# Patient Record
Sex: FEMALE | Race: WHITE | NOT HISPANIC OR LATINO | Employment: OTHER | URBAN - METROPOLITAN AREA
[De-identification: names, ages, dates, MRNs, and addresses within clinical notes are randomized per-mention and may not be internally consistent; named-entity substitution may affect disease eponyms.]

---

## 2017-01-03 ENCOUNTER — ALLSCRIPTS OFFICE VISIT (OUTPATIENT)
Dept: OTHER | Facility: OTHER | Age: 82
End: 2017-01-03

## 2017-01-05 ENCOUNTER — HOSPITAL ENCOUNTER (OUTPATIENT)
Dept: RADIOLOGY | Age: 82
Discharge: HOME | End: 2017-01-05
Payer: MEDICARE

## 2017-01-05 ENCOUNTER — TRANSCRIBE ORDERS (OUTPATIENT)
Dept: ADMINISTRATIVE | Age: 82
End: 2017-01-05

## 2017-01-05 DIAGNOSIS — M54.2 CERVICALGIA: ICD-10-CM

## 2017-01-05 PROCEDURE — 72050 X-RAY EXAM NECK SPINE 4/5VWS: CPT

## 2017-01-17 ENCOUNTER — ALLSCRIPTS OFFICE VISIT (OUTPATIENT)
Dept: OTHER | Facility: OTHER | Age: 82
End: 2017-01-17

## 2017-01-17 DIAGNOSIS — M23.50: ICD-10-CM

## 2017-01-19 ENCOUNTER — APPOINTMENT (OUTPATIENT)
Dept: PHYSICAL THERAPY | Age: 82
End: 2017-01-19
Payer: MEDICARE

## 2017-01-19 ENCOUNTER — GENERIC CONVERSION - ENCOUNTER (OUTPATIENT)
Dept: OTHER | Facility: OTHER | Age: 82
End: 2017-01-19

## 2017-01-19 DIAGNOSIS — M23.50: ICD-10-CM

## 2017-01-19 PROCEDURE — 97162 PT EVAL MOD COMPLEX 30 MIN: CPT

## 2017-01-19 PROCEDURE — G8979 MOBILITY GOAL STATUS: HCPCS

## 2017-01-19 PROCEDURE — G8978 MOBILITY CURRENT STATUS: HCPCS

## 2017-01-21 ENCOUNTER — OFFICE VISIT (OUTPATIENT)
Dept: URGENT CARE | Age: 82
End: 2017-01-21
Payer: MEDICARE

## 2017-01-21 PROCEDURE — 99204 OFFICE O/P NEW MOD 45 MIN: CPT | Performed by: FAMILY MEDICINE

## 2017-01-21 PROCEDURE — 99215 OFFICE O/P EST HI 40 MIN: CPT | Performed by: FAMILY MEDICINE

## 2017-01-21 PROCEDURE — G0463 HOSPITAL OUTPT CLINIC VISIT: HCPCS | Performed by: FAMILY MEDICINE

## 2017-01-24 ENCOUNTER — APPOINTMENT (OUTPATIENT)
Dept: PHYSICAL THERAPY | Age: 82
End: 2017-01-24
Payer: MEDICARE

## 2017-01-24 PROCEDURE — 97110 THERAPEUTIC EXERCISES: CPT

## 2017-01-26 ENCOUNTER — APPOINTMENT (OUTPATIENT)
Dept: PHYSICAL THERAPY | Age: 82
End: 2017-01-26
Payer: MEDICARE

## 2017-01-26 ENCOUNTER — GENERIC CONVERSION - ENCOUNTER (OUTPATIENT)
Dept: OTHER | Facility: OTHER | Age: 82
End: 2017-01-26

## 2017-01-26 PROCEDURE — 97110 THERAPEUTIC EXERCISES: CPT

## 2017-01-31 ENCOUNTER — APPOINTMENT (OUTPATIENT)
Dept: PHYSICAL THERAPY | Age: 82
End: 2017-01-31
Payer: MEDICARE

## 2017-01-31 PROCEDURE — 97110 THERAPEUTIC EXERCISES: CPT

## 2017-02-02 ENCOUNTER — GENERIC CONVERSION - ENCOUNTER (OUTPATIENT)
Dept: OTHER | Facility: OTHER | Age: 82
End: 2017-02-02

## 2017-02-02 ENCOUNTER — APPOINTMENT (OUTPATIENT)
Dept: PHYSICAL THERAPY | Age: 82
End: 2017-02-02
Payer: MEDICARE

## 2017-02-02 PROCEDURE — 97110 THERAPEUTIC EXERCISES: CPT

## 2017-02-03 ENCOUNTER — GENERIC CONVERSION - ENCOUNTER (OUTPATIENT)
Dept: OTHER | Facility: OTHER | Age: 82
End: 2017-02-03

## 2017-02-06 ENCOUNTER — ALLSCRIPTS OFFICE VISIT (OUTPATIENT)
Dept: OTHER | Facility: OTHER | Age: 82
End: 2017-02-06

## 2017-02-07 ENCOUNTER — APPOINTMENT (OUTPATIENT)
Dept: PHYSICAL THERAPY | Age: 82
End: 2017-02-07
Payer: MEDICARE

## 2017-02-07 PROCEDURE — 97110 THERAPEUTIC EXERCISES: CPT

## 2017-02-08 ENCOUNTER — GENERIC CONVERSION - ENCOUNTER (OUTPATIENT)
Dept: OTHER | Facility: OTHER | Age: 82
End: 2017-02-08

## 2017-02-09 ENCOUNTER — APPOINTMENT (OUTPATIENT)
Dept: PHYSICAL THERAPY | Age: 82
End: 2017-02-09
Payer: MEDICARE

## 2017-02-14 ENCOUNTER — APPOINTMENT (OUTPATIENT)
Dept: PHYSICAL THERAPY | Age: 82
End: 2017-02-14
Payer: MEDICARE

## 2017-02-14 PROCEDURE — 97110 THERAPEUTIC EXERCISES: CPT

## 2017-02-16 ENCOUNTER — APPOINTMENT (OUTPATIENT)
Dept: PHYSICAL THERAPY | Age: 82
End: 2017-02-16
Payer: MEDICARE

## 2017-02-16 PROCEDURE — 97140 MANUAL THERAPY 1/> REGIONS: CPT

## 2017-02-16 PROCEDURE — 97110 THERAPEUTIC EXERCISES: CPT

## 2017-02-21 ENCOUNTER — APPOINTMENT (OUTPATIENT)
Dept: PHYSICAL THERAPY | Age: 82
End: 2017-02-21
Payer: MEDICARE

## 2017-02-21 ENCOUNTER — GENERIC CONVERSION - ENCOUNTER (OUTPATIENT)
Dept: OTHER | Facility: OTHER | Age: 82
End: 2017-02-21

## 2017-02-21 PROCEDURE — 97110 THERAPEUTIC EXERCISES: CPT

## 2017-02-21 PROCEDURE — G8979 MOBILITY GOAL STATUS: HCPCS

## 2017-02-21 PROCEDURE — G8978 MOBILITY CURRENT STATUS: HCPCS

## 2017-02-23 ENCOUNTER — APPOINTMENT (OUTPATIENT)
Dept: PHYSICAL THERAPY | Age: 82
End: 2017-02-23
Payer: MEDICARE

## 2017-02-23 ENCOUNTER — APPOINTMENT (OUTPATIENT)
Dept: LAB | Age: 82
End: 2017-02-23
Payer: MEDICARE

## 2017-02-23 ENCOUNTER — TRANSCRIBE ORDERS (OUTPATIENT)
Dept: ADMINISTRATIVE | Age: 82
End: 2017-02-23

## 2017-02-23 ENCOUNTER — GENERIC CONVERSION - ENCOUNTER (OUTPATIENT)
Dept: OTHER | Facility: OTHER | Age: 82
End: 2017-02-23

## 2017-02-23 DIAGNOSIS — E11.9 TYPE 2 DIABETES MELLITUS WITHOUT COMPLICATIONS (HCC): ICD-10-CM

## 2017-02-23 DIAGNOSIS — D64.9 ANEMIA: ICD-10-CM

## 2017-02-23 LAB
ALBUMIN SERPL BCP-MCNC: 4 G/DL (ref 3.5–5)
ALP SERPL-CCNC: 92 U/L (ref 46–116)
ALT SERPL W P-5'-P-CCNC: 24 U/L (ref 12–78)
ANION GAP SERPL CALCULATED.3IONS-SCNC: 10 MMOL/L (ref 4–13)
AST SERPL W P-5'-P-CCNC: 18 U/L (ref 5–45)
BILIRUB SERPL-MCNC: 0.32 MG/DL (ref 0.2–1)
BUN SERPL-MCNC: 16 MG/DL (ref 5–25)
CALCIUM SERPL-MCNC: 10 MG/DL (ref 8.3–10.1)
CHLORIDE SERPL-SCNC: 100 MMOL/L (ref 100–108)
CO2 SERPL-SCNC: 29 MMOL/L (ref 21–32)
CREAT SERPL-MCNC: 1.21 MG/DL (ref 0.6–1.3)
EST. AVERAGE GLUCOSE BLD GHB EST-MCNC: 134 MG/DL
GFR SERPL CREATININE-BSD FRML MDRD: 42.6 ML/MIN/1.73SQ M
GLUCOSE SERPL-MCNC: 171 MG/DL (ref 65–140)
HBA1C MFR BLD: 6.3 % (ref 4.2–6.3)
POTASSIUM SERPL-SCNC: 3.3 MMOL/L (ref 3.5–5.3)
PROT SERPL-MCNC: 8 G/DL (ref 6.4–8.2)
SODIUM SERPL-SCNC: 139 MMOL/L (ref 136–145)

## 2017-02-23 PROCEDURE — 97010 HOT OR COLD PACKS THERAPY: CPT

## 2017-02-23 PROCEDURE — 80053 COMPREHEN METABOLIC PANEL: CPT

## 2017-02-23 PROCEDURE — 83036 HEMOGLOBIN GLYCOSYLATED A1C: CPT

## 2017-02-23 PROCEDURE — 97110 THERAPEUTIC EXERCISES: CPT

## 2017-02-23 PROCEDURE — 36415 COLL VENOUS BLD VENIPUNCTURE: CPT

## 2017-02-24 ENCOUNTER — GENERIC CONVERSION - ENCOUNTER (OUTPATIENT)
Dept: OTHER | Facility: OTHER | Age: 82
End: 2017-02-24

## 2017-02-28 ENCOUNTER — ALLSCRIPTS OFFICE VISIT (OUTPATIENT)
Dept: OTHER | Facility: OTHER | Age: 82
End: 2017-02-28

## 2017-02-28 ENCOUNTER — APPOINTMENT (OUTPATIENT)
Dept: PHYSICAL THERAPY | Age: 82
End: 2017-02-28
Payer: MEDICARE

## 2017-02-28 LAB
HBA1C MFR BLD HPLC: 6 %
MAX DIASTOLIC BP: 78 MMHG
MAX HEART RATE: 118 BPM
MAX PREDICTED HEART RATE: 138 BPM
MAX. SYSTOLIC BP: 158 MMHG
PROTOCOL NAME: NORMAL
REASON FOR TERMINATION: NORMAL
TARGET HR FORMULA: NORMAL
TEST INDICATION: NORMAL
TIME IN EXERCISE PHASE: 180 S

## 2017-03-02 ENCOUNTER — APPOINTMENT (OUTPATIENT)
Dept: PHYSICAL THERAPY | Age: 82
End: 2017-03-02
Payer: MEDICARE

## 2017-03-02 ENCOUNTER — ALLSCRIPTS OFFICE VISIT (OUTPATIENT)
Dept: OTHER | Facility: OTHER | Age: 82
End: 2017-03-02

## 2017-03-02 PROCEDURE — 97110 THERAPEUTIC EXERCISES: CPT

## 2017-03-10 ENCOUNTER — ALLSCRIPTS OFFICE VISIT (OUTPATIENT)
Dept: RADIOLOGY | Facility: CLINIC | Age: 82
End: 2017-03-10
Payer: MEDICARE

## 2017-03-24 ENCOUNTER — GENERIC CONVERSION - ENCOUNTER (OUTPATIENT)
Dept: OTHER | Facility: OTHER | Age: 82
End: 2017-03-24

## 2017-03-28 ENCOUNTER — TRANSCRIBE ORDERS (OUTPATIENT)
Dept: ADMINISTRATIVE | Age: 82
End: 2017-03-28

## 2017-03-30 ENCOUNTER — APPOINTMENT (OUTPATIENT)
Dept: LAB | Age: 82
End: 2017-03-30
Payer: MEDICARE

## 2017-03-30 ENCOUNTER — TRANSCRIBE ORDERS (OUTPATIENT)
Dept: ADMINISTRATIVE | Age: 82
End: 2017-03-30

## 2017-03-30 ENCOUNTER — LAB CONVERSION - ENCOUNTER (OUTPATIENT)
Dept: OTHER | Facility: OTHER | Age: 82
End: 2017-03-30

## 2017-03-30 DIAGNOSIS — E78.5 HYPERLIPIDEMIA, UNSPECIFIED HYPERLIPIDEMIA TYPE: ICD-10-CM

## 2017-03-30 DIAGNOSIS — Z95.2 HEART VALVE REPLACED BY TRANSPLANT: ICD-10-CM

## 2017-03-30 DIAGNOSIS — Z86.2 PERSONAL HISTORY OF DISEASES OF BLOOD AND BLOOD-FORMING ORGANS: ICD-10-CM

## 2017-03-30 DIAGNOSIS — I25.119 ATHEROSCLEROSIS OF NATIVE CORONARY ARTERY WITH ANGINA PECTORIS, UNSPECIFIED WHETHER NATIVE OR TRANSPLANTED HEART (HCC): ICD-10-CM

## 2017-03-30 DIAGNOSIS — Z86.2 PERSONAL HISTORY OF DISEASES OF BLOOD AND BLOOD-FORMING ORGANS: Primary | ICD-10-CM

## 2017-03-30 DIAGNOSIS — I25.119 ATHEROSCLEROSIS OF NATIVE CORONARY ARTERY WITH ANGINA PECTORIS, UNSPECIFIED WHETHER NATIVE OR TRANSPLANTED HEART (HCC): Primary | ICD-10-CM

## 2017-03-30 LAB
BASOPHILS # BLD AUTO: 0.09 THOUSANDS/ΜL (ref 0–0.1)
BASOPHILS NFR BLD AUTO: 1 % (ref 0–1)
EOSINOPHIL # BLD AUTO: 0.61 THOUSAND/ΜL (ref 0–0.61)
EOSINOPHIL NFR BLD AUTO: 5 % (ref 0–6)
ERYTHROCYTE [DISTWIDTH] IN BLOOD BY AUTOMATED COUNT: 14 % (ref 11.6–15.1)
FERRITIN SERPL-MCNC: 26 NG/ML (ref 8–388)
HCT VFR BLD AUTO: 39.4 % (ref 34.8–46.1)
HGB BLD-MCNC: 12.3 G/DL (ref 11.5–15.4)
LYMPHOCYTES # BLD AUTO: 1.6 THOUSANDS/ΜL (ref 0.6–4.47)
LYMPHOCYTES NFR BLD AUTO: 14 % (ref 14–44)
MCH RBC QN AUTO: 28 PG (ref 26.8–34.3)
MCHC RBC AUTO-ENTMCNC: 31.2 G/DL (ref 31.4–37.4)
MCV RBC AUTO: 90 FL (ref 82–98)
MONOCYTES # BLD AUTO: 1 THOUSAND/ΜL (ref 0.17–1.22)
MONOCYTES NFR BLD AUTO: 9 % (ref 4–12)
NEUTROPHILS # BLD AUTO: 8.43 THOUSANDS/ΜL (ref 1.85–7.62)
NEUTS SEG NFR BLD AUTO: 71 % (ref 43–75)
NRBC BLD AUTO-RTO: 0 /100 WBCS
PLATELET # BLD AUTO: 358 THOUSANDS/UL (ref 149–390)
PMV BLD AUTO: 10.7 FL (ref 8.9–12.7)
RBC # BLD AUTO: 4.4 MILLION/UL (ref 3.81–5.12)
WBC # BLD AUTO: 11.78 THOUSAND/UL (ref 4.31–10.16)

## 2017-03-30 PROCEDURE — 82728 ASSAY OF FERRITIN: CPT

## 2017-03-30 PROCEDURE — 85025 COMPLETE CBC W/AUTO DIFF WBC: CPT

## 2017-03-30 PROCEDURE — 36415 COLL VENOUS BLD VENIPUNCTURE: CPT

## 2017-04-05 ENCOUNTER — ALLSCRIPTS OFFICE VISIT (OUTPATIENT)
Dept: OTHER | Facility: OTHER | Age: 82
End: 2017-04-05

## 2017-04-11 ENCOUNTER — ALLSCRIPTS OFFICE VISIT (OUTPATIENT)
Dept: OTHER | Facility: OTHER | Age: 82
End: 2017-04-11

## 2017-05-02 ENCOUNTER — ALLSCRIPTS OFFICE VISIT (OUTPATIENT)
Dept: OTHER | Facility: OTHER | Age: 82
End: 2017-05-02

## 2017-05-11 ENCOUNTER — GENERIC CONVERSION - ENCOUNTER (OUTPATIENT)
Dept: OTHER | Facility: OTHER | Age: 82
End: 2017-05-11

## 2017-06-01 ENCOUNTER — TRANSCRIBE ORDERS (OUTPATIENT)
Dept: ADMINISTRATIVE | Age: 82
End: 2017-06-01

## 2017-06-01 ENCOUNTER — APPOINTMENT (OUTPATIENT)
Dept: LAB | Age: 82
End: 2017-06-01
Payer: MEDICARE

## 2017-06-01 DIAGNOSIS — D50.9 IRON DEFICIENCY ANEMIA: ICD-10-CM

## 2017-06-01 LAB
BASOPHILS # BLD AUTO: 0.09 THOUSANDS/ΜL (ref 0–0.1)
BASOPHILS NFR BLD AUTO: 1 % (ref 0–1)
EOSINOPHIL # BLD AUTO: 0.55 THOUSAND/ΜL (ref 0–0.61)
EOSINOPHIL NFR BLD AUTO: 4 % (ref 0–6)
ERYTHROCYTE [DISTWIDTH] IN BLOOD BY AUTOMATED COUNT: 14.2 % (ref 11.6–15.1)
FERRITIN SERPL-MCNC: 28 NG/ML (ref 8–388)
HCT VFR BLD AUTO: 40.8 % (ref 34.8–46.1)
HGB BLD-MCNC: 12.7 G/DL (ref 11.5–15.4)
LYMPHOCYTES # BLD AUTO: 1.83 THOUSANDS/ΜL (ref 0.6–4.47)
LYMPHOCYTES NFR BLD AUTO: 14 % (ref 14–44)
MCH RBC QN AUTO: 28.5 PG (ref 26.8–34.3)
MCHC RBC AUTO-ENTMCNC: 31.1 G/DL (ref 31.4–37.4)
MCV RBC AUTO: 92 FL (ref 82–98)
MONOCYTES # BLD AUTO: 0.68 THOUSAND/ΜL (ref 0.17–1.22)
MONOCYTES NFR BLD AUTO: 5 % (ref 4–12)
NEUTROPHILS # BLD AUTO: 9.88 THOUSANDS/ΜL (ref 1.85–7.62)
NEUTS SEG NFR BLD AUTO: 76 % (ref 43–75)
NRBC BLD AUTO-RTO: 0 /100 WBCS
PLATELET # BLD AUTO: 388 THOUSANDS/UL (ref 149–390)
PMV BLD AUTO: 10.7 FL (ref 8.9–12.7)
RBC # BLD AUTO: 4.45 MILLION/UL (ref 3.81–5.12)
WBC # BLD AUTO: 13.06 THOUSAND/UL (ref 4.31–10.16)

## 2017-06-01 PROCEDURE — 36415 COLL VENOUS BLD VENIPUNCTURE: CPT

## 2017-06-01 PROCEDURE — 85025 COMPLETE CBC W/AUTO DIFF WBC: CPT

## 2017-06-01 PROCEDURE — 82728 ASSAY OF FERRITIN: CPT

## 2017-06-07 ENCOUNTER — ALLSCRIPTS OFFICE VISIT (OUTPATIENT)
Dept: OTHER | Facility: OTHER | Age: 82
End: 2017-06-07

## 2017-06-07 DIAGNOSIS — F03.90 DEMENTIA WITHOUT BEHAVIORAL DISTURBANCE (HCC): ICD-10-CM

## 2017-06-07 DIAGNOSIS — R60.9 EDEMA: ICD-10-CM

## 2017-06-07 DIAGNOSIS — E11.9 TYPE 2 DIABETES MELLITUS WITHOUT COMPLICATIONS (HCC): ICD-10-CM

## 2017-06-07 DIAGNOSIS — N39.0 URINARY TRACT INFECTION: ICD-10-CM

## 2017-06-07 DIAGNOSIS — E03.9 HYPOTHYROIDISM: ICD-10-CM

## 2017-06-07 DIAGNOSIS — I10 ESSENTIAL (PRIMARY) HYPERTENSION: ICD-10-CM

## 2017-06-15 ENCOUNTER — HOSPITAL ENCOUNTER (OUTPATIENT)
Dept: RADIOLOGY | Age: 82
Discharge: HOME | End: 2017-06-15
Payer: MEDICARE

## 2017-06-15 ENCOUNTER — APPOINTMENT (OUTPATIENT)
Dept: LAB | Age: 82
End: 2017-06-15
Payer: MEDICARE

## 2017-06-15 ENCOUNTER — TRANSCRIBE ORDERS (OUTPATIENT)
Dept: ADMINISTRATIVE | Age: 82
End: 2017-06-15

## 2017-06-15 DIAGNOSIS — E11.9 TYPE 2 DIABETES MELLITUS WITHOUT COMPLICATIONS (HCC): ICD-10-CM

## 2017-06-15 DIAGNOSIS — I10 ESSENTIAL (PRIMARY) HYPERTENSION: ICD-10-CM

## 2017-06-15 DIAGNOSIS — E03.9 HYPOTHYROIDISM: ICD-10-CM

## 2017-06-15 DIAGNOSIS — F03.90 DEMENTIA WITHOUT BEHAVIORAL DISTURBANCE (HCC): ICD-10-CM

## 2017-06-15 DIAGNOSIS — R60.9 EDEMA: ICD-10-CM

## 2017-06-15 DIAGNOSIS — N39.0 URINARY TRACT INFECTION: ICD-10-CM

## 2017-06-15 LAB
ALBUMIN SERPL BCP-MCNC: 4 G/DL (ref 3.5–5)
ALP SERPL-CCNC: 96 U/L (ref 46–116)
ALT SERPL W P-5'-P-CCNC: 27 U/L (ref 12–78)
ANION GAP SERPL CALCULATED.3IONS-SCNC: 9 MMOL/L (ref 4–13)
AST SERPL W P-5'-P-CCNC: 21 U/L (ref 5–45)
BACTERIA UR QL AUTO: ABNORMAL /HPF
BILIRUB SERPL-MCNC: 0.52 MG/DL (ref 0.2–1)
BILIRUB UR QL STRIP: NEGATIVE
BUN SERPL-MCNC: 19 MG/DL (ref 5–25)
CALCIUM SERPL-MCNC: 9.7 MG/DL (ref 8.3–10.1)
CHLORIDE SERPL-SCNC: 95 MMOL/L (ref 100–108)
CLARITY UR: ABNORMAL
CO2 SERPL-SCNC: 30 MMOL/L (ref 21–32)
COLOR UR: YELLOW
CREAT SERPL-MCNC: 1.07 MG/DL (ref 0.6–1.3)
EST. AVERAGE GLUCOSE BLD GHB EST-MCNC: 134 MG/DL
GFR SERPL CREATININE-BSD FRML MDRD: 49.1 ML/MIN/1.73SQ M
GLUCOSE P FAST SERPL-MCNC: 117 MG/DL (ref 65–99)
GLUCOSE UR STRIP-MCNC: NEGATIVE MG/DL
HBA1C MFR BLD: 6.3 % (ref 4.2–6.3)
HGB UR QL STRIP.AUTO: NEGATIVE
HYALINE CASTS #/AREA URNS LPF: ABNORMAL /LPF
KETONES UR STRIP-MCNC: NEGATIVE MG/DL
LEUKOCYTE ESTERASE UR QL STRIP: ABNORMAL
NITRITE UR QL STRIP: NEGATIVE
NON-SQ EPI CELLS URNS QL MICRO: ABNORMAL /HPF
PH UR STRIP.AUTO: 6.5 [PH] (ref 4.5–8)
POTASSIUM SERPL-SCNC: 3.6 MMOL/L (ref 3.5–5.3)
PROT SERPL-MCNC: 7.9 G/DL (ref 6.4–8.2)
PROT UR STRIP-MCNC: ABNORMAL MG/DL
RBC #/AREA URNS AUTO: ABNORMAL /HPF
SODIUM SERPL-SCNC: 134 MMOL/L (ref 136–145)
SP GR UR STRIP.AUTO: 1.02 (ref 1–1.03)
T4 FREE SERPL-MCNC: 1.31 NG/DL (ref 0.76–1.46)
TSH SERPL DL<=0.05 MIU/L-ACNC: 1.73 UIU/ML (ref 0.36–3.74)
UROBILINOGEN UR QL STRIP.AUTO: 0.2 E.U./DL
VIT B12 SERPL-MCNC: 656 PG/ML (ref 100–900)
WBC #/AREA URNS AUTO: ABNORMAL /HPF

## 2017-06-15 PROCEDURE — 82607 VITAMIN B-12: CPT

## 2017-06-15 PROCEDURE — 70450 CT HEAD/BRAIN W/O DYE: CPT

## 2017-06-15 PROCEDURE — 81001 URINALYSIS AUTO W/SCOPE: CPT

## 2017-06-15 PROCEDURE — 84439 ASSAY OF FREE THYROXINE: CPT

## 2017-06-15 PROCEDURE — 36415 COLL VENOUS BLD VENIPUNCTURE: CPT

## 2017-06-15 PROCEDURE — 84443 ASSAY THYROID STIM HORMONE: CPT

## 2017-06-15 PROCEDURE — 83036 HEMOGLOBIN GLYCOSYLATED A1C: CPT

## 2017-06-15 PROCEDURE — 80053 COMPREHEN METABOLIC PANEL: CPT

## 2017-06-21 ENCOUNTER — ALLSCRIPTS OFFICE VISIT (OUTPATIENT)
Dept: OTHER | Facility: OTHER | Age: 82
End: 2017-06-21

## 2017-06-26 ENCOUNTER — TRANSCRIBE ORDERS (OUTPATIENT)
Dept: ADMINISTRATIVE | Facility: HOSPITAL | Age: 82
End: 2017-06-26

## 2017-06-26 DIAGNOSIS — I35.0 NODULAR CALCIFIC AORTIC VALVE STENOSIS: Primary | ICD-10-CM

## 2017-06-29 ENCOUNTER — ALLSCRIPTS OFFICE VISIT (OUTPATIENT)
Dept: OTHER | Facility: OTHER | Age: 82
End: 2017-06-29

## 2017-07-03 ENCOUNTER — ALLSCRIPTS OFFICE VISIT (OUTPATIENT)
Dept: OTHER | Facility: OTHER | Age: 82
End: 2017-07-03

## 2017-07-03 DIAGNOSIS — D50.9 IRON DEFICIENCY ANEMIA: ICD-10-CM

## 2017-07-07 DIAGNOSIS — I25.10 ATHEROSCLEROTIC HEART DISEASE OF NATIVE CORONARY ARTERY WITHOUT ANGINA PECTORIS: ICD-10-CM

## 2017-07-07 DIAGNOSIS — E78.5 HYPERLIPIDEMIA: ICD-10-CM

## 2017-07-07 DIAGNOSIS — Z95.2 PRESENCE OF PROSTHETIC HEART VALVE: ICD-10-CM

## 2017-07-18 ENCOUNTER — TRANSCRIBE ORDERS (OUTPATIENT)
Dept: LAB | Facility: CLINIC | Age: 82
End: 2017-07-18

## 2017-07-18 ENCOUNTER — HOSPITAL ENCOUNTER (OUTPATIENT)
Dept: NON INVASIVE DIAGNOSTICS | Facility: CLINIC | Age: 82
Discharge: HOME | End: 2017-07-18
Payer: MEDICARE

## 2017-07-18 ENCOUNTER — OFFICE VISIT (OUTPATIENT)
Dept: LAB | Facility: CLINIC | Age: 82
End: 2017-07-18
Payer: MEDICARE

## 2017-07-18 ENCOUNTER — APPOINTMENT (OUTPATIENT)
Dept: LAB | Facility: CLINIC | Age: 82
End: 2017-07-18
Payer: MEDICARE

## 2017-07-18 ENCOUNTER — GENERIC CONVERSION - ENCOUNTER (OUTPATIENT)
Dept: OTHER | Facility: OTHER | Age: 82
End: 2017-07-18

## 2017-07-18 DIAGNOSIS — I35.0 NONRHEUMATIC AORTIC VALVE STENOSIS: ICD-10-CM

## 2017-07-18 DIAGNOSIS — Z95.2 PRESENCE OF PROSTHETIC HEART VALVE: ICD-10-CM

## 2017-07-18 DIAGNOSIS — I35.0 NODULAR CALCIFIC AORTIC VALVE STENOSIS: ICD-10-CM

## 2017-07-18 LAB
ANION GAP SERPL CALCULATED.3IONS-SCNC: 9 MMOL/L (ref 4–13)
ATRIAL RATE: 82 BPM
BUN SERPL-MCNC: 18 MG/DL (ref 5–25)
CALCIUM SERPL-MCNC: 9.8 MG/DL (ref 8.3–10.1)
CHLORIDE SERPL-SCNC: 99 MMOL/L (ref 100–108)
CO2 SERPL-SCNC: 31 MMOL/L (ref 21–32)
CREAT SERPL-MCNC: 0.95 MG/DL (ref 0.6–1.3)
ERYTHROCYTE [DISTWIDTH] IN BLOOD BY AUTOMATED COUNT: 13.6 % (ref 11.6–15.1)
GFR SERPL CREATININE-BSD FRML MDRD: 56.3 ML/MIN/1.73SQ M
GLUCOSE SERPL-MCNC: 99 MG/DL (ref 65–140)
HCT VFR BLD AUTO: 38 % (ref 34.8–46.1)
HGB BLD-MCNC: 11.9 G/DL (ref 11.5–15.4)
MCH RBC QN AUTO: 28.2 PG (ref 26.8–34.3)
MCHC RBC AUTO-ENTMCNC: 31.3 G/DL (ref 31.4–37.4)
MCV RBC AUTO: 90 FL (ref 82–98)
P AXIS: 62 DEGREES
PLATELET # BLD AUTO: 424 THOUSANDS/UL (ref 149–390)
PMV BLD AUTO: 9.8 FL (ref 8.9–12.7)
POTASSIUM SERPL-SCNC: 3.6 MMOL/L (ref 3.5–5.3)
PR INTERVAL: 200 MS
QRS AXIS: -67 DEGREES
QRSD INTERVAL: 180 MS
QT INTERVAL: 464 MS
QTC INTERVAL: 542 MS
RBC # BLD AUTO: 4.22 MILLION/UL (ref 3.81–5.12)
SODIUM SERPL-SCNC: 139 MMOL/L (ref 136–145)
T WAVE AXIS: 87 DEGREES
VENTRICULAR RATE: 82 BPM
WBC # BLD AUTO: 12.83 THOUSAND/UL (ref 4.31–10.16)

## 2017-07-18 PROCEDURE — 36415 COLL VENOUS BLD VENIPUNCTURE: CPT

## 2017-07-18 PROCEDURE — 93306 TTE W/DOPPLER COMPLETE: CPT

## 2017-07-18 PROCEDURE — 93005 ELECTROCARDIOGRAM TRACING: CPT

## 2017-07-18 PROCEDURE — 85027 COMPLETE CBC AUTOMATED: CPT

## 2017-07-18 PROCEDURE — 80048 BASIC METABOLIC PNL TOTAL CA: CPT

## 2017-07-19 ENCOUNTER — ALLSCRIPTS OFFICE VISIT (OUTPATIENT)
Dept: OTHER | Facility: OTHER | Age: 82
End: 2017-07-19

## 2017-08-01 ENCOUNTER — TRANSCRIBE ORDERS (OUTPATIENT)
Dept: ADMINISTRATIVE | Age: 82
End: 2017-08-01

## 2017-08-01 ENCOUNTER — APPOINTMENT (OUTPATIENT)
Dept: LAB | Age: 82
End: 2017-08-01
Payer: MEDICARE

## 2017-08-01 DIAGNOSIS — I25.119 ATHEROSCLEROSIS OF NATIVE CORONARY ARTERY WITH ANGINA PECTORIS, UNSPECIFIED WHETHER NATIVE OR TRANSPLANTED HEART (HCC): ICD-10-CM

## 2017-08-01 DIAGNOSIS — E78.5 OTHER AND UNSPECIFIED HYPERLIPIDEMIA: ICD-10-CM

## 2017-08-01 DIAGNOSIS — Z95.2 HEART VALVE REPLACED BY TRANSPLANT: ICD-10-CM

## 2017-08-01 DIAGNOSIS — I25.119 ATHEROSCLEROSIS OF NATIVE CORONARY ARTERY WITH ANGINA PECTORIS, UNSPECIFIED WHETHER NATIVE OR TRANSPLANTED HEART (HCC): Primary | ICD-10-CM

## 2017-08-01 LAB
ALBUMIN SERPL BCP-MCNC: 3.9 G/DL (ref 3.5–5)
ALP SERPL-CCNC: 88 U/L (ref 46–116)
ALT SERPL W P-5'-P-CCNC: 19 U/L (ref 12–78)
ANION GAP SERPL CALCULATED.3IONS-SCNC: 11 MMOL/L (ref 4–13)
AST SERPL W P-5'-P-CCNC: 20 U/L (ref 5–45)
BILIRUB SERPL-MCNC: 0.56 MG/DL (ref 0.2–1)
BUN SERPL-MCNC: 18 MG/DL (ref 5–25)
CALCIUM SERPL-MCNC: 9.9 MG/DL (ref 8.3–10.1)
CHLORIDE SERPL-SCNC: 98 MMOL/L (ref 100–108)
CHOLEST SERPL-MCNC: 122 MG/DL (ref 50–200)
CO2 SERPL-SCNC: 28 MMOL/L (ref 21–32)
CREAT SERPL-MCNC: 0.97 MG/DL (ref 0.6–1.3)
GFR SERPL CREATININE-BSD FRML MDRD: 55 ML/MIN/1.73SQ M
GLUCOSE P FAST SERPL-MCNC: 120 MG/DL (ref 65–99)
HDLC SERPL-MCNC: 55 MG/DL (ref 40–60)
LDLC SERPL CALC-MCNC: 44 MG/DL (ref 0–100)
POTASSIUM SERPL-SCNC: 3.7 MMOL/L (ref 3.5–5.3)
PROT SERPL-MCNC: 7.8 G/DL (ref 6.4–8.2)
SODIUM SERPL-SCNC: 137 MMOL/L (ref 136–145)
TRIGL SERPL-MCNC: 117 MG/DL

## 2017-08-01 PROCEDURE — 80053 COMPREHEN METABOLIC PANEL: CPT

## 2017-08-01 PROCEDURE — 36415 COLL VENOUS BLD VENIPUNCTURE: CPT

## 2017-08-01 PROCEDURE — 80061 LIPID PANEL: CPT

## 2017-08-02 ENCOUNTER — GENERIC CONVERSION - ENCOUNTER (OUTPATIENT)
Dept: OTHER | Facility: OTHER | Age: 82
End: 2017-08-02

## 2017-08-07 ENCOUNTER — ALLSCRIPTS OFFICE VISIT (OUTPATIENT)
Dept: OTHER | Facility: OTHER | Age: 82
End: 2017-08-07

## 2017-08-10 ENCOUNTER — ALLSCRIPTS OFFICE VISIT (OUTPATIENT)
Dept: OTHER | Facility: OTHER | Age: 82
End: 2017-08-10

## 2017-09-06 ENCOUNTER — ALLSCRIPTS OFFICE VISIT (OUTPATIENT)
Dept: OTHER | Facility: OTHER | Age: 82
End: 2017-09-06

## 2017-09-07 ENCOUNTER — TRANSCRIBE ORDERS (OUTPATIENT)
Dept: ADMINISTRATIVE | Age: 82
End: 2017-09-07

## 2017-09-07 ENCOUNTER — APPOINTMENT (OUTPATIENT)
Dept: LAB | Age: 82
End: 2017-09-07
Payer: MEDICARE

## 2017-09-07 DIAGNOSIS — E03.9 HYPOTHYROIDISM: ICD-10-CM

## 2017-09-07 LAB
T4 FREE SERPL-MCNC: 1.23 NG/DL (ref 0.76–1.46)
TSH SERPL DL<=0.05 MIU/L-ACNC: 1.81 UIU/ML (ref 0.36–3.74)

## 2017-09-07 PROCEDURE — 84443 ASSAY THYROID STIM HORMONE: CPT

## 2017-09-07 PROCEDURE — 84439 ASSAY OF FREE THYROXINE: CPT

## 2017-09-07 PROCEDURE — 36415 COLL VENOUS BLD VENIPUNCTURE: CPT

## 2017-10-04 ENCOUNTER — ALLSCRIPTS OFFICE VISIT (OUTPATIENT)
Dept: OTHER | Facility: OTHER | Age: 82
End: 2017-10-04

## 2017-10-25 NOTE — PROGRESS NOTES
Assessment  1  Hypothyroid (244 9) (E03 9)   2  Benign essential HTN (401 1) (I10)   3  CAD S/P percutaneous coronary angioplasty (414 01,V45 82) (I25 10,Z98 61)   4  Chronic low back pain (724 2,338 29) (M54 5,G89 29)   5  Dementia (294 20) (F03 90)   6  Edema (782 3) (R60 9)   7  Hyperlipidemia (272 4) (E78 5)   8  S/p TAVR (transcatheter aortic valve replacement), bioprosthetic (V42 2) (Z95 3)   9  Sleep apnea in adult (327 23) (G47 30)   10  Type 2 diabetes mellitus (250 00) (E11 9)    Plan  Hypothyroid    · (1) T4, FREE; Status:Active; Requested [facility]:[id number];    · (1) TSH; Status:Active; Requested [facility]:[id number];     #1 fatigue symptoms with history of hypothyroid I've asked the patient to have blood testing for free T4 TSH to ensure that she does not have a on the replacement situation  Her CBC indicates no signs of anemia  Her oxygenation is good with a PO2 of 96%  #2 hypertension blood pressure today is 120/78 with good control  Continue on present blood pressure medications  #3 coronary artery disease patient denies any active symptoms of chest pain palpitations or shortness of breath  Her most recent cardiology visit her Plavix was discontinued  She continues on low-dose aspirin on a daily basis  #4 chronic low back pain she continues with epidural steroid injections with her pain management physician Dr Ra Nugent  Neelima 5 early dementia symptoms likely to be secondary to aging process no evidence of Alzheimer's at this time and to need to monitor  #4 history of edema no signs or symptoms of congestive heart failure continue on furosemide 20 mg daily  #5 is free of hyperlipidemia good control reviewed patient's most recent blood work and encouraged her to continue on a healthy diet and continue on her atorvastatin medication  #6 history of sleep apnea patient continues to utilize oxygen 2 L/m overnight for control of nocturnal desaturations      #7 type 2 diabetes good control diabetes no evidence of hypoglycemia most recent blood test on  was 120 for her fasting glucose should continue diet and oral agents for control  Discussion/Summary  Discussion Summary:   In summary this 70-year-old female patient presents today for routine follow-up checkup  Her only complaint is that of fatigue we've asked her to have a free T4 and TSH to check on her thyroid replacement level  We'll continue on her present medications  We did review her blood work including her lipid profile blood sugar comprehensive metabolic profile and CBC  Ovidio Narinder to stay active and maintain a diet  I'll see her in 2 months for her next regular visit  Counseling Documentation With Imm: total time of encounter was 30 minutes-- and-- 20 minutes was spent counseling  Chief Complaint  Chief Complaint Free Text Note Form:  routine visit for this 70-year-old female patient  History of Present Illness  HPI: This 70-year-old female patient presents today for routine follow-up visit  Since her last visit with me she has been seen by cardiology and evaluated for her coronary artery disease and status post aortic valve replacement  Echocardiogram indicates good position and function of her aortic valve  She denies any chest pain palpitations shortness of breath or peripheral edema  Her cardiac disease presently seems to be well compensated with no evidence of any unstable angina or congestive heart failure  She has a permanent pacemaker which appears to be functioning fine  We reviewed her most recent blood work which indicates a well-controlled lipid profile with cholesterol of 122 and LDL of 44 and a triglyceride reading of 117 and a HDL of 55  Her fasting glucose reading was 120 she has had no apparent hypo-glycemic episodes  does complain of fatigue symptoms  We reviewed her most recent CBC which shows no anemia  I've ordered a free T4 and a TSH to assess her thyroid replacement status        Review of Systems  Complete-Female:   Constitutional: feeling tired-- and-- recent 2 lb weight loss, but-- no fever-- and-- no chills  Eyes: Decreased visual acuity, but-- as noted in HPI    ENT: no complaints of earache, no loss of hearing, no nose bleeds, no nasal discharge, no sore throat, no hoarseness  Cardiovascular: No complaints of slow heart rate, no fast heart rate, no chest pain, no palpitations, no leg claudication, no lower extremity edema  Respiratory: No complaints of shortness of breath, no wheezing, no cough, no SOB on exertion, no orthopnea, no PND  Gastrointestinal: No complaints of abdominal pain, no constipation, no nausea or vomiting, no diarrhea, no bloody stools  Genitourinary: No complaints of dysuria, no incontinence, no pelvic pain, no dysmenorrhea, no vaginal discharge or bleeding  Musculoskeletal: No complaints of arthralgias, no myalgias, no joint swelling or stiffness, no limb pain or swelling  Integumentary: No complaints of skin rash or lesions, no itching, no skin wounds, no breast pain or lump  Neurological: No complaints of headache, no confusion, no convulsions, no numbness, no dizziness or fainting, no tingling, no limb weakness, no difficulty walking  Endocrine: No complaints of proptosis, no hot flashes, no muscle weakness, no deepening of the voice, no feelings of weakness  Hematologic/Lymphatic: No complaints of swollen glands, no swollen glands in the neck, does not bleed easily, does not bruise easily  Active Problems  1  Anemia, iron deficiency (280 9) (D50 9)   2  Back pain (724 5) (M54 9)   3  Benign essential HTN (401 1) (I10)   4  Bilateral knee pain (719 46) (M25 561,M25 562)   5  CAD S/P percutaneous coronary angioplasty (414 01,V45 82) (I25 10,Z98 61)   6  Chronic low back pain (724 2,338 29) (M54 5,G89 29)   7  Dementia (294 20) (F03 90)   8  Dependence on nocturnal oxygen therapy (V46 2) (Z99 81)   9  Edema (782 3) (R60 9)   10   Essential thrombocytosis (238 71) (D47 3)   11  GERD (gastroesophageal reflux disease) (530 81) (K21 9)   12  Hyperlipidemia (272 4) (E78 5)   13  Hypochloremia (276 9) (E87 8)   14  Hyponatremia (276 1) (E87 1)   15  Hypothyroid (244 9) (E03 9)   16  Irritable bowel syndrome (564 1) (K58 9)   17  Lumbago (724 2) (M54 5)   18  Lumbar disc herniation with radiculopathy (722 10) (M51 16)   19  Macular degeneration (362 50) (H35 30)   20  Meniere disease (386 00) (H81 09)   21  Nonrheumatic aortic valve stenosis (424 1) (I35 0)   22  Presence of cardiac pacemaker (V45 01) (Z95 0)   23  S/p TAVR (transcatheter aortic valve replacement), bioprosthetic (V42 2) (Z95 3)   24  Sleep apnea in adult (327 23) (G47 30)   25  Type 2 diabetes mellitus (250 00) (E11 9)   26  Viral respiratory infection (079 99) (J98 8,B97 89)    Past Medical History  1  History of Acute UTI (599 0) (N39 0)   2  History of Aortic Valve Replacement   3  History of Arthritis of knee (716 96) (M17 10)   4  History of Bilateral knee pain (719 46) (M25 561,M25 562)   5  History of Bilateral knee pain (719 46) (M25 561,M25 562)   6  History of Calcaneal spur (726 73) (M77 30)   7  History of Cervicalgia (723 1) (M54 2)   8  History of Chalazion of right eye (373 2) (H00 13)   9  History of Chronic knee instability (718 86) (M23 50)   10  History of Chronic pain syndrome (338 4) (G89 4)   11  History of Diverticulitis (562 11) (K57 92)   12  History of Hip pain (719 45) (M25 559)   13  History of anemia (V12 3) (Z86 2)   14  History of bilateral knee replacement (V43 65) (Z96 653)   15  History of leukocytosis (V12 3) (Z86 2)   16  History of osteopenia (V13 59) (Z87 39)   17  History of thrombocytosis (V12 3) (Z86 2)   18  History of Hordeolum externum of left lower eyelid (373 11) (H00 015)   19  History of Leg wound, right (891 0) (S81 801A)   20  History of Lumbar postlaminectomy syndrome (722 83) (M96 1)   21   History of Meniere's disease (386 00) (H81 09) 22  History of Myofascial pain syndrome (729 1) (M79 1)   23  History of Neck pain (723 1) (M54 2)   24  History of Need for DTP vaccine (V06 1) (Z23)   25  History of Need for immunization against influenza (V04 81) (Z23)   26  History of Other specified symptoms and signs involving the circulatory and respiratory systems    (785 9,786 9) (R09 89)   27  History of S/P TAVR (transcatheter aortic valve replacement) (V43 3) (Z95 2)  Active Problems And Past Medical History Reviewed: The active problems and past medical history were reviewed and updated today  Surgical History  1  History of Aortic Valve Replacement Transcatheter   2  History of Appendectomy   3  History of Arthrodesis Lumbar   4  History of Back Surgery   5  History of Cholecystectomy   6  History of Eye Surgery   7  History of Hysterectomy   8  History of Pacemaker Placement   9  History of Small Bowel Resection   10  History of Tonsillectomy   11  History of Total Knee Arthroplasty   12  History of Venous Ligation With Stripping  Surgical History Reviewed: The surgical history was reviewed and updated today  Family History  Father    1  Family history of CAD in native artery  Family History    2  Family history of CAD in native artery   3  Family history of cerebrovascular accident (CVA) (V17 1) (Z82 3)   4  Family history of osteopenia (V17 89) (Z82 69)   5  Family history of Pituitary disease   6  Family history of Polycythemia  Family History Reviewed: The family history was reviewed and updated today  Social History   · Denied: Alcohol use (V49 89) (Z78 9)   · Denied: Drug use (305 90) (F19 90)   · Lives in independent group home (V60 89) (Z59 8)   · Never a smoker   · Non drinker / no alcohol use  Social History Reviewed: The social history was reviewed and updated today  The social history was reviewed and is unchanged  Current Meds   1  Acidophilus Probiotic Blend Oral Capsule;    Therapy: (Recorded:15Lbm4583) to Recorded   2  AmLODIPine Besylate 5 MG Oral Tablet; Take 1 tablet daily; Therapy: 53QLP0368 to (Franck Gasca)  Requested for: 88ZMN6225; Last Rx:07Tzl0991   Ordered   3  Aspirin 81 MG Oral Tablet Chewable; CHEW AND SWALLOW 1 TABLET DAILY; Therapy: (Recorded:25Ygo7730) to Recorded   4  Atorvastatin Calcium 20 MG Oral Tablet; TAKE 1 TABLET DAILY; Therapy: 74UND9832 to (Evaluate:76Ukw4761)  Requested for: 56JER1520; Last Rx:89Ndm5655   Ordered   5  Ferrous Sulfate 325 MG CAPS; take 1 capsule daily; Therapy: (Recorded:10Aug2017) to Recorded   6  Furosemide 20 MG Oral Tablet; Take 1 tablet daily; Therapy: 63Exc9548 to  Requested for: 10Aug2017 Recorded   7  Levothyroxine Sodium 50 MCG Oral Tablet; TAKE 1 TABLET DAILY; Therapy: 83CEG9116 to (UXVETOJF:81IKN1013)  Requested for: 24SAY0855; Last Rx:07Jun2017   Ordered   8  LORazepam 0 5 MG Oral Tablet; TAKE 0 05 TABLET 3 times daily; Therapy: 46ZFY5106 to (Evaluate:85Ulw9949); Last Rx:18Jan2017 Ordered   9  MetFORMIN HCl - 500 MG Oral Tablet; TAKE 1 TABLET TWICE DAILY  Requested for: 81WDX8184;   Last Rx:02Yvo7207 Ordered   10  Metoprolol Tartrate 50 MG Oral Tablet; Take 1 tablet twice daily; Therapy: 51Edv0959 to (Evaluate:45Pal2646)  Requested for: 55Ttr7262; Last Rx:67Tml5357    Ordered   11  Multiple Vitamin TABS; Therapy: (Recorded:10Aug2017) to Recorded   12  Nitrostat 0 4 MG Sublingual Tablet Sublingual; DISSOLVE 1 TABLET UNDER THE TONGUE AS NEEDED    FOR CHEST PAIN Recorded   13  Ocuvite TABS; TAKE 1 TABLET DAILY; Therapy: (Recorded:13Fno2485) to Recorded   14  Omeprazole 20 MG Oral Capsule Delayed Release; TAKE 1 CAPSULE BY MOUTH TWO TIMES DAILY; Therapy: 33Udr2173 to (Evaluate:82Fvt1000)  Requested for: 97Ryn8182; Last Rx:03Pht2177    Ordered   15  OneTouch Ultra Blue In Vitro Strip; TEST ONCE DAILY; Therapy: 67Qsi6043 to (96 761852)  Requested for: 70MUT9593; Last Rx:94Ege9719    Ordered   16   Potassium Chloride ER 20 MEQ Oral Tablet Extended Release; Take 1 tablet daily; Therapy: 20QOJ8956 to (Evaluate:08Jun2018)  Requested for: 28EAY0156; Last Rx:13Jun2017    Ordered   17  TraMADol HCl - 50 MG Oral Tablet; Take 1 tablet every 6 hours as needed for pain; Therapy: 11Lxy9605 to (Evaluate:15Jun2017); Last Rx:17Mar2017 Ordered   18  Vitamin C TABS; Therapy: (Yonatan Ko) to Recorded   19  Vitamin D TABS; Therapy: (Recorded:73Bvv9925) to Recorded  Medication List Reviewed: The medication list was reviewed and updated today  Allergies  1  Advil   2  Aleve TABS    Physical Exam    Constitutional   General appearance: No acute distress, well appearing and well nourished  Eyes   Conjunctiva and lids: No swelling, erythema or discharge  Ears, Nose, Mouth, and Throat   External inspection of ears and nose: Normal     Pulmonary   Respiratory effort: No increased work of breathing or signs of respiratory distress  Auscultation of lungs: Clear to auscultation  Cardiovascular   Auscultation of heart: Normal rate and rhythm, normal S1 and S2, without murmurs  Examination of extremities for edema and/or varicosities: Normal     Carotid pulses: Normal     Abdomen   Abdomen: Non-tender, no masses      Psychiatric   Orientation to person, place, and time: Normal     Mood and affect: Normal          Future Appointments    Date/Time Provider Specialty Site   07/10/2018 08:30 AM Cardiology, [address]   10/04/2017 08:00 AM Cardiology, Device Remote  [address]   01/05/2018 02:00 PM Cardiology, [address] [place] [address]   04/06/2018 01:00 PM Cardiology, [address] [place] [address]     Signatures   Electronically signed by : COLT Ponce ; Sep  6 2017  8:32AM EST                       (Author)

## 2017-11-01 ENCOUNTER — ALLSCRIPTS OFFICE VISIT (OUTPATIENT)
Dept: OTHER | Facility: OTHER | Age: 82
End: 2017-11-01

## 2017-11-02 NOTE — PROGRESS NOTES
Assessment  1  Hordeolum externum of left lower eyelid (373 11) (H00 015)   2  Benign essential HTN (401 1) (I10)   3  Dementia (294 20) (F03 90)   4  Hyperlipidemia (272 4) (E78 5)   5  Hypothyroid (244 9) (E03 9)   6  Type 2 diabetes mellitus (250 00) (E11 9)   7  Essential thrombocytosis (238 71) (D47 3)    Plan  Back pain    · TraMADol HCl - 50 MG Oral Tablet  Hordeolum externum of left lower eyelid    · Tobramycin-Dexamethasone 0 3-0 1 % Ophthalmic Suspension; INSTILL 1 DROP IN BOTH EYE  FOUR TIMES DAILY FOR 1 WEEK    1   Stye of the left lower eyelid recommend TobraDex ophthalmic solution 1 drop 4 times a day for 7 days is also recommended to use a warm washcloth over the lower eyelid for approximately 15 minutes twice a day  Expect good response to this treatment however should it fail to improve or in fact worsen she is encouraged call for follow-up visit  2   Hypertension blood pressure control is very good she will continue on her present medications  3   Hyper lipidemia of continue on present medication  Lipid profile reviewed with the patient it is excellent  4   Hypothyroid state good replacement based on recent blood work indicating normal free T4 and TSH  5   Type 2 diabetes good control no evidence of hypoglycemic episodes encouraged to eat a healthy balanced diet and continue on her metformin 500 mg twice a day  6   Essential thrombocytosis readings are under half a million she should continue on aspirin 81 mg daily  Discussion/Summary  Discussion Summary:   In summary the patient is seen today for examination and follow-up visit  In addition we reviewed her most recent blood work with her  She is seen to have a stye of the left lower eyelid today and she was prescribed medication to treat this infection  I expect good response with TobraDex 1 drop 4 times a day for 7 days and warm compresses to the eyelid  Her cardiovascular status appears to be stable   She has no chest pain palpitations or shortness of breath  Neurologically she has early dementia with no significant change in symptoms compared to her last visit with us  We will see her in 2 months for her next checkup  She is reminded to have a flu vaccine for this upcoming winter  Counseling Documentation With Imm: total time of encounter was 30 minutes-- and-- 25 minutes was spent counseling  Chief Complaint  Chief Complaint Free Text Note Form: This is a routine follow-up visit for this 80-year-old female patient  History of Present Illness  HPI: This 80-year-old female patient presents today for routine follow-up visit  She has been doing well since her last checkup with us  She she reports no new medical concerns  She has a history of dementia, hypothyroid, hypertension, hyperlipidemia, type 2 diabetes, chronic back pain, thrombocytosis  Review of Systems  Complete-Female:   Constitutional: No fever, no chills, feels well, no tiredness, no recent weight gain or weight loss  Eyes: Irritation to the lower eyelid  ENT: no complaints of earache, no loss of hearing, no nose bleeds, no nasal discharge, no sore throat, no hoarseness  Cardiovascular: No complaints of slow heart rate, no fast heart rate, no chest pain, no palpitations, no leg claudication, no lower extremity edema  Respiratory: No complaints of shortness of breath, no wheezing, no cough, no SOB on exertion, no orthopnea, no PND  Gastrointestinal: No complaints of abdominal pain, no constipation, no nausea or vomiting, no diarrhea, no bloody stools  Genitourinary: No complaints of dysuria, no incontinence, no pelvic pain, no dysmenorrhea, no vaginal discharge or bleeding  Musculoskeletal: No complaints of arthralgias, no myalgias, no joint swelling or stiffness, no limb pain or swelling  Integumentary: No complaints of skin rash or lesions, no itching, no skin wounds, no breast pain or lump     Neurological: confusion-- and-- Mild forgetfulness  Psychiatric: Not suicidal, no sleep disturbance, no anxiety or depression, no change in personality, no emotional problems  Endocrine: No complaints of proptosis, no hot flashes, no muscle weakness, no deepening of the voice, no feelings of weakness  Hematologic/Lymphatic: No complaints of swollen glands, no swollen glands in the neck, does not bleed easily, does not bruise easily  Active Problems  1  Anemia, iron deficiency (280 9) (D50 9)   2  Back pain (724 5) (M54 9)   3  Benign essential HTN (401 1) (I10)   4  Bilateral knee pain (719 46) (M25 561,M25 562)   5  CAD S/P percutaneous coronary angioplasty (414 01,V45 82) (I25 10,Z98 61)   6  Chronic low back pain (724 2,338 29) (M54 5,G89 29)   7  Dementia (294 20) (F03 90)   8  Dependence on nocturnal oxygen therapy (V46 2) (Z99 81)   9  Edema (782 3) (R60 9)   10  Essential thrombocytosis (238 71) (D47 3)   11  GERD (gastroesophageal reflux disease) (530 81) (K21 9)   12  Hordeolum externum of left lower eyelid (373 11) (H00 015)   13  Hyperlipidemia (272 4) (E78 5)   14  Hypochloremia (276 9) (E87 8)   15  Hyponatremia (276 1) (E87 1)   16  Hypothyroid (244 9) (E03 9)   17  Irritable bowel syndrome (564 1) (K58 9)   18  Lumbago (724 2) (M54 5)   19  Lumbar disc herniation with radiculopathy (722 10) (M51 16)   20  Macular degeneration (362 50) (H35 30)   21  Meniere disease (386 00) (H81 09)   22  Nonrheumatic aortic valve stenosis (424 1) (I35 0)   23  Presence of cardiac pacemaker (V45 01) (Z95 0)   24  S/p TAVR (transcatheter aortic valve replacement), bioprosthetic (V42 2) (Z95 3)   25  Sleep apnea in adult (327 23) (G47 30)   26  Type 2 diabetes mellitus (250 00) (E11 9)   27  Viral respiratory infection (079 99) (J98 8,B97 89)    Past Medical History  1  History of Acute UTI (599 0) (N39 0)   2  History of Aortic Valve Replacement   3  History of Arthritis of knee (716 96) (M17 10)   4   History of Bilateral knee pain (719 46) (M25 561,M25 562)   5  History of Bilateral knee pain (719 46) (M25 561,M25 562)   6  History of Calcaneal spur (726 73) (M77 30)   7  History of Cervicalgia (723 1) (M54 2)   8  History of Chalazion of right eye (373 2) (H00 13)   9  History of Chronic knee instability (718 86) (M23 50)   10  History of Chronic pain syndrome (338 4) (G89 4)   11  History of Diverticulitis (562 11) (K57 92)   12  History of Hip pain (719 45) (M25 559)   13  History of anemia (V12 3) (Z86 2)   14  History of bilateral knee replacement (V43 65) (Z96 653)   15  History of leukocytosis (V12 3) (Z86 2)   16  History of osteopenia (V13 59) (Z87 39)   17  History of thrombocytosis (V12 3) (Z86 2)   18  History of Hordeolum externum of left lower eyelid (373 11) (H00 015)   19  History of Leg wound, right (891 0) (S81 801A)   20  History of Lumbar postlaminectomy syndrome (722 83) (M96 1)   21  History of Meniere's disease (386 00) (H81 09)   22  History of Myofascial pain syndrome (729 1) (M79 1)   23  History of Neck pain (723 1) (M54 2)   24  History of Need for DTP vaccine (V06 1) (Z23)   25  History of Need for immunization against influenza (V04 81) (Z23)   26  History of Other specified symptoms and signs involving the circulatory and respiratory systems    (785 9,786 9) (R09 89)   27  History of S/P TAVR (transcatheter aortic valve replacement) (V43 3) (Z95 2)  Active Problems And Past Medical History Reviewed: The active problems and past medical history were reviewed and updated today  Surgical History  1  History of Aortic Valve Replacement Transcatheter   2  History of Appendectomy   3  History of Arthrodesis Lumbar   4  History of Back Surgery   5  History of Cholecystectomy   6  History of Eye Surgery   7  History of Hysterectomy   8  History of Pacemaker Placement   9  History of Small Bowel Resection   10  History of Tonsillectomy   11  History of Total Knee Arthroplasty   12   History of Venous Ligation With Stripping  Surgical History Reviewed: The surgical history was reviewed and updated today  Family History  Father    1  Family history of CAD in native artery  Family History    2  Family history of CAD in native artery   3  Family history of cerebrovascular accident (CVA) (V17 1) (Z82 3)   4  Family history of osteopenia (V17 89) (Z82 69)   5  Family history of Pituitary disease   6  Family history of Polycythemia  Family History Reviewed: The family history was reviewed and updated today  Social History   · Denied: Alcohol use (V49 89) (Z78 9)   · Denied: Drug use (305 90) (F19 90)   · Lives in independent group home (V60 89) (Z59 8)   · Never a smoker   · Non drinker / no alcohol use  Social History Reviewed: The social history was reviewed and updated today  The social history was reviewed and is unchanged  Current Meds   1  Acidophilus Probiotic Blend Oral Capsule; Therapy: (Recorded:80Qdm4548) to Recorded   2  AmLODIPine Besylate 5 MG Oral Tablet; Take 1 tablet daily; Therapy: 52XSC5844 to (Alex Jules)  Requested for: 58NZT1546; Last Rx:14Zpw3016   Ordered   3  Amoxicillin CAPS; Therapy: (Recorded:01Nov2017) to Recorded   4  Aspirin 81 MG Oral Tablet Chewable; CHEW AND SWALLOW 1 TABLET DAILY; Therapy: (Recorded:26Zwu4983) to Recorded   5  Atorvastatin Calcium 20 MG Oral Tablet; TAKE 1 TABLET DAILY; Therapy: 52LOK0423 to (Evaluate:01Yzu2283)  Requested for: 84TFH3406; Last Rx:30Bll1528   Ordered   6  Ferrous Sulfate 325 MG CAPS; take 1 capsule daily; Therapy: (Recorded:10Aug2017) to Recorded   7  Furosemide 20 MG Oral Tablet; Take 1 tablet daily; Therapy: 38Amv0590 to (Evaluate:13Oct2018)  Requested for: 35UTV2878; Last Rx:18Oct2017   Ordered   8  Levothyroxine Sodium 50 MCG Oral Tablet; TAKE 1 TABLET DAILY; Therapy: 36QUM7992 to (JFKTQDGW:35BOG8968)  Requested for: 70LHD4504; Last Rx:07Jun2017   Ordered   9   LORazepam 0 5 MG Oral Tablet; TAKE 0 05 TABLET 3 times daily; Therapy: 68EMJ2653 to (Evaluate:42Euc9879); Last Rx:18Jan2017 Ordered   10  MetFORMIN HCl - 500 MG Oral Tablet; TAKE 1 TABLET TWICE DAILY  Requested for: 05STR9574;    Last Rx:19Oct2016 Ordered   11  Metoprolol Tartrate 50 MG Oral Tablet; Take 1 tablet twice daily; Therapy: 13Nwr5557 to (Evaluate:22Dek7896)  Requested for: 33Bcg0636; Last Rx:00Uix9641    Ordered   12  Multiple Vitamin TABS; Therapy: (Recorded:10Aug2017) to Recorded   13  Nitrostat 0 4 MG Sublingual Tablet Sublingual; DISSOLVE 1 TABLET UNDER THE TONGUE AS NEEDED    FOR CHEST PAIN Recorded   14  Ocuvite TABS; TAKE 1 TABLET DAILY; Therapy: (Recorded:81Mip3861) to Recorded   15  Omeprazole 20 MG Oral Capsule Delayed Release; TAKE 1 CAPSULE BY MOUTH TWO TIMES DAILY; Therapy: 23Con8014 to (Evaluate:30Mar2018)  Requested for: 31Oct2017; Last Rx:31Oct2017    Ordered   16  OneTouch Ultra Blue In Vitro Strip; TEST ONCE DAILY; Therapy: 22Pwh2909 to (Ane McCool)  Requested for: 83VOZ9117; Last Rx:35Dju3193    Ordered   17  Potassium Chloride ER 20 MEQ Oral Tablet Extended Release; Take 1 tablet daily; Therapy: 42OGM5244 to (Evaluate:08Jun2018)  Requested for: 17EPJ3784; Last Rx:13Jun2017    Ordered   18  TraMADol HCl - 50 MG Oral Tablet; Take 1 tablet every 6 hours as needed for pain; Therapy: 09Ths5709 to (Evaluate:15Jun2017); Last Rx:17Mar2017 Ordered   19  Vitamin C TABS; Therapy: (Klarissa Keating) to Recorded   20  Vitamin D TABS; Therapy: (Recorded:32Evm9190) to Recorded  Medication List Reviewed: The medication list was reviewed and updated today  Allergies  1  Advil   2   Aleve TABS    Vitals  Vital Signs    Recorded: 98KOQ5937 01:11PM   Temperature 99 5 F   Heart Rate 75   Respiration 12   Systolic 510   Diastolic 82   Weight 003 lb 5 oz   BMI Calculated 34 81   BSA Calculated 1 87   O2 Saturation 96     Physical Exam    Constitutional   General appearance: No acute distress, well appearing and well nourished  Eyes   Conjunctiva and lids: No swelling, erythema or discharge  Ears, Nose, Mouth, and Throat   External inspection of ears and nose: Normal     Pulmonary   Respiratory effort: No increased work of breathing or signs of respiratory distress  Auscultation of lungs: Clear to auscultation  Cardiovascular   Auscultation of heart: Normal rate and rhythm, normal S1 and S2, without murmurs  Examination of extremities for edema and/or varicosities: Normal     Lymphatic   Palpation of lymph nodes in neck: No lymphadenopathy  Musculoskeletal   Gait and station: Abnormal  -- Unsteady gait patient ambulates with the assistance of a walker  Psychiatric   Orientation to person, place, and time: Normal  -- Mild decrease in short-term memory          Future Appointments    Date/Time Provider Specialty Site   07/10/2018 08:30 AM Cardiology, [facility]   01/05/2018 02:00 PM Cardiology, Device Remote   [address]   04/06/2018 01:00 PM Cardiology, Device Remote   [address]     Signatures   Electronically signed by : COLT Cortez ; Nov 1 2017  4:47PM EST                       (Author)

## 2017-11-28 ENCOUNTER — ALLSCRIPTS OFFICE VISIT (OUTPATIENT)
Dept: OTHER | Facility: OTHER | Age: 82
End: 2017-11-28

## 2018-01-01 ENCOUNTER — HOSPITAL ENCOUNTER (INPATIENT)
Facility: HOSPITAL | Age: 83
LOS: 3 days | Discharge: NURSING, REHAB OR LONG-TERM CARE | DRG: 956 | End: 2018-01-05
Attending: EMERGENCY MEDICINE | Admitting: ORTHOPAEDIC SURGERY
Payer: MEDICARE

## 2018-01-01 DIAGNOSIS — S32.599A INFERIOR PUBIC RAMUS FRACTURE (HCC): ICD-10-CM

## 2018-01-01 DIAGNOSIS — S32.512A CLOSED FRACTURE OF LEFT SUPERIOR PUBIC RAMUS, INITIAL ENCOUNTER: ICD-10-CM

## 2018-01-01 DIAGNOSIS — S72.001A CLOSED DISPLACED FRACTURE OF RIGHT FEMORAL NECK (HCC): Primary | ICD-10-CM

## 2018-01-01 DIAGNOSIS — I44.2 COMPLETE ATRIOVENTRICULAR BLOCK (HCC): ICD-10-CM

## 2018-01-01 DIAGNOSIS — S72.141A CLOSED DISPLACED INTERTROCHANTERIC FRACTURE OF RIGHT FEMUR, INITIAL ENCOUNTER (HCC): ICD-10-CM

## 2018-01-01 DIAGNOSIS — I25.10 CORONARY ARTERY DISEASE INVOLVING NATIVE CORONARY ARTERY: ICD-10-CM

## 2018-01-01 DIAGNOSIS — Z95.2 S/P TAVR (TRANSCATHETER AORTIC VALVE REPLACEMENT): ICD-10-CM

## 2018-01-01 PROCEDURE — 93005 ELECTROCARDIOGRAM TRACING: CPT | Performed by: EMERGENCY MEDICINE

## 2018-01-02 ENCOUNTER — APPOINTMENT (INPATIENT)
Dept: RADIOLOGY | Facility: HOSPITAL | Age: 83
DRG: 956 | End: 2018-01-02
Payer: MEDICARE

## 2018-01-02 ENCOUNTER — APPOINTMENT (EMERGENCY)
Dept: RADIOLOGY | Facility: HOSPITAL | Age: 83
DRG: 956 | End: 2018-01-02
Payer: MEDICARE

## 2018-01-02 ENCOUNTER — ANESTHESIA EVENT (INPATIENT)
Dept: PERIOP | Facility: HOSPITAL | Age: 83
DRG: 956 | End: 2018-01-02
Payer: MEDICARE

## 2018-01-02 ENCOUNTER — ANESTHESIA (INPATIENT)
Dept: PERIOP | Facility: HOSPITAL | Age: 83
DRG: 956 | End: 2018-01-02
Payer: MEDICARE

## 2018-01-02 PROBLEM — S72.141A CLOSED DISPLACED INTERTROCHANTERIC FRACTURE OF RIGHT FEMUR (HCC): Status: ACTIVE | Noted: 2018-01-02

## 2018-01-02 PROBLEM — T07.XXXA MULTIPLE INJURIES: Status: ACTIVE | Noted: 2018-01-02

## 2018-01-02 PROBLEM — Z86.73 HISTORY OF TIA (TRANSIENT ISCHEMIC ATTACK): Status: ACTIVE | Noted: 2018-01-02

## 2018-01-02 PROBLEM — Z95.0 HISTORY OF CARDIAC PACEMAKER: Status: ACTIVE | Noted: 2018-01-02

## 2018-01-02 PROBLEM — E11.9 DIABETES MELLITUS (HCC): Status: ACTIVE | Noted: 2018-01-02

## 2018-01-02 LAB
ABO GROUP BLD: NORMAL
ALBUMIN SERPL BCP-MCNC: 3.6 G/DL (ref 3.5–5)
ALP SERPL-CCNC: 87 U/L (ref 46–116)
ALT SERPL W P-5'-P-CCNC: 26 U/L (ref 12–78)
AMORPH URATE CRY URNS QL MICRO: ABNORMAL /HPF
ANION GAP SERPL CALCULATED.3IONS-SCNC: 6 MMOL/L (ref 4–13)
APTT PPP: 27 SECONDS (ref 23–35)
AST SERPL W P-5'-P-CCNC: 25 U/L (ref 5–45)
ATRIAL RATE: 67 BPM
BACTERIA UR QL AUTO: ABNORMAL /HPF
BASOPHILS # BLD AUTO: 0.09 THOUSANDS/ΜL (ref 0–0.1)
BASOPHILS NFR BLD AUTO: 1 % (ref 0–1)
BILIRUB SERPL-MCNC: 0.48 MG/DL (ref 0.2–1)
BILIRUB UR QL STRIP: NEGATIVE
BLD GP AB SCN SERPL QL: NEGATIVE
BUN SERPL-MCNC: 18 MG/DL (ref 5–25)
CALCIUM SERPL-MCNC: 10.1 MG/DL (ref 8.3–10.1)
CHLORIDE SERPL-SCNC: 99 MMOL/L (ref 100–108)
CLARITY UR: CLEAR
CO2 SERPL-SCNC: 30 MMOL/L (ref 21–32)
COLOR UR: YELLOW
CREAT SERPL-MCNC: 1.1 MG/DL (ref 0.6–1.3)
EOSINOPHIL # BLD AUTO: 0.28 THOUSAND/ΜL (ref 0–0.61)
EOSINOPHIL NFR BLD AUTO: 2 % (ref 0–6)
ERYTHROCYTE [DISTWIDTH] IN BLOOD BY AUTOMATED COUNT: 13.7 % (ref 11.6–15.1)
EST. AVERAGE GLUCOSE BLD GHB EST-MCNC: 134 MG/DL
GFR SERPL CREATININE-BSD FRML MDRD: 47 ML/MIN/1.73SQ M
GLUCOSE SERPL-MCNC: 141 MG/DL (ref 65–140)
GLUCOSE SERPL-MCNC: 151 MG/DL (ref 65–140)
GLUCOSE SERPL-MCNC: 171 MG/DL (ref 65–140)
GLUCOSE UR STRIP-MCNC: NEGATIVE MG/DL
HBA1C MFR BLD: 6.3 % (ref 4.2–6.3)
HCT VFR BLD AUTO: 40 % (ref 34.8–46.1)
HGB BLD-MCNC: 13 G/DL (ref 11.5–15.4)
HGB UR QL STRIP.AUTO: NEGATIVE
INR PPP: 1.04 (ref 0.86–1.16)
KETONES UR STRIP-MCNC: NEGATIVE MG/DL
LEUKOCYTE ESTERASE UR QL STRIP: ABNORMAL
LYMPHOCYTES # BLD AUTO: 1.94 THOUSANDS/ΜL (ref 0.6–4.47)
LYMPHOCYTES NFR BLD AUTO: 12 % (ref 14–44)
MCH RBC QN AUTO: 28.9 PG (ref 26.8–34.3)
MCHC RBC AUTO-ENTMCNC: 32.5 G/DL (ref 31.4–37.4)
MCV RBC AUTO: 89 FL (ref 82–98)
MONOCYTES # BLD AUTO: 0.8 THOUSAND/ΜL (ref 0.17–1.22)
MONOCYTES NFR BLD AUTO: 5 % (ref 4–12)
MUCOUS THREADS UR QL AUTO: ABNORMAL
NEUTROPHILS # BLD AUTO: 12.64 THOUSANDS/ΜL (ref 1.85–7.62)
NEUTS SEG NFR BLD AUTO: 80 % (ref 43–75)
NITRITE UR QL STRIP: NEGATIVE
NON-SQ EPI CELLS URNS QL MICRO: ABNORMAL /HPF
NRBC BLD AUTO-RTO: 0 /100 WBCS
P AXIS: 56 DEGREES
PH UR STRIP.AUTO: 7 [PH] (ref 4.5–8)
PLATELET # BLD AUTO: 362 THOUSANDS/UL (ref 149–390)
PMV BLD AUTO: 9.7 FL (ref 8.9–12.7)
POTASSIUM SERPL-SCNC: 4.3 MMOL/L (ref 3.5–5.3)
PR INTERVAL: 194 MS
PROT SERPL-MCNC: 7.8 G/DL (ref 6.4–8.2)
PROT UR STRIP-MCNC: ABNORMAL MG/DL
PROTHROMBIN TIME: 13.6 SECONDS (ref 12.1–14.4)
QRS AXIS: -64 DEGREES
QRSD INTERVAL: 192 MS
QT INTERVAL: 478 MS
QTC INTERVAL: 505 MS
RBC # BLD AUTO: 4.5 MILLION/UL (ref 3.81–5.12)
RBC #/AREA URNS AUTO: ABNORMAL /HPF
RH BLD: NEGATIVE
SODIUM SERPL-SCNC: 135 MMOL/L (ref 136–145)
SP GR UR STRIP.AUTO: 1.02 (ref 1–1.03)
SPECIMEN EXPIRATION DATE: NORMAL
T WAVE AXIS: 88 DEGREES
TROPONIN I SERPL-MCNC: <0.02 NG/ML
UROBILINOGEN UR QL STRIP.AUTO: 0.2 E.U./DL
VENTRICULAR RATE: 67 BPM
WBC # BLD AUTO: 15.91 THOUSAND/UL (ref 4.31–10.16)
WBC #/AREA URNS AUTO: ABNORMAL /HPF

## 2018-01-02 PROCEDURE — 85025 COMPLETE CBC W/AUTO DIFF WBC: CPT | Performed by: EMERGENCY MEDICINE

## 2018-01-02 PROCEDURE — C1713 ANCHOR/SCREW BN/BN,TIS/BN: HCPCS | Performed by: ORTHOPAEDIC SURGERY

## 2018-01-02 PROCEDURE — 86923 COMPATIBILITY TEST ELECTRIC: CPT

## 2018-01-02 PROCEDURE — 93005 ELECTROCARDIOGRAM TRACING: CPT

## 2018-01-02 PROCEDURE — 85730 THROMBOPLASTIN TIME PARTIAL: CPT | Performed by: STUDENT IN AN ORGANIZED HEALTH CARE EDUCATION/TRAINING PROGRAM

## 2018-01-02 PROCEDURE — 85610 PROTHROMBIN TIME: CPT | Performed by: STUDENT IN AN ORGANIZED HEALTH CARE EDUCATION/TRAINING PROGRAM

## 2018-01-02 PROCEDURE — 82948 REAGENT STRIP/BLOOD GLUCOSE: CPT

## 2018-01-02 PROCEDURE — 86901 BLOOD TYPING SEROLOGIC RH(D): CPT | Performed by: STUDENT IN AN ORGANIZED HEALTH CARE EDUCATION/TRAINING PROGRAM

## 2018-01-02 PROCEDURE — 86900 BLOOD TYPING SEROLOGIC ABO: CPT | Performed by: STUDENT IN AN ORGANIZED HEALTH CARE EDUCATION/TRAINING PROGRAM

## 2018-01-02 PROCEDURE — 73552 X-RAY EXAM OF FEMUR 2/>: CPT

## 2018-01-02 PROCEDURE — 81002 URINALYSIS NONAUTO W/O SCOPE: CPT | Performed by: STUDENT IN AN ORGANIZED HEALTH CARE EDUCATION/TRAINING PROGRAM

## 2018-01-02 PROCEDURE — 71045 X-RAY EXAM CHEST 1 VIEW: CPT

## 2018-01-02 PROCEDURE — 83036 HEMOGLOBIN GLYCOSYLATED A1C: CPT | Performed by: HOSPITALIST

## 2018-01-02 PROCEDURE — 81001 URINALYSIS AUTO W/SCOPE: CPT

## 2018-01-02 PROCEDURE — 86850 RBC ANTIBODY SCREEN: CPT | Performed by: STUDENT IN AN ORGANIZED HEALTH CARE EDUCATION/TRAINING PROGRAM

## 2018-01-02 PROCEDURE — 99285 EMERGENCY DEPT VISIT HI MDM: CPT

## 2018-01-02 PROCEDURE — 96374 THER/PROPH/DIAG INJ IV PUSH: CPT

## 2018-01-02 PROCEDURE — 84484 ASSAY OF TROPONIN QUANT: CPT | Performed by: EMERGENCY MEDICINE

## 2018-01-02 PROCEDURE — 36415 COLL VENOUS BLD VENIPUNCTURE: CPT

## 2018-01-02 PROCEDURE — 0QS606Z REPOSITION RIGHT UPPER FEMUR WITH INTRAMEDULLARY INTERNAL FIXATION DEVICE, OPEN APPROACH: ICD-10-PCS | Performed by: ORTHOPAEDIC SURGERY

## 2018-01-02 PROCEDURE — 73700 CT LOWER EXTREMITY W/O DYE: CPT

## 2018-01-02 PROCEDURE — 70450 CT HEAD/BRAIN W/O DYE: CPT

## 2018-01-02 PROCEDURE — 80053 COMPREHEN METABOLIC PANEL: CPT | Performed by: EMERGENCY MEDICINE

## 2018-01-02 PROCEDURE — C1769 GUIDE WIRE: HCPCS | Performed by: ORTHOPAEDIC SURGERY

## 2018-01-02 PROCEDURE — 72125 CT NECK SPINE W/O DYE: CPT

## 2018-01-02 DEVICE — 11.0MM TI TROCH FIXATION NAIL SCREW/95MM - STERILE: Type: IMPLANTABLE DEVICE | Site: HIP | Status: FUNCTIONAL

## 2018-01-02 DEVICE — 5.0MM TI LOCKING SCREW W/T25 STARDRIVE 52MM F/IM NAIL-STER: Type: IMPLANTABLE DEVICE | Site: HIP | Status: FUNCTIONAL

## 2018-01-02 DEVICE — 5.0MM TI LOCKING SCREW W/T25 STARDRIVE 44MM F/IM NAIL-STER: Type: IMPLANTABLE DEVICE | Site: HIP | Status: FUNCTIONAL

## 2018-01-02 DEVICE — 11MM/130 DEG TI CANN TROCH FIXATION NAIL 380MM/RIGHT-STER: Type: IMPLANTABLE DEVICE | Site: HIP | Status: FUNCTIONAL

## 2018-01-02 RX ORDER — ATORVASTATIN CALCIUM 20 MG/1
20 TABLET, FILM COATED ORAL
Status: DISCONTINUED | OUTPATIENT
Start: 2018-01-02 | End: 2018-01-05 | Stop reason: HOSPADM

## 2018-01-02 RX ORDER — OXYCODONE HYDROCHLORIDE 5 MG/1
5 TABLET ORAL EVERY 4 HOURS PRN
Status: DISCONTINUED | OUTPATIENT
Start: 2018-01-02 | End: 2018-01-05 | Stop reason: HOSPADM

## 2018-01-02 RX ORDER — ACETAMINOPHEN 325 MG/1
650 TABLET ORAL EVERY 6 HOURS SCHEDULED
Status: DISCONTINUED | OUTPATIENT
Start: 2018-01-02 | End: 2018-01-05 | Stop reason: HOSPADM

## 2018-01-02 RX ORDER — ACETAMINOPHEN 325 MG/1
650 TABLET ORAL EVERY 6 HOURS PRN
Status: DISCONTINUED | OUTPATIENT
Start: 2018-01-02 | End: 2018-01-02

## 2018-01-02 RX ORDER — CALCIUM CARBONATE 500(1250)
1 TABLET ORAL 2 TIMES DAILY WITH MEALS
Status: DISCONTINUED | OUTPATIENT
Start: 2018-01-02 | End: 2018-01-05 | Stop reason: HOSPADM

## 2018-01-02 RX ORDER — ONDANSETRON 2 MG/ML
INJECTION INTRAMUSCULAR; INTRAVENOUS AS NEEDED
Status: DISCONTINUED | OUTPATIENT
Start: 2018-01-02 | End: 2018-01-02 | Stop reason: SURG

## 2018-01-02 RX ORDER — FUROSEMIDE 40 MG/1
40 TABLET ORAL DAILY
Status: DISCONTINUED | OUTPATIENT
Start: 2018-01-02 | End: 2018-01-04

## 2018-01-02 RX ORDER — PROMETHAZINE HYDROCHLORIDE 25 MG/1
25 TABLET ORAL DAILY
Status: DISCONTINUED | OUTPATIENT
Start: 2018-01-02 | End: 2018-01-05 | Stop reason: HOSPADM

## 2018-01-02 RX ORDER — ALBUMIN, HUMAN INJ 5% 5 %
SOLUTION INTRAVENOUS CONTINUOUS PRN
Status: DISCONTINUED | OUTPATIENT
Start: 2018-01-02 | End: 2018-01-02 | Stop reason: SURG

## 2018-01-02 RX ORDER — MAGNESIUM HYDROXIDE 1200 MG/15ML
LIQUID ORAL AS NEEDED
Status: DISCONTINUED | OUTPATIENT
Start: 2018-01-02 | End: 2018-01-02 | Stop reason: HOSPADM

## 2018-01-02 RX ORDER — OXYCODONE HYDROCHLORIDE 5 MG/1
5 TABLET ORAL EVERY 4 HOURS PRN
Qty: 30 TABLET | Refills: 0 | Status: SHIPPED | OUTPATIENT
Start: 2018-01-02 | End: 2018-01-12

## 2018-01-02 RX ORDER — SUCCINYLCHOLINE CHLORIDE 20 MG/ML
INJECTION INTRAMUSCULAR; INTRAVENOUS AS NEEDED
Status: DISCONTINUED | OUTPATIENT
Start: 2018-01-02 | End: 2018-01-02 | Stop reason: SURG

## 2018-01-02 RX ORDER — ACETAMINOPHEN 325 MG/1
650 TABLET ORAL EVERY 6 HOURS PRN
Status: DISCONTINUED | OUTPATIENT
Start: 2018-01-02 | End: 2018-01-05 | Stop reason: HOSPADM

## 2018-01-02 RX ORDER — SODIUM CHLORIDE, SODIUM LACTATE, POTASSIUM CHLORIDE, CALCIUM CHLORIDE 600; 310; 30; 20 MG/100ML; MG/100ML; MG/100ML; MG/100ML
INJECTION, SOLUTION INTRAVENOUS CONTINUOUS PRN
Status: DISCONTINUED | OUTPATIENT
Start: 2018-01-02 | End: 2018-01-02 | Stop reason: SURG

## 2018-01-02 RX ORDER — ACETAMINOPHEN 325 MG/1
650 TABLET ORAL ONCE
Status: COMPLETED | OUTPATIENT
Start: 2018-01-02 | End: 2018-01-02

## 2018-01-02 RX ORDER — TRAMADOL HYDROCHLORIDE 50 MG/1
50 TABLET ORAL EVERY 6 HOURS PRN
Status: DISCONTINUED | OUTPATIENT
Start: 2018-01-02 | End: 2018-01-05 | Stop reason: HOSPADM

## 2018-01-02 RX ORDER — ASCORBIC ACID 500 MG
500 TABLET ORAL DAILY
Status: DISCONTINUED | OUTPATIENT
Start: 2018-01-02 | End: 2018-01-05 | Stop reason: HOSPADM

## 2018-01-02 RX ORDER — EPHEDRINE SULFATE 50 MG/ML
INJECTION, SOLUTION INTRAVENOUS AS NEEDED
Status: DISCONTINUED | OUTPATIENT
Start: 2018-01-02 | End: 2018-01-02 | Stop reason: SURG

## 2018-01-02 RX ORDER — POTASSIUM CHLORIDE 20 MEQ/1
20 TABLET, EXTENDED RELEASE ORAL DAILY
Status: DISCONTINUED | OUTPATIENT
Start: 2018-01-02 | End: 2018-01-05 | Stop reason: HOSPADM

## 2018-01-02 RX ORDER — PROPOFOL 10 MG/ML
INJECTION, EMULSION INTRAVENOUS CONTINUOUS PRN
Status: DISCONTINUED | OUTPATIENT
Start: 2018-01-02 | End: 2018-01-02 | Stop reason: SURG

## 2018-01-02 RX ORDER — CALCIUM CHLORIDE 100 MG/ML
INJECTION INTRAVENOUS; INTRAVENTRICULAR AS NEEDED
Status: DISCONTINUED | OUTPATIENT
Start: 2018-01-02 | End: 2018-01-02 | Stop reason: SURG

## 2018-01-02 RX ORDER — SODIUM CHLORIDE, SODIUM LACTATE, POTASSIUM CHLORIDE, CALCIUM CHLORIDE 600; 310; 30; 20 MG/100ML; MG/100ML; MG/100ML; MG/100ML
75 INJECTION, SOLUTION INTRAVENOUS CONTINUOUS
Status: DISCONTINUED | OUTPATIENT
Start: 2018-01-02 | End: 2018-01-03

## 2018-01-02 RX ORDER — PROPOFOL 10 MG/ML
INJECTION, EMULSION INTRAVENOUS AS NEEDED
Status: DISCONTINUED | OUTPATIENT
Start: 2018-01-02 | End: 2018-01-02 | Stop reason: SURG

## 2018-01-02 RX ORDER — LIDOCAINE HYDROCHLORIDE 10 MG/ML
INJECTION, SOLUTION INFILTRATION; PERINEURAL AS NEEDED
Status: DISCONTINUED | OUTPATIENT
Start: 2018-01-02 | End: 2018-01-02 | Stop reason: SURG

## 2018-01-02 RX ORDER — SODIUM CHLORIDE 9 MG/ML
75 INJECTION, SOLUTION INTRAVENOUS CONTINUOUS
Status: DISCONTINUED | OUTPATIENT
Start: 2018-01-02 | End: 2018-01-03

## 2018-01-02 RX ORDER — FENTANYL CITRATE 50 UG/ML
INJECTION, SOLUTION INTRAMUSCULAR; INTRAVENOUS AS NEEDED
Status: DISCONTINUED | OUTPATIENT
Start: 2018-01-02 | End: 2018-01-02 | Stop reason: SURG

## 2018-01-02 RX ORDER — METOPROLOL TARTRATE 50 MG/1
50 TABLET, FILM COATED ORAL 2 TIMES DAILY
Status: DISCONTINUED | OUTPATIENT
Start: 2018-01-02 | End: 2018-01-05 | Stop reason: HOSPADM

## 2018-01-02 RX ORDER — OXYCODONE HYDROCHLORIDE 5 MG/1
2.5 TABLET ORAL EVERY 4 HOURS PRN
Status: DISCONTINUED | OUTPATIENT
Start: 2018-01-02 | End: 2018-01-05 | Stop reason: HOSPADM

## 2018-01-02 RX ORDER — ASPIRIN 81 MG/1
81 TABLET, CHEWABLE ORAL DAILY
Status: DISCONTINUED | OUTPATIENT
Start: 2018-01-02 | End: 2018-01-05 | Stop reason: HOSPADM

## 2018-01-02 RX ORDER — LEVOTHYROXINE SODIUM 0.05 MG/1
50 TABLET ORAL
Status: DISCONTINUED | OUTPATIENT
Start: 2018-01-02 | End: 2018-01-05 | Stop reason: HOSPADM

## 2018-01-02 RX ORDER — OXYCODONE HYDROCHLORIDE 5 MG/1
5 TABLET ORAL EVERY 4 HOURS PRN
Status: DISCONTINUED | OUTPATIENT
Start: 2018-01-02 | End: 2018-01-02

## 2018-01-02 RX ORDER — ONDANSETRON 2 MG/ML
4 INJECTION INTRAMUSCULAR; INTRAVENOUS ONCE AS NEEDED
Status: DISCONTINUED | OUTPATIENT
Start: 2018-01-02 | End: 2018-01-02 | Stop reason: HOSPADM

## 2018-01-02 RX ORDER — FENTANYL CITRATE 50 UG/ML
50 INJECTION, SOLUTION INTRAMUSCULAR; INTRAVENOUS ONCE
Status: COMPLETED | OUTPATIENT
Start: 2018-01-02 | End: 2018-01-02

## 2018-01-02 RX ORDER — PANTOPRAZOLE SODIUM 40 MG/1
40 TABLET, DELAYED RELEASE ORAL
Status: DISCONTINUED | OUTPATIENT
Start: 2018-01-02 | End: 2018-01-05 | Stop reason: HOSPADM

## 2018-01-02 RX ORDER — FENTANYL CITRATE 50 UG/ML
25 INJECTION, SOLUTION INTRAMUSCULAR; INTRAVENOUS
Status: DISCONTINUED | OUTPATIENT
Start: 2018-01-02 | End: 2018-01-05 | Stop reason: HOSPADM

## 2018-01-02 RX ADMIN — ACETAMINOPHEN 650 MG: 325 TABLET, FILM COATED ORAL at 02:23

## 2018-01-02 RX ADMIN — POTASSIUM CHLORIDE 20 MEQ: 1500 TABLET, EXTENDED RELEASE ORAL at 18:29

## 2018-01-02 RX ADMIN — FENTANYL CITRATE 50 MCG: 50 INJECTION INTRAMUSCULAR; INTRAVENOUS at 02:20

## 2018-01-02 RX ADMIN — ACETAMINOPHEN 650 MG: 325 TABLET, FILM COATED ORAL at 17:35

## 2018-01-02 RX ADMIN — EPHEDRINE SULFATE 5 MG: 50 INJECTION, SOLUTION INTRAMUSCULAR; INTRAVENOUS; SUBCUTANEOUS at 11:35

## 2018-01-02 RX ADMIN — EPHEDRINE SULFATE 5 MG: 50 INJECTION, SOLUTION INTRAMUSCULAR; INTRAVENOUS; SUBCUTANEOUS at 11:45

## 2018-01-02 RX ADMIN — OXYCODONE HYDROCHLORIDE 5 MG: 5 TABLET ORAL at 07:48

## 2018-01-02 RX ADMIN — PHENYLEPHRINE HYDROCHLORIDE 25 MCG/MIN: 10 INJECTION INTRAVENOUS at 11:50

## 2018-01-02 RX ADMIN — SODIUM CHLORIDE 75 ML/HR: 0.9 INJECTION, SOLUTION INTRAVENOUS at 13:51

## 2018-01-02 RX ADMIN — EPHEDRINE SULFATE 5 MG: 50 INJECTION, SOLUTION INTRAMUSCULAR; INTRAVENOUS; SUBCUTANEOUS at 11:30

## 2018-01-02 RX ADMIN — EPHEDRINE SULFATE 10 MG: 50 INJECTION, SOLUTION INTRAMUSCULAR; INTRAVENOUS; SUBCUTANEOUS at 11:20

## 2018-01-02 RX ADMIN — SUCCINYLCHOLINE CHLORIDE 100 MG: 20 INJECTION, SOLUTION INTRAMUSCULAR; INTRAVENOUS at 11:05

## 2018-01-02 RX ADMIN — SODIUM CHLORIDE, SODIUM LACTATE, POTASSIUM CHLORIDE, AND CALCIUM CHLORIDE: .6; .31; .03; .02 INJECTION, SOLUTION INTRAVENOUS at 10:50

## 2018-01-02 RX ADMIN — METFORMIN HYDROCHLORIDE 500 MG: 500 TABLET, FILM COATED ORAL at 17:35

## 2018-01-02 RX ADMIN — PROPOFOL 120 MG: 10 INJECTION, EMULSION INTRAVENOUS at 11:05

## 2018-01-02 RX ADMIN — ONDANSETRON 4 MG: 2 INJECTION INTRAMUSCULAR; INTRAVENOUS at 12:33

## 2018-01-02 RX ADMIN — CEFAZOLIN SODIUM 2000 MG: 2 SOLUTION INTRAVENOUS at 11:26

## 2018-01-02 RX ADMIN — CEFAZOLIN SODIUM 2000 MG: 2 SOLUTION INTRAVENOUS at 19:39

## 2018-01-02 RX ADMIN — ATORVASTATIN CALCIUM 20 MG: 20 TABLET, FILM COATED ORAL at 21:27

## 2018-01-02 RX ADMIN — CALCIUM CHLORIDE 0.5 G: 100 INJECTION PARENTERAL at 12:00

## 2018-01-02 RX ADMIN — CALCIUM CHLORIDE 0.5 G: 100 INJECTION PARENTERAL at 12:05

## 2018-01-02 RX ADMIN — HYDROMORPHONE HYDROCHLORIDE 0.2 MG: 1 INJECTION, SOLUTION INTRAMUSCULAR; INTRAVENOUS; SUBCUTANEOUS at 13:37

## 2018-01-02 RX ADMIN — METOPROLOL TARTRATE 50 MG: 50 TABLET ORAL at 17:36

## 2018-01-02 RX ADMIN — ALBUMIN HUMAN: 0.05 INJECTION, SOLUTION INTRAVENOUS at 11:54

## 2018-01-02 RX ADMIN — SODIUM CHLORIDE 1000 ML: 0.9 INJECTION, SOLUTION INTRAVENOUS at 21:27

## 2018-01-02 RX ADMIN — FENTANYL CITRATE 50 MCG: 50 INJECTION, SOLUTION INTRAMUSCULAR; INTRAVENOUS at 11:05

## 2018-01-02 RX ADMIN — ACETAMINOPHEN 650 MG: 325 TABLET, FILM COATED ORAL at 23:12

## 2018-01-02 RX ADMIN — Medication 1 TABLET: at 17:35

## 2018-01-02 RX ADMIN — OXYCODONE HYDROCHLORIDE 5 MG: 5 TABLET ORAL at 15:31

## 2018-01-02 RX ADMIN — LIDOCAINE HYDROCHLORIDE 50 MG: 10 INJECTION, SOLUTION INFILTRATION; PERINEURAL at 11:05

## 2018-01-02 RX ADMIN — PROPOFOL 80 MCG/KG/MIN: 10 INJECTION, EMULSION INTRAVENOUS at 11:10

## 2018-01-02 RX ADMIN — FUROSEMIDE 40 MG: 40 TABLET ORAL at 18:29

## 2018-01-02 RX ADMIN — HYDROMORPHONE HYDROCHLORIDE 0.2 MG: 1 INJECTION, SOLUTION INTRAMUSCULAR; INTRAVENOUS; SUBCUTANEOUS at 13:52

## 2018-01-02 NOTE — PROGRESS NOTES
[address] Internal Medicine Progress Note  Patient: Rj Hutton 80 y o  female   MRN: [id number]  PCP: Gianna Doherty MD  Unit/Bed#: ED 24 Encounter: [id number]  Date Of Visit: 01/02/18    Assessment/Plan:  ·   Principal Problem:    Closed displaced intertrochanteric fracture of right femur ([facility] )  Active Problems:    Coronary artery disease involving native coronary artery    S/P TAVR (transcatheter aortic valve replacement)    Physical deconditioning    History of TIA (transient ischemic attack)    Diabetes mellitus ([facility] )    History of cardiac pacemaker   Closed displaced inter trochanteric fracture of right femur; status post fall  Comminuted intertrochanteric fracture with subtrochanteric extension  Planned for operative fixation of right lower extremity     Vital signs evaluated, patient is afebrile blood pressure controlled at 162/73mmHg;  Labs reviewed significant for hyponatremia 135  BUN creatinine within normal range 18/1 10  Cardiac troponin less than 0 02  Leukocytosis 15 91, H&H 13 0/40 0    Cardiology consult, continue aspirin 81 mg throughout the perioperative period, continue beta blockade, no further cardiac testing required preoperatively, proceed to surgical repair of  Right hip      H/x aortic stenosis; S/p TVR 07/2017- see above  H/x PM high-grade heart block with syncope, ventricular paced  Diabetes mellitus:   Blood glucose controlled , Continue sliding scale coverage, blood glucose monitor, A1c 6 3  History of TIA on aspirin, statin  Physical deconditioning:  Physical therapy      VTE Pharmacologic Prophylaxis:   Pharmacologic: Enoxaparin (Lovenox)  Mechanical VTE Prophylaxis in Place:  No    Patient Centered Rounds: I have performed bedside rounds with nursing staff today      Discussions with Specialists or Other Care Team Provider: yes    Education and Discussions with Family / Patient: yes  Current Length of Stay: 0 day(s)    Current Patient Status: Inpatient   Certification Statement: The patient will continue to require additional inpatient hospital stay due to medical/ surgical mgt    Discharge Plan: rehab    Code Status: Level 1 - Full Code      Subjective:   Pain right groin area  Loss of function to right lower limb  ROS negative for chest pain, shortness of breath, nausea, vomiting, fever  Review of systems negative otherwise  Objective:     Vitals:   Temp (24hrs), Av 9 °F (37 2 °C), Min:98 9 °F (37 2 °C), Max:98 9 °F (37 2 °C)    HR:  [62-78] 78  Resp:  [17-27] 17  BP: (135-174)/(63-93) 145/66  SpO2:  [92 %-95 %] 95 %  Body mass index is 34 02 kg/m²       Input and Output Summary (last 24 hours):     No intake or output data in the 24 hours ending 18 1036    Physical Exam:  General Appearance:    Alert, cooperative, no distress, appropriately responsive    Head:    Normocephalic, without obvious abnormality, atraumatic, mucous membranes moist    Eyes:    Conjunctiva/corneas clear, EOM's intact   Neck:   Supple   Lungs:     Clear to auscultation bilaterally, respirations unlabored, no crackles or wheeze heard    Heart:    Regular rate and rhythm, S1 and S2 no no murmur   Abdomen:     Soft, non-tender, bowel sounds active all four quadrants,     no masses, no organomegaly   Extremities:   Extremities  right hip with pain radiating to the groin with loss of function to right lower extremity   Neurologic:  nonfocal         Additional Data:     Labs:      Results from last 7 days  Lab Units 18  0005   WBC Thousand/uL 15 91*   HEMOGLOBIN g/dL 13 0   HEMATOCRIT % 40 0   PLATELETS Thousands/uL 362   NEUTROS PCT % 80*   LYMPHS PCT % 12*   MONOS PCT % 5   EOS PCT % 2       Results from last 7 days  Lab Units 18  0005   SODIUM mmol/L 135*   POTASSIUM mmol/L 4 3   CHLORIDE mmol/L 99*   CO2 mmol/L 30   BUN mg/dL 18   CREATININE mg/dL 1 10   CALCIUM mg/dL 10 1   TOTAL PROTEIN g/dL 7 8   BILIRUBIN TOTAL mg/dL 0 48   ALK PHOS U/L 87   ALT U/L 26   AST U/L 25   GLUCOSE RANDOM mg/dL 151*       Results from last 7 days  Lab Units 01/02/18  0319   INR  1 04       * I Have Reviewed All Lab Data Listed Above  * Additional Pertinent Lab Tests Reviewed: [facility]    Cultures:   Blood Culture: No results found for: BLOODCX  Urine Culture:   Lab Results   Component Value Date    URINECX 30,000-39,000 cfu/ml Mixed Contaminants X3 07/21/2016     Sputum Culture: No components found for: SPUTUMCX  Wound Culture: No results found for: WOUNDCULT    Last 24 Hours Medication List:     acetaminophen 650 mg Oral Q6H [address]   ascorbic acid 500 mg Oral Daily   aspirin 81 mg Oral Daily   atorvastatin 20 mg Oral HS   bisacodyl 5 mg Oral Once   calcium carbonate 1 tablet Oral BID With Meals   cefazolin 2,000 mg Intravenous Once   enoxaparin 40 mg Subcutaneous Daily   furosemide 40 mg Oral Daily   levothyroxine 50 mcg Oral Early Morning   metFORMIN 500 mg Oral BID With Meals   metoprolol tartrate 50 mg Oral BID   multivitamin-minerals 1 tablet Oral Daily   pantoprazole 40 mg Oral Early Morning   potassium chloride 20 mEq Oral Daily   promethazine 25 mg Oral Daily        Today, Patient Was Seen By: Mary Heaton MD    ** Please Note: Dragon 360 Dictation voice to text software may have been used in the creation of this document   **

## 2018-01-02 NOTE — CONSULTS
Inpatient Medical Consultation - [facility] Internal Medicine    Patient Information: Paige Hutton 80 y o  female MRN: [id number]  Unit/Bed#: ED 24 Encounter: [id number]  PCP: Debbie Lynch MD  Date of Admission:  1/1/2018  Date of Consultation: 01/02/18  Requesting Physician: Biagio Closs, DO    Reason For Consultation:   Medical comgt, pre-op risk assessment    Assessment/Plan:    Hospital Problem List:     Principal Problem:    Closed displaced intertrochanteric fracture of right femur [facility])  Active Problems:    Coronary artery disease involving native coronary artery    S/P TAVR (transcatheter aortic valve replacement)    Physical deconditioning    History of TIA (transient ischemic attack)    Diabetes mellitus ([facility] )    History of cardiac pacemaker      Plan:  · Closed right intertrochanteric femur fx: given cardiac comorbidities (PM, CHF, CAD with stent though > 6 months, hx AS with TAVR, obesity, DM, TIA and sedentary lifestyle) pt with intermediate risk would obtain consultation with cardiology, check hgb a1c, mgt, dvt prophylaxis, activity level and analgesics per primary service  · Hx CAD: with HERNAN x 2, was on DAPT for 1 year, continue asa, statin, beta blocker  Therapy  · Hx AS s/p TAVR  · Hx PM for high grade heart block with syncope, v-paced  · DM: accu checks, sliding scale coverage, check hgb a1c  · Hx TIA, on asa, statin  · Physical deconditioning: noted, eventual pt eval    VTE Prophylaxis: Enoxaparin (Lovenox)  / reason for no mechanical VTE prophylaxis fx       Counseling / Coordination of Care Time: 45 minutes  Greater than 50% of total time spent on patient counseling and coordination of care  Collaboration of Care:  Were Recommendations Directly Discussed with Primary Treatment Team? - No     History of Present Illness:    Paige Hutton is a 80 y o  female who presents to the ER after mechanical fall while ambulating to bathroom, she reports that her knee gave out and she went down and immediately noted severe 10/10 right hip and groin pain and inability to weight bear  She denies head trauma or LOC, no chest pain, shortness of breath or palpitations  She reports LH for several months that's being worked up by ENT  She is not very active ambulates in apartment with walker at baseline  She reports last seeing her cardiologist in July and states last echo was ok  She denies hx of CHF but is on lasix daily  Review of Systems:    Denies fevers, chills, sweats  No chest pain, sob, palpitations  No abd pain, nausea or emesis  +LH (chronic for months)  All systems are reviewed  Positive as per history of presenting illness  Patient answers no to all questions  Review of Systems    Past Medical and Surgical History:     Past Medical History:   Diagnosis Date    CAD (coronary artery disease)     s/p HERNAN 6/2016    CHF (congestive heart failure) (HCC)     Diabetes mellitus (HCC)     non-insulin depdenent    GERD (gastroesophageal reflux disease)     History of TIA (transient ischemic attack)     no residual deficits    Hyperlipidemia     Hypertension     Hypoxia     on home O2 QHS    Meniere disease     Pacemaker     CHB-Biotronik in 2/2015    Severe aortic stenosis        Past Surgical History:   Procedure Laterality Date    APPENDECTOMY      BACK SURGERY      CHOLECYSTECTOMY      HYSTERECTOMY      WA EGD TRANSORAL BIOPSY SINGLE/MULTIPLE N/A 11/9/2016    Procedure: ESOPHAGOGASTRODUODENOSCOPY (EGD); Surgeon: Danay Klein MD;  Location: [facility];   Service: Gastroenterology    WA REPLACE AORTIC VALVE OPENFEMORAL ARTERY APPROACH N/A 7/26/2016    Procedure: TRANSFEMORAL TAVR WITH 23MM LAROSE LILY S3 VALVE; JOSE ALFREDO ;  Surgeon: Austin Nicole DO;  Location: [facility];  Service: Cardiac Surgery    SMALL INTESTINE SURGERY      TOTAL KNEE ARTHROPLASTY      VARICOSE VEIN SURGERY      VENTRAL HERNIA REPAIR         Meds/Allergies:    all medications and allergies reviewed    Allergies: Allergies   Allergen Reactions    Advil [Ibuprofen] GI Intolerance       Social History:     Marital Status:     Substance Use History:   History   Alcohol Use No     History   Smoking Status    Never Smoker   Smokeless Tobacco    Never Used     History   Drug Use No       Family History:    non-contributory    Physical Exam:     Vitals:   Blood Pressure: 135/63 (01/02/18 0442)  Pulse: 69 (01/02/18 0442)  Temperature: 98 9 °F (37 2 °C) (01/01/18 2347)  Temp Source: Oral (01/01/18 2347)  Respirations: 20 (01/02/18 0442)  Height: 5' 2" (157 5 cm) (01/01/18 2347)  Weight - Scale: 84 4 kg (186 lb) (01/01/18 2347)  SpO2: 93 % (01/02/18 0442)      Vital signs are reviewed as above  No distress, comfortable on stretcher  Sclera anicteric  RRR  Clear b/l, though decreased 2/2 effort  Soft, obese, +bs, nontender  RLE externally rotated and shortened  Distal pulses intact  Calm and cooperative  Oriented x 3          Physical Exam      Additional Data:     Lab Results: I Have Reviewed All Lab Data Below:      Results from last 7 days  Lab Units 01/02/18  0005   WBC Thousand/uL 15 91*   HEMOGLOBIN g/dL 13 0   HEMATOCRIT % 40 0   PLATELETS Thousands/uL 362   NEUTROS PCT % 80*   LYMPHS PCT % 12*   MONOS PCT % 5   EOS PCT % 2       Results from last 7 days  Lab Units 01/02/18  0005   SODIUM mmol/L 135*   POTASSIUM mmol/L 4 3   CHLORIDE mmol/L 99*   CO2 mmol/L 30   BUN mg/dL 18   CREATININE mg/dL 1 10   CALCIUM mg/dL 10 1   TOTAL PROTEIN g/dL 7 8   BILIRUBIN TOTAL mg/dL 0 48   ALK PHOS U/L 87   ALT U/L 26   AST U/L 25   GLUCOSE RANDOM mg/dL 151*       Results from last 7 days  Lab Units 01/02/18  0319   INR  1 04       * Additional Pertinent Lab Tests Reviewed:  [facility]    Imaging: right intertrochanteric frature    EKG: v-paced      ** Please Note: Dragon 360 Dictation speech to text software may have been used in the creation of this document **

## 2018-01-02 NOTE — ED NOTES
Pt can take sips with meds per [facility] Stabs with Ortho  Holding the metformin for now       Corey Ivy RN  97/85/28 0147

## 2018-01-02 NOTE — CONSULTS
Consultation - Cardiology   John Hutton 80 y o  female MRN: [id number]  Unit/Bed#: ED 24 Encounter: [id number]    Assessment/Plan     Principal Problem:    Closed displaced intertrochanteric fracture of right femur [facility])  Active Problems:    Coronary artery disease involving native coronary artery    S/P TAVR (transcatheter aortic valve replacement)    Physical deconditioning    History of TIA (transient ischemic attack)    Diabetes mellitus ([address] )    History of cardiac pacemaker      Assessment/Plan    1  S/P mechanical fall    2  Right femur fracture  OR anticipated this AM- IM nailing of intertrochanteric femur fracture    3  S/P TAVR 7/2017  2D echocardiogram 7/2017- normal LVEF, mild conc LVH  Mild to mod MR  AV prosthesis well seated without stenosis or regurgitation  4  Chronic diastolic heart failure  Seems euvolemic  Monitor for volume overload  CXR- no active disease  Takes daily lasix 40mg daily    5  CAD s/p HERNAN X2 7/2016  On asa, BB , statin  At low workloads no anginal symptoms    6  S/P PPM- followed through [place] [id number] office        History of Present Illness   Physician Requesting Consult: Mohit Haro DO  Reason for Consult / Principal Problem: pre op risk stratification    HPI: John Hutton is a 80y o  year old female with chronic diastolic heart failure, PPM , CAD with HERNAN X2 to LAD 6/2016, severe AS S/P TAVR ( 23mm Cox LILY 3 ), HLD , HTN , TIA and diabetes who presents with a mechanical fall while getting OOB  She wears oxygen HS  Cardiology has been consulted regarding pre op risk stratification for   She is no longer on plavix but remains on asa  She has limited mobility d/t orthopedic issues  She ambulates with a cane/walker usually  At low workloads she denies any exertional SOB or CP  No edema  She has been stable from a cardiac standpoint  She hasn't had any c/o LE edema  She is followed by Dr Melara Double       Inpatient consult to Cardiology  Consult performed by: Lisa Kohli Bradford Notice  Consult ordered by: Ross Craven          Review of Systems   Constitutional: Negative  HENT: Negative  Eyes: Negative  Respiratory: Negative for shortness of breath  Cardiovascular: Negative for chest pain, palpitations and leg swelling  Gastrointestinal: Negative  Genitourinary: Negative  Musculoskeletal: Positive for arthralgias and gait problem  Neurological: Negative for syncope  Hematological: Bruises/bleeds easily  Psychiatric/Behavioral: Positive for confusion  Historical Information   Past Medical History:   Diagnosis Date    CAD (coronary artery disease)     s/p HERNAN 6/2016    CHF (congestive heart failure) (HCC)     Diabetes mellitus (HCC)     non-insulin depdenent    GERD (gastroesophageal reflux disease)     History of TIA (transient ischemic attack)     no residual deficits    Hyperlipidemia     Hypertension     Hypoxia     on home O2 QHS    Meniere disease     Pacemaker     CHB-Biotronik in 2/2015    Severe aortic stenosis      Past Surgical History:   Procedure Laterality Date    APPENDECTOMY      BACK SURGERY      CHOLECYSTECTOMY      HYSTERECTOMY      SC EGD TRANSORAL BIOPSY SINGLE/MULTIPLE N/A 11/9/2016    Procedure: ESOPHAGOGASTRODUODENOSCOPY (EGD); Surgeon: Morgan Ross MD;  Location: [facility]; Service: Gastroenterology    SC REPLACE AORTIC VALVE OPENFEMORAL ARTERY APPROACH N/A 7/26/2016    Procedure: TRANSFEMORAL TAVR WITH 23MM LAROSE LILY S3 VALVE; JOSE ALFREDO ;  Surgeon: Dara Hernandez DO;  Location: [facility];  Service: Cardiac Surgery    SMALL INTESTINE SURGERY      TOTAL KNEE ARTHROPLASTY      VARICOSE VEIN SURGERY      VENTRAL HERNIA REPAIR       History   Alcohol Use No     History   Drug Use No     History   Smoking Status    Never Smoker   Smokeless Tobacco    Never Used     Family History: History reviewed  No pertinent family history      Meds/Allergies   current meds:   Current Facility-Administered Medications Medication Dose Route Frequency    acetaminophen (TYLENOL) tablet 650 mg  650 mg Oral Q6H PRN    acetaminophen (TYLENOL) tablet 650 mg  650 mg Oral Q6H [address]    ascorbic acid (VITAMIN C) tablet 500 mg  500 mg Oral Daily    aspirin chewable tablet 81 mg  81 mg Oral Daily    atorvastatin (LIPITOR) tablet 20 mg  20 mg Oral HS    bisacodyl (DULCOLAX) EC tablet 5 mg  5 mg Oral Once    calcium carbonate (OYSTER SHELL,OSCAL) 500 mg tablet 1 tablet  1 tablet Oral BID With Meals    ceFAZolin (ANCEF) IVPB (premix) 2,000 mg  2,000 mg Intravenous Once    enoxaparin (LOVENOX) subcutaneous injection 40 mg  40 mg Subcutaneous Daily    fentanyl citrate (PF) 100 MCG/2ML 25 mcg  25 mcg Intravenous Q1H PRN    furosemide (LASIX) tablet 40 mg  40 mg Oral Daily    levothyroxine tablet 50 mcg  50 mcg Oral Early Morning    metFORMIN (GLUCOPHAGE) tablet 500 mg  500 mg Oral BID With Meals    metoprolol tartrate (LOPRESSOR) tablet 50 mg  50 mg Oral BID    multivitamin-minerals (CENTRUM) tablet 1 tablet  1 tablet Oral Daily    oxyCODONE (ROXICODONE) IR tablet 2 5 mg  2 5 mg Oral Q4H PRN    oxyCODONE (ROXICODONE) IR tablet 5 mg  5 mg Oral Q4H PRN    pantoprazole (PROTONIX) EC tablet 40 mg  40 mg Oral Early Morning    potassium chloride (K-DUR,KLOR-CON) CR tablet 20 mEq  20 mEq Oral Daily    promethazine (PHENERGAN) tablet 25 mg  25 mg Oral Daily    and PTA meds:    (Not in a hospital admission)  Allergies   Allergen Reactions    Advil [Ibuprofen] GI Intolerance       Objective   Vitals: Blood pressure (!) 174/74, pulse 72, temperature 98 9 °F (37 2 °C), temperature source Oral, resp  rate 22, height 5' 2" (1 575 m), weight 84 4 kg (186 lb), SpO2 95 %    Orthostatic Blood Pressures    Flowsheet Row Most Recent Value   Blood Pressure   174/74 filed at 01/02/2018 0730   Patient Position - Orthostatic VS  Lying filed at 01/02/2018 0442          No intake or output data in the 24 hours ending 01/02/18 0902    Invasive Devices Peripheral Intravenous Line            Peripheral IV 01/02/18 Left Antecubital less than 1 day                Physical Exam: BP (!) 174/74   Pulse 72   Temp 98 9 °F (37 2 °C) (Oral)   Resp 22   Ht 5' 2" (1 575 m)   Wt 84 4 kg (186 lb)   SpO2 95%   BMI 34 02 kg/m²   General Appearance:    Alert, cooperative, no distress, appears stated age   Head:    Normocephalic, no scleral icterus   Eyes:    PERRL   Nose:   Nares normal, septum midline, mucosa normal, no drainage    Throat:   Lips, mucosa, and tongue normal   Neck:   Supple, symmetrical, trachea midline     no  JVD       Lungs:     Clear to auscultation bilaterally, respirations unlabored        Heart:    Regular rate and rhythm, S1 and S2 normal, no murmur, rub   or gallop   Abdomen:     Soft, non-tender, bowel sounds active all four quadrants,      Extremities:   Extremities normal, atraumatic, no cyanosis or edema   Pulses:   2+ and symmetric all extremities   Skin:   Skin color, texture, turgor normal, no rashes or lesions   Neurologic:   Alert and oriented to person place and time  No focal deficits   Little forgetful       Lab Results:   Recent Results (from the past 72 hour(s))   Comprehensive metabolic panel    Collection Time: 01/02/18 12:05 AM   Result Value Ref Range    Sodium 135 (L) 136 - 145 mmol/L    Potassium 4 3 3 5 - 5 3 mmol/L    Chloride 99 (L) 100 - 108 mmol/L    CO2 30 21 - 32 mmol/L    Anion Gap 6 4 - 13 mmol/L    BUN 18 5 - 25 mg/dL    Creatinine 1 10 0 60 - 1 30 mg/dL    Glucose 151 (H) 65 - 140 mg/dL    Calcium 10 1 8 3 - 10 1 mg/dL    AST 25 5 - 45 U/L    ALT 26 12 - 78 U/L    Alkaline Phosphatase 87 46 - 116 U/L    Total Protein 7 8 6 4 - 8 2 g/dL    Albumin 3 6 3 5 - 5 0 g/dL    Total Bilirubin 0 48 0 20 - 1 00 mg/dL    eGFR 47 ml/min/1 73sq m   CBC and differential    Collection Time: 01/02/18 12:05 AM   Result Value Ref Range    WBC 15 91 (H) 4 31 - 10 16 Thousand/uL    RBC 4 50 3 81 - 5 12 Million/uL    Hemoglobin 13 0 11 5 - 15 4 g/dL    Hematocrit 40 0 34 8 - 46 1 %    MCV 89 82 - 98 fL    MCH 28 9 26 8 - 34 3 pg    MCHC 32 5 31 4 - 37 4 g/dL    RDW 13 7 11 6 - 15 1 %    MPV 9 7 8 9 - 12 7 fL    Platelets 460 104 - 291 Thousands/uL    nRBC 0 /100 WBCs    Neutrophils Relative 80 (H) 43 - 75 %    Lymphocytes Relative 12 (L) 14 - 44 %    Monocytes Relative 5 4 - 12 %    Eosinophils Relative 2 0 - 6 %    Basophils Relative 1 0 - 1 %    Neutrophils Absolute 12 64 (H) 1 85 - 7 62 Thousands/µL    Lymphocytes Absolute 1 94 0 60 - 4 47 Thousands/µL    Monocytes Absolute 0 80 0 17 - 1 22 Thousand/µL    Eosinophils Absolute 0 28 0 00 - 0 61 Thousand/µL    Basophils Absolute 0 09 0 00 - 0 10 Thousands/µL   Troponin I    Collection Time: 01/02/18 12:05 AM   Result Value Ref Range    Troponin I <0 02 <=0 04 ng/mL   ECG 12 lead    Collection Time: 01/02/18 12:23 AM   Result Value Ref Range    Ventricular Rate 67 BPM    Atrial Rate 67 BPM    KY Interval 194 ms    QRSD Interval 192 ms    QT Interval 478 ms    QTC Interval 505 ms    P Axis 56 degrees    QRS Axis -64 degrees    T Wave Axis 88 degrees   Protime-INR    Collection Time: 01/02/18  3:19 AM   Result Value Ref Range    Protime 13 6 12 1 - 14 4 seconds    INR 1 04 0 86 - 1 16   APTT    Collection Time: 01/02/18  3:19 AM   Result Value Ref Range    PTT 27 23 - 35 seconds   Type and screen    Collection Time: 01/02/18  3:19 AM   Result Value Ref Range    ABO Grouping B     Rh Factor Negative     Antibody Screen Negative     Specimen Expiration Date 20180105    Hemoglobin A1c    Collection Time: 01/02/18  5:27 AM   Result Value Ref Range    Hemoglobin A1C 6 3 4 2 - 6 3 %     mg/dl   ED Urine Macroscopic    Collection Time: 01/02/18  7:36 AM   Result Value Ref Range    Color, UA Yellow     Clarity, UA Clear     pH, UA 7 0 4 5 - 8 0    Leukocytes, UA Trace (A) Negative    Nitrite, UA Negative Negative    Protein,  (2+) (A) Negative mg/dl    Glucose, UA Negative Negative mg/dl    Ketones, UA Negative Negative mg/dl    Urobilinogen, UA 0 2 0 2, 1 0 E U /dl E U /dl    Bilirubin, UA Negative Negative    Blood, UA Negative Negative    Specific Gravity, UA 1 025 1 003 - 1 030   Fingerstick Glucose (POCT)    Collection Time: 18  7:51 AM   Result Value Ref Range    POC Glucose 141 (H) 65 - 140 mg/dl     [facility] [facility]  [phone]     Transthoracic Echocardiogram  2D, M-mode, Doppler, and Color Doppler     Study date:  2017     Patient: Anat Lee  MR number: [id number]  Account number: [de-identified]  : 1935  Age: 80 years  Gender: Female  Status: Outpatient  Location: [facility]  Height: 62 in  Weight: 192 5 lb  BP: 156/ 72 mmHg     Indications: Evaluate prosthetic aortic valve      Diagnoses: I35 9 - Nonrheumatic aortic valve disorder, unspecified     Sonographer:  ALMA Amador  Interpreting Physician:  Selena Loyola MD  Primary Physician:  Florentino Medel MD  Referring Physician:  Shannon Mitchell DO     SUMMARY     LEFT VENTRICLE:  Systolic function was normal  Ejection fraction was estimated to be 58 %  There were no regional wall motion abnormalities  Wall thickness was mildly increased  There was mild concentric hypertrophy      MITRAL VALVE:  There was mild to moderate annular calcification  There was mild to moderate regurgitation      AORTIC VALVE:  A transcatheter (TAVR #23 [employer] 3) bioprosthesis was present  The prosthesis was well seated without stenosis or regurgitation  The peak (mean) transvalvular velocities and gradients were 2 77 (2 00) m/sec and 31 (17) mmHg,  respectively  The aortic valve area measured 1 5 cm2 using continuity equation  Valve mean gradient was 17 mmHg  Estimated aortic valve area (by VTI) was 1 56 cm squared  Estimated aortic valve area (by Vmax) was 1 52 cm squared    Estimated aortic valve area (by Vmean) was 1 39 cm squared      TRICUSPID VALVE:  There was mild regurgitation      HISTORY: PRIOR HISTORY: #23 LAROSE Madeline TAVR 1 year post, CAD s/p PTCA, Hyperlipidemia, Permanent pacemaker     PROCEDURE: The study was performed in the [facility]  This was a routine study  The transthoracic approach was used  The study included complete 2D imaging, M-mode, complete spectral Doppler, and color Doppler  The  heart rate was 93 bpm, at the start of the study  Images were obtained from the parasternal, apical, subcostal, and suprasternal notch acoustic windows  Image quality was adequate      LEFT VENTRICLE: Size was normal  Systolic function was normal  Ejection fraction was estimated to be 58 %  There were no regional wall motion abnormalities  Wall thickness was mildly increased  There was mild concentric hypertrophy  DOPPLER: Normal sinus rhythm was absent  The study was not technically sufficient to allow evaluation of LV diastolic function      RIGHT VENTRICLE: The size was normal  Systolic function was normal  Wall thickness was normal      LEFT ATRIUM: Size was at the upper limits of normal      RIGHT ATRIUM: Size was normal      MITRAL VALVE: There was mild to moderate annular calcification  Valve structure was normal  There was normal leaflet separation  DOPPLER: The transmitral velocity was within the normal range  There was no evidence for stenosis  There was  mild to moderate regurgitation      AORTIC VALVE: A transcatheter (TAVR #23 Rodena Mater 3) bioprosthesis was present  The prosthesis was well seated without stenosis or regurgitation  The peak (mean) transvalvular velocities and gradients were 2 77 (2 00) m/sec and 31 (17)  mmHg, respectively  The aortic valve area measured 1 5 cm2 using continuity equation      TRICUSPID VALVE: The valve structure was normal  There was normal leaflet separation  DOPPLER: The transtricuspid velocity was within the normal range  There was no evidence for stenosis   There was mild regurgitation  Pulmonary artery  systolic pressure was within the normal range      PULMONIC VALVE: Leaflets exhibited normal thickness, no calcification, and normal cuspal separation  DOPPLER: The transpulmonic velocity was within the normal range  There was no regurgitation      PERICARDIUM: There was no pericardial effusion  The pericardium was normal in appearance      AORTA: The root exhibited normal size  SUMMARY     CORONARY CIRCULATION:  Left main: The vessel was medium to large sized  Angiography showed minor  luminal irregularities  LAD: The vessel was medium to large sized  Angiography  showed severe atherosclerosis  Mid LAD: There was a diffuse 90 % stenosis  This  is a likely culprit for the patient's clinical presentation  An intervention  was performed  Circumflex: The vessel was large sized  Angiography showed minor  luminal irregularities  RCA: The vessel was medium sized  Angiography showed  minor luminal irregularities      CARDIAC STRUCTURES:  There was about moderate aortic stenosis by gradients (peak to peak gradient 17  mmHg)      HEMODYNAMICS:  Hemodynamic assessment demonstrated mildly elevated LVEDP      1ST LESION INTERVENTIONS:  A drug-eluting stent procedure was performed on the 90 % lesion in the mid LAD  Following intervention there was a 0 % residual stenosis  A Xience Alpine Rx  2 75 x 33mm drug-eluting stent was placed across the lesion and deployed at a  maximum inflation pressure of 14 akiko  A Xience Alpine Rx 3 0 x 18mm  drug-eluting stent was placed across the lesion and deployed at a maximum  inflation pressure of 14 akiko  Imaging: I have personally reviewed pertinent reports  EKG: V paced      Code Status: Level 1 - Full Code  Advance Directive and Living Will:      Power of :    POLST:      Counseling / Coordination of Care  Total floor / unit time spent today 45 minutes    Greater than 50% of total time was spent with the patient and / or family counseling and / or coordination of care

## 2018-01-02 NOTE — OP NOTE
OPERATIVE REPORT  PATIENT NAME: Morris Hutton    :  1935  MRN: [id number]  Pt Location:  OR ROOM 18    SURGERY DATE: 2018    Surgeon(s) and Role:     * Alex Luna MD - Primary     * Josué Lowe PA-C - Assisting     * Edgardo Torres - Assisting    Preop Diagnosis:  Closed displaced intertrochanteric fracture of right femur, initial encounter [facility]) Ethel Clemons  Right subtrochanteric femur fracture     Post-Op Diagnosis Codes:     * Closed displaced intertrochanteric fracture of right femur, initial encounter ([facility] ) [S72 141A]  Right subtrochanteric femur fracture    Procedure(s) (LRB):  INSERTION NAIL IM FEMUR ANTEGRADE (TROCHANTERIC) RIGHT (Right)    Specimen(s):  * No specimens in log *    Estimated Blood Loss:   Minimal    Drains:       Anesthesia Type:   General    Operative Indications:  Closed displaced intertrochanteric fracture of right femur, initial encounter ([facility] ) [S72 141A]  Right subtrochanteric femur fracture    Operative Findings:  Right subtrochanteric femur fracture    Complications:   None    Procedure and Technique:  Antegrade IM nail fixation right femur, TFN  Patient was correctly identified by both the anesthesia department and myself  The right lower extremity was marked  She was taken to the operating room with general anesthesia was induced in the supine position  Deal catheter was placed  She was positioned supine onto radiolucent fracture table ensuring that all bony prominences and neurovascular structures were adequately padded and protected  The right lower extremity was placed in traction  The left lower extremity was placed in a well leg best and SCD was attached  Provisional reduction was obtained with the fracture table  The right lower extremity was prepped and draped in the usual sterile fashion  2 g of Ancef were administered for preoperative antibiotics  A time-out was performed    A 2 5 cm longitudinal incision was made proximal to the level of the greater trochanter with a 10 blade  Dissection was carried past the level of the fascia  A guide pin was introduced into the proximal femur to the level of fracture site  This was center center  An opening Reamer was passed over the guide pin  A ball-tip guidewire was passed through the proximal femur past the fracture site to the level of knee joint  A measurement was taken and this was 380 mm  Reaming ensued  We reamed in successive increments of 1 from a 8 5-12 5  A Synthes TFN nail measuring 11 x 380 was selected  This was passed over the ball-tip guidewire  The ball-tip guidewire was removed  The proximal locking jig was utilized to introduce a pin into the femoral neck and head center center on AP and lateral imaging  A measurement was taken this was pre drilled  A 95 mm lag screw was passed over the guide pin  This was locked in place proximally with a set screw  The proximal locking jig was removed  Attention was paid to the distal lock holes  Two stab incisions were made over the distal lateral femur  Utilizing a perfect [place] technique drill bits were used to drill holes  Measurements were taken  Is size 44 and size 52 mm distal lock screw was placed respectively  Final AP and lateral imaging confirmed appropriate instrumentation alignment and fixation  All wounds were copiously irrigated  Hemostasis was achieved  Fascial layers were closed using 0 Vicryl suture  The subcutaneous layers were closed using 2 Vicryl suture  The skin layers were reapproximated using staples  Sterile dressings were applied to the wound  The patient was taken off the fracture table extubated and taken out the of the operating room       I was present for the entire procedure    Patient Disposition:  PACU      Implants: Synthes TFN 11*380, 95mm lag screw   44/52 mm distal lock screws    SIGNATURE: Dain Ward MD  DATE: January 2, 2018  TIME: 12:17 PM

## 2018-01-02 NOTE — ANESTHESIA PREPROCEDURE EVALUATION
Review of Systems/Medical History  Patient summary reviewed  Chart reviewed  No history of anesthetic complications (NO FAMILY PROBLEMS  WITH ANESTHESIA)     Cardiovascular  EKG reviewed, Exercise tolerance: good, Exercise comment: ACTIVITY LIMITED BY BACK PAIN Pacemaker/AICD (NO PROBLEMS), Hyperlipidemia, Hypertension well controlled, Valvular heart disease (S/P TAVR JULY, DOING WELL) , aortic valve stenosis, CAD, , Dysrhythmias, , CHF ,   Comment: 7/18/17: LEFT VENTRICLE:  Systolic function was normal  Ejection fraction was estimated to be 58 %  There were no regional wall motion abnormalities  S/p TAVR: aortic valve doing well    Seen by Cardiology today: no further optimization required at this time prior to surgery / anesthesia,  Pulmonary  Sleep apnea (USES OXYGEN 3 L/M AT NIGHT) , ,   Comment: Home 02     GI/Hepatic    GERD well controlled,             Endo/Other  Diabetes type 2 ,      GYN       Hematology   Musculoskeletal       Neurology   Psychology           Physical Exam    Airway    Mallampati score: II  TM Distance: >3 FB  Neck ROM: full     Dental       Cardiovascular      Pulmonary      Other Findings        Anesthesia Plan  ASA Score- 3     Anesthesia Type- general with ASA Monitors  Additional Monitors:   Airway Plan: ETT  Comment: TIVA to minimize chance of post op cognitive dysfunction  Plan Factors-    Induction- intravenous  Postoperative Plan- Plan for postoperative opioid use  Informed Consent- Anesthetic plan and risks discussed with patient  I personally reviewed this patient with the CRNA  Discussed and agreed on the Anesthesia Plan with the CRNA  Karrie Nissen

## 2018-01-02 NOTE — PROGRESS NOTES
Low urine output noted  Given lasix and k-dur  Deal flushed with good return  Patient repositioned  Ortho notified  Told to watch closely and call if no improvement after Lasix

## 2018-01-02 NOTE — CASE MANAGEMENT
Initial Clinical Review    Admission: Date/Time/Statement: 1/2/18 @ 0341     Orders Placed This Encounter   Procedures    Inpatient Admission (expected length of stay for this patient is greater than two midnights)     Standing Status:   Standing     Number of Occurrences:   1     Order Specific Question:   Admitting Physician     Answer:   Mont Cushing [[id number]]     Order Specific Question:   Level of Care     Answer:   Med Surg [[id number]]     Order Specific Question:   Estimated length of stay     Answer:   More than 2 Midnights     Order Specific Question:   Certification     Answer:   I certify that inpatient services are medically necessary for this patient for a duration of greater than two midnights  See H&P and MD Progress Notes for additional information about the patient's course of treatment  ED: Date/Time/Mode of Arrival:   ED Arrival Information     Expected Arrival Acuity Means of Arrival Escorted By Service Admission Type    1/1/2018 23:30 1/1/2018 23:43 Urgent Ambulance [address] [facility] Urgent    Arrival Complaint    Fall          Chief Complaint:   Chief Complaint   Patient presents with   [facility] Fall     pt got up to went to the bathroom and slipped off of her mattress  When she was being helped to the bathroom, pt fell and hit her head  Does not remember if she was dizzy and fell  Pt c/o pain in R hip       History of Illness: 80 y  o female ambulates with walker status post fall from standing complaining of right hip pain and inability to bear weight  Pain is well localized to the hip and is made worse with motion or contact to the area  Denies numbness or tingling  Pain improves with rest  No other complaints at this time       ED Vital Signs:   ED Triage Vitals [01/01/18 2347]   Temperature Pulse Respirations Blood Pressure SpO2   98 9 °F (37 2 °C) 68 18 141/93 92 %      Temp Source Heart Rate Source Patient Position - Orthostatic VS BP Location FiO2 (%)   Oral Monitor Lying Right arm --      Pain Score       8        Wt Readings from Last 1 Encounters:   01/01/18 84 4 kg (186 lb)       Vital Signs (abnormal):   Date/Time  Temp  Pulse  Resp  BP  SpO2  O2 Device  Cardiac (WDL)  Patient Position - Orthostatic VS       01/02/18 1255  99 7 °F (37 6 °C)  76  14  110/54  --  Simple mask  --  --   01/02/18 1252  99 8 °F (37 7 °C)  87   23  110/54  98 %  Simple mask  X  --     Abnormal Labs:   01/02/18 0005     WBC 4 31 - 10 16 Thousand/uL 15 91       Diagnostic Test Results: CT Right Hip - Right hip, right inferior pubic ramus, right superior pubic ramus extending to anterior column fractures     ED Treatment:   Medication Administration from 01/01/2018 2330 to 01/02/2018 1049       Date/Time Order Dose Route Action     01/02/2018 0223 acetaminophen (TYLENOL) tablet 650 mg 650 mg Oral Given     01/02/2018 0220 fentanyl citrate (PF) 100 MCG/2ML 50 mcg 50 mcg Intravenous Given     01/02/2018 0748 oxyCODONE (ROXICODONE) IR tablet 5 mg 5 mg Oral Given          Past Medical/Surgical History:    Active Ambulatory Problems     Diagnosis Date Noted    Nonrheumatic aortic valve stenosis 06/06/2016    Coronary artery disease involving native coronary artery 06/08/2016    Complete atrioventricular block (HCC) 07/26/2016    S/P TAVR (transcatheter aortic valve replacement) 07/26/2016    Age-related cognitive decline 07/26/2016    Frequent falls 07/26/2016    Physical deconditioning 07/26/2016    Ambulatory dysfunction 07/26/2016    Vision impairment 07/26/2016    Constipation 07/26/2016    Incontinence in female 07/26/2016     Resolved Ambulatory Problems     Diagnosis Date Noted    No Resolved Ambulatory Problems     Past Medical History:   Diagnosis Date    CAD (coronary artery disease)     CHF (congestive heart failure) (HCC)     Diabetes mellitus (HCC)     GERD (gastroesophageal reflux disease)     History of TIA (transient ischemic attack)     Hyperlipidemia     Hypertension     Hypoxia     Meniere disease     Pacemaker     Severe aortic stenosis        Admitting Diagnosis: Complete atrioventricular block (HCC) [I44 2]  Inferior pubic ramus fracture ([facility] ) [S32 599A]  Closed displaced intertrochanteric fracture of right femur, initial encounter ([facility] ) [S72 141A]  S/P TAVR (transcatheter aortic valve replacement) [Z95 2]  Closed displaced fracture of right femoral neck (HCC) [S72 001A]  Coronary artery disease involving native coronary artery [I25 10]  Closed fracture of left superior pubic ramus, initial encounter ([facility] ) [S32 512A]  Unspecified multiple injuries, initial encounter [T07  XXXA]    Age/Sex: 80 y o  female      Assessment:  80 y  o female status post fall from standing with rightIntertrochanteric femur fracture     Plan:   · Non weight bearing right lower extremity  · Analgesics for pain  · NPO at midnight  · Deal Catheter insertion  · Medicine consult for all medical management and preoperative risk stratification  · To OR for IM nail femur fracture of Intertrochanteric femur fracture  · Dispo: Ortho will follow       Admission Orders:  NPO  1/2 OR Today - INSERTION NAIL IM FEMUR ANTEGRADE (TROCHANTERIC) RIGHT (Right Leg Upper)    Cardiology cons  Internal Medicine cons    Scheduled Meds:   acetaminophen 650 mg Oral Q6H [facility] & half-way   ascorbic acid 500 mg Oral Daily   aspirin 81 mg Oral Daily   atorvastatin 20 mg Oral HS   bisacodyl 5 mg Oral Once   calcium carbonate 1 tablet Oral BID With Meals   enoxaparin 40 mg Subcutaneous Daily   furosemide 40 mg Oral Daily   levothyroxine 50 mcg Oral Early Morning   metFORMIN 500 mg Oral BID With Meals   metoprolol tartrate 50 mg Oral BID   multivitamin-minerals 1 tablet Oral Daily   pantoprazole 40 mg Oral Early Morning   potassium chloride 20 mEq Oral Daily   promethazine 25 mg Oral Daily     Continuous Infusions:   lactated ringers 75 mL/hr     PRN Meds:     acetaminophen    fentanyl citrate (PF)    HYDROmorphone    ondansetron  Fatemeh Torres  [facility] Hold] oxyCODONE

## 2018-01-02 NOTE — ANESTHESIA POSTPROCEDURE EVALUATION
Post-Op Assessment Note      CV Status:  Stable    Mental Status:  Alert and awake    Hydration Status:  Euvolemic    PONV Controlled:  Controlled    Airway Patency:  Patent  Airway: intubated    Post Op Vitals Reviewed: Yes          Staff: CRNA           BP      Temp      Pulse     Resp      SpO2

## 2018-01-02 NOTE — ED ATTENDING ATTESTATION
I, [address] [facility], DO, saw and evaluated the patient  I have discussed the patient with the resident/non-physician practitioner and agree with the resident's/non-physician practitioner's findings, Plan of Care, and MDM as documented in the resident's/non-physician practitioner's note, except where noted  All available labs and Radiology studies were reviewed  At this point I agree with the current assessment done in the Emergency Department  I have conducted an independent evaluation of this patient a history and physical is as follows:    59-year-old female presents with hip pain status post fall  Patient was walking with assistance and leg gave out  Corbel Carlos Alberto to the ground and states she did hit her head however denies loss of consciousness  Complains right-sided hip pain  No chest pain or abdominal pain  Admits to neck pain as well and some low back pain  On exam-no acute distress, heart regular, lungs clear, abdomen soft nontender, tenderness right hip and right lower extremity is shortened, distally neurovascular intact   Plan-CT head and C-spine, CT hip, check basic labs    Critical Care Time  CritCare Time    Procedures

## 2018-01-03 PROBLEM — I50.32 CHRONIC DIASTOLIC CONGESTIVE HEART FAILURE (HCC): Status: ACTIVE | Noted: 2018-01-03

## 2018-01-03 PROBLEM — D62 ACUTE BLOOD LOSS AS CAUSE OF POSTOPERATIVE ANEMIA: Status: ACTIVE | Noted: 2018-01-03

## 2018-01-03 PROBLEM — E03.9 HYPOTHYROIDISM: Status: ACTIVE | Noted: 2018-01-03

## 2018-01-03 PROBLEM — N17.9 AKI (ACUTE KIDNEY INJURY) (HCC): Status: ACTIVE | Noted: 2018-01-03

## 2018-01-03 PROBLEM — E78.5 HYPERLIPIDEMIA: Status: ACTIVE | Noted: 2018-01-03

## 2018-01-03 LAB
ANION GAP SERPL CALCULATED.3IONS-SCNC: 10 MMOL/L (ref 4–13)
BACTERIA UR QL AUTO: ABNORMAL /HPF
BASOPHILS # BLD AUTO: 0.07 THOUSANDS/ΜL (ref 0–0.1)
BASOPHILS NFR BLD AUTO: 1 % (ref 0–1)
BILIRUB UR QL STRIP: NEGATIVE
BUN SERPL-MCNC: 32 MG/DL (ref 5–25)
CALCIUM SERPL-MCNC: 8.7 MG/DL (ref 8.3–10.1)
CHLORIDE SERPL-SCNC: 102 MMOL/L (ref 100–108)
CLARITY UR: CLEAR
CO2 SERPL-SCNC: 25 MMOL/L (ref 21–32)
COLOR UR: YELLOW
CREAT SERPL-MCNC: 1.69 MG/DL (ref 0.6–1.3)
CREAT UR-MCNC: 14.1 MG/DL
EOSINOPHIL # BLD AUTO: 0.06 THOUSAND/ΜL (ref 0–0.61)
EOSINOPHIL NFR BLD AUTO: 1 % (ref 0–6)
ERYTHROCYTE [DISTWIDTH] IN BLOOD BY AUTOMATED COUNT: 14.3 % (ref 11.6–15.1)
GFR SERPL CREATININE-BSD FRML MDRD: 28 ML/MIN/1.73SQ M
GLUCOSE SERPL-MCNC: 134 MG/DL (ref 65–140)
GLUCOSE SERPL-MCNC: 143 MG/DL (ref 65–140)
GLUCOSE SERPL-MCNC: 152 MG/DL (ref 65–140)
GLUCOSE SERPL-MCNC: 158 MG/DL (ref 65–140)
GLUCOSE SERPL-MCNC: 173 MG/DL (ref 65–140)
GLUCOSE UR STRIP-MCNC: NEGATIVE MG/DL
HCT VFR BLD AUTO: 22.2 % (ref 34.8–46.1)
HCT VFR BLD AUTO: 31 % (ref 34.8–46.1)
HGB BLD-MCNC: 10.5 G/DL (ref 11.5–15.4)
HGB BLD-MCNC: 7.1 G/DL (ref 11.5–15.4)
HGB UR QL STRIP.AUTO: ABNORMAL
HYALINE CASTS #/AREA URNS LPF: ABNORMAL /LPF
KETONES UR STRIP-MCNC: NEGATIVE MG/DL
LEUKOCYTE ESTERASE UR QL STRIP: NEGATIVE
LYMPHOCYTES # BLD AUTO: 1.9 THOUSANDS/ΜL (ref 0.6–4.47)
LYMPHOCYTES NFR BLD AUTO: 15 % (ref 14–44)
MCH RBC QN AUTO: 28.7 PG (ref 26.8–34.3)
MCHC RBC AUTO-ENTMCNC: 32 G/DL (ref 31.4–37.4)
MCV RBC AUTO: 90 FL (ref 82–98)
MONOCYTES # BLD AUTO: 1.3 THOUSAND/ΜL (ref 0.17–1.22)
MONOCYTES NFR BLD AUTO: 10 % (ref 4–12)
NEUTROPHILS # BLD AUTO: 9.67 THOUSANDS/ΜL (ref 1.85–7.62)
NEUTS SEG NFR BLD AUTO: 73 % (ref 43–75)
NITRITE UR QL STRIP: NEGATIVE
NON-SQ EPI CELLS URNS QL MICRO: ABNORMAL /HPF
NRBC BLD AUTO-RTO: 0 /100 WBCS
PH UR STRIP.AUTO: 5.5 [PH] (ref 4.5–8)
PLATELET # BLD AUTO: 231 THOUSANDS/UL (ref 149–390)
PMV BLD AUTO: 10.3 FL (ref 8.9–12.7)
POTASSIUM SERPL-SCNC: 4.6 MMOL/L (ref 3.5–5.3)
PROT UR STRIP-MCNC: NEGATIVE MG/DL
PROT UR-MCNC: <6 MG/DL
PROT/CREAT UR: <0.43 MG/G{CREAT} (ref 0–0.1)
RBC # BLD AUTO: 2.47 MILLION/UL (ref 3.81–5.12)
RBC #/AREA URNS AUTO: ABNORMAL /HPF
SODIUM SERPL-SCNC: 137 MMOL/L (ref 136–145)
SP GR UR STRIP.AUTO: 1.01 (ref 1–1.03)
UROBILINOGEN UR QL STRIP.AUTO: 0.2 E.U./DL
WBC # BLD AUTO: 13.07 THOUSAND/UL (ref 4.31–10.16)
WBC #/AREA URNS AUTO: ABNORMAL /HPF

## 2018-01-03 PROCEDURE — G8988 SELF CARE GOAL STATUS: HCPCS

## 2018-01-03 PROCEDURE — G8978 MOBILITY CURRENT STATUS: HCPCS | Performed by: PHYSICAL THERAPIST

## 2018-01-03 PROCEDURE — P9040 RBC LEUKOREDUCED IRRADIATED: HCPCS

## 2018-01-03 PROCEDURE — G8987 SELF CARE CURRENT STATUS: HCPCS

## 2018-01-03 PROCEDURE — 97167 OT EVAL HIGH COMPLEX 60 MIN: CPT

## 2018-01-03 PROCEDURE — 82948 REAGENT STRIP/BLOOD GLUCOSE: CPT

## 2018-01-03 PROCEDURE — 97163 PT EVAL HIGH COMPLEX 45 MIN: CPT | Performed by: PHYSICAL THERAPIST

## 2018-01-03 PROCEDURE — G8979 MOBILITY GOAL STATUS: HCPCS | Performed by: PHYSICAL THERAPIST

## 2018-01-03 PROCEDURE — 84156 ASSAY OF PROTEIN URINE: CPT | Performed by: INTERNAL MEDICINE

## 2018-01-03 PROCEDURE — 85018 HEMOGLOBIN: CPT | Performed by: STUDENT IN AN ORGANIZED HEALTH CARE EDUCATION/TRAINING PROGRAM

## 2018-01-03 PROCEDURE — 80048 BASIC METABOLIC PNL TOTAL CA: CPT | Performed by: PHYSICIAN ASSISTANT

## 2018-01-03 PROCEDURE — 82570 ASSAY OF URINE CREATININE: CPT | Performed by: INTERNAL MEDICINE

## 2018-01-03 PROCEDURE — 85025 COMPLETE CBC W/AUTO DIFF WBC: CPT | Performed by: PHYSICIAN ASSISTANT

## 2018-01-03 PROCEDURE — 81001 URINALYSIS AUTO W/SCOPE: CPT | Performed by: INTERNAL MEDICINE

## 2018-01-03 PROCEDURE — 85014 HEMATOCRIT: CPT | Performed by: STUDENT IN AN ORGANIZED HEALTH CARE EDUCATION/TRAINING PROGRAM

## 2018-01-03 RX ORDER — FUROSEMIDE 10 MG/ML
40 INJECTION INTRAMUSCULAR; INTRAVENOUS ONCE
Status: COMPLETED | OUTPATIENT
Start: 2018-01-03 | End: 2018-01-03

## 2018-01-03 RX ORDER — SODIUM CHLORIDE, SODIUM LACTATE, POTASSIUM CHLORIDE, CALCIUM CHLORIDE 600; 310; 30; 20 MG/100ML; MG/100ML; MG/100ML; MG/100ML
500 INJECTION, SOLUTION INTRAVENOUS ONCE
Status: COMPLETED | OUTPATIENT
Start: 2018-01-03 | End: 2018-01-03

## 2018-01-03 RX ADMIN — FUROSEMIDE 40 MG: 40 TABLET ORAL at 08:20

## 2018-01-03 RX ADMIN — Medication 1 TABLET: at 08:20

## 2018-01-03 RX ADMIN — PROMETHAZINE HYDROCHLORIDE 25 MG: 25 TABLET ORAL at 08:21

## 2018-01-03 RX ADMIN — ASPIRIN 81 MG 81 MG: 81 TABLET ORAL at 08:20

## 2018-01-03 RX ADMIN — ACETAMINOPHEN 650 MG: 325 TABLET, FILM COATED ORAL at 05:16

## 2018-01-03 RX ADMIN — SODIUM CHLORIDE, POTASSIUM CHLORIDE, SODIUM LACTATE AND CALCIUM CHLORIDE 500 ML: 600; 310; 30; 20 INJECTION, SOLUTION INTRAVENOUS at 11:30

## 2018-01-03 RX ADMIN — POTASSIUM CHLORIDE 20 MEQ: 1500 TABLET, EXTENDED RELEASE ORAL at 08:20

## 2018-01-03 RX ADMIN — METFORMIN HYDROCHLORIDE 500 MG: 500 TABLET, FILM COATED ORAL at 07:27

## 2018-01-03 RX ADMIN — OXYCODONE HYDROCHLORIDE 5 MG: 5 TABLET ORAL at 17:46

## 2018-01-03 RX ADMIN — ACETAMINOPHEN 650 MG: 325 TABLET, FILM COATED ORAL at 23:33

## 2018-01-03 RX ADMIN — CEFAZOLIN SODIUM 2000 MG: 2 SOLUTION INTRAVENOUS at 03:17

## 2018-01-03 RX ADMIN — ATORVASTATIN CALCIUM 20 MG: 20 TABLET, FILM COATED ORAL at 21:39

## 2018-01-03 RX ADMIN — Medication 1 TABLET: at 17:46

## 2018-01-03 RX ADMIN — OXYCODONE HYDROCHLORIDE AND ACETAMINOPHEN 500 MG: 500 TABLET ORAL at 08:20

## 2018-01-03 RX ADMIN — OXYCODONE HYDROCHLORIDE 5 MG: 5 TABLET ORAL at 07:28

## 2018-01-03 RX ADMIN — LEVOTHYROXINE SODIUM 50 MCG: 50 TABLET ORAL at 05:16

## 2018-01-03 RX ADMIN — FUROSEMIDE 40 MG: 10 INJECTION, SOLUTION INTRAMUSCULAR; INTRAVENOUS at 13:29

## 2018-01-03 RX ADMIN — METOPROLOL TARTRATE 50 MG: 50 TABLET ORAL at 17:46

## 2018-01-03 RX ADMIN — ACETAMINOPHEN 650 MG: 325 TABLET, FILM COATED ORAL at 11:25

## 2018-01-03 RX ADMIN — INSULIN LISPRO 1 UNITS: 100 INJECTION, SOLUTION INTRAVENOUS; SUBCUTANEOUS at 21:41

## 2018-01-03 RX ADMIN — TRAMADOL HYDROCHLORIDE 50 MG: 50 TABLET, FILM COATED ORAL at 11:26

## 2018-01-03 RX ADMIN — ACETAMINOPHEN 650 MG: 325 TABLET, FILM COATED ORAL at 17:46

## 2018-01-03 RX ADMIN — ENOXAPARIN SODIUM 40 MG: 40 INJECTION SUBCUTANEOUS at 08:19

## 2018-01-03 RX ADMIN — PANTOPRAZOLE SODIUM 40 MG: 40 TABLET, DELAYED RELEASE ORAL at 05:16

## 2018-01-03 RX ADMIN — METOPROLOL TARTRATE 50 MG: 50 TABLET ORAL at 08:20

## 2018-01-03 NOTE — ASSESSMENT & PLAN NOTE
· Patient with baseline creatinine which appears to be 0 9-1 2 but no documented history of CKD  Creatinine post-op 1 69 today  Patient has a Deal post-operatively  She has been receiving IVF   In light of her diastolic heart failure, now s/p IVF with elevated creatinine, I would recommend nephrology consult - this recommendation was discussed with the orthopaedics team  BMP in AM

## 2018-01-03 NOTE — ASSESSMENT & PLAN NOTE
· Cardiology following  · Monitor for signs and symptoms of volume overload  Patient denies SOB and is satting well on room air   Lasix per cardiology - she is on 40mg daily at home and of now

## 2018-01-03 NOTE — ASSESSMENT & PLAN NOTE
· Patient on metformin at home  This was not held on admission  While she is hospitalized, we will discontinue oral antihyperglycemics and start SSI with glucose monitoring  HbA1c 6 3   Hypoglycemia protocol ordered

## 2018-01-03 NOTE — ASSESSMENT & PLAN NOTE
· Hemoglobin yesterday was 13, now 7 1 today  Repeat H&H  Primary team has ordered transfusion of 2 units  Careful fluid management given her CHF   Cardiology on board and recommended nephrology consultation

## 2018-01-03 NOTE — PROGRESS NOTES
Progress Note - Donold Salvage Day 1935, 80 y o  female MRN: [id number]    Unit/Bed#: -01 Encounter: [id number]    Primary Care Provider: Charles Pozo MD   Date and time admitted to hospital: 1/1/2018 11:43 PM        * Closed displaced intertrochanteric fracture of right femur ([facility] )   Assessment & Plan    · POD#1 right IM nail  Ortho primary  · Pain control per primary  · PT/OT  · DVT prophylaxis per primary        MAYELIN (acute kidney injury) ([facility] )   Assessment & Plan    · Patient with baseline creatinine which appears to be 0 9-1 2 but no documented history of CKD  Creatinine post-op 1 69 today  Patient has a Deal post-operatively  She has been receiving IVF  In light of her diastolic heart failure, now s/p IVF with elevated creatinine, I would recommend nephrology consult - this recommendation was discussed with the orthopaedics team  BMP in AM        Acute blood loss as cause of postoperative anemia   Assessment & Plan    · Hemoglobin yesterday was 13, now 7 1 today  Repeat H&H  Primary team has ordered transfusion of 2 units  Careful fluid management given her CHF  Cardiology on board and recommended nephrology consultation        Diabetes mellitus [facility])   Assessment & Plan    · Patient on metformin at home  This was not held on admission  While she is hospitalized, we will discontinue oral antihyperglycemics and start SSI with glucose monitoring  HbA1c 6 3  Hypoglycemia protocol ordered        Chronic diastolic congestive heart failure [facility])   Assessment & Plan    · Cardiology following  · Monitor for signs and symptoms of volume overload  Patient denies SOB and is satting well on room air  Lasix per cardiology - she is on 40mg daily at home and of now        S/P TAVR (transcatheter aortic valve replacement)   Assessment & Plan    · Bioprosthetic valve well seated and without stenosis or regurg per most recent echocardiogram   Cardiology following          Coronary artery disease involving native coronary artery   Assessment & Plan    · Cardiology following  · S/p HERNAN x2  · Continue aspirin, statin, BB        History of cardiac pacemaker   Assessment & Plan    · Cardiology following  Follow up with them as an outpatient        Hyperlipidemia   Assessment & Plan    · Continue statin        Hypothyroidism   Assessment & Plan    · Continue levothyroxine          VTE Pharmacologic Prophylaxis:   Pharmacologic: Enoxaparin (Lovenox) per ortho primary  Mechanical VTE Prophylaxis in Place: Yes left leg only per primary ortho    Patient Centered Rounds: I have performed bedside rounds with nursing staff today  Discussions with Specialists or Other Care Team Provider: Orthopaedics, nursing,      Education and Discussions with Family / Patient: Patient    Time Spent for Care: 30 minutes  More than 50% of total time spent on counseling and coordination of care as described above  Current Length of Stay: 1 day(s)    Current Patient Status: Inpatient     Discharge Plan: Per primary, ortho  Recommend nephrology consult  Prtho has ordered transfusion 2 units PRBCs  Monitor H&H and volume status closely    Code Status: Level 1 - Full Code      Subjective:   Ms Hutton reports that she worked with therapy this AM and is feeling tired  Pain is controlled  She denies SOB, CP, abdominal pain  Objective:     Vitals:   Temp (24hrs), Av 2 °F (37 3 °C), Min:98 2 °F (36 8 °C), Max:99 8 °F (37 7 °C)    HR:  [] 86  Resp:  [10-23] 20  BP: ()/(45-63) 131/63  SpO2:  [95 %-98 %] 97 %  Body mass index is 33 29 kg/m²  Input and Output Summary (last 24 hours): Intake/Output Summary (Last 24 hours) at 18 1137  Last data filed at 18 0900   Gross per 24 hour   Intake             1100 ml   Output              800 ml   Net              300 ml       Physical Exam:     Physical Exam   Constitutional: She is oriented to person, place, and time  No distress     Patient seen lying on her back comfortably resting with nursing staff present  She is very pleasant and cooperative  Cardiovascular: Normal rate and regular rhythm  Murmur heard  Pulmonary/Chest: Effort normal and breath sounds normal  No respiratory distress  She has no wheezes  She exhibits no tenderness  Abdominal: Soft  Bowel sounds are normal  There is no tenderness  Musculoskeletal: She exhibits no edema  Neurological: She is alert and oriented to person, place, and time  Skin:   Right hip with dressing with dark staining on dressing  Psychiatric:   Non-anxious appearing   Vitals reviewed  Additional Data:     Labs:      Results from last 7 days  Lab Units 01/03/18  0559   WBC Thousand/uL 13 07*   HEMOGLOBIN g/dL 7 1*   HEMATOCRIT % 22 2*   PLATELETS Thousands/uL 231   NEUTROS PCT % 73   LYMPHS PCT % 15   MONOS PCT % 10   EOS PCT % 1       Results from last 7 days  Lab Units 01/03/18  0559 01/02/18  0005   SODIUM mmol/L 137 135*   POTASSIUM mmol/L 4 6 4 3   CHLORIDE mmol/L 102 99*   CO2 mmol/L 25 30   BUN mg/dL 32* 18   CREATININE mg/dL 1 69* 1 10   CALCIUM mg/dL 8 7 10 1   TOTAL PROTEIN g/dL  --  7 8   BILIRUBIN TOTAL mg/dL  --  0 48   ALK PHOS U/L  --  87   ALT U/L  --  26   AST U/L  --  25   GLUCOSE RANDOM mg/dL 143* 151*       Results from last 7 days  Lab Units 01/02/18  0319   INR  1 04       * I Have Reviewed All Lab Data Listed Above  * Additional Pertinent Lab Tests Reviewed:  All Labs Within Last 24 Hours Reviewed    Imaging:    Imaging Reports Reviewed Today Include: CXR, XR right hip, CT head and cervical spine, CT hip, echo  Imaging Personally Reviewed by Myself Includes:  None    Recent Cultures (last 7 days):           Last 24 Hours Medication List:     acetaminophen 650 mg Oral Q6H [address]   ascorbic acid 500 mg Oral Daily   aspirin 81 mg Oral Daily   atorvastatin 20 mg Oral HS   bisacodyl 5 mg Oral Once   calcium carbonate 1 tablet Oral BID With Meals   enoxaparin 40 mg Subcutaneous Daily   furosemide 40 mg Oral Daily   insulin lispro 1-5 Units Subcutaneous TID AC   insulin lispro 1-5 Units Subcutaneous HS   levothyroxine 50 mcg Oral Early Morning   metoprolol tartrate 50 mg Oral BID   multivitamin-minerals 1 tablet Oral Daily   pantoprazole 40 mg Oral Early Morning   potassium chloride 20 mEq Oral Daily   promethazine 25 mg Oral Daily        Today, Patient Was Seen By: Tevin Sullivan PA-C    ** Please Note: Dictation voice to text software may have been used in the creation of this document   **

## 2018-01-03 NOTE — CONSULTS
Consultation - Nephrology   Niya December Day 80 y o  female MRN: [id number]  Unit/Bed#: -01 Encounter: [id number]      Assessment/Plan:  1  Acute kidney injury, likely secondary to ATN, hypotension noted intraoperatively, possible component of acute anemia  2  Transient oliguria, improving  3  Acute anemia, postoperatively, anticipated 2 units PRBC  4  Status post right femoral intramedullary nail  5  Status post fall  6  Diabetes      · Overall her urine output already seems to be improving, this may have represented a component of ATN/prerenal azotemia in any case agree with 2 units of PRBC given her postoperative anemia  Would also give her 40 mg IV Lasix to help maintain urine output expression lieu of her history of diastolic heart failure  · Check urinalysis, protein creatinine ratio  · Monitor renal function closely  History of Present Illness   Physician Requesting Consult: Ivelisse Ray MD  Reason for Consult / Principal Problem:  Acute kidney injury  HPI: Niya December Day is a 80y o  year old female who presents with status post fall with right hip fracture    Patient is an 51-year-old female who unfortunately fell after ambulating to the bathroom  Apparently her knee gave out and she went down immediately  Was complaining of significant right hip and groin pain with the inability to bear weight  X-ray revealed right inferior right superior pubic ramus fracture, right femoral intertrochanteric fracture  Intraoperatively, blood pressure was noted be in the 80s  Was given phenylephrine with improvement in blood pressure  Postoperatively she was noted to be oliguric  Urine output has improved this morning  Addition patient also had significant anemia with a hemoglobin 7 1  Denies any significant chest pain, shortness of breath  Pain seems to be under fairly good control  No abdominal pain    Appetite seems to be normal     History obtained from chart review and the patient    Review of Systems: pertinent findings as noted above otherwise negative          Historical Information   Patient Active Problem List   Diagnosis    Nonrheumatic aortic valve stenosis    Coronary artery disease involving native coronary artery    Complete atrioventricular block (HCC)    S/P TAVR (transcatheter aortic valve replacement)    Age-related cognitive decline    Frequent falls    Physical deconditioning    Ambulatory dysfunction    Vision impairment    Constipation    Incontinence in female    History of TIA (transient ischemic attack)    Closed displaced intertrochanteric fracture of right femur ([facility]    Diabetes mellitus ([facility] )    History of cardiac pacemaker    [facility]IN [facility]    Acute blood loss as cause of postoperative anemia    Chronic diastolic congestive heart failure (HCC)    Hyperlipidemia    Hypothyroidism     Past Medical History:   Diagnosis Date    CAD (coronary artery disease)     s/p HERNAN 6/2016    [facility]est[facility]) [facility]     Diabetes mellitus (HCC)     non-insulin depdenent    Disease of thyroid gland     GERD (gastroesophageal reflux disease)     History of TIA (transient ischemic attack)     no residual deficits    Hyperlipidemia     Hypertension     Hypoxia     on home O2 QHS    Meniere disease     Pacemaker     [facility]-[employer]ron[employer] in 2/2015    Severe aortic stenosis      Past Surgical History:   Procedure Laterality Date    APPENDECTOMY      BACK SURGERY      CHOLECYSTECTOMY      FRACTURE SURGERY Right 01/02/2018    femur    HYSTERECTOMY      AZ EGD TRANSORAL BIOPSY SINGLE/MULTIPLE N/A 11/9/2016    Procedure: ESOPHAGOGASTRODUODENOSCOPY (EGD); Surgeon: Chirag House MD;  [facility]: [facility];   Service: Gastroenterology    AZ OPEN RX FEMUR FX+INTRAMED GARRETT Right 1/2/2018    Procedure: INSERTION NAIL IM FEMUR ANTEGRADE (TROCHANTERIC) RIGHT;  Surgeon: Ilda Wilcox MD;  Location: [facility];  Service: Orthopedics    AZ REPLACE AORTIC VALVE OPENFEMORAL ARTERY APPROACH N/A 7/26/2016    Procedure: TRANSFEMORAL TAVR WITH 23MM LAROSE LILY S3 VALVE; JOSE ALFREDO ;  Surgeon: Chris Langford DO;  Location: [facility];  Service: Cardiac Surgery    SMALL INTESTINE SURGERY      TOTAL KNEE ARTHROPLASTY      VARICOSE VEIN SURGERY      VENTRAL HERNIA REPAIR       Social History   History   Alcohol Use No     History   Drug Use No     History   Smoking Status    Never Smoker   Smokeless Tobacco    Never Used     History reviewed  No pertinent family history      Meds/Allergies   current meds:   Current Facility-Administered Medications   Medication Dose Route Frequency    acetaminophen (TYLENOL) tablet 650 mg  650 mg Oral Q6H PRN    acetaminophen (TYLENOL) tablet 650 mg  650 mg Oral Q6H [address]    ascorbic acid (VITAMIN C) tablet 500 mg  500 mg Oral Daily    aspirin chewable tablet 81 mg  81 mg Oral Daily    atorvastatin (LIPITOR) tablet 20 mg  20 mg Oral HS    bisacodyl (DULCOLAX) EC tablet 5 mg  5 mg Oral Once    calcium carbonate (OYSTER SHELL,OSCAL) 500 mg tablet 1 tablet  1 tablet Oral BID With Meals    enoxaparin (LOVENOX) subcutaneous injection 40 mg  40 mg Subcutaneous Daily    fentanyl citrate (PF) 100 MCG/2ML 25 mcg  25 mcg Intravenous Q1H PRN    furosemide (LASIX) tablet 40 mg  40 mg Oral Daily    insulin lispro (HumaLOG) 100 units/mL subcutaneous injection 1-5 Units  1-5 Units Subcutaneous TID AC    insulin lispro (HumaLOG) 100 units/mL subcutaneous injection 1-5 Units  1-5 Units Subcutaneous HS    lactated ringers infusion  75 mL/hr Intravenous Continuous    levothyroxine tablet 50 mcg  50 mcg Oral Early Morning    metoprolol tartrate (LOPRESSOR) tablet 50 mg  50 mg Oral BID    multivitamin-minerals (CENTRUM) tablet 1 tablet  1 tablet Oral Daily    oxyCODONE (ROXICODONE) IR tablet 2 5 mg  2 5 mg Oral Q4H PRN    oxyCODONE (ROXICODONE) IR tablet 5 mg  5 mg Oral Q4H PRN    pantoprazole (PROTONIX) EC tablet 40 mg  40 mg Oral Early Morning    potassium chloride (K-DUR,KLOR-CON) CR tablet 20 mEq  20 mEq Oral Daily    promethazine (PHENERGAN) tablet 25 mg  25 mg Oral Daily    traMADol (ULTRAM) tablet 50 mg  50 mg Oral Q6H PRN       Allergies   Allergen Reactions    Advil [Ibuprofen] GI Intolerance         Objective   /56   Pulse 82   Temp 98 2 °F (36 8 °C) (Oral)   Resp 20   Ht 5' 2" (1 575 m)   Wt 82 6 kg (182 lb)   SpO2 97%   BMI 33 29 kg/m²     Intake/Output Summary (Last 24 hours) at 01/03/18 1239  Last data filed at 01/03/18 0900   Gross per 24 hour   Intake              350 ml   Output              400 ml   Net              -50 ml       Current Weight: Weight - Scale: 82 6 kg (182 lb)    PHYSICAL EXAM:  General:  No acute distress, appears comfortable  HENT:  Mucosa moist  Eyes:  No scleral icterus  Neck:  Supple  Chest:  Clear to auscultation  CVS:  Regular  Abdomen:  Soft  Extremities:  Right lower extremity edema  Skin:  No rash  Neuro:  Nonfocal  Psych:  Appropriate      Lab Results:      Results from last 7 days  Lab Units 01/03/18  0559   WBC Thousand/uL 13 07*   HEMOGLOBIN g/dL 7 1*   HEMATOCRIT % 22 2*   PLATELETS Thousands/uL 231       Results from last 7 days  Lab Units 01/03/18  0559   SODIUM mmol/L 137   POTASSIUM mmol/L 4 6   CHLORIDE mmol/L 102   CO2 mmol/L 25   BUN mg/dL 32*   CREATININE mg/dL 1 69*   GLUCOSE RANDOM mg/dL 143*   CALCIUM mg/dL 8 7

## 2018-01-03 NOTE — ASSESSMENT & PLAN NOTE
· Bioprosthetic valve well seated and without stenosis or regurg per most recent echocardiogram   Cardiology following

## 2018-01-03 NOTE — ASSESSMENT & PLAN NOTE
· POD#1 right IM nail  Ortho primary     · Pain control per primary  · PT/OT  · DVT prophylaxis per primary

## 2018-01-03 NOTE — OCCUPATIONAL THERAPY NOTE
[address]  Evaluation     Patient Name: Patsy RUSSOMTGRACIELA Date: 1/3/2018  Problem List  Patient Active Problem List   Diagnosis    Nonrheumatic aortic valve stenosis    Coronary artery disease involving native coronary artery    Complete atrioventricular block (HCC)    S/P TAVR (transcatheter aortic valve replacement)    Age-related cognitive decline    Frequent falls    Physical deconditioning    Ambulatory dysfunction    Vision impairment    Constipation    Incontinence in female    History of TIA (transient ischemic attack)    Closed displaced intertrochanteric fracture of right femur ([address] )    Diabetes mellitus ([facility] )    History of cardiac pacemaker     Past Medical History  Past Medical History:   Diagnosis Date    CAD (coronary artery disease)     s/p HERNAN 6/2016    CHF (congestive heart failure) (HCC)     Diabetes mellitus ([facility] [address] )     non-insulin depdenent    Disease of thyroid gland     GERD (gastroesophageal reflux disease)     History of TIA (transient ischemic attack)     no residual deficits    Hyperlipidemia     Hypertension     Hypoxia     on home O2 QHS    Meniere disease     Pacemaker     [facility] in 2/2015    Severe aortic stenosis      Past Surgical History  Past Surgical History:   Procedure Laterality Date    APPENDECTOMY      BACK SURGERY      CHOLECYSTECTOMY      FRACTURE SURGERY Right 01/02/2018    femur    HYSTERECTOMY      PA EGD TRANSORAL BIOPSY SINGLE/MULTIPLE N/A 11/9/2016    Procedure: ESOPHAGOGASTRODUODENOSCOPY (EGD); Surgeon: Alicia Low MD;  [facility]: [facility];   Service: Gastroenterology    PA REPLACE AORTIC VALVE OPENFEMORAL ARTERY APPROACH N/A 7/26/2016    Procedure: TRANSFEMORAL TAVR WITH 23MM LAROSE LILY S3 VALVE; JOSE ALFREDO ;  Surgeon: Francies Homans, DO;  [facility];  Service: Cardiac Surgery    SMALL INTESTINE SURGERY      TOTAL KNEE ARTHROPLASTY      VARICOSE VEIN SURGERY      VENTRAL HERNIA REPAIR 01/03/18 0856   Note Type   Note type Eval only   Restrictions/Precautions   Weight Bearing Precautions Per Order Yes   RLE Weight Bearing Per Order WBAT   Other Precautions Fall Risk;O2;Multiple lines  (3L O2 HS and prn daily)   Pain Assessment   Pain Assessment 0-10   Pain Score 5   Pain Location Knee;Hip   Pain Orientation Right   Hospital Pain Intervention(s) Medication (See MAR)   Home Living   Type of Home Apartment  ([facility] apartment; [employer] [place])   Home Layout One level   Bathroom Shower/Tub Walk-in shower   [facility]   Additional Comments Pt lives in [address] [place] [address] apartment with no ROBY   Prior Function   Level of [place] Independent with ADLs and functional mobility   Lives With Facility staff  (available prn; pt in indep living)   ADL Assistance Independent   IADLs Independent   Falls in the last 6 months 1 to 4   Comments PTA, pt reports she was fully indep with ADL fxn and was able to perfrom lite meal prep and IADL fxn of med managemnt  Pt recieves 2 meals daily from facility   Lifestyle   Autonomy Pt was fully indep PTA   Service to Others retired   Intrinsic Gratification pt enjoys participating in leisure activities provided by facility   Psychosocial   Psychosocial (WDL) WDL   Patient Behaviors/Mood Anxious   Subjective   Subjective "I know that I probably will need some rehab"   ADL   Where Assessed Sitting at sink   Eating Assistance 5  Supervision/Setup   Grooming Assistance 5  Supervision/Setup   [address] 2  Maximal [address]  [address] [facility]  1  [address] 1  Total Assistance   Additional Comments Pt req max A x 2 for bed mobility and xfers for sit<>Stand at edge of bed  Pt unable to don socks or pants at this time  Ax2 to thread and don over hips in stance  Pt's spO2 on RA desat to 86%, cues for DBT, pt able to recover to 93%+   Bed Mobility   Rolling R 2  Maximal assistance   Rolling L 2  Maximal assistance   Supine to Sit 2  Maximal assistance   Additional items Assist x 2   Sit to Supine 2  Maximal assistance   Additional items Assist x 2   Additional Comments TA for bed mob and xfers with assist of multiple persons provided   Transfers   Sit to Stand 2  Maximal assistance   Additional items Assist x 2   Stand to Sit 2  Maximal assistance   Additional items Assist x 2   Stand pivot Unable to assess   Balance   Static Sitting Fair +   Dynamic Sitting Fair   Static Standing Fair -   Dynamic Standing Poor +   Activity Tolerance   Activity Tolerance Patient limited by pain; Patient limited by fatigue   Nurse Made Aware per RN Julio Aleman, pt appropriate to be seen for OT/PT eval   RUE Assessment   RUE Assessment X   LUE Assessment   LUE Assessment X  (generalized weakness)   Cognition   Overall Cognitive Status WFL   Orientation Level Oriented X4   Assessment   Limitation Decreased ADL status; Decreased UE ROM; Decreased UE strength;Decreased endurance;Decreased self-care trans;Decreased high-level ADLs   Prognosis Fair   Assessment Pt is a 80 y o  female seen for OT evaluation s/p admit [facility]/1/2018 w/ Closed displaced intertrochanteric fracture of right femur ([facility] [address] )  Personal factors affecting pt at time of IE include:difficulty performing ADLS, difficulty performing IADLS  and health management   Prior to admission, pt was fully indep with ADL fxn and IADL fxn utilizing RW for fxnl mob  Pt reporting unsafe behaviors however, stating she pulls on headboard and RW to assist with xfnelson Alvarado Upon evaluation: Pt requires TA (max A x 2) for xfers and ADL fxn 2* the following deficits impacting occupational performance: weakness, decreased ROM, decreased strength, decreased balance, decreased tolerance, increased pain and orthopedic restrictions   Pt to benefit from continued skilled OT tx while in the hospital to address deficits as defined above and maximize level of functional independence w ADL's and functional mobility  Occupational Performance areas to address include: grooming, bathing/shower, toilet hygiene, dressing, health maintenance, functional mobility, community mobility, clothing management, meal prep and household maintenance  From OT standpoint, recommendation at time of d/c would be appropriate for rehab stay when medically stable and appropriate  Plan   Treatment Interventions ADL retraining;Functional transfer training;UE strengthening/ROM; Endurance training;Patient/family training;Equipment evaluation/education; Compensatory technique education; Energy conservation; Activityengagement   Goal Expiration Date 01/13/18   OT Frequency 3-5x/wk   Additional Treatment Session   Start Time 0830   End Time 0855   Recommendation   OT Discharge Recommendation Short Term Rehab   Barthel Index   Feeding 10   Bathing 0   Grooming Score 0   Dressing Score 5   Bladder Score 0   Bowels Score 10   Toilet Use Score 5   Transfers (Bed/Chair) Score 5   Mobility (Level Surface) Score 0   Stairs Score 0   Barthel Index Score 35   Modified Bhavesh Scale   Modified Lincoln Scale 4   1  PT WILL COMPLETE UB/LB DRESSING WHILE SEATED/STANDING WITH MOD INDEPENDENCE     2  PT WILL COMPLETE TOILETING WITH MOD INDEPENDENCE, WITH THOROUGH HYGIENE, USING DME PRN     3  PT WILL COMPLETE BED MOBILITY AND TRANSFER TO EOB WITH MOD INDEPENDENCE, TO INCREASE FUNCTIONAL MOBILITY FOR PARTICIPATION IN ADLS/IADLS    4  PT WILL COMPLETE FUNCTIONAL TRANSFERS ON/OFF ALL SURFACES, INCLUDING TOILET, WITH MOD INDEPENDENCE AND DME PRN TO INCREASE PARTICIPATION IN ADLS/IADLS     5  PT WILL COMPLETE LIGHT GROOMING TASK WHILE STANDING AT SINK FOR 3-5 MINUTES, WITH MOD INDEPENDENCE AND GOOD BALANCE, DME PRN     6  PT WILL INCREASE ACTIVITY TOLERANCE TO 15-20 MINUTES DURING OT TX SESSION TO INCREASE PARTICIPATION IN ADLS/IADLS     Kike Joya, OT

## 2018-01-03 NOTE — PROGRESS NOTES
Pt still not producing urine at this time, only about 5ml  Ortho notified and 1000 ml NSS fluid bolus given  Will continue to monitor

## 2018-01-03 NOTE — SOCIAL WORK
CM met with the Pt and daughter at bedside to explain the CM role and discuss any potential D/C needs  Pt lives at [address] in [address] floor apartment  PTA IADL's, Pt uses R/w for ambulation  Facility provides two meals a day, Pt manages own medications  No hx of HHC, Pt reports hx of IP rehab, unsure of facility  Pt uses [employer] for transportation  Pt's home pharmacy is [facility] or [facility] on [address]  Pt's daughter Carroll Mathew is reported POA  Pt recommended for STR, SNF list provided

## 2018-01-03 NOTE — SOCIAL WORK
Pt can D/C 01/05 after 3MN's  [address], 2nd choice [address], 3rd choice [facility]  Referrals sent

## 2018-01-03 NOTE — ED PROVIDER NOTES
History  Chief Complaint   Patient presents with    Fall     pt got up to went to the bathroom and slipped off of her mattress  When she was being helped to the bathroom, pt fell and hit her head  Does not remember if she was dizzy and fell  Pt c/o pain in R hip     HPI  80-year-old female presents to the emergency department with complaint of right hip following mechanical fall  Patient states that she was walking to the bathroom with assistance when she believes her right knee gave out and she fell onto her right side  She did fall backwards and hit her head, but does not believe she lost consciousness  She was able to stand with assistance following the fall, but has not been able to bear weight since that time  On presentation, she denies any headache, neck pain, back pain, chest pain, shortness of breath, abdominal pain, nausea, or vomiting  However, she does complain of persistent right hip pain that radiates into her groin and down her right leg  Pain is worse with any attempt at movement of the leg or hip  Patient is to prosthetic knees, but has not previously had surgery on her hips  She is not on any blood thinners  Prior to Admission Medications   Prescriptions Last Dose Informant Patient Reported? Taking? AMLODIPINE BESYLATE PO 1/1/2018 at morning  Yes Yes   Sig: Take 1 tablet by mouth daily   Bisacodyl (DULCOLAX PO) 1/1/2018 at evening  Yes Yes   Sig: Take 1 capsule by mouth  Calcium Carbonate-Vitamin D (CALTRATE COLON HEALTH PO) 1/1/2018 at mrning  Yes Yes   Sig: Take 1 tablet by mouth daily  Multiple Vitamins-Minerals (OCUVITE ADULT 50+ PO) 1/1/2018 at morning  Yes Yes   Sig: Take 1 tablet by mouth daily  acetaminophen (TYLENOL) 325 mg tablet Unknown at 0230  No No   Sig: Take 2 tablets (650 mg total) by mouth every 6 (six) hours as needed for mild pain  ascorbic acid (VITAMIN C) 500 mg tablet 1/1/2018 at morning  Yes Yes   Sig: Take 500 mg by mouth daily     aspirin 81 mg chewable tablet 1/1/2018 at morning  Yes Yes   Sig: Chew 81 mg daily  atorvastatin (LIPITOR) 20 mg tablet 1/1/2018 at hs  No Yes   Sig: Take 1 tablet (20 mg total) by mouth daily at bedtime  furosemide (LASIX) 40 mg tablet 1/1/2018 at morning  Yes Yes   Sig: Take 40 mg by mouth daily   levothyroxine 50 mcg tablet 1/1/2018 at morning  Yes Yes   Sig: Take 50 mcg by mouth daily   metFORMIN (GLUCOPHAGE) 500 mg tablet 1/1/2018 at hs  Yes Yes   Sig: Take 500 mg by mouth 2 (two) times a day with meals  metoprolol tartrate (LOPRESSOR) 50 mg tablet 1/1/2018 at evening  No Yes   Sig: Take 1 tablet (50 mg total) by mouth 2 (two) times a day  pantoprazole (PROTONIX) 40 mg tablet 1/1/2018 at mrning  Yes Yes   Sig: Take 40 mg by mouth daily  polyethylene glycol (MIRALAX) powder Unknown at Unknown time  Yes No   Sig: Take 17 g by mouth daily  potassium chloride (K-DUR,KLOR-CON) 20 mEq tablet Unknown at Unknown time  Yes No   Sig: Take 20 mEq by mouth daily   promethazine (PHENERGAN) 25 mg tablet 1/1/2018 at morning  Yes Yes   Sig: Take 25 mg by mouth daily  Facility-Administered Medications: None       Past Medical History:   Diagnosis Date    CAD (coronary artery disease)     s/p HERNAN 6/2016    CHF (congestive heart failure) (HCC)     Diabetes mellitus ([facility] [address] )     non-insulin depdenent    Disease of thyroid gland     GERD (gastroesophageal reflux disease)     History of TIA (transient ischemic attack)     no residual deficits    Hyperlipidemia     Hypertension     Hypoxia     on home O2 QHS    Meniere disease     Pacemaker     [facility] in 2/2015    Severe aortic stenosis        Past Surgical History:   Procedure Laterality Date    APPENDECTOMY      BACK SURGERY      CHOLECYSTECTOMY      FRACTURE SURGERY Right 01/02/2018    femur    HYSTERECTOMY      OK EGD TRANSORAL BIOPSY SINGLE/MULTIPLE N/A 11/9/2016    Procedure: ESOPHAGOGASTRODUODENOSCOPY (EGD);   Surgeon: Kelly Cardona MD;  Location: [facility]; Service: Gastroenterology    NV REPLACE AORTIC VALVE OPENFEMORAL ARTERY APPROACH N/A 7/26/2016    Procedure: TRANSFEMORAL TAVR WITH 23MM LAROSE LILY S3 VALVE; JOSE ALFREDO ;  Surgeon: Gordy Diop DO;  [facility];  Service: Cardiac Surgery    SMALL INTESTINE SURGERY      TOTAL KNEE ARTHROPLASTY      VARICOSE VEIN SURGERY      VENTRAL HERNIA REPAIR         History reviewed  No pertinent family history  I have reviewed and agree with the history as documented  Social History   Substance Use Topics    Smoking status: Never Smoker    Smokeless tobacco: Never Used    Alcohol use No        Review of Systems   Constitutional: Negative for chills, fatigue and fever  HENT: Negative for trouble swallowing  Eyes: Negative for visual disturbance  Respiratory: Negative for cough and shortness of breath  Cardiovascular: Negative for chest pain  Gastrointestinal: Negative for abdominal pain, nausea and vomiting  Genitourinary: Negative for dysuria and hematuria  Musculoskeletal: Positive for arthralgias  Negative for back pain and neck pain  Skin: Negative for rash  Allergic/Immunologic: Negative for immunocompromised state  Neurological: Negative for headaches  Hematological: Does not bruise/bleed easily  Psychiatric/Behavioral: The patient is not nervous/anxious  All other systems reviewed and are negative        Physical Exam  ED Triage Vitals [01/01/18 2347]   Temperature Pulse Respirations Blood Pressure SpO2   98 9 °F (37 2 °C) 68 18 141/93 92 %      Temp Source Heart Rate Source Patient Position - Orthostatic VS BP Location FiO2 (%)   Oral Monitor Lying Right arm --      Pain Score       8           Orthostatic Vital Signs  Vitals:    01/02/18 1345 01/02/18 1400 01/02/18 1415 01/02/18 1526   BP: 103/50 (!) 104/48 (!) 104/48 124/58   Pulse: 84 82 86 104   Patient Position - Orthostatic VS:    Lying       Physical Exam   Constitutional: She is oriented to person, place, and time  She appears well-nourished  No distress  HENT:   Head: Normocephalic and atraumatic  Eyes: EOM are normal    Neck: Normal range of motion  Neck supple  Cardiovascular: Normal rate and regular rhythm  Murmur heard  Systolic murmur is present with a grade of 3/6   Pulmonary/Chest: Effort normal and breath sounds normal  No respiratory distress  Abdominal: Soft  She exhibits no distension  There is no tenderness  Musculoskeletal: She exhibits deformity  Right hip: She exhibits decreased range of motion and bony tenderness  Right leg externally rotated and shortened  Decreased range of motion at the right hip due to pain  Neurological: She is alert and oriented to person, place, and time  Skin: Skin is warm and dry  She is not diaphoretic  Psychiatric: She has a normal mood and affect  Her behavior is normal    Nursing note and vitals reviewed        ED Medications  Medications   aspirin chewable tablet 81 mg ( Oral Canceled Entry 1/2/18 0900)   bisacodyl (DULCOLAX) EC tablet 5 mg (5 mg Oral Not Given 1/2/18 0519)   promethazine (PHENERGAN) tablet 25 mg ( Oral Canceled Entry 1/2/18 0900)   ascorbic acid (VITAMIN C) tablet 500 mg ( Oral Canceled Entry 1/2/18 0900)   multivitamin-minerals (CENTRUM) tablet 1 tablet ( Oral Canceled Entry 1/2/18 0900)   calcium carbonate (OYSTER SHELL,OSCAL) 500 mg tablet 1 tablet (1 tablet Oral Given 1/2/18 1735)   pantoprazole (PROTONIX) EC tablet 40 mg (40 mg Oral Not Given 1/2/18 0527)   metFORMIN (GLUCOPHAGE) tablet 500 mg (500 mg Oral Given 1/2/18 1735)   acetaminophen (TYLENOL) tablet 650 mg (not administered)   atorvastatin (LIPITOR) tablet 20 mg (20 mg Oral Given 1/2/18 2127)   metoprolol tartrate (LOPRESSOR) tablet 50 mg (50 mg Oral Given 1/2/18 1736)   furosemide (LASIX) tablet 40 mg (40 mg Oral Given 1/2/18 1829)   potassium chloride (K-DUR,KLOR-CON) CR tablet 20 mEq (20 mEq Oral Given 1/2/18 1829)   levothyroxine tablet 50 mcg (50 mcg Oral Not Given 1/2/18 0528)   acetaminophen (TYLENOL) tablet 650 mg (650 mg Oral Given 1/2/18 2312)   oxyCODONE (ROXICODONE) IR tablet 2 5 mg ( Oral MAR Unhold 1/2/18 1438)   oxyCODONE (ROXICODONE) IR tablet 5 mg ( Oral Canceled Entry 1/2/18 1537)   fentanyl citrate (PF) 100 MCG/2ML 25 mcg ( Intravenous MAR Unhold 1/2/18 1438)   lactated ringers infusion (0 mL/hr Intravenous Stopped 1/2/18 1351)   sodium chloride 0 9 % infusion (75 mL/hr Intravenous Restarted 1/2/18 2347)   traMADol (ULTRAM) tablet 50 mg (not administered)   enoxaparin (LOVENOX) subcutaneous injection 40 mg (not administered)   ceFAZolin (ANCEF) IVPB (premix) 2,000 mg (2,000 mg Intravenous New Bag 1/2/18 1939)   acetaminophen (TYLENOL) tablet 650 mg (650 mg Oral Given 1/2/18 0223)   fentanyl citrate (PF) 100 MCG/2ML 50 mcg (50 mcg Intravenous Given 1/2/18 0220)   ceFAZolin (ANCEF) IVPB (premix) 2,000 mg (2,000 mg Intravenous Given 1/2/18 1126)   sodium chloride 0 9 % bolus 1,000 mL (0 mL Intravenous Stopped 1/2/18 2346)       Diagnostic Studies  Results Reviewed     Procedure Component Value Units Date/Time    Urine Microscopic [[id number]]  (Abnormal) Collected:  01/02/18 0736    Lab Status:  Final result Specimen:  Urine from Urine, Other Updated:  01/02/18 1302     RBC, UA None Seen /hpf      WBC, UA 4-10 (A) /hpf      Epithelial Cells Occasional /hpf      Bacteria, UA Occasional /hpf      AMORPH URATES Occasional /hpf      MUCOUS THREADS Occasional    Fingerstick Glucose (POCT) [[id number]]  (Abnormal) Collected:  01/02/18 0751    Lab Status:  Final result Updated:  01/02/18 0756     POC Glucose 141 (H) mg/dl     POCT urinalysis dipstick [[id number]]  (Abnormal) Resulted:  01/02/18 0742    Lab Status:  Final result Updated:  01/02/18 0742    ED Urine Macroscopic [[id number]]  (Abnormal) Collected:  01/02/18 0736    Lab Status:  Final result Specimen:  Urine Updated:  01/02/18 0734     Color, UA Yellow     Clarity, UA Clear     pH, UA 7 0     Leukocytes, UA Trace (A)     Nitrite, UA Negative     Protein,  (2+) (A) mg/dl      Glucose, UA Negative mg/dl      Ketones, UA Negative mg/dl      Urobilinogen, UA 0 2 E U /dl      Bilirubin, UA Negative     Blood, UA Negative     Specific Gravity, UA 1 025    Narrative:       CLINITEK RESULT    Hemoglobin A1c [[id number]]  (Normal) Collected:  01/02/18 0527    Lab Status:  Final result Specimen:  Blood from Arm, Left Updated:  01/02/18 0617     Hemoglobin A1C 6 3 %       mg/dl     Protime-INR [[id number]]  (Normal) Collected:  01/02/18 0319    Lab Status:  Final result Specimen:  Blood from Arm, Left Updated:  01/02/18 0341     Protime 13 6 seconds      INR 1 04    APTT [[id number]]  (Normal) Collected:  01/02/18 0319    Lab Status:  Final result Specimen:  Blood from Arm, Left Updated:  01/02/18 0341     PTT 27 seconds     Narrative: Therapeutic Heparin Range = 60-90 seconds    Troponin I [[id number]]  (Normal) Collected:  01/02/18 0005    Lab Status:  Final result Specimen:  Blood from Arm, Left Updated:  01/02/18 0030     Troponin I <0 02 ng/mL     Narrative:         [employer] Chemistry analyzer 99% cutoff is > 0 04 ng/mL in network labs    o cTnI 99% cutoff is useful only when applied to patients in the clinical setting of myocardial ischemia  o cTnI 99% cutoff should be interpreted in the context of clinical history, ECG findings and possibly cardiac imaging to establish correct diagnosis  o cTnI 99% cutoff may be suggestive but clearly not indicative of a coronary event without the clinical setting of myocardial ischemia      Comprehensive metabolic panel [[id number]]  (Abnormal) Collected:  01/02/18 0005    Lab Status:  Final result Specimen:  Blood from Arm, Left Updated:  01/02/18 0028     Sodium 135 (L) mmol/L      Potassium 4 3 mmol/L      Chloride 99 (L) mmol/L      CO2 30 mmol/L      Anion Gap 6 mmol/L      BUN 18 mg/dL      Creatinine 1 10 mg/dL      Glucose 151 (H) mg/dL      Calcium 10 1 mg/dL      AST 25 U/L ALT 26 U/L      Alkaline Phosphatase 87 U/L      Total Protein 7 8 g/dL      Albumin 3 6 g/dL      Total Bilirubin 0 48 mg/dL      eGFR 47 ml/min/1 73sq m     Narrative:         National Kidney Disease Education Program recommendations are as follows:  GFR calculation is accurate only with a steady state creatinine  Chronic Kidney disease less than 60 ml/min/1 73 sq  meters  Kidney failure less than 15 ml/min/1 73 sq  meters  CBC and differential [[id number]]  (Abnormal) Collected:  01/02/18 0005    Lab Status:  Final result Specimen:  Blood from Arm, Left Updated:  01/02/18 0013     WBC 15 91 (H) Thousand/uL      RBC 4 50 Million/uL      Hemoglobin 13 0 g/dL      Hematocrit 40 0 %      MCV 89 fL      MCH 28 9 pg      MCHC 32 5 g/dL      RDW 13 7 %      MPV 9 7 fL      Platelets 955 Thousands/uL      nRBC 0 /100 WBCs      Neutrophils Relative 80 (H) %      Lymphocytes Relative 12 (L) %      Monocytes Relative 5 %      Eosinophils Relative 2 %      Basophils Relative 1 %      Neutrophils Absolute 12 64 (H) Thousands/µL      Lymphocytes Absolute 1 94 Thousands/µL      Monocytes Absolute 0 80 Thousand/µL      Eosinophils Absolute 0 28 Thousand/µL      Basophils Absolute 0 09 Thousands/µL                  XR femur 2 vw right   Final Result by Rosalva Hamilton MD (01/02 1444)      Fluoroscopy provided for right hip ORIF  Please refer to the separate procedure notes for additional details  Workstation performed: [id number]         XR femur 2 vw right   Final Result by Ovidio Fox DO (01/02 1617)      Comminuted right femoral intertrochanteric fracture  Right inferior pubic ramus fracture  Workstation performed: [id number]         XR chest 1 view   Final Result by Ovidio Fox DO (01/02 0901)      No active pulmonary disease           Workstation performed: [id number]         CT spine cervical without contrast   ED Interpretation by Jess Galarza MD (01/02 9560)   neg      Final Result by Jessie Farley MD (01/02 4180)      No cervical spine fracture or traumatic malalignment  Findings are consistent with the preliminary report from [facility] which was provided shortly after completion of the exam                                 Workstation performed: [id number]         CT head wo contrast   ED Interpretation by Kinjal Seay MD (01/02 4649)   neg      Final Result by Jessie Farley MD (01/02 2500)      No acute intracranial abnormality  Microangiopathic changes  Findings are consistent with the preliminary report from [facility] which was provided shortly after completion of the exam                       Workstation performed: [id number]         CT hip right without contrast   ED Interpretation by Kinjal Seay MD (01/02 0991)   Abnormal   Comminuted, displaced right femoral neck fracture  Comminuted fractures of right superior and inferior pubic rami  Final Result by Jessie Farley MD (01/02 5215)   Right hip, right inferior pubic ramus, right superior pubic ramus extending to anterior column fractures as described above  Findings are consistent with the preliminary report from [facility] which was provided shortly after completion of the exam                       Workstation performed: [id number]               Procedures  Procedures      Phone Consults  ED Phone Contact    ED Course  ED Course as of Jan 03 0005 Tue Jan 02, 2018   0223 Ortho aware                                MDM  Number of Diagnoses or Management Options  Closed displaced fracture of right femoral neck [facility]):   Closed fracture of left superior pubic ramus, initial encounter [facility]): Inferior pubic ramus fracture [facility]):   Diagnosis management comments: 58-year-old female with right hip pain following fall, thought likely mechanical   Will obtain imaging of head, C-spine, and right hip  Will administer analgesia and reassess    Will check electrolytes, EKG other etiologies of fall   Plan for likely admission given right hip deformity on exam     CritCare Time    Disposition  Final diagnoses:   Closed displaced fracture of right femoral neck (HCC)   Inferior pubic ramus fracture (HCC)   Closed fracture of left superior pubic ramus, initial encounter ([facility] [address] )     Time reflects when diagnosis was documented in both MDM as applicable and the Disposition within this note     Time User Action Codes Description Comment    1/2/2018  3:09 AM Daryl Jackson Add [S72 141A] Closed displaced intertrochanteric fracture of right femur, initial encounter ([facility] [address] )     1/2/2018  3:39 AM Sahara Rochessa Add [S72 001A] Closed displaced fracture of right femoral neck ([facility] [address] )     1/2/2018  3:42 AM Sravanthi Miller Add [S32 599A] Inferior pubic ramus fracture ([facility] [address] )     1/2/2018  3:42 AM JosébelSaharaSravanthi Add [S32 512A] Closed fracture of left superior pubic ramus, initial encounter ([facility] [address] )     1/2/2018  3:42 AM Sravanthi Roche Modify [S72 141A] Closed displaced intertrochanteric fracture of right femur, initial encounter ([facility] [address] )     1/2/2018  3:42 AM Sravanthi Miller Modify [S72 001A] Closed displaced fracture of right femoral neck ([facility] [address] )     1/2/2018  4:54 AM Vernard Tanner Add [I25 10] Coronary artery disease involving native coronary artery     1/2/2018  4:54 AM Vernard Tanner Modify [I25 10] Coronary artery disease involving native coronary artery     1/2/2018  4:54 AM Vernard Tanner Add [Z95 2] S/P TAVR (transcatheter aortic valve replacement)     1/2/2018  4:54 AM Vernard Tanner Modify [Z95 2] S/P TAVR (transcatheter aortic valve replacement)     1/2/2018  4:54 AM Vernard Tanner Add [I44 2] Complete atrioventricular block ([facility] [address] )     1/2/2018  4:54 AM Vernard Tanner Modify [I44 2] Complete atrioventricular block [facility])       ED Disposition     ED Disposition Condition Comment    Admit  Case was discussed with Ortho and the patient's admission status was agreed to be Admission Status: inpatient status to the service of Dr Jas Amezcua   Follow-up Information     Follow up With Specialties Details Why Aleksandr Cherry MD Orthopedic Surgery Follow up in 2 week(s)  [address] [place] [address]          Current Discharge Medication List      START taking these medications    Details   enoxaparin (LOVENOX) 40 mg/0 4 mL Inject 0 4 mL under the skin daily for 30 days  Qty: 12 mL, Refills: 0      oxyCODONE (ROXICODONE) 5 mg immediate release tablet Take 1 tablet by mouth every 4 (four) hours as needed for moderate pain for up to 10 days Max Daily Amount: 30 mg  Qty: 30 tablet, Refills: 0         CONTINUE these medications which have NOT CHANGED    Details   AMLODIPINE BESYLATE PO Take 1 tablet by mouth daily      ascorbic acid (VITAMIN C) 500 mg tablet Take 500 mg by mouth daily  aspirin 81 mg chewable tablet Chew 81 mg daily  atorvastatin (LIPITOR) 20 mg tablet Take 1 tablet (20 mg total) by mouth daily at bedtime  Qty: 30 tablet, Refills: 2      Bisacodyl (DULCOLAX PO) Take 1 capsule by mouth  Calcium Carbonate-Vitamin D (CALTRATE COLON HEALTH PO) Take 1 tablet by mouth daily  furosemide (LASIX) 40 mg tablet Take 40 mg by mouth daily      levothyroxine 50 mcg tablet Take 50 mcg by mouth daily      metFORMIN (GLUCOPHAGE) 500 mg tablet Take 500 mg by mouth 2 (two) times a day with meals  metoprolol tartrate (LOPRESSOR) 50 mg tablet Take 1 tablet (50 mg total) by mouth 2 (two) times a day  Qty: 60 tablet, Refills: 2      Multiple Vitamins-Minerals (OCUVITE ADULT 50+ PO) Take 1 tablet by mouth daily  pantoprazole (PROTONIX) 40 mg tablet Take 40 mg by mouth daily  promethazine (PHENERGAN) 25 mg tablet Take 25 mg by mouth daily  acetaminophen (TYLENOL) 325 mg tablet Take 2 tablets (650 mg total) by mouth every 6 (six) hours as needed for mild pain    Qty: 30 tablet, Refills: 0      polyethylene glycol (MIRALAX) powder Take 17 g by mouth daily  potassium chloride (K-DUR,KLOR-CON) 20 mEq tablet Take 20 mEq by mouth daily           No discharge procedures on file  ED Provider  Attending physically available and evaluated Karie Mortonng Day  I managed the patient along with the ED Attending      Electronically Signed by         Laci Morocho MD  Resident  01/03/18 Mendel Rubin, MD  Resident  01/03/18 4619

## 2018-01-03 NOTE — PLAN OF CARE
Problem: OCCUPATIONAL THERAPY ADULT  Goal: Performs self-care activities at highest level of function for planned discharge setting  See evaluation for individualized goals  Treatment Interventions: ADL retraining, Functional transfer training, UE strengthening/ROM, Endurance training, Patient/family training, Equipment evaluation/education, Compensatory technique education, Energy conservation, Activityengagement          See flowsheet documentation for full assessment, interventions and recommendations  Limitation: Decreased ADL status, Decreased UE ROM, Decreased UE strength, Decreased endurance, Decreased self-care trans, Decreased high-level ADLs  Prognosis: Fair  Assessment: Pt is a 80 y o  female seen for OT evaluation [facility] w/ Closed displaced intertrochanteric fracture of right femur ([address] )  Personal factors affecting pt at time of IE include:difficulty performing ADLS, difficulty performing IADLS  and health management   Prior to admission, pt was fully indep with ADL fxn and IADL fxn utilizing RW for fxnl mob  Pt reporting unsafe behaviors however, stating she pulls on headboard and RW to assist with xfers  [place] Upon evaluation: Pt requires TA (max A x 2) for xfers and ADL fxn 2* the following deficits impacting occupational performance: weakness, decreased ROM, decreased strength, decreased balance, decreased tolerance, increased pain and orthopedic restrictions  Pt to benefit from continued skilled OT tx while in the hospital to address deficits as defined above and maximize level of functional independence w ADL's and functional mobility  Occupational Performance areas to address include: grooming, bathing/shower, toilet hygiene, dressing, health maintenance, functional mobility, community mobility, clothing management, meal prep and household maintenance   From OT standpoint, recommendation at time of d/c would be appropriate for rehab stay when medically stable and appropriate          OT Discharge Recommendation: Short Term Rehab

## 2018-01-03 NOTE — PROGRESS NOTES
Fluids d/c overnight and patient's pinon was emptied at 0520  Output overnight was only 175ml  Ortho paged

## 2018-01-03 NOTE — PHYSICAL THERAPY NOTE
Physical Therapy Evaluation    Patient Name: Kate Hutton    NRGXU'G Date: 1/3/2018     Problem List  Patient Active Problem List   Diagnosis    Nonrheumatic aortic valve stenosis    Coronary artery disease involving native coronary artery    Complete atrioventricular block (HCC)    S/P TAVR (transcatheter aortic valve replacement)    Age-related cognitive decline    Frequent falls    Physical deconditioning    Ambulatory dysfunction    Vision impairment    Constipation    Incontinence in female    History of TIA (transient ischemic attack)    Closed displaced intertrochanteric fracture of right femur ([address] )    Diabetes mellitus ([facility] [address] [facility]    History of cardiac pacemaker        Past Medical History  Past Medical History:   Diagnosis Date    CAD (coronary artery disease)     s/p HERNAN 6/2016    CHF (congestive heart failure) ([facility])     Diabetes mellitus ([facility] [address] )     non-insulin depdenent    Disease of thyroid gland     GERD (gastroesophageal reflux disease)     History of TIA (transient ischemic attack)     no residual deficits    Hyperlipidemia     Hypertension     Hypoxia     on home O2 QHS    Meniere disease     Pacemaker     CHB-Biotronik in 2/2015    Severe aortic stenosis         Past Surgical History  Past Surgical History:   Procedure Laterality Date    APPENDECTOMY      BACK SURGERY      CHOLECYSTECTOMY      FRACTURE SURGERY Right 01/02/2018    femur    HYSTERECTOMY      AR EGD TRANSORAL BIOPSY SINGLE/MULTIPLE N/A 11/9/2016    Procedure: ESOPHAGOGASTRODUODENOSCOPY (EGD); Surgeon: Michael Dietrich MD;  [facility];   Service: Gastroenterology    AR REPLACE AORTIC VALVE OPENFEMORAL ARTERY APPROACH N/A 7/26/2016    Procedure: TRANSFEMORAL TAVR WITH 23MM LAROSE LILY S3 VALVE; JOSE ALFREDO ;  Surgeon: Monica Benavides DO;  [facility];  Service: Cardiac Surgery    SMALL INTESTINE SURGERY      TOTAL KNEE ARTHROPLASTY      VARICOSE VEIN SURGERY      VENTRAL HERNIA REPAIR             01/03/18 0855   Note Type   Note type Eval only   Pain Assessment   Pain Assessment No/denies pain   Pain Score No Pain   Home Living   Type of Home Other (Comment)  (independent living- [facility])   Home Layout One level   Home Equipment Walker   Prior Function   Level of [place] Independent with ADLs and functional mobility   Lives With Facility staff   ADL Assistance Independent   IADLs Independent   Falls in the last 6 months 1 to 4   Restrictions/Precautions   Weight Bearing Precautions Per Order Yes   RLE Weight Bearing Per Order WBAT   Other Precautions Fall Risk;O2;Multiple lines   Cognition   Arousal/Participation Cooperative   Orientation Level Oriented X4   RLE Assessment   RLE Assessment X  (hip 2-, knee 3-, ankle 3)   LLE Assessment   LLE Assessment X  (hip 3-, knee 3, ankle 2+)   Coordination   Movements are Fluid and Coordinated 0   Coordination and Movement Description RLE pain and instability   Sensation WFL   Light Touch   RLE Light Touch Grossly intact   LLE Light Touch Grossly intact   Bed Mobility   Rolling R 2  Maximal assistance   Rolling L 2  Maximal assistance   Supine to Sit 2  Maximal assistance   Additional items Assist x 2   Sit to Supine 2  Maximal assistance   Additional items Assist x 2   Transfers   Sit to Stand 2  Maximal assistance   Additional items Assist x 2   Stand to Sit 2  Maximal assistance   Additional items Assist x 2   Stand pivot Unable to assess   Balance   Static Sitting Fair +   Dynamic Sitting Fair   Static Standing Fair -   Dynamic Standing Poor +   Activity Tolerance   Activity Tolerance Patient limited by pain   Nurse Made Aware HOSSEIN Pat[place] aware   Assessment   Prognosis Good   Problem List Decreased strength;Decreased range of motion;Decreased endurance; Impaired balance;Decreased mobility;Pain   Assessment Pt is 79 y/o female admitted to hospital s/p fall resulting in R femur fx; pt is s/p R IM nailing on 1/2/18  Currently WBAT RLE  Pt denies pain @ rest but pain increases to 8/10 after transfers  Pt PLOF is mod I using RW @ independent living facility  Pt reports she was sitting EOB getting dressed and slid off the bed; facility staff was able to help her onto her feet and she tried to walk with RW but fell again 2* R hip pain  Pt also reports hx of B/L TKR and B/L shoulder arthritis which limits functional mobility  Pt presents with R hip pain, dec RLE ROM, dec BLE strength, dec endurance, poor ability to weight shift, dec balance  Noted dec L DF strength and pt reports she does shuffle her feet when walking; contributing to fall risk  Pt reports using O2 @ home @ night; on RA pt SpO2 is 90-93% and drops to 86-89% after transfers; pt requires vc's for PLB; @ end of session SpO2 93% @ rest on RA  Overall pt requires max A x2 for bed mobility and transfers; unable to assess ambulation @ this time 2* pain and poor standing tolerance  Pt will benefit from short-term skilled PT to address above impairments and maximize I with all functional mobility  Barriers to Discharge Decreased caregiver support   Goals   Patient Goals to walk    STG Expiration Date 01/10/18   Short Term Goal #1 Pt will perform all transfers @ min A level using RW  Short Term Goal #2 Pt will ambulate 10' using RW and mod A  Plan   Treatment/Interventions Functional transfer training;LE strengthening/ROM; Therapeutic exercise; Endurance training;Patient/family training;Equipment eval/education; Bed mobility;Gait training   PT Frequency 5x/wk   Recommendation   Recommendation Short-term skilled PT   Equipment Recommended Walker   PT - OK to Discharge Yes  (to rehab)   Barthel Index   Feeding 10   Bathing 0   Grooming Score 0   Dressing Score 5   Bladder Score 0   Bowels Score 10   Toilet Use Score 5   Transfers (Bed/Chair) Score 5   Mobility (Level Surface) Score 0   Stairs Score 0   Barthel Index Score 35

## 2018-01-04 PROBLEM — G93.40 ENCEPHALOPATHY: Status: ACTIVE | Noted: 2018-01-04

## 2018-01-04 LAB
ABO GROUP BLD BPU: NORMAL
ABO GROUP BLD BPU: NORMAL
ANION GAP SERPL CALCULATED.3IONS-SCNC: 6 MMOL/L (ref 4–13)
ANION GAP SERPL CALCULATED.3IONS-SCNC: 6 MMOL/L (ref 4–13)
BASOPHILS # BLD AUTO: 0.08 THOUSANDS/ΜL (ref 0–0.1)
BASOPHILS NFR BLD AUTO: 1 % (ref 0–1)
BPU ID: NORMAL
BPU ID: NORMAL
BUN SERPL-MCNC: 27 MG/DL (ref 5–25)
BUN SERPL-MCNC: 31 MG/DL (ref 5–25)
CALCIUM SERPL-MCNC: 9.1 MG/DL (ref 8.3–10.1)
CALCIUM SERPL-MCNC: 9.2 MG/DL (ref 8.3–10.1)
CHLORIDE SERPL-SCNC: 90 MMOL/L (ref 100–108)
CHLORIDE SERPL-SCNC: 93 MMOL/L (ref 100–108)
CO2 SERPL-SCNC: 30 MMOL/L (ref 21–32)
CO2 SERPL-SCNC: 31 MMOL/L (ref 21–32)
CREAT SERPL-MCNC: 1.09 MG/DL (ref 0.6–1.3)
CREAT SERPL-MCNC: 1.27 MG/DL (ref 0.6–1.3)
EOSINOPHIL # BLD AUTO: 0.18 THOUSAND/ΜL (ref 0–0.61)
EOSINOPHIL NFR BLD AUTO: 1 % (ref 0–6)
ERYTHROCYTE [DISTWIDTH] IN BLOOD BY AUTOMATED COUNT: 13.8 % (ref 11.6–15.1)
GFR SERPL CREATININE-BSD FRML MDRD: 39 ML/MIN/1.73SQ M
GFR SERPL CREATININE-BSD FRML MDRD: 47 ML/MIN/1.73SQ M
GLUCOSE SERPL-MCNC: 141 MG/DL (ref 65–140)
GLUCOSE SERPL-MCNC: 142 MG/DL (ref 65–140)
GLUCOSE SERPL-MCNC: 155 MG/DL (ref 65–140)
GLUCOSE SERPL-MCNC: 158 MG/DL (ref 65–140)
GLUCOSE SERPL-MCNC: 169 MG/DL (ref 65–140)
GLUCOSE SERPL-MCNC: 175 MG/DL (ref 65–140)
HCT VFR BLD AUTO: 26.4 % (ref 34.8–46.1)
HGB BLD-MCNC: 9 G/DL (ref 11.5–15.4)
LYMPHOCYTES # BLD AUTO: 1.67 THOUSANDS/ΜL (ref 0.6–4.47)
LYMPHOCYTES NFR BLD AUTO: 10 % (ref 14–44)
MCH RBC QN AUTO: 30.1 PG (ref 26.8–34.3)
MCHC RBC AUTO-ENTMCNC: 34.1 G/DL (ref 31.4–37.4)
MCV RBC AUTO: 88 FL (ref 82–98)
MONOCYTES # BLD AUTO: 1.42 THOUSAND/ΜL (ref 0.17–1.22)
MONOCYTES NFR BLD AUTO: 9 % (ref 4–12)
NEUTROPHILS # BLD AUTO: 13.15 THOUSANDS/ΜL (ref 1.85–7.62)
NEUTS SEG NFR BLD AUTO: 79 % (ref 43–75)
NRBC BLD AUTO-RTO: 1 /100 WBCS
PLATELET # BLD AUTO: 181 THOUSANDS/UL (ref 149–390)
PMV BLD AUTO: 10.9 FL (ref 8.9–12.7)
POTASSIUM SERPL-SCNC: 3.7 MMOL/L (ref 3.5–5.3)
POTASSIUM SERPL-SCNC: 4.1 MMOL/L (ref 3.5–5.3)
RBC # BLD AUTO: 2.99 MILLION/UL (ref 3.81–5.12)
SODIUM SERPL-SCNC: 127 MMOL/L (ref 136–145)
SODIUM SERPL-SCNC: 129 MMOL/L (ref 136–145)
UNIT DISPENSE STATUS: NORMAL
UNIT DISPENSE STATUS: NORMAL
UNIT PRODUCT CODE: NORMAL
UNIT PRODUCT CODE: NORMAL
UNIT RH: NORMAL
UNIT RH: NORMAL
WBC # BLD AUTO: 16.73 THOUSAND/UL (ref 4.31–10.16)

## 2018-01-04 PROCEDURE — 82948 REAGENT STRIP/BLOOD GLUCOSE: CPT

## 2018-01-04 PROCEDURE — 97535 SELF CARE MNGMENT TRAINING: CPT

## 2018-01-04 PROCEDURE — 80048 BASIC METABOLIC PNL TOTAL CA: CPT | Performed by: PHYSICIAN ASSISTANT

## 2018-01-04 PROCEDURE — 97530 THERAPEUTIC ACTIVITIES: CPT

## 2018-01-04 PROCEDURE — 85025 COMPLETE CBC W/AUTO DIFF WBC: CPT | Performed by: PHYSICIAN ASSISTANT

## 2018-01-04 PROCEDURE — 80048 BASIC METABOLIC PNL TOTAL CA: CPT | Performed by: STUDENT IN AN ORGANIZED HEALTH CARE EDUCATION/TRAINING PROGRAM

## 2018-01-04 RX ORDER — POLYETHYLENE GLYCOL 3350 17 G/17G
17 POWDER, FOR SOLUTION ORAL DAILY PRN
Status: DISCONTINUED | OUTPATIENT
Start: 2018-01-04 | End: 2018-01-05 | Stop reason: HOSPADM

## 2018-01-04 RX ORDER — SENNOSIDES 8.6 MG
1 TABLET ORAL
Status: DISCONTINUED | OUTPATIENT
Start: 2018-01-04 | End: 2018-01-05 | Stop reason: HOSPADM

## 2018-01-04 RX ADMIN — METOPROLOL TARTRATE 50 MG: 50 TABLET ORAL at 18:30

## 2018-01-04 RX ADMIN — ATORVASTATIN CALCIUM 20 MG: 20 TABLET, FILM COATED ORAL at 21:16

## 2018-01-04 RX ADMIN — ACETAMINOPHEN 650 MG: 325 TABLET, FILM COATED ORAL at 23:46

## 2018-01-04 RX ADMIN — SENNOSIDES 8.6 MG: 8.6 TABLET, FILM COATED ORAL at 21:16

## 2018-01-04 RX ADMIN — INSULIN LISPRO 1 UNITS: 100 INJECTION, SOLUTION INTRAVENOUS; SUBCUTANEOUS at 08:40

## 2018-01-04 RX ADMIN — POLYETHYLENE GLYCOL 3350 17 G: 17 POWDER, FOR SOLUTION ORAL at 12:53

## 2018-01-04 RX ADMIN — ACETAMINOPHEN 650 MG: 325 TABLET, FILM COATED ORAL at 18:30

## 2018-01-04 RX ADMIN — SODIUM CHLORIDE 500 ML: 0.9 INJECTION, SOLUTION INTRAVENOUS at 12:59

## 2018-01-04 RX ADMIN — ACETAMINOPHEN 650 MG: 325 TABLET, FILM COATED ORAL at 12:12

## 2018-01-04 RX ADMIN — Medication 1 TABLET: at 08:59

## 2018-01-04 RX ADMIN — INSULIN LISPRO 1 UNITS: 100 INJECTION, SOLUTION INTRAVENOUS; SUBCUTANEOUS at 12:13

## 2018-01-04 RX ADMIN — LEVOTHYROXINE SODIUM 50 MCG: 50 TABLET ORAL at 05:05

## 2018-01-04 RX ADMIN — ASPIRIN 81 MG 81 MG: 81 TABLET ORAL at 08:59

## 2018-01-04 RX ADMIN — PANTOPRAZOLE SODIUM 40 MG: 40 TABLET, DELAYED RELEASE ORAL at 05:05

## 2018-01-04 RX ADMIN — PROMETHAZINE HYDROCHLORIDE 25 MG: 25 TABLET ORAL at 09:00

## 2018-01-04 RX ADMIN — OXYCODONE HYDROCHLORIDE AND ACETAMINOPHEN 500 MG: 500 TABLET ORAL at 08:59

## 2018-01-04 RX ADMIN — POTASSIUM CHLORIDE 20 MEQ: 1500 TABLET, EXTENDED RELEASE ORAL at 08:59

## 2018-01-04 RX ADMIN — ENOXAPARIN SODIUM 40 MG: 40 INJECTION SUBCUTANEOUS at 08:59

## 2018-01-04 RX ADMIN — Medication 1 TABLET: at 18:30

## 2018-01-04 RX ADMIN — ACETAMINOPHEN 650 MG: 325 TABLET, FILM COATED ORAL at 05:05

## 2018-01-04 RX ADMIN — METOPROLOL TARTRATE 50 MG: 50 TABLET ORAL at 08:59

## 2018-01-04 NOTE — ASSESSMENT & PLAN NOTE
Pt is postop   ?  Secondary to hyponatremia vs post anaesthesia, sleep wake cycle  Pt is reasonable for me but daughter concerned   meds reviewed feel appropriate  nephro treating sodium and likely hypovolemic

## 2018-01-04 NOTE — PROGRESS NOTES
Progress Note - Karie Kingston Day 1935, 80 y o  female MRN: [id number]    Unit/Bed#: -01 Encounter: [id number]    Primary Care Provider: Anahy Biggs MD   Date and time admitted to hospital: 1/1/2018 11:43 PM        Encephalopathy   Assessment & Plan    Pt is postop   ? Secondary to hyponatremia vs post anaesthesia, sleep wake cycle  Pt is reasonable for me but daughter concerned   meds reviewed feel appropriate  nephro treating sodium and likely hypovolemic         * Closed displaced intertrochanteric fracture of right femur ([address] )   Assessment & Plan    · POD# 2 right IM nail  · Ortho primary  · Pain control  Adjusted due to some lethargy  · When walked into the room the pt was talking to her daughter on the phone who states he mother is very confused   · Pt is alert but is confused with some of her stories   · Last evening was pulling out iv sites   · Reviewed pain meds, think appropriate atc tylenol   · PT/OT  · DVT prophylaxis per primary        S/P TAVR (transcatheter aortic valve replacement)   Assessment & Plan    · Bioprosthetic valve well seated and without stenosis or regurg per most recent echocardiogram   Cardiology following  Coronary artery disease involving native coronary artery   Assessment & Plan    · Cardiology appreciated   · S/p HERNAN x2  · Continue aspirin, statin, BB        Hypothyroidism   Assessment & Plan    · Continue levothyroxine        Hyperlipidemia   Assessment & Plan    · Continue statin        Chronic diastolic congestive heart failure [facility])   Assessment & Plan    · Cardiology following  · Monitor for signs and symptoms of volume overload  ·  Patient denies SOB and is satting well on room air     · Lasix  Now discontinued bolus of ivf per nehro today  · Will remove pinon discussed with ortho / urinary retention protocol in place  · Also strict I/Os via, blue pads please weigh        Acute blood loss as cause of postoperative anemia   Assessment & Plan · Hemoglobin 9 0 and 26 4  ·  Repeat H&H    · Primary team has ordered transfusion of 2 units  · Careful fluid management given her CHF  · Cards/nephro on board will monitor   · Lasix dcd         MAYELIN (acute kidney injury) ([facility] [address] )   Assessment & Plan    · Patient with baseline creatinine which appears to be 0 9-1 2 but no documented history of CKD  Creatinine post-op 1 69    ·  Patient has a Deal post-operatively  ·  She has been receiving IVF which was no longer running   · Pt was on lasix which has been discontinued and pt will receive one time ivf bolus per nephro chk bmp         History of cardiac pacemaker   Assessment & Plan    · Cardiology following  · Follow up with them as an outpatient        Diabetes mellitus [facility])   Assessment & Plan    · Patient on metformin at home  · This was not held on admission  ·  While she is hospitalized, we will discontinue oral antihyperglycemics and start SSI with glucose monitoring  · HbA1c 6 3  · Hypoglycemia protocol ordered  · Fasting bs was 155            VTE Pharmacologic Prophylaxis:   Pharmacologic: Enoxaparin (Lovenox)  Mechanical VTE Prophylaxis in Place: Yes    Patient Centered Rounds: I have performed bedside rounds with nursing staff today  Discussions with Specialists or Other Care Team Provider: nursing     Education and Discussions with Family / Patient: patient     Time Spent for Care: 30 minutes  More than 50% of total time spent on counseling and coordination of care as described above  Current Length of Stay: 2 day(s)    Current Patient Status: Inpatient   Certification Statement: The patient will continue to require additional inpatient hospital stay due to Pt  pending placement for rehab and sodium correction per nephro     Discharge Plan: to rehab when stable     Code Status: Level 1 - Full Code      Subjective:   Pt is a little confused  But able to make sense  She realizes that she is partially confused  States she has some pain  Wants me to update her daughter which was done over the phone at the bedside  Daughter concerned as pt is more confused from her baseline     Objective:     Vitals:   Temp (24hrs), Av 9 °F (36 6 °C), Min:97 2 °F (36 2 °C), Max:98 4 °F (36 9 °C)    HR:  [62-94] 84  Resp:  [18-20] 18  BP: (125-162)/(60-72) 144/67  SpO2:  [85 %-97 %] 85 %  Body mass index is 33 29 kg/m²  Input and Output Summary (last 24 hours): Intake/Output Summary (Last 24 hours) at 18 1710  Last data filed at 18 1326   Gross per 24 hour   Intake             1010 ml   Output             4150 ml   Net            -3140 ml       Physical Exam:     Physical Exam   Constitutional: She appears well-developed and well-nourished  No distress  HENT:   Head: Normocephalic and atraumatic  Eyes: Pupils are equal, round, and reactive to light  Neck: No JVD present  No tracheal deviation present  No thyromegaly present  Pulmonary/Chest: No stridor  No respiratory distress  She has no wheezes  She has no rales  She exhibits no tenderness  Poor effort    Abdominal: She exhibits no distension and no mass  There is no tenderness  There is no rebound and no guarding  Genitourinary:   Genitourinary Comments: Deal cath in place yellow urine   Musculoskeletal: She exhibits edema (right leg with dsd lateral knee area and right lateral upper thigh) and tenderness  She exhibits no deformity  Lymphadenopathy:     She has no cervical adenopathy  Neurological: She is alert  2 -3    Skin: No rash noted  She is not diaphoretic  No erythema  No pallor  Psychiatric: She has a normal mood and affect         Additional Data:     Labs:      Results from last 7 days  Lab Units 18  0540   WBC Thousand/uL 16 73*   HEMOGLOBIN g/dL 9 0*   HEMATOCRIT % 26 4*   PLATELETS Thousands/uL 181   NEUTROS PCT % 79*   LYMPHS PCT % 10*   MONOS PCT % 9   EOS PCT % 1       Results from last 7 days  Lab Units 18  0540  18  0005   SODIUM mmol/L 127*  < > 135*   POTASSIUM mmol/L 3 7  < > 4 3   CHLORIDE mmol/L 90*  < > 99*   CO2 mmol/L 31  < > 30   BUN mg/dL 31*  < > 18   CREATININE mg/dL 1 27  < > 1 10   CALCIUM mg/dL 9 1  < > 10 1   TOTAL PROTEIN g/dL  --   --  7 8   BILIRUBIN TOTAL mg/dL  --   --  0 48   ALK PHOS U/L  --   --  87   ALT U/L  --   --  26   AST U/L  --   --  25   GLUCOSE RANDOM mg/dL 155*  < > 151*   < > = values in this interval not displayed  Results from last 7 days  Lab Units 01/02/18  0319   INR  1 04       * I Have Reviewed All Lab Data Listed Above  * Additional Pertinent Lab Tests Reviewed: All Labs Within Last 24 Hours Reviewed    Imaging:    Imaging Reports Reviewed Today Include: reviewed       Recent Cultures (last 7 days):           Last 24 Hours Medication List:     acetaminophen 650 mg Oral Q6H [facility]   ascorbic acid 500 mg Oral Daily   aspirin 81 mg Oral Daily   atorvastatin 20 mg Oral HS   bisacodyl 5 mg Oral Once   calcium carbonate 1 tablet Oral BID With Meals   enoxaparin 40 mg Subcutaneous Daily   insulin lispro 1-5 Units Subcutaneous TID AC   insulin lispro 1-5 Units Subcutaneous HS   levothyroxine 50 mcg Oral Early Morning   metoprolol tartrate 50 mg Oral BID   multivitamin-minerals 1 tablet Oral Daily   pantoprazole 40 mg Oral Early Morning   potassium chloride 20 mEq Oral Daily   promethazine 25 mg Oral Daily   senna 1 tablet Oral HS   sodium chloride 500 mL Intravenous Once        Today, Patient Was Seen By: KASIE Taylor    ** Please Note: Dictation voice to text software may have been used in the creation of this document   **

## 2018-01-04 NOTE — OCCUPATIONAL THERAPY NOTE
[address] Progress Note     Patient Name: Tyrell Hutton  QJNTD'I Date: 1/4/2018     Problem List  Patient Active Problem List   Diagnosis    Nonrheumatic aortic valve stenosis    Coronary artery disease involving native coronary artery    Complete atrioventricular block (HCC)    S/P TAVR (transcatheter aortic valve replacement)    Age-related cognitive decline    Frequent falls    Physical deconditioning    Ambulatory dysfunction    Vision impairment    Constipation    Incontinence in female    History of TIA (transient ischemic attack)    Closed displaced intertrochanteric fracture of right femur ([facility] [address] [facility]    Diabetes mellitus ([facility] [address] [facility]    History of cardiac pacemaker    MAYELIN (acute kidney injury) ([facility] [address] )    Acute blood loss as cause of postoperative anemia    Chronic diastolic congestive heart failure ([facility] [address] )    Hyperlipidemia    Hypothyroidism        Past Medical History  Past Medical History:   Diagnosis Date    CAD (coronary artery disease)     s/p HERNAN 6/2016    CHF (congestive heart failure) (HCC)     Diabetes mellitus (HCC)     non-insulin depdenent    Disease of thyroid gland     GERD (gastroesophageal reflux disease)     History of TIA (transient ischemic attack)     no residual deficits    Hyperlipidemia     Hypertension     Hypoxia     on home O2 QHS    Meniere disease     Pacemaker     CHB-Biotronik in 2/2015    Severe aortic stenosis         Past Surgical History  Past Surgical History:   Procedure Laterality Date    APPENDECTOMY      BACK SURGERY      CHOLECYSTECTOMY      FRACTURE SURGERY Right 01/02/2018    femur    HYSTERECTOMY      ND EGD TRANSORAL BIOPSY SINGLE/MULTIPLE N/A 11/9/2016    Procedure: ESOPHAGOGASTRODUODENOSCOPY (EGD); Surgeon: Monika Diaz MD;  [facility];   Service: Gastroenterology    ND OPEN RX FEMUR FX+INTRAMED GARRETT Right 1/2/2018    Procedure: INSERTION NAIL IM FEMUR ANTEGRADE (TROCHANTERIC) RIGHT;  Surgeon: Sully Garza MD; [facility];  Service: Orthopedics    [place] REPLACE AORTIC VALVE OPENFEMORAL ARTERY APPROACH N/A 7/26/2016    Procedure: TRANSFEMORAL TAVR WITH 23MM LAROSE LILY S3 VALVE; JOSE ALFREDO ;  Surgeon: Richa Owens DO;  [facility];  Service: Cardiac Surgery    SMALL INTESTINE SURGERY      TOTAL KNEE ARTHROPLASTY      VARICOSE VEIN SURGERY      VENTRAL HERNIA REPAIR            01/04/18 1055   Restrictions/Precautions   RLE Weight Bearing Per Order WBAT   Other Precautions Fall Risk;Bed Alarm  (pinon)   Lifestyle   Autonomy "I was very confused last night, I didnt know where I was and apparently ripped out my IV"   Pain Assessment   Pain Assessment 0-10   Pain Score 8   Pain Location Hip   Pain Orientation Right   Hospital Pain Intervention(s) Medication (See MAR); Rest;Repositioned;Cold applied   ADL   Where Assessed Sitting at sink   Grooming Assistance 5  Supervision/Setup   Grooming Comments Pt completed grooming tasks seated at sink; Pt req max A x 2 to stand; inappropriate to stand at sink and peform grooming tasks   UB Bathing Assistance 4  Minimal Assistance   UB Bathing Comments Pt req A to bathe abdomen 2* large body habitus   LB Bathing Assistance 1  Total Assistance   LB Bathing Comments Pt rolling for bottom hygiene in bed   LB Dressing Comments TA to don/doff socks   Transfers   Sit to Stand 2  Maximal assistance  (A x 3)   Additional items Assist x 3  (slide board)   Stand to Sit 2  Maximal assistance   Additional items Assist x 2   Sliding Board transfer 2  Maximal assistance   Additional items Assist x 3   Additional Comments pt completed slide board xfer and sit<>stand xfer with A x 2 to complete sit<>stand and Max A x 3 for slide board xfer  Pt req A x 2 for xfer assist and assist of another to safely manage slide board and equip   Pt extremely deconditioned with impaired UE/LE strength, dec endurance, dec act tolerance, and inc pain   Cognition   Overall Cognitive Status Impaired Comments Formalized cog assessment may be necessary to determine d/c needs  Pt tangentile and forgetful  repeating stories at times   [address] [place] [address] Dr Taylor reports she is legally blind   Activity Tolerance   Activity Tolerance Patient limited by pain; Patient limited by fatigue   Medical Staff Made Aware RN    Plan   Treatment Interventions ADL retraining;Functional transfer training;UE strengthening/ROM; Endurance training;Cognitive reorientation;Patient/family training;Equipment evaluation/education; Compensatory technique education; Energy conservation   Treatment Day 1   OT Frequency 3-5x/wk   Recommendation   OT Discharge Recommendation Short Term Rehab   Barthel Index   Feeding 5   Bathing 0   Grooming Score 5   Dressing Score 0   Bladder Score 0   Bowels Score 10   Toilet Use Score 0   Transfers (Bed/Chair) Score 5   Mobility (Level Surface) Score 0   Stairs Score 0   Barthel Index Score 25   Modified [place] Scale   Modified [place] Scale 4   Lorraine Ceja, OT

## 2018-01-04 NOTE — ASSESSMENT & PLAN NOTE
· POD# 2 right IM nail  · Ortho primary     · Pain control  Adjusted due to some lethargy  · When walked into the room the pt was talking to her daughter on the phone who states he mother is very confused   · Pt is alert but is confused with some of her stories   · Last evening was pulling out iv sites   · Reviewed pain meds, think appropriate atc tylenol   · PT/OT  · DVT prophylaxis per primary

## 2018-01-04 NOTE — ASSESSMENT & PLAN NOTE
· Patient with baseline creatinine which appears to be 0 9-1 2 but no documented history of CKD  Creatinine post-op 1 69    ·  Patient has a Deal post-operatively    ·  She has been receiving IVF which was no longer running   · Pt was on lasix which has been discontinued and pt will receive one time ivf bolus per nephro chk bmp

## 2018-01-04 NOTE — ASSESSMENT & PLAN NOTE
· Patient on metformin at home  · This was not held on admission  ·  While she is hospitalized, we will discontinue oral antihyperglycemics and start SSI with glucose monitoring  · HbA1c 6 3     · Hypoglycemia protocol ordered  · Fasting bs was 155

## 2018-01-04 NOTE — PROGRESS NOTES
NEPHROLOGY PROGRESS NOTE   Teresa Soriano Day 80 y o  female MRN: [id number]  Unit/Bed#: -01 Encounter: [id number]  Reason for Consult: MAYELIN    Assessment/Plan:  1  Acute kidney injury, likely secondary to ATN, hypotension noted intraoperatively, possible component of acute anemia  2  Hyponatremia, possible component of volume   3  Transient oliguria, improving  4  Acute anemia, postoperatively, anticipated 2 units PRBC  5  Status post right femoral intramedullary nail  6  Status post fall  7  Diabetes     · Overall renal function is improved, creatinine 1 3  · Urine output markedly improved  · Given her decrease in serum sodium, empirically give 500 cc normal saline x1  · Repeat BMP later today to ensure that is sodium levels remained stable      SUBJECTIVE:  Seen and examined  Patient awake alert  Apparently had a bad evening, some confusion, had pulled out her IV as well as catheter  Currently denies any chest pain, shortness of Breath appears awake and alert      OBJECTIVE:  Current Weight: Weight - Scale: 82 6 kg (182 lb)  Vitals:    01/04/18 0011 01/04/18 0356 01/04/18 0730 01/04/18 0846   BP: 125/60 162/72 150/67 143/64   Pulse: 62 94 78 86   Resp: 20 20 20    Temp: (!) 97 2 °F (36 2 °C) 98 °F (36 7 °C) 98 °F (36 7 °C)    TempSrc: Oral Oral Oral    SpO2: 96% 96% 97%    Weight:       Height:           Intake/Output Summary (Last 24 hours) at 01/04/18 1228  Last data filed at 01/04/18 1047   Gross per 24 hour   Intake             2510 ml   Output             4300 ml   Net            -1790 ml     GENERAL :  No apparent distress, appears comfortable  NECK:  Supple  CV:  Regular  RESP:  Clear to auscultation  ABD:  Soft, nontender  EXT:  No significant edema  Psych:  Appropriate  SKIN:  No rash    Medications:    Current Facility-Administered Medications:     acetaminophen (TYLENOL) tablet 650 mg, 650 mg, Oral, Q6H PRN, Shen Cohen MD    acetaminophen (TYLENOL) tablet 650 mg, 650 mg, Oral, Q6H [address], Danielle Ketchum Molly Mcdonough MD, 650 mg at 01/04/18 1212    ascorbic acid (VITAMIN C) tablet 500 mg, 500 mg, Oral, Daily, Cherry Early MD, 500 mg at 01/04/18 4031    aspirin chewable tablet 81 mg, 81 mg, Oral, Daily, Cherry Early MD, 81 mg at 01/04/18 0859    atorvastatin (LIPITOR) tablet 20 mg, 20 mg, Oral, HS, Cherry Early MD, 20 mg at 01/03/18 2139    bisacodyl (DULCOLAX) EC tablet 5 mg, 5 mg, Oral, Once, Cherry Early MD    calcium carbonate (OYSTER SHELL,OSCAL) 500 mg tablet 1 tablet, 1 tablet, Oral, BID With Meals, Cherry Early MD, 1 tablet at 01/04/18 0859    enoxaparin (LOVENOX) subcutaneous injection 40 mg, 40 mg, Subcutaneous, Daily, Laz Gresham PA-C, 40 mg at 01/04/18 0859    fentanyl citrate (PF) 100 MCG/2ML 25 mcg, 25 mcg, Intravenous, Q1H PRN, Cherry Early MD    insulin lispro (HumaLOG) 100 units/mL subcutaneous injection 1-5 Units, 1-5 Units, Subcutaneous, TID AC, 1 Units at 01/04/18 1213 **AND** Fingerstick Glucose (POCT), , , TID AC, Pierre Ribera PA-C    insulin lispro (HumaLOG) 100 units/mL subcutaneous injection 1-5 Units, 1-5 Units, Subcutaneous, HS, Pierre Ribera PA-C, 1 Units at 01/03/18 2141    levothyroxine tablet 50 mcg, 50 mcg, Oral, Early Morning, Cherry Early MD, 50 mcg at 01/04/18 0505    metoprolol tartrate (LOPRESSOR) tablet 50 mg, 50 mg, Oral, BID, Cherry Early MD, 50 mg at 01/04/18 0859    multivitamin-minerals (CENTRUM) tablet 1 tablet, 1 tablet, Oral, Daily, Cherry Early MD, 1 tablet at 01/04/18 0859    oxyCODONE (ROXICODONE) IR tablet 2 5 mg, 2 5 mg, Oral, Q4H PRN, Cherry Early MD    oxyCODONE (ROXICODONE) IR tablet 5 mg, 5 mg, Oral, Q4H PRN, Cherry Early MD, 5 mg at 01/03/18 1746    pantoprazole (PROTONIX) EC tablet 40 mg, 40 mg, Oral, Early Morning, Cherry Early MD, 40 mg at 01/04/18 0505    polyethylene glycol (MIRALAX) packet 17 g, 17 g, Oral, Daily PRN, Cherry Early MD    potassium chloride (K-DUR,KLOR-CON) CR tablet 20 mEq, 20 mEq, Oral, Daily, Lyle Hsu MD, 20 mEq at 01/04/18 0859    promethazine (PHENERGAN) tablet 25 mg, 25 mg, Oral, Daily, Lyle Hsu MD, 25 mg at 01/04/18 0900    senna (SENOKOT) tablet 8 6 mg, 1 tablet, Oral, HS, Lyle Hsu MD    sodium chloride 0 9 % bolus 500 mL, 500 mL, Intravenous, Once, Luis Griffin, DO    traMADol Charli Brow) tablet 50 mg, 50 mg, Oral, Q6H PRN, Elizabeth Alvarado PA-C, 50 mg at 01/03/18 1126    Laboratory Results:    Results from last 7 days  Lab Units 01/04/18  0540 01/03/18  2112 01/03/18  0559 01/02/18  0005   WBC Thousand/uL 16 73*  --  13 07* 15 91*   HEMOGLOBIN g/dL 9 0* 10 5* 7 1* 13 0   HEMATOCRIT % 26 4* 31 0* 22 2* 40 0   PLATELETS Thousands/uL 181  --  231 362   SODIUM mmol/L 127*  --  137 135*   POTASSIUM mmol/L 3 7  --  4 6 4 3   CHLORIDE mmol/L 90*  --  102 99*   CO2 mmol/L 31  --  25 30   BUN mg/dL 31*  --  32* 18   CREATININE mg/dL 1 27  --  1 69* 1 10   CALCIUM mg/dL 9 1  --  8 7 10 1   ALBUMIN g/dL  --   --   --  3 6   TOTAL PROTEIN g/dL  --   --   --  7 8   GLUCOSE RANDOM mg/dL 155*  --  143* 151*

## 2018-01-04 NOTE — PROGRESS NOTES
Pt found with blood on self  Pt removed IV site from L wrist  Pt does not remember removing IV and states that she was trying to put her blanket back on  Vital signs are stable  Pt is oriented to person and place  Pt could recall her birthday and what month we are currently in  Pt could not recall the current year  Will continue to monitor

## 2018-01-04 NOTE — PLAN OF CARE
Problem: OCCUPATIONAL THERAPY ADULT  Goal: Performs self-care activities at highest level of function for planned discharge setting  See evaluation for individualized goals  Treatment Interventions: ADL retraining, Functional transfer training, UE strengthening/ROM, Endurance training, Cognitive reorientation, Patient/family training, Equipment evaluation/education, Compensatory technique education, Energy conservation          See flowsheet documentation for full assessment, interventions and recommendations  Outcome: Progressing  Limitation: Decreased ADL status, Decreased UE ROM, Decreased UE strength, Decreased endurance, Decreased self-care trans, Decreased high-level ADLs  Prognosis: Fair  Assessment: Pt is a 80 y o  female seen for OT evaluation s/p admit to [facility] on 1/1/2018 w/ Closed displaced intertrochanteric fracture of right femur ([address] )  Personal factors affecting pt at time of IE include:difficulty performing ADLS, difficulty performing IADLS  and health management   Prior to admission, pt was fully indep with ADL fxn and IADL fxn utilizing RW for fxnl mob  Pt reporting unsafe behaviors however, stating she pulls on headboard and RW to assist with xfers  Maged Tang Upon evaluation: Pt requires TA (max A x 2) for xfers and ADL fxn 2* the following deficits impacting occupational performance: weakness, decreased ROM, decreased strength, decreased balance, decreased tolerance, increased pain and orthopedic restrictions  Pt to benefit from continued skilled OT tx while in the hospital to address deficits as defined above and maximize level of functional independence w ADL's and functional mobility  Occupational Performance areas to address include: grooming, bathing/shower, toilet hygiene, dressing, health maintenance, functional mobility, community mobility, clothing management, meal prep and household maintenance   From OT standpoint, recommendation at time of d/c would be appropriate for rehab stay when medically stable and appropriate          OT Discharge Recommendation: Short Term Rehab

## 2018-01-04 NOTE — ASSESSMENT & PLAN NOTE
· Hemoglobin 9 0 and 26 4  ·  Repeat H&H    · Primary team has ordered transfusion of 2 units  · Careful fluid management given her CHF    · Cards/nephro on board will monitor   · Lasix dcd

## 2018-01-04 NOTE — PLAN OF CARE
Problem: PHYSICAL THERAPY ADULT  Goal: Performs mobility at highest level of function for planned discharge setting  See evaluation for individualized goals  Treatment/Interventions: Functional transfer training, LE strengthening/ROM, Therapeutic exercise, Endurance training, Patient/family training, Equipment eval/education, Bed mobility  Equipment Recommended: Wheelchair, Nelida Jean       See flowsheet documentation for full assessment, interventions and recommendations  Outcome: Progressing  Prognosis: Fair  Problem List: Decreased strength, Decreased range of motion, Decreased endurance, Impaired balance, Decreased mobility, Pain  Assessment: Pt participated in skilled PT session with focus on transfers  Pt was total A for sup>sit, unable to assist with transfer  Once sitting EOB pt required max A from therapist to maintain sitting balance; after a few minutes pt was able to hold herself up using bedrail and bed for support and CG from therapist  Attempted sit>stand 2x using RW and max A x2 but pt unable to fully stand upright, LE's leaning back against bed for support and unable to weight shift to initiate SPT  Performed slideboard transfer to w/c max A X2 with little participation from pt  Also performed 2x partial sit>stands in order to reposition pad and reposition pt in w/c  Pt's functional mobility limited by pain, dec LE strength/ROM, dec endurance, dec balance ,poor ability to weight shift, dec WB-ing through RLE  Pt will cont to benefit from skilled PT to continue to address above impairments and progress functional transfers  Barriers to Discharge: Decreased caregiver support     Recommendation: Short-term skilled PT     PT - OK to Discharge: Yes (to rehab)    See flowsheet documentation for full assessment

## 2018-01-04 NOTE — PROGRESS NOTES
Orthopedics   Rod Gathers Day 80 y o  female MRN: [id number]  Unit/Bed#: -01      Subjective:  80 y  o female post operative day 2 right femoral intramedullary nail  No numbness/tinglin RLE  No cp/sob      Labs:    0  Lab Value Date/Time   HCT 31 0 (L) 01/03/2018 2112   HCT 22 2 (L) 01/03/2018 0559   HCT 40 0 01/02/2018 0005   HCT 36 8 12/01/2015 0915   HCT 38 6 07/09/2015 1516   HCT 35 1 02/25/2015 1841   HGB 10 5 (L) 01/03/2018 2112   HGB 7 1 (L) 01/03/2018 0559   HGB 13 0 01/02/2018 0005   HGB 11 7 12/01/2015 0915   HGB 12 0 07/09/2015 1516   HGB 10 5 (L) 02/25/2015 1841   INR 1 04 01/02/2018 0319   INR 1 10 02/20/2015 0618   WBC 13 07 (H) 01/03/2018 0559   WBC 15 91 (H) 01/02/2018 0005   WBC 12 83 (H) 07/18/2017 1456   WBC 10 63 (H) 12/01/2015 0915   WBC 14 31 (H) 07/09/2015 1516   WBC 9 22 02/25/2015 1841   ESR 33 (H) 12/22/2016 1432   CRP 9 5 (H) 12/22/2016 1432       Meds:    Current Facility-Administered Medications:     acetaminophen (TYLENOL) tablet 650 mg, 650 mg, Oral, Q6H PRN, Ger Cedillo MD    acetaminophen (TYLENOL) tablet 650 mg, 650 mg, Oral, Q6H [facility], Ger Cedillo MD, 650 mg at 01/04/18 0505    ascorbic acid (VITAMIN C) tablet 500 mg, 500 mg, Oral, Daily, Ger Cedillo MD, 500 mg at 01/03/18 0820    aspirin chewable tablet 81 mg, 81 mg, Oral, Daily, Ger Cedillo MD, 81 mg at 01/03/18 0820    atorvastatin (LIPITOR) tablet 20 mg, 20 mg, Oral, HS, Ger Cedillo MD, 20 mg at 01/03/18 2139    bisacodyl (DULCOLAX) EC tablet 5 mg, 5 mg, Oral, Once, Ger Cedillo MD    calcium carbonate (OYSTER SHELL,OSCAL) 500 mg tablet 1 tablet, 1 tablet, Oral, BID With Meals, Ger Cedillo MD, 1 tablet at 01/03/18 1746    enoxaparin (LOVENOX) subcutaneous injection 40 mg, 40 mg, Subcutaneous, Daily, [facility], PA-, 40 mg at 01/03/18 9981    fentanyl citrate (PF) 100 MCG/2ML 25 mcg, 25 mcg, Intravenous, Q1H PRN, Ger Cedillo MD    furosemide (LASIX) tablet 40 mg, 40 mg, Oral, Daily, Raul Montemayor MD, 40 mg at 01/03/18 0820    insulin lispro (HumaLOG) 100 units/mL subcutaneous injection 1-5 Units, 1-5 Units, Subcutaneous, TID AC **AND** Fingerstick Glucose (POCT), , , TID AC, King Robin PA-C    insulin lispro (HumaLOG) 100 units/mL subcutaneous injection 1-5 Units, 1-5 Units, Subcutaneous, HS, King Robin PA-C, 1 Units at 01/03/18 2141    levothyroxine tablet 50 mcg, 50 mcg, Oral, Early Morning, Raul Montemayor MD, 50 mcg at 01/04/18 0505    metoprolol tartrate (LOPRESSOR) tablet 50 mg, 50 mg, Oral, BID, Raul Montemayor MD, 50 mg at 01/03/18 1746    multivitamin-minerals (CENTRUM) tablet 1 tablet, 1 tablet, Oral, Daily, Raul Montemayor MD, 1 tablet at 01/03/18 0820    oxyCODONE (ROXICODONE) IR tablet 2 5 mg, 2 5 mg, Oral, Q4H PRN, Raul Montemayor MD    oxyCODONE (ROXICODONE) IR tablet 5 mg, 5 mg, Oral, Q4H PRN, Raul Montemayor MD, 5 mg at 01/03/18 1746    pantoprazole (PROTONIX) EC tablet 40 mg, 40 mg, Oral, Early Morning, Raul Montemayor MD, 40 mg at 01/04/18 0505    potassium chloride (K-DUR,KLOR-CON) CR tablet 20 mEq, 20 mEq, Oral, Daily, Raul Montemayor MD, 20 mEq at 01/03/18 0820    promethazine (PHENERGAN) tablet 25 mg, 25 mg, Oral, Daily, Raul Montemayor MD, 25 mg at 01/03/18 7196    traMADol (ULTRAM) tablet 50 mg, 50 mg, Oral, Q6H PRN, Aziza Avila PA-C, 50 mg at 01/03/18 1126    Blood Culture:   No results found for: BLOODCX    Wound Culture:   No results found for: WOUNDCULT    Ins and Outs:  I/O last 24 hours: In: 8565 [P O :600;  I V :500; Blood:1350]  Out: 7824 [Urine:4595]          Physical exam:  Vitals:    01/04/18 0356   BP: 162/72   Pulse: 94   Resp: 20   Temp: 98 °F (36 7 °C)   SpO2: 96%     right lower extremity  · Dressings clean and dry  · SILT s/s/sp/dp/t  · Motor intact to ankle df/pf, EHL/FHL  · Toes x5 warm and pink  · Actively firing quad muscle    _*_*_*_*_*_*_*_*_*_*_*_*_*_*_*_*_*_*_*_*_*_*_*_*_*_*_*_*_*_*_*_*_*_*_*_*_*_*_*_*_*    Assessment: 80 y  o female post operative day 2 right femur IMN   Pt doing well    Plan:  · Up and out of bed  · Nephrology recs for MAYELIN mgmt  · Weightbearing as tolerated RLE  · PT/OT  · DVT prophylaxis  · Analgesics  · Patient noted to have acute blood loss anemia due to a drop in Hbg of > 2 0g from preop levels, will monitor vital signs and resuscitate with IV fluids as needed    Radha Aguayo MD

## 2018-01-04 NOTE — PHYSICAL THERAPY NOTE
PHYSICAL THERAPY NOTE          Patient Name: Jose Luis Hutton  CXOAJ'I Date: 1/4/2018 01/04/18 0945   Pain Assessment   Pain Assessment 0-10   Pain Score 7   Pain Type Acute pain;Surgical pain   Pain Location Hip   Pain Orientation Right   Hospital Pain Intervention(s) Medication (See MAR); Cold applied   Response to Interventions pt given ice pack @ end of session   Restrictions/Precautions   RLE Weight Bearing Per Order WBAT   Other Precautions Fall Risk  (pinon)   General   Chart Reviewed Yes   Response to Previous Treatment Patient with no complaints from previous session  Cognition   Arousal/Participation Cooperative   Subjective   Subjective Pt reports she had a rough night last night but is feeling better today    Bed Mobility   Supine to Sit 1  Dependent   Additional items Verbal cues;LE management; Bedrails;HOB elevated;Assist x 2   Transfers   Sit to Stand 2  Maximal assistance   Additional items Assist x 2   Stand to Sit 2  Maximal assistance   Additional items Assist x 2   Sliding Board transfer 2  Maximal assistance   Additional items Assist x 2   Additional Comments pt was total A for sit>stand transfer and was unable to assume fully upright position, LE's leaning back against bed  Pt able to perform partial sit>stand with arms around therapist in order to reposition pad, max A  Balance   Static Sitting Fair   Dynamic Sitting Fair -   Static Standing Poor +   Dynamic Standing Poor   Activity Tolerance   Activity Tolerance Patient limited by fatigue;Patient limited by pain   Nurse Made Aware RN aware    Assessment   Prognosis Fair   Problem List Decreased strength;Decreased range of motion;Decreased endurance; Impaired balance;Decreased mobility;Pain   Assessment Pt participated in skilled PT session with focus on transfers  Pt was total A for sup>sit, unable to assist with transfer   Once sitting EOB pt required max A from therapist to maintain sitting balance; after a few minutes pt was able to hold herself up using bedrail and bed for support and CG from therapist  Attempted sit>stand 2x using RW and max A x2 but pt unable to fully stand upright, LE's leaning back against bed for support and unable to weight shift to initiate SPT  Performed slideboard transfer to w/c max A X2 with little participation from pt  Also performed 2x partial sit>stands in order to reposition pad and reposition pt in w/c  Pt's functional mobility limited by pain, dec LE strength/ROM, dec endurance, dec balance ,poor ability to weight shift, dec WB-ing through RLE  Pt will cont to benefit from skilled PT to continue to address above impairments and progress functional transfers  Barriers to Discharge Decreased caregiver support   Plan   Treatment/Interventions Functional transfer training;LE strengthening/ROM; Therapeutic exercise; Endurance training;Patient/family training;Equipment eval/education; Bed mobility   Progress Slow progress, decreased activity tolerance   PT Frequency 5x/wk   Recommendation   Recommendation Short-term skilled PT   Equipment Recommended Wheelchair;Walker   PT - OK to Discharge Yes  (to rehab)

## 2018-01-05 ENCOUNTER — ALLSCRIPTS OFFICE VISIT (OUTPATIENT)
Dept: OTHER | Facility: OTHER | Age: 83
End: 2018-01-05

## 2018-01-05 ENCOUNTER — GENERIC CONVERSION - ENCOUNTER (OUTPATIENT)
Dept: OTHER | Facility: OTHER | Age: 83
End: 2018-01-05

## 2018-01-05 VITALS
BODY MASS INDEX: 33.49 KG/M2 | HEART RATE: 80 BPM | DIASTOLIC BLOOD PRESSURE: 58 MMHG | WEIGHT: 182 LBS | SYSTOLIC BLOOD PRESSURE: 126 MMHG | TEMPERATURE: 98.1 F | OXYGEN SATURATION: 93 % | HEIGHT: 62 IN | RESPIRATION RATE: 20 BRPM

## 2018-01-05 LAB
ANION GAP SERPL CALCULATED.3IONS-SCNC: 7 MMOL/L (ref 4–13)
BASOPHILS # BLD AUTO: 0.07 THOUSANDS/ΜL (ref 0–0.1)
BASOPHILS NFR BLD AUTO: 0 % (ref 0–1)
BUN SERPL-MCNC: 24 MG/DL (ref 5–25)
CALCIUM SERPL-MCNC: 9.1 MG/DL (ref 8.3–10.1)
CHLORIDE SERPL-SCNC: 96 MMOL/L (ref 100–108)
CO2 SERPL-SCNC: 28 MMOL/L (ref 21–32)
CREAT SERPL-MCNC: 0.91 MG/DL (ref 0.6–1.3)
EOSINOPHIL # BLD AUTO: 0.29 THOUSAND/ΜL (ref 0–0.61)
EOSINOPHIL NFR BLD AUTO: 2 % (ref 0–6)
ERYTHROCYTE [DISTWIDTH] IN BLOOD BY AUTOMATED COUNT: 14.1 % (ref 11.6–15.1)
GFR SERPL CREATININE-BSD FRML MDRD: 59 ML/MIN/1.73SQ M
GLUCOSE SERPL-MCNC: 120 MG/DL (ref 65–140)
GLUCOSE SERPL-MCNC: 125 MG/DL (ref 65–140)
GLUCOSE SERPL-MCNC: 130 MG/DL (ref 65–140)
HCT VFR BLD AUTO: 25.6 % (ref 34.8–46.1)
HGB BLD-MCNC: 8.6 G/DL (ref 11.5–15.4)
LYMPHOCYTES # BLD AUTO: 1.74 THOUSANDS/ΜL (ref 0.6–4.47)
LYMPHOCYTES NFR BLD AUTO: 11 % (ref 14–44)
MCH RBC QN AUTO: 29.9 PG (ref 26.8–34.3)
MCHC RBC AUTO-ENTMCNC: 33.6 G/DL (ref 31.4–37.4)
MCV RBC AUTO: 89 FL (ref 82–98)
MONOCYTES # BLD AUTO: 1.66 THOUSAND/ΜL (ref 0.17–1.22)
MONOCYTES NFR BLD AUTO: 10 % (ref 4–12)
NEUTROPHILS # BLD AUTO: 12.17 THOUSANDS/ΜL (ref 1.85–7.62)
NEUTS SEG NFR BLD AUTO: 77 % (ref 43–75)
NRBC BLD AUTO-RTO: 1 /100 WBCS
PLATELET # BLD AUTO: 219 THOUSANDS/UL (ref 149–390)
PMV BLD AUTO: 10.6 FL (ref 8.9–12.7)
POTASSIUM SERPL-SCNC: 4 MMOL/L (ref 3.5–5.3)
RBC # BLD AUTO: 2.88 MILLION/UL (ref 3.81–5.12)
SODIUM SERPL-SCNC: 131 MMOL/L (ref 136–145)
WBC # BLD AUTO: 16.17 THOUSAND/UL (ref 4.31–10.16)

## 2018-01-05 PROCEDURE — 97116 GAIT TRAINING THERAPY: CPT

## 2018-01-05 PROCEDURE — 85025 COMPLETE CBC W/AUTO DIFF WBC: CPT | Performed by: PHYSICIAN ASSISTANT

## 2018-01-05 PROCEDURE — 82948 REAGENT STRIP/BLOOD GLUCOSE: CPT

## 2018-01-05 PROCEDURE — 97530 THERAPEUTIC ACTIVITIES: CPT

## 2018-01-05 PROCEDURE — 80048 BASIC METABOLIC PNL TOTAL CA: CPT | Performed by: PHYSICIAN ASSISTANT

## 2018-01-05 PROCEDURE — 97535 SELF CARE MNGMENT TRAINING: CPT

## 2018-01-05 PROCEDURE — 97112 NEUROMUSCULAR REEDUCATION: CPT

## 2018-01-05 RX ORDER — FUROSEMIDE 40 MG/1
40 TABLET ORAL DAILY
Refills: 0
Start: 2018-01-08 | End: 2021-03-01

## 2018-01-05 RX ORDER — FUROSEMIDE 40 MG/1
40 TABLET ORAL DAILY
Refills: 0 | Status: CANCELLED
Start: 2018-01-08

## 2018-01-05 RX ORDER — KETOTIFEN FUMARATE 0.35 MG/ML
1 SOLUTION/ DROPS OPHTHALMIC 2 TIMES DAILY PRN
Status: DISCONTINUED | OUTPATIENT
Start: 2018-01-05 | End: 2018-01-05 | Stop reason: HOSPADM

## 2018-01-05 RX ADMIN — Medication 1 TABLET: at 09:05

## 2018-01-05 RX ADMIN — POTASSIUM CHLORIDE 20 MEQ: 1500 TABLET, EXTENDED RELEASE ORAL at 09:05

## 2018-01-05 RX ADMIN — PANTOPRAZOLE SODIUM 40 MG: 40 TABLET, DELAYED RELEASE ORAL at 05:19

## 2018-01-05 RX ADMIN — LEVOTHYROXINE SODIUM 50 MCG: 50 TABLET ORAL at 05:18

## 2018-01-05 RX ADMIN — ASPIRIN 81 MG 81 MG: 81 TABLET ORAL at 09:05

## 2018-01-05 RX ADMIN — TRAMADOL HYDROCHLORIDE 50 MG: 50 TABLET, FILM COATED ORAL at 05:19

## 2018-01-05 RX ADMIN — METOPROLOL TARTRATE 50 MG: 50 TABLET ORAL at 09:05

## 2018-01-05 RX ADMIN — KETOTIFEN FUMARATE 1 DROP: 0.35 SOLUTION/ DROPS OPHTHALMIC at 15:00

## 2018-01-05 RX ADMIN — ACETAMINOPHEN 650 MG: 325 TABLET, FILM COATED ORAL at 11:47

## 2018-01-05 RX ADMIN — ACETAMINOPHEN 650 MG: 325 TABLET, FILM COATED ORAL at 05:18

## 2018-01-05 RX ADMIN — PROMETHAZINE HYDROCHLORIDE 25 MG: 25 TABLET ORAL at 11:47

## 2018-01-05 RX ADMIN — ENOXAPARIN SODIUM 40 MG: 40 INJECTION SUBCUTANEOUS at 09:04

## 2018-01-05 RX ADMIN — OXYCODONE HYDROCHLORIDE AND ACETAMINOPHEN 500 MG: 500 TABLET ORAL at 09:05

## 2018-01-05 NOTE — OCCUPATIONAL THERAPY NOTE
[address] Progress Note     Patient Name: Teresa Soriano Day  SSOXP'S Date: 1/5/2018  Problem List  Patient Active Problem List   Diagnosis    Nonrheumatic aortic valve stenosis    Coronary artery disease involving native coronary artery    Complete atrioventricular block (HCC)    S/P TAVR (transcatheter aortic valve replacement)    Age-related cognitive decline    Frequent falls    Ambulatory dysfunction    Vision impairment    Constipation    Incontinence in female    [facility]osed displaced [facility]ter[facility] [facility]    [facility]    History of cardiac pacemaker    MAYELIN [facility]    Acute blood loss as cause of postoperative anemia    Chronic diastolic congestive heart failure ([facility] )    Hyperlipidemia    Hypothyroidism    Encephalopathy        01/05/18 1050   Restrictions/Precautions   RLE Weight Bearing Per Order WBAT   Other Precautions Fall Risk;Bed Alarm   Lifestyle   Reciprocal Relationships Pt's dtr at bedside for OT tx today   Pain Assessment   Pain Assessment 0-10   Pain Score 5   Pain Type Acute pain   Pain Location Hip   Pain Orientation Right   Hospital Pain Intervention(s) Medication (See MAR); Repositioned; Rest   ADL   Where Assessed Supine, bed   Toileting Assistance  1  Total Assistance   Toileting Deficit Use of bedpan/urinal setup   Toileting Comments Pt utilizing bed pan this am; Pt req Max A to roll and A to place/remove bed pan and TA for hygiene  Functional Standing Tolerance   Comments Pt completed in stance wt shifitng L<>R to inc WB tolerance for R LE in stance to improve xfer and ADL status; pt req A x 2 to maintain position in stance with dec standing tolerance noted  pt displays fearfulness and apprehension with all xfers req A to manage LEs for positioning      Transfers   Sliding Board transfer 3  Moderate assistance   Additional items Assist x 2   Additional Comments Pt completed slide board xfer with A x 2 to position and assist  Pt req step by step cues to sequence xfer, Mod A x 1 and CG A of another to manage equipment and provide additional steadying      Assessment   Assessment Pt positioned in w/c at end of tx session; OT edu pt on wt shifting to dec risk for skin breakdown   Plan   Treatment Day 2   OT Frequency 3-5x/wk   Recommendation   OT Discharge Recommendation Short Term Rehab   Barthel Index   Feeding 10   Bathing 0   Grooming Score 5   Dressing Score 5   Bladder Score 5   Bowels Score 10   Toilet Use Score 0   Transfers (Bed/Chair) Score 5   Mobility (Level Surface) Score 0   Stairs Score 0   Barthel Index Score 40   Modified Bhavesh Scale   Modified Shepherd Scale 4

## 2018-01-05 NOTE — PROGRESS NOTES
NEPHROLOGY PROGRESS NOTE   Deonna Mary Day 80 y o  female MRN: [id number]  Unit/Bed#: -01 Encounter: [id number]  Reason for Consult: MAYELIN    Assessment/Plan:  1  Acute kidney injury, likely secondary to ATN, hypotension noted intraoperatively, possible component of acute anemia  2  Hyponatremia, possible component of volume   3  Transient oliguria, improving  4  Acute anemia, postoperatively, anticipated 2 units PRBC  5  Status post right femoral intramedullary nail  6  Status post fall  7  Diabetes     · Overall her renal function has improved, now back within her baseline  · Hyponatremia has improved with saline, given her history of congestive heart failure, avoid further IV fluids, will also hold her Lasix for the next 24-48 hours, when her sodium level normalizes will resume her home diuretic dosing  · Continue with fluid restriction  · If patient is discharged, repeat BMP on Monday, again of sodium levels have normalized, resume diuretic therapy     SUBJECTIVE:  Seen and examined  Patient more awake and alert  Daughter at bedside  Currently eating lunch      OBJECTIVE:  Current Weight: Weight - Scale: 82 6 kg (182 lb)  Vitals:    01/04/18 1600 01/04/18 1829 01/04/18 2300 01/05/18 0700   BP:  129/58 137/63 126/58   Pulse:  94 84 80   Resp:   18 20   Temp:   97 9 °F (36 6 °C) 98 1 °F (36 7 °C)   TempSrc:   Oral Oral   SpO2: 96%  94% 93%   Weight:       Height:           Intake/Output Summary (Last 24 hours) at 01/05/18 1325  Last data filed at 01/05/18 1300   Gross per 24 hour   Intake             1400 ml   Output             2525 ml   Net            -1125 ml     GENERAL :  No apparent distress, appears comfortable  NECK:  Supple  CV:  Regular  RESP:  Few rales left base  ABD:  Soft  EXT:  Trace edema  Psych:  Appropriate  SKIN:  No rash    Medications:    Current Facility-Administered Medications:     acetaminophen (TYLENOL) tablet 650 mg, 650 mg, Oral, Q6H PRN, Aide Fletcher MD    acetaminophen (TYLENOL) tablet 650 mg, 650 mg, Oral, Q6H [address], Robert Lee MD, 650 mg at 01/05/18 1147    ascorbic acid (VITAMIN C) tablet 500 mg, 500 mg, Oral, Daily, Robert Lee MD, 500 mg at 01/05/18 0905    aspirin chewable tablet 81 mg, 81 mg, Oral, Daily, Robert Lee MD, 81 mg at 01/05/18 2456    atorvastatin (LIPITOR) tablet 20 mg, 20 mg, Oral, HS, Robert Lee MD, 20 mg at 01/04/18 2116    bisacodyl (DULCOLAX) EC tablet 5 mg, 5 mg, Oral, Once, Robert Lee MD    calcium carbonate (OYSTER SHELL,OSCAL) 500 mg tablet 1 tablet, 1 tablet, Oral, BID With Meals, Robert Lee MD, 1 tablet at 01/05/18 0905    enoxaparin (LOVENOX) subcutaneous injection 40 mg, 40 mg, Subcutaneous, Daily, Janet Doss PA-C, 40 mg at 01/05/18 1815    fentanyl citrate (PF) 100 MCG/2ML 25 mcg, 25 mcg, Intravenous, Q1H PRN, Robert Lee MD    insulin lispro (HumaLOG) 100 units/mL subcutaneous injection 1-5 Units, 1-5 Units, Subcutaneous, TID AC, 1 Units at 01/04/18 1213 **AND** Fingerstick Glucose (POCT), , , TID AC, Tevin Sullivan PA-C    insulin lispro (HumaLOG) 100 units/mL subcutaneous injection 1-5 Units, 1-5 Units, Subcutaneous, HS, Tevin Sullivan PA-C, 1 Units at 01/03/18 2141    ketotifen (ZADITOR) 0 025 % ophthalmic solution 1 drop, 1 drop, Both Eyes, BID PRN, Oswaldo Gil PA-C    levothyroxine tablet 50 mcg, 50 mcg, Oral, Early Morning, Robert Lee MD, 50 mcg at 01/05/18 0518    metoprolol tartrate (LOPRESSOR) tablet 50 mg, 50 mg, Oral, BID, Robert Lee MD, 50 mg at 01/05/18 0764    multivitamin-minerals (CENTRUM) tablet 1 tablet, 1 tablet, Oral, Daily, Robert Lee MD, 1 tablet at 01/05/18 0905    oxyCODONE (ROXICODONE) IR tablet 2 5 mg, 2 5 mg, Oral, Q4H PRN, Robert Lee MD    oxyCODONE (ROXICODONE) IR tablet 5 mg, 5 mg, Oral, Q4H PRN, Jones MD Jesus, 5 mg at 01/03/18 1746    pantoprazole (PROTONIX) EC tablet 40 mg, 40 mg, Oral, Early Morning, Cherry Early MD, 40 mg at 01/05/18 3838    polyethylene glycol (MIRALAX) packet 17 g, 17 g, Oral, Daily PRN, Cherry Early MD, 17 g at 01/04/18 1253    potassium chloride (K-DUR,KLOR-CON) CR tablet 20 mEq, 20 mEq, Oral, Daily, Cherry Early MD, 20 mEq at 01/05/18 0297    promethazine (PHENERGAN) tablet 25 mg, 25 mg, Oral, Daily, Cherry Early MD, 25 mg at 01/05/18 1147    senna (SENOKOT) tablet 8 6 mg, 1 tablet, Oral, HS, Cherry Early MD, 8 6 mg at 01/04/18 2116    traMADol (ULTRAM) tablet 50 mg, 50 mg, Oral, Q6H PRN, Laz Gresham PA-C, 50 mg at 01/05/18 3822    Laboratory Results:    Results from last 7 days  Lab Units 01/05/18  0537 01/04/18  1754 01/04/18  0540 01/03/18  2112 01/03/18  0559 01/02/18  0005   WBC Thousand/uL 16 17*  --  16 73*  --  13 07* 15 91*   HEMOGLOBIN g/dL 8 6*  --  9 0* 10 5* 7 1* 13 0   HEMATOCRIT % 25 6*  --  26 4* 31 0* 22 2* 40 0   PLATELETS Thousands/uL 219  --  181  --  231 362   SODIUM mmol/L 131* 129* 127*  --  137 135*   POTASSIUM mmol/L 4 0 4 1 3 7  --  4 6 4 3   CHLORIDE mmol/L 96* 93* 90*  --  102 99*   CO2 mmol/L 28 30 31  --  25 30   BUN mg/dL 24 27* 31*  --  32* 18   CREATININE mg/dL 0 91 1 09 1 27  --  1 69* 1 10   CALCIUM mg/dL 9 1 9 2 9 1  --  8 7 10 1   ALBUMIN g/dL  --   --   --   --   --  3 6   TOTAL PROTEIN g/dL  --   --   --   --   --  7 8   GLUCOSE RANDOM mg/dL 125 158* 155*  --  143* 151*

## 2018-01-05 NOTE — PROGRESS NOTES
Orthopedics   Tyrell Elaly Day 80 y o  female MRN: [id number]  Unit/Bed#: -01      Subjective:  80 y  o female post operative day 3 right femoral intramedullary nail  No acute events overnight  Pain controlled  Denies numbness or tingling   Encephalopathy improved   Labs:    0  Lab Value Date/Time   HCT 25 6 (L) 01/05/2018 0537   HCT 26 4 (L) 01/04/2018 0540   HCT 31 0 (L) 01/03/2018 2112   HCT 36 8 12/01/2015 0915   HCT 38 6 07/09/2015 1516   HCT 35 1 02/25/2015 1841   HGB 8 6 (L) 01/05/2018 0537   HGB 9 0 (L) 01/04/2018 0540   HGB 10 5 (L) 01/03/2018 2112   HGB 11 7 12/01/2015 0915   HGB 12 0 07/09/2015 1516   HGB 10 5 (L) 02/25/2015 1841   INR 1 04 01/02/2018 0319   INR 1 10 02/20/2015 0618   WBC 16 17 (H) 01/05/2018 0537   WBC 16 73 (H) 01/04/2018 0540   WBC 13 07 (H) 01/03/2018 0559   WBC 10 63 (H) 12/01/2015 0915   WBC 14 31 (H) 07/09/2015 1516   WBC 9 22 02/25/2015 1841   ESR 33 (H) 12/22/2016 1432   CRP 9 5 (H) 12/22/2016 1432       Meds:    Current Facility-Administered Medications:     acetaminophen (TYLENOL) tablet 650 mg, 650 mg, Oral, Q6H PRN, Sherine Martin MD    acetaminophen (TYLENOL) tablet 650 mg, 650 mg, Oral, Q6H [address], Sherine Martin MD, 650 mg at 01/05/18 0518    ascorbic acid (VITAMIN C) tablet 500 mg, 500 mg, Oral, Daily, Sherine Martin MD, 500 mg at 01/04/18 7303    aspirin chewable tablet 81 mg, 81 mg, Oral, Daily, Sherine Martin MD, 81 mg at 01/04/18 0859    atorvastatin (LIPITOR) tablet 20 mg, 20 mg, Oral, HS, Sherine Martin MD, 20 mg at 01/04/18 2116    bisacodyl (DULCOLAX) EC tablet 5 mg, 5 mg, Oral, Once, Sherine Martin MD    calcium carbonate (OYSTER SHELL,OSCAL) 500 mg tablet 1 tablet, 1 tablet, Oral, BID With Meals, Sherine Martin MD, 1 tablet at 01/04/18 1830    enoxaparin (LOVENOX) subcutaneous injection 40 mg, 40 mg, Subcutaneous, Daily, [facility], PAFREDI, 40 mg at 01/04/18 0859    fentanyl citrate (PF) 100 MCG/2ML 25 mcg, 25 mcg, Intravenous, Q1H PRN, Deven Boo MD    insulin lispro (HumaLOG) 100 units/mL subcutaneous injection 1-5 Units, 1-5 Units, Subcutaneous, TID AC, 1 Units at 01/04/18 1213 **AND** Fingerstick Glucose (POCT), , , TID AC, Viet Weeks PA-C    insulin lispro (HumaLOG) 100 units/mL subcutaneous injection 1-5 Units, 1-5 Units, Subcutaneous, HS, Viet Weeks PA-C, 1 Units at 01/03/18 2141    levothyroxine tablet 50 mcg, 50 mcg, Oral, Early Morning, Deven Boo MD, 50 mcg at 01/05/18 0518    metoprolol tartrate (LOPRESSOR) tablet 50 mg, 50 mg, Oral, BID, Deven Boo MD, 50 mg at 01/04/18 1830    multivitamin-minerals (CENTRUM) tablet 1 tablet, 1 tablet, Oral, Daily, Deven Boo MD, 1 tablet at 01/04/18 0859    oxyCODONE (ROXICODONE) IR tablet 2 5 mg, 2 5 mg, Oral, Q4H PRN, Deven Boo MD    oxyCODONE (ROXICODONE) IR tablet 5 mg, 5 mg, Oral, Q4H PRN, Deven Boo MD, 5 mg at 01/03/18 1746    pantoprazole (PROTONIX) EC tablet 40 mg, 40 mg, Oral, Early Morning, Deven Boo MD, 40 mg at 01/05/18 0519    polyethylene glycol (MIRALAX) packet 17 g, 17 g, Oral, Daily PRN, Deven Boo MD, 17 g at 01/04/18 1253    potassium chloride (K-DUR,KLOR-CON) CR tablet 20 mEq, 20 mEq, Oral, Daily, Deven Boo MD, 20 mEq at 01/04/18 0859    promethazine (PHENERGAN) tablet 25 mg, 25 mg, Oral, Daily, Deven Boo MD, 25 mg at 01/04/18 0900    senna (SENOKOT) tablet 8 6 mg, 1 tablet, Oral, HS, Deven Boo MD, 8 6 mg at 01/04/18 2116    traMADol (ULTRAM) tablet 50 mg, 50 mg, Oral, Q6H PRN, Josué Lowe PA-C, 50 mg at 01/05/18 1272    Blood Culture:   No results found for: BLOODCX    Wound Culture:   No results found for: WOUNDCULT    Ins and Outs:  I/O last 24 hours:   In: 1565 [P O :540; IV Piggyback:500]  Out: 3295 [Urine:3475]          Physical exam:  Vitals:    01/04/18 2300   BP: 137/63   Pulse: 84   Resp: 18   Temp: 97 9 °F (36 6 °C)   SpO2: 94% right lower extremity  · Dressings with mild bloody drainage distal incision, intact   · SILT in all distributions distally   · Motor intact to ankle df/pf, EHL/FHL  · Toes x5 warm and pink  · Actively firing quad muscle    _*_*_*_*_*_*_*_*_*_*_*_*_*_*_*_*_*_*_*_*_*_*_*_*_*_*_*_*_*_*_*_*_*_*_*_*_*_*_*_*_*    Assessment: 80 y  o female post operative day 3 right femur IMN   Pt doing well    Plan:  · Up and out of bed  · Nephrology recs for MAYELIN mgmt  · F/u medicine for clearance to facility   · Weightbearing as tolerated RLE  · PT/OT  · DVT prophylaxis  · Analgesics  · DC planning   · Patient noted to have acute blood loss anemia due to a drop in Hbg of > 2 0g from preop levels, will monitor vital signs and resuscitate with IV fluids as needed    Latrell Moyer MD

## 2018-01-05 NOTE — PHYSICAL THERAPY NOTE
Physical Therapy Progress Note     01/05/18 1051   Pain Assessment   Pain Assessment 0-10   Pain Score 5   Pain Type Acute pain   Pain Location Hip   Pain Orientation Right   Restrictions/Precautions   RLE Weight Bearing Per Order WBAT   Other Precautions Fall Risk;Bed Alarm   General   Chart Reviewed Yes   Response to Previous Treatment Patient with no complaints from previous session  Family/Caregiver Present No   Cognition   Overall Cognitive Status Impaired   Arousal/Participation Cooperative   Orientation Level Oriented X4   Subjective   Subjective reports 5/10 pain (as above); no other c/o   Bed Mobility   Rolling R 3  Moderate assistance   Additional items Assist x 2   Rolling L 3  Moderate assistance   Additional items Assist x 2   Supine to Sit 3  Moderate assistance   Additional items Assist x 2   Sit to Supine 3  Moderate assistance   Additional items Assist x 2   Transfers   Sit to Stand 3  Moderate assistance   Additional items Assist x 1   Stand to Sit 3  Moderate assistance   Additional items Assist x 1   Stand pivot 3  Moderate assistance   Additional items Assist x 1   Sliding Board transfer 3  Moderate assistance   Additional items Assist x 2   Additional Comments slide board xfer with modA x2   Ambulation/Elevation   Gait pattern Not tested   Stair Management Assistance Not tested   Wheelchair Activities   Wheelchair Cushion Standard   Balance   Static Sitting Fair   Dynamic Sitting Fair -   Static Standing Poor +   Dynamic Standing Poor   Endurance Deficit   Endurance Deficit Yes   Endurance Deficit Description difficulty with xfer supine <> sit and fatigued with SBT to w/c from bed; Activity Tolerance   Activity Tolerance Patient limited by fatigue;Patient limited by pain   Nurse Made Aware yes, NSG   Assessment   Prognosis Fair   Problem List Decreased strength;Decreased range of motion;Decreased endurance; Impaired balance;Decreased mobility;Pain   Assessment Pt supine in bed prior to session; able to supine <> Sit and roll L/R/bridge with min/modA x1-2; pt sit EOB with S x5 minutes using B UE and feet on floor; STS x3 with modA x1-2 and pressure on low back and knees to facilitate uprioght posture; pt not attempting erect posture; feeding into slumped/flexed posture and inability to stand; pt slide board xfer, to L side into w/c; discussed positioning in w/c and how to pressure relief; small manual stretch B HS and gastroc; finished session seated in bedside w/c with all needs in reach and alarms active; recommend cont PT POC:    Barriers to Discharge Decreased caregiver support   Goals   Patient Goals none stated   STG Expiration Date 01/10/18   Plan   Treatment/Interventions ADL retraining;Functional transfer training;LE strengthening/ROM; Elevations; Therapeutic exercise; Endurance training;Cognitive reorientation;Patient/family training;Equipment eval/education; Bed mobility;Gait training   Progress Progressing toward goals   PT Frequency 5x/wk   Recommendation   Recommendation Short-term skilled PT   Equipment Recommended Wheelchair;Walker   PT - OK to Discharge Yes  (to short term rehab)     Geraldo Michael, PTA

## 2018-01-05 NOTE — SOCIAL WORK
Per Cathie Wilkerson with [facility], Pt is ready for D/C today  Per Abelardo Ambrose has bed available  Transport scheduled with [facility] at 1900  RN and Pt's daughter aware

## 2018-01-05 NOTE — PLAN OF CARE
Problem: PHYSICAL THERAPY ADULT  Goal: Performs mobility at highest level of function for planned discharge setting  See evaluation for individualized goals  Treatment/Interventions: ADL retraining, Functional transfer training, LE strengthening/ROM, Elevations, Therapeutic exercise, Endurance training, Cognitive reorientation, Patient/family training, Equipment eval/education, Bed mobility, Gait training  Equipment Recommended: Wheelchair, Lulu Parham       See flowsheet documentation for full assessment, interventions and recommendations  Outcome: Progressing  Prognosis: Fair  Problem List: Decreased strength, Decreased range of motion, Decreased endurance, Impaired balance, Decreased mobility, Pain  Assessment: Pt supine in bed prior to session; able to supine <> Sit and roll L/R/bridge with min/modA x1-2; pt sit EOB with S x5 minutes using B UE and feet on floor; STS x3 with modA x1-2 and pressure on low back and knees to facilitate uprioght posture; pt not attempting erect posture; feeding into slumped/flexed posture and inability to stand; pt slide board xfer, to L side into w/c; discussed positioning in w/c and how to pressure relief; small manual stretch B HS and gastroc; finished session seated in bedside w/c with all needs in reach and alarms active; recommend cont PT POC:   Barriers to Discharge: Decreased caregiver support     Recommendation: Short-term skilled PT     PT - OK to Discharge: Yes (to short term rehab)    See flowsheet documentation for full assessment

## 2018-01-05 NOTE — DISCHARGE INSTRUCTIONS
Discharge Instructions - Orthopedics  Deonna Mary Day 80 y o  female MRN: [id number]  Unit/Bed#: PACU 06     Discussed with nephrology, pt should not resume lasix until bmp done on Monday 1/08/2018, bmp should be chkd and sent to dr Davy Cali and or primary care doctor on 1/8/2018     Weight Bearing Status:                                           Weight-bearing as tolerated right lower extremity    DVT prophylaxis:  Complete course of Lovenox as directed    Pain:  Continue analgesics as directed    Dressing Instructions:   Keep dressing clean, dry and intact until follow up appointment  Do not shower until 3 days after the procedure  PT/OT:  Continue PT/OT on outpatient basis as directed    Appt Instructions: If you do not have your appointment, please call the clinic at [phone] to f/u with Dr Leena Laboy in the office in 2 weeks  Contact the office sooner if you experience any increased numbness/tingling in the extremities

## 2018-01-05 NOTE — DISCHARGE SUMMARY
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]  Inferior pubic ramus fracture ([address] ) [S32 599A]  Closed displaced intertrochanteric fracture of right femur, initial encounter ([address] ) [S72 141A]  S/P TAVR (transcatheter aortic valve replacement) [Z95 2]  Closed displaced fracture of right femoral neck ([address] ) [S72 001A]  Coronary artery disease involving native coronary artery [I25 10]  Closed fracture of left superior pubic ramus, initial encounter ([address] ) [S32 512A]  Unspecified multiple injuries, initial encounter [T07  XXXA]    Discharge diagnosis: Complete atrioventricular block ([address] ) [I44 2]  Inferior pubic ramus fracture ([address] ) [S32 599A]  Closed displaced intertrochanteric fracture of right femur, initial encounter ([address] ) [S72 141A]  S/P TAVR (transcatheter aortic valve replacement) [Z95 2]  Closed displaced fracture of right femoral neck ([address] ) [S72 001A]  Coronary artery disease involving native coronary artery [I25 10]  Closed fracture of left superior pubic ramus, initial encounter ([address] ) [S32 512A]  Unspecified multiple injuries, initial encounter [T07  XXXA]    Date of admission: 1/1/2018    Date of discharge: 01/05/18    Procedure: Right long cephalomedullary nail     HPI: 80 y o  female with a history of Fall from standing    Prior to surgery the risks and benefits of surgery were explained and informed consent was obtained  Hospital course: Pt was taken to the OR on 01/01/18  Surgery went without complications and pt was discharged to the PACU in a stable condition and was transferred to the floor  On discharge date pt was cleared by PT and the medicine team and determined to be safe for discharge  Daily discussion was had with the patient, nursing staff, orthopaedic team, and family members if present    All questions were answered to the patients satisifaction  0  Lab Value Date/Time   HGB 8 6 (L) 01/05/2018 0537   HGB 9 0 (L) 01/04/2018 0540   HGB 10 5 (L) 01/03/2018 2112   HGB 7 1 (L) 01/03/2018 0559   HGB 13 0 01/02/2018 0005   HGB 11 9 07/18/2017 1456   HGB 12 7 06/01/2017 1313   HGB 12 3 03/30/2017 0924   HGB 11 9 12/22/2016 1432   HGB 10 9 (L) 10/06/2016 0923   HGB 10 8 (L) 08/24/2016 1157   HGB 10 0 (L) 07/28/2016 0514   HGB 8 5 (L) 07/27/2016 0427   HGB 9 5 (L) 07/26/2016 0936   HGB 10 4 (L) 07/26/2016 0920   HGB 8 8 (L) 07/26/2016 0852   HGB 8 5 (L) 07/26/2016 0832   HGB 10 5 (L) 07/26/2016 0801   HGB 12 6 07/14/2016 0720   HGB 11 9 06/30/2016 1523   HGB 10 1 (L) 06/09/2016 0527   HGB 11 6 06/08/2016 0938   HGB 12 9 05/03/2016 1106   HGB 11 7 12/01/2015 0915   HGB 12 0 07/09/2015 1516   HGB 10 5 (L) 02/25/2015 1841   HGB 11 7 02/21/2015 0424   HGB 10 9 (L) 02/20/2015 0520   HGB 11 4 (L) 02/19/2015 1220   HGB 11 8 07/01/2014 1117   HGB 13 0 02/20/2014 0855       Greater than 2 gram decrease in Hb qualifies for diagnosis of acute blood loss anemia  Vital signs remained stable and pt was resuscitated with IVF as needed  Discharge Instructions:   · Keep dressings clean and dry at all times   · Complete DVT prophylaxis as prescribed   · Physical therapy  · Follow-up as scheduled, otherwise call for appt  Discharge Medications: For the complete list of discharge medications, please refer to the patient's medication reconciliation

## 2018-01-08 ENCOUNTER — GENERIC CONVERSION - ENCOUNTER (OUTPATIENT)
Dept: OTHER | Facility: OTHER | Age: 83
End: 2018-01-08

## 2018-01-10 NOTE — MISCELLANEOUS
Message   Recorded as Task   Date: 02/03/2017 01:56 PM, Created By: Balbir Roberts   Task Name: Care Coordination   Assigned To: Anti Ayesha PALMA,TEAM   Regarding Patient: Patricia Winn, Status: In Progress   Comment:    Blossom Vallecillo - 03 Feb 2017 1:56 PM     TASK CREATED  Caller: Self; Care Coordination; [phone] (Home)  Pt lmom stating that she is still on abt for her eye infection and is cancelling injection scheduled for 2/10  Pt will call when she if free of infection to reschedule appt  Spoke to pt she continues with an eye infection and is on abt eye drops  Pt will call to reschedule once her eye doctor clears her of infection  Brigitte Palma - 03 Feb 2017 2:47 PM     TASK REPLIED TO: Previously Assigned To [facility],Team  Aware  Thanks  Balbir Roberts - 71 Feb 2017 2:53 PM     TASK Nathanael Chopra - 17 Feb 2017 11:25 AM     TASK REACTIVATED   Nella Laguerre - 17 Feb 2017 11:26 AM     TASK REPLIED TO: Previously Assigned To [facility],Team  T/c from patient who states her eye infection has resolved and she would like to schedule her injection  Please contact @ [phone]   [facility] TERM - 17 Feb 2017 12:18 PM     TASK IN PROGRESS   Adebayo Davey - 17 Feb 2017 12:20 PM     TASK REPLIED TO: Previously Assigned To [facility] sched,Team  LMOM for pt to cb to reschedule  If pt has NOT taken her Plavix today, we can schedule for Fri 2/24 in [place] with Dr Lonnie Long  (needs to hold for 7 days)    If pt HAS taken Plavix today, we will need a new anti coag form signed by Dr Logan Aldrich- current hold form only good until 3/1/17  (will need to wait until we receive the signed form back from her dr to schedule)  Adebayo Davey - 17 Feb 2017 2:33 PM     TASK REASSIGNED: Previously Assigned To [facility],Team  Spoke to pt, she did take her Plavix this AM so we cannot add her on 2/24 since she needs to hold Plavix for 7 days   Now the soonest available date we can schedule her for the R L2 & L3 TFESI is 3/3/17 in [place] with Dr Ashok Ceballos  (current hold form expires 3/1/17)    Refaxed anti coag hold request to Dr Salvatore Quevedo at [phone]  Advised pt she will receive a call from our office when we get the new form back signed  LeahEmili encarnacion - 22 Feb 2017 4:38 PM     TASK EDITED  **Received Plavix hold approval from Dr Salvatore Quevedo dated 2/19/17, he has a note on the bottom of form that pt is to stay on ASA 81mg once daily  **    Home # called message said person unavailable please try your call later  Left vm on pt's cell to c/b on Thurs after 8am and s/w the RN at the [facility] office  [facility] c/b # provided  Shalini Sapp - 23 Feb 2017 12:50 PM     TASK EDITED  Calls received on [place] triage line at [phone], 11:18 and 11:56 from pt  and pt 's daughter trying to get rescheduled for TFESI  RN called pt  and daughter and was able to schedule pt  for 3/3 at 9 AM arrival time  Pt  aware that the last day to take Plavix is today 2/23  Pt  is aware that she is only holding the Plavix and that she will continue to take all of her other medications including her ASA  Pt  aware that she will need a , call to reschedule if she becomes ill has a fever or starts antibiotics, wear loose comfortable clothing, may eat and take all medications, except her Plavix which is being held, for up to one prior to opro  Pt  aware that she may be weak in her rt  leg for 6-8 hours post procedure and that she should bring her walker  Pt  verbalized understanding of all information and was appreciative of call  Active Problems    1  Anemia (285 9) (D64 9)   2  Aortic Valve Replacement   3  Arthritis of knee (716 96) (M19 90)   4  Back pain (724 5) (M54 9)   5  Benign essential HTN (401 1) (I10)   6  Bilateral knee pain (719 46) (M25 561,M25 562)   7  Bilateral knee pain (719 46) (M25 561,M25 562)   8  CAD S/P percutaneous coronary angioplasty (414 01,V45 82) (I25 10,Z98 61)   9  Chalazion of right eye (373 2) (H00 13)   10  Chronic knee instability (718 86) (M23 50)   11  Chronic low back pain (724 2,338 29) (M54 5,G89 29)   12  Chronic pain syndrome (338 4) (G89 4)   13  Dependence on nocturnal oxygen therapy (V46 2) (Z99 81)   14  Edema (782 3) (R60 9)   15  Essential thrombocytosis (238 71) (D47 3)   16  GERD (gastroesophageal reflux disease) (530 81) (K21 9)   17  Hip pain (719 45) (M25 559)   18  History of bilateral knee replacement (V43 65) (Z96 653)   19  Hordeolum externum of left lower eyelid (373 11) (H00 015)   20  Hyperlipidemia (272 4) (E78 5)   21  Hypothyroid (244 9) (E03 9)   22  Irritable bowel syndrome (564 1) (K58 9)   23  Leg wound, right (891 0) (S81 801A)   24  Leukocytosis (288 60) (D72 829)   25  Lumbago (724 2) (M54 5)   26  Lumbar disc herniation with radiculopathy (722 10) (M51 16)   27  Lumbar postlaminectomy syndrome (722 83) (M96 1)   28  Macular degeneration (362 50) (H35 30)   29  Meniere disease (386 00) (H81 09)   30  Myofascial pain syndrome (729 1) (M79 1)   31  Neck pain (723 1) (M54 2)   32  Need for DTP vaccine (V06 1) (Z23)   33  Need for immunization against influenza (V04 81) (Z23)   34  Nonrheumatic aortic valve stenosis (424 1) (I35 0)   35  Other specified symptoms and signs involving the circulatory and respiratory systems    (785 9,786 9) (R09 89)   36  Postop check (V67 00) (Z09)   37  Presence of cardiac pacemaker (V45 01) (Z95 0)   38  S/P TAVR (transcatheter aortic valve replacement) (V43 3) (Z95 2)   39  Sleep apnea in adult (327 23) (G47 30)   40  Thrombocytosis (238 71) (D47 3)   41  Type 2 diabetes mellitus (250 00) (E11 9)    Current Meds   1  Acidophilus Probiotic Blend Oral Capsule; Therapy: (Recorded:00Hqf7590) to Recorded   2  AmLODIPine Besylate 5 MG Oral Tablet; Take 1 tablet daily; Therapy: 64CRP3939 to (Violet Stageroxy)  Requested for: 35HJG3805; Last   Rx:32Vrs4867 Ordered   3   Aspirin 81 MG Oral Tablet Chewable; CHEW AND SWALLOW 1 TABLET DAILY; Therapy: (Recorded:82Bvw4305) to Recorded   4  Atorvastatin Calcium 20 MG Oral Tablet; TAKE 1 TABLET DAILY; Therapy: 90HUG5316 to (Evaluate:50Ofc7199)  Requested for: 58HVN5739; Last   Rx:71Wrs7701 Ordered   5  Clopidogrel Bisulfate 75 MG Oral Tablet; TAKE 1 TABLET DAILY; Therapy: 35BFT2452 to (Sony Khan)  Requested for: 75MGV8129; Last   Rx:38Rdm8621 Ordered   6  Dulcolax Stool Softener CAPS; Therapy: (Recorded:09Evo8904) to Recorded   7  Erythromycin 5 MG/GM Ophthalmic Ointment; Apply 1 cm ribbon to right eye 4 times   daily x 7 days; Therapy: 01DLO0857 to (Last Y80QVN1479)  Requested for: 2017 Ordered   8  Furosemide 40 MG Oral Tablet; Take 1 tablet daily; Therapy: 69Cbh2663 to (Evaluate:2017)  Requested for: 2016; Last   Rx:2016 Ordered   9  Levothyroxine Sodium 50 MCG Oral Tablet; TAKE 1 TABLET DAILY; Therapy: 71LDH9963 to [phone])  Requested for: 66FUS5661; Last   Rx:35Mmh2621 Ordered   10  LORazepam 0 5 MG Oral Tablet; TAKE 0 05 TABLET 3 times daily; Therapy: 17DWG5539 to (Evaluate:03Cvu6751); Last Rx:2017 Ordered   11  MetFORMIN HCl - 500 MG Oral Tablet; TAKE 1 TABLET TWICE DAILY  Requested for:    2016; Last Rx:2016 Ordered   12  Metoprolol Tartrate 50 MG Oral Tablet; Take 1 tablet twice daily; Therapy: 50Ozn2862 to (Evaluate:16Mne8682)  Requested for: 69Zhp3699; Last    Rx:11Tjm7135 Ordered   13  MiraLax POWD (Polyethylene Glycol 3350); Therapy: (Recorded:90Yhv1441) to Recorded   14  Nitrostat 0 4 MG Sublingual Tablet Sublingual (Nitroglycerin); DISSOLVE 1 TABLET    UNDER THE TONGUE AS NEEDED FOR CHEST PAIN Recorded   15  Ocuvite TABS; TAKE 1 TABLET DAILY; Therapy: (Recorded:70Lmy7239) to Recorded   16  OneTouch Ultra Blue In Vitro Strip; TEST ONCE DAILY; Therapy: 69Vqg9181 to (Charli Dos Santos)  Requested for: 64XRN3839; Last    Rx:42Kww3297 Ordered   17   Potassium Chloride ER 20 MEQ Oral Tablet Extended Release; Take 1 tablet daily; Therapy: 91Meh0719 to (Evaluate:14Oct2017)  Requested for: 76MGQ5069; Last    Rx:19Oct2016 Ordered   18  TraMADol HCl - 50 MG Oral Tablet; Take 1 tablet every 6 hours as needed for pain; Therapy: 42Sqy4190 to (Evaluate:07Blo6359); Last Rx:47Bql9545 Ordered   19  Vitamin C TABS; Therapy: (All Valderrama) to Recorded   20  Vitamin D TABS; Therapy: (Recorded:76Smk6964) to Recorded    Allergies    1   No Known Drug Allergies    Signatures   Electronically signed by : Lala Pete RN; Feb 23 2017 12:51PM EST                       (Author)

## 2018-01-10 NOTE — MISCELLANEOUS
Message   Recorded as Task   Date: 12/13/2016 11:29 AM, Created By: Gianfranco Reynolds   Task Name: Miscellaneous   Assigned To: [facility]   Regarding Patient: Shaka Hidalgo, Status: Active   CommentLuz Wright - 13 Dec 2016 11:29 AM     TASK CREATED  Anti-coag form needs to be faxed to Dr TEIXEIRA [facility]- [phone]  Pt is scheduled for 2/3/17 (needs to wait until then bc of recent surgery)  Need to wait to send form until January- form signed by dr cain oropeza for 30 days  Adebayo Davey - 15 Dec 2016 8:54 AM     TASK IN PROGRESS   Adebayo Davey - 06 Jan 2017 10:26 AM     TASK REASSIGNED: Previously Assigned To Anti Coag MINERVA,TEAM  [facility] on 1[facility]17 for procedure on 2/3/17  Blossom Vallecillo - 18 Jan 2017 9:59 AM     TASK EDITED  2nd request faxed to Cheri Chappell - 23 Jan 2017 8:30 AM     TASK EDITED  Call placed/left message on nurse line for return call regarding anticoagulant request form  Blossom Vallecillo - 23 Jan 2017 10:19 AM     TASK EDITED   Bam Marcus - 23 Jan 2017 10:27 AM     TASK EDITED  Denisse Cottrell to Bravo Contreras at [facility] office  Directed to a task from 1/6/17  Approval received from [facility] for pt to hold Plavix 7 days prior to TFESI on 2/3/17 and to continue asa  Task to be scanned into chart  Spoke to pt instructed her to stop Plavix starting on 1/27/17, npo 1 hour prior to injection, bring a , if she becomes ill/fever/abt to call the office  Pt verbalized understanding  Sarina Gotti - 26 Jan 2017 1:27 PM     TASK REPLIED TO: Previously Assigned To [place]  Thanks  Active Problems    1  Anemia (285 9) (D64 9)   2  Aortic Valve Replacement   3  Arthritis of knee (716 96) (M19 90)   4  Back pain (724 5) (M54 9)   5  Benign essential HTN (401 1) (I10)   6  Bilateral knee pain (719 46) (M25 561,M25 562)   7  Bilateral knee pain (719 46) (M25 561,M25 562)   8   CAD S/P percutaneous coronary angioplasty (414 01,V45 82) (I25 10,Z98 61)   9  Chalazion of right eye (373 2) (H00 13)   10  Chronic knee instability (718 86) (M23 50)   11  Chronic low back pain (724 2,338 29) (M54 5,G89 29)   12  Chronic pain syndrome (338 4) (G89 4)   13  Dependence on nocturnal oxygen therapy (V46 2) (Z99 81)   14  Edema (782 3) (R60 9)   15  Essential thrombocytosis (238 71) (D47 3)   16  GERD (gastroesophageal reflux disease) (530 81) (K21 9)   17  Hip pain (719 45) (M25 559)   18  History of bilateral knee replacement (V43 65) (Z96 653)   19  Hyperlipidemia (272 4) (E78 5)   20  Hypothyroid (244 9) (E03 9)   21  Irritable bowel syndrome (564 1) (K58 9)   22  Leg wound, right (891 0) (S81 801A)   23  Leukocytosis (288 60) (D72 829)   24  Lumbago (724 2) (M54 5)   25  Lumbar disc herniation with radiculopathy (722 10) (M51 16)   26  Lumbar postlaminectomy syndrome (722 83) (M96 1)   27  Macular degeneration (362 50) (H35 30)   28  Meniere disease (386 00) (H81 09)   29  Myofascial pain syndrome (729 1) (M79 1)   30  Neck pain (723 1) (M54 2)   31  Need for DTP vaccine (V06 1) (Z23)   32  Need for immunization against influenza (V04 81) (Z23)   33  Nonrheumatic aortic valve stenosis (424 1) (I35 0)   34  Other specified symptoms and signs involving the circulatory and respiratory systems    (785 9,786 9) (R09 89)   35  Postop check (V67 00) (Z09)   36  Presence of cardiac pacemaker (V45 01) (Z95 0)   37  S/P TAVR (transcatheter aortic valve replacement) (V43 3) (Z95 2)   38  Sleep apnea in adult (327 23) (G47 30)   39  Thrombocytosis (238 71) (D47 3)   40  Type 2 diabetes mellitus (250 00) (E11 9)    Current Meds   1  Acidophilus Probiotic Blend Oral Capsule; Therapy: (Recorded:37Yxg2658) to Recorded   2  AmLODIPine Besylate 5 MG Oral Tablet; Take 1 tablet daily; Therapy: 31YRW6540 to (Yifan Lambert)  Requested for: 63QTM4145; Last   Rx:05Vwg5857 Ordered   3   Aspirin 81 MG Oral Tablet Chewable; CHEW AND SWALLOW 1 TABLET DAILY; Therapy: (Recorded:49Gag5259) to Recorded   4  Atorvastatin Calcium 20 MG Oral Tablet; Take 1 tablet daily Recorded   5  Clopidogrel Bisulfate 75 MG Oral Tablet; TAKE 1 TABLET DAILY  Requested for:   92DLS7270; Last Rx:19Oct2016 Ordered   6  Clopidogrel Bisulfate 75 MG Oral Tablet; TAKE 1 TABLET DAILY; Therapy: 82NZT2813 to (Cynthia Huber)  Requested for: 63XKW9113; Last   Rx:55Alm6059 Ordered   7  Dulcolax Stool Softener CAPS; Therapy: (Recorded:88Kng2938) to Recorded   8  Erythromycin 5 MG/GM Ophthalmic Ointment; Apply 1 cm ribbon to right eye 4 times   daily x 7 days; Therapy: 83RCA8798 to (Last EL:60CCL4951)  Requested for: 21Jan2017 Ordered   9  Furosemide 40 MG Oral Tablet; Take 1 tablet daily; Therapy: 85Alw2803 to (Evaluate:14Oct2017)  Requested for: 19Oct2016; Last   Rx:19Oct2016 Ordered   10  Levothyroxine Sodium 50 MCG Oral Tablet; TAKE 1 TABLET DAILY; Therapy: 92AKB7693 to (Evaluate:99Ric4174)  Requested for: 64Soq6384; Last    Rx:39Cmi8788 Ordered   11  LORazepam 0 5 MG Oral Tablet; TAKE 0 05 TABLET 3 times daily; Therapy: 66AJX1347 to (Evaluate:77Arl2267); Last Rx:18Jan2017 Ordered   12  Meclizine HCl - 12 5 MG Oral Tablet; One pill Twice a day; Therapy: 33UAV8587 to (Evaluate:01Stg7586)  Requested for: 29UVQ0743; Last    Rx:25Hti0354 Ordered   13  MetFORMIN HCl - 500 MG Oral Tablet; TAKE 1 TABLET TWICE DAILY  Requested for:    26IDC4748; Last Rx:19Oct2016 Ordered   14  Metoprolol Tartrate 50 MG Oral Tablet; Take 1 tablet twice daily; Therapy: 24Dcq3071 to (Evaluate:07Rzq1859)  Requested for: 89Rhd0030; Last    Rx:65Ymx7387 Ordered   15  MiraLax POWD (Polyethylene Glycol 3350); Therapy: (Recorded:86Dhe1581) to Recorded   16  Nitrostat 0 4 MG Sublingual Tablet Sublingual (Nitroglycerin); DISSOLVE 1 TABLET    UNDER THE TONGUE AS NEEDED FOR CHEST PAIN Recorded   17  Ocuvite TABS; TAKE 1 TABLET DAILY; Therapy: (Recorded:65Dax0940) to Recorded   18   OneTouch Ultra Blue In Vitro Strip; TEST ONCE DAILY; Therapy: 31Yvz0162 to (Andrew Gil)  Requested for: 75TCZ2506; Last    Rx:52Nnj9803 Ordered   19  Potassium Chloride ER 20 MEQ Oral Tablet Extended Release; Take 1 tablet daily; Therapy: 09Evm6539 to (Evaluate:14Oct2017)  Requested for: 86BGX6780; Last    Rx:62Saa9981 Ordered   20  Promethazine HCl - 25 MG Oral Tablet; take 1 tab daily; Therapy: (Recorded:31Kmb5220) to Recorded   21  TraMADol HCl - 50 MG Oral Tablet; Take 1 tablet every 6 hours as needed for pain; Therapy: 86Vbi5654 to (Evaluate:05Xuv2455); Last Rx:11Vfx9510 Ordered   22  Vitamin C TABS; Therapy: (Shawn Marcos) to Recorded   23  Vitamin D TABS; Therapy: (Recorded:31Kuz8443) to Recorded    Allergies    1   No Known Drug Allergies    Signatures   Electronically signed by : Alonso Mejia RN; Jan 26 2017  1:41PM EST                       (Author)

## 2018-01-11 NOTE — MISCELLANEOUS
Message   Recorded as Task   Date: 02/03/2017 01:56 PM, Created By: Gurpreet Camacho   Task Name: Care Coordination   Assigned To: [facility] [facility]   Regarding Patient: Jorge Huitron, Status: Active   Comment:    AvelBlossom - 03 Feb 2017 1:56 PM     TASK CREATED  Caller: Self; Care Coordination; [phone] (Home)  Pt lmom stating that she is still on abt for her eye infection and is cancelling injection scheduled for 2/10  Pt will call when she if free of infection to reschedule appt  Spoke to pt she continues with an eye infection and is on abt eye drops  Pt will call to reschedule once her eye doctor clears her of infection  Esther Osullivan - 03 Feb 2017 2:47 PM     TASK REPLIED TO: Previously Assigned To [facility] [facility]  Aware  Thanks  Active Problems    1  Anemia (285 9) (D64 9)   2  Aortic Valve Replacement   3  Arthritis of knee (716 96) (M19 90)   4  Back pain (724 5) (M54 9)   5  Benign essential HTN (401 1) (I10)   6  Bilateral knee pain (719 46) (M25 561,M25 562)   7  Bilateral knee pain (719 46) (M25 561,M25 562)   8  CAD S/P percutaneous coronary angioplasty (414 01,V45 82) (I25 10,Z98 61)   9  Chalazion of right eye (373 2) (H00 13)   10  Chronic knee instability (718 86) (M23 50)   11  Chronic low back pain (724 2,338 29) (M54 5,G89 29)   12  Chronic pain syndrome (338 4) (G89 4)   13  Dependence on nocturnal oxygen therapy (V46 2) (Z99 81)   14  Edema (782 3) (R60 9)   15  Essential thrombocytosis (238 71) (D47 3)   16  GERD (gastroesophageal reflux disease) (530 81) (K21 9)   17  Hip pain (719 45) (M25 559)   18  History of bilateral knee replacement (V43 65) (Z96 653)   19  Hordeolum externum of left lower eyelid (373 11) (H00 015)   20  Hyperlipidemia (272 4) (E78 5)   21  Hypothyroid (244 9) (E03 9)   22  Irritable bowel syndrome (564 1) (K58 9)   23  Leg wound, right (891 0) (S81 801A)   24  Leukocytosis (288 60) (D72 829)   25   Lumbago (724 2) (M54 5)   26  Lumbar disc herniation with radiculopathy (722 10) (M51 16)   27  Lumbar postlaminectomy syndrome (722 83) (M96 1)   28  Macular degeneration (362 50) (H35 30)   29  Meniere disease (386 00) (H81 09)   30  Myofascial pain syndrome (729 1) (M79 1)   31  Neck pain (723 1) (M54 2)   32  Need for DTP vaccine (V06 1) (Z23)   33  Need for immunization against influenza (V04 81) (Z23)   34  Nonrheumatic aortic valve stenosis (424 1) (I35 0)   35  Other specified symptoms and signs involving the circulatory and respiratory systems    (785 9,786 9) (R09 89)   36  Postop check (V67 00) (Z09)   37  Presence of cardiac pacemaker (V45 01) (Z95 0)   38  S/P TAVR (transcatheter aortic valve replacement) (V43 3) (Z95 2)   39  Sleep apnea in adult (327 23) (G47 30)   40  Thrombocytosis (238 71) (D47 3)   41  Type 2 diabetes mellitus (250 00) (E11 9)    Current Meds   1  Acidophilus Probiotic Blend Oral Capsule; Therapy: (Recorded:51Lzq7349) to Recorded   2  AmLODIPine Besylate 5 MG Oral Tablet; Take 1 tablet daily; Therapy: 87XKR0143 to ( Grooms)  Requested for: 03JFX8822; Last   Rx:16Ezf0059 Ordered   3  Aspirin 81 MG Oral Tablet Chewable; CHEW AND SWALLOW 1 TABLET DAILY; Therapy: (Recorded:46Flw6989) to Recorded   4  Atorvastatin Calcium 20 MG Oral Tablet; Take 1 tablet daily Recorded   5  Clopidogrel Bisulfate 75 MG Oral Tablet; TAKE 1 TABLET DAILY  Requested for:   55BFP0596; Last Rx:85Mop5324 Ordered   6  Clopidogrel Bisulfate 75 MG Oral Tablet; TAKE 1 TABLET DAILY; Therapy: 91WPB8582 to ( Grooms)  Requested for: 36TUU0281; Last   Rx:51Sbh5596 Ordered   7  Dulcolax Stool Softener CAPS; Therapy: (Recorded:49Iar2764) to Recorded   8  Erythromycin 5 MG/GM Ophthalmic Ointment; Apply 1 cm ribbon to right eye 4 times   daily x 7 days; Therapy: 42OJY4784 to (Last KI:17XEG8945)  Requested for: 21Jan2017 Ordered   9  Furosemide 40 MG Oral Tablet; Take 1 tablet daily;    Therapy: 88Fer3943 to (Evaluate:14Oct2017)  Requested for: 94JHV6458; Last   Rx:67Hcp8207 Ordered   10  Levothyroxine Sodium 50 MCG Oral Tablet; TAKE 1 TABLET DAILY; Therapy: 39PYI0952 to ()  Requested for: 72LHS3294; Last    Rx:83Qwz7850 Ordered   11  LORazepam 0 5 MG Oral Tablet; TAKE 0 05 TABLET 3 times daily; Therapy: 00VNW7805 to (Evaluate:10Dzh1602); Last Rx:18Jan2017 Ordered   12  Meclizine HCl - 12 5 MG Oral Tablet; One pill Twice a day; Therapy: 75YZU6241 to (Evaluate:73Lnr1758)  Requested for: 44JIO3997; Last    Rx:37Uyg3260 Ordered   13  MetFORMIN HCl - 500 MG Oral Tablet; TAKE 1 TABLET TWICE DAILY  Requested for:    86WJM2250; Last Rx:19Lfr3416 Ordered   14  Metoprolol Tartrate 50 MG Oral Tablet; Take 1 tablet twice daily; Therapy: 29Bjq4922 to (Evaluate:44Dix2072)  Requested for: 85Dhx7969; Last    Rx:29Ejy9379 Ordered   15  MiraLax POWD (Polyethylene Glycol 3350); Therapy: (Recorded:74Pbu2051) to Recorded   16  Nitrostat 0 4 MG Sublingual Tablet Sublingual (Nitroglycerin); DISSOLVE 1 TABLET    UNDER THE TONGUE AS NEEDED FOR CHEST PAIN Recorded   17  Ocuvite TABS; TAKE 1 TABLET DAILY; Therapy: (Recorded:98Uwp2497) to Recorded   18  OneTouch Ultra Blue In Vitro Strip; TEST ONCE DAILY; Therapy: 28Oxs9605 to (77 873 135)  Requested for: 02IZY1176; Last    Rx:36Wrk2956 Ordered   19  Potassium Chloride ER 20 MEQ Oral Tablet Extended Release; Take 1 tablet daily; Therapy: 63Ome4337 to (Evaluate:47Day5900)  Requested for: 97YXU7523; Last    Rx:26Azq5652 Ordered   20  Promethazine HCl - 25 MG Oral Tablet; take 1 tab daily; Therapy: (Recorded:30Vrr8128) to Recorded   21  Tobramycin-Dexamethasone 0 3-0 1 % Ophthalmic Suspension (TobraDex); INSTILL    1-2 DROPS INTO AFFECTED EYE(S)  4 TIMES    DAILY AS DIRECTED; Therapy: 70OWU8200 to (Evaluate:71Dze0934)  Requested for: 61EOT0649; Last    Rx:89Oqw3660 Ordered   22  TraMADol HCl - 50 MG Oral Tablet;  Take 1 tablet every 6 hours as needed for pain; Therapy: 96Hek4914 to (Evaluate:23Cpg0319); Last Rx:02Jry7994 Ordered   23  Vitamin C TABS; Therapy: (Lizabeth Humza) to Recorded   24  Vitamin D TABS; Therapy: (Recorded:78Xnt2124) to Recorded    Allergies    1   No Known Drug Allergies    Signatures   Electronically signed by : Ashely Gonzalez RN; Feb  3 2017  2:53PM EST                       (Author)

## 2018-01-11 NOTE — MISCELLANEOUS
History of Present Illness  Cardiac Surgery Phone Follow-up:    This phone call was in regards to post-operative care  Procedure: TF TAVR  7/26/16   Hospital Discharge Date: 7/28/16  Surgeon: Lori Tolentino DO  Office Visit Pending: Yes   8/24/16  The patient has an appointment with their PCP on: 8/1/16 & 8/9/16  The patient has an appointment with their cardiologist on: 8/10/16  Date of Call: 08/02/2016, follow-up call after discharge  Symptom review with the patient is as follows: shortness of breath, no chest pain, leg swelling, pain is controlled, incision stable, no bowel problems, stable appetite, no lightheaded/dizziness, blood pressure stable, heart rate stable, activity: tolerating daily activity and walks, patient's reported pre-op weight was 195 lbs, patient's reported weight at discharge was lbs, patient's reported current weight is 190 lbs and medication review  Comments: Spoke to patient today  She states she feels much better  She is not feeling as tired or napping nearly as much as she has in the past  She does experience some mild shortness of breath that she is walking a lot  She does feel a little tired in the p m  and takes a short neck  She was seen by her PCP yesterday and noted to have lower extremity edema  He discontinued her hydrochlorothiazide and started her on Lasix 40 mg daily  She's had a 4 pound weight loss since yesterday  She reports her edema is improved today  She went to [place] [address] today and had a BMP drawn  She states her PCP will evaluate her lab work tomorrow and she has a follow-up appointment with him in his office on 9/9/16  She states she's happy with how she is feeling  Her groin site is healing without any issues  Plan: 1  Continue daily, call for waking up 2 pounds or more in one day  2  Continue activity, increase as tolerated  3  Continue frequent use of incentive spirometer    4  Maintain appointment with PCP for follow-up of duretic therapy  5  Call CT surgery office with questions  Active Problems    1  Anemia (285 9) (D64 9)   2  Back pain (724 5) (M54 9)   3  CAD S/P percutaneous coronary angioplasty (414 01,V45 82) (I25 10,Z98 61)   4  Dependence on nocturnal oxygen therapy (V46 2) (Z99 81)   5  Edema (782 3) (R60 9)   6  Essential thrombocytosis (238 71) (D47 3)   7  GERD (gastroesophageal reflux disease) (530 81) (K21 9)   8  Hip pain (719 45) (M25 559)   9  Hyperlipidemia (272 4) (E78 5)   10  Leg wound, right (891 0) (S81 801A)   11  Lumbago (724 2) (M54 5)   12  Macular degeneration (362 50) (H35 30)   13  Meniere disease (386 00) (H81 09)   14  Need for DTP vaccine (V06 1) (Z23)   15  Nonrheumatic aortic valve stenosis (424 1) (I35 0)   16  Other specified symptoms and signs involving the circulatory and respiratory systems    (785 9,786 9) (R09 89)   17  Presence of cardiac pacemaker (V45 01) (Z95 0)   18  Sleep apnea in adult (327 23) (G47 33)   19  Thrombocytosis (238 71) (D47 3)   20  Type 2 diabetes mellitus (250 00) (E11 9)    Past Medical History    1  History of Calcaneal spur (726 73) (M77 30)   2  History of Cervicalgia (723 1) (M54 2)   3  History of Diverticulitis (562 11) (K57 92)   4  History of osteopenia (V13 59) (Z87 39)   5  History of Meniere's disease (386 00) (H81 09)    Surgical History    1  History of Appendectomy   2  History of Arthrodesis Lumbar   3  History of Back Surgery   4  History of Cholecystectomy   5  History of Eye Surgery   6  History of Hysterectomy   7  History of Pacemaker Placement   8  History of Small Bowel Resection   9  History of Tonsillectomy   10  History of Total Knee Arthroplasty   11  History of Venous Ligation With Stripping    Family History  Problems    1  Family history of CAD in native artery : Father   2  Family history of cerebrovascular accident (CVA) (V17 1) (Z82 3)   3  Family history of osteopenia (V17 89) (Z82 69)   4  Family history of Pituitary disease   5   Family history of Polycythemia    Social History    · Lives in independent group home (V60 89) (Z59 8)   · Never a smoker   · Non drinker / no alcohol use    Current Meds    1  Atorvastatin Calcium 40 MG Oral Tablet; TAKE 1 TABLET DAILY; Therapy: 80IUD7891 to (Evaluate:72Jce9664); Last Rx:05Chz6594 Ordered   2  Metoprolol Tartrate 50 MG Oral Tablet; Take 1 tablet twice daily; Therapy: 01Hzr3082 to (Last Rx:74Ewh2574) Ordered    3  Furosemide 40 MG Oral Tablet; Take 1 tablet daily; Therapy: 80Bhl5916 to (Last Rx:53Qvh0013)  Requested for: 15Zxx3405 Ordered    4  Omeprazole 20 MG Oral Capsule Delayed Release; Take 1 capsule twice daily; Therapy: 64ILF9057 to (Evaluate:50Tuk0986)  Requested for: 14KWO4689; Last   Rx:52Ebw6397 Ordered    5  Meclizine HCl - 12 5 MG Oral Tablet; One pill Twice a day; Therapy: 57VPP1040 to (Evaluate:46Aeo8930)  Requested for: 66IAU8043; Last   Rx:68Xod4464 Ordered    6  Mupirocin 2 % External Ointment; Apply a small amount to nostrils twice per day for 5-7   days; Therapy: 65Uul3316 to (Evaluate:56Yon6617)  Requested for: 86Phh4536; Last   Rx:78Jdz3152 Ordered    7  MetFORMIN HCl - 500 MG Oral Tablet; TAKE 1 TABLET TWICE DAILY  Requested for:   85PYO6386; Last Rx:50Spv3945 Ordered   8  OneTouch Ultra Blue In Vitro Strip; TEST ONCE DAILY; Therapy: 91Swp1971 to (Evaluate:27Mar2017)  Requested for: 56Npv4338; Last   Rx:80Otg0276 Ordered    9  Ascorbic Acid 500 MG Oral Tablet; Take 1 tablet daily Recorded   10  Aspirin 81 MG Oral Tablet Chewable; CHEW AND SWALLOW 1 TABLET DAILY; Therapy: (Recorded:87Qdp8189) to Recorded   11  Clopidogrel Bisulfate 75 MG Oral Tablet; Take 1 tablet daily Recorded   12  Dulcolax Stool Softener CAPS; Therapy: (Recorded:62Zez1139) to Recorded   13  MiraLax POWD (Polyethylene Glycol 3350); Therapy: (Recorded:96Ewu9010) to Recorded   14   Nitrostat 0 4 MG Sublingual Tablet Sublingual; DISSOLVE 1 TABLET UNDER THE    TONGUE AS NEEDED FOR CHEST PAIN Recorded   15  Ocuvite TABS; TAKE 1 TABLET DAILY; Therapy: (Recorded:84Fbt2421) to Recorded   16  Pantoprazole Sodium 40 MG Oral Tablet Delayed Release; TAKE 1 TABLET EVERY DAY    Recorded   17  Promethazine HCl - 25 MG Oral Tablet; take 1 tab daily; Therapy: (Recorded:38Thr2864) to Recorded   18  Vitamin D TABS; Therapy: (Recorded:98Lxh8292) to Recorded    Signatures   Electronically signed by : KASIE Zepeda;  Aug  2 2016  2:47PM EST                       (Author)

## 2018-01-11 NOTE — RESULT NOTES
Verified Results  (1) LIPID PANEL FASTING W DIRECT LDL REFLEX [id number] 09:26AM Sandeep Barth     Test Name Result Flag Reference   CHOLESTEROL 122 mg/dL     LDL CHOLESTEROL CALCULATED 44 mg/dL  0-100   This is a fasting blood test  Water,black tea or black  coffee only after 9:00pm the night before test  Drink 2 glasses of water the morning of test         Triglyceride:         Normal              <150 mg/dl       Borderline High    150-199 mg/dl       High               200-499 mg/dl       Very High          >499 mg/dl  Cholesterol:         Desirable        <200 mg/dl      Borderline High  200-239 mg/dl      High             >239 mg/dl  HDL Cholesterol:        High    >59 mg/dL      Low     <41 mg/dL  LDL Cholesterol:        Optimal          <100 mg/dl        Near Optimal     100-129 mg/dl        Above Optimal          Borderline High   130-159 mg/dl          High              160-189 mg/dl          Very High        >189 mg/dl  LDL CALCULATED:    This screening LDL is a calculated result  It does not have the accuracy of the Direct Measured LDL in the monitoring of patients with hyperlipidemia and/or statin therapy  Direct Measure LDL (EYP884) must be ordered separately in these patients  TRIGLYCERIDES 117 mg/dL  <=150   Specimen collection should occur prior to N-Acetylcysteine or Metamizole administration due to the potential for falsely depressed results  HDL,DIRECT 55 mg/dL  40-60   Specimen collection should occur prior to Metamizole administration due to the potential for falsely depressed results

## 2018-01-12 VITALS
OXYGEN SATURATION: 96 % | SYSTOLIC BLOOD PRESSURE: 120 MMHG | DIASTOLIC BLOOD PRESSURE: 78 MMHG | HEART RATE: 74 BPM | RESPIRATION RATE: 12 BRPM

## 2018-01-12 VITALS
HEIGHT: 62 IN | HEART RATE: 74 BPM | WEIGHT: 193 LBS | SYSTOLIC BLOOD PRESSURE: 156 MMHG | BODY MASS INDEX: 35.51 KG/M2 | DIASTOLIC BLOOD PRESSURE: 72 MMHG

## 2018-01-12 VITALS
TEMPERATURE: 100 F | HEART RATE: 82 BPM | RESPIRATION RATE: 12 BRPM | DIASTOLIC BLOOD PRESSURE: 82 MMHG | OXYGEN SATURATION: 96 % | SYSTOLIC BLOOD PRESSURE: 120 MMHG

## 2018-01-12 NOTE — RESULT NOTES
Message   Recorded as Task   Date: 01/26/2017 04:21 PM, Created By: Burke Trujillo   Task Name: Call Back   Assigned To: [employer]   Regarding Patient: Yara Guo, Status: In Progress   Comment:    Shalini Sapp - 26 Jan 2017 4:21 PM     TASK CREATED  Caller: Self; General Medical Question; [phone] (Home)  VMMLOM on Josh triage line at 2:45  Pt stated that she was transferred to this number  Per pt she has an opro with [facility] on 2/3 and she is currently taking erythromycin 0 05mg TID  Per pt it is for a Stye in her eye and she will be complete this on next Friday  Pt requesting a c/b on ph # [phone]   SekouShalini - 26 Jan 2017 4:22 PM     TASK EDITED  Attempted to call pt on number provided, VMMLOM for pt to c/b  Ph number and office hours provided  Monisha Fischer - 27 Jan 2017 1:06 PM     TASK EDITED    I spoke with pt, states she will continue with eye gtts till next Friday  I did discuss with Dr Simon Martin, he would prefer that the pt reschedule for 1 week later  I advised pt, scheduled for 2/10/17 at 0830  Pt will hold Plavix after 2/2/17  Will be NPO 1 hr prior  Pt will call with any other questions or concerns  Will need to extend auth to hold Plavix, expires 2/7/17  ************   Shanna Torres - 27 Jan 2017 3:46 PM     TASK EDITED  Another Anticoagulant hold consent faxed to Dr Geraldine Trivedi for review  Shanna Torres - 27 Jan 2017 3:46 PM     TASK IN PROGRESS   Blossom Vallecillo - 30 Jan 2017 11:54 AM     TASK EDITED  Refaxed to Blossom Castanon - 30 Jan 2017 1:38 PM     TASK EDITED  Clarification: Pt's anticoagulant hold request faxed to Dr Espitia/cardiologist    Francenia Sandhoff - 30 Jan 2017 1:39 PM     TASK EDITED   Shanna Torres - 02 Feb 2017 8:23 AM     TASK EDITED  Received two consents to hold Plavix, one from Dr Paolo Fang and the other from Dr Geraldine Trivedi  Please see scanned images          Signatures   Electronically signed by : Simon Martin, ; Feb 2 2017  8:23AM EST                       (Author)

## 2018-01-13 VITALS
HEART RATE: 116 BPM | OXYGEN SATURATION: 94 % | WEIGHT: 192.31 LBS | DIASTOLIC BLOOD PRESSURE: 80 MMHG | RESPIRATION RATE: 16 BRPM | BODY MASS INDEX: 35.39 KG/M2 | HEIGHT: 62 IN | SYSTOLIC BLOOD PRESSURE: 164 MMHG | TEMPERATURE: 97.9 F

## 2018-01-13 VITALS
SYSTOLIC BLOOD PRESSURE: 140 MMHG | OXYGEN SATURATION: 97 % | HEART RATE: 105 BPM | TEMPERATURE: 100.2 F | RESPIRATION RATE: 16 BRPM | DIASTOLIC BLOOD PRESSURE: 82 MMHG

## 2018-01-13 VITALS
HEART RATE: 108 BPM | BODY MASS INDEX: 36.07 KG/M2 | WEIGHT: 196 LBS | SYSTOLIC BLOOD PRESSURE: 157 MMHG | HEIGHT: 62 IN | DIASTOLIC BLOOD PRESSURE: 83 MMHG

## 2018-01-13 VITALS
HEART RATE: 91 BPM | RESPIRATION RATE: 16 BRPM | OXYGEN SATURATION: 96 % | DIASTOLIC BLOOD PRESSURE: 78 MMHG | TEMPERATURE: 99.6 F | SYSTOLIC BLOOD PRESSURE: 138 MMHG

## 2018-01-13 NOTE — MISCELLANEOUS
Message   Recorded as Task   Date: 10/19/2016 04:32 PM, Created By: Elke Espinal   Task Name: Call Back   Assigned To: [address]   Regarding Patient: Anu Ahmadi, Status: Active   Comment:    Elke Espinal - 19 Oct 2016 4:32 PM     TASK CREATED  Caller: Self; Results Inquiry; [phone] (Home)  Pt left vm at 3:28 that she had a call from the doctor this am when she was out about her MRI she had done yest     She will be home today until 4:45  C/B # [phone]  [address] - 20 Oct 2016 12:44 PM     TASK REPLIED TO: Previously Assigned To [facility] [address] on 10/20/16 1242 pm to review MRI results with her  Since I am unable to reach her, please have the patient come in to review MRI results and to discuss next steps  Nancy Gale - 20 Oct 2016 3:01 PM     TASK EDITED  s/w pt, advised of above  Pt verbalized understanding - apologized for missing Dr Jamel Gaming calls  Scheduled sovs 11/4/016 at [phone] in the [place] office  Pt verbalized understanding and apprecatio  Active Problems    1  Anemia (285 9) (D64 9)   2  Aortic Valve Replacement   3  Back pain (724 5) (M54 9)   4  CAD S/P percutaneous coronary angioplasty (414 01,V45 82) (I25 10,Z98 61)   5  Chronic low back pain (724 2,338 29) (M54 5,G89 29)   6  Chronic pain syndrome (338 4) (G89 4)   7  Dependence on nocturnal oxygen therapy (V46 2) (Z99 81)   8  Edema (782 3) (R60 9)   9  Essential thrombocytosis (238 71) (D47 3)   10  GERD (gastroesophageal reflux disease) (530 81) (K21 9)   11  Hip pain (719 45) (M25 559)   12  Hyperlipidemia (272 4) (E78 5)   13  Leg wound, right (891 0) (S81 801A)   14  Leukocytosis (288 60) (D72 829)   15  Lumbago (724 2) (M54 5)   16  Lumbar disc herniation with radiculopathy (722 10) (M51 16)   17  Lumbar postlaminectomy syndrome (722 83) (M96 1)   18  Macular degeneration (362 50) (H35 30)   19  Meniere disease (386 00) (H81 09)   20  Need for DTP vaccine (V06 1) (Z23)   21  Nonrheumatic aortic valve stenosis (424 1) (I35 0)   22  Other specified symptoms and signs involving the circulatory and respiratory systems    (785 9,786 9) (R09 89)   23  Postop check (V67 00) (Z09)   24  Presence of cardiac pacemaker (V45 01) (Z95 0)   25  S/P TAVR (transcatheter aortic valve replacement) (V43 3) (Z95 2)   26  Sleep apnea in adult (327 23) (G47 33)   27  Thrombocytosis (238 71) (D47 3)   28  Type 2 diabetes mellitus (250 00) (E11 9)    Current Meds   1  Acidophilus Probiotic Blend Oral Capsule; Therapy: (Recorded:53Gqw4692) to Recorded   2  Aspirin 81 MG Oral Tablet Chewable; CHEW AND SWALLOW 1 TABLET DAILY; Therapy: (Recorded:26May2016) to Recorded   3  Atorvastatin Calcium 40 MG Oral Tablet; TAKE 1 TABLET DAILY; Therapy: 72NBE6970 to (Evaluate:14Oct2017)  Requested for: 92PEW3867; Last   Rx:19Oct2016 Ordered   4  Clopidogrel Bisulfate 75 MG Oral Tablet; TAKE 1 TABLET DAILY  Requested for:   92BDF0898; Last Rx:19Oct2016 Ordered   5  Dulcolax Stool Softener CAPS; Therapy: (Recorded:73Evy8609) to Recorded   6  Furosemide 40 MG Oral Tablet; Take 1 tablet daily; Therapy: 91Vus4720 to (Evaluate:14Oct2017)  Requested for: 19Oct2016; Last   Rx:19Oct2016 Ordered   7  Meclizine HCl - 12 5 MG Oral Tablet; One pill Twice a day; Therapy: 26KBU6135 to (Evaluate:18Uzw2739)  Requested for: 82RPP2952; Last   Rx:95Rzv5502 Ordered   8  MetFORMIN HCl - 500 MG Oral Tablet; TAKE 1 TABLET TWICE DAILY  Requested for:   19Oct2016; Last Rx:19Oct2016 Ordered   9  Metoprolol Tartrate 50 MG Oral Tablet; Take 1 tablet twice daily; Therapy: 90Lpi5778 to (Evaluate:14Oct2017)  Requested for: 19Oct2016; Last   Rx:19Oct2016 Ordered   10  MiraLax POWD (Polyethylene Glycol 3350); Therapy: (Recorded:53Ija6098) to Recorded   11  Nitrostat 0 4 MG Sublingual Tablet Sublingual (Nitroglycerin); DISSOLVE 1 TABLET    UNDER THE TONGUE AS NEEDED FOR CHEST PAIN Recorded   12  Ocuvite TABS; TAKE 1 TABLET DAILY;     Therapy: (Recorded:96Ycf1982) to Recorded   13  OneTouch Ultra Blue In Vitro Strip; TEST ONCE DAILY; Therapy: 11Stp6086 to (Evaluate:27Mar2017)  Requested for: 79Ekj1631; Last    Rx:13Zvz6451 Ordered   14  Pantoprazole Sodium 40 MG Oral Tablet Delayed Release; TAKE 1 TABLET EVERY DAY     Requested for: 54BEF0631; Last Rx:19Oct2016 Ordered   15  Potassium Chloride ER 20 MEQ Oral Tablet Extended Release; Take 1 tablet daily; Therapy: 85Irf3622 to (Evaluate:14Oct2017)  Requested for: 21XLG9681; Last    Rx:19Oct2016 Ordered   16  Promethazine HCl - 25 MG Oral Tablet; take 1 tab daily; Therapy: (Recorded:48Muq1544) to Recorded   17  TraMADol HCl - 50 MG Oral Tablet; Take 1 tablet every 6 hours as needed for pain; Therapy: 06Nrc3096 to (Evaluate:73Frx1627); Last Rx:01Vjl1114 Ordered   18  Vitamin C TABS; Therapy: (Bernhard Sicard) to Recorded   19  Vitamin D TABS; Therapy: (Recorded:95Uzu7049) to Recorded    Allergies    1   No Known Drug Allergies    Signatures   Electronically signed by : Qi Handley, ; Oct 20 2016  3:01PM EST                       (Author)

## 2018-01-14 VITALS
BODY MASS INDEX: 35.7 KG/M2 | SYSTOLIC BLOOD PRESSURE: 130 MMHG | OXYGEN SATURATION: 95 % | DIASTOLIC BLOOD PRESSURE: 80 MMHG | HEART RATE: 117 BPM | WEIGHT: 194 LBS | HEIGHT: 62 IN

## 2018-01-14 VITALS
HEART RATE: 89 BPM | OXYGEN SATURATION: 96 % | BODY MASS INDEX: 35.16 KG/M2 | DIASTOLIC BLOOD PRESSURE: 72 MMHG | WEIGHT: 192.25 LBS | TEMPERATURE: 99.5 F | SYSTOLIC BLOOD PRESSURE: 124 MMHG | RESPIRATION RATE: 16 BRPM

## 2018-01-14 VITALS
OXYGEN SATURATION: 97 % | BODY MASS INDEX: 34.42 KG/M2 | WEIGHT: 187.01 LBS | RESPIRATION RATE: 18 BRPM | HEIGHT: 62 IN | DIASTOLIC BLOOD PRESSURE: 80 MMHG | TEMPERATURE: 99.3 F | SYSTOLIC BLOOD PRESSURE: 142 MMHG | HEART RATE: 94 BPM

## 2018-01-14 VITALS
SYSTOLIC BLOOD PRESSURE: 156 MMHG | DIASTOLIC BLOOD PRESSURE: 81 MMHG | BODY MASS INDEX: 35.88 KG/M2 | HEART RATE: 103 BPM | WEIGHT: 195 LBS | HEIGHT: 62 IN

## 2018-01-14 VITALS
BODY MASS INDEX: 35.33 KG/M2 | DIASTOLIC BLOOD PRESSURE: 68 MMHG | HEIGHT: 62 IN | RESPIRATION RATE: 16 BRPM | SYSTOLIC BLOOD PRESSURE: 130 MMHG | HEART RATE: 76 BPM | TEMPERATURE: 98.7 F | OXYGEN SATURATION: 96 % | WEIGHT: 192 LBS

## 2018-01-14 VITALS
BODY MASS INDEX: 35.7 KG/M2 | HEIGHT: 62 IN | WEIGHT: 194 LBS | SYSTOLIC BLOOD PRESSURE: 151 MMHG | HEART RATE: 75 BPM | DIASTOLIC BLOOD PRESSURE: 74 MMHG

## 2018-01-14 VITALS
HEART RATE: 75 BPM | OXYGEN SATURATION: 96 % | RESPIRATION RATE: 12 BRPM | DIASTOLIC BLOOD PRESSURE: 82 MMHG | BODY MASS INDEX: 34.81 KG/M2 | WEIGHT: 190.31 LBS | TEMPERATURE: 99.5 F | SYSTOLIC BLOOD PRESSURE: 138 MMHG

## 2018-01-14 VITALS
DIASTOLIC BLOOD PRESSURE: 74 MMHG | WEIGHT: 192.31 LBS | SYSTOLIC BLOOD PRESSURE: 146 MMHG | BODY MASS INDEX: 35.39 KG/M2 | HEART RATE: 84 BPM | HEIGHT: 62 IN

## 2018-01-16 DIAGNOSIS — Z47.89 ENCOUNTER FOR OTHER ORTHOPEDIC AFTERCARE (CODE): ICD-10-CM

## 2018-01-16 DIAGNOSIS — E87.1 HYPO-OSMOLALITY AND HYPONATREMIA (CODE): ICD-10-CM

## 2018-01-17 ENCOUNTER — ALLSCRIPTS OFFICE VISIT (OUTPATIENT)
Dept: OTHER | Facility: OTHER | Age: 83
End: 2018-01-17

## 2018-01-17 ENCOUNTER — APPOINTMENT (OUTPATIENT)
Dept: RADIOLOGY | Facility: CLINIC | Age: 83
End: 2018-01-17
Payer: COMMERCIAL

## 2018-01-17 DIAGNOSIS — Z47.89 ENCOUNTER FOR OTHER ORTHOPEDIC AFTERCARE (CODE): ICD-10-CM

## 2018-01-17 PROCEDURE — 73552 X-RAY EXAM OF FEMUR 2/>: CPT

## 2018-01-17 NOTE — MISCELLANEOUS
History of Present Illness  TCM Communication [facility]: The patient is being contacted for follow-up after hospitalization and [facility]  She was hospitalized at and [facility]  The date of admission: 6/8/2016, date of discharge: 6/10/2016  She was discharged to home, to an assisted living facility  She scheduled a follow up appointment  The patient is currently asymptomatic  Counseling was provided to patient's caretaker  Communication performed and completed by Sheila Meyers      Active Problems    1  Aortic stenosis (424 1) (I35 0)   2  Back pain (724 5) (M54 9)   3  Essential thrombocytosis (238 71) (D47 3)   4  GERD (gastroesophageal reflux disease) (530 81) (K21 9)   5  Hip pain (719 45) (M25 559)   6  Hyperlipidemia (272 4) (E78 5)   7  Lumbago (724 2) (M54 5)   8  Meniere disease (386 00) (H81 09)   9  Other specified symptoms and signs involving the circulatory and respiratory systems   (785 9,786 9) (R09 89)   10  Presence of cardiac pacemaker (V45 01) (Z95 0)   11  Type 2 diabetes mellitus (250 00) (E11 9)    Past Medical History    1  History of Calcaneal spur (726 73) (M77 30)   2  History of Cervicalgia (723 1) (M54 2)   3  History of Diverticulitis (562 11) (K57 92)   4  History of osteopenia (V13 59) (Z87 39)   5  History of Meniere's disease (386 00) (H81 09)    Surgical History    1  History of Appendectomy   2  History of Arthrodesis Lumbar   3  History of Back Surgery   4  History of Cholecystectomy   5  History of Eye Surgery   6  History of Hysterectomy   7  History of Pacemaker Placement   8  History of Small Bowel Resection   9  History of Tonsillectomy   10  History of Total Knee Arthroplasty   11  History of Venous Ligation With Stripping    Family History  Family History    1  Family history of cerebrovascular accident (CVA) (V17 1) (Z82 3)   2  Family history of osteopenia (V17 89) (Z82 69)   3   Family history of Polycythemia    Social History    · Never a smoker · Non drinker / no alcohol use    Current Meds   1  Aspirin 81 MG Oral Tablet Chewable; CHEW AND SWALLOW 1 TABLET DAILY; Therapy: (Recorded:26May2016) to Recorded   2  Dulcolax Stool Softener CAPS; Therapy: (Recorded:16May2016) to Recorded   3  Meclizine HCl - 12 5 MG Oral Tablet; One pill Twice a day; Therapy: 20DDP9308 to (Evaluate:74Smw6812)  Requested for: 00BJF6611; Last   Rx:16Feb2016 Ordered   4  MetFORMIN HCl - 500 MG Oral Tablet; TAKE 1 TABLET DAILY; Therapy: (Recorded:26May2016) to Recorded   5  Metoprolol Succinate ER 50 MG Oral Tablet Extended Release 24 Hour; take 3 tabs daily; Therapy: (Recorded:26May2016) to Recorded   6  MiraLax POWD;   Therapy: (Recorded:16May2016) to Recorded   7  Omeprazole 20 MG Oral Capsule Delayed Release; TAKE 1 CAPSULE DAILY; Therapy: (Recorded:26May2016) to Recorded   8  Omeprazole 20 MG Oral Capsule Delayed Release; Take 1 capsule twice daily; Therapy: 85AJD0848 to (Evaluate:55Isc4420)  Requested for: 50ZZD4877; Last   Rx:16Feb2016 Ordered   9  Promethazine HCl - 25 MG Oral Tablet; take 1 tab daily; Therapy: (Recorded:26May2016) to Recorded   10  TraMADol HCl - 50 MG Oral Tablet; TAKE 1 TABLET Every 6 hours Take 1 to 2 tblets every    6 hours PRN pain; Last Rx:10May2016 Ordered   11  Triamterene-HCTZ 37 5-25 MG Oral Tablet; TAKE 2 TABLETS BY MOUTH EVERY DAY; Therapy: 94FAL3063 to (Evaluate:16Wpt3090)  Requested for: 18IMG0225; Last    Rx:30May2016 Ordered   12  Vitamin C TABS; Therapy: (Recorded:37Jfs3324) to Recorded   13  Vitamin D TABS; Therapy: (Recorded:76Oxs4544) to Recorded    Allergies    1  No Known Drug Allergies    Future Appointments    Date/Time Provider Specialty Site   06/30/2016 11:00 AM Cardiology, [address] [facility]   06/23/2016 01:30 PM COLT Cortez   Internal Medicine [facility] INTERNAL MED   06/15/2016 09:45 AM Sara Russell, DO Cardiac Surgery CT SURGERY [address] [facility] 06/16/2016 01:45 PM Kylie Alejo MD Hematology Oncology [facility] MEDICAL ONCOLOGY   07/07/2016 02:20 PM Eugenia Ye DO Cardiology [facility]     Signatures   Electronically signed by : Beth Azul OM; Jun 14 2016 12:11PM EST                       (Author)    Electronically signed by : COLT Faith ; Jun 14 2016  5:24PM EST                       (Author)

## 2018-01-17 NOTE — MISCELLANEOUS
Message   Recorded as Task   Date: 02/03/2017 01:56 PM, Created By: Jean-Pierre Luevano   Task Name: Care Coordination   Assigned To: [facility] sched,Team   Regarding Patient: Shelby Gomez, Status: Active   Comment:    Blossom Vallecillo - 03 Feb 2017 1:56 PM     TASK CREATED  Caller: Self; Care Coordination; [phone] (Home)  Pt lmom stating that she is still on abt for her eye infection and is cancelling injection scheduled for 2/10  Pt will call when she if free of infection to reschedule appt  Spoke to pt she continues with an eye infection and is on abt eye drops  Pt will call to reschedule once her eye doctor clears her of infection  Jai Moody - 03 Feb 2017 2:47 PM     TASK REPLIED TO: Previously Assigned To [facility] [facility],Team  Aware  Thanks  Jean-Pierer Luevano - 86 Feb 2017 2:53 PM     TASK Tammie Herrera - 17 Feb 2017 11:25 AM     TASK REACTIVATED   Nella Laguerre - 17 Feb 2017 11:26 AM     TASK REPLIED TO: Previously Assigned To [facility] sched,Team  T/c from patient who states her eye infection has resolved and she would like to schedule her injection  Please contact @ [phone]   [facility] TERM - 17 Feb 2017 12:18 PM     TASK IN PROGRESS   Adebayo Davey - 17 Feb 2017 12:20 PM     TASK REPLIED TO: Previously Assigned To [facility] sched,Team  LMOM for pt to cb to reschedule  If pt has NOT taken her Plavix today, we can schedule for Fri 2/24 in [place] with Dr Alyssa Rhoades  (needs to hold for 7 days)    If pt HAS taken Plavix today, we will need a new anti coag form signed by Dr Vinh Oliveira- current hold form only good until 3/1/17  (will need to wait until we receive the signed form back from her dr to schedule)  Adebayo Davey - 17 Feb 2017 2:33 PM     TASK REASSIGNED: Previously Assigned To [facility] sched,Team  Spoke to pt, she did take her Plavix this AM so we cannot add her on 2/24 since she needs to hold Plavix for 7 days   Now the soonest available date we can schedule her for the R L2 & L3 TFESI is 3/3/17 in [place] with Dr Ashia Hyatt  (current hold form expires 3/1/17)    Refaxed anti coag hold request to Dr Chrissy Melo at [phone]  Advised pt she will receive a call from our office when we get the new form back signed  LeahEmili - 22 Feb 2017 4:38 PM     TASK EDITED  **Received Plavix hold approval from Dr Chrissy Melo dated 2/19/17, he has a note on the bottom of form that pt is to stay on ASA 81mg once daily  **    Home # called message said person unavailable please try your call later  Left vm on pt's cell to c/b on Thurs after 8am and s/w the RN at the [facility] office  [facility] c/b # provided  Shalini Sapp - 23 Feb 2017 12:50 PM     TASK EDITED  Calls received on [place] triage line at [phone], 11:18 and 11:56 from pt  and pt 's daughter trying to get rescheduled for TFESI  RN called pt  and daughter and was able to schedule pt  for 3/3 at 9 AM arrival time  Pt  aware that the last day to take Plavix is today 2/23  Pt  is aware that she is only holding the Plavix and that she will continue to take all of her other medications including her ASA  Pt  aware that she will need a , call to reschedule if she becomes ill has a fever or starts antibiotics, wear loose comfortable clothing, may eat and take all medications, except her Plavix which is being held, for up to one prior to opro  Pt  aware that she may be weak in her rt  leg for 6-8 hours post procedure and that she should bring her walker  Pt  verbalized understanding of all information and was appreciative of call  Shalini Sapp - 23 Feb 2017 12:51 PM     TASK COMPLETED   Adebayo Davey - 24 Feb 2017 11:31 AM     TASK REACTIVATED   Adebayo Davey - 24 Feb 2017 11:32 AM     TASK REPLIED TO: Previously Assigned To Anti Coag B,TEAM  Per change in Dr Rajeev Pires schedule, needed to reschedule pt from 3/3 to 3/10  Pt will now be taking her last dose of Plavix on 3/2  Pt understood and agreeable  Active Problems    1  Anemia (285 9) (D64 9)   2  Aortic Valve Replacement   3  Arthritis of knee (716 96) (M19 90)   4  Back pain (724 5) (M54 9)   5  Benign essential HTN (401 1) (I10)   6  Bilateral knee pain (719 46) (M25 561,M25 562)   7  Bilateral knee pain (719 46) (M25 561,M25 562)   8  CAD S/P percutaneous coronary angioplasty (414 01,V45 82) (I25 10,Z98 61)   9  Chalazion of right eye (373 2) (H00 13)   10  Chronic knee instability (718 86) (M23 50)   11  Chronic low back pain (724 2,338 29) (M54 5,G89 29)   12  Chronic pain syndrome (338 4) (G89 4)   13  Dependence on nocturnal oxygen therapy (V46 2) (Z99 81)   14  Edema (782 3) (R60 9)   15  Essential thrombocytosis (238 71) (D47 3)   16  GERD (gastroesophageal reflux disease) (530 81) (K21 9)   17  Hip pain (719 45) (M25 559)   18  History of bilateral knee replacement (V43 65) (Z96 653)   19  Hordeolum externum of left lower eyelid (373 11) (H00 015)   20  Hyperlipidemia (272 4) (E78 5)   21  Hypothyroid (244 9) (E03 9)   22  Irritable bowel syndrome (564 1) (K58 9)   23  Leg wound, right (891 0) (S81 801A)   24  Leukocytosis (288 60) (D72 829)   25  Lumbago (724 2) (M54 5)   26  Lumbar disc herniation with radiculopathy (722 10) (M51 16)   27  Lumbar postlaminectomy syndrome (722 83) (M96 1)   28  Macular degeneration (362 50) (H35 30)   29  Meniere disease (386 00) (H81 09)   30  Myofascial pain syndrome (729 1) (M79 1)   31  Neck pain (723 1) (M54 2)   32  Need for DTP vaccine (V06 1) (Z23)   33  Need for immunization against influenza (V04 81) (Z23)   34  Nonrheumatic aortic valve stenosis (424 1) (I35 0)   35  Other specified symptoms and signs involving the circulatory and respiratory systems    (785 9,786 9) (R09 89)   36  Postop check (V67 00) (Z09)   37  Presence of cardiac pacemaker (V45 01) (Z95 0)   38  S/P TAVR (transcatheter aortic valve replacement) (V43 3) (Z95 2)   39  Sleep apnea in adult (327 23) (G47 30)   40   Thrombocytosis (238 71) (D47 3)   41  Type 2 diabetes mellitus (250 00) (E11 9)    Current Meds   1  Acidophilus Probiotic Blend Oral Capsule; Therapy: (Recorded:88Gzt2696) to Recorded   2  AmLODIPine Besylate 5 MG Oral Tablet; Take 1 tablet daily; Therapy: 47FGT1885 to (Aziza Garth)  Requested for: 18PCL6969; Last   Rx:27Ryw7858 Ordered   3  Aspirin 81 MG Oral Tablet Chewable; CHEW AND SWALLOW 1 TABLET DAILY; Therapy: (Recorded:26May2016) to Recorded   4  Atorvastatin Calcium 20 MG Oral Tablet; TAKE 1 TABLET DAILY; Therapy: 18KKM8379 to (Evaluate:59Wfy4231)  Requested for: 57RGE5246; Last   Rx:13Phj5206 Ordered   5  Clopidogrel Bisulfate 75 MG Oral Tablet; TAKE 1 TABLET DAILY; Therapy: 35XKD0066 to (Aziza Garth)  Requested for: 64BJJ8186; Last   Rx:59Wnw5135 Ordered   6  Dulcolax Stool Softener CAPS; Therapy: (Recorded:92Jog1601) to Recorded   7  Erythromycin 5 MG/GM Ophthalmic Ointment; Apply 1 cm ribbon to right eye 4 times   daily x 7 days; Therapy: 39CLO4291 to (Last TB:03FCO5112)  Requested for: 21Jan2017 Ordered   8  Furosemide 40 MG Oral Tablet; Take 1 tablet daily; Therapy: 95Vsp3065 to (Evaluate:14Oct2017)  Requested for: 19Oct2016; Last   Rx:19Oct2016 Ordered   9  Levothyroxine Sodium 50 MCG Oral Tablet; TAKE 1 TABLET DAILY; Therapy: 32BBZ5170 to [phone])  Requested for: 86HUG8615; Last   Rx:01Feb2017 Ordered   10  LORazepam 0 5 MG Oral Tablet; TAKE 0 05 TABLET 3 times daily; Therapy: 12ZPJ5350 to (Evaluate:85Iuy9708); Last Rx:18Jan2017 Ordered   11  MetFORMIN HCl - 500 MG Oral Tablet; TAKE 1 TABLET TWICE DAILY  Requested for:    19Oct2016; Last Rx:19Oct2016 Ordered   12  Metoprolol Tartrate 50 MG Oral Tablet; Take 1 tablet twice daily; Therapy: 31Abj1378 to (Evaluate:87Pcx4227)  Requested for: 23Ymj6177; Last    Rx:76Xrh5411 Ordered   13  MiraLax POWD (Polyethylene Glycol 3350); Therapy: (Recorded:28Cbf8391) to Recorded   14   Nitrostat 0 4 MG Sublingual Tablet Sublingual (Nitroglycerin); DISSOLVE 1 TABLET    UNDER THE TONGUE AS NEEDED FOR CHEST PAIN Recorded   15  Ocuvite TABS; TAKE 1 TABLET DAILY; Therapy: (Recorded:49Hrx0234) to Recorded   16  OneTouch Ultra Blue In Vitro Strip; TEST ONCE DAILY; Therapy: 13Qni9555 to (Anny Danielle)  Requested for: 96YXV2402; Last    Rx:13Dty0860 Ordered   17  Potassium Chloride ER 20 MEQ Oral Tablet Extended Release; Take 1 tablet daily; Therapy: 63Miq0214 to (Evaluate:67Nfq9540)  Requested for: 83GHR2559; Last    Rx:58Uth2654 Ordered   18  TraMADol HCl - 50 MG Oral Tablet; Take 1 tablet every 6 hours as needed for pain; Therapy: 02Fip3045 to (Evaluate:70Rpm9088); Last Rx:36Gwe0141 Ordered   19  Vitamin C TABS; Therapy: (Marybeth Hooper) to Recorded   20  Vitamin D TABS; Therapy: (Recorded:76Gkq5659) to Recorded    Allergies    1   No Known Drug Allergies    Signatures   Electronically signed by : Yoseph Morales, ; Feb 24 2017 11:32AM EST                       (Author)

## 2018-01-18 ENCOUNTER — GENERIC CONVERSION - ENCOUNTER (OUTPATIENT)
Dept: OTHER | Facility: OTHER | Age: 83
End: 2018-01-18

## 2018-01-18 NOTE — RESULT NOTES
Verified Results  (1) BASIC METABOLIC PROFILE 83QWT4544 01:08PM Prakash Velarde   [facility]ly [facility] Kidney Disease Education Program recommendations are as follows:  GFR calculation is accurate only with a steady state creatinine  Chronic Kidney disease less than 60 ml/min/1 73 sq  meters  Kidney failure less than 15 ml/min/1 73 sq  meters  Test Name Result Flag Reference   GLUCOSE,RANDM 117 mg/dL     SODIUM 139 mmol/L  136-145   POTASSIUM 4 3 mmol/L  3 5-5 3   CHLORIDE 101 mmol/L  100-108   CARBON DIOXIDE 26 mmol/L  21-32   ANION GAP (CALC) 12 mmol/L  4-13   BLOOD UREA NITROGEN 29 mg/dL H 5-25   CREATININE 1 93 mg/dL H 0 60-1 30   CALCIUM 9 7 mg/dL  8 3-10 1   eGFR Non-African American 24 9 ml/min/1 73sq m       (1) LIPID PANEL, FASTING 27OCQ7028 01:08PM Prakash Velarde   Triglyceride:         Normal              <150 mg/dl       Borderline High    150-199 mg/dl       High               200-499 mg/dl       Very High          >499 mg/dl  Cholesterol:         Desirable        <200 mg/dl      Borderline High  200-239 mg/dl      High             >239 mg/dl  HDL Cholesterol:        High    >59 mg/dL      Low     <41 mg/dL  LDL CALCULATED:    This screening LDL is a calculated result  It does not have the accuracy of the Direct Measured LDL in the monitoring of patients with hyperlipidemia and/or statin therapy  Direct Measure LDL (PSK190) must be ordered separately in these patients       Test Name Result Flag Reference   CHOLESTEROL 199 mg/dL     HDL,DIRECT 44 mg/dL  40-60   LDL CHOLESTEROL CALCULATED 120 mg/dL H 0-100   TRIGLYCERIDES 177 mg/dL H <=150

## 2018-01-18 NOTE — PROGRESS NOTES
Assessment   1  Aftercare following other surgery of musculoskeletal system (V58 78) (Z47 89)    Plan   Aftercare following other surgery of musculoskeletal system    · Follow-up Visit in 4 Weeks Evaluation and Treatment  Follow-up  Status: Hold For -    Scheduling  Requested for: 96QXA4152    Discussion/Summary      Patient seen and examined by Dr Patricia Nolasco and myself  She is about 2 weeks s/p right IMN fixation for closed intertrochanteric fracture (1/2/2018)  X-rays in office today appear stable  incisions are c/d/i, staples removed and steri-strips applied  RLE, continue physical therapy  in one month for re-evaluation and new x-rays  The patient, patient's family was counseled regarding diagnostic results,-- instructions for management,-- importance of compliance with treatment  The treatment plan was reviewed with the patient/guardian  The patient/guardian understands and agrees with the treatment plan      Chief Complaint   1  Leg Pain  Post-op visit      Post-Op   Post-Op Hip:      [address] is status post hip fracture repair of the right hip on 1/2/2018  Right IMN fixation (antegrade) for closed intertrochanteric fracture  HPI:    PE:    Assessment:    Plan:     HPI: Patient presents for a post-op visit  She is about 2 weeks s/p right IMN fixation for IT fracture  Today she reports her right hip pain is improving and she is starting to progress with therapy  She notes intermittent clear fluid drainage from most proximal incision, no pus drainage, erythema, denies fever/chills  No numbness or tingling  No other complaints today  Review of Systems        Constitutional: no fever-- and-- no chills  Cardiovascular: No complaints of chest pain, no palpitations, no leg claudication or lower extremity edema  Respiratory: no compliants of shortness of breath, no wheezing, no cough        Gastrointestinal: no complaints of abdominal pain, no constipation, no nausea or diarrhea, no vomiting, no bloody stools  Musculoskeletal: arthralgias-- and-- limb pain  Integumentary: no skin wound  Neurological: no numbness-- and-- no tingling  ROS reviewed  Active Problems   1  Anemia, iron deficiency (280 9) (D50 9)   2  Back pain (724 5) (M54 9)   3  Benign essential HTN (401 1) (I10)   4  Bilateral knee pain (719 46) (M25 561,M25 562)   5  CAD S/P percutaneous coronary angioplasty (414 01,V45 82) (I25 10,Z98 61)   6  Chronic low back pain (724 2,338 29) (M54 5,G89 29)   7  Dementia (294 20) (F03 90)   8  Dependence on nocturnal oxygen therapy (V46 2) (Z99 81)   9  Edema (782 3) (R60 9)   10  Essential thrombocytosis (238 71) (D47 3)   11  GERD (gastroesophageal reflux disease) (530 81) (K21 9)   12  Hordeolum externum of left lower eyelid (373 11) (H00 015)   13  Hyperlipidemia (272 4) (E78 5)   14  Hypochloremia (276 9) (E87 8)   15  Hyponatremia (276 1) (E87 1)   16  Hypothyroid (244 9) (E03 9)   17  Irritable bowel syndrome (564 1) (K58 9)   18  Lumbago (724 2) (M54 5)   19  Lumbar disc herniation with radiculopathy (722 10) (M51 16)   20  Macular degeneration (362 50) (H35 30)   21  Meniere disease (386 00) (H81 09)   22  Need for influenza vaccination (V04 81) (Z23)   23  Nonrheumatic aortic valve stenosis (424 1) (I35 0)   24  Presence of cardiac pacemaker (V45 01) (Z95 0)   25  S/p TAVR (transcatheter aortic valve replacement), bioprosthetic (V42 2) (Z95 3)   26  Sleep apnea in adult (327 23) (G47 30)   27  Type 2 diabetes mellitus (250 00) (E11 9)   28  Viral respiratory infection (079 99) (J98 8,B97 89)    Social History    · Denied: Alcohol use (V49 89) (Z78 9)   · Denied: Drug use (305 90) (F19 90)   · Lives in independent group home (V60 89) (Z59 8)   · Never a smoker   · Non drinker / no alcohol use  The social history was reviewed and updated today  Current Meds    1  Acidophilus Probiotic Blend Oral Capsule;      Therapy: (Recorded:29Gfx7769) to Recorded 2  AmLODIPine Besylate 5 MG Oral Tablet; Take 1 tablet daily; Therapy: 85XRH8735 to (Jerry Bidding)  Requested for: 69YDP7514; Last     Rx:56Svb4751 Ordered   3  Amoxicillin CAPS; Therapy: (Recorded:01Nov2017) to Recorded   4  Aspirin 81 MG Oral Tablet Chewable; CHEW AND SWALLOW 1 TABLET DAILY; Therapy: (Recorded:18Rzl3925) to Recorded   5  Atorvastatin Calcium 20 MG Oral Tablet; TAKE 1 TABLET DAILY; Therapy: 84RFZ4510 to (Evaluate:56Mod0855)  Requested for: 87GWG2154; Last     Rx:70Zaw6557 Ordered   6  Ferrous Sulfate 325 MG CAPS; take 1 capsule daily; Therapy: (Recorded:10Aug2017) to Recorded   7  Furosemide 20 MG Oral Tablet; Take 1 tablet daily; Therapy: 01Aug2016 to (Evaluate:13Oct2018)  Requested for: 17WEG9634; Last     Rx:18Oct2017 Ordered   8  Levothyroxine Sodium 50 MCG Oral Tablet; TAKE 1 TABLET DAILY; Therapy: 89KAZ1571 to (KJDIXKKV:74OMP2431)  Requested for: 79BBA5547; Last     Rx:07Jun2017 Ordered   9  LORazepam 0 5 MG Oral Tablet; TAKE 0 05 TABLET 3 times daily; Therapy: 02BQY0168 to 21 ); Last Rx:81Xbk1186 Ordered   10  Meclizine HCl - 12 5 MG Oral Tablet; One pill Twice a day; Therapy: 87IAO9564 to (Ty Pozo)  Requested for: 10OGO2315; Last      Rx:72Kzd5542 Ordered   11  MetFORMIN HCl - 500 MG Oral Tablet; TAKE 1 TABLET TWICE DAILY  Requested for:      19Oct2016; Last Rx:19Oct2016 Ordered   12  Metoprolol Tartrate 50 MG Oral Tablet; Take 1 tablet twice daily; Therapy: 21Png6215 to (Evaluate:47Qvo0096)  Requested for: 88Hep0179; Last      Rx:06Wvv9575 Ordered   13  Multiple Vitamin TABS; Therapy: (Recorded:10Aug2017) to Recorded   14  Nitrostat 0 4 MG Sublingual Tablet Sublingual; DISSOLVE 1 TABLET UNDER THE      TONGUE AS NEEDED FOR CHEST PAIN Recorded   15  Ocuvite TABS; TAKE 1 TABLET DAILY; Therapy: (Recorded:94Jzk8145) to Recorded   16   Omeprazole 20 MG Oral Capsule Delayed Release; TAKE 1 CAPSULE BY MOUTH TWO TIMES DAILY; Therapy: 52Xvo4866 to (Evaluate:30Mar2018)  Requested for: 31Oct2017; Last      Rx:31Oct2017 Ordered   17  OneTouch Ultra Blue In Vitro Strip; TEST ONCE DAILY; Therapy: 92Dar4127 to [phone])  Requested for: 45ULM6225; Last      Rx:82Wpt3750 Ordered   18  Potassium Chloride ER 20 MEQ Oral Tablet Extended Release; Take 1 tablet daily; Therapy: 44UID1504 to (Evaluate:08Jun2018)  Requested for: 36DQO8841; Last      Rx:13Jun2017 Ordered   19  Tobramycin-Dexamethasone 0 3-0 1 % Ophthalmic Suspension; INSTILL 1 DROP IN      BOTH EYE FOUR TIMES DAILY FOR 1 WEEK; Therapy: 45FOO4765 to (Evaluate:08Nov2017)  Requested for: 57TXI2898; Last      Rx:01Nov2017 Ordered   20  Vitamin C TABS; Therapy: (Robert Comer) to Recorded   21  Vitamin D TABS; Therapy: (Recorded:20Vyt1563) to Recorded     The medication list was reviewed and updated today  Allergies   1  Advil   2  Aleve TABS    Vitals    Recorded: 41TGB1808 09:08AM   Heart Rate 943   Systolic 738   Diastolic 78   Height 5 ft 2 in   Weight 190 lb    BMI Calculated 34 75   BSA Calculated 1 87     Physical Exam        Constitutional - General appearance: Normal       Musculoskeletal - Lower extremity compartments: Normal       Cardiovascular - Pulses: Normal       Respiratory - Lungs - Clear to auscultation bilaterally, no rales, no rhonci, no wheezes  Abdomen - Abdomen - Soft, nontender, nondistended  Skin - Skin and subcutaneous tissue: Normal       Neurologic - Lower extremity peripheral neuro exam: Normal       Psychiatric - Orientation to person, place, and time: Normal       Free Text - NAD  Patient examined in stretcher  Right hip reveals incisions and staples are c/d/i, no evidence of pus drainage or erythema, no wound dehiscence  Minimally TTP over the right hip  She is able to flex the hip joint  Normal ankle plantar and dorsiflexion  No pitting edema or calf tenderness   Feet are warm and well perfused  Attending Note   Attending Note [facility]: Level of Participation: I was present in clinic and examined the patient  Patient's History: Patient is status post left femur IM nail fixation for subtrochanteric femur fracture  On today's visit she reports generalized hip pain related surgery  She also reports history of left footdrop  She has a history of left knee replacement with complications as well as history of previous lumbar spine surgery  Diagnosis and Plan: Status post left hip/femur IM nail fixation  Weightbearing as tolerated  Continue physical therapy  Follow-up in 4 weeks for re-evaluation and new x-rays        Future Appointments      Date/Time Provider Specialty Site   07/10/2018 08:30 AM Cardiology, [address] [facility]   04/06/2018 01:00 PM Cardiology, Device Remote   [facility] [address]   01/19/2018 08:00 AM Salud Ryan, [facility]phrology [address] [facility]     Signatures    Electronically signed by : Julien Romano [facility]; Jan 17 2018  9:37AM EST                       (Author)     Electronically signed by : COLT Manley ; Jan 17 2018  9:51AM EST                       (Author)

## 2018-01-18 NOTE — RESULT NOTES
Message   Recorded as Task   Date: 03/16/2017 02:45 PM, Created By: Angela Clemens   Task Name: Follow Up   Assigned To: [address] [facility] [address] [facility] Procedure,Team   Regarding Patient: Jerome Langford, Status: Active   Comment:    Hailey Reyes - 16 Mar 2017 2:45 PM     TASK CREATED  Patient reports she received 25% relief rom her procedure  Patient ware of the 2wk wait  Her pain leve today is 6  S/p R L2 AND L3 TFESI /PLAVIX-CONFIR CHANGE done3/10/17  Antonia Knight - 16 Mar 2017 3:30 PM     TASK REPLIED TO: Previously Assigned To Jesus Kim          Signatures   Electronically signed by : Arlen Smith, ; Mar 24 2017 11:51AM EST                       (Author)

## 2018-01-19 ENCOUNTER — ALLSCRIPTS OFFICE VISIT (OUTPATIENT)
Dept: OTHER | Facility: OTHER | Age: 83
End: 2018-01-19

## 2018-01-20 NOTE — PROGRESS NOTES
Assessment   1  Benign essential HTN (401 1) (I10)   2  Hyponatremia (276 1) (E87 1)   3  CKD (chronic kidney disease), stage II (585 2) (N18 2)    Discussion/Summary      Patient is an 66-year-old female who presents for hospital follow-up  She was admitted to [facility] from 1/1-1/5 after a fall and suffered a femur fracture  On the day of admission she went to the OR for an intramedullary nail  Postoperatively she developed some acute kidney injury and renal was consulted  1  Acute kidney injury: Suspected ATN from intraoperative hypotension and acute anemia  Her creatinine increased from 1 1 to 1 69 the next day  It then slowly started improved and on discharge was down to 0 9  She had repeat labs yesterday and creatinine was 0 88  2  CKD IIIA: Baseline creatinine 0 9-1 1 which corresponds to a GFR in the 50s   3  Hyponatremia: Sodium dropped to 127 postoperatively  This was felt to be some hypovolemia and did improved to 131 on discharge with IV fluids  Repeat labs yesterday showed a sodium of 140  WIll d/c oral fluid restriction and repeat BMP in 2 weeks to make sure sodium is stable  4  Acute blood-loss anemia: Hemoglobin was 13 preoperatively and dropped down to 7 1  She did receive transfusions and discharge hemoglobin was 8 6    5  Femur fracture: Status post intramedullary nail Hypertension: BP slightly above goal and also a HR on the higher side  Restart her prior amlodipine 5mg daily  s/p TAVR: last ECHO showed EF 55% in July and did not mention diastolic dysfunction  Currently euvolemic  Ok to restart lasix from a renal standpoint if edema or breathing worsens    patient is doing well from a renal standpoint  Follow up prn  Continue to follow and we will gladly see her again if sodium drops or creatinine worsens  Reason For Visit   Hospital follow up      History of Present Illness   Patient presents for hospital follow up   She was recently admitted with a femur fracture and underwent fixation  She is now at rehab and is working with therapy  Overall she has been feeling well  She does complain of urinary hesitancy and occasional dysuria  She states a urine sample was sent last night  She has no shortness of breath but does wear O2 at night (which is chronic for the past year)  She has been off her lasix since discharge and has no edema  She also noticed her BP and heart rate has been a little higher than normal lately but she has been off her amlodipine since discharge  She is on a fluid restriction for her hyponatremia  She states she did have hyponatremia in the past but at that time she was on a thiazide  Review of Systems        Constitutional: No complaints of fever, no chills, no anorexia, no tiredness, no recent weight gain or weight loss  Integumentary: No complaints of skin rash  Gastrointestinal: No complains of abdominal pain, no constipation or diarrhea, no nausea or vomiting  Respiratory: No complaints of shortness of breath, no cough, no productive sputum  Cardiovascular: No complaints of orthopnea, no PND, no chest pain, no palpitations, no lower extremity edema  Musculoskeletal: No complaints of joint pain or swelling  Neurological: No complaints of headache, no lightheadedness or dizziness  Genitourinary: dysuria-- and-- urinary frequency, but-- no hematuria,-- no nocturia,-- no incomplete emptying of bladder-- and-- no foamy urine  Past Medical History      The active problems and past medical history were reviewed and updated today  Surgical History      The surgical history was reviewed and updated today  Family History      The family history was reviewed and updated today  Social History   The social history was reviewed and updated today  The social history was reviewed and is unchanged  Current Meds    1  AmLODIPine Besylate 5 MG Oral Tablet; TAKE 1 TABLET DAILY;      Therapy: 48GPW7411 to (Evaluate:08Nsx6326) Recorded   2  AmLODIPine Besylate 5 MG Oral Tablet; TAKE ONE TABLET BY MOUTH ONCE A DAY; Therapy: 73LFW0592 to (Evaluate:96Saf8443) Recorded   3  Aspirin 81 MG Oral Tablet Chewable; CHEW AND SWALLOW 1 TABLET DAILY; Therapy: (Recorded:07Hbr5713) to Recorded   4  Atorvastatin Calcium 20 MG Oral Tablet; TAKE 1 TABLET DAILY; Therapy: 20TUA8236 to (Evaluate:35Ymn2082)  Requested for: 49ISC4084; Last     Rx:86Imn9575 Ordered   5  Levothyroxine Sodium 50 MCG Oral Tablet; TAKE 1 TABLET DAILY; Therapy: 70DQO7924 to (ILVFUTAM:94YJS7787)  Requested for: 02HTG6545; Last     Rx:14Irr5989 Ordered   6  Lovenox 40 MG/0 4ML Subcutaneous Solution; Inject 4ml daily; Therapy: 05JZG1970 to Recorded   7  MetFORMIN HCl - 500 MG Oral Tablet; TAKE 1 TABLET TWICE DAILY  Requested for:     19Oct2016; Last Rx:81Nxu6127 Ordered   8  Metoprolol Tartrate 50 MG Oral Tablet; Take 1 tablet twice daily; Therapy: 38Lmn1534 to (Evaluate:42Uvu0804)  Requested for: 55Dmj5689; Last     Rx:34Voa1605 Ordered   9  Milk of Magnesia 400 MG/5ML Oral Suspension; Use as needed; Therapy: 60TUV8184 to Recorded   10  MiraLax Oral Packet; MIX 1 PACKET IN 8 OUNCES OF LIQUID AND DRINK ONCE DAILY; Therapy: 45YNG5653 to (Evaluate:70Lqn1957) Recorded   11  Ocuvite TABS; TAKE 1 TABLET DAILY; Therapy: (Recorded:43Ogz4845) to Recorded   12  Omeprazole 20 MG Oral Capsule Delayed Release; TAKE 1 CAPSULE BY MOUTH TWO      TIMES DAILY; Therapy: 98Enu3198 to (Evaluate:30Mar2018)  Requested for: 31Oct2017; Last      Rx:31Oct2017 Ordered   13  OneTouch Ultra Blue In Vitro Strip; TEST ONCE DAILY; Therapy: 17Bgg1737 to (21 )  Requested for: 13ROF6207; Last      Rx:07Bqk2122 Ordered   14  Pantoprazole Sodium 40 MG Oral Tablet Delayed Release; TAKE 1 TABLET DAILY; Therapy: 79WNL3496 to Recorded   15  Potassium Chloride ER 20 MEQ Oral Tablet Extended Release; Take 1 tablet daily;       Therapy: 30HIY0332 to (Evaluate:08Jun2018)  Requested for: 22KSR9762; Last      Rx:13Jun2017 Ordered   16  Tobramycin-Dexamethasone 0 3-0 1 % Ophthalmic Suspension; INSTILL 1 DROP IN      BOTH EYE FOUR TIMES DAILY FOR 1 WEEK; Therapy: 37YFV5279 to (Evaluate:08Nov2017)  Requested for: 15FII2734; Last      Rx:01Nov2017 Ordered   17  Tylenol 325 MG Oral Tablet; TAKE 1 TO 2 TABLETS EVERY 4 HOURS AS NEEDED; Therapy: 24FLX8333 to Recorded     The medication list was reviewed and updated today  Allergies   1  Advil   2  Aleve TABS    Vitals   Vital Signs    Recorded: 52ROU3835 08:47AM Recorded: 69UYK1557 08:08AM   Heart Rate 96    Systolic 727    Diastolic 82    Height  5 ft 2 in   Weight  188 lb 4 0 oz   BMI Calculated  34 43   BSA Calculated  1 86     Physical Exam        Constitutional: General appearance: No acute distress, well appearing and well nourished  ENT: External ears and nose appear normal          Eyes: Anicteric sclerae  Pulmonary: Respiratory effort: No increased work of breathing or signs of respiratory distress  -- Auscultation of lungs: Clear to auscultation  Cardiovascular: Auscultation of heart: Normal rate and rhythm, normal S1 and S2, without murmurs  Abdomen: Non-tender, no masses  Extremities: No cyanosis, clubbing or edema  Rash: No rash present        Neurologic: Non Focal          Psychiatric: Orientation to person, place, and time: Normal  -- and-- Mood and affect: Normal        Results/Data   Epifanio Fisher - Eye Exam 87LUH0728 12:00AM       Test Name Result Flag Reference   Retinopathy Screening      No Retinopathy on exam      * XR FEMUR 2 VIEW RIGHT 17Jan2018 09:11AM Alfonso Bodily Order Number: [id number]      Performing Comments: rm 4      Test Name Result Flag Reference   XR FEMUR 2 VW RIGHT (Report)     RIGHT FEMUR           INDICATION: Right femoral fracture, postsurgical assessment           COMPARISON: January 2, 2018           VIEWS: AP and "lateral;           IMAGES: 5           FINDINGS:           Patient is status post ORIF of comminuted right intertrochanteric fracture  Osseous alignment is unchanged from the intraoperative images  Intramedullary kindra and dynamic hip screw positioning appears appropriate  There is a right knee prosthesis  Expected postoperative changes are seen in the adjacent soft tissues  IMPRESSION:           Compared with January 2, 2018, unchanged satisfactory postoperative appearance/alignment of the right hip status post ORIF                 Workstation performed: [id number]           Signed by:      Tru Atkinson MD      1/18/18      Cardiac Device Remote 18LFT3205 01:42PM Tim Chema      Test Name Result Flag Reference   MISCELLANEOUS COMMENT      BIOTRONIK TRANSMISSION: BATTERY STATUS "OK"  AP-0% - 100% (>40%/DEPENDENT)  ALL AVAILABLE LEAD PARAMETERS APPEAR WITHIN NORMAL LIMIT & STABLE  NO SIGNIFICANT HIGH RATE EPISODES  PACEMAKER FUNCTIONING APPROPRIATELY  eb   Cardiac Electrophysiology Report      [id number] 1 5 18 pdf   DEVICE TYPE Pacemaker        Cardiac Electrophysiology Report [id number] 01:42PM Tim Chema      Test Name Result Flag Reference   Cardiac Electrophysiology Report      [id number]  pdf        Future Appointments      Date/Time Provider Specialty Site   07/10/2018 08:30 AM Cardiology, 2021 Radha Tobar [facility]   04/06/2018 01:00 PM Cardiology, Device Remote   [facility]     Signatures    Electronically signed by : Kylah Juárez [facility]; Jan 19 2018  8:48AM EST                       (Author)     Electronically signed by : Suly Patel DO; Jan 19 2018  9:55AM EST                       (Author)     "

## 2018-01-22 VITALS
SYSTOLIC BLOOD PRESSURE: 144 MMHG | HEIGHT: 62 IN | WEIGHT: 188.25 LBS | HEART RATE: 96 BPM | DIASTOLIC BLOOD PRESSURE: 82 MMHG | BODY MASS INDEX: 34.64 KG/M2

## 2018-01-23 VITALS
HEIGHT: 62 IN | DIASTOLIC BLOOD PRESSURE: 78 MMHG | SYSTOLIC BLOOD PRESSURE: 127 MMHG | WEIGHT: 190 LBS | HEART RATE: 106 BPM | BODY MASS INDEX: 34.96 KG/M2

## 2018-01-23 NOTE — MISCELLANEOUS
History of Present Illness  TCM Communication [facility]: The patient is being contacted for follow-up after hospitalization  She was hospitalized at [facility]  The date of admission: 1/1/18, date of discharge: 1/5/18  Diagnosis: Complete atrioventricular block  She was discharged to home  She did not schedule a follow up appointment  Communication performed and completed by Leelee Manuel   HPI: Patient will follow up with specialist      Active Problems    1  Aftercare following other surgery of musculoskeletal system (V58 78) (Z47 89)   2  Anemia, iron deficiency (280 9) (D50 9)   3  Back pain (724 5) (M54 9)   4  Benign essential HTN (401 1) (I10)   5  Bilateral knee pain (719 46) (M25 561,M25 562)   6  CAD S/P percutaneous coronary angioplasty (414 01,V45 82) (I25 10,Z98 61)   7  Chronic low back pain (724 2,338 29) (M54 5,G89 29)   8  Dementia (294 20) (F03 90)   9  Dependence on nocturnal oxygen therapy (V46 2) (Z99 81)   10  Edema (782 3) (R60 9)   11  Essential thrombocytosis (238 71) (D47 3)   12  GERD (gastroesophageal reflux disease) (530 81) (K21 9)   13  Hordeolum externum of left lower eyelid (373 11) (H00 015)   14  Hyperlipidemia (272 4) (E78 5)   15  Hypochloremia (276 9) (E87 8)   16  Hyponatremia (276 1) (E87 1)   17  Hypothyroid (244 9) (E03 9)   18  Irritable bowel syndrome (564 1) (K58 9)   19  Lumbago (724 2) (M54 5)   20  Lumbar disc herniation with radiculopathy (722 10) (M51 16)   21  Macular degeneration (362 50) (H35 30)   22  Meniere disease (386 00) (H81 09)   23  Need for influenza vaccination (V04 81) (Z23)   24  Nonrheumatic aortic valve stenosis (424 1) (I35 0)   25  Presence of cardiac pacemaker (V45 01) (Z95 0)   26  S/p TAVR (transcatheter aortic valve replacement), bioprosthetic (V42 2) (Z95 3)   27  Sleep apnea in adult (327 23) (G47 30)   28  Type 2 diabetes mellitus (250 00) (E11 9)   29   Viral respiratory infection (079 99) (J98 8,B97 89)    Past Medical History    1  History of Acute UTI (599 0) (N39 0)   2  History of Aortic Valve Replacement   3  History of Arthritis of knee (716 96) (M17 10)   4  History of Bilateral knee pain (719 46) (M25 561,M25 562)   5  History of Bilateral knee pain (719 46) (M25 561,M25 562)   6  History of Calcaneal spur (726 73) (M77 30)   7  History of Cervicalgia (723 1) (M54 2)   8  History of Chalazion of right eye (373 2) (H00 13)   9  History of Chronic knee instability (718 86) (M23 50)   10  History of Chronic pain syndrome (338 4) (G89 4)   11  History of Diverticulitis (562 11) (K57 92)   12  History of Hip pain (719 45) (M25 559)   13  History of anemia (V12 3) (Z86 2)   14  History of bilateral knee replacement (V43 65) (Z96 653)   15  History of leukocytosis (V12 3) (Z86 2)   16  History of osteopenia (V13 59) (Z87 39)   17  History of thrombocytosis (V12 3) (Z86 2)   18  History of Hordeolum externum of left lower eyelid (373 11) (H00 015)   19  History of Leg wound, right (891 0) (S81 801A)   20  History of Lumbar postlaminectomy syndrome (722 83) (M96 1)   21  History of Meniere's disease (386 00) (H81 09)   22  History of Myofascial pain syndrome (729 1) (M79 1)   23  History of Neck pain (723 1) (M54 2)   24  History of Need for DTP vaccine (V06 1) (Z23)   25  History of Need for immunization against influenza (V04 81) (Z23)   26  History of Other specified symptoms and signs involving the circulatory and respiratory    systems (785 9,786 9) (R09 89)   27  History of S/P TAVR (transcatheter aortic valve replacement) (V43 3) (Z95 2)    Surgical History    1  History of Aortic Valve Replacement Transcatheter   2  History of Appendectomy   3  History of Arthrodesis Lumbar   4  History of Back Surgery   5  History of Cholecystectomy   6  History of Eye Surgery   7  History of Hysterectomy   8  History of Pacemaker Placement   9  History of Small Bowel Resection   10  History of Tonsillectomy   11   History of Total Knee Arthroplasty   12  History of Venous Ligation With Stripping    Family History  Father    1  Family history of CAD in native artery  Family History    2  Family history of CAD in native artery   3  Family history of cerebrovascular accident (CVA) (V17 1) (Z82 3)   4  Family history of osteopenia (V17 89) (Z82 69)   5  Family history of Pituitary disease   6  Family history of Polycythemia    Social History    · Denied: Alcohol use (V49 89) (Z78 9)   · Denied: Drug use (305 90) (F19 90)   · Lives in independent group home (V60 89) (Z59 8)   · Never a smoker   · Non drinker / no alcohol use    Current Meds   1  Acidophilus Probiotic Blend Oral Capsule; Therapy: (Recorded:23Chr3934) to Recorded   2  AmLODIPine Besylate 5 MG Oral Tablet; Take 1 tablet daily; Therapy: 77HXD3819 to (Kojo Page)  Requested for: 20ROZ5874; Last   Rx:94Rvk1312 Ordered   3  Amoxicillin CAPS; Therapy: (Recorded:01Nov2017) to Recorded   4  Aspirin 81 MG Oral Tablet Chewable; CHEW AND SWALLOW 1 TABLET DAILY; Therapy: (Recorded:48Mjl0181) to Recorded   5  Atorvastatin Calcium 20 MG Oral Tablet; TAKE 1 TABLET DAILY; Therapy: 70KQG1715 to (Evaluate:55Nxg2256)  Requested for: 26IJM8943; Last   Rx:31Bny8669 Ordered   6  Ferrous Sulfate 325 MG CAPS; take 1 capsule daily; Therapy: (Recorded:10Aug2017) to Recorded   7  Furosemide 20 MG Oral Tablet; Take 1 tablet daily; Therapy: 40Suw6728 to (Evaluate:13Oct2018)  Requested for: 80QZY5685; Last   Rx:18Oct2017 Ordered   8  Levothyroxine Sodium 50 MCG Oral Tablet; TAKE 1 TABLET DAILY; Therapy: 39YLX9036 to (TAQGZNWU:30VSJ0430)  Requested for: 08VGM6286; Last   Rx:51Rur1198 Ordered   9  LORazepam 0 5 MG Oral Tablet; TAKE 0 05 TABLET 3 times daily; Therapy: 21CIF9470 to Yahir Cronin); Last Rx:33Sgc0437 Ordered   10  Meclizine HCl - 12 5 MG Oral Tablet; One pill Twice a day;     Therapy: 13PCX8836 to (German Cabrera)  Requested for: 42AYS4135; Last    Rx:86Iac6100 Ordered 11  MetFORMIN HCl - 500 MG Oral Tablet; TAKE 1 TABLET TWICE DAILY  Requested for:    20KLV0777; Last Rx:93Evz3738 Ordered   12  Metoprolol Tartrate 50 MG Oral Tablet; Take 1 tablet twice daily; Therapy: 04Ekf4410 to (Evaluate:49Doe4913)  Requested for: 29Nkt3876; Last    Rx:37Zxy7387 Ordered   13  Multiple Vitamin TABS; Therapy: (Recorded:43Rpa3525) to Recorded   14  Nitrostat 0 4 MG Sublingual Tablet Sublingual; DISSOLVE 1 TABLET UNDER THE    TONGUE AS NEEDED FOR CHEST PAIN Recorded   15  Ocuvite TABS; TAKE 1 TABLET DAILY; Therapy: (Recorded:56Aae8780) to Recorded   16  Omeprazole 20 MG Oral Capsule Delayed Release; TAKE 1 CAPSULE BY MOUTH TWO    TIMES DAILY; Therapy: 43Jvv1317 to (Evaluate:30Mar2018)  Requested for: 31Oct2017; Last    Rx:82Uwm1144 Ordered   17  OneTouch Ultra Blue In Vitro Strip; TEST ONCE DAILY; Therapy: 44Hnh6113 to ([id number] [id number])  Requested for: 18JFU1077; Last    Rx:01Yky9646 Ordered   18  Potassium Chloride ER 20 MEQ Oral Tablet Extended Release; Take 1 tablet daily; Therapy: 16RYQ7281 to (Evaluate:08Jun2018)  Requested for: 57OGH9739; Last    Rx:13Jun2017 Ordered   19  Tobramycin-Dexamethasone 0 3-0 1 % Ophthalmic Suspension; INSTILL 1 DROP IN    BOTH EYE FOUR TIMES DAILY FOR 1 WEEK; Therapy: 67SCK6582 to (Evaluate:08Nov2017)  Requested for: 66DAR7681; Last    Rx:01Nov2017 Ordered   20  Vitamin C TABS; Therapy: (Tawanda Arias) to Recorded   21  Vitamin D TABS; Therapy: (Recorded:73Yym4355) to Recorded    Allergies    1  Advil   2  Aleve TABS    Future Appointments    Date/Time Provider Specialty Site   07/10/2018 08:30 AM Cardiology, 2021 Radha Morris   04/06/2018 01:00 PM Cardiology, Device Remote  [address]     Signatures   Electronically signed by :  David Briggs, ; Jan 18 2018  6:18PM EST                       (Author)    Electronically signed by : COLT Harris ; Jan 19 2018  7:44AM EST                       (Author)

## 2018-01-23 NOTE — MISCELLANEOUS
Assessment   1  Edema (782 3) (R60 9)1   2  Sleep apnea in adult (327 23) (G47 33)1   3  Type 2 diabetes mellitus (250 00) (E11 9)1   4  Nonrheumatic aortic valve stenosis (424 1) (I35 0)1      1 Amended By: Za Douglas; Aug 01 2016 12:26 PM EST    Plan   We will discontinue the patient's hydrochlorothiazide  In its place we will start furosemide 40 mg daily to be taken in the morning  A basic metabolic profile request was provided to the patient  This is to be performed later this week  Discussion/Summary   In summary this 59-year-old female patient presents today for transition of care  She is status post trans-vascular aortic valve replacement for aortic stenosis  She does have some shortness of breath as well as peripheral edema  She denies any excessive fluid consumption she also denies any chest pain or palpitations  We will adjust her diarrhetic therapy by discontinuing her hydrochlorothiazide and replacing with furosemide examine the patient in 1 week's time  She has a history of sleep apnea and uses oxygen at night and we will check a overnight oxygen oximetry study  Her type 2 diabetes appears to be well controlled and her foot exam is normal on today's visit  Chief Complaint  Chief Complaint Free Text Note Form: His is a transition of care visit for this 59-year-old female patient  She is status post percutaneous replacement of her aortic valve for treatment of aortic stenosis  The procedure was performed by Dr Myke Kang  The patient still has occasional shortness of breath on exertion denies any palpitations or chest discomfort  Does have bilateral lower leg edema1        1 Amended By: Za Douglas; Aug 01 2016 12:21 PM EST    History of Present Illness  [facility] Communication [facility]: The patient is being contacted for follow-up after hospitalization and [facility]  She was hospitalized at and [facility]  The date of admission: 7/26/2016, date of discharge: 7/28/2016  Diagnosis: Nonrheumatic aortic valve disorder  She was discharged to an assisted living facility  She scheduled a follow up appointment  Symptoms: weakness and fatigue  The patient is currently experiencing symptoms  Counseling was provided to patient's caretaker  Julianne Monterroso performed and completed by Wendy Vilchis   HPI:      Review of Systems  Complete-Female:   Constitutional:1  No fever, no chills, feels well, no tiredness, no recent weight gain or weight loss1   Eyes:1  No complaints of eye pain, no red eyes, no eyesight problems, no discharge, no dry eyes, no itching of eyes1   ENT:1  no complaints of earache, no loss of hearing, no nose bleeds, no nasal discharge, no sore throat, no hoarseness1   Cardiovascular:1  lower extremity edema1   Respiratory:1  shortness of breath during exertion1   Gastrointestinal:1  No complaints of abdominal pain, no constipation, no nausea or vomiting, no diarrhea, no bloody stools1   Genitourinary:1  No complaints of dysuria, no incontinence, no pelvic pain, no dysmenorrhea, no vaginal discharge or bleeding1   Musculoskeletal:1  No complaints of arthralgias, no myalgias, no joint swelling or stiffness, no limb pain or swelling1   Integumentary:1  No complaints of skin rash or lesions, no itching, no skin wounds, no breast pain or lump1   Neurological:1  No complaints of headache, no confusion, no convulsions, no numbness, no dizziness or fainting, no tingling, no limb weakness, no difficulty walking1   Psychiatric:1  Not suicidal, no sleep disturbance, no anxiety or depression, no change in personality, no emotional problems1   Endocrine:1  No complaints of proptosis, no hot flashes, no muscle weakness, no deepening of the voice, no feelings of weakness1   Hematologic/Lymphatic:1  No complaints of swollen glands, no swollen glands in the neck, does not bleed easily, does not bruise easily1         1 Amended By: Za Douglas;  Aug 01 2016 12:23 PM EST    Active Problems   1  Anemia (285 9) (D64 9)  2  Back pain (724 5) (M54 9)  3  CAD S/P percutaneous coronary angioplasty (414 01,V45 82) (I25 10,Z98 61)  4  Dependence on nocturnal oxygen therapy (V46 2) (Z99 81)  5  Essential thrombocytosis (238 71) (D47 3)  6  GERD (gastroesophageal reflux disease) (530 81) (K21 9)  7  Hip pain (719 45) (M25 559)  8  Hyperlipidemia (272 4) (E78 5)  9  Leg wound, right (891 0) (S81 801A)  10  Lumbago (724 2) (M54 5)  11  Macular degeneration (362 50) (H35 30)  12  Meniere disease (386 00) (H81 09)  13  Need for DTP vaccine (V06 1) (Z23)  14  Nonrheumatic aortic valve stenosis (424 1) (I35 0)1   15  Other specified symptoms and signs involving the circulatory and respiratory systems    (785 9,786 9) (R09 89)  16  Presence of cardiac pacemaker (V45 01) (Z95 0)  17  Thrombocytosis (238 71) (D47 3)  18  Type 2 diabetes mellitus (250 00) (E11 9)     1 Amended By: [facility]; Aug 01 2016 12:24 PM EST    Past Medical History   1  History of Calcaneal spur (726 73) (M77 30)  2  History of Cervicalgia (723 1) (M54 2)  3  History of Diverticulitis (562 11) (K57 92)  4  History of osteopenia (V13 59) (Z87 39)  5  History of Meniere's disease (386 00) (H81 09)    Surgical History   1  History of Appendectomy  2  History of Arthrodesis Lumbar  3  History of Back Surgery  4  History of Cholecystectomy  5  History of Eye Surgery  6  History of Hysterectomy  7  History of Pacemaker Placement  8  History of Small Bowel Resection  9  History of Tonsillectomy  10  History of Total Knee Arthroplasty  11  History of Venous Ligation With Stripping    Family History  Father   1  Family history of CAD in native artery  Family History   2  Family history of CAD in native artery  3  Family history of cerebrovascular accident (CVA) (V17 1) (Z82 3)  4  Family history of osteopenia (V17 89) (Z82 69)  5  Family history of Pituitary disease  6   Family history of Polycythemia    Social History    · Lives in independent group home (V60 89) (Z59 8)   · Never a smoker   · Non drinker / no alcohol use    Current Meds  1  Ascorbic Acid 500 MG Oral Tablet; Take 1 tablet daily Recorded  2  Aspirin 81 MG Oral Tablet Chewable; CHEW AND SWALLOW 1 TABLET DAILY; Therapy: (Recorded:04Pkq9807) to Recorded  3  Atorvastatin Calcium 40 MG Oral Tablet; TAKE 1 TABLET DAILY; Therapy: 03YNH3806 to (Evaluate:26Qzh0803); Last Rx:12Kuf5137 Ordered  4  Clopidogrel Bisulfate 75 MG Oral Tablet; Take 1 tablet daily Recorded  5  Dulcolax Stool Softener CAPS; Therapy: (Recorded:73Rdk0637) to Recorded  6  Meclizine HCl - 12 5 MG Oral Tablet; One pill Twice a day; Therapy: 40KIX8479 to (Evaluate:40Bzi1470)  Requested for: 53SCR0300; Last   Rx:62Gfb0682 Ordered  7  MetFORMIN HCl - 500 MG Oral Tablet; TAKE 1 TABLET TWICE DAILY  Requested for:   74WQP4138; Last Rx:71Joi7172 Ordered  8  Metoprolol Succinate ER 50 MG Oral Tablet Extended Release 24 Hour; take 3 tabs daily; Therapy: (Recorded:24Mxl3043) to Recorded  9  MiraLax POWD;   Therapy: (Recorded:16May2016) to Recorded  10  Mupirocin 2 % External Ointment; Apply a small amount to nostrils twice per day for 5-7    days; Therapy: 95Cgi8475 to (Evaluate:24Bba5036)  Requested for: 71Fhj1378; Last    Rx:58Raz5013 Ordered  11  Nitrostat 0 4 MG Sublingual Tablet Sublingual; DISSOLVE 1 TABLET UNDER THE    TONGUE AS NEEDED FOR CHEST PAIN Recorded  12  Ocuvite TABS; TAKE 1 TABLET DAILY; Therapy: (Recorded:49Ytr9769) to Recorded  13  Omeprazole 20 MG Oral Capsule Delayed Release; Take 1 capsule twice daily; Therapy: 94BXQ3934 to (Evaluate:30Rtd4564)  Requested for: 30LHS2585; Last    Rx:48Onk5865 Ordered  14  OneTouch Ultra Blue In Vitro Strip; TEST ONCE DAILY; Therapy: 65Tul8290 to (Evaluate:27Mar2017)  Requested for: 97Tbm2060; Last    Rx:23Fft6543 Ordered  15  Promethazine HCl - 25 MG Oral Tablet; take 1 tab daily; Therapy: (Recorded:74Vkh9701) to Recorded  16  TraMADol HCl - 50 MG Oral Tablet; TAKE 1 TABLET Every 6 hours Take 1 to 2 tblets every    6 hours PRN pain; Last Rx:84Ebw9687 Ordered  17  Triamterene-HCTZ 37 5-25 MG Oral Tablet; TAKE 2 TABLETS BY MOUTH EVERY DAY; Therapy: 06QLE7311 to (Evaluate:18Swn7874)  Requested for: 01BAU9497; Last    Rx:32Lle4842 Ordered  18  Vitamin D TABS; Therapy: (Recorded:97Tkn3152) to Recorded    Allergies   1  No Known Drug Allergies    Physical Exam    Constitutional1    General appearance: No acute distress, well appearing and well nourished  1    Eyes1    Conjunctiva and lids: No swelling, erythema or discharge  1    Ears, Nose, Mouth, and Throat1    External inspection of ears and nose: Normal 1    Pulmonary1    Respiratory effort: No increased work of breathing or signs of respiratory distress  1    Auscultation of lungs: Clear to auscultation  1    Cardiovascular1    Auscultation of heart: Abnormal  1  A grade 2/6 systolic murmur1   Examination of extremities for edema and/or varicosities: Abnormal  1  +1 edema of both lower legs and feet1   Carotid pulses: Normal 1    Abdomen1    Abdomen: Non-tender, no masses  1    Skin1    Skin and subcutaneous tissue: Normal without rashes or lesions  1    Psychiatric1    Orientation to person, place, and time: Normal 1    Mood and affect: Normal 1    Diabetic Foot Screen: Normal 1        1 Amended By: Nury London; Aug 01 2016 12:26 PM EST    Future Appointments    Date/Time Provider Specialty Site   07/03/2017 11:00 AM Cardiology, [address] Rad[address] Tobar [facility]   10/03/2016 03:00 PM Cardiology, [address] [place] [address]   08/01/2016 09:30 AM COLT Betancur   Internal Medicine [facility] INTERNAL MED   08/24/2016 01:00 PM [address], DO Cardiac Surgery CT SURGERY [place] OFFICE   09/01/2016 11:45 AM Shania Alejo MD Hematology Oncology [facility]   08/10/2016 10:40 AM Nupur Toni Kwon, DO Cardiology [facility]     Signatures   Electronically signed by : Aleksandar Denise OM; Jul 29 2016  8:56AM EST                       (Author)    Electronically signed by : COLT Scott ; Jul 29 2016 11:22AM EST                       (Author)    Electronically signed by : COLT Scott ; Aug  1 2016 12:28PM EST                       (Author)    Electronically signed by : COLT Scott ; Aug  1 2016  6:24PM EST                       (Author)

## 2018-01-23 NOTE — PROGRESS NOTES
Chief Complaint  Patient was given the high dose flu vaccine today  AV      Active Problems    1  Anemia, iron deficiency (280 9) (D50 9)   2  Back pain (724 5) (M54 9)   3  Benign essential HTN (401 1) (I10)   4  Bilateral knee pain (719 46) (M25 561,M25 562)   5  CAD S/P percutaneous coronary angioplasty (414 01,V45 82) (I25 10,Z98 61)   6  Chronic low back pain (724 2,338 29) (M54 5,G89 29)   7  Dementia (294 20) (F03 90)   8  Dependence on nocturnal oxygen therapy (V46 2) (Z99 81)   9  Edema (782 3) (R60 9)   10  Essential thrombocytosis (238 71) (D47 3)   11  GERD (gastroesophageal reflux disease) (530 81) (K21 9)   12  Hordeolum externum of left lower eyelid (373 11) (H00 015)   13  Hyperlipidemia (272 4) (E78 5)   14  Hypochloremia (276 9) (E87 8)   15  Hyponatremia (276 1) (E87 1)   16  Hypothyroid (244 9) (E03 9)   17  Irritable bowel syndrome (564 1) (K58 9)   18  Lumbago (724 2) (M54 5)   19  Lumbar disc herniation with radiculopathy (722 10) (M51 16)   20  Macular degeneration (362 50) (H35 30)   21  Meniere disease (386 00) (H81 09)   22  Nonrheumatic aortic valve stenosis (424 1) (I35 0)   23  Presence of cardiac pacemaker (V45 01) (Z95 0)   24  S/p TAVR (transcatheter aortic valve replacement), bioprosthetic (V42 2) (Z95 3)   25  Sleep apnea in adult (327 23) (G47 30)   26  Type 2 diabetes mellitus (250 00) (E11 9)   27  Viral respiratory infection (079 99) (J98 8,B97 89)    Current Meds   1  Acidophilus Probiotic Blend Oral Capsule; Therapy: (Recorded:61Ygz0720) to Recorded   2  AmLODIPine Besylate 5 MG Oral Tablet; Take 1 tablet daily; Therapy: 20WPB5505 to (Yves Bush)  Requested for: 11TVV2267; Last   Rx:37Gyo5669 Ordered   3  Amoxicillin CAPS; Therapy: (Recorded:77Wwn0328) to Recorded   4  Aspirin 81 MG Oral Tablet Chewable; CHEW AND SWALLOW 1 TABLET DAILY; Therapy: (Recorded:03Zyf8462) to Recorded   5  Atorvastatin Calcium 20 MG Oral Tablet; TAKE 1 TABLET DAILY;    Therapy: 11KPC3642 to (Evaluate:01Feb2018)  Requested for: 44AUC1435; Last   Rx:36Ulq5002 Ordered   6  Ferrous Sulfate 325 MG CAPS; take 1 capsule daily; Therapy: (Recorded:10Aug2017) to Recorded   7  Furosemide 20 MG Oral Tablet; Take 1 tablet daily; Therapy: 60Uis9823 to (Evaluate:13Oct2018)  Requested for: 57BFZ2700; Last   Rx:18Oct2017 Ordered   8  Levothyroxine Sodium 50 MCG Oral Tablet; TAKE 1 TABLET DAILY; Therapy: 28PJL0762 to (LWEXBBEY:04PSW2107)  Requested for: 44TRT5918; Last   Rx:07Jun2017 Ordered   9  LORazepam 0 5 MG Oral Tablet; TAKE 0 05 TABLET 3 times daily; Therapy: 66GPM1072 to (Evaluate:77Uts8100); Last Rx:18Jan2017 Ordered   10  MetFORMIN HCl - 500 MG Oral Tablet; TAKE 1 TABLET TWICE DAILY  Requested for:    49MOO9819; Last Rx:19Oct2016 Ordered   11  Metoprolol Tartrate 50 MG Oral Tablet; Take 1 tablet twice daily; Therapy: 10Atx7265 to (Evaluate:47Uab8277)  Requested for: 05Vaf4110; Last    Rx:92Bwf7454 Ordered   12  Multiple Vitamin TABS; Therapy: (Recorded:10Aug2017) to Recorded   13  Nitrostat 0 4 MG Sublingual Tablet Sublingual; DISSOLVE 1 TABLET UNDER THE    TONGUE AS NEEDED FOR CHEST PAIN Recorded   14  Ocuvite TABS; TAKE 1 TABLET DAILY; Therapy: (Recorded:53Kac5956) to Recorded   15  Omeprazole 20 MG Oral Capsule Delayed Release; TAKE 1 CAPSULE BY MOUTH TWO    TIMES DAILY; Therapy: 13Apr2017 to (Evaluate:30Mar2018)  Requested for: 31Oct2017; Last    Rx:31Oct2017 Ordered   16  OneTouch Ultra Blue In Vitro Strip; TEST ONCE DAILY; Therapy: 72Kpu5796 to (Ling Egan)  Requested for: 33QIE4044; Last    Rx:11Lmb6360 Ordered   17  Potassium Chloride ER 20 MEQ Oral Tablet Extended Release; Take 1 tablet daily; Therapy: 06VCW3736 to (Evaluate:08Jun2018)  Requested for: 38JPB6175; Last    Rx:13Jun2017 Ordered   18  Tobramycin-Dexamethasone 0 3-0 1 % Ophthalmic Suspension; INSTILL 1 DROP IN    BOTH EYE FOUR TIMES DAILY FOR 1 WEEK;     Therapy: 18GQL0649 to (Marie Nunez)  Requested for: 78FVJ9755; Last    Rx:01Nov2017 Ordered   19  Vitamin C TABS; Therapy: (Barney Rifdarrick) to Recorded   20  Vitamin D TABS; Therapy: (Recorded:32Hxz8491) to Recorded    Allergies    1  Advil   2  Aleve TABS    Plan  Need for influenza vaccination    · Fluzone High-Dose 0 5 ML Intramuscular Suspension Prefilled Syringe    Future Appointments    Date/Time Provider Specialty Site   07/10/2018 08:30 AM Cardiology, [address] Tob[facility]   01/05/2018 02:00 PM Cardiology, Device Remote   [address]   04/06/2018 01:00 PM Cardiology, Device Remote   [address]   01/03/2018 08:00 AM COLT Velazquez   Internal Medicine [facility] INTERNAL MED     Signatures   Electronically signed by : COLT Zhang ; Dec 21 2017  5:48PM EST                       (Author)

## 2018-02-12 ENCOUNTER — HOSPITAL ENCOUNTER (OUTPATIENT)
Dept: RADIOLOGY | Facility: HOSPITAL | Age: 83
Discharge: HOME | End: 2018-02-12
Attending: ORTHOPAEDIC SURGERY
Payer: COMMERCIAL

## 2018-02-12 ENCOUNTER — OFFICE VISIT (OUTPATIENT)
Dept: OBGYN CLINIC | Facility: HOSPITAL | Age: 83
End: 2018-02-12

## 2018-02-12 VITALS
HEART RATE: 73 BPM | SYSTOLIC BLOOD PRESSURE: 114 MMHG | WEIGHT: 188 LBS | DIASTOLIC BLOOD PRESSURE: 73 MMHG | BODY MASS INDEX: 35.5 KG/M2 | HEIGHT: 61 IN

## 2018-02-12 DIAGNOSIS — S72.141D CLOSED DISPLACED INTERTROCHANTERIC FRACTURE OF RIGHT FEMUR WITH ROUTINE HEALING, SUBSEQUENT ENCOUNTER: ICD-10-CM

## 2018-02-12 DIAGNOSIS — S72.21XA CLOSED DISPLACED SUBTROCHANTERIC FRACTURE OF RIGHT FEMUR, INITIAL ENCOUNTER (HCC): Primary | ICD-10-CM

## 2018-02-12 PROCEDURE — 73552 X-RAY EXAM OF FEMUR 2/>: CPT

## 2018-02-12 PROCEDURE — 99024 POSTOP FOLLOW-UP VISIT: CPT | Performed by: ORTHOPAEDIC SURGERY

## 2018-02-12 NOTE — ASSESSMENT & PLAN NOTE
Patient is roughly 6 weeks status post right femur IM nail fixation for intertrochanteric fracture  On today's visit she reports pain that waxes and wanes in nature but overall feels improvement since the surgery  She reports activity and weather related pain  Continue with physical therapy, weight-bearing as tolerated  Follow-up in 6 weeks for re-evaluation and new x-rays

## 2018-02-12 NOTE — PROGRESS NOTES
Assessment/Plan:    Closed displaced intertrochanteric fracture of right femur ([facility] )  Patient is roughly 6 weeks status post right femur IM nail fixation for intertrochanteric fracture  On today's visit she reports pain that waxes and wanes in nature but overall feels improvement since the surgery  She reports activity and weather related pain  Continue with physical therapy, weight-bearing as tolerated  Follow-up in 6 weeks for re-evaluation and new x-rays  Problem List Items Addressed This Visit     Closed displaced intertrochanteric fracture of right femur [facility])     Patient is roughly 6 weeks status post right femur IM nail fixation for intertrochanteric fracture  On today's visit she reports pain that waxes and wanes in nature but overall feels improvement since the surgery  She reports activity and weather related pain  Continue with physical therapy, weight-bearing as tolerated  Follow-up in 6 weeks for re-evaluation and new x-rays  Other Visit Diagnoses     Closed displaced subtrochanteric fracture of right femur, initial encounter ([facility] )    -  Primary    Relevant Orders    XR femur 2 vw right            Subjective:      Patient ID: Giselle Marie is a 80 y o  female  HPI  Patient presents to the office 6 weeks status post right hip IM nail fixation for closed intertrochanteric fracture (01/02/2018)  She is still having pain, but it is improving  She is progressing with physical therapy  The following portions of the patient's history were reviewed and updated as appropriate: current medications, past family history, past medical history, past social history, past surgical history and problem list     Review of Systems   Constitutional: Negative  HENT: Negative  Eyes: Negative  Respiratory: Negative  Cardiovascular: Negative  Gastrointestinal: Negative  Endocrine: Negative  Genitourinary: Negative  Skin: Negative  Allergic/Immunologic: Negative  Hematological: Negative  Psychiatric/Behavioral: Negative  Objective:    Vitals:    02/12/18 1159   BP: 114/73   Pulse: 73        Physical Exam   Constitutional: She is oriented to person, place, and time  She appears well-developed and well-nourished  HENT:   Head: Normocephalic and atraumatic  Eyes: Pupils are equal, round, and reactive to light  Neck: Neck supple  Cardiovascular: Intact distal pulses  Pulmonary/Chest: Effort normal and breath sounds normal    Neurological: She is alert and oriented to person, place, and time  Skin: Skin is warm and dry  Psychiatric: She has a normal mood and affect  Her behavior is normal        Patient presents to the office in a wheelchair  She is able to flex hip  Right lower extremity demonstrates no gross deformity  She is grossly motor and sensory stable L2-S1  Negative modified straight leg raise bilaterally    Imaging demonstrates stable fixation right femur with evidence of callus formation  Fracture lines are still visible        Imaging

## 2018-03-07 NOTE — PROGRESS NOTES
"  Discussion/Summary  Normal device function      Results/Data  Cardiac Device Remote [id number] 01:42PM Marceil Hazard     Test Name Result Flag Reference   MISCELLANEOUS COMMENT      BIOTRONIK TRANSMISSION: BATTERY STATUS "OK"  AP-0% - 100% (>40%/DEPENDENT)  ALL AVAILABLE LEAD PARAMETERS APPEAR WITHIN NORMAL LIMIT & STABLE  NO SIGNIFICANT HIGH RATE EPISODES  PACEMAKER FUNCTIONING APPROPRIATELY  eb   Cardiac Electrophysiology Report      [id number] bio 1 5  18 pdf   DEVICE TYPE Pacemaker       Cardiac Electrophysiology Report [id number] 01:42PM Marceil Hazard     Test Name Result Flag Reference   Cardiac Electrophysiology Report      [id number]  pdf     Signatures   Electronically signed by : Nancy Lynn, ; Jan 5 2018  9:03AM EST                       (Author)    Electronically signed by : Vivian Walden DO; Jan 7 2018 11:38AM EST                       (Author)    "

## 2018-03-07 NOTE — PROGRESS NOTES
"  Discussion/Summary  Normal device function      Results/Data  Cardiac Device Remote 03Oct2016 01:58PM Lenka Prattro     Test Name Result Flag Reference   MISCELLANEOUS COMMENT      BIOTRONIK TRANSMISSION: BATTERY STATUS "OK"  AP-1%, -100% (DEPENDENT/ [facility])  NO SIGNIFICANT HIGH RATE EPISODES  ALL AVAILABLE LEAD PARAMETERS WITHIN NORMAL LIMITS  NORMAL DEVICE FUNCTION  GV   Cardiac Electrophysiology Report      [id number]  pdf   DEVICE TYPE Pacemaker       Cardiac Electrophysiology Report [id number] 01:58PM Lenka Ezio     Test Name Result Flag Reference   Cardiac Electrophysiology Report      [id number]  pdf     Signatures   Electronically signed by : Keshia Patel RN; Oct  3 2016 10:07AM EST                       (Author)    Electronically signed by : Meenakshi Boles DO; Oct  4 2016  6:45PM EST                       (Author)    "

## 2018-03-07 NOTE — PROGRESS NOTES
"  Discussion/Summary  Normal device function      Results/Data  Results   Cardiac Device Remote [id number] 02:47PM Fred Pin     Test Name Result Flag Reference   MISCELLANEOUS COMMENT      BIOTRONIK TRANSMISSION: [facility]  NO SIGNIFICANT HIGH RATE EPISODES  ALL AVAILABLE LEAD PARAMETERS WITHIN NORMAL LIMITS  AP 0%  100%  NORMAL DEVICE FUNCTION  ---CHASE   Cardiac Electrophysiology Report      [id number]  pdf   DEVICE TYPE Pacemaker       Cardiac Electrophysiology Report [id number] 02:47PM Fred Pin     Test Name Result Flag Reference   Cardiac Electrophysiology Report      [id number]  pdf     Signatures   Electronically signed by : Eliceo Farley, ; Mar 17 2016 11:30AM EST                       (Author)    Electronically signed by : Deepika Garvin DO; Mar 19 2016  7:34PM EST                       (Author)    "

## 2018-03-07 NOTE — PROGRESS NOTES
"  Discussion/Summary  Normal device function      Results/Data  Cardiac Device Remote 04Oct2017 01:26PM Shanel Ayala     Test Name Result Flag Reference   MISCELLANEOUS COMMENT      BIOTRONIK TRANSMISSION: BATTERY STATUS "OK"  AP 0%  100% (>40%/DEPENDENT)  ALL AVAILABLE LEAD PARAMETERS WITHIN NORMAL LIMITS  NO SIGNIFICANT HIGH RATE EPISODES  NORMAL DEVICE FUNCTION  RG   Cardiac Electrophysiology Report      [id number]  pdf   DEVICE TYPE Pacemaker       Cardiac Electrophysiology Report [id number] 01:26PM Shanel Ayala     Test Name Result Flag Reference   Cardiac Electrophysiology Report      [id number] pdf     Signatures   Electronically signed by : Pooja Marino, ; Oct  4 2017  9:43AM EST                       (Author)    Electronically signed by : Aleksandar Shaw DO; Oct  8 2017 12:28PM EST                       (Author)    "

## 2018-03-07 NOTE — PROGRESS NOTES
"  Discussion/Summary  Normal device function      Results/Data  Results   Cardiac Device In Clinic 30Jun2016 03:51PM Eather Lover     Test Name Result Flag Reference   MISCELLANEOUS COMMENT (Report)     DEVICE INTERROGATED IN THE [place] OFFICE BY COMPANY REP; BATTERY VOLTAGE ADEQUATE (>9 YRS); AP = 05%,  = 100% ([facility]); 1 AHR EPISODE NOTED WITH DURATION @ <= 3 MINS FOR PAF; PT TAKES ASA 81, CLOPIDOGREL, METOPROLOL SUCC; ALL LEAD PARAMETERS TEST WITHIN NORMAL LIMITS/STABLE; NO PROGRAMMING CHANGES MADE TO DEVICE PARAMETERS; PACEMAKER FUNCTIONING APPROPRIATELY  eb   Cardiac Electrophysiology Report      [id number]  PDF   DEVICE TYPE Pacemaker       Cardiac Electrophysiology Report [id number] 03:51PM Eather Lover     Test Name Result Flag Reference   Cardiac Electrophysiology Report      [id number]  pdf     Signatures   Electronically signed by : Mara Mason, ; Jun 30 2016 12:05PM EST                       (Author)    Electronically signed by : Leslie Singleton DO; Jun 30 2016  8:15PM EST                       (Author)    "

## 2018-03-07 NOTE — PROGRESS NOTES
"  Discussion/Summary  Normal device function      Results/Data  Cardiac Device Remote [id number] 05:34PM Enrique Torres     Test Name Result Flag Reference   MISCELLANEOUS COMMENT      BIOTRONIK TRANSMISSION: BATTERY STATUS "OK"  AP 0%  100%  NO SIGNIFICANT HIGH RATE EPISODES  ALL LEAD PARAMETERS WITHIN NORMAL LIMITS  NORMAL DEVICE FUNCTION  NC   Cardiac Electrophysiology Report      [id number]  pdf   DEVICE TYPE Pacemaker       Cardiac Electrophysiology Report [id number] 05:34PM Enrique Torres     Test Name Result Flag Reference   Cardiac Electrophysiology Report      [id number]  pdf     Signatures   Electronically signed by : Tim Almeida, ; Diogo 10 2017  7:33AM EST                       (Author)    Electronically signed by : COLT Lee ; Diogo 10 2017 10:48AM EST                       (Author)    "

## 2018-03-07 NOTE — PROGRESS NOTES
"  Discussion/Summary  Normal device function      Results/Data  Cardiac Device In Clinic [id number] 02:49PM Durward Fuelling     Test Name Result Flag Reference   MISCELLANEOUS COMMENT (Report)     DEVICE INTERROGATED IN THE [facility] OFFICE  BATTERY VOLTAGE ADEQUATE (7 5 YR)  AP 0%  100% (VO > 40%/DEPENDENT)  ALL AVAILABLE LEAD PARAMETERS WITHIN NORMAL LIMITS  NO SIGNIFICANT HIGH RATE EPISODES  NO PROGRAMMING CHANGES MADE TO DEVICE PARAMETERS  PACEMAKER FUNCTIONING APPROPRIATELY  RG   Cardiac Electrophysiology Report      [id number]  PDF   DEVICE TYPE Pacemaker       Cardiac Electrophysiology Report [id number] 02:49PM Durward Fuelling     Test Name Result Flag Reference   Cardiac Electrophysiology Report      [id number]  pdf     Signatures   Electronically signed by : Felicia Truong, ; Jul  3 2017 11:13AM EST                       (Author)    Electronically signed by : COLT Valdes ; Jul  3 2017 11:28AM EST                       (Author)    "

## 2018-03-22 ENCOUNTER — OFFICE VISIT (OUTPATIENT)
Dept: OBGYN CLINIC | Facility: HOSPITAL | Age: 83
End: 2018-03-22

## 2018-03-22 ENCOUNTER — HOSPITAL ENCOUNTER (OUTPATIENT)
Dept: RADIOLOGY | Facility: HOSPITAL | Age: 83
Discharge: HOME | End: 2018-03-22
Attending: ORTHOPAEDIC SURGERY
Payer: MEDICARE

## 2018-03-22 VITALS
WEIGHT: 183 LBS | HEART RATE: 68 BPM | SYSTOLIC BLOOD PRESSURE: 121 MMHG | DIASTOLIC BLOOD PRESSURE: 79 MMHG | BODY MASS INDEX: 35.93 KG/M2 | HEIGHT: 60 IN

## 2018-03-22 DIAGNOSIS — M79.604 LEG PAIN, ANTERIOR, RIGHT: ICD-10-CM

## 2018-03-22 DIAGNOSIS — M79.604 LEG PAIN, ANTERIOR, RIGHT: Primary | ICD-10-CM

## 2018-03-22 DIAGNOSIS — S72.141D CLOSED DISPLACED INTERTROCHANTERIC FRACTURE OF RIGHT FEMUR WITH ROUTINE HEALING, SUBSEQUENT ENCOUNTER: ICD-10-CM

## 2018-03-22 PROCEDURE — 73552 X-RAY EXAM OF FEMUR 2/>: CPT

## 2018-03-22 PROCEDURE — 99024 POSTOP FOLLOW-UP VISIT: CPT | Performed by: ORTHOPAEDIC SURGERY

## 2018-03-22 NOTE — PROGRESS NOTES
Assessment/Plan:    Closed displaced intertrochanteric fracture of right femur ([address] )  Patient is almost 3 months status post right hip/femur IM nail fixation for intertrochanteric fracture  She is doing well  There is evidence of callus formation  Weightbearing as tolerated  Follow-up in 3 months for re-evaluation and new x-rays  Diagnoses and all orders for this visit:    Leg pain, anterior, right  -     XR femur 2 vw right; Future    Closed displaced intertrochanteric fracture of right femur with routine healing, subsequent encounter        Subjective:      Patient ID: Harmony Schwarz is a 80 y o  female  HPI  Patient is roughly 3 months status post right hip/femur IM nail fixation for intertrochanteric fracture  Overall she reports doing well  She has chronic left footdrop  She is working with physical therapy at her nursing facility    Review of Systems    Improving right hip pain  Subjective weakness right lower extremity  Chronic left footdrop    Objective:      /79   Pulse 68   Ht 5' (1 524 m)   Wt 83 kg (183 lb)   BMI 35 74 kg/m²          Physical Exam    Awake alert and oriented x3  Affect is appropriate  Minimally tender to palpation over the right hip   Motor and sensory stable with the exception of right hip flexion weakness 2+/3/5  Chronic left footdrop  X-ray demonstrates exuberant callus formation about the right hip and stable implant

## 2018-03-22 NOTE — ASSESSMENT & PLAN NOTE
Patient is almost 3 months status post right hip/femur IM nail fixation for intertrochanteric fracture  She is doing well  There is evidence of callus formation  Weightbearing as tolerated  Follow-up in 3 months for re-evaluation and new x-rays

## 2018-04-06 ENCOUNTER — CLINICAL SUPPORT (OUTPATIENT)
Dept: CARDIOLOGY CLINIC | Facility: CLINIC | Age: 83
End: 2018-04-06
Payer: MEDICARE

## 2018-04-06 DIAGNOSIS — R55 SYNCOPE AND COLLAPSE: ICD-10-CM

## 2018-04-06 DIAGNOSIS — Z95.0 CARDIAC PACEMAKER IN SITU: ICD-10-CM

## 2018-04-06 DIAGNOSIS — I44.2 AV BLOCK, COMPLETE (HCC): Primary | ICD-10-CM

## 2018-04-06 PROCEDURE — 93296 REM INTERROG EVL PM/IDS: CPT | Performed by: INTERNAL MEDICINE

## 2018-04-06 PROCEDURE — 93294 REM INTERROG EVL PM/LDLS PM: CPT | Performed by: INTERNAL MEDICINE

## 2018-04-06 NOTE — PROGRESS NOTES
DUAL PM BIOTRONIK TRANSMISSION: BATTERY STATUS "OK"  AP-0%, -100% (DEPENDENT/ CHB)  ALL AVAILABLE LEAD PARAMETERS WITHIN NORMAL LIMITS  NO SIGNIFICANT HIGH RATE EPISODES  NORMAL DEVICE FUNCTION   GV

## 2018-05-21 ENCOUNTER — TELEPHONE (OUTPATIENT)
Dept: CARDIOLOGY CLINIC | Facility: CLINIC | Age: 83
End: 2018-05-21

## 2018-05-21 NOTE — TELEPHONE ENCOUNTER
Received called from Pt's dtr Yousif Casanova checking to see if Nnamdi Araya needs to have any testing prior to o/v 8/9  Per note, orders for echo, lipid panel and CMP  Printed scripts and mailed to address  Yousif Casanova made aware

## 2018-06-01 DIAGNOSIS — E78.5 HYPERLIPIDEMIA: ICD-10-CM

## 2018-06-01 DIAGNOSIS — I25.10 ATHEROSCLEROTIC HEART DISEASE OF NATIVE CORONARY ARTERY WITHOUT ANGINA PECTORIS: ICD-10-CM

## 2018-06-25 ENCOUNTER — HOSPITAL ENCOUNTER (OUTPATIENT)
Dept: RADIOLOGY | Facility: HOSPITAL | Age: 83
Discharge: HOME | End: 2018-06-25
Attending: ORTHOPAEDIC SURGERY
Payer: MEDICARE

## 2018-06-25 ENCOUNTER — OFFICE VISIT (OUTPATIENT)
Dept: OBGYN CLINIC | Facility: HOSPITAL | Age: 83
End: 2018-06-25
Payer: MEDICARE

## 2018-06-25 VITALS
WEIGHT: 190 LBS | HEART RATE: 75 BPM | DIASTOLIC BLOOD PRESSURE: 68 MMHG | BODY MASS INDEX: 37.3 KG/M2 | SYSTOLIC BLOOD PRESSURE: 122 MMHG | HEIGHT: 60 IN

## 2018-06-25 DIAGNOSIS — Z98.890 STATUS POST SURGERY: ICD-10-CM

## 2018-06-25 DIAGNOSIS — M25.551 PAIN OF RIGHT HIP JOINT: Primary | ICD-10-CM

## 2018-06-25 DIAGNOSIS — M25.551 PAIN OF RIGHT HIP JOINT: ICD-10-CM

## 2018-06-25 PROCEDURE — 72170 X-RAY EXAM OF PELVIS: CPT

## 2018-06-25 PROCEDURE — 99213 OFFICE O/P EST LOW 20 MIN: CPT | Performed by: ORTHOPAEDIC SURGERY

## 2018-06-25 PROCEDURE — 73552 X-RAY EXAM OF FEMUR 2/>: CPT

## 2018-06-25 NOTE — PROGRESS NOTES
Assessment/Plan:     Diagnoses and all orders for this visit:    Pain of right hip joint  -     Cancel: XR hip/pelv 2-3 vws right if performed; Future    Status post surgery      patient seen by and discussed with Dr Manjinder Alvarado  Patient is cleared from our standpoint at this time  She continue with all ambulatory activities within her recommended restrictions from her other family doctors  Patient should continue to follow up for her spine injections with [facility] that she has been doing regularly  She can follow up with us on an as-needed basis at this time  Subjective:      Patient ID: Makenzie Levin is a 80 y o  female  Patient presents today for 6 month follow-up status post right femoral IM nail  Patient states that she has not had any pain since the surgery  She states that she has had more restrictions in pain from her TIA and spinal stenosis rather than the hip  She has graduated and completed physical therapy at her rehab program and she can now ambulate with a walker and does not require a 1-1 assistance for ambulatory activities  She denies any new or acute complaints  She denies any concerns for today  The following portions of the patient's history were reviewed and updated as appropriate: allergies, current medications, past family history, past medical history, past social history, past surgical history and problem list     Review of Systems   Constitutional: Negative for chills and fever  Respiratory: Negative for shortness of breath  Cardiovascular: Negative for chest pain  Gastrointestinal: Negative for constipation and diarrhea  Skin: Negative for rash  Neurological: Negative for weakness and numbness  Hematological: Negative  Psychiatric/Behavioral: Negative for behavioral problems  The patient is not nervous/anxious            Objective:      /68   Pulse 75   Ht 5' (1 524 m)   Wt 86 2 kg (190 lb)   BMI 37 11 kg/m²          Physical Exam Constitutional: She is oriented to person, place, and time  She appears well-developed and well-nourished  HENT:   Head: Normocephalic and atraumatic  Eyes: Pupils are equal, round, and reactive to light  Neck: Neck supple  Cardiovascular: Normal rate and regular rhythm  Pulmonary/Chest: Effort normal and breath sounds normal    Neurological: She is alert and oriented to person, place, and time  Skin: Skin is warm and dry  No rash noted  Psychiatric: She has a normal mood and affect  Her behavior is normal      right hip:  Patient has no significant tenderness to palpation over fracture site  She has an inability to flex her hip of 90°, however, she does not have any pain with passive range of motion  She has 5/5 strength to the quadriceps muscle  Patient has intact sensation distally

## 2018-07-10 ENCOUNTER — IN-CLINIC DEVICE VISIT (OUTPATIENT)
Dept: CARDIOLOGY CLINIC | Facility: CLINIC | Age: 83
End: 2018-07-10
Payer: MEDICARE

## 2018-07-10 DIAGNOSIS — R55 SYNCOPE AND COLLAPSE: ICD-10-CM

## 2018-07-10 DIAGNOSIS — Z45.018 ADJUSTMENT AND MANAGEMENT OF CARDIAC PACEMAKER: ICD-10-CM

## 2018-07-10 DIAGNOSIS — Z95.0 CARDIAC PACEMAKER IN SITU: ICD-10-CM

## 2018-07-10 DIAGNOSIS — I44.2 AV BLOCK, COMPLETE (HCC): Primary | ICD-10-CM

## 2018-07-10 PROCEDURE — 93280 PM DEVICE PROGR EVAL DUAL: CPT | Performed by: INTERNAL MEDICINE

## 2018-07-10 NOTE — PROGRESS NOTES
Results for orders placed or performed in visit on 07/10/18   Cardiac EP device report    Narrative    BIOT DUAL PM  DEVICE INTERROGATED IN THE [facility] OFFICE  BATTERY VOLTAGE ADEQUATE (6 7 YRS)  AP-0%, -100% (DEPENDENT/ CHB)  ALL LEAD PARAMETERS WITHIN NORMAL LIMITS  ALL OTHER TESTING WITHIN NORMAL LIMITS  NO SIGNIFICANT HIGH RATE EPISODES  NORMAL DEVICE FUNCTION   GV

## 2018-07-12 ENCOUNTER — HOSPITAL ENCOUNTER (OUTPATIENT)
Dept: NON INVASIVE DIAGNOSTICS | Facility: CLINIC | Age: 83
Discharge: HOME | End: 2018-07-12
Payer: MEDICARE

## 2018-07-12 DIAGNOSIS — E78.5 HYPERLIPIDEMIA: ICD-10-CM

## 2018-07-12 DIAGNOSIS — I25.10 ATHEROSCLEROTIC HEART DISEASE OF NATIVE CORONARY ARTERY WITHOUT ANGINA PECTORIS: ICD-10-CM

## 2018-07-12 PROCEDURE — 93306 TTE W/DOPPLER COMPLETE: CPT

## 2018-07-12 PROCEDURE — 93306 TTE W/DOPPLER COMPLETE: CPT | Performed by: INTERNAL MEDICINE

## 2018-08-09 ENCOUNTER — OFFICE VISIT (OUTPATIENT)
Dept: CARDIOLOGY CLINIC | Facility: CLINIC | Age: 83
End: 2018-08-09
Payer: MEDICARE

## 2018-08-09 VITALS
WEIGHT: 196.1 LBS | SYSTOLIC BLOOD PRESSURE: 130 MMHG | BODY MASS INDEX: 38.5 KG/M2 | OXYGEN SATURATION: 96 % | HEART RATE: 63 BPM | DIASTOLIC BLOOD PRESSURE: 60 MMHG | HEIGHT: 60 IN

## 2018-08-09 DIAGNOSIS — I25.10 CORONARY ARTERY DISEASE INVOLVING NATIVE CORONARY ARTERY OF NATIVE HEART WITHOUT ANGINA PECTORIS: ICD-10-CM

## 2018-08-09 DIAGNOSIS — Z95.5 STATUS POST INSERTION OF DRUG-ELUTING STENT INTO LEFT ANTERIOR DESCENDING (LAD) ARTERY: ICD-10-CM

## 2018-08-09 DIAGNOSIS — Z95.0 HISTORY OF CARDIAC PACEMAKER: ICD-10-CM

## 2018-08-09 DIAGNOSIS — Z95.2 S/P TAVR (TRANSCATHETER AORTIC VALVE REPLACEMENT): Primary | ICD-10-CM

## 2018-08-09 PROCEDURE — 99214 OFFICE O/P EST MOD 30 MIN: CPT | Performed by: INTERNAL MEDICINE

## 2018-08-09 RX ORDER — TETRAHYDROZOLINE HCL 0.05 %
1 DROPS OPHTHALMIC (EYE) 4 TIMES DAILY PRN
COMMUNITY

## 2018-08-09 NOTE — PROGRESS NOTES
Cardiology Follow Up Visit    Mariel Mckeon  1935  [id number]  [address]    1  S/P TAVR (transcatheter aortic valve replacement)      23mm Cox S3 2016   2  History of cardiac pacemaker      2/2015 for complete heart block   3  Status post insertion of drug-eluting stent into left anterior descending (LAD) artery      6/2016   4  Coronary artery disease involving native coronary artery of native heart without angina pectoris         Interval History:     Patient here follow up CAD and TAVR 2016  Had hip fracture after fell out of bed 1/2018  She was told she had a TIA but no neurological evaluation  CT scan no acute abnormality  Nonetheless, she has rehab since but has gone to a nursing home after that fall  2D echocardiogram shows good valve function July 2018 with a mean gradient of 14 mmHg of the bioprosthetic aortic valve implant  She has had no symptoms of coronary artery disease  LAD stent 2016 pre TAVR  She has had no angina  She has maintained on statin therapy as well as aspirin therapy    Last LDL 44 as detailed below        Patient Active Problem List   Diagnosis    Nonrheumatic aortic valve stenosis    Coronary artery disease involving native coronary artery    Complete atrioventricular block (HCC)    S/P TAVR (transcatheter aortic valve replacement)    Age-related cognitive decline    Frequent falls    Ambulatory dysfunction    Vision impairment    Constipation    Incontinence in female    Closed displaced intertrochanteric fracture of right femur ([address] )    Diabetes mellitus ([address] )    History of cardiac pacemaker    MAYELIN (acute kidney injury) ([address] )    Acute blood loss as cause of postoperative anemia    Chronic diastolic congestive heart failure (HCC)    Hyperlipidemia    Hypothyroidism    Encephalopathy    Status post insertion of drug-eluting stent into left anterior descending (LAD) artery     Past Medical History:   Diagnosis Date    CAD (coronary artery disease)     s/p HERNAN 6/2016    CHF (congestive heart failure) (HCC)     Diabetes mellitus (HCC)     non-insulin depdenent    Disease of thyroid gland     GERD (gastroesophageal reflux disease)     History of TIA (transient ischemic attack)     no residual deficits    Hyperlipidemia     Hypertension     Hypoxia     on home O2 QHS    Meniere disease     Pacemaker     CHB-Biotronik in 2/2015    Severe aortic stenosis      Social History     Social History    Marital status:      Spouse name: N/A    Number of children: N/A    Years of education: N/A     Occupational History    Not on file  Social History Main Topics    Smoking status: Never Smoker    Smokeless tobacco: Never Used    Alcohol use No    Drug use: No    Sexual activity: Not on file     Other Topics Concern    Not on file     Social History Narrative    No narrative on file      No family history on file  Past Surgical History:   Procedure Laterality Date    APPENDECTOMY      BACK SURGERY      CHOLECYSTECTOMY      FRACTURE SURGERY Right 01/02/2018    femur    HYSTERECTOMY      RI EGD TRANSORAL BIOPSY SINGLE/MULTIPLE N/A 11/9/2016    Procedure: ESOPHAGOGASTRODUODENOSCOPY (EGD); Surgeon: Arlene August MD;  Location: [facility];   Service: Gastroenterology    RI OPEN RX FEMUR FX+INTRAMED GARRETT Right 1/2/2018    Procedure: INSERTION NAIL IM FEMUR ANTEGRADE (TROCHANTERIC) RIGHT;  Surgeon: Toyin Granda MD;  [facility];  Service: Orthopedics    RI REPLACE AORTIC VALVE OPENFEMORAL ARTERY APPROACH N/A 7/26/2016    Procedure: TRANSFEMORAL TAVR WITH 23MM LAROSE LILY S3 VALVE; JOSE ALFREDO ;  Surgeon: Dino Aase, DO;  [facility];  Service: Cardiac Surgery    SMALL INTESTINE SURGERY      TOTAL KNEE ARTHROPLASTY      VARICOSE VEIN SURGERY      VENTRAL HERNIA REPAIR         Current Outpatient Prescriptions:    acetaminophen (TYLENOL) 325 mg tablet, Take 2 tablets (650 mg total) by mouth every 6 (six) hours as needed for mild pain , Disp: 30 tablet, Rfl: 0    AMLODIPINE BESYLATE PO, Take 5 mg by mouth daily  , Disp: , Rfl:     aspirin 81 mg chewable tablet, Chew 81 mg daily  , Disp: , Rfl:     atorvastatin (LIPITOR) 20 mg tablet, Take 1 tablet (20 mg total) by mouth daily at bedtime   (Patient taking differently: Take 10 mg by mouth daily at bedtime  ), Disp: 30 tablet, Rfl: 2    furosemide (LASIX) 40 mg tablet, Take 1 tablet by mouth daily, Disp: , Rfl: 0    levothyroxine 50 mcg tablet, Take 50 mcg by mouth daily, Disp: , Rfl:     metoprolol tartrate (LOPRESSOR) 50 mg tablet, Take 1 tablet (50 mg total) by mouth 2 (two) times a day , Disp: 60 tablet, Rfl: 2    potassium chloride (K-DUR,KLOR-CON) 20 mEq tablet, Take 20 mEq by mouth daily, Disp: , Rfl:     tetrahydrozoline (VISINE) 0 05 % ophthalmic solution, 1 drop 4 (four) times a day as needed for irritation, Disp: , Rfl:     enoxaparin (LOVENOX) 40 mg/0 4 mL, Inject 0 4 mL under the skin daily for 30 days, Disp: 12 mL, Rfl: 0  Allergies   Allergen Reactions    Advil [Ibuprofen] GI Intolerance         Social, Family, Medication history reviewed and updated as necessary      Labs:     Lab Results   Component Value Date     (L) 01/05/2018    K 4 0 01/05/2018    CL 96 (L) 01/05/2018    CO2 28 01/05/2018    BUN 24 01/05/2018    CREATININE 0 91 01/05/2018    CREATININE 1 09 01/04/2018    GLUCOSE 125 01/05/2018    CALCIUM 9 1 01/05/2018       Lab Results   Component Value Date    WBC 16 17 (H) 01/05/2018    HGB 8 6 (L) 01/05/2018    HGB 9 0 (L) 01/04/2018    HCT 25 6 (L) 01/05/2018    HCT 26 4 (L) 01/04/2018     01/05/2018     01/04/2018       Lab Results   Component Value Date    CHOL 122 08/01/2017    CHOL 160 06/09/2016     Lab Results   Component Value Date    HDL 55 08/01/2017    HDL 34 (L) 06/09/2016     Lab Results   Component Value Date LDLCALC 44 08/01/2017    LDLCALC 105 (H) 06/09/2016     Lab Results   Component Value Date    TRIG 117 08/01/2017    TRIG 103 06/09/2016       Lab Results   Component Value Date    ALT 26 01/02/2018    ALT 19 08/01/2017    AST 25 01/02/2018    AST 20 08/01/2017       Lab Results   Component Value Date    INR 1 04 01/02/2018    INR 0 94 07/21/2016       Lab Results   Component Value Date    NTBNP 535 (H) 07/21/2016       Lab Results   Component Value Date    HGBA1C 6 3 01/02/2018    HGBA1C 6 3 06/15/2017    HGBA1C 6 0 02/28/2017           Imaging: Reviewed in epic        Review of Systems:  14 systems reviewed and negative with exception of the above   Review of Systems    PHYSICAL EXAM:    Physical Exam    Vitals:    08/09/18 1331   BP: 130/60   Pulse: 63   SpO2: 96%     Body mass index is 38 3 kg/m²  Weight (last 2 days)     Date/Time   Weight    08/09/18 1331  89 (196 1)              Gen: No acute distress  HEENT: anicteric, mucous membranes moist  Neck: supple, no jugular venous distention, or carotid bruit  Heart: regular, 2/6 randal  Lungs :clear to auscultation bilaterally, no rales/rhonchi or wheeze  Abdomen: soft nontender, normoactive bowel sounds, no organomegaly  Ext: warm and perfused, normal femoral pulses, trace edema, or clubbing  Skin: warm, no rashes  Neuro: AAO x 3, no focal findings  Psychiatric: normal affect  Musculoskeletal: no obvious joint deformities  Discussion/Summary:    1  S/p TAVR 23mm ES3 2016,   Echocardiogram July 2018 good valve function, mean gradient 14 mmHg    2  Coronary artery disease, asymptomatic    A  LAD drug-eluting stent placement 2016 pre tavr    3  Recent fall with hip fracture and nursing home placement / ambulatory dysfunction    4  Essential hypertension  Controlled    5  Hyperlipidemia, on statin, no recent LDL    6  Permanent pacemaker placed 2015    Plan:  Patient doing well from a cardiac standpoint  No med changes required    Echocardiogram shows good valve function  Will have follow-up echocardiogram next year as well as lipids  Can continue low-salt Mediterranean type diet and daily activity as much as possible

## 2018-10-10 ENCOUNTER — REMOTE DEVICE CLINIC VISIT (OUTPATIENT)
Dept: CARDIOLOGY CLINIC | Facility: CLINIC | Age: 83
End: 2018-10-10
Payer: MEDICARE

## 2018-10-10 DIAGNOSIS — Z95.0 PACEMAKER: Primary | ICD-10-CM

## 2018-10-10 DIAGNOSIS — I44.2 CHB (COMPLETE HEART BLOCK) (HCC): ICD-10-CM

## 2018-10-10 PROCEDURE — 93294 REM INTERROG EVL PM/LDLS PM: CPT | Performed by: INTERNAL MEDICINE

## 2018-10-10 PROCEDURE — 93296 REM INTERROG EVL PM/IDS: CPT | Performed by: INTERNAL MEDICINE

## 2018-10-10 NOTE — PROGRESS NOTES
Results for orders placed or performed in visit on 10/10/18   Cardiac EP device report    Narrative    BIOT DUAL CHAMBER  PPM (DDD MODE)  ezCaterRONIXero TRANSMISSION: BATTERY STATUS "OK"  AP 0%;  100% (CHB/DDD 50)  ALL LEAD PARAMETERS WITHIN NORMAL LIMITS  NO EPISODES  NORMAL DEVICE FUNCTION   PL

## 2018-11-13 NOTE — NURSING NOTE
Pt d/c to D/C rachel with verbal and written instructions  Verbalized understanding  No c/o SOB, pain or discomfort at this time  Try increasing to 30 mg nightly

## 2019-01-24 ENCOUNTER — REMOTE DEVICE CLINIC VISIT (OUTPATIENT)
Dept: CARDIOLOGY CLINIC | Facility: CLINIC | Age: 84
End: 2019-01-24
Payer: MEDICARE

## 2019-01-24 DIAGNOSIS — I44.2 CHB (COMPLETE HEART BLOCK) (HCC): Primary | ICD-10-CM

## 2019-01-24 DIAGNOSIS — Z95.0 PACEMAKER: ICD-10-CM

## 2019-01-24 PROCEDURE — 93296 REM INTERROG EVL PM/IDS: CPT | Performed by: INTERNAL MEDICINE

## 2019-01-24 PROCEDURE — 93294 REM INTERROG EVL PM/LDLS PM: CPT | Performed by: INTERNAL MEDICINE

## 2019-01-24 NOTE — PROGRESS NOTES
Results for orders placed or performed in visit on 01/24/19   Cardiac EP device report    Narrative    BIOT DUAL CHAMBER  PPM (DDD MODE)  Machin[employer]RONIDesalitech TRANSMISSION: BATTERY STATUS "OK" (70%)  AP 0%;  70%  ALL LEAD PARAMETERS WITHIN NORMAL LIMITS  NO EPISODES  NORMAL DEVICE FUNCTION   PL

## 2019-04-25 ENCOUNTER — REMOTE DEVICE CLINIC VISIT (OUTPATIENT)
Dept: CARDIOLOGY CLINIC | Facility: CLINIC | Age: 84
End: 2019-04-25
Payer: MEDICARE

## 2019-04-25 DIAGNOSIS — I44.2 CHB (COMPLETE HEART BLOCK) (HCC): ICD-10-CM

## 2019-04-25 DIAGNOSIS — Z95.0 PACEMAKER: Primary | ICD-10-CM

## 2019-04-25 PROCEDURE — 93294 REM INTERROG EVL PM/LDLS PM: CPT | Performed by: INTERNAL MEDICINE

## 2019-04-25 PROCEDURE — 93296 REM INTERROG EVL PM/IDS: CPT | Performed by: INTERNAL MEDICINE

## 2019-05-02 ENCOUNTER — TRANSCRIBE ORDERS (OUTPATIENT)
Dept: ADMINISTRATIVE | Facility: HOSPITAL | Age: 84
End: 2019-05-02

## 2019-05-02 DIAGNOSIS — M54.5 LOW BACK PAIN, UNSPECIFIED BACK PAIN LATERALITY, UNSPECIFIED CHRONICITY, WITH SCIATICA PRESENCE UNSPECIFIED: ICD-10-CM

## 2019-05-02 DIAGNOSIS — M51.36 DEGENERATION OF LUMBAR INTERVERTEBRAL DISC: Primary | ICD-10-CM

## 2019-05-21 ENCOUNTER — HOSPITAL ENCOUNTER (OUTPATIENT)
Dept: RADIOLOGY | Facility: HOSPITAL | Age: 84
Discharge: HOME | End: 2019-05-21

## 2019-06-18 ENCOUNTER — HOSPITAL ENCOUNTER (OUTPATIENT)
Dept: RADIOLOGY | Facility: HOSPITAL | Age: 84
Discharge: HOME | End: 2019-06-18

## 2019-06-18 ENCOUNTER — HOSPITAL ENCOUNTER (OUTPATIENT)
Dept: RADIOLOGY | Facility: HOSPITAL | Age: 84
Discharge: HOME | End: 2019-06-18
Payer: MEDICARE

## 2019-06-18 DIAGNOSIS — M54.5 LOW BACK PAIN, UNSPECIFIED BACK PAIN LATERALITY, UNSPECIFIED CHRONICITY, WITH SCIATICA PRESENCE UNSPECIFIED: ICD-10-CM

## 2019-06-18 DIAGNOSIS — M51.36 DEGENERATION OF LUMBAR INTERVERTEBRAL DISC: ICD-10-CM

## 2019-06-18 PROCEDURE — A9585 GADOBUTROL INJECTION: HCPCS

## 2019-06-18 PROCEDURE — 72158 MRI LUMBAR SPINE W/O & W/DYE: CPT

## 2019-06-18 RX ADMIN — GADOBUTROL 9 ML: 604.72 INJECTION INTRAVENOUS at 12:59

## 2019-07-16 ENCOUNTER — IN-CLINIC DEVICE VISIT (OUTPATIENT)
Dept: CARDIOLOGY CLINIC | Facility: CLINIC | Age: 84
End: 2019-07-16
Payer: MEDICARE

## 2019-07-16 DIAGNOSIS — Z95.0 CARDIAC PACEMAKER IN SITU: Primary | ICD-10-CM

## 2019-07-16 PROCEDURE — 93280 PM DEVICE PROGR EVAL DUAL: CPT | Performed by: INTERNAL MEDICINE

## 2019-07-16 NOTE — PROGRESS NOTES
Results for orders placed or performed in visit on 07/16/19   Cardiac EP device report    Narrative    BIOT DUAL CHAMBER  PPM (DDD MODE)  DEVICE INTERROGATED IN THE [place] OFFICE: BATTERY VOLTAGE ADEQUATE  AP 0%  100%  ALL LEAD PARAMETERS & TRENDS WITHIN NORMAL LIMITS  NO SIGNIFICANT HIGH RATE EPISODES  NO PROGRAMMING CHANGES MADE TO DEVICE PARAMETERS  NORMAL DEVICE FUNCTION   NC
- - -

## 2019-08-15 ENCOUNTER — HOSPITAL ENCOUNTER (OUTPATIENT)
Dept: NON INVASIVE DIAGNOSTICS | Facility: CLINIC | Age: 84
Discharge: HOME | End: 2019-08-15
Payer: MEDICARE

## 2019-08-15 ENCOUNTER — TELEPHONE (OUTPATIENT)
Dept: CARDIOLOGY CLINIC | Facility: CLINIC | Age: 84
End: 2019-08-15

## 2019-08-15 DIAGNOSIS — I25.10 CORONARY ARTERY DISEASE INVOLVING NATIVE CORONARY ARTERY OF NATIVE HEART WITHOUT ANGINA PECTORIS: ICD-10-CM

## 2019-08-15 DIAGNOSIS — Z95.2 S/P TAVR (TRANSCATHETER AORTIC VALVE REPLACEMENT): ICD-10-CM

## 2019-08-15 PROCEDURE — 93306 TTE W/DOPPLER COMPLETE: CPT | Performed by: INTERNAL MEDICINE

## 2019-08-15 PROCEDURE — 93306 TTE W/DOPPLER COMPLETE: CPT

## 2019-08-15 NOTE — TELEPHONE ENCOUNTER
Notes recorded by Jim Gay DO on 8/15/2019 at 2:18 PM EDT  Please let patient know echo looked fine  I Started to leave a message with results but her machine cut me off  If she calls back - please advise echo looks fine

## 2019-08-20 RX ORDER — KETOCONAZOLE 20 MG/G
CREAM TOPICAL DAILY
COMMUNITY
Start: 2019-07-17

## 2019-08-20 RX ORDER — ATORVASTATIN CALCIUM 10 MG/1
TABLET, FILM COATED ORAL
COMMUNITY
Start: 2019-07-31

## 2019-08-20 RX ORDER — AMLODIPINE BESYLATE 5 MG/1
5 TABLET ORAL DAILY
COMMUNITY
Start: 2019-07-31

## 2019-08-21 ENCOUNTER — OFFICE VISIT (OUTPATIENT)
Dept: CARDIOLOGY CLINIC | Facility: CLINIC | Age: 84
End: 2019-08-21
Payer: MEDICARE

## 2019-08-21 VITALS
HEART RATE: 90 BPM | WEIGHT: 197.4 LBS | OXYGEN SATURATION: 95 % | SYSTOLIC BLOOD PRESSURE: 140 MMHG | HEIGHT: 62 IN | DIASTOLIC BLOOD PRESSURE: 78 MMHG | BODY MASS INDEX: 36.33 KG/M2

## 2019-08-21 DIAGNOSIS — I25.10 CORONARY ARTERY DISEASE INVOLVING NATIVE CORONARY ARTERY OF NATIVE HEART WITHOUT ANGINA PECTORIS: ICD-10-CM

## 2019-08-21 DIAGNOSIS — Z95.2 S/P TAVR (TRANSCATHETER AORTIC VALVE REPLACEMENT): ICD-10-CM

## 2019-08-21 DIAGNOSIS — I35.0 NONRHEUMATIC AORTIC VALVE STENOSIS: Primary | ICD-10-CM

## 2019-08-21 DIAGNOSIS — Z95.5 STATUS POST INSERTION OF DRUG-ELUTING STENT INTO LEFT ANTERIOR DESCENDING (LAD) ARTERY: ICD-10-CM

## 2019-08-21 PROCEDURE — 99214 OFFICE O/P EST MOD 30 MIN: CPT | Performed by: INTERNAL MEDICINE

## 2019-08-21 NOTE — PROGRESS NOTES
Cardiology Follow Up Visit    Yenifer Savage Day  1935  [id number]  [address] [facility]    1  Nonrheumatic aortic valve stenosis     2  Coronary artery disease involving native coronary artery of native heart without angina pectoris  Echo complete with contrast if indicated   3  S/P TAVR (transcatheter aortic valve replacement)  Echo complete with contrast if indicated   4  Status post insertion of drug-eluting stent into left anterior descending (LAD) artery           Discussion/Summary:    1  Status post TAVR, 23 mm [facility] S3 valve 2016, echocardiogram August 2019, mild aortic valve regurgitation, no stenosis  2  History of complete heart block permanent pacemaker February 2015  3  LAD PCI 2016, coronary artery disease, stable, asymptomatic    Plan:  Patient overall doing well, blood pressure states at times after exercise slightly elevated but resting acceptable today  Discussed if that should be the case would add valsartan 80 mg daily to her regimen  Echocardiogram with good bioprosthetic aortic valve function, routine follow-up recommended with annual echo    Interval History:    Patient presents follow-up history of coronary artery disease with LAD PCI in transcatheter aortic valve implantation/      Since last visit, patient has done well  Noticed blood pressure a nursing home after she exercises and with recent increased arthritis slightly elevated  One hundred [de-identified] systolic today  Exertional dyspnea with walker  But not much change over the last several years  Not very active beyond   the walker  From a coronary artery disease standpoint  No angina  History of permanent pacemaker placement  She is ventricular paced an atrial sensed  No high rate episodes        Patient Active Problem List   Diagnosis    Nonrheumatic aortic valve stenosis    Coronary artery disease involving native coronary artery  Complete atrioventricular block (HCC)    S/P TAVR (transcatheter aortic valve replacement)    Age-related cognitive decline    Frequent falls    Ambulatory dysfunction    Vision impairment    Constipation    Incontinence in female    Closed displaced intertrochanteric fracture of right femur ([facility] [address] )    Diabetes mellitus ([facility] )    History of cardiac pacemaker    MAYELIN (acute kidney injury) ([facility] )    Acute blood loss as cause of postoperative anemia    Chronic diastolic congestive heart failure (HCC)    Hyperlipidemia    Hypothyroidism    Encephalopathy    Status post insertion of drug-eluting stent into left anterior descending (LAD) artery     Past Medical History:   Diagnosis Date    CAD (coronary artery disease)     s/p HERNAN 6/2016    [facility]est[facility] failure) [facility]     Diabetes mellitus ([facility])     non-insulin depdenent    Disease of thyroid gland     GERD (gastroesophageal reflux disease)     History of TIA (transient ischemic attack)     no residual deficits    Hyperlipidemia     Hypertension     Hypoxia     on home O2 QHS    Meniere disease     Pacemaker     [facility] in 2/2015    Severe aortic stenosis      Social History     Socioeconomic History    Marital status:       Spouse name: Not on file    Number of children: Not on file    Years of education: Not on file    Highest education level: Not on file   Occupational History    Not on file   Social Needs    Financial resource strain: Not on file    Food insecurity:     Worry: Not on file     Inability: Not on file    Transportation needs:     Medical: Not on file     Non-medical: Not on file   Tobacco Use    Smoking status: Never Smoker    Smokeless tobacco: Never Used   Substance and Sexual Activity    Alcohol use: No    Drug use: No    Sexual activity: Not on file   Lifestyle    Physical activity:     Days per week: Not on file     Minutes per session: Not on file    Stress: Not on file   Relationships  Social connections:     Talks on phone: Not on file     Gets together: Not on file     Attends [employer] service: Not on file     Active member of club or organization: Not on file     Attends meetings of clubs or organizations: Not on file     Relationship status: Not on file    Intimate partner violence:     Fear of current or ex partner: Not on file     Emotionally abused: Not on file     Physically abused: Not on file     Forced sexual activity: Not on file   Other Topics Concern    Not on file   Social History Narrative    Not on file      No family history on file  Past Surgical History:   Procedure Laterality Date    APPENDECTOMY      BACK SURGERY      CHOLECYSTECTOMY      FRACTURE SURGERY Right 01/02/2018    femur    HYSTERECTOMY      SC EGD TRANSORAL BIOPSY SINGLE/MULTIPLE N/A 11/9/2016    Procedure: ESOPHAGOGASTRODUODENOSCOPY (EGD); Surgeon: Abelardo Eagle MD;  Location: [facility]; Service: Gastroenterology    SC OPEN RX FEMUR FX+INTRAMED GARRETT Right 1/2/2018    Procedure: INSERTION NAIL IM FEMUR ANTEGRADE (TROCHANTERIC) RIGHT;  Surgeon: Sugey Lobo MD;  Location[facility];  Service: Orthopedics    SC REPLACE AORTIC VALVE OPENFEMORAL ARTERY APPROACH N/A 7/26/2016    Procedure: TRANSFEMORAL TAVR WITH 23MM LAROSE LILY S3 VALVE; JOSE ALFREDO ;  Surgeon: Maricel Jamison DO;  [facility];  Service: Cardiac Surgery    SMALL INTESTINE SURGERY      TOTAL KNEE ARTHROPLASTY      VARICOSE VEIN SURGERY      VENTRAL HERNIA REPAIR         Current Outpatient Medications:     acetaminophen (TYLENOL) 325 mg tablet, Take 2 tablets (650 mg total) by mouth every 6 (six) hours as needed for mild pain , Disp: 30 tablet, Rfl: 0    amLODIPine (NORVASC) 5 mg tablet, , Disp: , Rfl:     aspirin 81 mg chewable tablet, Chew 81 mg daily  , Disp: , Rfl:     atorvastatin (LIPITOR) 20 mg tablet, Take 1 tablet (20 mg total) by mouth daily at bedtime   (Patient taking differently: Take 10 mg by mouth daily at bedtime  ), Disp: 30 tablet, Rfl: 2    furosemide (LASIX) 40 mg tablet, Take 1 tablet by mouth daily, Disp: , Rfl: 0    ketoconazole (NIZORAL) 2 % cream, , Disp: , Rfl:     levothyroxine 50 mcg tablet, Take 50 mcg by mouth daily, Disp: , Rfl:     metoprolol tartrate (LOPRESSOR) 50 mg tablet, Take 1 tablet (50 mg total) by mouth 2 (two) times a day , Disp: 60 tablet, Rfl: 2    potassium chloride (K-DUR,KLOR-CON) 20 mEq tablet, Take 20 mEq by mouth daily, Disp: , Rfl:     tetrahydrozoline (VISINE) 0 05 % ophthalmic solution, 1 drop 4 (four) times a day as needed for irritation, Disp: , Rfl:     atorvastatin (LIPITOR) 10 mg tablet, , Disp: , Rfl:     enoxaparin (LOVENOX) 40 mg/0 4 mL, Inject 0 4 mL under the skin daily for 30 days, Disp: 12 mL, Rfl: 0  Allergies   Allergen Reactions    Advil [Ibuprofen] GI Intolerance         Social, Family, Medication history reviewed and updated as necessary      Labs:     Lab Results   Component Value Date     12/01/2015    K 4 0 01/05/2018    CL 96 (L) 01/05/2018    CO2 28 01/05/2018    BUN 24 01/05/2018    CREATININE 0 91 01/05/2018    CREATININE 1 09 01/04/2018    GLUCOSE 134 07/26/2016    CALCIUM 9 1 01/05/2018       Lab Results   Component Value Date    WBC 16 17 (H) 01/05/2018    HGB 8 6 (L) 01/05/2018    HGB 9 0 (L) 01/04/2018    HCT 25 6 (L) 01/05/2018    HCT 26 4 (L) 01/04/2018     01/05/2018     01/04/2018       Lab Results   Component Value Date    CHOL 195 07/09/2015    CHOL 163 02/20/2015     Lab Results   Component Value Date    HDL 55 08/01/2017    HDL 34 (L) 06/09/2016     Lab Results   Component Value Date    LDLCALC 44 08/01/2017    LDLCALC 105 (H) 06/09/2016     No results found for: LDLDIRECT          Lab Results   Component Value Date    TRIG 117 08/01/2017    TRIG 103 06/09/2016       Lab Results   Component Value Date    ALT 26 01/02/2018    ALT 19 08/01/2017    AST 25 01/02/2018    AST 20 08/01/2017       Lab Results Component Value Date    INR 1 04 01/02/2018    INR 0 94 07/21/2016       Lab Results   Component Value Date    NTBNP 535 (H) 07/21/2016       Lab Results   Component Value Date    HGBA1C 6 3 01/02/2018    HGBA1C 6 3 06/15/2017    HGBA1C 6 0 02/28/2017           Imaging: Reviewed in epic      Review of Systems:  14 systems reviewed and negative with exception of the above      PHYSICAL EXAM:      Vitals:    08/21/19 1319   BP: 140/78   Pulse: 90   SpO2: 95%     Body mass index is 36 1 kg/m²  Weight (last 2 days)     Date/Time   Weight    08/21/19 1319   89 5 (197 4)                Gen: No acute distress  HEENT: anicteric, mucous membranes moist  Neck: supple, no jugular venous distention, or carotid bruit  Heart: regular, normal s1 and s2, 1/6 SANTO  Lungs :clear to auscultation bilaterally, no rales/rhonchi or wheeze  Abdomen: soft nontender, normoactive bowel sounds, no organomegaly  Ext: warm and perfused, normal femoral pulses, no edema, or clubbing  Skin: warm, no rashes  Neuro: AAO x 3, no focal findings  Psychiatric: normal affect  Musculoskeletal: no obvious joint deformities

## 2019-10-31 ENCOUNTER — REMOTE DEVICE CLINIC VISIT (OUTPATIENT)
Dept: CARDIOLOGY CLINIC | Facility: CLINIC | Age: 84
End: 2019-10-31
Payer: MEDICARE

## 2019-10-31 DIAGNOSIS — Z95.0 CARDIAC PACEMAKER IN SITU: Primary | ICD-10-CM

## 2019-10-31 PROCEDURE — 93296 REM INTERROG EVL PM/IDS: CPT | Performed by: INTERNAL MEDICINE

## 2019-10-31 PROCEDURE — 93294 REM INTERROG EVL PM/LDLS PM: CPT | Performed by: INTERNAL MEDICINE

## 2019-10-31 NOTE — PROGRESS NOTES
Results for orders placed or performed in visit on 10/31/19   Cardiac EP device report    Narrative    BIOT DUAL CHAMBER  PPM (DDD MODE)  [employer] TRANSMISSION: BATTERY STATUS "OK"  AP 0%  100%  ALL AVAILABLE LEAD PARAMETERS WITHIN NORMAL LIMITS  NO SIGNIFICANT HIGH RATE EPISODES  NORMAL DEVICE FUNCTION   NC

## 2020-02-03 ENCOUNTER — REMOTE DEVICE CLINIC VISIT (OUTPATIENT)
Dept: CARDIOLOGY CLINIC | Facility: CLINIC | Age: 85
End: 2020-02-03
Payer: MEDICARE

## 2020-02-03 DIAGNOSIS — Z95.0 CARDIAC PACEMAKER IN SITU: Primary | ICD-10-CM

## 2020-02-03 PROCEDURE — 93294 REM INTERROG EVL PM/LDLS PM: CPT | Performed by: INTERNAL MEDICINE

## 2020-02-03 PROCEDURE — 93296 REM INTERROG EVL PM/IDS: CPT | Performed by: INTERNAL MEDICINE

## 2020-02-03 NOTE — PROGRESS NOTES
Results for orders placed or performed in visit on 02/03/20   Cardiac EP device report    Narrative    BIOT DUAL CHAMBER  PPM (DDD MODE)/ ACTIVE SYSTEM IS MRI CONDITIONAL  [employer] TRANSMISSION: BATTERY VOLTAGE ADEQUATE (60% REMAINING BATTERY LIFE)  AP-0%, -100% (DEPENDENT/CHB)  ALL AVAILABLE LEAD PARAMETERS WITHIN NORMAL LIMITS  NO SIGNIFICANT HIGH RATE EPISODES  NORMAL DEVICE FUNCTION   GV

## 2020-02-10 ENCOUNTER — APPOINTMENT (EMERGENCY)
Dept: RADIOLOGY | Facility: HOSPITAL | Age: 85
DRG: 563 | End: 2020-02-10
Payer: MEDICARE

## 2020-02-10 ENCOUNTER — HOSPITAL ENCOUNTER (INPATIENT)
Facility: HOSPITAL | Age: 85
LOS: 3 days | Discharge: NURSING, REHAB OR LONG-TERM CARE | DRG: 563 | End: 2020-02-14
Attending: EMERGENCY MEDICINE | Admitting: SURGERY
Payer: MEDICARE

## 2020-02-10 DIAGNOSIS — S42.017A CLOSED NONDISPLACED FRACTURE OF STERNAL END OF RIGHT CLAVICLE, INITIAL ENCOUNTER: ICD-10-CM

## 2020-02-10 DIAGNOSIS — R26.2 AMBULATORY DYSFUNCTION: ICD-10-CM

## 2020-02-10 DIAGNOSIS — S42.017D CLOSED NONDISPLACED FRACTURE OF STERNAL END OF RIGHT CLAVICLE WITH ROUTINE HEALING, SUBSEQUENT ENCOUNTER: ICD-10-CM
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
## 2020-02-10 DIAGNOSIS — S42.001A RIGHT CLAVICLE FRACTURE: Primary | ICD-10-CM

## 2020-02-10 DIAGNOSIS — E11.22 TYPE 2 DIABETES MELLITUS WITH CHRONIC KIDNEY DISEASE, WITHOUT LONG-TERM CURRENT USE OF INSULIN, UNSPECIFIED CKD STAGE (HCC): ICD-10-CM

## 2020-02-10 PROBLEM — S42.011A CLOSED FRACTURE OF STERNAL END OF RIGHT CLAVICLE: Status: ACTIVE | Noted: 2020-02-10

## 2020-02-10 LAB
ALBUMIN SERPL BCP-MCNC: 4.2 G/DL (ref 3.5–5)
ALP SERPL-CCNC: 99 U/L (ref 46–116)
ALT SERPL W P-5'-P-CCNC: 26 U/L (ref 12–78)
ANION GAP SERPL CALCULATED.3IONS-SCNC: 7 MMOL/L (ref 4–13)
AST SERPL W P-5'-P-CCNC: 38 U/L (ref 5–45)
BASOPHILS # BLD AUTO: 0.13 THOUSANDS/ΜL (ref 0–0.1)
BASOPHILS NFR BLD AUTO: 1 % (ref 0–1)
BILIRUB SERPL-MCNC: 0.66 MG/DL (ref 0.2–1)
BUN SERPL-MCNC: 20 MG/DL (ref 5–25)
CALCIUM SERPL-MCNC: 10.1 MG/DL (ref 8.3–10.1)
CHLORIDE SERPL-SCNC: 102 MMOL/L (ref 100–108)
CO2 SERPL-SCNC: 25 MMOL/L (ref 21–32)
CREAT SERPL-MCNC: 0.84 MG/DL (ref 0.6–1.3)
EOSINOPHIL # BLD AUTO: 0.24 THOUSAND/ΜL (ref 0–0.61)
EOSINOPHIL NFR BLD AUTO: 2 % (ref 0–6)
ERYTHROCYTE [DISTWIDTH] IN BLOOD BY AUTOMATED COUNT: 12.9 % (ref 11.6–15.1)
GFR SERPL CREATININE-BSD FRML MDRD: 64 ML/MIN/1.73SQ M
GLUCOSE SERPL-MCNC: 108 MG/DL (ref 65–140)
HCT VFR BLD AUTO: 41.9 % (ref 34.8–46.1)
HGB BLD-MCNC: 13.7 G/DL (ref 11.5–15.4)
IMM GRANULOCYTES # BLD AUTO: 0.09 THOUSAND/UL (ref 0–0.2)
IMM GRANULOCYTES NFR BLD AUTO: 1 % (ref 0–2)
INR PPP: 0.94 (ref 0.84–1.19)
LYMPHOCYTES # BLD AUTO: 1.69 THOUSANDS/ΜL (ref 0.6–4.47)
LYMPHOCYTES NFR BLD AUTO: 14 % (ref 14–44)
MCH RBC QN AUTO: 30 PG (ref 26.8–34.3)
MCHC RBC AUTO-ENTMCNC: 32.7 G/DL (ref 31.4–37.4)
MCV RBC AUTO: 92 FL (ref 82–98)
MONOCYTES # BLD AUTO: 0.97 THOUSAND/ΜL (ref 0.17–1.22)
MONOCYTES NFR BLD AUTO: 8 % (ref 4–12)
NEUTROPHILS # BLD AUTO: 8.89 THOUSANDS/ΜL (ref 1.85–7.62)
NEUTS SEG NFR BLD AUTO: 74 % (ref 43–75)
NRBC BLD AUTO-RTO: 0 /100 WBCS
PLATELET # BLD AUTO: 376 THOUSANDS/UL (ref 149–390)
PMV BLD AUTO: 10.1 FL (ref 8.9–12.7)
POTASSIUM SERPL-SCNC: 5.5 MMOL/L (ref 3.5–5.3)
PROT SERPL-MCNC: 8.4 G/DL (ref 6.4–8.2)
PROTHROMBIN TIME: 12.2 SECONDS (ref 11.6–14.5)
RBC # BLD AUTO: 4.57 MILLION/UL (ref 3.81–5.12)
SODIUM SERPL-SCNC: 134 MMOL/L (ref 136–145)
WBC # BLD AUTO: 12.01 THOUSAND/UL (ref 4.31–10.16)

## 2020-02-10 PROCEDURE — 99285 EMERGENCY DEPT VISIT HI MDM: CPT

## 2020-02-10 PROCEDURE — 1123F ACP DISCUSS/DSCN MKR DOCD: CPT | Performed by: EMERGENCY MEDICINE

## 2020-02-10 PROCEDURE — 72125 CT NECK SPINE W/O DYE: CPT

## 2020-02-10 PROCEDURE — 85610 PROTHROMBIN TIME: CPT | Performed by: EMERGENCY MEDICINE

## 2020-02-10 PROCEDURE — 73080 X-RAY EXAM OF ELBOW: CPT

## 2020-02-10 PROCEDURE — 99223 1ST HOSP IP/OBS HIGH 75: CPT | Performed by: SURGERY

## 2020-02-10 PROCEDURE — 73000 X-RAY EXAM OF COLLAR BONE: CPT

## 2020-02-10 PROCEDURE — 93005 ELECTROCARDIOGRAM TRACING: CPT

## 2020-02-10 PROCEDURE — 85025 COMPLETE CBC W/AUTO DIFF WBC: CPT | Performed by: EMERGENCY MEDICINE

## 2020-02-10 PROCEDURE — 36415 COLL VENOUS BLD VENIPUNCTURE: CPT | Performed by: EMERGENCY MEDICINE

## 2020-02-10 PROCEDURE — 73110 X-RAY EXAM OF WRIST: CPT

## 2020-02-10 PROCEDURE — 73030 X-RAY EXAM OF SHOULDER: CPT

## 2020-02-10 PROCEDURE — 80053 COMPREHEN METABOLIC PANEL: CPT | Performed by: EMERGENCY MEDICINE

## 2020-02-10 PROCEDURE — 74177 CT ABD & PELVIS W/CONTRAST: CPT

## 2020-02-10 PROCEDURE — 70450 CT HEAD/BRAIN W/O DYE: CPT

## 2020-02-10 RX ORDER — ACETAMINOPHEN 325 MG/1
975 TABLET ORAL ONCE
Status: COMPLETED | OUTPATIENT
Start: 2020-02-10 | End: 2020-02-10

## 2020-02-10 RX ADMIN — IOHEXOL 100 ML: 350 INJECTION, SOLUTION INTRAVENOUS at 18:07

## 2020-02-10 RX ADMIN — ACETAMINOPHEN 975 MG: 325 TABLET ORAL at 19:08

## 2020-02-10 NOTE — ED ATTENDING ATTESTATION
2/10/2020  IMarcie MD, saw and evaluated the patient  I have discussed the patient with the resident and agree with the resident's findings, Plan of Care, and MDM as documented in the resident's note, unless otherwise documented below  All available labs and Radiology studies were reviewed by myself  I was present for key portions of any procedure(s) performed by the resident and I was immediately available to provide assistance  I agree with the current assessment done in the Emergency Department  I have conducted an independent evaluation of this patient  Briefly, this is a 80 y o  female with past medical history of coronary artery disease, TAVR, with pacemaker in place, presenting following a mechanical fall  Patient is being evaluated as a level C trauma  Patient was returning home from her chiropractor  She slipped on wet floor and fell backwards, striking right side of her head and upper back  She did not lose consciousness  She reports pain in the right side of her head and neck in the back, as well as right shoulder, right hand, and right hip  She denies chest pain, palpitations, shortness of breath  There is no abdominal pain  She reports chronic pain in her right shoulder and right hip due to arthritis, however, also reports injuring her right shoulder in the fall today  She is not on blood thinners      Physical Exam  Vitals:    02/11/20 1356 02/11/20 2212 02/12/20 0717 02/12/20 1510   BP: 158/76 144/73 143/72 142/65   TempSrc: Oral      Pulse: 76 101 85 90   Resp:       Patient Position - Orthostatic VS:       Temp: 98 4 °F (36 9 °C) 98 5 °F (36 9 °C) 98 3 °F (36 8 °C) 99 6 °F (37 6 °C)     PHYSICAL EXAM  /64   Pulse 88   Temp 98 4 °F (36 9 °C) (Oral)   Resp 18   Wt 89 4 kg (197 lb)   SpO2 93%   BMI 36 03 kg/m²   Primary Survey  Airway: Intact  Breathing: Breathing comfortably on room air, breath sounds present and equal bilaterally  Circulation: 2+ radial, dorsalis pedis, posterior tibial pulses  Secondary Survey  Head: Atraumatic, no edema, ecchymoses, no appreciable/palpable hematoma, bony abnormality to the point of impact  Eyes: PERRL, EOMI, Negative racoon sign  Nose: No deformity, no septal hematoma  Mouth/Throat: No dental fractures, no malocclusion  Neck:  There is mild tenderness to palpation over right paraspinal cervical muscle group  Chest: No deformity or ecchymosis, no crepitus to palpation, no flail chest   CV: Regular rate and rhythm  There is pacemaker present in left upper chest without overlying erythema over the pacemaker pocket  Respiratory: Breathing comfortably on room air, breath sounds present and equal bilaterally  Abdomen: Non-distended, normal bowel signs, tender over left flank without rebound or guarding  There is no ecchymosis appreciated  Pelvis: Stable  Extremities: No deformities, moves all extremities spontaneously  There is tenderness with palpation over right clavicle and right shoulder diffusely  There is tenderness with palpation of right wrist over thumb, there is tenderness with palpation over right hip diffusely  There is no tenderness with leg log roll  Back: No step-offs, ecchymoses, or deformities  Non-tender over T- and L- spine  Neuro: GCS 15  Moves all extremities    Vital signs and nursing notes reviewed      Labs  Labs Reviewed   CBC AND DIFFERENTIAL - Abnormal       Result Value Ref Range Status    WBC 12 01 (*) 4 31 - 10 16 Thousand/uL Final    RBC 4 57  3 81 - 5 12 Million/uL Final    Hemoglobin 13 7  11 5 - 15 4 g/dL Final    Hematocrit 41 9  34 8 - 46 1 % Final    MCV 92  82 - 98 fL Final    MCH 30 0  26 8 - 34 3 pg Final    MCHC 32 7  31 4 - 37 4 g/dL Final    RDW 12 9  11 6 - 15 1 % Final    MPV 10 1  8 9 - 12 7 fL Final    Platelets 994  691 - 390 Thousands/uL Final    nRBC 0  /100 WBCs Final    Neutrophils Relative 74  43 - 75 % Final    Immat GRANS % 1  0 - 2 % Final    Lymphocytes Relative 14  14 - 44 % Final    Monocytes Relative 8  4 - 12 % Final    Eosinophils Relative 2  0 - 6 % Final    Basophils Relative 1  0 - 1 % Final    Neutrophils Absolute 8 89 (*) 1 85 - 7 62 Thousands/µL Final    Immature Grans Absolute 0 09  0 00 - 0 20 Thousand/uL Final    Lymphocytes Absolute 1 69  0 60 - 4 47 Thousands/µL Final    Monocytes Absolute 0 97  0 17 - 1 22 Thousand/µL Final    Eosinophils Absolute 0 24  0 00 - 0 61 Thousand/µL Final    Basophils Absolute 0 13 (*) 0 00 - 0 10 Thousands/µL Final   COMPREHENSIVE METABOLIC PANEL - Abnormal    Sodium 134 (*) 136 - 145 mmol/L Final    Potassium 5 5 (*) 3 5 - 5 3 mmol/L Final    Comment: Moderately Hemolyzed; Results May be Affected    Chloride 102  100 - 108 mmol/L Final    CO2 25  21 - 32 mmol/L Final    ANION GAP 7  4 - 13 mmol/L Final    BUN 20  5 - 25 mg/dL Final    Creatinine 0 84  0 60 - 1 30 mg/dL Final    Comment: Standardized to IDMS reference method    Glucose 108  65 - 140 mg/dL Final    Comment:   If the patient is fasting, the ADA then defines impaired fasting glucose as > 100 mg/dL and diabetes as > or equal to 123 mg/dL  Specimen collection should occur prior to Sulfasalazine administration due to the potential for falsely depressed results  Specimen collection should occur prior to Sulfapyridine administration due to the potential for falsely elevated results  Calcium 10 1  8 3 - 10 1 mg/dL Final    AST 38  5 - 45 U/L Final    Comment: Moderately Hemolyzed; Results May be Affected  Specimen collection should occur prior to Sulfasalazine administration due to the potential for falsely depressed results  ALT 26  12 - 78 U/L Final    Comment:   Specimen collection should occur prior to Sulfasalazine and/or Sulfapyridine administration due to the potential for falsely depressed results       Alkaline Phosphatase 99  46 - 116 U/L Final    Total Protein 8 4 (*) 6 4 - 8 2 g/dL Final    Albumin 4 2  3 5 - 5 0 g/dL Final    Total Bilirubin 0 66  0 20 - 1 00 mg/dL Final    eGFR 64  ml/min/1 73sq m Final    Narrative:     National Kidney Disease Foundation guidelines for Chronic Kidney Disease (CKD):     Stage 1 with normal or high GFR (GFR > 90 mL/min/1 73 square meters)    Stage 2 Mild CKD (GFR = 60-89 mL/min/1 73 square meters)    Stage 3A Moderate CKD (GFR = 45-59 mL/min/1 73 square meters)    Stage 3B Moderate CKD (GFR = 30-44 mL/min/1 73 square meters)    Stage 4 Severe CKD (GFR = 15-29 mL/min/1 73 square meters)    Stage 5 End Stage CKD (GFR <15 mL/min/1 73 square meters)  Note: GFR calculation is accurate only with a steady state creatinine   HEMOGLOBIN A1C - Abnormal    Hemoglobin A1C 6 6 (*) 4 2 - 6 3 % Final      mg/dl Final   CBC AND PLATELET - Abnormal    WBC 12 10 (*) 4 31 - 10 16 Thousand/uL Final    RBC 3 97  3 81 - 5 12 Million/uL Final    Hemoglobin 12 1  11 5 - 15 4 g/dL Final    Hematocrit 36 7  34 8 - 46 1 % Final    MCV 92  82 - 98 fL Final    MCH 30 5  26 8 - 34 3 pg Final    MCHC 33 0  31 4 - 37 4 g/dL Final    RDW 13 2  11 6 - 15 1 % Final    Platelets 944  987 - 390 Thousands/uL Final    MPV 10 1  8 9 - 12 7 fL Final   PROTIME-INR - Normal    Protime 12 2  11 6 - 14 5 seconds Final    INR 0 94  0 84 - 1 19 Final   PLATELET COUNT - Normal    Platelets 896  160 - 390 Thousands/uL Final    MPV 9 7  8 9 - 12 7 fL Final   POCT GLUCOSE - Normal    POC Glucose 130  65 - 140 mg/dl Final   POCT GLUCOSE - Normal    POC Glucose 112  65 - 140 mg/dl Final   BASIC METABOLIC PANEL    Sodium 554  136 - 145 mmol/L Final    Potassium 3 7  3 5 - 5 3 mmol/L Final    Chloride 102  100 - 108 mmol/L Final    CO2 28  21 - 32 mmol/L Final    ANION GAP 6  4 - 13 mmol/L Final    BUN 18  5 - 25 mg/dL Final    Creatinine 0 84  0 60 - 1 30 mg/dL Final    Comment: Standardized to IDMS reference method    Glucose 123  65 - 140 mg/dL Final    Comment:   If the patient is fasting, the ADA then defines impaired fasting glucose as > 100 mg/dL and diabetes as > or equal to 123 mg/dL  Specimen collection should occur prior to Sulfasalazine administration due to the potential for falsely depressed results  Specimen collection should occur prior to Sulfapyridine administration due to the potential for falsely elevated results  Calcium 9 4  8 3 - 10 1 mg/dL Final    eGFR 64  ml/min/1 73sq m Final    Narrative:     [employer] guidelines for Chronic Kidney Disease (CKD):     Stage 1 with normal or high GFR (GFR > 90 mL/min/1 73 square meters)    Stage 2 Mild CKD (GFR = 60-89 mL/min/1 73 square meters)    Stage 3A Moderate CKD (GFR = 45-59 mL/min/1 73 square meters)    Stage 3B Moderate CKD (GFR = 30-44 mL/min/1 73 square meters)    Stage 4 Severe CKD (GFR = 15-29 mL/min/1 73 square meters)    Stage 5 End Stage CKD (GFR <15 mL/min/1 73 square meters)  Note: GFR calculation is accurate only with a steady state creatinine        Tests  XR shoulder 2+ views RIGHT   Final Result      Acute proximal midclavicular fracture is better appreciated on cervical spine CT  No definitive acute fracture around the right shoulder joint though evaluation is rather limited by positioning  Moderate to severe glenohumeral osteoarthritis  Workstation performed: [id number]         XR elbow 3+ views RIGHT   Final Result      No acute osseous abnormality  Workstation performed: [id number]         XR wrist 3+ views RIGHT   Final Result   Degenerative change without acute osseous abnormality  Workstation performed: [id number]         CT head without contrast   Final Result      No acute intracranial abnormality  Workstation performed: [id number]         CT cervical spine without contrast   Final Result       No cervical spine fracture or traumatic malalignment  Workstation performed: [id number]         CT abdomen pelvis with contrast   Final Result      No acute traumatic findings in the abdomen or pelvis  Status post hysterectomy  Dilation of the vaginal cuff with fluid, noting 2 foci of gas in the nondependent portion  Findings are nonspecific  Recommend correlation with physical exam findings  Additional chronic findings as described  The study was marked in [facility] for immediate notification  Workstation performed: [id number]             Procedures  ECG 12 Lead Documentation Only  Date/Time: 2/10/2020 6:40 PM  Performed by: Berhane Mark MD  Authorized by: Berhane Mark MD     Comments:      Ventricular paced rhythm, ventricular rate 92, SD interval 192, , , no excessive ST segment discordance or concordance to suggest STEMI   100% capture  ED Course    58-year-old female presenting following mechanical fall  Vital signs reviewed, within normal limits  Patient arrives without cervical collar in place, C-spine precautions maintained  CT head, CT C-spine, CT abdomen/pelvis, as well as x-rays of right shoulder, right elbow, and of right wrist obtained  Symptomatic leak, patient has no lower abdominal pain, low index of suspicion for infection causing dilation of vaginal cuff with fluid and foci of gas  Patient may follow up on outpatient basis with gynecology for pelvic exam   Rest of imaging reveals for acute fracture of right medial clavicle without displacement or impingement of mediastinum  Case discussed with Trauma surgery who kindly accepts patient for admission      Clinical Impression  Final diagnoses:   Right clavicle fracture   Fall, initial encounter   Ambulatory dysfunction

## 2020-02-11 ENCOUNTER — APPOINTMENT (INPATIENT)
Dept: RADIOLOGY | Facility: HOSPITAL | Age: 85
DRG: 563 | End: 2020-02-11
Payer: MEDICARE

## 2020-02-11 PROBLEM — I10 HYPERTENSION: Chronic | Status: ACTIVE | Noted: 2020-02-11

## 2020-02-11 LAB
ANION GAP SERPL CALCULATED.3IONS-SCNC: 6 MMOL/L (ref 4–13)
ATRIAL RATE: 92 BPM
BUN SERPL-MCNC: 18 MG/DL (ref 5–25)
CALCIUM SERPL-MCNC: 9.4 MG/DL (ref 8.3–10.1)
CHLORIDE SERPL-SCNC: 102 MMOL/L (ref 100–108)
CO2 SERPL-SCNC: 28 MMOL/L (ref 21–32)
CREAT SERPL-MCNC: 0.84 MG/DL (ref 0.6–1.3)
ERYTHROCYTE [DISTWIDTH] IN BLOOD BY AUTOMATED COUNT: 13.2 % (ref 11.6–15.1)
EST. AVERAGE GLUCOSE BLD GHB EST-MCNC: 143 MG/DL
GFR SERPL CREATININE-BSD FRML MDRD: 64 ML/MIN/1.73SQ M
GLUCOSE SERPL-MCNC: 112 MG/DL (ref 65–140)
GLUCOSE SERPL-MCNC: 123 MG/DL (ref 65–140)
GLUCOSE SERPL-MCNC: 130 MG/DL (ref 65–140)
GLUCOSE SERPL-MCNC: 173 MG/DL (ref 65–140)
GLUCOSE SERPL-MCNC: 91 MG/DL (ref 65–140)
HBA1C MFR BLD: 6.6 % (ref 4.2–6.3)
HCT VFR BLD AUTO: 36.7 % (ref 34.8–46.1)
HGB BLD-MCNC: 12.1 G/DL (ref 11.5–15.4)
MCH RBC QN AUTO: 30.5 PG (ref 26.8–34.3)
MCHC RBC AUTO-ENTMCNC: 33 G/DL (ref 31.4–37.4)
MCV RBC AUTO: 92 FL (ref 82–98)
P AXIS: 69 DEGREES
PLATELET # BLD AUTO: 318 THOUSANDS/UL (ref 149–390)
PLATELET # BLD AUTO: 318 THOUSANDS/UL (ref 149–390)
PMV BLD AUTO: 10.1 FL (ref 8.9–12.7)
PMV BLD AUTO: 9.7 FL (ref 8.9–12.7)
POTASSIUM SERPL-SCNC: 3.7 MMOL/L (ref 3.5–5.3)
PR INTERVAL: 192 MS
QRS AXIS: -70 DEGREES
QRSD INTERVAL: 188 MS
QT INTERVAL: 450 MS
QTC INTERVAL: 556 MS
RBC # BLD AUTO: 3.97 MILLION/UL (ref 3.81–5.12)
SODIUM SERPL-SCNC: 136 MMOL/L (ref 136–145)
T WAVE AXIS: 101 DEGREES
VENTRICULAR RATE: 92 BPM
WBC # BLD AUTO: 12.1 THOUSAND/UL (ref 4.31–10.16)

## 2020-02-11 PROCEDURE — 82948 REAGENT STRIP/BLOOD GLUCOSE: CPT

## 2020-02-11 PROCEDURE — 73502 X-RAY EXAM HIP UNI 2-3 VIEWS: CPT

## 2020-02-11 PROCEDURE — 85049 AUTOMATED PLATELET COUNT: CPT | Performed by: PHYSICIAN ASSISTANT

## 2020-02-11 PROCEDURE — 83036 HEMOGLOBIN GLYCOSYLATED A1C: CPT | Performed by: PHYSICIAN ASSISTANT

## 2020-02-11 PROCEDURE — 97167 OT EVAL HIGH COMPLEX 60 MIN: CPT

## 2020-02-11 PROCEDURE — 99232 SBSQ HOSP IP/OBS MODERATE 35: CPT | Performed by: PHYSICIAN ASSISTANT

## 2020-02-11 PROCEDURE — 36415 COLL VENOUS BLD VENIPUNCTURE: CPT | Performed by: PHYSICIAN ASSISTANT

## 2020-02-11 PROCEDURE — NS001 PR NO SIGNATURE OR ATTESTATION: Performed by: ORTHOPAEDIC SURGERY

## 2020-02-11 PROCEDURE — 97163 PT EVAL HIGH COMPLEX 45 MIN: CPT

## 2020-02-11 PROCEDURE — 80048 BASIC METABOLIC PNL TOTAL CA: CPT | Performed by: PHYSICIAN ASSISTANT

## 2020-02-11 PROCEDURE — 99285 EMERGENCY DEPT VISIT HI MDM: CPT | Performed by: EMERGENCY MEDICINE

## 2020-02-11 PROCEDURE — 85027 COMPLETE CBC AUTOMATED: CPT | Performed by: PHYSICIAN ASSISTANT

## 2020-02-11 PROCEDURE — 93010 ELECTROCARDIOGRAM REPORT: CPT | Performed by: INTERNAL MEDICINE

## 2020-02-11 RX ORDER — OXYCODONE HYDROCHLORIDE 5 MG/1
5 TABLET ORAL EVERY 4 HOURS PRN
Status: DISCONTINUED | OUTPATIENT
Start: 2020-02-11 | End: 2020-02-14 | Stop reason: HOSPADM

## 2020-02-11 RX ORDER — FERROUS SULFATE 325(65) MG
325 TABLET ORAL
Status: DISCONTINUED | OUTPATIENT
Start: 2020-02-11 | End: 2020-02-14 | Stop reason: HOSPADM

## 2020-02-11 RX ORDER — LOPERAMIDE HYDROCHLORIDE 2 MG/1
2 TABLET ORAL 3 TIMES DAILY PRN
COMMUNITY

## 2020-02-11 RX ORDER — NYSTATIN 100000 [USP'U]/G
POWDER TOPICAL 2 TIMES DAILY
COMMUNITY

## 2020-02-11 RX ORDER — TETRAHYDROZOLINE HCL 0.05 %
1 DROPS OPHTHALMIC (EYE) 4 TIMES DAILY PRN
Status: DISCONTINUED | OUTPATIENT
Start: 2020-02-11 | End: 2020-02-11 | Stop reason: CLARIF

## 2020-02-11 RX ORDER — DOCUSATE SODIUM 100 MG/1
100 CAPSULE, LIQUID FILLED ORAL 2 TIMES DAILY
Status: DISCONTINUED | OUTPATIENT
Start: 2020-02-11 | End: 2020-02-14 | Stop reason: HOSPADM

## 2020-02-11 RX ORDER — ATORVASTATIN CALCIUM 10 MG/1
10 TABLET, FILM COATED ORAL
Status: DISCONTINUED | OUTPATIENT
Start: 2020-02-11 | End: 2020-02-14 | Stop reason: HOSPADM

## 2020-02-11 RX ORDER — INSULIN GLARGINE 100 [IU]/ML
12 INJECTION, SOLUTION SUBCUTANEOUS
Status: DISCONTINUED | OUTPATIENT
Start: 2020-02-11 | End: 2020-02-14 | Stop reason: HOSPADM

## 2020-02-11 RX ORDER — POTASSIUM CHLORIDE 20 MEQ/1
20 TABLET, EXTENDED RELEASE ORAL DAILY
Status: DISCONTINUED | OUTPATIENT
Start: 2020-02-11 | End: 2020-02-11

## 2020-02-11 RX ORDER — HYDROMORPHONE HCL/PF 1 MG/ML
0.2 SYRINGE (ML) INJECTION EVERY 4 HOURS PRN
Status: DISCONTINUED | OUTPATIENT
Start: 2020-02-11 | End: 2020-02-11

## 2020-02-11 RX ORDER — ASPIRIN 81 MG/1
81 TABLET, CHEWABLE ORAL DAILY
Status: DISCONTINUED | OUTPATIENT
Start: 2020-02-11 | End: 2020-02-14 | Stop reason: HOSPADM

## 2020-02-11 RX ORDER — ACETAMINOPHEN 325 MG/1
975 TABLET ORAL EVERY 8 HOURS SCHEDULED
Status: DISCONTINUED | OUTPATIENT
Start: 2020-02-11 | End: 2020-02-14 | Stop reason: HOSPADM

## 2020-02-11 RX ORDER — ONDANSETRON 2 MG/ML
4 INJECTION INTRAMUSCULAR; INTRAVENOUS EVERY 6 HOURS PRN
Status: DISCONTINUED | OUTPATIENT
Start: 2020-02-11 | End: 2020-02-14 | Stop reason: HOSPADM

## 2020-02-11 RX ORDER — LIDOCAINE 50 MG/G
1 PATCH TOPICAL DAILY
Status: COMPLETED | OUTPATIENT
Start: 2020-02-11 | End: 2020-02-11

## 2020-02-11 RX ORDER — OXYCODONE HYDROCHLORIDE 5 MG/1
2.5 TABLET ORAL EVERY 4 HOURS PRN
Status: DISCONTINUED | OUTPATIENT
Start: 2020-02-11 | End: 2020-02-14 | Stop reason: HOSPADM

## 2020-02-11 RX ORDER — LEVOTHYROXINE SODIUM 0.05 MG/1
50 TABLET ORAL
Status: DISCONTINUED | OUTPATIENT
Start: 2020-02-11 | End: 2020-02-14 | Stop reason: HOSPADM

## 2020-02-11 RX ORDER — LOPERAMIDE HYDROCHLORIDE 2 MG/1
2 CAPSULE ORAL 4 TIMES DAILY PRN
Status: DISCONTINUED | OUTPATIENT
Start: 2020-02-11 | End: 2020-02-14 | Stop reason: HOSPADM

## 2020-02-11 RX ORDER — KETOTIFEN FUMARATE 0.35 MG/ML
1 SOLUTION/ DROPS OPHTHALMIC 2 TIMES DAILY PRN
Status: DISCONTINUED | OUTPATIENT
Start: 2020-02-11 | End: 2020-02-14 | Stop reason: HOSPADM

## 2020-02-11 RX ORDER — FERROUS SULFATE 325(65) MG
325 TABLET ORAL
COMMUNITY

## 2020-02-11 RX ORDER — SENNOSIDES 8.6 MG
2 TABLET ORAL DAILY
Status: DISCONTINUED | OUTPATIENT
Start: 2020-02-11 | End: 2020-02-14 | Stop reason: HOSPADM

## 2020-02-11 RX ORDER — METOPROLOL TARTRATE 50 MG/1
50 TABLET, FILM COATED ORAL 2 TIMES DAILY
Status: DISCONTINUED | OUTPATIENT
Start: 2020-02-11 | End: 2020-02-14 | Stop reason: HOSPADM

## 2020-02-11 RX ORDER — AMLODIPINE BESYLATE 5 MG/1
5 TABLET ORAL 2 TIMES DAILY
Status: DISCONTINUED | OUTPATIENT
Start: 2020-02-12 | End: 2020-02-14 | Stop reason: HOSPADM

## 2020-02-11 RX ORDER — AMOXICILLIN 500 MG/1
2000 CAPSULE ORAL AS NEEDED
COMMUNITY
End: 2021-09-05 | Stop reason: HOSPADM

## 2020-02-11 RX ORDER — FUROSEMIDE 40 MG/1
40 TABLET ORAL DAILY
Status: DISCONTINUED | OUTPATIENT
Start: 2020-02-11 | End: 2020-02-12

## 2020-02-11 RX ADMIN — METOPROLOL TARTRATE 50 MG: 50 TABLET, FILM COATED ORAL at 09:30

## 2020-02-11 RX ADMIN — ACETAMINOPHEN 975 MG: 325 TABLET ORAL at 00:53

## 2020-02-11 RX ADMIN — ENOXAPARIN SODIUM 30 MG: 30 INJECTION SUBCUTANEOUS at 22:10

## 2020-02-11 RX ADMIN — INSULIN LISPRO 4 UNITS: 100 INJECTION, SOLUTION INTRAVENOUS; SUBCUTANEOUS at 19:02

## 2020-02-11 RX ADMIN — ACETAMINOPHEN 975 MG: 325 TABLET ORAL at 22:09

## 2020-02-11 RX ADMIN — ATORVASTATIN CALCIUM 10 MG: 10 TABLET, FILM COATED ORAL at 18:03

## 2020-02-11 RX ADMIN — INSULIN GLARGINE 12 UNITS: 100 INJECTION, SOLUTION SUBCUTANEOUS at 22:10

## 2020-02-11 RX ADMIN — LIDOCAINE 1 PATCH: 50 PATCH TOPICAL at 09:29

## 2020-02-11 RX ADMIN — METOPROLOL TARTRATE 50 MG: 50 TABLET, FILM COATED ORAL at 22:12

## 2020-02-11 RX ADMIN — ASPIRIN 81 MG 81 MG: 81 TABLET ORAL at 09:30

## 2020-02-11 RX ADMIN — ACETAMINOPHEN 975 MG: 325 TABLET ORAL at 06:14

## 2020-02-11 RX ADMIN — ENOXAPARIN SODIUM 30 MG: 30 INJECTION SUBCUTANEOUS at 09:31

## 2020-02-11 RX ADMIN — LEVOTHYROXINE SODIUM 50 MCG: 50 TABLET ORAL at 09:29

## 2020-02-11 RX ADMIN — OXYCODONE HYDROCHLORIDE 2.5 MG: 5 TABLET ORAL at 22:09

## 2020-02-11 RX ADMIN — FUROSEMIDE 40 MG: 40 TABLET ORAL at 09:30

## 2020-02-11 RX ADMIN — INSULIN LISPRO 1 UNITS: 100 INJECTION, SOLUTION INTRAVENOUS; SUBCUTANEOUS at 19:01

## 2020-02-11 RX ADMIN — FERROUS SULFATE TAB 325 MG (65 MG ELEMENTAL FE) 325 MG: 325 (65 FE) TAB at 09:30

## 2020-02-11 NOTE — PLAN OF CARE
Problem: PHYSICAL THERAPY ADULT  Goal: Performs mobility at highest level of function for planned discharge setting  See evaluation for individualized goals  Description  Treatment/Interventions: Functional transfer training, LE strengthening/ROM, Therapeutic exercise, Endurance training, Patient/family training, Equipment eval/education, Bed mobility, Gait training, Spoke to nursing          See flowsheet documentation for full assessment, interventions and recommendations  Note:   Prognosis: Fair  Problem List: Decreased strength, Decreased range of motion, Decreased endurance, Impaired balance, Decreased mobility, Decreased coordination, Decreased safety awareness, Pain, Orthopedic restrictions  Assessment: Pt is 80 y o  female seen for PT evaluation s/p admit to [facility] on 2/10/2020 w/ Closed fracture of sternal end of right clavicle  Per Ortho, non-operative management w/ NWB RUE in sling  PT consulted to assess pt's functional mobility and d/c needs  Order placed for PT eval and tx, w/ Up in chair and NWB RUE order  Comorbidities affecting pt's physical performance at time of assessment include: ambulatory dysfunction, fall, HTN, aortic valve stenosis, CAD, hx of R femur fx, hx of cardiac pacemaker, MAYELIN, CHF  PTA, pt was resident of [facility] where she was ambulating w/ AD and requiring assistance for LB bathing and dressing  Pt was able to ambulate to dining rodgers for meals  Personal factors affecting pt at time of IE include: ambulating w/ assistive device, inability to ambulate household distances, inability to navigate community distances, positive fall history, inability to perform IADLs and inability to perform ADLs   Please find objective findings from PT assessment regarding body systems outlined above with impairments and limitations including weakness, decreased ROM, impaired balance, decreased endurance, impaired coordination, gait deviations, pain, decreased activity tolerance, decreased functional mobility tolerance, fall risk and orthopedic restrictions  Pt performed bed mobility at [address]  Pt shoes were donned w/ DF assist brace on LLE 2' hx of drop foot  Pt performed STS at Mod A X2 and took 3 sidesteps toward HOD at [address]  Pt presents w/ significant difficulty picking up feet when ambulation and took shuffling sidesteps requiring assistance to facilitate weightshifting  The following objective measures performed on IE also reveal limitations: Modified [place]: 4 (moderate/severe disability)  Pt's clinical presentation is currently unstable/unpredictable seen in pt's presentation of recent admission for R clavicle fx requiring medical attention, recent decline in function as compared to baseline, multiple lines, fall risk, pain, increased reliance on assistance for functional mobility  Pt to benefit from continued PT tx to address deficits as defined above and maximize level of functional independent mobility and consistency  From PT/mobility standpoint, recommendation at time of d/c would be STR pending progress in order to facilitate return to PLOF  Recommendation: Post acute IP rehab     PT - OK to Discharge: Yes    See flowsheet documentation for full assessment

## 2020-02-11 NOTE — PHYSICAL THERAPY NOTE
Physical Therapy Evaluation     Patient's Name: Yue Womack Day    Admitting Diagnosis  Right clavicle fracture [S42 001A]  Closed nondisplaced fracture of sternal end of right clavicle, initial encounter [S42 017A]  Fall, initial encounter [W19  XXXA]  Ambulatory dysfunction [R26 2]  Unspecified multiple injuries, initial encounter [T07  XXXA]    Problem List  Patient Active Problem List   Diagnosis    Nonrheumatic aortic valve stenosis    Coronary artery disease involving native coronary artery    Complete atrioventricular block (HCC)    S/P TAVR (transcatheter aortic valve replacement)    Age-related cognitive decline    Frequent falls    Ambulatory dysfunction    Vision impairment    Constipation    Incontinence in female    Closed displaced intertrochanteric fracture of right femur ([facility]    Diabetes mellitus ([facility]    History of cardiac pacemaker    MAYELIN (acute kidney injury) ([facility]    Acute blood loss as cause of postoperative anemia    Chronic diastolic congestive heart failure (HCC)    Hyperlipidemia    Hypothyroidism    Encephalopathy    Status post insertion of drug-eluting stent into left anterior descending (LAD) artery    Fall    Closed fracture of sternal end of right clavicle    Hypertension       Past Medical History  Past Medical History:   Diagnosis Date    Blind     CAD (coronary artery disease)     s/p HERNAN 6/2016    CHF (congestive heart failure) (HCC)     Diabetes mellitus (HCC)     non-insulin depdenent    Disease of thyroid gland     GERD (gastroesophageal reflux disease)     History of TIA (transient ischemic attack)     no residual deficits    Hyperlipidemia     Hypertension     Hypoxia     on home O2 QHS    Meniere disease     Pacemaker     [facility] in 2/2015    Severe aortic stenosis        Past Surgical History  Past Surgical History:   Procedure Laterality Date    APPENDECTOMY      BACK SURGERY      CHOLECYSTECTOMY      FRACTURE SURGERY Right 01/02/2018    femur    HYSTERECTOMY      DE EGD TRANSORAL BIOPSY SINGLE/MULTIPLE N/A 11/9/2016    Procedure: ESOPHAGOGASTRODUODENOSCOPY (EGD); Surgeon: Gayatri Yu MD;  [facility]: [facility]; Service: Gastroenterology    DE OPEN RX FEMUR FX+INTRAMED GARRETT Right 1/2/2018    Procedure: INSERTION NAIL IM FEMUR ANTEGRADE (TROCHANTERIC) RIGHT;  Surgeon: Elda Alonso MD;  [facility];  Service: Orthopedics    DE REPLACE AORTIC VALVE OPENFEMORAL ARTERY APPROACH N/A 7/26/2016    Procedure: TRANSFEMORAL TAVR WITH 23MM LAROSE LILY S3 VALVE; JOSE ALFREDO ;  Surgeon: Manuel Pugh DO;  [facility];  Service: Cardiac Surgery    SMALL INTESTINE SURGERY      TOTAL KNEE ARTHROPLASTY      VARICOSE VEIN SURGERY      VENTRAL HERNIA REPAIR          02/11/20 1104   Note Type   Note type Eval only   Pain Assessment   Pain Assessment 0-10   Pain Score 3   Pain Type Acute pain   Pain Location Neck; Shoulder   Pain Orientation Right   Hospital Pain Intervention(s) Repositioned; Ambulation/increased activity; Distraction; Emotional support   Response to Interventions tolerated   Home Living   Type of Home Assisted living   Home Layout One level;Performs ADLs on one level   Bathroom Shower/Tub Walk-in shower   Bathroom Equipment Shower chair;Grab bars in [facility] [address]   Additional Comments Pt resides at [address] [place] [address] Road  Pt was ambulating w/ RW PTA   Prior Function   Level of [place] Needs assistance with ADLs and functional mobility; Needs assistance with IADLs   Lives With Facility staff   Receives Help From Personal care attendant   ADL Assistance Needs assistance   IADLs Needs assistance   Falls in the last 6 months 1 to 4   Comments Pt reports needing assistance w/ LB bathing and dressing   Pt ambulated to dining rodgers w/ RW to receive meals   Restrictions/Precautions   Weight Bearing Precautions Per Order Yes   RUE Weight Bearing Per Order NWB   Braces or Orthoses Sling  (LLE DF assist brace)   Other Precautions WBS; Multiple lines;Telemetry; Fall Risk;Pain   General   Family/Caregiver Present No   Cognition   Arousal/Participation Alert   Orientation Level Oriented X4   Memory Decreased recall of precautions   Following Commands Follows one step commands without difficulty   Comments Pt pleasant and agreeable to work w/ therapy  Pt required VCs for all initiation, sequenincg, and safety   RLE Assessment   RLE Assessment   (grossly assessed w/ functional mobility; at least 3+/5)   LLE Assessment   LLE Assessment   (grossly assessed w/ functional mobility; at least 3+/5)   Light Touch   RLE Light Touch Grossly intact   LLE Light Touch Grossly intact   Bed Mobility   Supine to Sit 2  Maximal assistance   Additional items Assist x 2; Increased time required;Verbal cues;LE management   Sit to Supine 2  Maximal assistance   Additional items Assist x 2; Increased time required;Verbal cues;LE management   Additional Comments Pt lying supine upon PT arrival  Pt shoes donned lying supine  Pt returned lying supine at end of session w/ all needs within reach   Transfers   Sit to Stand 3  Moderate assistance   Additional items Assist x 2; Increased time required;Verbal cues   Stand to Sit 3  Moderate assistance   Additional items Assist x 2; Increased time required;Verbal cues   Additional Comments Transfers w/ HHA on LUE  Pt requires VCs for maintaining upright posture throughout session   Ambulation/Elevation   Gait pattern Excessively slow; Short stride;Decreased foot clearance;Shuffling   Gait Assistance 3  Moderate assist   Additional items Assist x 2;Verbal cues   Assistive Device   (HHA on LUE)   Distance 3 sidesteps   Stair Management Assistance Not tested   Balance   Static Sitting Fair -   Dynamic Sitting Poor +   Static Standing Poor   Dynamic Standing Poor -   Ambulatory Poor -   Endurance Deficit   Endurance Deficit Yes   Endurance Deficit Description pain, weakness, fatigue   Activity Tolerance Activity Tolerance Patient limited by fatigue;Patient limited by pain   Medical Staff Made Aware LUZMARIA Mcnally   Nurse Made Aware RN cleared pt for therapy   Assessment   Prognosis Fair   Problem List Decreased strength;Decreased range of motion;Decreased endurance; Impaired balance;Decreased mobility; Decreased coordination;Decreased safety awareness;Pain;Orthopedic restrictions   Assessment Pt is 80 y o  female seen for PT evaluation s/p admit to [facility] on 2/10/2020 w/ Closed fracture of sternal end of right clavicle  Per Ortho, non-operative management w/ NWB RUE in sling  PT consulted to assess pt's functional mobility and d/c needs  Order placed for PT eval and tx, w/ Up in chair and NWB RUE order  Comorbidities affecting pt's physical performance at time of assessment include: ambulatory dysfunction, fall, HTN, aortic valve stenosis, CAD, hx of R femur fx, hx of cardiac pacemaker, MAYELIN, CHF  PTA, pt was resident of [facility] where she was ambulating w/ AD and requiring assistance for LB bathing and dressing  Pt was able to ambulate to dining rodgers for meals  Personal factors affecting pt at time of IE include: ambulating w/ assistive device, inability to ambulate household distances, inability to navigate community distances, positive fall history, inability to perform IADLs and inability to perform ADLs  Please find objective findings from PT assessment regarding body systems outlined above with impairments and limitations including weakness, decreased ROM, impaired balance, decreased endurance, impaired coordination, gait deviations, pain, decreased activity tolerance, decreased functional mobility tolerance, fall risk and orthopedic restrictions  Pt performed bed mobility at [address] [facility]  Pt shoes were donned w/ DF assist brace on LLE 2' hx of drop foot  Pt performed STS at Mod A X2 and took 3 sidesteps toward HOD at [address]   Pt presents w/ significant difficulty picking up feet when ambulation and took shuffling sidesteps requiring assistance to facilitate weightshifting  The following objective measures performed on IE also reveal limitations: Modified [place]: 4 (moderate/severe disability)  Pt's clinical presentation is currently unstable/unpredictable seen in pt's presentation of recent admission for R clavicle fx requiring medical attention, recent decline in function as compared to baseline, multiple lines, fall risk, pain, increased reliance on assistance for functional mobility  Pt to benefit from continued PT tx to address deficits as defined above and maximize level of functional independent mobility and consistency  From PT/mobility standpoint, recommendation at time of d/c would be STR pending progress in order to facilitate return to PLOF  Goals   Patient Goals to be able to walk   STG Expiration Date 02/25/20   Short Term Goal #1 1  Pt will demonstrate the ability to perform all aspects of bed mobility at Min A in onder to maximize functional independence and decrease burden on caregivers  2 Pt will demonstrate the ability to perform all functional transfers at [address] in order to maximize functional independence and decrease burden on caregivers  3 Pt will demonstrate the ability to ambulate at least 50ft with least restrictive device at S in order to maximize functional independence  4 Pt will demonstrate improved balance by one grade in order to decrease risk of falls  5 Pt will increase b/l LE strength by 1 grade in order to increase ease of functional mobility and transfers   PT Treatment Day 0   Plan   Treatment/Interventions Functional transfer training;LE strengthening/ROM; Therapeutic exercise; Endurance training;Patient/family training;Equipment eval/education; Bed mobility;Gait training;Spoke to nursing   PT Frequency   (3-5x/week)   Recommendation   Recommendation Post acute IP rehab   PT - OK to Discharge Yes   Additional Comments when medically cleared to rehab Modified [place] Scale   Modified [place] Scale 4     Frankey Pleas, PT, DPT
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
## 2020-02-11 NOTE — PLAN OF CARE
Problem: OCCUPATIONAL THERAPY ADULT  Goal: Performs self-care activities at highest level of function for planned discharge setting  See evaluation for individualized goals  Description  Treatment Interventions: ADL retraining, Functional transfer training, UE strengthening/ROM, Endurance training, Cognitive reorientation, Patient/family training, Equipment evaluation/education, Compensatory technique education, Fine motor coordination activities, Activityengagement, Energy conservation          See flowsheet documentation for full assessment, interventions and recommendations  Note:   Limitation: Decreased ADL status, Decreased UE ROM, Decreased UE strength, Decreased Safe judgement during ADL, Decreased endurance, Decreased high-level ADLs  Prognosis: Fair  Assessment: Pt is a 79 yo female seen for OT eval [facility] w/ closed fx of sternal end of R clavicle  Per ortho, "NWB right upper extremity in sling  Will pursue non-operative treatment " Comorbidities include a h/o ambulatory dysfunctoin, DM, HTN, CAD, s/p TAVR, frequent falls, vision impairment, female incontinence, cardiac pacemaker, MAYELIN, HLD , encephalopathy  Pt with active OT orders and up in chair orders  Pt is retired and resides at [address] [place] [address]  Pt was required A w/  ADLS and IADLS, does not drive, & required use of DME PTA, including RW for mobility  Pt is currently demonstrating the following occupational deficits: Max A UB bathing/dressing, Total A LB bathing/dressing and toieleting, Max Ax2 bed mobility, Mod Ax2 STS and ambulating lateral side steps w/ HHAx2   These deficits that are impacting pt's baseline areas of occupation are a result of the following impairments: pain, endurance, activity tolerance, functional mobility, forward functional reach, balance, functional standing tolerance, unsupportive home environment, decreased I w/ ADLS/IADLS, strength, ROM, cognitive impairments, decreased safety awareness, decreased insight into deficits, impaired fine motor skills and coordination deficits  The following Occupational Performance Areas to address include: grooming, bathing/shower, toilet hygiene, dressing, health maintenance, functional mobility and clothing management  Based on the aforementioned OT evaluation, functional performance deficits, and assessments, pt has been identified as a high complexity evaluation  Recommend STR upon D/C   Pt to continue to benefit from acute immediate OT services to address the following goals 3-5x/week to  w/in 7-10 days:      OT Discharge Recommendation: Short Term Rehab  OT - OK to Discharge: Yes(when medically cleared)

## 2020-02-11 NOTE — ASSESSMENT & PLAN NOTE
Lab Results   Component Value Date    HGBA1C 6 6 (H) 02/11/2020       Recent Labs     02/11/20  0130 02/11/20  0609   POCGLU 130 112       Blood Sugar Average: Last 72 hrs:  (P) 121   - Continue subcutaneous insulin regimen, though patient is refusing   - Monitor blood sugars 4 times daily   - Outpatient follow-up with primary care provider

## 2020-02-11 NOTE — ASSESSMENT & PLAN NOTE
- Status post fall with the below noted injuries  - Fall precautions  - Geriatric Medicine consult secondary to ISAR greater than 2   - PT and OT evaluation and treatment as indicated  - Case Management consultation for disposition planning

## 2020-02-11 NOTE — PROGRESS NOTES
Progress Note - Divya Ludin Day 1935, 80 y o  female MRN: [id number]    Unit/Bed#: ED 23 Encounter: [id number]    Primary Care Provider: Richard Merino MD   Date and time admitted to hospital: 2/10/2020  5:25 PM        Fall  Assessment & Plan  - Status post fall with the below noted injuries  - Fall precautions  - Geriatric Medicine consult secondary to ISAR greater than 2   - PT and OT evaluation and treatment as indicated  - Case Management consultation for disposition planning  Hypertension  Assessment & Plan  - Continue home medication regimen   - Outpatient follow-up with primary care provider  Hypothyroidism  Assessment & Plan  - Continue home medication regimen   - Outpatient follow-up with primary care provider  Diabetes mellitus [facility])  Assessment & Plan  Lab Results   Component Value Date    HGBA1C 6 6 (H) 02/11/2020       Recent Labs     02/11/20  0130 02/11/20  0609   POCGLU 130 112       Blood Sugar Average: Last 72 hrs:  (P) 121   - Continue subcutaneous insulin regimen, though patient is refusing   - Monitor blood sugars 4 times daily   - Outpatient follow-up with primary care provider  Ambulatory dysfunction  Assessment & Plan  - Ambulatory dysfunction with baseline use of walker and presentation of fall   - Fall precautions  - Geriatric Medicine consult secondary to ISAR greater than 2   - PT and OT evaluation and treatment as indicated  - Case Management consultation for disposition planning  Anticipate need for post acute care facility placement secondary to acute traumatic injury likely limiting her ability to use a walker  * Closed fracture of sternal end of right clavicle  Assessment & Plan  - Closed proximal right clavicle fracture  - Appreciate orthopedics evaluation  Non operative management in a sling  NWB RUE    - Initiate multimodal analgesic regimen  - Monitor neurovascular exam   - PT and OT evaluation and treatment as indicated when appropriate    - Continue DVT prophylaxis  Prophylaxis: Sequential compression device (Venodyne)  and Enoxaparin (Lovenox)    Disposition: Continue med-surg status    Code status:  Level 1 - Full Code    Consultants: Geriatrics, orthopedics      Identification of Seniors at Risk      Most Recent Value   (ISAR) Identification of Seniors at Risk   Before the illness or injury that brought you to the Emergency, did you need someone to help you on a regular basis? 0 Filed at: 02/10/2020 1732   In the last 24 hours, have you needed more help than usual?  0 Filed at: 02/10/2020 1732   Have you been hospitalized for one or more nights during the past 6 months? 0 Filed at: 02/10/2020 1732   In general, do you see well?  0 Filed at: 02/10/2020 1732   In general, do you have serious problems with your memory? 1 Filed at: 02/10/2020 1732   Do you take more than three different medications every day? 1 Filed at: 02/10/2020 1732   ISAR Score  2 Filed at: 02/10/2020 1732        Geriatrics consultation placed  SUBJECTIVE:     Transfer from: N/A  Outside Films Received: not applicable  Tertiary Exam Due on: 2/11/20    Mechanism of Injury:Fall    Details related to Injury: +LOC:  no    Chief Complaint: "Right shoulder pain"    HPI/Last 24 hour events: Patient notes pain in the area of her broken clavicle  She is feeling well otherwise  Denies pre-syncopal symptoms prior to fall  Confirms that she was wearing wet shoes and slipped       Active medications:           Current Facility-Administered Medications:     acetaminophen (TYLENOL) tablet 975 mg, 975 mg, Oral, Q8H CORDELIA, 975 mg at 02/11/20 0614    [START ON 2/12/2020] amLODIPine (NORVASC) tablet 5 mg, 5 mg, Oral, BID    aspirin chewable tablet 81 mg, 81 mg, Oral, Daily, 81 mg at 02/11/20 0930    atorvastatin (LIPITOR) tablet 10 mg, 10 mg, Oral, Daily With Dinner    docusate sodium (COLACE) capsule 100 mg, 100 mg, Oral, BID    enoxaparin (LOVENOX) subcutaneous injection 30 mg, 30 mg, Subcutaneous, BID, 30 mg at 02/11/20 0931    ferrous sulfate tablet 325 mg, 325 mg, Oral, Daily With Breakfast, 325 mg at 02/11/20 0930    furosemide (LASIX) tablet 40 mg, 40 mg, Oral, Daily, 40 mg at 02/11/20 0930    insulin glargine (LANTUS) subcutaneous injection 12 Units 0 12 mL, 12 Units, Subcutaneous, HS, Stopped at 02/11/20 0144    insulin lispro (HumaLOG) 100 units/mL subcutaneous injection 1-5 Units, 1-5 Units, Subcutaneous, HS    insulin lispro (HumaLOG) 100 units/mL subcutaneous injection 1-6 Units, 1-6 Units, Subcutaneous, TID AC **AND** Fingerstick Glucose (POCT), , , TID AC    insulin lispro (HumaLOG) 100 units/mL subcutaneous injection 4 Units, 4 Units, Subcutaneous, Daily With Breakfast    insulin lispro (HumaLOG) 100 units/mL subcutaneous injection 4 Units, 4 Units, Subcutaneous, Daily With Lunch    insulin lispro (HumaLOG) 100 units/mL subcutaneous injection 4 Units, 4 Units, Subcutaneous, Daily With Dinner    ketotifen (ZADITOR) 0 025 % ophthalmic solution 1 drop, 1 drop, Both Eyes, BID PRN    levothyroxine tablet 50 mcg, 50 mcg, Oral, Early Morning, 50 mcg at 02/11/20 0929    lidocaine (LIDODERM) 5 % patch 1 patch, 1 patch, Topical, Daily, 1 patch at 02/11/20 0929    loperamide (IMODIUM) capsule 2 mg, 2 mg, Oral, 4x Daily PRN    metoprolol tartrate (LOPRESSOR) tablet 50 mg, 50 mg, Oral, BID, 50 mg at 02/11/20 0930    naloxone (NARCAN) 0 04 mg/mL syringe 0 04 mg, 0 04 mg, Intravenous, Q1MIN PRN    ondansetron (ZOFRAN) injection 4 mg, 4 mg, Intravenous, Q6H PRN    oxyCODONE (ROXICODONE) IR tablet 2 5 mg, 2 5 mg, Oral, Q4H PRN    oxyCODONE (ROXICODONE) IR tablet 5 mg, 5 mg, Oral, Q4H PRN    senna (SENOKOT) tablet 17 2 mg, 2 tablet, Oral, Daily    Current Outpatient Medications:     acetaminophen (TYLENOL) 325 mg tablet    amLODIPine (NORVASC) 5 mg tablet    aspirin 81 mg chewable tablet    diclofenac sodium (VOLTAREN) 1 %    ferrous sulfate 325 (65 Fe) mg tablet   ketoconazole (NIZORAL) 2 % cream    levothyroxine 50 mcg tablet    loperamide (IMODIUM A-D) 2 MG tablet    metoprolol tartrate (LOPRESSOR) 50 mg tablet    Multiple Vitamins-Minerals (OCUVITE ADULT 50+ PO)    nystatin (MYCOSTATIN) powder    tetrahydrozoline (VISINE) 0 05 % ophthalmic solution    atorvastatin (LIPITOR) 10 mg tablet    enoxaparin (LOVENOX) 40 mg/0 4 mL    furosemide (LASIX) 40 mg tablet    potassium chloride (K-DUR,KLOR-CON) 20 mEq tablet      OBJECTIVE:     Vitals:   Vitals:    02/11/20 1000   BP: 147/67   Pulse: 74   Resp: 18   Temp:    SpO2: 94%       Physical Exam:   GENERAL APPEARANCE: NAD  NEURO: GCS 15, non-focal  HEENT: NCAT  CV: RRR, no MGR  LUNGS: CTA bilaterally  GI: soft,non-tender,non-distended  : voiding  MSK: + RUE in sling, NVI distally  NO other edema or deformities of any extermities  SKIN: Pink, warm, dry    I/O:   I/O     None          Invasive Devices: Invasive Devices     Peripheral Intravenous Line            Peripheral IV 02/10/20 Right Antecubital less than 1 day                  Imaging:   Xr Clavicle Right    Result Date: 2/11/2020  Impression: Acute fracture of the medial aspect of the right clavicle  No pneumothorax identified  Workstation performed: [id number]     Xr Shoulder 2+ Views Right    Result Date: 2/10/2020  Impression: Acute proximal midclavicular fracture is better appreciated on cervical spine CT  No definitive acute fracture around the right shoulder joint though evaluation is rather limited by positioning  Moderate to severe glenohumeral osteoarthritis  Workstation performed: [id number]     Xr Elbow 3+ Views Right    Result Date: 2/11/2020  Impression: No acute osseous abnormality  Workstation performed: [id number]     Xr Wrist 3+ Views Right    Result Date: 2/11/2020  Impression: Degenerative change without acute osseous abnormality   Workstation performed: [id number]     Xr Hip/pelv 2-3 Vws Right If Performed    Result Date: 2/11/2020  Impression: No acute fracture seen  Old Posttraumatic and postsurgical changes as mentioned  Workstation performed: [id number]     Ct Head Without Contrast    Result Date: 2/10/2020  Impression: No acute intracranial abnormality  Workstation performed: [id number]     Ct Cervical Spine Without Contrast    Result Date: 2/10/2020  Impression:  No cervical spine fracture or traumatic malalignment  Workstation performed: [id number]     Ct Abdomen Pelvis With Contrast    Result Date: 2/10/2020  Impression: No acute traumatic findings in the abdomen or pelvis  Status post hysterectomy  Dilation of the vaginal cuff with fluid, noting 2 foci of gas in the nondependent portion  Findings are nonspecific  Recommend correlation with physical exam findings  Additional chronic findings as described  The study was marked in [facility] for immediate notification   Workstation performed: [id number]       Labs:   CBC:   Lab Results   Component Value Date    WBC 12 10 (H) 02/11/2020    HGB 12 1 02/11/2020    HCT 36 7 02/11/2020    MCV 92 02/11/2020     02/11/2020    MCH 30 5 02/11/2020    MCHC 33 0 02/11/2020    RDW 13 2 02/11/2020    MPV 10 1 02/11/2020    NRBC 0 02/10/2020     CMP:   Lab Results   Component Value Date     02/11/2020    CO2 28 02/11/2020    BUN 18 02/11/2020    CREATININE 0 84 02/11/2020    CALCIUM 9 4 02/11/2020    AST 38 02/10/2020    ALT 26 02/10/2020    ALKPHOS 99 02/10/2020    EGFR 64 02/11/2020

## 2020-02-11 NOTE — ASSESSMENT & PLAN NOTE
Lab Results   Component Value Date    HGBA1C 6 3 01/02/2018       No results for input(s): POCGLU in the last 72 hours  Blood Sugar Average: Last 72 hrs:     - Initiate subcutaneous insulin regimen  - Monitor blood sugars 4 times daily   - Outpatient follow-up with primary care provider

## 2020-02-11 NOTE — ED NOTES
Trauma at bedside     [facility], [address] [facility]  02/11/20 5969

## 2020-02-11 NOTE — ED PROVIDER NOTES
History  Chief Complaint   Patient presents with    Fall     Pt slipped and fell outside, hitting R shoulder -LOC, +headstrike     59-year-old female with history of CAD, CHF, diabetes, ambulatory dysfunction, walks with a walker at baseline presenting from assisted living facility for evaluation after she fell  Patient reports a mechanical fall  She was walking with her walker from outside after attending a doctor's appointment  Her shoes were wet and she slipped  Reports falling onto her backside and onto her right side  Did not have any prodrome before her fall  Has significant right shoulder pain at this time  Does report that she has some neck pain that radiates into her right shoulder  Has chronic pain, reports that her back pain is about stable  No other injuries noted  She denies any loss of consciousness  Does take ASA 81 daily  No amnesia to the events  History provided by:  Patient   used: No    Fall   Associated symptoms: back pain    Associated symptoms: no abdominal pain, no chest pain, no headaches, no nausea and no vomiting        Prior to Admission Medications   Prescriptions Last Dose Informant Patient Reported? Taking? Multiple Vitamins-Minerals (OCUVITE ADULT 50+ PO)   Yes Yes   Sig: Take by mouth daily   acetaminophen (TYLENOL) 325 mg tablet  Self No Yes   Sig: Take 2 tablets (650 mg total) by mouth every 6 (six) hours as needed for mild pain  amLODIPine (NORVASC) 5 mg tablet   Yes Yes   Si mg 2 (two) times a day    aspirin 81 mg chewable tablet  Self Yes Yes   Sig: Chew 81 mg daily     atorvastatin (LIPITOR) 10 mg tablet   Yes No   diclofenac sodium (VOLTAREN) 1 %   Yes Yes   Sig: Apply 2 g topically 4 (four) times a day as needed   enoxaparin (LOVENOX) 40 mg/0 4 mL   No No   Sig: Inject 0 4 mL under the skin daily for 30 days   ferrous sulfate 325 (65 Fe) mg tablet   Yes Yes   Sig: Take 325 mg by mouth daily with breakfast   furosemide (LASIX) 40 mg tablet  Self No No   Sig: Take 1 tablet by mouth daily   ketoconazole (NIZORAL) 2 % cream   Yes Yes   Sig: daily    levothyroxine 50 mcg tablet  Self Yes Yes   Sig: Take 50 mcg by mouth daily   loperamide (IMODIUM A-D) 2 MG tablet   Yes Yes   Sig: Take 2 mg by mouth 3 (three) times a day as needed for diarrhea   metoprolol tartrate (LOPRESSOR) 50 mg tablet  Self No Yes   Sig: Take 1 tablet (50 mg total) by mouth 2 (two) times a day  nystatin (MYCOSTATIN) powder   Yes Yes   Sig: Apply topically 2 (two) times a day   potassium chloride (K-DUR,KLOR-CON) 20 mEq tablet  Self Yes No   Sig: Take 20 mEq by mouth daily   tetrahydrozoline (VISINE) 0 05 % ophthalmic solution   Yes Yes   Si drop 4 (four) times a day as needed for irritation      Facility-Administered Medications: None       Past Medical History:   Diagnosis Date    Blind     CAD (coronary artery disease)     s/p HERNAN 2016    [facility]est[facility] [address] [facility]     [facility]     non-insulin depdenent    Disease of thyroid gland     GERD (gastroesophageal reflux disease)     History of TIA (transient ischemic attack)     no residual deficits    Hyperlipidemia     Hypertension     Hypoxia     on home O2 QHS    Meniere disease     Pacemaker     [facility] in 2015    Severe aortic stenosis        Past Surgical History:   Procedure Laterality Date    APPENDECTOMY      BACK SURGERY      CHOLECYSTECTOMY      FRACTURE SURGERY Right 2018    femur    HYSTERECTOMY      RI EGD TRANSORAL BIOPSY SINGLE/MULTIPLE N/A 2016    Procedure: ESOPHAGOGASTRODUODENOSCOPY (EGD); Surgeon: Orion Bolton MD;  [facility];   Service: Gastroenterology    RI OPEN RX FEMUR FX+INTRAMED GARRETT Right 2018    Procedure: INSERTION NAIL IM FEMUR ANTEGRADE (TROCHANTERIC) RIGHT;  Surgeon: Jamie Martinez MD;  [facility];  Service: Orthopedics    RI REPLACE AORTIC VALVE OPENFEMORAL ARTERY APPROACH N/A 2016    Procedure: TRANSFEMORAL TAVR WITH 23MM LAROSE LILY S3 VALVE; JOSE ALFREDO ;  Surgeon: Doris Ivey DO;  [facility];  Service: Cardiac Surgery    SMALL INTESTINE SURGERY      TOTAL KNEE ARTHROPLASTY      VARICOSE VEIN SURGERY      VENTRAL HERNIA REPAIR         History reviewed  No pertinent family history  I have reviewed and agree with the history as documented  Social History     Tobacco Use    Smoking status: Never Smoker    Smokeless tobacco: Never Used   Substance Use Topics    Alcohol use: No    Drug use: No        Review of Systems   Constitutional: Negative for chills, fatigue and fever  HENT: Negative for congestion and sore throat  Eyes: Negative for redness and visual disturbance  Respiratory: Negative for shortness of breath and wheezing  Cardiovascular: Negative for chest pain and palpitations  Gastrointestinal: Negative for abdominal pain, diarrhea, nausea and vomiting  Genitourinary: Negative for difficulty urinating, dysuria, hematuria and urgency  Musculoskeletal: Positive for back pain and gait problem  Negative for joint swelling  Skin: Negative for pallor and rash  Neurological: Negative for dizziness, syncope, weakness, light-headedness and headaches  Psychiatric/Behavioral: Negative for confusion  The patient is not nervous/anxious          Physical Exam  ED Triage Vitals   Temperature Pulse Respirations Blood Pressure SpO2   02/10/20 1800 02/10/20 1730 02/10/20 1730 02/10/20 1730 02/10/20 1730   98 4 °F (36 9 °C) 99 20 (!) 199/83 96 %      Temp Source Heart Rate Source Patient Position - Orthostatic VS BP Location FiO2 (%)   02/10/20 1800 02/10/20 1730 02/10/20 1730 02/10/20 1957 --   Oral Monitor Lying Left arm       Pain Score       02/10/20 1731       4             Orthostatic Vital Signs  Vitals:    02/10/20 2200 02/10/20 2300 02/11/20 0000 02/11/20 0200   BP: (!) 193/82  146/64 142/65   Pulse: 100 94 88 90   Patient Position - Orthostatic VS:   Lying Lying Physical Exam   Constitutional: She is oriented to person, place, and time  She appears well-developed and well-nourished  No distress  HENT:   Head: Normocephalic  No hematoma, laceration  No hemotympanum b/l  Eyes: Pupils are equal, round, and reactive to light  Conjunctivae are normal    Neck: Normal range of motion  Cardiovascular: Normal rate, regular rhythm and normal heart sounds  No murmur heard  Pulmonary/Chest: Effort normal and breath sounds normal  No respiratory distress  She has no wheezes  She has no rales  She exhibits no tenderness  Abdominal: Soft  Bowel sounds are normal  She exhibits no distension  There is tenderness (Left flank)  Musculoskeletal: Normal range of motion  Tenderness of right heme role head, right clavicle approximately  No C, T, L-spine tenderness  No pelvic tenderness  Able to range both hips fully  Right shoulder has normal range of motion  Symmetric strength throughout  Neurological: She is alert and oriented to person, place, and time  No cranial nerve deficit or sensory deficit  She exhibits normal muscle tone  Coordination normal    Patellar and Achilles reflexes symmetric bilaterally  4/5 symmetric strength b/l    Skin: Skin is warm and dry  No rash noted  She is not diaphoretic  Psychiatric: She has a normal mood and affect  Nursing note and vitals reviewed        ED Medications  Medications   amLODIPine (NORVASC) tablet 5 mg (has no administration in time range)   atorvastatin (LIPITOR) tablet 10 mg (has no administration in time range)   ferrous sulfate tablet 325 mg (has no administration in time range)   furosemide (LASIX) tablet 40 mg (has no administration in time range)   levothyroxine tablet 50 mcg (has no administration in time range)   loperamide (IMODIUM) capsule 2 mg (has no administration in time range)   metoprolol tartrate (LOPRESSOR) tablet 50 mg (has no administration in time range)   docusate sodium (COLACE) capsule 100 mg (has no administration in time range)   senna (SENOKOT) tablet 17 2 mg (has no administration in time range)   ondansetron (ZOFRAN) injection 4 mg (has no administration in time range)   enoxaparin (LOVENOX) subcutaneous injection 30 mg (has no administration in time range)   acetaminophen (TYLENOL) tablet 975 mg (975 mg Oral Given 2/11/20 0053)   lidocaine (LIDODERM) 5 % patch 1 patch (has no administration in time range)   oxyCODONE (ROXICODONE) IR tablet 2 5 mg (has no administration in time range)   oxyCODONE (ROXICODONE) IR tablet 5 mg (has no administration in time range)   HYDROmorphone (DILAUDID) injection 0 2 mg (has no administration in time range)   naloxone (NARCAN) 0 04 mg/mL syringe 0 04 mg (has no administration in time range)   ketotifen (ZADITOR) 0 025 % ophthalmic solution 1 drop (has no administration in time range)   insulin lispro (HumaLOG) 100 units/mL subcutaneous injection 4 Units (has no administration in time range)   insulin lispro (HumaLOG) 100 units/mL subcutaneous injection 4 Units (has no administration in time range)   insulin lispro (HumaLOG) 100 units/mL subcutaneous injection 4 Units (has no administration in time range)   insulin glargine (LANTUS) subcutaneous injection 12 Units 0 12 mL (0 Units Subcutaneous Hold 2/11/20 0144)   insulin lispro (HumaLOG) 100 units/mL subcutaneous injection 1-6 Units (has no administration in time range)   insulin lispro (HumaLOG) 100 units/mL subcutaneous injection 1-5 Units (1 Units Subcutaneous Not Given 2/11/20 0135)   acetaminophen (TYLENOL) tablet 975 mg (975 mg Oral Given 2/10/20 1908)   iohexol (OMNIPAQUE) 350 MG/ML injection (MULTI-DOSE) 100 mL (100 mL Intravenous Given 2/10/20 1807)       Diagnostic Studies  Results Reviewed     Procedure Component Value Units Date/Time    Platelet count [[id number]]  (Normal) Collected:  02/11/20 0156    Lab Status:  Final result Specimen:  Blood from Arm, Right Updated:  02/11/20 0205     Platelets 318 Thousands/uL      MPV 9 7 fL     Hemoglobin A1c w/EAG Estimation (Orders if not completed within the last 90 days) [[id number]] Collected:  02/11/20 0156    Lab Status:   In process Specimen:  Blood from Arm, Right Updated:  02/11/20 0159    Fingerstick Glucose (POCT) [[id number]]  (Normal) Collected:  02/11/20 0130    Lab Status:  Final result Updated:  02/11/20 0130     POC Glucose 130 mg/dl     Protime-INR [[id number]]  (Normal) Collected:  02/10/20 1753    Lab Status:  Final result Specimen:  Blood from Arm, Right Updated:  02/10/20 1826     Protime 12 2 seconds      INR 0 94    Comprehensive metabolic panel [[id number]]  (Abnormal) Collected:  02/10/20 1753    Lab Status:  Final result Specimen:  Blood from Arm, Right Updated:  02/10/20 1817     Sodium 134 mmol/L      Potassium 5 5 mmol/L      Chloride 102 mmol/L      CO2 25 mmol/L      ANION GAP 7 mmol/L      BUN 20 mg/dL      Creatinine 0 84 mg/dL      Glucose 108 mg/dL      Calcium 10 1 mg/dL      AST 38 U/L      ALT 26 U/L      Alkaline Phosphatase 99 U/L      Total Protein 8 4 g/dL      Albumin 4 2 g/dL      Total Bilirubin 0 66 mg/dL      eGFR 64 ml/min/1 73sq m     Narrative:       [place] guidelines for Chronic Kidney Disease (CKD):     Stage 1 with normal or high GFR (GFR > 90 mL/min/1 73 square meters)    Stage 2 Mild CKD (GFR = 60-89 mL/min/1 73 square meters)    Stage 3A Moderate CKD (GFR = 45-59 mL/min/1 73 square meters)    Stage 3B Moderate CKD (GFR = 30-44 mL/min/1 73 square meters)    Stage 4 Severe CKD (GFR = 15-29 mL/min/1 73 square meters)    Stage 5 End Stage CKD (GFR <15 mL/min/1 73 square meters)  Note: GFR calculation is accurate only with a steady state creatinine    CBC and differential [[id number]]  (Abnormal) Collected:  02/10/20 1753    Lab Status:  Final result Specimen:  Blood from Arm, Right Updated:  02/10/20 1800     WBC 12 01 Thousand/uL      RBC 4 57 Million/uL      Hemoglobin 13 7 g/dL Hematocrit 41 9 %      MCV 92 fL      MCH 30 0 pg      MCHC 32 7 g/dL      RDW 12 9 %      MPV 10 1 fL      Platelets 559 Thousands/uL      nRBC 0 /100 WBCs      Neutrophils Relative 74 %      Immat GRANS % 1 %      Lymphocytes Relative 14 %      Monocytes Relative 8 %      Eosinophils Relative 2 %      Basophils Relative 1 %      Neutrophils Absolute 8 89 Thousands/µL      Immature Grans Absolute 0 09 Thousand/uL      Lymphocytes Absolute 1 69 Thousands/µL      Monocytes Absolute 0 97 Thousand/µL      Eosinophils Absolute 0 24 Thousand/µL      Basophils Absolute 0 13 Thousands/µL                  XR shoulder 2+ views RIGHT   Final Result by Adán Reed MD (02/10 2124)      Acute proximal midclavicular fracture is better appreciated on cervical spine CT  No definitive acute fracture around the right shoulder joint though evaluation is rather limited by positioning  Moderate to severe glenohumeral osteoarthritis  Workstation performed: [id number]         CT head without contrast   Final Result by Huber Carvajal MD (02/10 1831)      No acute intracranial abnormality  Workstation performed: [id number]         CT cervical spine without contrast   Final Result by Huber Carvajal MD (02/10 1828)       No cervical spine fracture or traumatic malalignment  Workstation performed: [id number]         CT abdomen pelvis with contrast   Final Result by Adán Reed MD (02/10 1831)      No acute traumatic findings in the abdomen or pelvis  Status post hysterectomy  Dilation of the vaginal cuff with fluid, noting 2 foci of gas in the nondependent portion  Findings are nonspecific  Recommend correlation with physical exam findings  Additional chronic findings as described  The study was marked in [facility] for immediate notification              Workstation performed: [id number]         XR elbow 3+ views RIGHT    (Results Pending)   XR wrist 3+ views RIGHT    (Results Pending)   XR clavicle right    (Results Pending)   XR hip/pelv 2-3 vws right if performed    (Results Pending)         Procedures  Procedures      ED Course  ED Course as of Feb 11 0255   Mon Feb 10, 2020   2130 Acute proximal midclavicular fracture is better appreciated on cervical spine CT      No definitive acute fracture around the right shoulder joint though evaluation is rather limited by positioning      Moderate to severe glenohumeral osteoarthritis  2130 Admit to trauma likely  Identification of Seniors at Risk      Most Recent Value   (ISAR) Identification of Seniors at Risk   Before the illness or injury that brought you to the Emergency, did you need someone to help you on a regular basis? 0 Filed at: 02/10/2020 1732   In the last 24 hours, have you needed more help than usual?  0 Filed at: 02/10/2020 1732   Have you been hospitalized for one or more nights during the past 6 months? 0 Filed at: 02/10/2020 1732   In general, do you see well?  0 Filed at: 02/10/2020 1732   In general, do you have serious problems with your memory? 1 Filed at: 02/10/2020 1732   Do you take more than three different medications every day? 1 Filed at: 02/10/2020 1732   ISAR Score  2 Filed at: 02/10/2020 1732                          MDM  Number of Diagnoses or Management Options  Ambulatory dysfunction:   Fall, initial encounter:   Right clavicle fracture:   Diagnosis management comments: 27-year-old female presenting as a level see trauma after a fall backwards onto her right side  Most prominent area of discomfort is right shoulder and clavicle area  Is able to range appropriately however  CT head is unremarkable  CT C-spine does not show any acute fractures of the cervical spine, however there is a right proximal clavicular fracture visualized  This is also visualized on right shoulder film  No fractures of right humeral head    Given concern that patient fell on her right side, imaging of elbow and wrist obtain on right side  CT abd/pelvis obtained for mild left sided flank tenderness, unremarkable other than incidental findings of small amount of air in vaginal cuff  Both were unremarkable per my and attending's review  She uses a walker at baseline  Will be unable to ambulate with significant pain at right shoulder/clavicle  Admitted to trauma surgery  Disposition  Final diagnoses:   Right clavicle fracture   Fall, initial encounter   Ambulatory dysfunction     Time reflects when diagnosis was documented in both MDM as applicable and the Disposition within this note     Time User Action Codes Description Comment    2/11/2020 12:08 AM Sanjay Lundborg Add [S42 017A] Closed nondisplaced fracture of sternal end of right clavicle, initial encounter     2/11/2020 12:08 AM Sanjay Lundborg Modify [S42 017A] Closed nondisplaced fracture of sternal end of right clavicle, initial encounter     2/11/2020  2:19 AM Jeannette, Glorietta Screws Add [S42 001A] Right clavicle fracture     2/11/2020  2:19 AM Jeannette, Glorietta Screws Modify [S42 017A] Closed nondisplaced fracture of sternal end of right clavicle, initial encounter     2/11/2020  2:19 AM Jeannette, Glorietta Screws Modify [S42 001A] Right clavicle fracture     2/11/2020  2:19 AM Jeannette, Glorietta Screws Add [W19  XXXA] Fall, initial encounter     2/11/2020  2:19 AM Jeannette, Glorietta Screws Add [R26 2] Ambulatory dysfunction       ED Disposition     ED Disposition Condition Date/Time Comment    Admit Stable Tue Feb 11, 2020  2:18 AM Case was discussed with Dr Kyleigh Lopez and the patient's admission status was agreed to be Admission Status: inpatient status to the service of Dr Kyleigh Lopez   Follow-up Information    None         Patient's Medications   Discharge Prescriptions    No medications on file     No discharge procedures on file  ED Provider  Attending physically available and evaluated Eboni Bentley Hutton  I managed the patient along with the ED Attending      Electronically Signed by         Nigel Trujillo MD  02/11/20 1098

## 2020-02-11 NOTE — ASSESSMENT & PLAN NOTE
- Closed proximal right clavicle fracture  - Appreciate orthopedics evaluation  Non operative management in a sling  NWB RUE    - Initiate multimodal analgesic regimen  - Monitor neurovascular exam   - PT and OT evaluation and treatment as indicated when appropriate   - Continue DVT prophylaxis

## 2020-02-11 NOTE — PLAN OF CARE
Problem: Prexisting or High Potential for Compromised Skin Integrity  Goal: Skin integrity is maintained or improved  Description  INTERVENTIONS:  - Identify patients at risk for skin breakdown  - Assess and monitor skin integrity  - Assess and monitor nutrition and hydration status  - Monitor labs   - Assess for incontinence   - Turn and reposition patient  - Assist with mobility/ambulation  - Relieve pressure over bony prominences  - Avoid friction and shearing  - Provide appropriate hygiene as needed including keeping skin clean and dry  - Evaluate need for skin moisturizer/barrier cream  - Collaborate with interdisciplinary team   - Patient/family teaching  - Consider wound care consult   Outcome: Progressing     Problem: Potential for Falls  Goal: Patient will remain free of falls  Description  INTERVENTIONS:  - Assess patient frequently for physical needs  -  Identify cognitive and physical deficits and behaviors that affect risk of falls    -  [place] fall precautions as indicated by assessment   - Educate patient/family on patient safety including physical limitations  - Instruct patient to call for assistance with activity based on assessment  - Modify environment to reduce risk of injury  - Consider OT/PT consult to assist with strengthening/mobility  Outcome: Progressing     Problem: PAIN - ADULT  Goal: Verbalizes/displays adequate comfort level or baseline comfort level  Description  Interventions:  - Encourage patient to monitor pain and request assistance  - Assess pain using appropriate pain scale  - Administer analgesics based on type and severity of pain and evaluate response  - Implement non-pharmacological measures as appropriate and evaluate response  - Consider cultural and social influences on pain and pain management  - Notify physician/advanced practitioner if interventions unsuccessful or patient reports new pain  Outcome: Progressing     Problem: SAFETY ADULT  Goal: Maintain or return to baseline ADL function  Description  INTERVENTIONS:  -  Assess patient's ability to carry out ADLs; assess patient's baseline for ADL function and identify physical deficits which impact ability to perform ADLs (bathing, care of mouth/teeth, toileting, grooming, dressing, etc )  - Assess/evaluate cause of self-care deficits   - Assess range of motion  - Assess patient's mobility; develop plan if impaired  - Assess patient's need for assistive devices and provide as appropriate  - Encourage maximum independence but intervene and supervise when necessary  - Involve family in performance of ADLs  - Assess for home care needs following discharge   - Consider OT consult to assist with ADL evaluation and planning for discharge  - Provide patient education as appropriate  Outcome: Progressing  Goal: Maintain or return mobility status to optimal level  Description  INTERVENTIONS:  - Assess patient's baseline mobility status (ambulation, transfers, stairs, etc )    - Identify cognitive and physical deficits and behaviors that affect mobility  - Identify mobility aids required to assist with transfers and/or ambulation (gait belt, sit-to-stand, lift, walker, cane, etc )  - [place] fall precautions as indicated by assessment  - Record patient progress and toleration of activity level on Mobility SBAR; progress patient to next Phase/Stage  - Instruct patient to call for assistance with activity based on assessment  - Consider rehabilitation consult to assist with strengthening/weightbearing, etc   Outcome: Progressing     Problem: DISCHARGE PLANNING  Goal: Discharge to home or other facility with appropriate resources  Description  INTERVENTIONS:  - Identify barriers to discharge w/patient and caregiver  - Arrange for needed discharge resources and transportation as appropriate  - Identify discharge learning needs (meds, wound care, etc )  - Arrange for interpretive services to assist at discharge as needed  - Refer to Case Management Department for coordinating discharge planning if the patient needs post-hospital services based on physician/advanced practitioner order or complex needs related to functional status, cognitive ability, or social support system  Outcome: Progressing     Problem: Knowledge Deficit  Goal: Patient/family/caregiver demonstrates understanding of disease process, treatment plan, medications, and discharge instructions  Description  Complete learning assessment and assess knowledge base    Interventions:  - Provide teaching at level of understanding  - Provide teaching via preferred learning methods  Outcome: Progressing     Problem: GENITOURINARY - ADULT  Goal: Maintains or returns to baseline urinary function  Description  INTERVENTIONS:  - Assess urinary function  - Encourage oral fluids to ensure adequate hydration if ordered  - Administer IV fluids as ordered to ensure adequate hydration  - Administer ordered medications as needed  - Offer frequent toileting  - Follow urinary retention protocol if ordered  Outcome: Progressing     Problem: SKIN/TISSUE INTEGRITY - ADULT  Goal: Skin integrity remains intact  Description  INTERVENTIONS  - Identify patients at risk for skin breakdown  - Assess and monitor skin integrity  - Assess and monitor nutrition and hydration status  - Monitor labs (i e  albumin)  - Assess for incontinence   - Turn and reposition patient  - Assist with mobility/ambulation  - Relieve pressure over bony prominences  - Avoid friction and shearing  - Provide appropriate hygiene as needed including keeping skin clean and dry  - Evaluate need for skin moisturizer/barrier cream  - Collaborate with interdisciplinary team (i e  Nutrition, Rehabilitation, etc )   - Patient/family teaching  Outcome: Progressing  Goal: Incision(s), wounds(s) or drain site(s) healing without S/S of infection  Description  INTERVENTIONS  - Assess and document risk factors for skin impairment   - Assess and document dressing, incision, wound bed, drain sites and surrounding tissue  - Consider nutrition services referral as needed  - Oral mucous membranes remain intact  - Provide patient/ family education  Outcome: Progressing  Goal: Oral mucous membranes remain intact  Description  INTERVENTIONS  - Assess oral mucosa and hygiene practices  - Implement preventative oral hygiene regimen  - Implement oral medicated treatments as ordered  - Initiate Nutrition services referral as needed  Outcome: Progressing     Problem: MUSCULOSKELETAL - ADULT  Goal: Maintain or return mobility to safest level of function  Description  INTERVENTIONS:  - Assess patient's ability to carry out ADLs; assess patient's baseline for ADL function and identify physical deficits which impact ability to perform ADLs (bathing, care of mouth/teeth, toileting, grooming, dressing, etc )  - Assess/evaluate cause of self-care deficits   - Assess range of motion  - Assess patient's mobility  - Assess patient's need for assistive devices and provide as appropriate  - Encourage maximum independence but intervene and supervise when necessary  - Involve family in performance of ADLs  - Assess for home care needs following discharge   - Consider OT consult to assist with ADL evaluation and planning for discharge  - Provide patient education as appropriate  Outcome: Progressing  Goal: Maintain proper alignment of affected body part  Description  INTERVENTIONS:  - Support, maintain and protect limb and body alignment  - Provide patient/ family with appropriate education  Outcome: Progressing

## 2020-02-11 NOTE — H&P
H&P- Zakia Landsman Day 1935, 80 y o  female MRN: [id number]    Unit/Bed#: ED 23 Encounter: [id number]    Primary Care Provider: Safia Ivy MD   Date and time admitted to hospital: 2/10/2020  5:25 PM        Fall  Assessment & Plan  - Status post fall with the below noted injuries  - Fall precautions  - Geriatric Medicine consult secondary to ISAR greater than 2   - PT and OT evaluation and treatment as indicated  - Case Management consultation for disposition planning  Ambulatory dysfunction  Assessment & Plan  - Ambulatory dysfunction with baseline use of walker and presentation of fall   - Fall precautions  - Geriatric Medicine consult secondary to ISAR greater than 2   - PT and OT evaluation and treatment as indicated  - Case Management consultation for disposition planning  Anticipate need for post acute care facility placement secondary to acute traumatic injury likely limiting her ability to use a walker  * Closed fracture of sternal end of right clavicle  Assessment & Plan  - Closed proximal right clavicle fracture  - Await Orthopedic surgery evaluation and recommendations  - Maintain nonweightbearing status on the right upper extremity and sling as ordered for immobilization/support/comfort  - Initiate multimodal analgesic regimen  - Monitor neurovascular exam   - PT and OT evaluation and treatment as indicated when appropriate  - Initiate DVT prophylaxis  Diabetes mellitus [facility])  Assessment & Plan  Lab Results   Component Value Date    HGBA1C 6 3 01/02/2018       No results for input(s): POCGLU in the last 72 hours  Blood Sugar Average: Last 72 hrs:     - Initiate subcutaneous insulin regimen  - Monitor blood sugars 4 times daily   - Outpatient follow-up with primary care provider  Hypertension  Assessment & Plan  - Continue home medication regimen   - Outpatient follow-up with primary care provider      Hypothyroidism  Assessment & Plan  - Continue home medication regimen   - Outpatient follow-up with primary care provider  H&P Exam - Trauma   Jigar Hutton 80 y o  female MRN: [id number]  Unit/Bed#: ED 23 Encounter: [id number]    Assessment/Plan   Trauma Alert: Evaluation  Model of Arrival: Ambulance  Trauma Team: Attending Dr Zeina Sierra and IVONNE Ramos PA-C  Consultants: Orthopaedics: Dr Linda Ureña 10:32 pm, Returned call: Yes I recieved feedback from Ortho resident at 12:05 am    Trauma Active Problems and Trauma Plan: See above  Chief Complaint:  My right side hurts      History of Present Illness   HPI:  Jigar Hutton is a 80 y o  female who presents from [address] following a mechanical fall  The patient states that she recently return to her assisted living facility following a doctor's appointment  She tempted to get up from her chair and walked to the bathroom using her walker  She notes that she still had issues on which are wet from outside and she slipped falling onto her right side  She did strike her head, but denied loss of consciousness  She struck mostly her right shoulder and the right side of her body  She did have sudden acute onset right shoulder pain as well as some soreness to the right posterior head and neck  She denied any new or ongoing headache, visual changes, lightheadedness or dizziness, chest pain, shortness of breath or difficulty breathing, abdominal pain, nausea or vomiting, and/or any numbness/weakness/tingling in her extremities  Her main complaint is right shoulder pain  Mechanism:Fall    Review of Systems   Constitutional: Negative  Negative for activity change, appetite change, chills, diaphoresis, fatigue and fever  HENT: Negative for congestion, ear pain, facial swelling and sore throat  Right posterior scalp pain   Eyes: Negative  Negative for photophobia, pain and visual disturbance  Respiratory: Negative  Negative for cough, chest tightness, shortness of breath and wheezing  Cardiovascular: Negative  Negative for chest pain and leg swelling  Gastrointestinal: Negative  Negative for abdominal distention, abdominal pain, constipation, diarrhea, nausea and vomiting  Endocrine: Negative  Genitourinary: Negative  Negative for difficulty urinating, dysuria, flank pain, frequency and hematuria  Musculoskeletal: Positive for arthralgias (Right shoulder and right hip pain), back pain and neck pain  Negative for myalgias and neck stiffness  Skin: Negative  Negative for color change, pallor, rash and wound  Allergic/Immunologic: Negative  Neurological: Negative  Negative for dizziness, syncope, weakness, light-headedness and headaches  Hematological: Negative  Psychiatric/Behavioral: Negative  Negative for agitation and confusion  12-point, complete review of systems was reviewed and negative except as stated above  Historical Information   Efforts to obtain history included the following sources: other medical personnel    Past Medical History:   Diagnosis Date    Blind     CAD (coronary artery disease)     s/p HERNAN 6/2016    CHF (congestive heart failure) ([facility])     Diabetes mellitus ([facility] [address] )     non-insulin depdenent    Disease of thyroid gland     GERD (gastroesophageal reflux disease)     History of TIA (transient ischemic attack)     no residual deficits    Hyperlipidemia     Hypertension     Hypoxia     on home O2 QHS    Meniere disease     Pacemaker     [facility] in 2/2015    Severe aortic stenosis      Past Surgical History:   Procedure Laterality Date    APPENDECTOMY      BACK SURGERY      CHOLECYSTECTOMY      FRACTURE SURGERY Right 01/02/2018    femur    HYSTERECTOMY      SC EGD TRANSORAL BIOPSY SINGLE/MULTIPLE N/A 11/9/2016    Procedure: ESOPHAGOGASTRODUODENOSCOPY (EGD); Surgeon: Deanne Ma MD;  [facility]: [facility];   Service: Gastroenterology    SC OPEN RX FEMUR FX+INTRAMED GARRETT Right 1/2/2018    Procedure: INSERTION NAIL IM FEMUR ANTEGRADE (TROCHANTERIC) RIGHT;  Surgeon: Catherine Wilde MD;  [facility];  Service: Orthopedics    NE REPLACE AORTIC VALVE OPENFEMORAL ARTERY APPROACH N/A 7/26/2016    Procedure: TRANSFEMORAL TAVR WITH 23MM LAROSE LILY S3 VALVE; JOSE ALFREDO ;  Surgeon: Florian Mejia DO;  [facility];  Service: Cardiac Surgery    SMALL INTESTINE SURGERY      TOTAL KNEE ARTHROPLASTY      VARICOSE VEIN SURGERY      VENTRAL HERNIA REPAIR       Social History   Social History     Substance and Sexual Activity   Alcohol Use No     Social History     Substance and Sexual Activity   Drug Use No     Social History     Tobacco Use   Smoking Status Never Smoker   Smokeless Tobacco Never Used     Immunization History   Administered Date(s) Administered    H1N1, All Formulations 01/08/2010    Influenza Split High Dose Preservative Free IM 10/27/2016, 11/28/2017    Tdap 07/12/2016     Last Tetanus: Unknown  Family History: Non-contributory  Unable to obtain/limited by N/A      Meds/Allergies   all current active meds have been reviewed and current meds:   Current Facility-Administered Medications   Medication Dose Route Frequency    acetaminophen (TYLENOL) tablet 975 mg  975 mg Oral Q8H [address]    [START ON 2/12/2020] amLODIPine (NORVASC) tablet 5 mg  5 mg Oral BID    atorvastatin (LIPITOR) tablet 10 mg  10 mg Oral Daily With Dinner    docusate sodium (COLACE) capsule 100 mg  100 mg Oral BID    enoxaparin (LOVENOX) subcutaneous injection 30 mg  30 mg Subcutaneous BID    ferrous sulfate tablet 325 mg  325 mg Oral Daily With Breakfast    furosemide (LASIX) tablet 40 mg  40 mg Oral Daily    HYDROmorphone (DILAUDID) injection 0 2 mg  0 2 mg Intravenous Q4H PRN    ketotifen (ZADITOR) 0 025 % ophthalmic solution 1 drop  1 drop Both Eyes BID PRN    levothyroxine tablet 50 mcg  50 mcg Oral Early Morning    lidocaine (LIDODERM) 5 % patch 1 patch  1 patch Topical Daily    loperamide (IMODIUM) capsule 2 mg  2 mg Oral 4x Daily PRN    metoprolol tartrate (LOPRESSOR) tablet 50 mg  50 mg Oral BID    naloxone (NARCAN) 0 04 mg/mL syringe 0 04 mg  0 04 mg Intravenous Q1MIN PRN    ondansetron (ZOFRAN) injection 4 mg  4 mg Intravenous Q6H PRN    oxyCODONE (ROXICODONE) IR tablet 2 5 mg  2 5 mg Oral Q4H PRN    oxyCODONE (ROXICODONE) IR tablet 5 mg  5 mg Oral Q4H PRN    senna (SENOKOT) tablet 17 2 mg  2 tablet Oral Daily       Allergies   Allergen Reactions    Advil [Ibuprofen] GI Intolerance         PHYSICAL EXAM    PE limited by: N/A    Objective   Vitals:   First set: Temperature: 98 4 °F (36 9 °C) (02/10/20 1800)  Pulse: 99 (02/10/20 1730)  Respirations: 20 (02/10/20 1730)  Blood Pressure: (!) 199/83 (02/10/20 1730)    Primary Survey:   (A) Airway: Patent and intact  (B) Breathing: Clear and equal bilaterally  (C) Circulation: Pulses:   normal, carotid  2/4, pedal  2/4, radial  2/4 and femoral  2/4  (D) Disabliity:  GCS Total:  15, Eye Opening:   Spontaneous = 4, Motor Response: Obeys commands = 6 and Verbal Response:  Oriented = 5  (E) Expose:  Completed    Secondary Survey: (Click on Physical Exam tab above)  Physical Exam   Constitutional: She is oriented to person, place, and time  She appears well-developed and well-nourished  She is active  She has a sickly appearance  No distress  She is not intubated  Cervical collar cleared prior to trauma consult and my evaluation  HENT:   Head: Normocephalic  Head is with contusion (Evidence of small contusion with overlying swelling of the right lower posterior scalp with associated tenderness)  Head is without abrasion and without laceration  Right Ear: Hearing and external ear normal    Left Ear: Hearing and external ear normal    Nose: Nose normal  No nose lacerations, sinus tenderness or nasal deformity  Mouth/Throat: Oropharynx is clear and moist and mucous membranes are normal    Eyes: Pupils are equal, round, and reactive to light   EOM are normal    Neck: Normal range of motion  Neck supple  Spinous process tenderness and muscular tenderness present  No tracheal deviation and normal range of motion present  Cardiovascular: Normal rate, regular rhythm, normal heart sounds and intact distal pulses  Exam reveals no gallop and no friction rub  No murmur heard  Pulses:       Carotid pulses are 2+ on the right side, and 2+ on the left side  Radial pulses are 2+ on the right side, and 2+ on the left side  Femoral pulses are 2+ on the right side, and 2+ on the left side  Dorsalis pedis pulses are 2+ on the right side, and 2+ on the left side  Pulmonary/Chest: Effort normal and breath sounds normal  No accessory muscle usage or stridor  No tachypnea and no bradypnea  She is not intubated  No respiratory distress  She has no decreased breath sounds  She has no wheezes  She has no rhonchi  She has no rales  She exhibits no tenderness, no crepitus and no deformity  Abdominal: Soft  Normal appearance and bowel sounds are normal  She exhibits no distension  There is no tenderness  There is no rebound and no guarding  Musculoskeletal: She exhibits tenderness (Tenderness noted over right clavicle, diffusely throughout the neck and back, and over the right hip laterally  )  She exhibits no edema or deformity  Right shoulder: She exhibits decreased range of motion (Decreased range of motion secondary to pain), tenderness (Moderate tenderness over the right anterior shoulder and clavicle), swelling and pain  She exhibits no deformity  Right hip: She exhibits decreased range of motion (Decreased range of motion secondary to pain in the right hip) and tenderness (Tenderness over the right lateral hip)  She exhibits no swelling, no crepitus and no deformity  Cervical back: She exhibits tenderness and pain  She exhibits normal range of motion, no swelling and no deformity  Thoracic back: She exhibits tenderness and pain   She exhibits normal range of motion, no swelling and no deformity  Lumbar back: She exhibits tenderness and pain  She exhibits normal range of motion, no swelling and no deformity  Neurological: She is alert and oriented to person, place, and time  GCS eye subscore is 4  GCS verbal subscore is 5  GCS motor subscore is 6  Skin: Skin is warm, dry and intact  Capillary refill takes less than 2 seconds  No rash noted  She is not diaphoretic  No erythema  No pallor  Nursing note and vitals reviewed  Invasive Devices     Peripheral Intravenous Line            Peripheral IV 02/10/20 Right Antecubital less than 1 day                Lab Results:   Results: I have personally reviewed pertinent reports   , BMP/CMP:   Lab Results   Component Value Date    SODIUM 134 (L) 02/10/2020    K 5 5 (H) 02/10/2020     02/10/2020    CO2 25 02/10/2020    BUN 20 02/10/2020    CREATININE 0 84 02/10/2020    CALCIUM 10 1 02/10/2020    AST 38 02/10/2020    ALT 26 02/10/2020    ALKPHOS 99 02/10/2020    EGFR 64 02/10/2020   , CBC:   Lab Results   Component Value Date    WBC 12 01 (H) 02/10/2020    HGB 13 7 02/10/2020    HCT 41 9 02/10/2020    MCV 92 02/10/2020     02/10/2020    MCH 30 0 02/10/2020    MCHC 32 7 02/10/2020    RDW 12 9 02/10/2020    MPV 10 1 02/10/2020    NRBC 0 02/10/2020    and Coagulation:   Lab Results   Component Value Date    INR 0 94 02/10/2020     Imaging/EKG Studies: Results: I have personally reviewed pertinent reports     and I have personally reviewed pertinent films in PACS  Other Studies: N/A    Code Status: Level 1 - Full Code  Advance Directive and Living Will:      Power of :    POLST:

## 2020-02-11 NOTE — ASSESSMENT & PLAN NOTE
- Ambulatory dysfunction with baseline use of walker and presentation of fall   - Fall precautions  - Geriatric Medicine consult secondary to ISAR greater than 2   - PT and OT evaluation and treatment as indicated  - Case Management consultation for disposition planning  Anticipate need for post acute care facility placement secondary to acute traumatic injury likely limiting her ability to use a walker

## 2020-02-11 NOTE — ED NOTES
Per Nayeli Rowley PA-C, hold lantus for now  Trauma will discuss insulin orders with patient in the morning        Valentin Berman RN  02/11/20 1540

## 2020-02-11 NOTE — ED NOTES
Pt refused 12 U of lantus at this time  Pt states "I have never taken insulin and I don't take metformin anymore either " Page sent to Adi Beckman PA-C questioning if pt needs to take insulin        Jens Giraldo RN  02/11/20 3614

## 2020-02-11 NOTE — ED NOTES
Pt's oxygen saturation 86-89% while sleeping; 2 L NC oxygen applied at this time        Kenneth Roth RN  02/11/20 2625

## 2020-02-11 NOTE — SOCIAL WORK
CM met with pt to discuss the role of CM  Pt resides at [address] [place] [address]  While there, pt requires assistance with bathing and dressing  Pt uses a walker to ambulate and uses a shower chair while bathing  Pt has a living will  Pt obtains her medication on site  Pt has no hx of mental health, substance abuse, IP rehab, or VNA  If pt were to need rehab, pt is amenable to a referral being placed with [facility]  Cm will preemptively place with [facility]  CM reviewed d/c planning process including the following: identifying help at home, patient preference for d/c planning needs, Discharge Lounge, [facility] Meds to Bed program, availability of treatment team to discuss questions or concerns patient and/or family may have regarding understanding medications and recognizing signs and symptoms once discharged  CM also encouraged patient to follow up with all recommended appointments after discharge  Patient advised of importance for patient and family to participate in managing patients medical well being

## 2020-02-12 LAB
GLUCOSE SERPL-MCNC: 118 MG/DL (ref 65–140)
GLUCOSE SERPL-MCNC: 131 MG/DL (ref 65–140)
GLUCOSE SERPL-MCNC: 158 MG/DL (ref 65–140)
GLUCOSE SERPL-MCNC: 161 MG/DL (ref 65–140)

## 2020-02-12 PROCEDURE — 99222 1ST HOSP IP/OBS MODERATE 55: CPT | Performed by: INTERNAL MEDICINE

## 2020-02-12 PROCEDURE — 99232 SBSQ HOSP IP/OBS MODERATE 35: CPT | Performed by: SURGERY

## 2020-02-12 PROCEDURE — 82948 REAGENT STRIP/BLOOD GLUCOSE: CPT

## 2020-02-12 RX ORDER — FUROSEMIDE 40 MG/1
TABLET ORAL
Status: COMPLETED
Start: 2020-02-12 | End: 2020-02-12

## 2020-02-12 RX ORDER — ASPIRIN 81 MG/1
TABLET, CHEWABLE ORAL
Status: COMPLETED
Start: 2020-02-12 | End: 2020-02-12

## 2020-02-12 RX ORDER — ACETAMINOPHEN 325 MG/1
TABLET ORAL
Status: COMPLETED
Start: 2020-02-12 | End: 2020-02-12

## 2020-02-12 RX ORDER — LEVOTHYROXINE SODIUM 0.05 MG/1
TABLET ORAL
Status: COMPLETED
Start: 2020-02-12 | End: 2020-02-12

## 2020-02-12 RX ORDER — SENNOSIDES 8.6 MG
TABLET ORAL
Status: COMPLETED
Start: 2020-02-12 | End: 2020-02-12

## 2020-02-12 RX ORDER — FUROSEMIDE 40 MG/1
40 TABLET ORAL EVERY OTHER DAY
Status: DISCONTINUED | OUTPATIENT
Start: 2020-02-14 | End: 2020-02-14 | Stop reason: HOSPADM

## 2020-02-12 RX ORDER — DOCUSATE SODIUM 100 MG/1
CAPSULE, LIQUID FILLED ORAL
Status: COMPLETED
Start: 2020-02-12 | End: 2020-02-12

## 2020-02-12 RX ORDER — METOPROLOL TARTRATE 50 MG/1
TABLET, FILM COATED ORAL
Status: COMPLETED
Start: 2020-02-12 | End: 2020-02-12

## 2020-02-12 RX ORDER — FUROSEMIDE 40 MG/1
40 TABLET ORAL EVERY OTHER DAY
Status: DISCONTINUED | OUTPATIENT
Start: 2020-02-13 | End: 2020-02-12 | Stop reason: SDUPTHER

## 2020-02-12 RX ORDER — FERROUS SULFATE 325(65) MG
TABLET ORAL
Status: COMPLETED
Start: 2020-02-12 | End: 2020-02-12

## 2020-02-12 RX ORDER — AMLODIPINE BESYLATE 5 MG/1
TABLET ORAL
Status: COMPLETED
Start: 2020-02-12 | End: 2020-02-12

## 2020-02-12 RX ADMIN — AMLODIPINE BESYLATE 5 MG: 5 TABLET ORAL at 21:43

## 2020-02-12 RX ADMIN — ENOXAPARIN SODIUM 30 MG: 30 INJECTION SUBCUTANEOUS at 08:19

## 2020-02-12 RX ADMIN — FUROSEMIDE 40 MG: 40 TABLET ORAL at 08:17

## 2020-02-12 RX ADMIN — ENOXAPARIN SODIUM 30 MG: 30 INJECTION SUBCUTANEOUS at 21:43

## 2020-02-12 RX ADMIN — LEVOTHYROXINE SODIUM: 50 TABLET ORAL at 07:00

## 2020-02-12 RX ADMIN — FERROUS SULFATE TAB 325 MG (65 MG ELEMENTAL FE) 325 MG: 325 (65 FE) TAB at 08:16

## 2020-02-12 RX ADMIN — ASPIRIN 81 MG 81 MG: 81 TABLET ORAL at 08:17

## 2020-02-12 RX ADMIN — METOPROLOL TARTRATE 50 MG: 50 TABLET, FILM COATED ORAL at 21:41

## 2020-02-12 RX ADMIN — ACETAMINOPHEN 975 MG: 325 TABLET ORAL at 13:27

## 2020-02-12 RX ADMIN — DOCUSATE SODIUM 100 MG: 100 CAPSULE, LIQUID FILLED ORAL at 08:17

## 2020-02-12 RX ADMIN — INSULIN GLARGINE 12 UNITS: 100 INJECTION, SOLUTION SUBCUTANEOUS at 21:50

## 2020-02-12 RX ADMIN — ACETAMINOPHEN 975 MG: 325 TABLET ORAL at 21:41

## 2020-02-12 RX ADMIN — DOCUSATE SODIUM 100 MG: 100 CAPSULE, LIQUID FILLED ORAL at 17:26

## 2020-02-12 RX ADMIN — INSULIN LISPRO 4 UNITS: 100 INJECTION, SOLUTION INTRAVENOUS; SUBCUTANEOUS at 08:19

## 2020-02-12 RX ADMIN — ATORVASTATIN CALCIUM 10 MG: 10 TABLET, FILM COATED ORAL at 17:31

## 2020-02-12 RX ADMIN — INSULIN LISPRO 1 UNITS: 100 INJECTION, SOLUTION INTRAVENOUS; SUBCUTANEOUS at 17:33

## 2020-02-12 RX ADMIN — SENNOSIDES 17.2 MG: 8.6 TABLET, FILM COATED ORAL at 08:18

## 2020-02-12 RX ADMIN — INSULIN LISPRO 1 UNITS: 100 INJECTION, SOLUTION INTRAVENOUS; SUBCUTANEOUS at 11:59

## 2020-02-12 RX ADMIN — METOPROLOL TARTRATE 50 MG: 50 TABLET, FILM COATED ORAL at 08:10

## 2020-02-12 RX ADMIN — AMLODIPINE BESYLATE 5 MG: 5 TABLET ORAL at 08:21

## 2020-02-12 RX ADMIN — INSULIN LISPRO 4 UNITS: 100 INJECTION, SOLUTION INTRAVENOUS; SUBCUTANEOUS at 17:32

## 2020-02-12 RX ADMIN — INSULIN LISPRO 4 UNITS: 100 INJECTION, SOLUTION INTRAVENOUS; SUBCUTANEOUS at 12:00

## 2020-02-12 RX ADMIN — ACETAMINOPHEN 325 MG: 325 TABLET ORAL at 07:00

## 2020-02-12 NOTE — PROGRESS NOTES
Progress Note - Tony Kelly Day 1935, 80 y o  female MRN: [id number]    Unit/Bed#: [facility] 029-96 Encounter: [id number]    Primary Care Provider: Martina Roberts MD   Date and time admitted to hospital: 2/10/2020  5:25 PM        Hypertension  Assessment & Plan  - Continue home norvasc and metoprolol  - Outpatient follow-up with primary care provider  Fall  Assessment & Plan  - Status post fall with the below noted injuries  - Fall precautions  - Geriatric Medicine consult secondary to ISAR greater than 2   - PT and OT evaluation and treatment as indicated  - Case Management consultation for disposition planning  Hypothyroidism  Assessment & Plan  - Continue home synthroid dosing  - Outpatient follow-up with primary care provider  Diabetes mellitus [facility])  Assessment & Plan  Lab Results   Component Value Date    HGBA1C 6 6 (H) 02/11/2020       Recent Labs     02/11/20  0130 02/11/20  0609 02/11/20  1723 02/11/20  2058   POCGLU 130 112 173* 91       Blood Sugar Average: Last 72 hrs:  (P) 126 5   - Continue subcutaneous insulin regimen, though patient is refusing, continue to provide re-education  - Monitor blood sugars 4 times daily   - Outpatient follow-up with primary care provider  Ambulatory dysfunction  Assessment & Plan  - Ambulatory dysfunction with baseline use of walker and presentation of fall   - Fall precautions  - Geriatric Medicine consult secondary to ISAR greater than 2   - PT and OT evaluation and treatment as indicated  - Case Management consultation for disposition planning  Anticipate need for post acute care facility placement secondary to acute traumatic injury likely limiting her ability to use a walker  * Closed fracture of sternal end of right clavicle  Assessment & Plan  - Closed proximal right clavicle fracture  - Appreciate orthopedics evaluation  Non operative management in a sling  NWB RUE    - Initiate multimodal analgesic regimen    - Monitor neurovascular exam   - PT and OT evaluation and treatment as indicated when appropriate   - Continue DVT prophylaxis  Disposition:  Awaiting placement in acute rehab      SUBJECTIVE:  Chief Complaint:  None given    Subjective:   Patient with uneventful overnight period  This morning, no complaints  States she slept well  Pain is well controlled        OBJECTIVE:     Meds/Allergies     Current Facility-Administered Medications:     acetaminophen (TYLENOL) tablet 975 mg, 975 mg, Oral, Q8H [address], Chuy Benites PA-C, 975 mg at 02/11/20 2209    amLODIPine (NORVASC) tablet 5 mg, 5 mg, Oral, BID, Chuy Benites PA-C    aspirin chewable tablet 81 mg, 81 mg, Oral, Daily, Orysia BarnacleELLYN, 81 mg at 02/11/20 0930    atorvastatin (LIPITOR) tablet 10 mg, 10 mg, Oral, Daily With Dinner, Chuy Benites PA-C, 10 mg at 02/11/20 1803    docusate sodium (COLACE) capsule 100 mg, 100 mg, Oral, BID, Chuy Benites PA-C    enoxaparin (LOVENOX) subcutaneous injection 30 mg, 30 mg, Subcutaneous, BID, Chuy Benites PA-C, 30 mg at 02/11/20 2210    ferrous sulfate tablet 325 mg, 325 mg, Oral, Daily With Breakfast, Chuy Benites PA-C, 325 mg at 02/11/20 0930    furosemide (LASIX) tablet 40 mg, 40 mg, Oral, Daily, Chuy Benites PA-C, 40 mg at 02/11/20 0930    insulin glargine (LANTUS) subcutaneous injection 12 Units 0 12 mL, 12 Units, Subcutaneous, HS, Chuy Benites PA-C, 12 Units at 02/11/20 2210    insulin lispro (HumaLOG) 100 units/mL subcutaneous injection 1-5 Units, 1-5 Units, Subcutaneous, HS, Chuy Benites PA-C    insulin lispro (HumaLOG) 100 units/mL subcutaneous injection 1-6 Units, 1-6 Units, Subcutaneous, TID AC, 1 Units at 02/11/20 1901 **AND** Fingerstick Glucose (POCT), , , TID AC, Chuy Benites PA-C    insulin lispro (HumaLOG) 100 units/mL subcutaneous injection 4 Units, 4 Units, Subcutaneous, Daily With Breakfast, Chuy Benites PA-C    insulin lispro (HumaLOG) 100 units/mL subcutaneous injection 4 Units, 4 Units, Subcutaneous, Daily With Lunch, Chuy Benites PA-C    insulin lispro (HumaLOG) 100 units/mL subcutaneous injection 4 Units, 4 Units, Subcutaneous, Daily With Excell ELLYN Rasmussen, 4 Units at 02/11/20 1902    ketotifen (ZADITOR) 0 025 % ophthalmic solution 1 drop, 1 drop, Both Eyes, BID PRN, VALERIE Paulson-C    levothyroxine tablet 50 mcg, 50 mcg, Oral, Early Morning, Chuy Benites PA-C, 50 mcg at 02/11/20 9704    loperamide (IMODIUM) capsule 2 mg, 2 mg, Oral, 4x Daily PRN, Nayeli Rowley PA-C    metoprolol tartrate (LOPRESSOR) tablet 50 mg, 50 mg, Oral, BID, Chuy Benites PA-C, 50 mg at 02/11/20 2212    naloxone (NARCAN) 0 04 mg/mL syringe 0 04 mg, 0 04 mg, Intravenous, Q1MIN PRN, VALERIE Paulson-C    ondansetron (ZOFRAN) injection 4 mg, 4 mg, Intravenous, Q6H PRN, VALERIE Paulson-C    oxyCODONE (ROXICODONE) IR tablet 2 5 mg, 2 5 mg, Oral, Q4H PRN, Nayeli Ramirezast, PA-C, 2 5 mg at 02/11/20 2209    oxyCODONE (ROXICODONE) IR tablet 5 mg, 5 mg, Oral, Q4H PRN, Nayeli Ramirezast PA-C    senna (SENOKOT) tablet 17 2 mg, 2 tablet, Oral, Daily, Chuy Benites PA-C     Vitals:   Vitals:    02/11/20 2212   BP: 144/73   Pulse: 101   Resp:    Temp: 98 5 °F (36 9 °C)   SpO2: 93%       Intake/Output:  I/O       02/10 0701 - 02/11 0700 02/11 0701 - 02/12 0700    P  O   240    Total Intake(mL/kg)  240 (2 7)    Urine (mL/kg/hr)  700 (0 3)    Total Output  700    Net  -460                 Nutrition/GI Proph/Bowel Reg:  Carb controlled diet, no GI prophylaxis indicated, Colace and senna ordered; last bowel movement:  Prior to admission     Physical Exam:   GENERAL APPEARANCE:  Elderly female who appears approximately stated age, lying comfortably in bed  No apparent distress  Awake and alert  NEURO:  Awake, alert and orient x3  Nonfocal  HEENT:  Normocephalic atraumatic  Pupils equal round reactive to light  EOMs intact  Mucous membranes dry    Neck is supple, no cervical midline tenderness  CV:  Regular rate and rhythm without murmurs, rubs, gallops  LUNGS:  Clear to auscultation bilaterally, nonlabored, no wheezes, rales, rhonchi  GI:  Soft, nontender, no distension  :  Deferred  MSK:  Right upper extremity in sling, mild pain with palpation  2+ radial pulse with intact sensation and capillary refill in right hand  Otherwise no deformities, full range of motion in other 3 extremities  SKIN:  Warm, dry, well perfused    Invasive Devices     Peripheral Intravenous Line            Peripheral IV 02/10/20 Right Antecubital 1 day          Drain            External Urinary Catheter less than 1 day                 Lab Results: Results: I have personally reviewed pertinent films in PACS a m   Labs pending  Imaging/EKG Studies: Results: I have personally reviewed pertinent films in PACS  Other Studies: N/A  VTE Prophylaxis: Enoxaparin (Lovenox)

## 2020-02-12 NOTE — ASSESSMENT & PLAN NOTE
Lab Results   Component Value Date    HGBA1C 6 6 (H) 02/11/2020       Recent Labs     02/11/20  0130 02/11/20  0609 02/11/20  1723 02/11/20 2058   POCGLU 130 112 173* 91       Blood Sugar Average: Last 72 hrs:  (P) 126 5   - Continue subcutaneous insulin regimen, though patient is refusing, continue to provide re-education  - Monitor blood sugars 4 times daily   - Outpatient follow-up with primary care provider

## 2020-02-12 NOTE — CONSULTS
Consultation - [address] [facility] Tierra Day 80 y o  female MRN: [id number]  Unit/Bed#: [facility] 298-54 Encounter: [id number]      Assessment/Plan     Ambulatory dysfunction with fall-status post mechanical fall  The patient uses a walker at her baseline  Prior to this the patient had not had a fall for several years  Her most recent fall resulted in femoral fracture few years ago   -patient may have difficulty with ambulation she will not be able to use walker properly with her fracture of the right clavicle   -P T  Recommends inpatient rehab  -fall precautions    R clavicle fracture-status post mechanical fall  Orthopedic surgery has evaluated the patient and recommend nonsurgical management with nonweightbearing status of her right upper extremity and to maintain in a sling   -follow-up with orthopedic surgery    Debility-secondary to multiple comorbidities as well as obesity, foot drop and ambulatory dysfunction   -P T  Recommending inpatient rehab    Acute pain related to fracture  -agree with geriatric pain protocol  -continue Tylenol 975 milligrams t i d  Scheduled  -continue low-dose oxycodone 2 5 milligrams Q 4 p r n  For moderate pain  -continue low-dose oxycodone 5 milligrams Q 4 p r n  For severe pain    Vision Impairment-chronic, unchanged  The patient follows at [facility] for management  -continue with home ophthalmic drops    DM type 2-A1c 6 6%  Patient used to be on metformin and insulin  However, her blood sugar has stabilized since she arrived at [facility] and she has been diet controlled  -would discontinue scheduled insulin  -can continue with sliding scale insulin for corrective coverage  -continue with carb controlled diet    HTN-currently adequately controlled  -continue home regimen with amlodipine, metoprolol    CAD-status post PCI with 2 stent placements    Currently asymptomatic without any anginal chest pain   -continue beta-blocker  -continue aspirin and statin    CHF-TTE in August 2019 showed EF of 60 percent and grade 1 diastolic dysfunction   -continue with Lasix every other day as ordered at home  -continue with potassium supplementation    Hypothyroidism-chronic, stable  -continue with home levothyroxine 50 micrograms daily    Cognitive screening -patient is alert and oriented x4 on exam   She and her son note no difficulty with memory or cognition  Mainly her need for assistance with ADLs is secondary to her physical debility as well as poor eye sight   -no further cognitive screening needed at this time  Delirium Precautions:  -Maintain normal sleep/wake cycle, lights and tv on during day with blinds open, lights and tv off at bedtime with blinds closed, minimize unnecessary interruptions  -if insomnia develops consider Melatonin 3mg   -continue supportive care and frequent reorientation   -consider bowel regimen and monitor for adequate urine output as both urine and stool retention may contribute to and worsen delirium    Home medications as verified by [facility]:  Acetaminophen 325 milligrams 2 tablets b i d  Amlodipine 5 milligrams b i d  Amoxicillin 500 milligrams take 4 capsules prior to dental appointments  Artificial tears 1 drop each eye twice daily  Aspirin 81 milligrams daily  Atorvastatin 10 milligrams daily  Diclofenac gel 1 percent apply daily Q 12 p r n  For pain  Ferrous sulfate 325 milligrams 1 tablet daily  Furosemide 40 milligrams every other day  Get a can is all 2 percent topically to face daily  Levothyroxine 50 micrograms daily  Loperamide 2 milligrams 3 times a day as needed for diarrhea  Metoprolol tartrate 50 milligrams b i d    Nystatin powder topically twice daily as needed for irritation  Ocuvite 50+/lutein 5 milligrams daily  Potassium chloride 20 mEq extended release 1 tablet daily  Prosight1 tablet daily    History of Present Illness   Physician Requesting Consult: Graciela Thomas MD  Reason for Consult / Principal Problem: Fall with clavicle fracture  Hx and PE limited by: NA  Additional history obtained from: The patient's son who is at the bedside    HPI: Giuliana Galan is a 80y o  year old female with past medical history significant for hypertension, CAD, CHF, diet controlled type 2 diabetes mellitus, ambulatory dysfunction, hypothyroidism, complete heart block status post ppm and previous femoral fracture status post right femoral IM nail who presented on 02/10/2020 after a mechanical fall  She was returning to her assisted living facility from a doctor's appointment when she slipped on a wet surface and fell backwards  She immediately had right shoulder pain  She was brought to [facility] for evaluation where she was found to have right clavicle fracture  Orthopedic surgery was consulted and recommended nonsurgical management and advise she remain nonweightbearing with her left upper extremity in a sling  Patient was evaluated by physical therapy and occupational therapy he recommended inpatient short-term rehab  Today, the patient reports that her pain is well controlled especially when she holds it still  She does have increased pain when she is moving her right arm  The patient has lived in assisted living at [facility] for several years  She receives assistance with her ADLs including putting shoes on in the morning, bathing and administering her medications  Three meals a day are provided for her  Patient has baseline ambulatory dysfunction and uses a walker her baseline  She does have a brace that she uses for footdrop  She denies any hearing impairment  She does have chronic vision impairment with 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 for which she follows at [facility]  She has 3 living children and her daughter is her power of   The patient denies any cognitive changes and her son who was at the bedside and provides additional history agrees that she has not had any change in her memory  Inpatient consult to Gerontology     Performed by  Carey Allen DO     Authorized by Lulu Barajas PA-C            Review of Systems   Constitutional: Negative for appetite change, chills and fever  HENT: Negative for congestion  Eyes: Positive for visual disturbance (chronic)  Respiratory: Negative for shortness of breath  Cardiovascular: Negative for chest pain  Gastrointestinal: Negative for abdominal pain and constipation  Genitourinary: Negative for frequency  Musculoskeletal: Positive for arthralgias  Skin: Negative for rash  Neurological: Negative for headaches  Hematological: Does not bruise/bleed easily  Psychiatric/Behavioral: Negative for agitation and confusion  Historical Information   Past Medical History:   Diagnosis Date    Blind     CAD (coronary artery disease)     s/p HERNAN 6/2016    [facility]     Diabetes mellitus ([facility] [address] )     non-insulin depdenent    Disease of thyroid gland     GERD (gastroesophageal reflux disease)     History of TIA (transient ischemic attack)     no residual deficits    Hyperlipidemia     Hypertension     Hypoxia     on home O2 QHS    Meniere disease     Pacemaker     CHB-Biotronik in 2/2015    Severe aortic stenosis      Past Surgical History:   Procedure Laterality Date    APPENDECTOMY      BACK SURGERY      CHOLECYSTECTOMY      FRACTURE SURGERY Right 01/02/2018    femur    HYSTERECTOMY      IL EGD TRANSORAL BIOPSY SINGLE/MULTIPLE N/A 11/9/2016    Procedure: ESOPHAGOGASTRODUODENOSCOPY (EGD); Surgeon: Talon Rousseau MD;  [facility]: [facility];   Service: Gastroenterology    IL OPEN RX FEMUR FX+INTRAMED GARRETT Right 1/2/2018    Procedure: INSERTION NAIL IM FEMUR ANTEGRADE (TROCHANTERIC) RIGHT;  Surgeon: Hugo Hernandez MD;  Location: [facility];  Service: Orthopedics    IL REPLACE AORTIC VALVE OPENFEMORAL ARTERY APPROACH N/A 7/26/2016    Procedure: TRANSFEMORAL TAVR WITH 23MM LAROSE LILY S3 VALVE; JOSE ALFREDO ;  Surgeon: Gene Sinha DO;  Location: [facility];  Service: Cardiac Surgery    SMALL INTESTINE SURGERY      TOTAL KNEE ARTHROPLASTY      VARICOSE VEIN SURGERY      VENTRAL HERNIA REPAIR       Social History   Social History     Substance and Sexual Activity   Alcohol Use Not Currently    Frequency: Never    Drinks per session: Patient refused    Binge frequency: Never     Social History     Substance and Sexual Activity   Drug Use No     Social History     Tobacco Use   Smoking Status Never Smoker   Smokeless Tobacco Never Used     Family History:   Family History   Problem Relation Age of Onset    Cancer Child      Meds/Allergies   current meds:   Current Facility-Administered Medications   Medication Dose Route Frequency    acetaminophen (TYLENOL) tablet 975 mg  975 mg Oral Q8H [address]    amLODIPine (NORVASC) tablet 5 mg  5 mg Oral BID    aspirin chewable tablet 81 mg  81 mg Oral Daily    atorvastatin (LIPITOR) tablet 10 mg  10 mg Oral Daily With Dinner    docusate sodium (COLACE) capsule 100 mg  100 mg Oral BID    enoxaparin (LOVENOX) subcutaneous injection 30 mg  30 mg Subcutaneous BID    ferrous sulfate tablet 325 mg  325 mg Oral Daily With Breakfast    furosemide (LASIX) tablet 40 mg  40 mg Oral Daily    insulin glargine (LANTUS) subcutaneous injection 12 Units 0 12 mL  12 Units Subcutaneous HS    insulin lispro (HumaLOG) 100 units/mL subcutaneous injection 1-5 Units  1-5 Units Subcutaneous HS    insulin lispro (HumaLOG) 100 units/mL subcutaneous injection 1-6 Units  1-6 Units Subcutaneous TID AC    insulin lispro (HumaLOG) 100 units/mL subcutaneous injection 4 Units  4 Units Subcutaneous Daily With Breakfast    insulin lispro (HumaLOG) 100 units/mL subcutaneous injection 4 Units  4 Units Subcutaneous Daily With Lunch    insulin lispro (HumaLOG) 100 units/mL subcutaneous injection 4 Units  4 Units Subcutaneous Daily With Dinner    ketotifen (ZADITOR) 0 025 % ophthalmic solution 1 drop  1 drop Both Eyes BID PRN    levothyroxine tablet 50 mcg  50 mcg Oral Early Morning    loperamide (IMODIUM) capsule 2 mg  2 mg Oral 4x Daily PRN    metoprolol tartrate (LOPRESSOR) tablet 50 mg  50 mg Oral BID    naloxone (NARCAN) 0 04 mg/mL syringe 0 04 mg  0 04 mg Intravenous Q1MIN PRN    ondansetron (ZOFRAN) injection 4 mg  4 mg Intravenous Q6H PRN    oxyCODONE (ROXICODONE) IR tablet 2 5 mg  2 5 mg Oral Q4H PRN    oxyCODONE (ROXICODONE) IR tablet 5 mg  5 mg Oral Q4H PRN    senna (SENOKOT) tablet 17 2 mg  2 tablet Oral Daily     Allergies   Allergen Reactions    Advil [Ibuprofen] GI Intolerance       Objective     Intake/Output Summary (Last 24 hours) at 2/12/2020 1414  Last data filed at 2/12/2020 1351  Gross per 24 hour   Intake 600 ml   Output 1130 ml   Net -530 ml     Invasive Devices     Drain            External Urinary Catheter less than 1 day              Physical Exam   Constitutional: She is oriented to person, place, and time  She appears well-developed and well-nourished  No distress  Obese elderly female lying comfortably in bed   HENT:   Head: Normocephalic and atraumatic  Right Ear: External ear normal    Left Ear: External ear normal    Eyes: Right eye exhibits discharge  Left eye exhibits no discharge  Chronic changes of the R eye   Neck: No tracheal deviation present  Cardiovascular: Normal rate and regular rhythm  Murmur heard  Pulmonary/Chest: Effort normal and breath sounds normal  No stridor  No respiratory distress  She has no wheezes  She has no rales  Abdominal: Soft  Bowel sounds are normal  She exhibits no distension  There is no tenderness  Musculoskeletal: She exhibits deformity (RUE in sling)  Neurological: She is alert and oriented to person, place, and time  Skin: Skin is warm and dry  No rash noted  She is not diaphoretic  No erythema  No pallor  Psychiatric: She has a normal mood and affect   Her behavior is normal  Judgment and thought content normal    Nursing note and vitals reviewed  Lab Results:   I have personally reviewed pertinent lab results including the following:   Ref Range & Units 2/11/20  4:26 AM   WBC 4 31 - 10 16 Thousand/uL 12  10High     RBC 3 81 - 5 12 Million/uL 3 97    Hemoglobin 11 5 - 15 4 g/dL 12 1    Hematocrit 34 8 - 46 1 % 36 7    MCV 82 - 98 fL 92    MCH 26 8 - 34 3 pg 30 5    MCHC 31 4 - 37 4 g/dL 33 0    RDW 11 6 - 15 1 % 13 2    Platelets 427 - 195 Thousands/uL 318    MPV 8 9 - 12 7 fL 10 1       Ref Range & Units 2/11/20  4:26 AM   Sodium 136 - 145 mmol/L 136    Potassium 3 5 - 5 3 mmol/L 3 7    Chloride 100 - 108 mmol/L 102    CO2 21 - 32 mmol/L 28    ANION GAP 4 - 13 mmol/L 6    BUN 5 - 25 mg/dL 18    Creatinine 0 60 - 1 30 mg/dL 0 84    Comment: Standardized to IDMS reference method   Glucose 65 - 140 mg/dL 123      I have personally reviewed the following imaging study reports:  XR R clavicle 2/10/2020  IMPRESSION:  Acute fracture of the medial aspect of the right clavicle  No pneumothorax identified  CT head 2/10/2020  IMPRESSION:  No acute intracranial abnormality      Therapies:   PT: recommend inpatient rehab  OT: recommend STR    VTE Prophylaxis: Enoxaparin (Lovenox)    Code Status: Level 1 - Full Code  Advance Directive and Living Will:      Power of :   Daughter  POLST:      Family and Social Support:   Living Arrangements: Other (Comment) (per pt from [facility] assisted living )  Support Systems: None  Assistance Needed: walker   Type of Current Residence: Assisted living  [address]: [address]: No  [place] of Choice: Yes      Goals of Care: return to [facility]

## 2020-02-13 ENCOUNTER — APPOINTMENT (INPATIENT)
Dept: RADIOLOGY | Facility: HOSPITAL | Age: 85
DRG: 563 | End: 2020-02-13
Payer: MEDICARE

## 2020-02-13 LAB
GLUCOSE SERPL-MCNC: 100 MG/DL (ref 65–140)
GLUCOSE SERPL-MCNC: 110 MG/DL (ref 65–140)
GLUCOSE SERPL-MCNC: 111 MG/DL (ref 65–140)
GLUCOSE SERPL-MCNC: 116 MG/DL (ref 65–140)

## 2020-02-13 PROCEDURE — 82948 REAGENT STRIP/BLOOD GLUCOSE: CPT

## 2020-02-13 PROCEDURE — 73130 X-RAY EXAM OF HAND: CPT

## 2020-02-13 PROCEDURE — 99232 SBSQ HOSP IP/OBS MODERATE 35: CPT | Performed by: SURGERY

## 2020-02-13 RX ADMIN — INSULIN LISPRO 4 UNITS: 100 INJECTION, SOLUTION INTRAVENOUS; SUBCUTANEOUS at 12:30

## 2020-02-13 RX ADMIN — AMLODIPINE BESYLATE 5 MG: 5 TABLET ORAL at 10:43

## 2020-02-13 RX ADMIN — AMLODIPINE BESYLATE 5 MG: 5 TABLET ORAL at 21:58

## 2020-02-13 RX ADMIN — KETOTIFEN FUMARATE 1 DROP: 0.35 SOLUTION/ DROPS OPHTHALMIC at 10:54

## 2020-02-13 RX ADMIN — ENOXAPARIN SODIUM 30 MG: 30 INJECTION SUBCUTANEOUS at 10:43

## 2020-02-13 RX ADMIN — ASPIRIN 81 MG 81 MG: 81 TABLET ORAL at 10:43

## 2020-02-13 RX ADMIN — KETOTIFEN FUMARATE 1 DROP: 0.35 SOLUTION/ DROPS OPHTHALMIC at 21:58

## 2020-02-13 RX ADMIN — INSULIN GLARGINE 12 UNITS: 100 INJECTION, SOLUTION SUBCUTANEOUS at 22:08

## 2020-02-13 RX ADMIN — ACETAMINOPHEN 975 MG: 325 TABLET ORAL at 21:57

## 2020-02-13 RX ADMIN — METOPROLOL TARTRATE 50 MG: 50 TABLET, FILM COATED ORAL at 21:59

## 2020-02-13 RX ADMIN — FERROUS SULFATE TAB 325 MG (65 MG ELEMENTAL FE) 325 MG: 325 (65 FE) TAB at 10:43

## 2020-02-13 RX ADMIN — SENNOSIDES 17.2 MG: 8.6 TABLET, FILM COATED ORAL at 10:43

## 2020-02-13 RX ADMIN — ENOXAPARIN SODIUM 30 MG: 30 INJECTION SUBCUTANEOUS at 21:58

## 2020-02-13 RX ADMIN — INSULIN LISPRO 4 UNITS: 100 INJECTION, SOLUTION INTRAVENOUS; SUBCUTANEOUS at 18:14

## 2020-02-13 RX ADMIN — INSULIN LISPRO 4 UNITS: 100 INJECTION, SOLUTION INTRAVENOUS; SUBCUTANEOUS at 10:44

## 2020-02-13 RX ADMIN — METOPROLOL TARTRATE 50 MG: 50 TABLET, FILM COATED ORAL at 10:43

## 2020-02-13 RX ADMIN — ACETAMINOPHEN 975 MG: 325 TABLET ORAL at 15:17

## 2020-02-13 RX ADMIN — ACETAMINOPHEN 975 MG: 325 TABLET ORAL at 05:46

## 2020-02-13 RX ADMIN — LEVOTHYROXINE SODIUM 50 MCG: 50 TABLET ORAL at 05:46

## 2020-02-13 RX ADMIN — DOCUSATE SODIUM 100 MG: 100 CAPSULE, LIQUID FILLED ORAL at 10:44

## 2020-02-13 RX ADMIN — ATORVASTATIN CALCIUM 10 MG: 10 TABLET, FILM COATED ORAL at 18:13

## 2020-02-13 NOTE — ASSESSMENT & PLAN NOTE
Lab Results   Component Value Date    HGBA1C 6 6 (H) 02/11/2020       Recent Labs     02/12/20  0811 02/12/20  1133 02/12/20  1714 02/12/20  2104   POCGLU 118 161* 158* 131       Blood Sugar Average: Last 72 hrs:  (P) 134 25     Glycemic control acceptable here  Continue to monitor blood glucose

## 2020-02-13 NOTE — ASSESSMENT & PLAN NOTE
Closed proximal right clavicle fracture secondary to fall  Appreciate Orthopedic Surgery recommendations  Non-operative management, nonweightbearing in sling  Will need outpatient follow-up

## 2020-02-13 NOTE — ASSESSMENT & PLAN NOTE
Ambulatory dysfunction with baseline use of walker and presentation of fall  PT and OT recommending rehabilitation  Disposition planning

## 2020-02-13 NOTE — SOCIAL WORK
Bed available tomorrow at [address] [facility]  Pt will d/c there tomorrow @1400 via [facility] wheelchair [place] [facility]

## 2020-02-13 NOTE — PLAN OF CARE
Problem: Prexisting or High Potential for Compromised Skin Integrity  Goal: Skin integrity is maintained or improved  Description  INTERVENTIONS:  - Identify patients at risk for skin breakdown  - Assess and monitor skin integrity  - Assess and monitor nutrition and hydration status  - Monitor labs   - Assess for incontinence   - Turn and reposition patient  - Assist with mobility/ambulation  - Relieve pressure over bony prominences  - Avoid friction and shearing  - Provide appropriate hygiene as needed including keeping skin clean and dry  - Evaluate need for skin moisturizer/barrier cream  - Collaborate with interdisciplinary team   - Patient/family teaching  - Consider wound care consult   Outcome: Progressing     Problem: Potential for Falls  Goal: Patient will remain free of falls  Description  INTERVENTIONS:  - Assess patient frequently for physical needs  -  Identify cognitive and physical deficits and behaviors that affect risk of falls    -  [place] fall precautions as indicated by assessment   - Educate patient/family on patient safety including physical limitations  - Instruct patient to call for assistance with activity based on assessment  - Modify environment to reduce risk of injury  - Consider OT/PT consult to assist with strengthening/mobility  Outcome: Progressing     Problem: PAIN - ADULT  Goal: Verbalizes/displays adequate comfort level or baseline comfort level  Description  Interventions:  - Encourage patient to monitor pain and request assistance  - Assess pain using appropriate pain scale  - Administer analgesics based on type and severity of pain and evaluate response  - Implement non-pharmacological measures as appropriate and evaluate response  - Consider cultural and social influences on pain and pain management  - Notify physician/advanced practitioner if interventions unsuccessful or patient reports new pain  Outcome: Progressing     Problem: SAFETY ADULT  Goal: Maintain or return to baseline ADL function  Description  INTERVENTIONS:  -  Assess patient's ability to carry out ADLs; assess patient's baseline for ADL function and identify physical deficits which impact ability to perform ADLs (bathing, care of mouth/teeth, toileting, grooming, dressing, etc )  - Assess/evaluate cause of self-care deficits   - Assess range of motion  - Assess patient's mobility; develop plan if impaired  - Assess patient's need for assistive devices and provide as appropriate  - Encourage maximum independence but intervene and supervise when necessary  - Involve family in performance of ADLs  - Assess for home care needs following discharge   - Consider OT consult to assist with ADL evaluation and planning for discharge  - Provide patient education as appropriate  Outcome: Progressing  Goal: Maintain or return mobility status to optimal level  Description  INTERVENTIONS:  - Assess patient's baseline mobility status (ambulation, transfers, stairs, etc )    - Identify cognitive and physical deficits and behaviors that affect mobility  - Identify mobility aids required to assist with transfers and/or ambulation (gait belt, sit-to-stand, lift, walker, cane, etc )  - [place] fall precautions as indicated by assessment  - Record patient progress and toleration of activity level on Mobility SBAR; progress patient to next Phase/Stage  - Instruct patient to call for assistance with activity based on assessment  - Consider rehabilitation consult to assist with strengthening/weightbearing, etc   Outcome: Progressing     Problem: DISCHARGE PLANNING  Goal: Discharge to home or other facility with appropriate resources  Description  INTERVENTIONS:  - Identify barriers to discharge w/patient and caregiver  - Arrange for needed discharge resources and transportation as appropriate  - Identify discharge learning needs (meds, wound care, etc )  - Arrange for interpretive services to assist at discharge as needed  - Refer to Case Management Department for coordinating discharge planning if the patient needs post-hospital services based on physician/advanced practitioner order or complex needs related to functional status, cognitive ability, or social support system  Outcome: Progressing     Problem: Knowledge Deficit  Goal: Patient/family/caregiver demonstrates understanding of disease process, treatment plan, medications, and discharge instructions  Description  Complete learning assessment and assess knowledge base    Interventions:  - Provide teaching at level of understanding  - Provide teaching via preferred learning methods  Outcome: Progressing     Problem: GENITOURINARY - ADULT  Goal: Maintains or returns to baseline urinary function  Description  INTERVENTIONS:  - Assess urinary function  - Encourage oral fluids to ensure adequate hydration if ordered  - Administer IV fluids as ordered to ensure adequate hydration  - Administer ordered medications as needed  - Offer frequent toileting  - Follow urinary retention protocol if ordered  Outcome: Progressing     Problem: SKIN/TISSUE INTEGRITY - ADULT  Goal: Skin integrity remains intact  Description  INTERVENTIONS  - Identify patients at risk for skin breakdown  - Assess and monitor skin integrity  - Assess and monitor nutrition and hydration status  - Monitor labs (i e  albumin)  - Assess for incontinence   - Turn and reposition patient  - Assist with mobility/ambulation  - Relieve pressure over bony prominences  - Avoid friction and shearing  - Provide appropriate hygiene as needed including keeping skin clean and dry  - Evaluate need for skin moisturizer/barrier cream  - Collaborate with interdisciplinary team (i e  Nutrition, Rehabilitation, etc )   - Patient/family teaching  Outcome: Progressing  Goal: Incision(s), wounds(s) or drain site(s) healing without S/S of infection  Description  INTERVENTIONS  - Assess and document risk factors for skin impairment   - Assess and document dressing, incision, wound bed, drain sites and surrounding tissue  - Consider nutrition services referral as needed  - Oral mucous membranes remain intact  - Provide patient/ family education  Outcome: Progressing  Goal: Oral mucous membranes remain intact  Description  INTERVENTIONS  - Assess oral mucosa and hygiene practices  - Implement preventative oral hygiene regimen  - Implement oral medicated treatments as ordered  - Initiate Nutrition services referral as needed  Outcome: Progressing     Problem: MUSCULOSKELETAL - ADULT  Goal: Maintain or return mobility to safest level of function  Description  INTERVENTIONS:  - Assess patient's ability to carry out ADLs; assess patient's baseline for ADL function and identify physical deficits which impact ability to perform ADLs (bathing, care of mouth/teeth, toileting, grooming, dressing, etc )  - Assess/evaluate cause of self-care deficits   - Assess range of motion  - Assess patient's mobility  - Assess patient's need for assistive devices and provide as appropriate  - Encourage maximum independence but intervene and supervise when necessary  - Involve family in performance of ADLs  - Assess for home care needs following discharge   - Consider OT consult to assist with ADL evaluation and planning for discharge  - Provide patient education as appropriate  Outcome: Progressing  Goal: Maintain proper alignment of affected body part  Description  INTERVENTIONS:  - Support, maintain and protect limb and body alignment  - Provide patient/ family with appropriate education  Outcome: Progressing     Problem: Nutrition/Hydration-ADULT  Goal: Nutrient/Hydration intake appropriate for improving, restoring or maintaining nutritional needs  Description  Monitor and assess patient's nutrition/hydration status for malnutrition  Collaborate with interdisciplinary team and initiate plan and interventions as ordered    Monitor patient's weight and dietary intake as ordered or per policy  Utilize nutrition screening tool and intervene as necessary  Determine patient's food preferences and provide high-protein, high-caloric foods as appropriate       INTERVENTIONS:  - Monitor oral intake, urinary output, labs, and treatment plans  - Assess nutrition and hydration status and recommend course of action  - Evaluate amount of meals eaten  - Assist patient with eating if necessary   - Allow adequate time for meals  - Recommend/ encourage appropriate diets, oral nutritional supplements, and vitamin/mineral supplements  - Order, calculate, and assess calorie counts as needed  - Recommend, monitor, and adjust tube feedings and TPN/PPN based on assessed needs  - Assess need for intravenous fluids  - Provide specific nutrition/hydration education as appropriate  - Include patient/family/caregiver in decisions related to nutrition  Outcome: Progressing

## 2020-02-13 NOTE — ASSESSMENT & PLAN NOTE
Status post fall with the below noted injuries  [address] input  PT and OT recommending rehabilitation  Case Management consultation for disposition planning

## 2020-02-13 NOTE — PROGRESS NOTES
Progress Note - Rosie Mac Day 1935, 80 y o  female MRN: [id number]    Unit/Bed#: [facility] 188-38 Encounter: [id number]    Primary Care Provider: Aleja Bob MD   Date and time admitted to hospital: 2/10/2020  5:25 PM      Fall  Assessment & Plan  Status post fall with the below noted injuries  [address] input  PT and OT recommending rehabilitation  Case Management consultation for disposition planning  Hypertension  Assessment & Plan  Continue home antihypertensives  Hypothyroidism  Assessment & Plan  Continue home levothyroxine  Diabetes mellitus [facility])  Assessment & Plan  Lab Results   Component Value Date    HGBA1C 6 6 (H) 02/11/2020       Recent Labs     02/12/20  0811 02/12/20  1133 02/12/20  1714 02/12/20  2104   POCGLU 118 161* 158* 131       Blood Sugar Average: Last 72 hrs:  (P) 134 25     Glycemic control acceptable here  Continue to monitor blood glucose  Ambulatory dysfunction  Assessment & Plan  Ambulatory dysfunction with baseline use of walker and presentation of fall  PT and OT recommending rehabilitation  Disposition planning  * Closed fracture of sternal end of right clavicle  Assessment & Plan  Closed proximal right clavicle fracture secondary to fall  Appreciate Orthopedic Surgery recommendations  Non-operative management, nonweightbearing in sling  Will need outpatient follow-up  Disposition:  Medically stable for discharge  Placement pending  SUBJECTIVE:    Chief Complaint:  None, patient feels well    Subjective:  Patient seen and examined this morning  She states that she slept through the night without any pain  Patient denies any headache, chest pain or pressure, shortness of breath, nausea, vomiting, or abdominal pain  The pain in her clavicle is well controlled  She denies any numbness or tingling in her right hand        OBJECTIVE:     Meds/Allergies     Current Facility-Administered Medications:    acetaminophen (TYLENOL) tablet 975 mg, 975 mg, Oral, Q8H [address], Chuy Benites PA-C, 975 mg at 02/13/20 0546    amLODIPine (NORVASC) tablet 5 mg, 5 mg, Oral, BID, Erinn Nino PA-C, 5 mg at 02/12/20 2143    aspirin chewable tablet 81 mg, 81 mg, Oral, Daily, Emmanuel Das PA-C, 81 mg at 02/12/20 8763    atorvastatin (LIPITOR) tablet 10 mg, 10 mg, Oral, Daily With Dinner, Chuy Benites PA-C, 10 mg at 02/12/20 1731    docusate sodium (COLACE) capsule 100 mg, 100 mg, Oral, BID, Chuy Benites PA-C, 100 mg at 02/12/20 1726    enoxaparin (LOVENOX) subcutaneous injection 30 mg, 30 mg, Subcutaneous, BID, hCuy Benites PA-C, 30 mg at 02/12/20 2143    ferrous sulfate tablet 325 mg, 325 mg, Oral, Daily With Breakfast, Erinn Nino PA-C, 325 mg at 02/12/20 0816    [START ON 2/14/2020] furosemide (LASIX) tablet 40 mg, 40 mg, Oral, Every Other Day, Janice Perry DO    insulin glargine (LANTUS) subcutaneous injection 12 Units 0 12 mL, 12 Units, Subcutaneous, HS, Chuy Benites PA-C, 12 Units at 02/12/20 2150    insulin lispro (HumaLOG) 100 units/mL subcutaneous injection 1-5 Units, 1-5 Units, Subcutaneous, HS, Chuy Benites PA-C    insulin lispro (HumaLOG) 100 units/mL subcutaneous injection 1-6 Units, 1-6 Units, Subcutaneous, TID AC, 1 Units at 02/12/20 1733 **AND** Fingerstick Glucose (POCT), , , TID AC, Chuy Benites PA-C    insulin lispro (HumaLOG) 100 units/mL subcutaneous injection 4 Units, 4 Units, Subcutaneous, Daily With Breakfast, Erinn Nino PA-C, 4 Units at 02/12/20 0819    insulin lispro (HumaLOG) 100 units/mL subcutaneous injection 4 Units, 4 Units, Subcutaneous, Daily With Lunch, Chuy Benites PA-C, 4 Units at 02/12/20 1200    insulin lispro (HumaLOG) 100 units/mL subcutaneous injection 4 Units, 4 Units, Subcutaneous, Daily With Sarai Alcaraz PA-C, 4 Units at 02/12/20 1732    ketotifen (ZADITOR) 0 025 % ophthalmic solution 1 drop, 1 drop, Both Eyes, BID PRN, Erinn Nino PA-C    levothyroxine tablet 50 mcg, 50 mcg, Oral, Early Morning, Chuy Benites PA-C, 50 mcg at 02/13/20 0546    loperamide (IMODIUM) capsule 2 mg, 2 mg, Oral, 4x Daily PRN, Gt Viear PA-C    metoprolol tartrate (LOPRESSOR) tablet 50 mg, 50 mg, Oral, BID, Chuy Benites PA-C, 50 mg at 02/12/20 2141    naloxone (NARCAN) 0 04 mg/mL syringe 0 04 mg, 0 04 mg, Intravenous, Q1MIN PRN, Gt Viera PA-C    ondansetron (ZOFRAN) injection 4 mg, 4 mg, Intravenous, Q6H PRN, Gt Viera PA-C    oxyCODONE (ROXICODONE) IR tablet 2 5 mg, 2 5 mg, Oral, Q4H PRN, Gt Viera PA-C, 2 5 mg at 02/11/20 2209    oxyCODONE (ROXICODONE) IR tablet 5 mg, 5 mg, Oral, Q4H PRN, Gt Viera PA-C    senna (SENOKOT) tablet 17 2 mg, 2 tablet, Oral, Daily, Chuy Benites PA-C, 17 2 mg at 02/12/20 0818     Vitals:   Vitals:    02/13/20 0722   BP: 155/75   Pulse: 78   Resp:    Temp: 97 7 °F (36 5 °C)   SpO2: 94%       Intake/Output:  I/O       02/11 0701 - 02/12 0700 02/12 0701 - 02/13 0700 02/13 0701 - 02/14 0700    P  O  480 580     Total Intake(mL/kg) 480 (5 5) 580 (6 6)     Urine (mL/kg/hr) 900 (0 4) 1130 (0 5)     Total Output 900 1130     Net -420 -550            Unmeasured Urine Occurrence  1 x            Nutrition/GI Proph/Bowel Reg:  Diabetic diet; bowel regimen with Senokot and Colace    Physical Exam:   GENERAL APPEARANCE:  Awake, alert, no distress  NEURO:  GCS 15, moves left upper extremity and bilateral lower extremities with 5/5 strength, range of motion limited in right upper extremity secondary to clavicular fracture  HEENT:  No external signs of head trauma, normocephalic  CV:  Regular rate and rhythm, no murmurs  LUNGS:  Lungs clear bilaterally, no tachypnea  GI:  Abdomen soft, nontender  :  Deferred  MSK:  Right upper extremity in sling, right hand neurovascularly intact  SKIN:  Warm, dry, no rash    Invasive Devices     Drain            External Urinary Catheter less than 1 day                 Lab Results:  No labs today     Imaging/EKG Studies: Results: I have personally reviewed pertinent reports        VTE Prophylaxis: Enoxaparin (Lovenox)

## 2020-02-14 VITALS
SYSTOLIC BLOOD PRESSURE: 125 MMHG | WEIGHT: 193.12 LBS | BODY MASS INDEX: 35.54 KG/M2 | RESPIRATION RATE: 19 BRPM | HEART RATE: 71 BPM | HEIGHT: 62 IN | OXYGEN SATURATION: 95 % | DIASTOLIC BLOOD PRESSURE: 54 MMHG | TEMPERATURE: 98.4 F

## 2020-02-14 LAB
GLUCOSE SERPL-MCNC: 127 MG/DL (ref 65–140)
GLUCOSE SERPL-MCNC: 98 MG/DL (ref 65–140)

## 2020-02-14 PROCEDURE — 99238 HOSP IP/OBS DSCHRG MGMT 30/<: CPT | Performed by: NURSE PRACTITIONER

## 2020-02-14 PROCEDURE — 82948 REAGENT STRIP/BLOOD GLUCOSE: CPT

## 2020-02-14 RX ORDER — OXYCODONE HYDROCHLORIDE 5 MG/1
2.5 TABLET ORAL EVERY 4 HOURS PRN
Qty: 3 TABLET | Refills: 0 | Status: ON HOLD | OUTPATIENT
Start: 2020-02-14 | End: 2021-08-22 | Stop reason: SDUPTHER

## 2020-02-14 RX ORDER — INSULIN GLARGINE 100 [IU]/ML
12 INJECTION, SOLUTION SUBCUTANEOUS
Qty: 10 ML | Refills: 0
Start: 2020-02-14 | End: 2021-09-05 | Stop reason: HOSPADM

## 2020-02-14 RX ADMIN — KETOTIFEN FUMARATE 1 DROP: 0.35 SOLUTION/ DROPS OPHTHALMIC at 08:55

## 2020-02-14 RX ADMIN — FUROSEMIDE 40 MG: 40 TABLET ORAL at 08:52

## 2020-02-14 RX ADMIN — ASPIRIN 81 MG 81 MG: 81 TABLET ORAL at 08:52

## 2020-02-14 RX ADMIN — SENNOSIDES 17.2 MG: 8.6 TABLET, FILM COATED ORAL at 08:52

## 2020-02-14 RX ADMIN — LEVOTHYROXINE SODIUM 50 MCG: 50 TABLET ORAL at 05:18

## 2020-02-14 RX ADMIN — AMLODIPINE BESYLATE 5 MG: 5 TABLET ORAL at 08:52

## 2020-02-14 RX ADMIN — INSULIN LISPRO 4 UNITS: 100 INJECTION, SOLUTION INTRAVENOUS; SUBCUTANEOUS at 12:53

## 2020-02-14 RX ADMIN — DOCUSATE SODIUM 100 MG: 100 CAPSULE, LIQUID FILLED ORAL at 08:52

## 2020-02-14 RX ADMIN — METOPROLOL TARTRATE 50 MG: 50 TABLET, FILM COATED ORAL at 08:52

## 2020-02-14 RX ADMIN — INSULIN LISPRO 4 UNITS: 100 INJECTION, SOLUTION INTRAVENOUS; SUBCUTANEOUS at 08:51

## 2020-02-14 RX ADMIN — ACETAMINOPHEN 975 MG: 325 TABLET ORAL at 05:18

## 2020-02-14 RX ADMIN — FERROUS SULFATE TAB 325 MG (65 MG ELEMENTAL FE) 325 MG: 325 (65 FE) TAB at 08:52

## 2020-02-14 RX ADMIN — ACETAMINOPHEN 975 MG: 325 TABLET ORAL at 14:20

## 2020-02-14 RX ADMIN — ENOXAPARIN SODIUM 30 MG: 30 INJECTION SUBCUTANEOUS at 08:50

## 2020-02-14 NOTE — ASSESSMENT & PLAN NOTE
Lab Results   Component Value Date    HGBA1C 6 6 (H) 02/11/2020       Recent Labs     02/13/20  1210 02/13/20  1721 02/13/20 2032 02/14/20  0805   POCGLU 100 111 110 98       Blood Sugar Average: Last 72 hrs:  (P) 123 [id number]     Glycemic control acceptable here  Continue to monitor blood glucose  Was not on any antihyperglycemic agents at home  I discussed that hopefully her blood sugars will return to baseline prior to discharge from rehab but that if they do not she may require medications to control her glucose   She is agreeable and will follow closely with her PCP after rehab

## 2020-02-14 NOTE — ASSESSMENT & PLAN NOTE
Closed proximal right clavicle fracture secondary to fall  Appreciate Orthopedic Surgery recommendations  Non-operative management, nonweightbearing in sling  Will need outpatient follow-up   Stable for DC today to rehab

## 2020-02-14 NOTE — DISCHARGE INSTRUCTIONS
Discharge Instructions - Orthopedics  Juvenal Said Day 80 y o  female MRN: [id number]  Unit/Bed#: X ray    Weight Bearing Status:                                           Nonweightbearing in the right upper extremity  OK for elbow and wrist range of motion exercises and shoulder pendulum exercises as tolerated    Appt Instructions: If you do not have your appointment, please call the clinic at [phone] to f/u with Dr Rosendo Choudhary in 4 weeks  Otherwise followup as scheduled     Diabetes in the Older Adult   You may require medication to control your diabetes after rehab  Please continue to monitor your blood sugar and obtain close follow-up with your PCP, within 1 week from discharge  Your blood sugar goal is 70 - 180  If your blood sugar is lower than 70 this is an emergency and you will need treatment immediately  If your blood sugar is > 500 this is an emergency and will require immediate treatment  WHAT YOU NEED TO KNOW:   Older adults with diabetes are at risk for heart disease, stroke, kidney disease, blindness, and nerve damage   You may also be at risk for any of the following:  · Poor nutrition or low blood sugar levels    · Confusion or problems with memory, attention, or learning new things    · Trouble controlling urination or frequent urinary tract infections    · Trouble with coordination or balance    · Falls and injuries    · Pain    · Depression    · Open sores on your legs or feet  DISCHARGE INSTRUCTIONS:   Call 911 for any of the following:   · You have any of the following signs of a stroke:      ¨ Numbness or drooping on one side of your face     ¨ Weakness in an arm or leg    ¨ Confusion or difficulty speaking    ¨ Dizziness, a severe headache, or vision loss    · You have any of the following signs of a heart attack:      ¨ Squeezing, pressure, or pain in your chest that lasts longer than 5 minutes or returns    ¨ Discomfort or pain in your back, neck, jaw, stomach, or arm     ¨ Trouble breathing    ¨ Nausea or vomiting    ¨ Lightheadedness or a sudden cold sweat, especially with chest pain or trouble breathing  Return to the emergency department if:   · You have severe abdominal pain, or the pain spreads to your back  You may also be vomiting  · You have trouble staying awake or focusing  · You are shaking or sweating  · You have blurred or double vision  · Your breath has a fruity, sweet smell  · Your breathing is deep and labored, or rapid and shallow  · Your heartbeat is fast and weak  · You fall and get hurt  Contact your healthcare provider if:   · You are vomiting or have diarrhea  · You have an upset stomach and cannot eat the foods on your meal plan  · You feel weak or more tired than usual      · You feel dizzy, have headaches, or are easily irritated  · Your skin is red, warm, dry, or swollen  · You have a wound that does not heal      · You have numbness in your arms or legs  · You have trouble coping with your illness, or you feel anxious or depressed  · You have problems with your memory  · You have changes in your vision  · You have questions or concerns about your condition or care  Medicines  may be given to decrease the amount of sugar in your blood  You may also need medicine to lower your blood pressure or cholesterol, or medicine to prevent blood clots  Manage the ABCs and prevent problems caused by diabetes:   · Check your blood sugar levels as directed  Your healthcare provider will tell you when and how often to check during the day  Your healthcare provider will also tell you what your blood sugar levels should be before and after a meal  You may need to check for ketones in your urine or blood if your level is higher than directed  Write down your results and show them to your healthcare provider  Your provider may use the results to make changes to your medicine, food, or exercise schedules   Ask your healthcare provider for more information about how to treat a high or low blood sugar level  · Follow your meal plan as directed  A dietitian will help you make a meal plan to keep your blood sugar level steady and make sure you get enough nutrition  Do not skip meals  Your blood sugar level may drop too low if you have taken diabetes medicine and do not eat  Ask your healthcare provider about programs in your community that can deliver the meals to your home  · Try to be active for 30 to 60 minutes most days of the week  Exercise can help keep your blood sugar level steady, decrease your risk of heart disease, and help you lose weight  It can also help improve your balance and decrease your risk for falls  Work with your healthcare provider to create an exercise plan  Ask a family member or friend to exercise with you  Start slow and exercise for 5 to 10 minutes at a time  Examples of activities include walking or swimming  Include muscle strengthening activities 2 to 3 days each week  Include balance training 2 to 3 times each week  Activities that help increase balance include yoga and pierre chi      · Maintain a healthy weight  Ask your healthcare provider how much you should weigh  A healthy weight can help you control your diabetes and prevent heart disease  Ask your provider to help you create a weight loss plan if you are overweight  Together you can set manageable weight loss goals  · Do not smoke  Ask your healthcare provider for information if you currently smoke and need help to quit  Do not use e-cigarettes or smokeless tobacco in place of cigarettes or to help you quit  They still contain nicotine  · Manage stress  Stress may increase your blood sugar level  Deep breathing, muscle relaxation, and music may help you relax  Ask your healthcare provider for more information about these practices  Other ways to manage your diabetes:   · Check your feet every day for sores    Look at your whole foot, including the bottom, and between and under your toes  Check for wounds, corns, and calluses  Use a mirror to see the bottom of your feet  The skin on your feet may be shiny, tight, dry, or darker than normal  Your feet may also be cold and pale  Feel your feet by running your hands along the tops, bottoms, sides, and between your toes  Redness, swelling, and warmth are signs of blood flow problems that can lead to a foot ulcer  Do not try to remove corns or calluses yourself  · Wear medical alert identification  Wear medical alert jewelry or carry a card that says you have diabetes  Ask your healthcare provider where to get these items  · Ask about vaccines  You have a higher risk for serious illness if you get the flu, pneumonia, or hepatitis  Ask your healthcare provider if you should get a flu, pneumonia, shingles, or hepatitis B vaccine, and when to get the vaccine  · Keep all appointments  You may need to return to have your A1c checked every 3 months  You will need to return at least once each year to have your feet checked  You will need an eye exam once a year to check for retinopathy  You will also need urine tests every year to check for kidney problems  You may need tests to monitor for heart disease  Write down your questions so you remember to ask them during your visits  · Get help from family and friends  You may need help checking your blood sugar level, giving insulin injections, or preparing your meals  Ask your family and friends to help you with these tasks  Talk to your healthcare provider if you do not have someone at home to help you  A healthcare provider can come to your home to help you with these tasks  Follow up with your healthcare provider as directed: You may need to return to have your A1c checked every 3 months  You will need to return at least once each year to have your feet checked   You will need an eye exam once a year to check for retinopathy  You will also need urine tests every year to check for kidney problems  You may need tests to monitor for heart disease  Write down your questions so you remember to ask them during your visits  © 2017 [employer] Information is for End User's use only and may not be sold, redistributed or otherwise used for commercial purposes  All illustrations and images included in CareNotes® are the copyrighted property of [employer]  or [employer]  The above information is an  only  It is not intended as medical advice for individual conditions or treatments  Talk to your doctor, nurse or pharmacist before following any medical regimen to see if it is safe and effective for you

## 2020-02-14 NOTE — PLAN OF CARE
Problem: Prexisting or High Potential for Compromised Skin Integrity  Goal: Skin integrity is maintained or improved  Description  INTERVENTIONS:  - Identify patients at risk for skin breakdown  - Assess and monitor skin integrity  - Assess and monitor nutrition and hydration status  - Monitor labs   - Assess for incontinence   - Turn and reposition patient  - Assist with mobility/ambulation  - Relieve pressure over bony prominences  - Avoid friction and shearing  - Provide appropriate hygiene as needed including keeping skin clean and dry  - Evaluate need for skin moisturizer/barrier cream  - Collaborate with interdisciplinary team   - Patient/family teaching  - Consider wound care consult   Outcome: Adequate for Discharge     Problem: Potential for Falls  Goal: Patient will remain free of falls  Description  INTERVENTIONS:  - Assess patient frequently for physical needs  -  Identify cognitive and physical deficits and behaviors that affect risk of falls    -  [place] fall precautions as indicated by assessment   - Educate patient/family on patient safety including physical limitations  - Instruct patient to call for assistance with activity based on assessment  - Modify environment to reduce risk of injury  - Consider OT/PT consult to assist with strengthening/mobility  Outcome: Adequate for Discharge     Problem: PAIN - ADULT  Goal: Verbalizes/displays adequate comfort level or baseline comfort level  Description  Interventions:  - Encourage patient to monitor pain and request assistance  - Assess pain using appropriate pain scale  - Administer analgesics based on type and severity of pain and evaluate response  - Implement non-pharmacological measures as appropriate and evaluate response  - Consider cultural and social influences on pain and pain management  - Notify physician/advanced practitioner if interventions unsuccessful or patient reports new pain  Outcome: Adequate for Discharge     Problem: SAFETY ADULT  Goal: Maintain or return to baseline ADL function  Description  INTERVENTIONS:  -  Assess patient's ability to carry out ADLs; assess patient's baseline for ADL function and identify physical deficits which impact ability to perform ADLs (bathing, care of mouth/teeth, toileting, grooming, dressing, etc )  - Assess/evaluate cause of self-care deficits   - Assess range of motion  - Assess patient's mobility; develop plan if impaired  - Assess patient's need for assistive devices and provide as appropriate  - Encourage maximum independence but intervene and supervise when necessary  - Involve family in performance of ADLs  - Assess for home care needs following discharge   - Consider OT consult to assist with ADL evaluation and planning for discharge  - Provide patient education as appropriate  Outcome: Adequate for Discharge  Goal: Maintain or return mobility status to optimal level  Description  INTERVENTIONS:  - Assess patient's baseline mobility status (ambulation, transfers, stairs, etc )    - Identify cognitive and physical deficits and behaviors that affect mobility  - Identify mobility aids required to assist with transfers and/or ambulation (gait belt, sit-to-stand, lift, walker, cane, etc )  - [place] fall precautions as indicated by assessment  - Record patient progress and toleration of activity level on Mobility SBAR; progress patient to next Phase/Stage  - Instruct patient to call for assistance with activity based on assessment  - Consider rehabilitation consult to assist with strengthening/weightbearing, etc   Outcome: Adequate for Discharge     Problem: DISCHARGE PLANNING  Goal: Discharge to home or other facility with appropriate resources  Description  INTERVENTIONS:  - Identify barriers to discharge w/patient and caregiver  - Arrange for needed discharge resources and transportation as appropriate  - Identify discharge learning needs (meds, wound care, etc )  - Arrange for interpretive services to assist at discharge as needed  - Refer to Case Management Department for coordinating discharge planning if the patient needs post-hospital services based on physician/advanced practitioner order or complex needs related to functional status, cognitive ability, or social support system  Outcome: Adequate for Discharge     Problem: Knowledge Deficit  Goal: Patient/family/caregiver demonstrates understanding of disease process, treatment plan, medications, and discharge instructions  Description  Complete learning assessment and assess knowledge base    Interventions:  - Provide teaching at level of understanding  - Provide teaching via preferred learning methods  Outcome: Adequate for Discharge     Problem: GENITOURINARY - ADULT  Goal: Maintains or returns to baseline urinary function  Description  INTERVENTIONS:  - Assess urinary function  - Encourage oral fluids to ensure adequate hydration if ordered  - Administer IV fluids as ordered to ensure adequate hydration  - Administer ordered medications as needed  - Offer frequent toileting  - Follow urinary retention protocol if ordered  Outcome: Adequate for Discharge     Problem: SKIN/TISSUE INTEGRITY - ADULT  Goal: Skin integrity remains intact  Description  INTERVENTIONS  - Identify patients at risk for skin breakdown  - Assess and monitor skin integrity  - Assess and monitor nutrition and hydration status  - Monitor labs (i e  albumin)  - Assess for incontinence   - Turn and reposition patient  - Assist with mobility/ambulation  - Relieve pressure over bony prominences  - Avoid friction and shearing  - Provide appropriate hygiene as needed including keeping skin clean and dry  - Evaluate need for skin moisturizer/barrier cream  - Collaborate with interdisciplinary team (i e  Nutrition, Rehabilitation, etc )   - Patient/family teaching  Outcome: Adequate for Discharge  Goal: Incision(s), wounds(s) or drain site(s) healing without S/S of infection  Description  INTERVENTIONS  - Assess and document risk factors for skin impairment   - Assess and document dressing, incision, wound bed, drain sites and surrounding tissue  - Consider nutrition services referral as needed  - Oral mucous membranes remain intact  - Provide patient/ family education  Outcome: Adequate for Discharge  Goal: Oral mucous membranes remain intact  Description  INTERVENTIONS  - Assess oral mucosa and hygiene practices  - Implement preventative oral hygiene regimen  - Implement oral medicated treatments as ordered  - Initiate Nutrition services referral as needed  Outcome: Adequate for Discharge     Problem: MUSCULOSKELETAL - ADULT  Goal: Maintain or return mobility to safest level of function  Description  INTERVENTIONS:  - Assess patient's ability to carry out ADLs; assess patient's baseline for ADL function and identify physical deficits which impact ability to perform ADLs (bathing, care of mouth/teeth, toileting, grooming, dressing, etc )  - Assess/evaluate cause of self-care deficits   - Assess range of motion  - Assess patient's mobility  - Assess patient's need for assistive devices and provide as appropriate  - Encourage maximum independence but intervene and supervise when necessary  - Involve family in performance of ADLs  - Assess for home care needs following discharge   - Consider OT consult to assist with ADL evaluation and planning for discharge  - Provide patient education as appropriate  Outcome: Adequate for Discharge  Goal: Maintain proper alignment of affected body part  Description  INTERVENTIONS:  - Support, maintain and protect limb and body alignment  - Provide patient/ family with appropriate education  Outcome: Adequate for Discharge     Problem: Nutrition/Hydration-ADULT  Goal: Nutrient/Hydration intake appropriate for improving, restoring or maintaining nutritional needs  Description  Monitor and assess patient's nutrition/hydration status for malnutrition  Collaborate with interdisciplinary team and initiate plan and interventions as ordered  Monitor patient's weight and dietary intake as ordered or per policy  Utilize nutrition screening tool and intervene as necessary  Determine patient's food preferences and provide high-protein, high-caloric foods as appropriate       INTERVENTIONS:  - Monitor oral intake, urinary output, labs, and treatment plans  - Assess nutrition and hydration status and recommend course of action  - Evaluate amount of meals eaten  - Assist patient with eating if necessary   - Allow adequate time for meals  - Recommend/ encourage appropriate diets, oral nutritional supplements, and vitamin/mineral supplements  - Order, calculate, and assess calorie counts as needed  - Recommend, monitor, and adjust tube feedings and TPN/PPN based on assessed needs  - Assess need for intravenous fluids  - Provide specific nutrition/hydration education as appropriate  - Include patient/family/caregiver in decisions related to nutrition  Outcome: Adequate for Discharge

## 2020-02-14 NOTE — DISCHARGE SUMMARY
Discharge- Fredi Alexandra Day 1935, 80 y o  female MRN: [id number]    Unit/Bed#: [facility] 451-51 Encounter: [id number]    Primary Care Provider: Dalia Mcintyre MD   Date and time admitted to hospital: 2/10/2020  5:25 PM        Hypertension  Assessment & Plan  Continue home antihypertensives  Fall  Assessment & Plan  Status post fall with the below noted injuries  [address] input  PT and OT recommending rehabilitation  Case Management consultation for disposition planning  Hypothyroidism  Assessment & Plan  Continue home levothyroxine  Diabetes mellitus [facility])  Assessment & Plan  Lab Results   Component Value Date    HGBA1C 6 6 (H) 02/11/2020       Recent Labs     02/13/20  1210 02/13/20  1721 02/13/20  2032 02/14/20  0805   POCGLU 100 111 110 98       Blood Sugar Average: Last 72 hrs:  (P) 123 [id number]     Glycemic control acceptable here  Continue to monitor blood glucose  Was not on any antihyperglycemic agents at home  I discussed that hopefully her blood sugars will return to baseline prior to discharge from rehab but that if they do not she may require medications to control her glucose  She is agreeable and will follow closely with her PCP after rehab     Ambulatory dysfunction  Assessment & Plan  Ambulatory dysfunction with baseline use of walker and presentation of fall  PT and OT recommending rehabilitation  Disposition planning  * Closed fracture of sternal end of right clavicle  Assessment & Plan  Closed proximal right clavicle fracture secondary to fall  Appreciate Orthopedic Surgery recommendations  Non-operative management, nonweightbearing in sling  Will need outpatient follow-up   Stable for DC today to rehab                 Resolved Problems  Date Reviewed: 2/14/2020    None          Admission Date:   Admission Orders (From admission, onward)     Ordered        02/11/20 0011  Inpatient Admission  Once                   Physical Exam Constitutional: She is oriented to person, place, and time  No distress  HENT:   Head: Normocephalic and atraumatic  Eyes: Pupils are equal, round, and reactive to light  Conjunctivae are normal  Right eye exhibits no discharge  Left eye exhibits no discharge  Right eye droop/closes - this is chronic   Neck: Normal range of motion  No tracheal deviation present  Cardiovascular: Normal rate, regular rhythm, normal heart sounds and intact distal pulses  Pulmonary/Chest: Effort normal and breath sounds normal  No stridor  No respiratory distress  She has no wheezes  She has no rales  She exhibits no tenderness  right clavicle point tenderness, no external deformity or hematoma    Abdominal: Soft  Bowel sounds are normal  She exhibits no distension and no mass  There is no tenderness  There is no rebound and no guarding  Musculoskeletal: She exhibits no edema or deformity  No paresthesias or motor deficits    Neurological: She is alert and oriented to person, place, and time  Moves all extremities with equal strength bilaterally, right arm in sling   Clear speech   GCS 15    Skin: Skin is warm and dry  Capillary refill takes less than 2 seconds  She is not diaphoretic  Psychiatric: She has a normal mood and affect  Admitting Diagnosis: Right clavicle fracture [S42 001A]  Closed nondisplaced fracture of sternal end of right clavicle, initial encounter [S42 017A]  Injury, unspecified, initial encounter [T14 90XA]  Ambulatory dysfunction [R26 2]  Unspecified multiple injuries, initial encounter [T07  XXXA]    HPI: Per Darline PADILLA on admission "Deven Chawla is a 80 y o  female who presents from [facility] following a mechanical fall  The patient states that she recently return to her assisted living facility following a doctor's appointment  She tempted to get up from her chair and walked to the bathroom using her walker    She notes that she still had issues on which are wet from outside and she slipped falling onto her right side  She did strike her head, but denied loss of consciousness  She struck mostly her right shoulder and the right side of her body  She did have sudden acute onset right shoulder pain as well as some soreness to the right posterior head and neck  She denied any new or ongoing headache, visual changes, lightheadedness or dizziness, chest pain, shortness of breath or difficulty breathing, abdominal pain, nausea or vomiting, and/or any numbness/weakness/tingling in her extremities  Her main complaint is right shoulder pain "    Procedures Performed:   Orders Placed This Encounter   Procedures    ED ECG Documentation Only       Summary of Hospital Course: Mrs Hutton was treated for right clavicle fracture  She was managed non-operatively as recommended by orthopedic surgery with right arm in a sling and non-weight bearing  She will follow-up with them as an outpatient  She was recommended for rehab after working with Pt and Ot therapy  She is stable for DC today  Of note, she does not take any medication for her known diabetes at home  She reports she was on metformin but was able to DC this with lifestyle and diet modifications  Her glucose is uncontrolled at this time, possibly in the setting if acute stress related to her fracture  She will DC to rehab with [facility] but was told she may require medication for her diabetes when she is discharged from rehab and she will at a very minimum need close follow-up with her PCP  She verbalizes understanding and is agreeable to this plan  Significant Findings, Care, Treatment and Services Provided: Xr Clavicle Right    Result Date: 2/11/2020  Impression: Acute fracture of the medial aspect of the right clavicle  No pneumothorax identified   Workstation performed: [id number]     Xr Shoulder 2+ Views Right    Result Date: 2/10/2020  Impression: Acute proximal midclavicular fracture is better appreciated on cervical spine CT  No definitive acute fracture around the right shoulder joint though evaluation is rather limited by positioning  Moderate to severe glenohumeral osteoarthritis  Workstation performed: [id number]     Xr Elbow 3+ Views Right    Result Date: 2/11/2020  Impression: No acute osseous abnormality  Workstation performed: [id number]     Xr Wrist 3+ Views Right    Result Date: 2/11/2020  Impression: Degenerative change without acute osseous abnormality  Workstation performed: [id number]     Xr Hand 3+ Vw Right    Result Date: 2/13/2020  Impression: Osteoarthritis particularly at the third and fourth PIP joints and first Aia 16 joint  Workstation performed: [id number]     Xr Hip/pelv 2-3 Vws Right If Performed    Result Date: 2/11/2020  Impression: No acute fracture seen  Old Posttraumatic and postsurgical changes as mentioned  Workstation performed: [id number]     Ct Head Without Contrast    Result Date: 2/10/2020  Impression: No acute intracranial abnormality  Workstation performed: [id number]     Ct Cervical Spine Without Contrast    Result Date: 2/10/2020  Impression:  No cervical spine fracture or traumatic malalignment  Workstation performed: [id number]     Ct Abdomen Pelvis With Contrast    Result Date: 2/10/2020  Impression: No acute traumatic findings in the abdomen or pelvis  Status post hysterectomy  Dilation of the vaginal cuff with fluid, noting 2 foci of gas in the nondependent portion  Findings are nonspecific  Recommend correlation with physical exam findings  Additional chronic findings as described  The study was marked in [facility] for immediate notification  Workstation performed: [id number]       Complications: none    Condition at Discharge: poor         Discharge instructions/Information to patient and family:   See after visit summary for information provided to patient and family        Provisions for Follow-Up Care:  See after visit summary for information related to follow-up care and any pertinent home health orders  PCP: Nicola Givens MD    Disposition: Short-term rehab at [address] Readmission: No    Discharge Statement   I spent 18 minutes discharging the patient  This time was spent on the day of discharge  I had direct contact with the patient on the day of discharge  Additional documentation is required if more than 30 minutes were spent on discharge  Discharge Medications:  See after visit summary for reconciled discharge medications provided to patient and family

## 2020-02-14 NOTE — INCIDENTAL FINDINGS
The following findings require follow up:  Radiographic finding   Finding: small right renal cyst, multiple areas of arthritis and degenerative changes including in the right arm/shoulder, wrist and fingers as well as spine and hips  Fluid and dilation of the vaginal cuff - this is non-specific  Follow up required: You do not require any specific follow-up for these findings   Please make your PCP aware of these incidental findings for health surveillance     Please notify the following clinician to assist with the follow up:   Dr Kimi Roberts

## 2020-02-19 ENCOUNTER — OFFICE VISIT (OUTPATIENT)
Dept: OBGYN CLINIC | Facility: CLINIC | Age: 85
End: 2020-02-19
Payer: MEDICARE

## 2020-02-19 ENCOUNTER — APPOINTMENT (OUTPATIENT)
Dept: RADIOLOGY | Facility: AMBULARY SURGERY CENTER | Age: 85
End: 2020-02-19
Payer: COMMERCIAL

## 2020-02-19 VITALS
DIASTOLIC BLOOD PRESSURE: 70 MMHG | BODY MASS INDEX: 35.51 KG/M2 | WEIGHT: 193 LBS | HEIGHT: 62 IN | SYSTOLIC BLOOD PRESSURE: 120 MMHG | HEART RATE: 77 BPM

## 2020-02-19 DIAGNOSIS — S42.001A CLOSED DISPLACED FRACTURE OF RIGHT CLAVICLE, UNSPECIFIED PART OF CLAVICLE, INITIAL ENCOUNTER: Primary | ICD-10-CM

## 2020-02-19 DIAGNOSIS — S42.001A CLOSED DISPLACED FRACTURE OF RIGHT CLAVICLE, UNSPECIFIED PART OF CLAVICLE, INITIAL ENCOUNTER: ICD-10-CM

## 2020-02-19 PROCEDURE — 73000 X-RAY EXAM OF COLLAR BONE: CPT

## 2020-02-19 PROCEDURE — 99213 OFFICE O/P EST LOW 20 MIN: CPT | Performed by: ORTHOPAEDIC SURGERY

## 2020-02-19 NOTE — PROGRESS NOTES
80 y o female   Right hand dominant female had a recent   Mechanical fall and was found have a right medial clavicle fracture  She has no numbness or tingling  Pain was in the clavicle region that is made worse with activity and is relieved with rest   Her pain does not radiate    Review of Systems  Review of systems negative unless otherwise specified in HPI    Past Medical History  Past Medical History:   Diagnosis Date    Blind     CAD (coronary artery disease)     s/p HERNAN 6/2016    CH[facility]congestive [facility] failure) [facility] [address] [facility]     Diabetes mellitus ([facility] )     non-insulin depdenent    Disease of thyroid gland     GERD (gastroesophageal reflux disease)     History of TIA (transient ischemic attack)     no residual deficits    Hyperlipidemia     Hypertension     Hypoxia     on home O2 QHS    Meniere disease     Pacemaker     [facility]-[employer]ron[employer] in 2/2015    Severe aortic stenosis        Past Surgical History  Past Surgical History:   Procedure Laterality Date    APPENDECTOMY      BACK SURGERY      CHOLECYSTECTOMY      FRACTURE SURGERY Right 01/02/2018    femur    HYSTERECTOMY      MD EGD TRANSORAL BIOPSY SINGLE/MULTIPLE N/A 11/9/2016    Procedure: ESOPHAGOGASTRODUODENOSCOPY (EGD); Surgeon: Pritesh Calderón MD;  [facility]: [facility];   Service: Gastroenterology    MD OPEN RX FEMUR FX+INTRAMED GARRETT Right 1/2/2018    Procedure: INSERTION NAIL IM FEMUR ANTEGRADE (TROCHANTERIC) RIGHT;  Surgeon: José Luis Leblanc MD;  [facility];  Service: Orthopedics    MD REPLACE AORTIC VALVE OPENFEMORAL ARTERY APPROACH N/A 7/26/2016    Procedure: TRANSFEMORAL TAVR WITH 23MM LAROSE LILY S3 VALVE; JOSE ALFREDO ;  Surgeon: Steffanie Daniel DO;  [facility];  Service: Cardiac Surgery    SMALL INTESTINE SURGERY      TOTAL KNEE ARTHROPLASTY      VARICOSE VEIN SURGERY      VENTRAL HERNIA REPAIR         Current Medications  Current Outpatient Medications on File Prior to Visit   Medication Sig Dispense Refill    acetaminophen (TYLENOL) 325 mg tablet Take 2 tablets (650 mg total) by mouth every 6 (six) hours as needed for mild pain  30 tablet 0    amLODIPine (NORVASC) 5 mg tablet 5 mg 2 (two) times a day       amoxicillin (AMOXIL) 500 mg capsule Take 2,000 mg by mouth as needed (PRIOR TO DENTAL APPOINTMENTS)      aspirin 81 mg chewable tablet Chew 81 mg daily   atorvastatin (LIPITOR) 10 mg tablet       diclofenac sodium (VOLTAREN) 1 % Apply 2 g topically 4 (four) times a day as needed      ferrous sulfate 325 (65 Fe) mg tablet Take 325 mg by mouth daily with breakfast      furosemide (LASIX) 40 mg tablet Take 1 tablet by mouth daily (Patient taking differently: Take 40 mg by mouth every other day )  0    insulin glargine (LANTUS) 100 units/mL subcutaneous injection Inject 12 Units under the skin daily at bedtime 10 mL 0    insulin lispro (HumaLOG) 100 units/mL injection Inject 1-5 Units under the skin daily at bedtime  0    insulin lispro (HumaLOG) 100 units/mL injection Inject 1-6 Units under the skin 3 (three) times a day before meals  0    ketoconazole (NIZORAL) 2 % cream daily       levothyroxine 50 mcg tablet Take 50 mcg by mouth daily      loperamide (IMODIUM A-D) 2 MG tablet Take 2 mg by mouth 3 (three) times a day as needed for diarrhea      metoprolol tartrate (LOPRESSOR) 50 mg tablet Take 1 tablet (50 mg total) by mouth 2 (two) times a day   60 tablet 2    Multiple Vitamins-Minerals (OCUVITE ADULT 50+ PO) Take by mouth daily      nystatin (MYCOSTATIN) powder Apply topically 2 (two) times a day      oxyCODONE (ROXICODONE) 5 mg immediate release tablet Take 0 5 tablets (2 5 mg total) by mouth every 4 (four) hours as needed for moderate pain or severe painMax Daily Amount: 15 mg 3 tablet 0    potassium chloride (K-DUR,KLOR-CON) 20 mEq tablet Take 20 mEq by mouth daily      tetrahydrozoline (VISINE) 0 05 % ophthalmic solution 1 drop 4 (four) times a day as needed for irritation       No current facility-administered medications on file prior to visit  Recent Labs [facility] R[facility])  0   Lab Value Date/Time    HCT 36 7 02/11/2020 0426    HCT 36 8 12/01/2015 0915    HGB 12 1 02/11/2020 0426    HGB 11 7 12/01/2015 0915    WBC 12 10 (H) 02/11/2020 0426    WBC 10 63 (H) 12/01/2015 0915    INR 0 94 02/10/2020 1753    INR 1 10 02/20/2015 0618    ESR 33 (H) 12/22/2016 1432    CRP 9 5 (H) 12/22/2016 1432    GLUCOSE 134 07/26/2016 0936    GLUCOSE 123 12/01/2015 0915    HGBA1C 6 6 (H) 02/11/2020 0156    HGBA1C 6 0 02/28/2017 1351         Physical exam  · General: Awake, Alert, Oriented  · Eyes: Pupils equal, round and reactive to light  · Heart: regular rate and rhythm  · Lungs: No audible wheezing  · Abdomen: soft   right upper extremity:  Skin is intact  There is no ecchymosis   mild tenderness to palpation over the medial aspect of the clavicle  Sensation is intact throughout right upper extremity  Motor is intact and, PN, radial, ulnar nerves distribution  Fingers warm well perfused  Imaging    X-rays right clavicle reviewed personally today showing previously identified medial clavicle fracture  There is no significant displacement  Procedure    None    Assessment/Plan:   80 y  o female  Right clavicle fracture   Being managed non operatively in a sling  She may be weight-bearing as tolerated right upper extremity  Her sling is to be worn for comfort only  Continue occupational and physical therapy at the rehab center as tolerated    Follow up in 6 weeks

## 2020-04-13 DIAGNOSIS — S42.017D CLOSED NONDISPLACED FRACTURE OF STERNAL END OF RIGHT CLAVICLE WITH ROUTINE HEALING, SUBSEQUENT ENCOUNTER: Primary | ICD-10-CM

## 2020-07-17 ENCOUNTER — IN-CLINIC DEVICE VISIT (OUTPATIENT)
Dept: CARDIOLOGY CLINIC | Facility: CLINIC | Age: 85
End: 2020-07-17
Payer: MEDICARE

## 2020-07-17 DIAGNOSIS — Z95.0 PACEMAKER: Primary | ICD-10-CM

## 2020-07-17 PROCEDURE — 93280 PM DEVICE PROGR EVAL DUAL: CPT | Performed by: INTERNAL MEDICINE

## 2020-07-17 NOTE — PROGRESS NOTES
Results for orders placed or performed in visit on 07/17/20   Cardiac EP device report    Narrative    BIOT DUAL CHAMBER  PPM (DDD MODE)/ ACTIVE SYSTEM IS MRI CONDITIONAL  DEVICE INTERROGATED IN THE [place] OFFICE  BATTERY ADEQUATE (55%)  AP 0%;  100% (CHB/DEPENDENT/DDD 50)  ALL LEAD PARAMETERS WITHIN NORMAL LIMITS  NO EPISODES  NO PROGRAMMING CHANGES MADE TO DEVICE PARAMETERS  NORMAL DEVICE FUNCTION   PL

## 2020-08-03 ENCOUNTER — HOSPITAL ENCOUNTER (OUTPATIENT)
Dept: NON INVASIVE DIAGNOSTICS | Facility: CLINIC | Age: 85
Discharge: HOME | End: 2020-08-03
Payer: MEDICARE

## 2020-08-03 ENCOUNTER — TRANSCRIBE ORDERS (OUTPATIENT)
Dept: LAB | Facility: CLINIC | Age: 85
End: 2020-08-03

## 2020-08-03 DIAGNOSIS — Z95.2 S/P TAVR (TRANSCATHETER AORTIC VALVE REPLACEMENT): ICD-10-CM

## 2020-08-03 DIAGNOSIS — I25.10 CORONARY ARTERY DISEASE INVOLVING NATIVE CORONARY ARTERY OF NATIVE HEART WITHOUT ANGINA PECTORIS: ICD-10-CM

## 2020-08-03 PROCEDURE — 93306 TTE W/DOPPLER COMPLETE: CPT

## 2020-08-03 PROCEDURE — 93306 TTE W/DOPPLER COMPLETE: CPT | Performed by: INTERNAL MEDICINE

## 2020-08-14 ENCOUNTER — OFFICE VISIT (OUTPATIENT)
Dept: CARDIOLOGY CLINIC | Facility: CLINIC | Age: 85
End: 2020-08-14
Payer: MEDICARE

## 2020-08-14 VITALS — TEMPERATURE: 97.1 F | DIASTOLIC BLOOD PRESSURE: 52 MMHG | HEART RATE: 68 BPM | SYSTOLIC BLOOD PRESSURE: 140 MMHG

## 2020-08-14 DIAGNOSIS — Z95.5 STATUS POST INSERTION OF DRUG-ELUTING STENT INTO LEFT ANTERIOR DESCENDING (LAD) ARTERY: ICD-10-CM

## 2020-08-14 DIAGNOSIS — Z95.2 S/P TAVR (TRANSCATHETER AORTIC VALVE REPLACEMENT): ICD-10-CM

## 2020-08-14 DIAGNOSIS — I25.10 CORONARY ARTERY DISEASE INVOLVING NATIVE CORONARY ARTERY OF NATIVE HEART WITHOUT ANGINA PECTORIS: Primary | ICD-10-CM

## 2020-08-14 PROCEDURE — 99214 OFFICE O/P EST MOD 30 MIN: CPT | Performed by: INTERNAL MEDICINE

## 2020-08-14 NOTE — PROGRESS NOTES
Cardiology Follow Up Visit    Ashlie January Day  1935  [id number]  [facility] [address] [id number]  [phone]  [phone]    1  Coronary artery disease involving native coronary artery of native heart without angina pectoris, lad pci pre tavr 2016 with myron  Echo complete with contrast if indicated    Lipid Panel with Direct LDL reflex   2  Status post insertion of drug-eluting stent into left anterior descending (LAD) artery, pre tavr 2016  Echo complete with contrast if indicated   3  S/P TAVR (transcatheter aortic valve replacement), #23 Cox S3  Echo complete with contrast if indicated         Discussion/Summary:    1  Status post TAVR, 23 mm Cox Madeline S3 valve 2016, echocardiogram August 2019, mild aortic valve regurgitation, no stenosis  2  History of complete heart block permanent pacemaker February 2015  3  CAD, LAD PCI 2016, coronary artery disease, stable, asymptomatic  4  Essential hypertension, overall controlled  5  Hyperlipidemia, on secondary prevention statin    Plan: Pt doing well clinically  No complaints  Venous insuff edema on lasix, instructed can take extra dose if edema worse, legs feel heavier  Otherwise echo with good device function and normal LVEF  Lipid panel ordered  Encouraged low salt/sensible diet, daily activity as much as possible  Stable CV status  Interval History:    30-year-old female here follow-up status post TAVR 2016, lad PCI with lesion LAD found pre TAVR catheterization underwent drug-eluting stent placement    Frequent mechanical falls and ambulatory dysfunction  Most recently February 2020  Echocardiogram August 2020 with mean TAVR gradient 13 mmHg, well-seated valve  Permanent pacemaker with complete heart block 2015, dual chamber permanent pacemaker placement  Venous insuff edema worse h/o broken right lower leg with remote femur fx         Patient Active Problem List   Diagnosis  Nonrheumatic aortic valve stenosis    Coronary artery disease involving native coronary artery    Complete atrioventricular block (HCC)    S/P TAVR (transcatheter aortic valve replacement)    Age-related cognitive decline    Frequent falls    Ambulatory dysfunction    Vision impairment    Constipation    Incontinence in female    Closed displaced intertrochanteric fracture of right femur ([facility]    Diabetes mellitus ([facility] )    History of cardiac pacemaker    [facility]    Acute blood loss as cause of postoperative anemia    Chronic diastolic congestive heart failure ([facility])    Hyperlipidemia    Hypothyroidism    Encephalopathy    Status post insertion of drug-eluting stent into left anterior descending (LAD) artery    Fall    Closed fracture of sternal end of right clavicle    Hypertension     Past Medical History:   Diagnosis Date    Anemia     Resolved 3/1/2017     Arthritis     Knee - Resolved 3/1/2017     Blind     CAD (coronary artery disease)     s/p HERNAN 6/2016    Calcaneal spur     CHF (congestive heart failure) ([facility])     Chronic knee instability     Resolved 3/1/2017     Chronic pain syndrome     Resolved 3/1/2017     Dementia ([facility] )     Last assessed 11/1/2017     Diabetes mellitus ([facility] )     non-insulin depdenent    Disease of thyroid gland     Diverticulitis     Essential thrombocytopenia ([facility])     Last assessed 11/1/2017     GERD (gastroesophageal reflux disease)     History of aortic valve replacement     Resolved 3/1/2017     History of bilateral knee replacement     Resolved 3/1/2017     History of TIA (transient ischemic attack)     no residual deficits    Hyperlipidemia     Hypertension     Hypoxia     on home O2 QHS    Leukocytosis     Resolved 3/1/2017     Lumbar post-laminectomy syndrome     Resolved 3/1/2017     Meniere disease     Osteopenia     Pacemaker     [facility]ron[facility] in 2/2015    S/P TAVR (transcatheter aortic valve replacement)     Resolved 3/1/2017     Severe aortic stenosis     Thrombocytosis ([facility] )     Resolved 4/5/2017     Type 2 diabetes mellitus ([facility] )     Last assessed 11/1/2017      Social History     Socioeconomic History    Marital status:       Spouse name: Not on file    Number of children: Not on file    Years of education: Not on file    Highest education level: Not on file   Occupational History    Not on file   Social Needs    Financial resource strain: Not on file    Food insecurity     Worry: Not on file     Inability: Not on file    Transportation needs     Medical: Not on file     Non-medical: Not on file   Tobacco Use    Smoking status: Never Smoker    Smokeless tobacco: Never Used   Substance and Sexual Activity    Alcohol use: Not Currently     Frequency: Never     Drinks per session: Patient refused     Binge frequency: Never    Drug use: No    Sexual activity: Not on file   Lifestyle    Physical activity     Days per week: Not on file     Minutes per session: Not on file    Stress: Not on file   Relationships    Social connections     Talks on phone: Not on file     Gets together: Not on file     Attends [employer] service: Not on file     Active member of club or organization: Not on file     Attends meetings of clubs or organizations: Not on file     Relationship status: Not on file    Intimate partner violence     Fear of current or ex partner: Not on file     Emotionally abused: Not on file     Physically abused: Not on file     Forced sexual activity: Not on file   Other Topics Concern    Not on file   Social History Narrative    Lives in an independent group home       Family History   Problem Relation Age of Onset    Cancer Child     Coronary artery disease Father         Native artery     Stroke Family     Osteopenia Family     Other Family         Pituitary disease    Polycythemia Family      Past Surgical History:   Procedure Laterality Date    APPENDECTOMY  BACK SURGERY      Arthrodesis     CARDIAC PACEMAKER PLACEMENT      CHOLECYSTECTOMY      EYE SURGERY      FRACTURE SURGERY Right 01/02/2018    femur    HYSTERECTOMY      CT EGD TRANSORAL BIOPSY SINGLE/MULTIPLE N/A 11/9/2016    Procedure: ESOPHAGOGASTRODUODENOSCOPY (EGD); Surgeon: Heather Tony MD;  Location: [facility]; Service: Gastroenterology    CT OPEN RX FEMUR FX+INTRAMED GARRETT Right 1/2/2018    Procedure: INSERTION NAIL IM FEMUR ANTEGRADE (TROCHANTERIC) RIGHT;  Surgeon: Holly Rodríguez MD;  Location: [facility];  Service: Orthopedics    CT REPLACE AORTIC VALVE OPENFEMORAL ARTERY APPROACH N/A 7/26/2016    Procedure: TRANSFEMORAL TAVR WITH 23MM LAROSE LILY S3 VALVE; JOSE ALFREDO ;  Surgeon: Issa Mcgowan DO;  [facility];  Service: Cardiac Surgery    SMALL INTESTINE SURGERY      TONSILLECTOMY      TOTAL KNEE ARTHROPLASTY      VARICOSE VEIN SURGERY      VENTRAL HERNIA REPAIR         Current Outpatient Medications:     acetaminophen (TYLENOL) 325 mg tablet, Take 2 tablets (650 mg total) by mouth every 6 (six) hours as needed for mild pain , Disp: 30 tablet, Rfl: 0    amLODIPine (NORVASC) 5 mg tablet, 5 mg 2 (two) times a day , Disp: , Rfl:     amoxicillin (AMOXIL) 500 mg capsule, Take 2,000 mg by mouth as needed (PRIOR TO DENTAL APPOINTMENTS), Disp: , Rfl:     aspirin 81 mg chewable tablet, Chew 81 mg daily  , Disp: , Rfl:     atorvastatin (LIPITOR) 10 mg tablet, , Disp: , Rfl:     diclofenac sodium (VOLTAREN) 1 %, Apply 2 g topically 4 (four) times a day as needed, Disp: , Rfl:     ferrous sulfate 325 (65 Fe) mg tablet, Take 325 mg by mouth daily with breakfast, Disp: , Rfl:     furosemide (LASIX) 40 mg tablet, Take 1 tablet by mouth daily, Disp: , Rfl: 0    insulin glargine (LANTUS) 100 units/mL subcutaneous injection, Inject 12 Units under the skin daily at bedtime, Disp: 10 mL, Rfl: 0    ketoconazole (NIZORAL) 2 % cream, daily , Disp: , Rfl:     levothyroxine 50 mcg tablet, Take 50 mcg by mouth daily, Disp: , Rfl:     loperamide (IMODIUM A-D) 2 MG tablet, Take 2 mg by mouth 3 (three) times a day as needed for diarrhea, Disp: , Rfl:     metoprolol tartrate (LOPRESSOR) 50 mg tablet, Take 1 tablet (50 mg total) by mouth 2 (two) times a day , Disp: 60 tablet, Rfl: 2    Multiple Vitamins-Minerals (OCUVITE ADULT 50+ PO), Take by mouth daily, Disp: , Rfl:     nystatin (MYCOSTATIN) powder, Apply topically 2 (two) times a day, Disp: , Rfl:     potassium chloride (K-DUR,KLOR-CON) 20 mEq tablet, Take 20 mEq by mouth daily, Disp: , Rfl:     tetrahydrozoline (VISINE) 0 05 % ophthalmic solution, 1 drop 4 (four) times a day as needed for irritation, Disp: , Rfl:     insulin lispro (HumaLOG) 100 units/mL injection, Inject 1-5 Units under the skin daily at bedtime, Disp: , Rfl: 0    insulin lispro (HumaLOG) 100 units/mL injection, Inject 1-6 Units under the skin 3 (three) times a day before meals, Disp: , Rfl: 0    oxyCODONE (ROXICODONE) 5 mg immediate release tablet, Take 0 5 tablets (2 5 mg total) by mouth every 4 (four) hours as needed for moderate pain or severe painMax Daily Amount: 15 mg, Disp: 3 tablet, Rfl: 0  Allergies   Allergen Reactions    Advil [Ibuprofen] GI Intolerance         Social, Family, Medication history reviewed and updated as necessary      Labs:     Lab Results   Component Value Date     12/01/2015    K 3 7 02/11/2020     02/11/2020    CO2 28 02/11/2020    BUN 18 02/11/2020    CREATININE 0 84 02/11/2020    CREATININE 0 84 02/10/2020    GLUCOSE 134 07/26/2016    CALCIUM 9 4 02/11/2020       Lab Results   Component Value Date    WBC 12 10 (H) 02/11/2020    HGB 12 1 02/11/2020    HGB 13 7 02/10/2020    HCT 36 7 02/11/2020    HCT 41 9 02/10/2020     02/11/2020     02/11/2020       Lab Results   Component Value Date    CHOL 195 07/09/2015    CHOL 163 02/20/2015     Lab Results   Component Value Date    HDL 55 08/01/2017    HDL 34 (L) 06/09/2016 Lab Results   Component Value Date    LDLCALC 44 08/01/2017    LDLCALC 105 (H) 06/09/2016     No results found for: LDLDIRECT          Lab Results   Component Value Date    TRIG 117 08/01/2017    TRIG 103 06/09/2016       Lab Results   Component Value Date    ALT 26 02/10/2020    ALT 26 01/02/2018    AST 38 02/10/2020    AST 25 01/02/2018       Lab Results   Component Value Date    INR 0 94 02/10/2020    INR 1 04 01/02/2018       Lab Results   Component Value Date    NTBNP 535 (H) 07/21/2016       Lab Results   Component Value Date    HGBA1C 6 6 (H) 02/11/2020    HGBA1C 6 3 01/02/2018    HGBA1C 6 3 06/15/2017           Imaging: Reviewed in epic        Review of Systems:  14 systems reviewed and negative with exception of the above        PHYSICAL EXAM:      Vitals:    08/14/20 1027   BP: 140/52   Pulse: 68   Temp: (!) 97 1 °F (36 2 °C)     There is no height or weight on file to calculate BMI  Weight (last 2 days)     Date/Time   Weight    08/14/20 1027   --    Weight: unavailable - in wheelchair at 08/14/20 1027                Gen: No acute distress  HEENT: anicteric, mucous membranes moist  Neck: supple, no jugular venous distention, or carotid bruit  Heart: regular, normal s1 and s2, 2/6 randal  Lungs :clear to auscultation bilaterally, no rales/rhonchi or wheeze  Abdomen: soft nontender, normoactive bowel sounds, no organomegaly  Ext: warm and perfused, normal femoral pulses, 1-2+ edema worse right leg, or clubbing  Skin: warm, no rashes  Neuro: AAO x 3, no focal findings  Psychiatric: normal affect  Musculoskeletal: no obvious joint deformities

## 2020-11-11 ENCOUNTER — IN-CLINIC DEVICE VISIT (OUTPATIENT)
Dept: CARDIOLOGY CLINIC | Facility: CLINIC | Age: 85
End: 2020-11-11
Payer: MEDICARE

## 2020-11-11 DIAGNOSIS — Z95.0 CARDIAC PACEMAKER IN SITU: Primary | ICD-10-CM

## 2020-11-11 PROCEDURE — 93288 INTERROG EVL PM/LDLS PM IP: CPT | Performed by: INTERNAL MEDICINE

## 2020-12-01 ENCOUNTER — IN-CLINIC DEVICE VISIT (OUTPATIENT)
Dept: CARDIOLOGY CLINIC | Facility: CLINIC | Age: 85
End: 2020-12-01

## 2020-12-01 DIAGNOSIS — Z95.0 CARDIAC PACEMAKER IN SITU: Primary | ICD-10-CM

## 2020-12-01 PROCEDURE — RECHECK: Performed by: INTERNAL MEDICINE

## 2021-02-12 DIAGNOSIS — Z23 ENCOUNTER FOR IMMUNIZATION: ICD-10-CM

## 2021-03-01 ENCOUNTER — APPOINTMENT (EMERGENCY)
Dept: RADIOLOGY | Facility: HOSPITAL | Age: 86
End: 2021-03-01
Payer: MEDICARE

## 2021-03-01 ENCOUNTER — HOSPITAL ENCOUNTER (EMERGENCY)
Facility: HOSPITAL | Age: 86
Discharge: HOME | End: 2021-03-01
Attending: EMERGENCY MEDICINE | Admitting: EMERGENCY MEDICINE
Payer: MEDICARE

## 2021-03-01 VITALS
TEMPERATURE: 98.4 F | SYSTOLIC BLOOD PRESSURE: 168 MMHG | HEART RATE: 76 BPM | DIASTOLIC BLOOD PRESSURE: 69 MMHG | OXYGEN SATURATION: 96 % | RESPIRATION RATE: 16 BRPM

## 2021-03-01 DIAGNOSIS — I87.2 VENOUS INSUFFICIENCY: ICD-10-CM

## 2021-03-01 DIAGNOSIS — R60.0 BILATERAL LOWER EXTREMITY EDEMA: Primary | ICD-10-CM

## 2021-03-01 LAB
ALBUMIN SERPL BCP-MCNC: 3.8 G/DL (ref 3.5–5)
ALP SERPL-CCNC: 114 U/L (ref 46–116)
ALT SERPL W P-5'-P-CCNC: 33 U/L (ref 12–78)
ANION GAP SERPL CALCULATED.3IONS-SCNC: 8 MMOL/L (ref 4–13)
AST SERPL W P-5'-P-CCNC: 18 U/L (ref 5–45)
BASOPHILS # BLD AUTO: 0.09 THOUSANDS/ΜL (ref 0–0.1)
BASOPHILS NFR BLD AUTO: 1 % (ref 0–1)
BILIRUB SERPL-MCNC: 0.29 MG/DL (ref 0.2–1)
BUN SERPL-MCNC: 20 MG/DL (ref 5–25)
CALCIUM SERPL-MCNC: 9.8 MG/DL (ref 8.3–10.1)
CHLORIDE SERPL-SCNC: 101 MMOL/L (ref 100–108)
CO2 SERPL-SCNC: 30 MMOL/L (ref 21–32)
CREAT SERPL-MCNC: 1.01 MG/DL (ref 0.6–1.3)
EOSINOPHIL # BLD AUTO: 0.45 THOUSAND/ΜL (ref 0–0.61)
EOSINOPHIL NFR BLD AUTO: 4 % (ref 0–6)
ERYTHROCYTE [DISTWIDTH] IN BLOOD BY AUTOMATED COUNT: 13.2 % (ref 11.6–15.1)
GFR SERPL CREATININE-BSD FRML MDRD: 51 ML/MIN/1.73SQ M
GLUCOSE SERPL-MCNC: 125 MG/DL (ref 65–140)
HCT VFR BLD AUTO: 41 % (ref 34.8–46.1)
HGB BLD-MCNC: 12.9 G/DL (ref 11.5–15.4)
IMM GRANULOCYTES # BLD AUTO: 0.05 THOUSAND/UL (ref 0–0.2)
IMM GRANULOCYTES NFR BLD AUTO: 0 % (ref 0–2)
LYMPHOCYTES # BLD AUTO: 1.96 THOUSANDS/ΜL (ref 0.6–4.47)
LYMPHOCYTES NFR BLD AUTO: 17 % (ref 14–44)
MCH RBC QN AUTO: 29.9 PG (ref 26.8–34.3)
MCHC RBC AUTO-ENTMCNC: 31.5 G/DL (ref 31.4–37.4)
MCV RBC AUTO: 95 FL (ref 82–98)
MONOCYTES # BLD AUTO: 1.09 THOUSAND/ΜL (ref 0.17–1.22)
MONOCYTES NFR BLD AUTO: 9 % (ref 4–12)
NEUTROPHILS # BLD AUTO: 8.18 THOUSANDS/ΜL (ref 1.85–7.62)
NEUTS SEG NFR BLD AUTO: 69 % (ref 43–75)
NRBC BLD AUTO-RTO: 0 /100 WBCS
NT-PROBNP SERPL-MCNC: 668 PG/ML
PLATELET # BLD AUTO: 369 THOUSANDS/UL (ref 149–390)
PMV BLD AUTO: 9.7 FL (ref 8.9–12.7)
POTASSIUM SERPL-SCNC: 4 MMOL/L (ref 3.5–5.3)
PROT SERPL-MCNC: 7.9 G/DL (ref 6.4–8.2)
RBC # BLD AUTO: 4.32 MILLION/UL (ref 3.81–5.12)
SODIUM SERPL-SCNC: 139 MMOL/L (ref 136–145)
TROPONIN I SERPL-MCNC: <0.02 NG/ML
WBC # BLD AUTO: 11.82 THOUSAND/UL (ref 4.31–10.16)

## 2021-03-01 PROCEDURE — 36415 COLL VENOUS BLD VENIPUNCTURE: CPT

## 2021-03-01 PROCEDURE — 99285 EMERGENCY DEPT VISIT HI MDM: CPT | Performed by: EMERGENCY MEDICINE

## 2021-03-01 PROCEDURE — 84484 ASSAY OF TROPONIN QUANT: CPT | Performed by: EMERGENCY MEDICINE

## 2021-03-01 PROCEDURE — 85025 COMPLETE CBC W/AUTO DIFF WBC: CPT | Performed by: EMERGENCY MEDICINE

## 2021-03-01 PROCEDURE — 80053 COMPREHEN METABOLIC PANEL: CPT | Performed by: EMERGENCY MEDICINE

## 2021-03-01 PROCEDURE — 71045 X-RAY EXAM CHEST 1 VIEW: CPT

## 2021-03-01 PROCEDURE — 99284 EMERGENCY DEPT VISIT MOD MDM: CPT

## 2021-03-01 PROCEDURE — 93005 ELECTROCARDIOGRAM TRACING: CPT

## 2021-03-01 PROCEDURE — 83880 ASSAY OF NATRIURETIC PEPTIDE: CPT | Performed by: EMERGENCY MEDICINE

## 2021-03-01 PROCEDURE — 96374 THER/PROPH/DIAG INJ IV PUSH: CPT

## 2021-03-01 RX ORDER — FUROSEMIDE 10 MG/ML
40 INJECTION INTRAMUSCULAR; INTRAVENOUS ONCE
Status: COMPLETED | OUTPATIENT
Start: 2021-03-01 | End: 2021-03-01

## 2021-03-01 RX ORDER — BUMETANIDE 1 MG/1
2 TABLET ORAL 2 TIMES DAILY
Qty: 56 TABLET | Refills: 0 | Status: SHIPPED | OUTPATIENT
Start: 2021-03-01 | End: 2021-08-22 | Stop reason: HOSPADM

## 2021-03-01 RX ADMIN — FUROSEMIDE 40 MG: 10 INJECTION, SOLUTION INTRAVENOUS at 20:34

## 2021-03-02 LAB
ATRIAL RATE: 78 BPM
P AXIS: 54 DEGREES
PR INTERVAL: 186 MS
QRS AXIS: -57 DEGREES
QRSD INTERVAL: 192 MS
QT INTERVAL: 468 MS
QTC INTERVAL: 520 MS
T WAVE AXIS: 79 DEGREES
VENTRICULAR RATE: 74 BPM

## 2021-03-02 PROCEDURE — 93010 ELECTROCARDIOGRAM REPORT: CPT | Performed by: INTERNAL MEDICINE

## 2021-03-02 NOTE — ED PROVIDER NOTES
History  Chief Complaint   Patient presents with    Foot Swelling     pt reprots swollen legs for 3 weeks  reports she often has swelling but it never lasts this long     This is a 80year old female patient with PMH of CAD s/p HERNAN, s/p TAVR, complete heart block s/p pacemake, hypothyroidism, HTN, DM, presented with worsening legt swelling for the past 3 weeks  She also stated she had gained weight about 5 pounds recently  She denied SOB, MARTIN, CP, or trauma to the leg  She has documented venous insufficiency in the past according to chart review  Her PCP has increased the dose of lasix from 40 mg once a day, to twice a day now at 80mg twice a day still of no help  Leg elevation also did not help with the swelling  She stated her legs are swollen when she wakes up in the morning  History provided by:  Patient and significant other      Prior to Admission Medications   Prescriptions Last Dose Informant Patient Reported? Taking? Multiple Vitamins-Minerals (OCUVITE ADULT 50+ PO)  Self Yes No   Sig: Take by mouth daily   acetaminophen (TYLENOL) 325 mg tablet  Self No No   Sig: Take 2 tablets (650 mg total) by mouth every 6 (six) hours as needed for mild pain  amLODIPine (NORVASC) 5 mg tablet  Self Yes No   Si mg 2 (two) times a day    amoxicillin (AMOXIL) 500 mg capsule  Self Yes No   Sig: Take 2,000 mg by mouth as needed (PRIOR TO DENTAL APPOINTMENTS)   aspirin 81 mg chewable tablet  Self Yes No   Sig: Chew 81 mg daily     atorvastatin (LIPITOR) 10 mg tablet  Self Yes No   diclofenac sodium (VOLTAREN) 1 %  Self Yes No   Sig: Apply 2 g topically 4 (four) times a day as needed   ferrous sulfate 325 (65 Fe) mg tablet  Self Yes No   Sig: Take 325 mg by mouth daily with breakfast   insulin glargine (LANTUS) 100 units/mL subcutaneous injection  Self No No   Sig: Inject 12 Units under the skin daily at bedtime   insulin lispro (HumaLOG) 100 units/mL injection  Self No No   Sig: Inject 1-5 Units under the skin daily at bedtime   insulin lispro (HumaLOG) 100 units/mL injection  Self No No   Sig: Inject 1-6 Units under the skin 3 (three) times a day before meals   ketoconazole (NIZORAL) 2 % cream  Self Yes No   Sig: daily    levothyroxine 50 mcg tablet  Self Yes No   Sig: Take 50 mcg by mouth daily   loperamide (IMODIUM A-D) 2 MG tablet  Self Yes No   Sig: Take 2 mg by mouth 3 (three) times a day as needed for diarrhea   metoprolol tartrate (LOPRESSOR) 50 mg tablet  Self No No   Sig: Take 1 tablet (50 mg total) by mouth 2 (two) times a day     nystatin (MYCOSTATIN) powder  Self Yes No   Sig: Apply topically 2 (two) times a day   oxyCODONE (ROXICODONE) 5 mg immediate release tablet  Self No No   Sig: Take 0 5 tablets (2 5 mg total) by mouth every 4 (four) hours as needed for moderate pain or severe painMax Daily Amount: 15 mg   potassium chloride (K-DUR,KLOR-CON) 20 mEq tablet  Self Yes No   Sig: Take 20 mEq by mouth daily   tetrahydrozoline (VISINE) 0 05 % ophthalmic solution  Self Yes No   Si drop 4 (four) times a day as needed for irritation      Facility-Administered Medications: None       Past Medical History:   Diagnosis Date    Anemia     Resolved 3/1/2017     Arthritis     Knee - Resolved 3/1/2017     Blind     CAD (coronary artery disease)     s/p HERNAN 2016    Calcaneal spur     [facility]est[facility] failure) [facility]     Chronic knee instability     Resolved 3/1/2017     Chronic pain syndrome     Resolved 3/1/2017     Dementia ([facility] [address] )     Last assessed 2017     Diabetes mellitus ([facility] [address] )     non-insulin depdenent    Disease of thyroid gland     Diverticulitis     Essential thrombocytopenia ([facility] [address] )     Last assessed 2017     GERD (gastroesophageal reflux disease)     History of aortic valve replacement     Resolved 3/1/2017     History of bilateral knee replacement     Resolved 3/1/2017     History of TIA (transient ischemic attack)     no residual deficits    Hyperlipidemia     Hypertension  Hypoxia     on home O2 QHS    Leukocytosis     Resolved 3/1/2017     Lumbar post-laminectomy syndrome     Resolved 3/1/2017     Meniere disease     Osteopenia     Pacemaker     [facility] in 2/2015    S/P TAVR (transcatheter aortic valve replacement)     Resolved 3/1/2017     Severe aortic stenosis     Thrombocytosis [facility] [address] [facility]     Resolved 4/5/2017     Type 2 diabetes mellitus ([facility] [address] )     Last assessed 11/1/2017        Past Surgical History:   Procedure Laterality Date    APPENDECTOMY      BACK SURGERY      Arthrodesis     CARDIAC PACEMAKER PLACEMENT      CHOLECYSTECTOMY      EYE SURGERY      FRACTURE SURGERY Right 01/02/2018    femur    HYSTERECTOMY      CA EGD TRANSORAL BIOPSY SINGLE/MULTIPLE N/A 11/9/2016    Procedure: ESOPHAGOGASTRODUODENOSCOPY (EGD); Surgeon: Leticia Malik MD;  Location: [facility]; Service: Gastroenterology    CA OPEN RX FEMUR FX+INTRAMED GARRETT Right 1/2/2018    Procedure: INSERTION NAIL IM FEMUR ANTEGRADE (TROCHANTERIC) RIGHT;  Surgeon: Elen Campos MD;  [facility];  Service: Orthopedics    CA REPLACE AORTIC VALVE OPENFEMORAL ARTERY APPROACH N/A 7/26/2016    Procedure: TRANSFEMORAL TAVR WITH 23MM LAROSE LILY S3 VALVE; JOSE ALFREDO ;  Surgeon: Stana Kussmaul, DO;  [facility];  Service: Cardiac Surgery    SMALL INTESTINE SURGERY      TONSILLECTOMY      TOTAL KNEE ARTHROPLASTY      VARICOSE VEIN SURGERY      VENTRAL HERNIA REPAIR         Family History   Problem Relation Age of Onset    Cancer Child     Coronary artery disease Father         Native artery     Stroke Family     Osteopenia Family     Other Family         Pituitary disease    Polycythemia Family      I have reviewed and agree with the history as documented      E-Cigarette/Vaping    E-Cigarette Use Never User      E-Cigarette/Vaping Substances     Social History     Tobacco Use    Smoking status: Never Smoker    Smokeless tobacco: Never Used   Substance Use Topics    Alcohol use: Not Currently     Frequency: Never     Drinks per session: Patient refused     Binge frequency: Never    Drug use: No        Review of Systems   Constitutional: Negative for chills and fever  HENT: Negative for ear pain and sore throat  Eyes: Negative for pain and visual disturbance  Respiratory: Negative for cough and shortness of breath  Cardiovascular: Positive for leg swelling  Negative for chest pain and palpitations  Gastrointestinal: Negative for abdominal pain and vomiting  Genitourinary: Negative for dysuria and hematuria  Musculoskeletal: Negative for arthralgias and back pain  Skin: Negative for color change and rash  Neurological: Negative for seizures and syncope  All other systems reviewed and are negative  Physical Exam  ED Triage Vitals   Temperature Pulse Respirations Blood Pressure SpO2   03/01/21 1637 03/01/21 1636 03/01/21 1636 03/01/21 1636 03/01/21 1636   98 4 °F (36 9 °C) 76 16 168/69 96 %      Temp Source Heart Rate Source Patient Position - Orthostatic VS BP Location FiO2 (%)   03/01/21 1637 03/01/21 1636 03/01/21 1636 03/01/21 1636 --   Oral Monitor Sitting Left arm       Pain Score       03/01/21 1636       2             Orthostatic Vital Signs  Vitals:    03/01/21 1636   BP: 168/69   Pulse: 76   Patient Position - Orthostatic VS: Sitting       Physical Exam  Vitals signs and nursing note reviewed  Constitutional:       General: She is not in acute distress  Appearance: She is well-developed  She is obese  HENT:      Head: Normocephalic and atraumatic  Eyes:      Conjunctiva/sclera: Conjunctivae normal    Neck:      Musculoskeletal: Neck supple  Cardiovascular:      Rate and Rhythm: Normal rate and regular rhythm  Heart sounds: No murmur  Comments: JVD +  Pulmonary:      Effort: Pulmonary effort is normal  No respiratory distress  Breath sounds: Normal breath sounds  Abdominal:      Palpations: Abdomen is soft        Tenderness: There is no abdominal tenderness  There is no guarding or rebound  Musculoskeletal:      Right lower leg: Edema (upto thigh bilaterally) present  Left lower leg: Edema present  Skin:     General: Skin is warm and dry  Coloration: Skin is not jaundiced  Findings: No lesion or rash  Neurological:      Mental Status: She is alert           ED Medications  Medications   furosemide (LASIX) injection 40 mg (has no administration in time range)       Diagnostic Studies  Results Reviewed     Procedure Component Value Units Date/Time    Comprehensive metabolic panel [[id number]] Collected: 03/01/21 1628    Lab Status: Final result Specimen: Blood from Arm, Left Updated: 03/01/21 1719     Sodium 139 mmol/L      Potassium 4 0 mmol/L      Chloride 101 mmol/L      CO2 30 mmol/L      ANION GAP 8 mmol/L      BUN 20 mg/dL      Creatinine 1 01 mg/dL      Glucose 125 mg/dL      Calcium 9 8 mg/dL      AST 18 U/L      ALT 33 U/L      Alkaline Phosphatase 114 U/L      Total Protein 7 9 g/dL      Albumin 3 8 g/dL      Total Bilirubin 0 29 mg/dL      eGFR 51 ml/min/1 73sq m     Narrative:      Mikhail guidelines for Chronic Kidney Disease (CKD):     Stage 1 with normal or high GFR (GFR > 90 mL/min/1 73 square meters)    Stage 2 Mild CKD (GFR = 60-89 mL/min/1 73 square meters)    Stage 3A Moderate CKD (GFR = 45-59 mL/min/1 73 square meters)    Stage 3B Moderate CKD (GFR = 30-44 mL/min/1 73 square meters)    Stage 4 Severe CKD (GFR = 15-29 mL/min/1 73 square meters)    Stage 5 End Stage CKD (GFR <15 mL/min/1 73 square meters)  Note: GFR calculation is accurate only with a steady state creatinine    NT-BNP PRO [[id number]]  (Abnormal) Collected: 03/01/21 1628    Lab Status: Final result Specimen: Blood from Arm, Left Updated: 03/01/21 1719     NT-proBNP 668 pg/mL     Troponin I [[id number]]  (Normal) Collected: 03/01/21 1628    Lab Status: Final result Specimen: Blood from Arm, Left Updated: 03/01/21 1704     Troponin I <0 02 ng/mL     CBC and differential [[id number]]  (Abnormal) Collected: 03/01/21 1628    Lab Status: Final result Specimen: Blood from Arm, Left Updated: 03/01/21 1643     WBC 11 82 Thousand/uL      RBC 4 32 Million/uL      Hemoglobin 12 9 g/dL      Hematocrit 41 0 %      MCV 95 fL      MCH 29 9 pg      MCHC 31 5 g/dL      RDW 13 2 %      MPV 9 7 fL      Platelets 981 Thousands/uL      nRBC 0 /100 WBCs      Neutrophils Relative 69 %      Immat GRANS % 0 %      Lymphocytes Relative 17 %      Monocytes Relative 9 %      Eosinophils Relative 4 %      Basophils Relative 1 %      Neutrophils Absolute 8 18 Thousands/µL      Immature Grans Absolute 0 05 Thousand/uL      Lymphocytes Absolute 1 96 Thousands/µL      Monocytes Absolute 1 09 Thousand/µL      Eosinophils Absolute 0 45 Thousand/µL      Basophils Absolute 0 09 Thousands/µL                  XR chest 1 view portable    (Results Pending)         Procedures  Procedures      ED Course                             SBIRT 22yo+      Most Recent Value   SBIRT (24 yo +)   In order to provide better care to our patients, we are screening all of our patients for alcohol and drug use  Would it be okay to ask you these screening questions? Unable to answer at this time Filed at: 03/01/2021 2002                [facility]  Number of Diagnoses or Management Options  Bilateral lower extremity edema:   Venous insufficiency:   Diagnosis management comments: Bilateral leg swelling: CBC, CMP, are unremarkable  Troponin is negative  BNP is elevated  CXR does not show apparent pulmonary edema or pleural effusion  IV lasix 40 mg  Will start Bumex 2mg BID upon discharge  Refer the patient to vascular surgery  Patient is medically stable to be discharged         Amount and/or Complexity of Data Reviewed  Clinical lab tests: ordered and reviewed  Discussion of test results with the performing providers: yes  Decide to obtain previous medical records or to obtain history from someone other than the patient: yes  Obtain history from someone other than the patient: yes  Review and summarize past medical records: yes  Discuss the patient with other providers: yes  Independent visualization of images, tracings, or specimens: yes    Risk of Complications, Morbidity, and/or Mortality  Presenting problems: low  Diagnostic procedures: low  Management options: low    Patient Progress  Patient progress: stable      Disposition  Final diagnoses:   Bilateral lower extremity edema   Venous insufficiency     Time reflects when diagnosis was documented in both MDM as applicable and the Disposition within this note     Time User Action Codes Description Comment    3/1/2021  8:19 PM Sami Valenzuela Add [R60 0] Bilateral lower extremity edema     3/1/2021  8:19 PM Sami Valenzuela Add [I87 2] Venous insufficiency       ED Disposition     ED Disposition Condition Date/Time Comment    Discharge Stable Mon Mar 1, 2021  8:31 PM Rosie Mac Day discharge to home/self care  Follow-up Information     Follow up With Specialties Details Why Contact Info Additional Information    Vin Anderson MD Internal Medicine In 1 week  [address] [facility] [address]  [phone]       [facility] Vascular Surgery In 1 week  [address] [id number] [phone] [address], [id number] [phone]          Patient's Medications   Discharge Prescriptions    BUMETANIDE (BUMEX) 1 MG TABLET    Take 2 tablets (2 mg total) by mouth 2 (two) times a day for 14 days       Start Date: 3/1/2021  End Date: 3/15/2021       Order Dose: 2 mg       Quantity: 56 tablet    Refills: 0         PDMP Review       Value Time User    PDMP Reviewed  Yes 2/14/2020 11:42 AM Lizbeth Dubois           ED Provider  Attending physically available and evaluated Rosie Mac Day   I managed the patient along with the ED Attending      Electronically Signed by         Sarah Garcia MD  03/01/21 2030

## 2021-03-08 NOTE — ED ATTENDING ATTESTATION
3/1/2021  IGloria MD, saw and evaluated the patient  I have discussed the patient with the resident/non-physician practitioner and agree with the resident's/non-physician practitioner's findings, Plan of Care, and MDM as documented in the resident's/non-physician practitioner's note, except where noted  All available labs and Radiology studies were reviewed  I was present for key portions of any procedure(s) performed by the resident/non-physician practitioner and I was immediately available to provide assistance  At this point I agree with the current assessment done in the Emergency Department  I have conducted an independent evaluation of this patient a history and physical is as follows:    ED Course     81 yo female with h/o DM, CAD s/p stent, CHF, dementia, pacemaker, s/p TAVR resident of nursing facility p/w worsening bilateral symmetric LE swelling not improved by escalating doses of PO lasix  Pt without h/o cp or SOB, f/c/n/v/d  On exam pt with intact distal pulses, soft compartments, pitting edema to mid thigh with intact skin  No signs of infection  Pt without vol overload on CXR or exam   Creatinine intact  Discussed in real time with PCP who agrees to trial of Bumex and continuation of compression stockings  Plan discussed with patient and family who feel comfortable with discharge and close follow up with PCP          Past Medical History:   Diagnosis Date    Anemia     Resolved 3/1/2017     Arthritis     Knee - Resolved 3/1/2017     Blind     CAD (coronary artery disease)     s/p HERNAN 6/2016    Calcaneal spur     CHF (congestive heart failure) (HCC)     Chronic knee instability     Resolved 3/1/2017     Chronic pain syndrome     Resolved 3/1/2017     Dementia ([address] )     Last assessed 11/1/2017     Diabetes mellitus ([address] )     non-insulin depdenent    Disease of thyroid gland     Diverticulitis     Essential thrombocytopenia ([address] )     Last assessed 11/1/2017     GERD (gastroesophageal reflux disease)     History of aortic valve replacement     Resolved 3/1/2017     History of bilateral knee replacement     Resolved 3/1/2017     History of TIA (transient ischemic attack)     no residual deficits    Hyperlipidemia     Hypertension     Hypoxia     on home O2 QHS    Leukocytosis     Resolved 3/1/2017     Lumbar post-laminectomy syndrome     Resolved 3/1/2017     Meniere disease     Osteopenia     [facility]    S/P TAVR (transcatheter aortic valve replacement)     Resolved 3/1/2017     Severe aortic stenosis     Thrombocytosis ([address] )     Resolved 4/5/2017     Type 2 diabetes mellitus ([facility] [address] )     Last assessed 11/1/2017          Critical Care Time  Procedures

## 2021-03-11 ENCOUNTER — TRANSCRIBE ORDERS (OUTPATIENT)
Dept: ADMINISTRATIVE | Facility: HOSPITAL | Age: 86
End: 2021-03-11

## 2021-03-11 DIAGNOSIS — Z12.31 SCREENING MAMMOGRAM FOR HIGH-RISK PATIENT: Primary | ICD-10-CM

## 2021-03-24 ENCOUNTER — OFFICE VISIT (OUTPATIENT)
Dept: VASCULAR SURGERY | Facility: CLINIC | Age: 86
End: 2021-03-24
Payer: MEDICARE

## 2021-03-24 VITALS — DIASTOLIC BLOOD PRESSURE: 72 MMHG | SYSTOLIC BLOOD PRESSURE: 168 MMHG | HEART RATE: 77 BPM

## 2021-03-24 DIAGNOSIS — R60.0 BILATERAL LOWER EXTREMITY EDEMA: ICD-10-CM

## 2021-03-24 DIAGNOSIS — I87.2 VENOUS INSUFFICIENCY: ICD-10-CM

## 2021-03-24 PROCEDURE — 99204 OFFICE O/P NEW MOD 45 MIN: CPT | Performed by: PHYSICIAN ASSISTANT

## 2021-03-24 RX ORDER — FUROSEMIDE 40 MG/1
TABLET ORAL
Status: ON HOLD | COMMUNITY
Start: 2021-03-13 | End: 2021-08-22 | Stop reason: SDUPTHER

## 2021-03-24 NOTE — PROGRESS NOTES
Assessment/Plan:    Marked bilateral lower extremity edema  History of venous insufficiency  Underlying cardiac disease    - sudden, 8 weeks of marked increased LE edema  - refractory to compression and increased diuretics  - ?no increased SOB  - sedentary  - hx vv stripping  - seen in ED on 3/1/21 for edema         R  L  Thigh 54cm 54cm   Calf  47 5cm 45cm  Ankle 29cm 31cm      Discussion:  We had a detailed discussion regarding her history, symptoms and plan of care  Etiology of marked edema is uncertain  She has a cardiac history but denies increased shortness breath  She also has known venous disease  Review of her labs show renal function is stable  Her echo from last year showed normal systolic function  Given her age, recommend conservative measures and education to help her a better manage lower extremity edema  Agree with Ace wrap compression to be applied daily  She would benefit from lymphedema clinic therapy if it can be coordinated  At the lymphedema clinic, they can help her get her legs to the point where she can take care of them and provide education and further recommendations  May consider lymphedema pumps  Recommend heart healthy diet and low-sodium  She can continue with diuretic therapy as directed  Would recommend Cardiology were primary care for diuretic management  I recommend regular follow up with cardiology to rule out cardiac etiology especially if edema does not respond or if she develops chest pain or shortness of breath        -Continue with compression, elevation, activity as tolerated and skin moisturizer    -Avoid soups and foods high in sodium  -Refer for physiotherapy with manual lymphatic drainage for therapeutic purposes   -Follow-up with Cardiology to rule out cardiac etiology  -Monitor legs for skin changes   -Legs were measured in the office today   -Check venous duplex for completeness to rule out DVT  -Follow up office visit in 6-8 weeks or sooner if needed  Diagnoses and all orders for this visit:    Bilateral lower extremity edema  -     Ambulatory referral to Vascular Surgery  -     Ambulatory referral to PT/OT lymphedema therapy; Future  -     VAS lower limb venous duplex study, complete bilateral; Future    Venous insufficiency  -     Ambulatory referral to Vascular Surgery  -     Ambulatory referral to PT/OT lymphedema therapy; Future  -     VAS lower limb venous duplex study, complete bilateral; Future    Other orders  -     furosemide (LASIX) 40 mg tablet        Subjective:      Patient ID: Kathy Prophet Day is a 80 y o  female  Patient is new to our office today  She is here for evaluation of BLE edema  Patient states she has burning pain in her legs from the knee down  She states her legs hurt when touched  The patient's legs are wrapped with ace wraps  She states her legs got to large for stockings  Patient takes ASA 81mg and Atorvastatin  HPI    Kathy Prophet Day 59-year-old female hypertension, hyperlipidemia, diabetes mellitus requiring insulin, permanent pacemaker, CAD s/p cor stents, s/p TAVR ('16), CHF who is referred for evaluation of bilateral lower extremity edema  Patient presents today accompanied by her daughter  She resides at a local nursing home  She is generally in the wheelchair but can use a walker  She reported with reveals a helpful couple of months ago when she suddenly developed marked lower extremity edema for which she presented to the [employer] for evaluation  Her workup was significant for mildly elevated proBNP but otherwise negative  She was placed on increased diuretic therapy on Bumex 40 BID (x 14 days) and referred to vascular surgery for evaluation  The patient gives a history of intermittent, chronic lower extremity edema but it normally resolves on own  Currently, she has marked edema which is not resolved  She has severe pedal and lower extremity edema with some edema into the thighs    At the center, they are using Ace wrap compression but the compression seems to have lost its elasticity  She alos has new, severe foot pain describing her feet are "on fire" which is likely due to increased edema since she does not normally have foot pain or neuropathy  She has no leg pain or calf pain  She denies increased shortness of breath, chest pain or trauma  She has a history of varicose veins and venous insufficiency and underwent vein stripping of the right leg at age 23, as well as some injection procedures over the years  Currently, she has no severe bulging varicose veins  I had a long discussion with patient and her daughter, assuming there is nothing serious underlying cause of her edema or cardiac concerned, we can proceed with conservative measures in ordered get her edema under control to the point where it can be adequately managed  The patient and her daughter agree with this approach and would like to avoid invasive procedures if possible  Patient education was provided  The following portions of the patient's history were reviewed and updated as appropriate: allergies, current medications, past family history, past medical history, past social history, past surgical history and problem list     Review of Systems   Constitutional: Negative  HENT: Negative  Eyes: Negative  Respiratory: Negative  Cardiovascular: Positive for leg swelling  Gastrointestinal: Negative  Endocrine: Negative  Genitourinary: Negative  Musculoskeletal: Negative  Leg pain   Skin: Negative  Allergic/Immunologic: Negative  Neurological: Negative  Hematological: Negative  Psychiatric/Behavioral: Positive for agitation  Objective:      /72 (BP Location: Right arm, Patient Position: Sitting)   Pulse 77             Marked Bilateral pedal and LE edema  Good AT/DP/PT signals bilaterally  Feet are warm and well-perfused          Physical Exam  Vitals signs and nursing note reviewed  Constitutional:       Appearance: She is well-developed  She is obese  Comments: Elderly female  Comes into the office in a wheelchair  Appears chronically, but not acutely ill  HENT:      Head: Normocephalic and atraumatic  Eyes:      General: Scleral icterus present  Pupils: Pupils are equal, round, and reactive to light  Neck:      Musculoskeletal: Neck supple  Thyroid: No thyromegaly  Vascular: Carotid bruit present  No JVD  Trachea: Trachea normal    Cardiovascular:      Rate and Rhythm: Normal rate and regular rhythm  Pulses:           Carotid pulses are 2+ on the right side and 2+ on the left side  Radial pulses are 2+ on the right side and 2+ on the left side  Heart sounds: Normal heart sounds, S1 normal and S2 normal  No murmur  No friction rub  No gallop  Comments:   Marked, bilateral lower extremity edema  Chronic stasis changes  No wounds      Pulmonary:      Effort: Pulmonary effort is normal  No accessory muscle usage or respiratory distress  Breath sounds: Normal breath sounds  No wheezing or rales  Abdominal:      General: Bowel sounds are normal  There is no distension  Palpations: Abdomen is soft  Tenderness: There is no abdominal tenderness  Comments: Obese; no flud   Musculoskeletal:         General: No deformity  Right lower leg: 3+ Edema present  Left lower le+ Edema present  Comments: Moves all extremities, but decreased lower extremity strength   Skin:     General: Skin is warm and dry  Findings: No lesion or rash  Nails: There is no clubbing  Neurological:      Mental Status: She is alert and oriented to person, place, and time  Comments: Grossly normal    Psychiatric:         Behavior: Behavior is cooperative  I have reviewed and made appropriate changes to the review of systems input by the medical assistant      Vitals:    21 1336 BP: 168/72   BP Location: Right arm   Patient Position: Sitting   Pulse: 77       Patient Active Problem List   Diagnosis    Nonrheumatic aortic valve stenosis    Coronary artery disease involving native coronary artery    Complete atrioventricular block (HCC)    S/P TAVR (transcatheter aortic valve replacement)    Age-related cognitive decline    Frequent falls    Ambulatory dysfunction    Vision impairment    Constipation    Incontinence in female    Closed displaced intertrochanteric fracture of right femur [facility]    Diabetes mellitus ([facility]    History of cardiac pacemaker    MAYELIN (acute kidney injury) ([facility]    Acute blood loss as cause of postoperative anemia    Chronic diastolic congestive heart failure (HCC)    Hyperlipidemia    Hypothyroidism    Encephalopathy    Status post insertion of drug-eluting stent into left anterior descending (LAD) artery    Fall    Closed fracture of sternal end of right clavicle    Hypertension       Past Surgical History:   Procedure Laterality Date    APPENDECTOMY      BACK SURGERY      Arthrodesis     CARDIAC PACEMAKER PLACEMENT      CHOLECYSTECTOMY      EYE SURGERY      FRACTURE SURGERY Right 01/02/2018    femur    HYSTERECTOMY      WI EGD TRANSORAL BIOPSY SINGLE/MULTIPLE N/A 11/9/2016    Procedure: ESOPHAGOGASTRODUODENOSCOPY (EGD); Surgeon: Selma Kelly MD;  [facility];   Service: Gastroenterology    WI OPEN RX FEMUR FX+INTRAMED GARRETT Right 1/2/2018    Procedure: INSERTION NAIL IM FEMUR ANTEGRADE (TROCHANTERIC) RIGHT;  Surgeon: Yesi Martinez MD;  [facility];  Service: Orthopedics    WI REPLACE AORTIC VALVE OPENFEMORAL ARTERY APPROACH N/A 7/26/2016    Procedure: TRANSFEMORAL TAVR WITH 23MM LAROSE LILY S3 VALVE; JOSE ALFREDO ;  Surgeon: Jesus Alvarez DO;  [facility];  Service: Cardiac Surgery    SMALL INTESTINE SURGERY      TONSILLECTOMY      TOTAL KNEE ARTHROPLASTY      VARICOSE VEIN SURGERY      VENTRAL HERNIA REPAIR         Family History   Problem Relation Age of Onset    Cancer Child     Coronary artery disease Father         Native artery     Stroke Family     Osteopenia Family     Other Family         Pituitary disease    Polycythemia Family        Social History     Socioeconomic History    Marital status:       Spouse name: Not on file    Number of children: Not on file    Years of education: Not on file    Highest education level: Not on file   Occupational History    Not on file   Social Needs    Financial resource strain: Not on file    Food insecurity     Worry: Not on file     Inability: Not on file    Transportation needs     Medical: Not on file     Non-medical: Not on file   Tobacco Use    Smoking status: Never Smoker    Smokeless tobacco: Never Used   Substance and Sexual Activity    Alcohol use: Not Currently     Frequency: Never     Drinks per session: Patient refused     Binge frequency: Never    Drug use: No    Sexual activity: Not on file   Lifestyle    Physical activity     Days per week: Not on file     Minutes per session: Not on file    Stress: Not on file   Relationships    Social connections     Talks on phone: Not on file     Gets together: Not on file     Attends Ca[employer]ism service: Not on file     Active member of club or organization: Not on file     Attends meetings of clubs or organizations: Not on file     Relationship status: Not on file    Intimate partner violence     Fear of current or ex partner: Not on file     Emotionally abused: Not on file     Physically abused: Not on file     Forced sexual activity: Not on file   Other Topics Concern    Not on file   Social History Narrative    Lives in an independent group home        Allergies   Allergen Reactions    Advil [Ibuprofen] GI Intolerance    Propoxyphene Other (See Comments)     DARVOCET=ACID REFLUX    Rofecoxib Other (See Comments)         Current Outpatient Medications:     acetaminophen (TYLENOL) 325 mg tablet, Take 2 tablets (650 mg total) by mouth every 6 (six) hours as needed for mild pain , Disp: 30 tablet, Rfl: 0    amLODIPine (NORVASC) 5 mg tablet, 5 mg 2 (two) times a day , Disp: , Rfl:     amoxicillin (AMOXIL) 500 mg capsule, Take 2,000 mg by mouth as needed (PRIOR TO DENTAL APPOINTMENTS), Disp: , Rfl:     aspirin 81 mg chewable tablet, Chew 81 mg daily  , Disp: , Rfl:     atorvastatin (LIPITOR) 10 mg tablet, , Disp: , Rfl:     diclofenac sodium (VOLTAREN) 1 %, Apply 2 g topically 4 (four) times a day as needed, Disp: , Rfl:     ferrous sulfate 325 (65 Fe) mg tablet, Take 325 mg by mouth daily with breakfast, Disp: , Rfl:     furosemide (LASIX) 40 mg tablet, , Disp: , Rfl:     insulin glargine (LANTUS) 100 units/mL subcutaneous injection, Inject 12 Units under the skin daily at bedtime, Disp: 10 mL, Rfl: 0    ketoconazole (NIZORAL) 2 % cream, daily , Disp: , Rfl:     levothyroxine 50 mcg tablet, Take 50 mcg by mouth daily, Disp: , Rfl:     loperamide (IMODIUM A-D) 2 MG tablet, Take 2 mg by mouth 3 (three) times a day as needed for diarrhea, Disp: , Rfl:     metoprolol tartrate (LOPRESSOR) 50 mg tablet, Take 1 tablet (50 mg total) by mouth 2 (two) times a day , Disp: 60 tablet, Rfl: 2    Multiple Vitamins-Minerals (OCUVITE ADULT 50+ PO), Take by mouth daily, Disp: , Rfl:     nystatin (MYCOSTATIN) powder, Apply topically 2 (two) times a day, Disp: , Rfl:     oxyCODONE (ROXICODONE) 5 mg immediate release tablet, Take 0 5 tablets (2 5 mg total) by mouth every 4 (four) hours as needed for moderate pain or severe painMax Daily Amount: 15 mg, Disp: 3 tablet, Rfl: 0    potassium chloride (K-DUR,KLOR-CON) 20 mEq tablet, Take 20 mEq by mouth daily, Disp: , Rfl:     tetrahydrozoline (VISINE) 0 05 % ophthalmic solution, 1 drop 4 (four) times a day as needed for irritation, Disp: , Rfl:     bumetanide (BUMEX) 1 mg tablet, Take 2 tablets (2 mg total) by mouth 2 (two) times a day for 14 days, Disp: 56 tablet, Rfl: 0

## 2021-03-24 NOTE — PATIENT INSTRUCTIONS
Marked bilateral lower extremity edema  History of venous insufficiency  Underlying cardiac disease      - sudden, 8 weeks of marked increased LE edema  - refractory to compression and increased diuretics  - ?no increased SOB  - sedentary   - hx vv stripping      -Continue with compression, elevation, activity as tolerated and skin moisturizer      -avoid soups and foods high in sodium    -refer for physiotherapy with manual lymphatic drainage for therapeutic purposes    -follow-up with Cardiology to rule out cardiac etiology    -monitor legs for skin changes    -check venous duplex for completeness to rule out DVT    -Follow up office visit in 6-8 weeks or sooner if needed  We had a detailed discussion regarding her history, symptoms and plan of care  Etiology of marked edema is uncertain  She has a cardiac history but denies increased shortness breath  She also has known venous disease  Review of her labs show renal function is stable  Her echo from last year showed normal systolic function  Given her age, recommend conservative measures and education to help her a better manage lower extremity edema  Agree with Ace wrap compression to be applied daily  She would benefit from lymphedema clinic therapy if it can be coordinated  At the lymphedema clinic, they can help her get her legs to the point where she can take care of them and provide education and further recommendations  May consider lymphedema pumps  Recommend heart healthy diet and low-sodium  She can continue with diuretic therapy as directed  Would recommend Cardiology were primary care for diuretic management  I recommend regular follow up with cardiology to rule out cardiac etiology especially if edema does not respond or if she develops chest pain or shortness of breath  Edema   WHAT YOU NEED TO KNOW:   Edema is swelling throughout your body  Edema is usually a sign that you are retaining fluid   The swelling may be caused by heart failure or kidney, thyroid, or liver disease  It may also be caused by medicines such as antidepressants, blood pressure medicines, or hormones  Sudden swelling around the lips or face may be a sign of a severe allergic reaction  Swelling of an arm or leg may be caused by blockage of your veins  DISCHARGE INSTRUCTIONS:   Return to the emergency department if:   · You have shortness of breath at rest, especially when you lie down  · You cough up pink, foamy sputum  · You have chest pain  · Your heartbeat is fast or uneven  Contact your healthcare provider if:   · The swollen area feels cold and is pale or blue in color  · The swollen area feels warm, painful, and is red in color  · You have increased swelling or swelling in other parts of your body  · You have questions or concerns about your condition or care  Medicines:   · Medicines  help to get rid of extra body fluid  · Take your medicine as directed  Contact your healthcare provider if you think your medicine is not helping or if you have side effects  Tell him or her if you are allergic to any medicine  Keep a list of the medicines, vitamins, and herbs you take  Include the amounts, and when and why you take them  Bring the list or the pill bottles to follow-up visits  Carry your medicine list with you in case of an emergency  Follow up with your healthcare provider as directed:  Write down your questions so you remember to ask them during your visits  Manage edema:   · Elevate  your arms or legs as directed  Raise them above the level of your heart as often as you can  This will help decrease swelling and pain  Prop them on pillows or blankets to keep them elevated comfortably  · Wear pressure stockings as directed  The stockings are tight and put pressure on your legs  This helps to keep fluid from collecting in your legs or ankles  · Limit your salt intake  Salt causes your body to hold water   Ask about any other changes to your diet  · Stay active  Do not stand or sit for long periods of time  Ask your healthcare provider about the best exercise plan for you  · Keep your skin moist  using lotion, cream, or ointment  Ask your healthcare provider what to use and how often to use it  © Copyright [address] Information is for End User's use only and may not be sold, redistributed or otherwise used for commercial purposes  All illustrations and images included in CareNotes® are the copyrighted property of A D A M , Inc  or [facility]   The above information is an  only  It is not intended as medical advice for individual conditions or treatments  Talk to your doctor, nurse or pharmacist before following any medical regimen to see if it is safe and effective for you

## 2021-04-01 ENCOUNTER — REMOTE DEVICE CLINIC VISIT (OUTPATIENT)
Dept: CARDIOLOGY CLINIC | Facility: CLINIC | Age: 86
End: 2021-04-01
Payer: MEDICARE

## 2021-04-01 DIAGNOSIS — Z95.0 PACEMAKER: Primary | ICD-10-CM

## 2021-04-01 PROCEDURE — 93294 REM INTERROG EVL PM/LDLS PM: CPT | Performed by: INTERNAL MEDICINE

## 2021-04-01 PROCEDURE — 93296 REM INTERROG EVL PM/IDS: CPT | Performed by: INTERNAL MEDICINE

## 2021-04-01 NOTE — PROGRESS NOTES
Results for orders placed or performed in visit on 04/01/21   Cardiac EP device report    Narrative    BIOT DUAL CHAMBER  PPM (DDD MODE)/ ACTIVE SYSTEM IS MRI CONDITIONAL  BIOTRONIK TRANSMISSION: BATTERY VOLTAGE ADEQUATE (~50% REMAINING LONGEVITY)  AP 0%  100% (DEPENDENT - DDD 50)  ALL AVAILABLE LEAD PARAMETERS WITHIN NORMAL LIMITS  NO SIGNIFICANT HIGH RATE EPISODES  PACEMAKER FUNCTIONING APPROPRIATELY     EBS

## 2021-04-08 ENCOUNTER — EVALUATION (OUTPATIENT)
Dept: PHYSICAL THERAPY | Age: 86
End: 2021-04-08
Payer: MEDICARE

## 2021-04-08 DIAGNOSIS — R60.0 BILATERAL LOWER EXTREMITY EDEMA: ICD-10-CM

## 2021-04-08 DIAGNOSIS — I89.0 LYMPHEDEMA: Primary | ICD-10-CM

## 2021-04-08 DIAGNOSIS — I87.2 VENOUS INSUFFICIENCY: ICD-10-CM

## 2021-04-08 PROCEDURE — 97162 PT EVAL MOD COMPLEX 30 MIN: CPT

## 2021-04-08 PROCEDURE — 97110 THERAPEUTIC EXERCISES: CPT

## 2021-04-09 NOTE — PROGRESS NOTES
PT Evaluation     Today's date: 2021  Patient name: Blaise Hutton  : 1935  MRN: [id number]  Referring provider: Nereida Pollack PA-C  Dx:   Encounter Diagnosis     ICD-10-CM    1  Lymphedema  I89 0    2  Bilateral lower extremity edema  R60 0 Ambulatory referral to PT/OT lymphedema therapy   3   Venous insufficiency  I87 2 Ambulatory referral to PT/OT lymphedema therapy                  Assessment/Plan    Subjective    Objective           Precautions: allergies: advil, rofecoxib, propoxyphene      Manuals 21             I eval            Mld massage and soft tissue mobilization                                       There exer              Le lymphedema exer packet, ap, ankle circles, hip ext/int rot, bent knee fall outs, , gs, adductor sets, laq, knee bounces 10 reps each                                                                                          Ther Ex                                                                                                                     Ther Activity                                       Gait Training                                       Modalities

## 2021-04-15 ENCOUNTER — HOSPITAL ENCOUNTER (OUTPATIENT)
Dept: NON INVASIVE DIAGNOSTICS | Facility: CLINIC | Age: 86
Discharge: HOME | End: 2021-04-15
Payer: MEDICARE

## 2021-04-15 DIAGNOSIS — I87.2 VENOUS INSUFFICIENCY: ICD-10-CM

## 2021-04-15 DIAGNOSIS — R60.0 BILATERAL LOWER EXTREMITY EDEMA: ICD-10-CM

## 2021-04-15 PROCEDURE — 93970 EXTREMITY STUDY: CPT | Performed by: SURGERY

## 2021-04-15 PROCEDURE — 93970 EXTREMITY STUDY: CPT

## 2021-04-19 ENCOUNTER — OFFICE VISIT (OUTPATIENT)
Dept: PHYSICAL THERAPY | Age: 86
End: 2021-04-19
Payer: MEDICARE

## 2021-04-19 DIAGNOSIS — I87.2 VENOUS INSUFFICIENCY: Primary | ICD-10-CM

## 2021-04-19 DIAGNOSIS — I89.0 LYMPHEDEMA: ICD-10-CM

## 2021-04-19 PROCEDURE — 97140 MANUAL THERAPY 1/> REGIONS: CPT

## 2021-04-19 PROCEDURE — 97110 THERAPEUTIC EXERCISES: CPT

## 2021-04-20 NOTE — PROGRESS NOTES
Daily Note     Today's date: 2021  Patient name: Kate Hutton  : 1935  MRN: [id number]  Referring provider: Nishant Cuevas PA-C  Dx:   Encounter Diagnosis     ICD-10-CM    1  Venous insufficiency  I87 2    2  Lymphedema  I89 0                   Subjective: noted decrease in bilateral le girth today  Pt reports she performs her exer daily  Objective: See treatment diary below      Assessment: Tolerated treatment well  Patient would benefit from continued PT      Plan: Continue per plan of care        Precautions: allergies: advil, rofecoxib, propoxyphene      Manuals 21            I eval            Mld massage and soft tissue mobilization  45 min man                                     There exer              Le lymphedema exer packet, ap, ankle circles, hip ext/int rot, bent knee fall outs, , gs, adductor sets, laq, knee bounces 10 reps each 10 reps while in PT                         Nu step  15 min r 2                                                               Ther Ex                                                                                                                     Ther Activity                                       Gait Training                                       Modalities

## 2021-04-23 ENCOUNTER — OFFICE VISIT (OUTPATIENT)
Dept: PHYSICAL THERAPY | Age: 86
End: 2021-04-23
Payer: MEDICARE

## 2021-04-23 DIAGNOSIS — I87.2 VENOUS INSUFFICIENCY: ICD-10-CM

## 2021-04-23 DIAGNOSIS — I89.0 LYMPHEDEMA: Primary | ICD-10-CM

## 2021-04-23 PROCEDURE — 97116 GAIT TRAINING THERAPY: CPT

## 2021-04-23 PROCEDURE — 97110 THERAPEUTIC EXERCISES: CPT

## 2021-04-23 PROCEDURE — 97140 MANUAL THERAPY 1/> REGIONS: CPT

## 2021-04-28 ENCOUNTER — TELEPHONE (OUTPATIENT)
Dept: VASCULAR SURGERY | Facility: CLINIC | Age: 86
End: 2021-04-28

## 2021-04-28 ENCOUNTER — OFFICE VISIT (OUTPATIENT)
Dept: PHYSICAL THERAPY | Age: 86
End: 2021-04-28
Payer: MEDICARE

## 2021-04-28 DIAGNOSIS — I89.0 LYMPHEDEMA: Primary | ICD-10-CM

## 2021-04-28 DIAGNOSIS — I89.0 LYMPHEDEMA OF BOTH LOWER EXTREMITIES: Primary | ICD-10-CM

## 2021-04-28 PROCEDURE — 97110 THERAPEUTIC EXERCISES: CPT

## 2021-04-28 PROCEDURE — 97140 MANUAL THERAPY 1/> REGIONS: CPT

## 2021-04-28 NOTE — TELEPHONE ENCOUNTER
Received call from Holly Melvin, lymphedema therapist, at [facility]  She said pt is ready for compression wraps and she wants to know if we can fax her an order so she can send it to [facility]  Per Holly Melvin, dx code needs to be I89 0 bilateral lower extremity lymphedema and the order needs to be for "Juxta Fit compression wraps below knee and for both feet"  She asked us to fax the rx to [phone]  Informed her I would send a message to Mirella to see if that is ok to order and then we can fax it over

## 2021-04-29 NOTE — PROGRESS NOTES
Daily Note     Today's date: 2021  Patient name: Roman Hodgkins Day  : 1935  MRN: [id number]  Referring provider: Eun Torres PA-C  Dx:   Encounter Diagnosis     ICD-10-CM    1  Lymphedema  I89 0                   Subjective: "my legs look much smaller "  PT calling Marcus PADILLA requesting juxta fit compression garments below the knee and both feet to be fitted by [facility]  Awaiting dr rosario  Objective: See treatment diary below      Assessment: Tolerated treatment well  Patient would benefit from continued PT      Plan: Continue per plan of care        Precautions: allergies: advil, rofecoxib, propoxyphene      Manuals 21          I eval            Mld massage and soft tissue mobilization  45 min man 45 min man 45 min man                                   There exer              Le lymphedema exer packet, ap, ankle circles, hip ext/int rot, bent knee fall outs, , gs, adductor sets, laq, knee bounces 10 reps each 10 reps while in PT                         Nu step  15 min r 2 15 min r 2          Laq, hip flexion, ankle pumps , w/c push ups  6 sets of 5    3 x 10         S/p transfer  W/c to mat    perf                                    Ther Ex                                                                                                                     Ther Activity                                       Gait Training             With rolling walker cg of 1   20 ft x 4                       Modalities

## 2021-04-30 ENCOUNTER — OFFICE VISIT (OUTPATIENT)
Dept: PHYSICAL THERAPY | Age: 86
End: 2021-04-30
Payer: MEDICARE

## 2021-04-30 DIAGNOSIS — I87.2 VENOUS INSUFFICIENCY: ICD-10-CM

## 2021-04-30 DIAGNOSIS — I89.0 LYMPHEDEMA: Primary | ICD-10-CM

## 2021-04-30 PROCEDURE — 97140 MANUAL THERAPY 1/> REGIONS: CPT

## 2021-04-30 PROCEDURE — 97110 THERAPEUTIC EXERCISES: CPT

## 2021-04-30 NOTE — PROGRESS NOTES
Daily Note     Today's date: 2021  Patient name: Paige Grant Day  : 1935  MRN: [id number]  Referring provider: Keshia Bragg PA-C  Dx:   Encounter Diagnosis     ICD-10-CM    1  Lymphedema  I89 0    2  Venous insufficiency  I87 2                   Subjective: no new c/o's      Objective: See treatment diary below      Assessment: Tolerated treatment well  Patient would benefit from continued PT      Plan: Continue per plan of care        Precautions: allergies: advil, rofecoxib, propoxyphene      Manuals 21         I eval            Mld massage and soft tissue mobilization  45 min man 45 min man 45 min man 45 min                                   There exer              Le lymphedema exer packet, ap, ankle circles, hip ext/int rot, bent knee fall outs, , gs, adductor sets, laq, knee bounces 10 reps each 10 reps while in PT                         Nu step  15 min r 2 15 min r 2  15 min r 3        Laq, hip flexion, ankle pumps , w/c push ups  6 sets of 5    3 x 10 3  X 10        S/p transfer  W/c to mat    perf                                    Ther Ex                                                                                                                     Ther Activity                                       Gait Training             With rolling walker cg of 1   20 ft x 4  20 ft x 4                     Modalities

## 2021-05-03 ENCOUNTER — APPOINTMENT (OUTPATIENT)
Dept: PHYSICAL THERAPY | Age: 86
End: 2021-05-03
Payer: MEDICARE

## 2021-05-06 ENCOUNTER — OFFICE VISIT (OUTPATIENT)
Dept: PHYSICAL THERAPY | Age: 86
End: 2021-05-06
Payer: MEDICARE

## 2021-05-06 DIAGNOSIS — I89.0 LYMPHEDEMA: Primary | ICD-10-CM

## 2021-05-06 PROCEDURE — 97110 THERAPEUTIC EXERCISES: CPT

## 2021-05-06 NOTE — PROGRESS NOTES
Daily Note     Today's date: 2021  Patient name: Victor Manuel Araujo Day  : 1935  MRN: [id number]  Referring provider: Radha Farmer PA-C  Dx:   Encounter Diagnosis     ICD-10-CM    1  Lymphedema  I89 0                   Subjective: pT bl/ LE's with decreased girth  Objective: See treatment diary below      Assessment: Tolerated treatment well  Patient would benefit from continued PT      Plan: Continue per plan of care        Precautions: allergies: advil, rofecoxib, propoxyphene      Manuals 21        I eval            Mld massage and soft tissue mobilization  45 min man 45 min man 45 min man 45 min                                   There exer              Le lymphedema exer packet, ap, ankle circles, hip ext/int rot, bent knee fall outs, , gs, adductor sets, laq, knee bounces 10 reps each 10 reps while in PT                         Nu step  15 min r 2 15 min r 2  15 min r 3        Laq, hip flexion, ankle pumps , w/c push ups  6 sets of 5    3 x 10 3  X 10        S/p transfer  W/c to mat    perf                                    Ther Ex                                                                                                                     Ther Activity                                       Gait Training             With rolling walker cg of 1   20 ft x 4  20 ft x 4                     Modalities

## 2021-05-10 ENCOUNTER — OFFICE VISIT (OUTPATIENT)
Dept: PHYSICAL THERAPY | Age: 86
End: 2021-05-10
Payer: MEDICARE

## 2021-05-10 DIAGNOSIS — I89.0 LYMPHEDEMA: Primary | ICD-10-CM

## 2021-05-10 DIAGNOSIS — I87.2 VENOUS INSUFFICIENCY: ICD-10-CM

## 2021-05-10 PROCEDURE — 97110 THERAPEUTIC EXERCISES: CPT

## 2021-05-10 PROCEDURE — 97140 MANUAL THERAPY 1/> REGIONS: CPT

## 2021-05-10 NOTE — PROGRESS NOTES
Daily Note     Today's date: 5/10/2021  Patient name: Dejon Hutton  : 1935  MRN: [id number]  Referring provider: Danika Ortiz PA-C  Dx:   Encounter Diagnosis     ICD-10-CM    1  Lymphedema  I89 0    2  Venous insufficiency  I87 2                   Subjective: Pt without ace wrap on legs  Pt will have nursing put on upon her return to [address] [place] [address]  Objective: See treatment diary below      Assessment: Tolerated treatment well  Patient would benefit from continued PT      Plan: Continue per plan of care        Precautions: allergies: advil, rofecoxib, propoxyphene      Manuals 4/8/21 4/19 4/23 4/28 4/30 5/6 5/10       I eval            Mld massage and soft tissue mobilization  45 min man 45 min man 45 min man 45 min   45 min man                                There exer              Le lymphedema exer packet, ap, ankle circles, hip ext/int rot, bent knee fall outs, , gs, adductor sets, laq, knee bounces 10 reps each 10 reps while in PT                         Nu step  15 min r 2 15 min r 2  15 min r 3  15 min r 3      Laq, hip flexion, ankle pumps , w/c push ups  6 sets of 5    3 x 10 3  X 10        S/p transfer  W/c to mat    perf                                    Ther Ex                                                                                                                     Ther Activity                                       Gait Training             With rolling walker cg of 1   20 ft x 4  20 ft x 4  30 ft x 2                   Modalities

## 2021-05-13 ENCOUNTER — OFFICE VISIT (OUTPATIENT)
Dept: PHYSICAL THERAPY | Age: 86
End: 2021-05-13
Payer: MEDICARE

## 2021-05-13 DIAGNOSIS — I87.2 VENOUS INSUFFICIENCY: ICD-10-CM

## 2021-05-13 DIAGNOSIS — I89.0 LYMPHEDEMA: Primary | ICD-10-CM

## 2021-05-13 PROCEDURE — 97116 GAIT TRAINING THERAPY: CPT

## 2021-05-13 PROCEDURE — 97110 THERAPEUTIC EXERCISES: CPT

## 2021-05-13 PROCEDURE — 97016 VASOPNEUMATIC DEVICE THERAPY: CPT

## 2021-05-13 NOTE — PROGRESS NOTES
Daily Note     Today's date: 2021  Patient name: Loretta Vann Day  : 1935  MRN: [id number]  Referring provider: Richard Martin PA-C  Dx:   Encounter Diagnosis     ICD-10-CM    1  Lymphedema  I89 0    2  Venous insufficiency  I87 2                   Subjective: "my legs are much smaller " Sowmya supervising PT   Objective: See treatment diary below      Assessment: Tolerated treatment well  Patient would benefit from continued PT      Plan: Continue per plan of care        Precautions: allergies: advil, rofecoxib, propoxyphene      Manuals 4/8/21 4/19 4/23 4/28 4/30 5/6 5/10 5/13      I eval            Mld massage and soft tissue mobilization  45 min man 45 min man 45 min man 45 min   45 min man                                There exer              Le lymphedema exer packet, ap, ankle circles, hip ext/int rot, bent knee fall outs, , gs, adductor sets, laq, knee bounces 10 reps each 10 reps while in PT                         Nu step  15 min r 2 15 min r 2  15 min r 3  15 min r 3 15 min r 3     Laq, hip flexion, ankle pumps , w/c push ups  6 sets of 5    3 x 10 3  X 10        S/p transfer  W/c to mat    perf                                    Ther Ex                                                                                                                     Ther Activity                                       Gait Training             With rolling walker cg of 1   20 ft x 4  20 ft x 4  30 ft x 2 30 ft x 2                  Modalities             30 mmHG with legs elevated trial compression pump         30 min

## 2021-05-17 ENCOUNTER — OFFICE VISIT (OUTPATIENT)
Dept: VASCULAR SURGERY | Facility: CLINIC | Age: 86
End: 2021-05-17
Payer: MEDICARE

## 2021-05-17 VITALS — DIASTOLIC BLOOD PRESSURE: 88 MMHG | SYSTOLIC BLOOD PRESSURE: 158 MMHG | TEMPERATURE: 99 F | HEART RATE: 78 BPM

## 2021-05-17 DIAGNOSIS — I89.0 SECONDARY LYMPHEDEMA: Primary | ICD-10-CM

## 2021-05-17 PROCEDURE — 99214 OFFICE O/P EST MOD 30 MIN: CPT | Performed by: PHYSICIAN ASSISTANT

## 2021-05-17 RX ORDER — IBUPROFEN 600 MG/1
TABLET ORAL
COMMUNITY
Start: 2021-04-19

## 2021-05-17 NOTE — PATIENT INSTRUCTIONS
Secondary lymphedema      -Compression garments daily  -Tubigrip and Ace wraps  -Continue with lymphedema therapy  -Consider lymphedema pumps  -Diuretics per PCP  -F/u 3 months or sooner if needed      Lymphedema   WHAT YOU NEED TO KNOW:   There is no cure for lymphedema  Treatment can relieve symptoms and prevent lymphedema from getting worse  DISCHARGE INSTRUCTIONS:   Contact your healthcare provider or lymphedema specialist if:   · You have a fever or chills  · You have an open area of skin that looks red or swollen, or drains pus  · Your symptoms, such as swelling or pain, get worse  · Your arms or legs feel heavy, or you cannot move them  · Your skin becomes hard, thick, or rough  · You have a skin wound that will not heal      · Your shoes, clothes, or jewelry feel tighter  · You have questions or concerns about your condition or care  Self-care:   · Elevate  your arm or leg above the level of your heart as often as you can  This will help decrease swelling and pain  Prop your arm or leg on pillows or blankets to keep it elevated comfortably  · Wear compression socks, sleeves, or bandages  as directed  Compression devices must be fitted by a healthcare provider  Compression devices may need to be replaced every 3 to 6 months  · Exercise  can help you maintain or regain function of your arm or leg  Ask your healthcare provider what type of exercise to do and how often to do it  Start slow, take breaks, and gradually do more each day  Do not do vigorous, repeated exercises  Watch for changes in your arm or leg during exercise  Stop and rest if you have swelling, increased pain, or heaviness  Elevate your arm or leg above the level of your heart  · Change your position often  to help move lymphatic fluid through your body  Do not sit or  one position for more than 30 minutes  Do not cross your legs when you sit  These actions can cause lymphatic fluid to buildup  · Maintain a healthy weight  Ask your healthcare provider what you should weigh  Weight loss may improve your symptoms  If you need to lose weight, your healthcare provider can help you create a weight loss program     Prevent infection with proper skin care:  A skin infection can make lymphedema worse  Do the following to decrease your risk for a skin infection in your arm or leg with lymphedema:  · Wash your skin gently and dry it well  Use a mild soap to wash your skin  Gently pat your skin dry after you bathe  Apply a mild cream or lotion to moisturize your skin and prevent dryness or cracking  Keep your feet clean and dry  · Protect your skin from injury  Wear gloves when you garden and wash dishes  Cut your nails straight across to prevent injury to your fingers and toes  Use sunscreen and insect repellant to avoid burns and punctures  Wear slippers in the house  Wear shoes when you go outside  · Check your skin every day for signs of infection  Signs of infection include redness, swelling, increased heat, or pus  You may also have a fever or chills  · Care for cuts, scratches or burns  Apply antibiotic ointment to cuts and other small breaks in the skin  Apply a cold pack or cold water to a burn for 15 minutes  Then wash it with soap and water  Cover scratches, cuts, or burns with a clean, dry gauze or bandage as directed  Keep the area clean and dry  · Tell healthcare providers that you have lymphedema  Tell them not to do, IVs, blood draws, and blood pressure readings in the arm or leg with lymphedema  If there is lymphedema in both arms, ask them to take your blood pressure on your leg  Do not allow flu shots or vaccinations in your arm with lymphedema  Follow up with your healthcare provider or lymphedema specialist as directed: You will need regular visits so healthcare providers can examine the affected areas   You may also be referred to a clinic that specializes in lymphedema treatment  Write down your questions so you remember to ask them during your visits  © Copyright [address] Information is for End User's use only and may not be sold, redistributed or otherwise used for commercial purposes  All illustrations and images included in CareNotes® are the copyrighted property of [employer] D [employer]  or [place]  The above information is an  only  It is not intended as medical advice for individual conditions or treatments  Talk to your doctor, nurse or pharmacist before following any medical regimen to see if it is safe and effective for you

## 2021-05-17 NOTE — PROGRESS NOTES
Assessment/Plan:    Secondary lymphedema  Marked bilateral lower extremity edema  History of venous insufficiency  Underlying cardiac disease    -overall doing better with double ACE wrap compression and lymphedema therapy  -currently on Lasix alternative 80/40  -trying to decrease Na  2+ ankle/ 1-2 + LE pitting (improved)    Second measurements  Thigh  57 cm 56 cm  Calf  40 cm 37 cm  Ankle  24 cm 24 cmk    Plan:  Overall improving with conservative measures for lower extremity edema  She has less pain and swelling now on compressive therapy, lymphedma therapy and diuretics  However there is still residual edema  Continue with compressive therapy  She has a CircAid type garment on order from lymphedema therapy  If she continues to have LE edema or refractory, we will add lymphedema pump therapy      -Compression garments daily  -Tubigrip and Ace wraps  -Continue with lymphedema therapy  -Consider lymphedema pumps  -Diuretics per PCP  -F/u 3 months or sooner if needed        Subjective:      Patient ID: Bertha Gusman is a 80 y o  female  Patient is here as a lymphedema f/u and has been doing lymphedema therapy  Patient states the therapy is really great and the swelling went down  Patient plans on getting wraps for the legs  HPI   Harleen Hutton 80-year-old female hypertension, hyperlipidemia, diabetes mellitus requiring insulin, permanent pacemaker, CAD s/p cor stents, s/p TAVR ('16), CHF who is referred for evaluation of bilateral lower extremity edema  She resides at a local nursing home  She is generally in the wheelchair but can use a walker  She reported with reveals a helpful couple of months ago when she suddenly developed marked lower extremity edema for which she presented to [facility] for evaluation  Her workup was significant for mildly elevated proBNP but otherwise negative    She was placed on increased diuretic therapy on Bumex 40 BID (x 14 days) and referred to vascular surgery for evaluation      The patient gives a history of intermittent, chronic lower extremity edema but it normally resolves on own  Currently, she has marked edema which is not resolved  She has severe pedal and lower extremity edema with some edema into the thighs  At the center, they are using Ace wrap compression but the compression seems to have lost its elasticity  She also has new, severe foot pain describing her feet are "on fire" which is likely due to increased edema since she does not normally have foot pain or neuropathy  She has no leg pain or calf pain  She denies increased shortness of breath, chest pain or trauma  She has a history of varicose veins and venous insufficiency and underwent vein stripping of the right leg at age 23, as well as some injection procedures over the years  Currently, she has no severe bulging varicose veins  I had a long discussion with patient and her daughter, assuming there is nothing serious underlying cause of her edema or cardiac concerned, we can proceed with conservative measures in ordered get her edema under control to the point where it can be adequately managed  The patient and her daughter agree with this approach and would like to avoid invasive procedures if possible  Patient education was provided  5/17/21:  Patient returns to the office accompanied by her daughter  She has been going to lymphedema clinic twice weekly, using double Ace wrap compression and on furosemide (per PCP) with good effect  She has much less edema in the legs and they feel better  She still occasionally has some abnormal sensations/ "fire" feeling but it is much less so  She tells me that she is fitted for a compression garment which is on order by the lymphedema clinic  She should be receiving the garment in the next week or 2  She still asks if she can have salt and eat soup which she knows she should not   We reviewed basic patient education for venous insufficiency/secondary lymphedema  We will continue with conservative measures  I discussed patient and her daughter that she has increased lower extremity edema the we should add lymphedema pump therapy  They are in agree with plan  The following portions of the patient's history were reviewed and updated as appropriate: allergies, current medications, past family history, past medical history, past social history, past surgical history and problem list     Review of Systems   Constitutional: Negative  HENT: Negative  Eyes: Negative  Respiratory: Negative  Cardiovascular: Positive for leg swelling (B/l)  Gastrointestinal: Negative  Endocrine: Negative  Genitourinary: Negative  Musculoskeletal: Negative  Skin: Negative  Allergic/Immunologic: Negative  Neurological: Negative  Hematological: Negative  Psychiatric/Behavioral: Negative  Objective:      /88 (BP Location: Right arm, Patient Position: Sitting, Cuff Size: Standard)   Pulse 78   Temp 99 °F (37 2 °C) (Tympanic)       Seated in wheelchair  Obese  2+ ankle edema  1-2+ LE edema; improved  Chronic venous changes  No open wounds  Physical Exam  Vitals signs and nursing note reviewed  Constitutional:       Appearance: She is well-developed  She is obese  Comments: Elderly female in wheelchair   HENT:      Head: Normocephalic and atraumatic  Eyes:      Pupils: Pupils are equal, round, and reactive to light  Neck:      Musculoskeletal: Neck supple  Thyroid: No thyromegaly  Vascular: No JVD  Trachea: Trachea normal    Cardiovascular:      Rate and Rhythm: Normal rate and regular rhythm  Pulses:           Carotid pulses are 2+ on the right side and 2+ on the left side  Radial pulses are 2+ on the right side and 2+ on the left side  Dorsalis pedis pulses are 2+ on the right side and 2+ on the left side        Heart sounds: Normal heart sounds, S1 normal and S2 normal  No murmur  No friction rub  No gallop  Pulmonary:      Effort: Pulmonary effort is normal  No accessory muscle usage or respiratory distress  Breath sounds: No wheezing or rales  Comments: Decreased BS    Resp: non-labored  Abdominal:      General: Bowel sounds are normal  There is no distension  Palpations: Abdomen is soft  Tenderness: There is no abdominal tenderness  Musculoskeletal: Normal range of motion  General: No deformity  Right lower leg: Edema present  Left lower leg: Edema present  Skin:     General: Skin is warm and dry  Findings: No lesion or rash  Nails: There is no clubbing  Neurological:      Mental Status: She is alert and oriented to person, place, and time  Comments: Grossly normal    Psychiatric:         Behavior: Behavior is cooperative  I have reviewed and made appropriate changes to the review of systems input by the medical assistant      Vitals:    05/17/21 1112   BP: 158/88   BP Location: Right arm   Patient Position: Sitting   Cuff Size: Standard   Pulse: 78   Temp: 99 °F (37 2 °C)   TempSrc: Tympanic       Patient Active Problem List   Diagnosis    Nonrheumatic aortic valve stenosis    Coronary artery disease involving native coronary artery    Complete atrioventricular block (HCC)    S/P TAVR (transcatheter aortic valve replacement)    Age-related cognitive decline    Frequent falls    Ambulatory dysfunction    Vision impairment    Constipation    Incontinence in female    Closed displaced intertrochanteric fracture of right femur (HCC)    Diabetes mellitus ([facility] )    History of cardiac pacemaker    MAYELIN (acute kidney injury) ([facility] )    Acute blood loss as cause of postoperative anemia    Chronic diastolic congestive heart failure (HCC)    Hyperlipidemia    Hypothyroidism    Encephalopathy    Status post insertion of drug-eluting stent into left anterior descending (LAD) artery    Fall  Closed fracture of sternal end of right clavicle    Hypertension       Past Surgical History:   Procedure Laterality Date    APPENDECTOMY      BACK SURGERY      Arthrodesis     CARDIAC PACEMAKER PLACEMENT      CHOLECYSTECTOMY      EYE SURGERY      FRACTURE SURGERY Right 01/02/2018    femur    HYSTERECTOMY      [facility] EGD TRANSORAL BIOPSY SINGLE/MULTIPLE N/A 11/9/2016    Procedure: ESOPHAGOGASTRODUODENOSCOPY (EGD); Surgeon: Lina Chiang MD;  [facility]; Service: Gastroenterology    VA OPEN RX FEMUR FX+INTRAMED GARRETT Right 1/2/2018    Procedure: INSERTION NAIL IM FEMUR ANTEGRADE (TROCHANTERIC) RIGHT;  Surgeon: Maria E Fox MD;  [facility];  Service: Orthopedics    VA REPLACE AORTIC VALVE OPENFEMORAL ARTERY APPROACH N/A 7/26/2016    Procedure: TRANSFEMORAL TAVR WITH 23MM LAROSE LILY S3 VALVE; JOSE ALFREDO ;  Surgeon: Ignacio Nielson DO;  [facility];  Service: Cardiac Surgery    SMALL INTESTINE SURGERY      TONSILLECTOMY      TOTAL KNEE ARTHROPLASTY      VARICOSE VEIN SURGERY      VENTRAL HERNIA REPAIR         Family History   Problem Relation Age of Onset    Cancer Child     Coronary artery disease Father         Native artery     Stroke Family     Osteopenia Family     Other Family         Pituitary disease    Polycythemia Family        Social History     Socioeconomic History    Marital status:       Spouse name: Not on file    Number of children: Not on file    Years of education: Not on file    Highest education level: Not on file   Occupational History    Not on file   Social Needs    Financial resource strain: Not on file    Food insecurity     Worry: Not on file     Inability: Not on file    Transportation needs     Medical: Not on file     Non-medical: Not on file   Tobacco Use    Smoking status: Never Smoker    Smokeless tobacco: Never Used   Substance and Sexual Activity    Alcohol use: Not Currently     Frequency: Never     Drinks per session: Patient refused     Binge frequency: Never    Drug use: No    Sexual activity: Not on file   Lifestyle    Physical activity     Days per week: Not on file     Minutes per session: Not on file    Stress: Not on file   Relationships    Social connections     Talks on phone: Not on file     Gets together: Not on file     Attends Christianity service: Not on file     Active member of club or organization: Not on file     Attends meetings of clubs or organizations: Not on file     Relationship status: Not on file    Intimate partner violence     Fear of current or ex partner: Not on file     Emotionally abused: Not on file     Physically abused: Not on file     Forced sexual activity: Not on file   Other Topics Concern    Not on file   Social History Narrative    Lives in an independent group home        Allergies   Allergen Reactions    Advil [Ibuprofen] GI Intolerance    Propoxyphene Other (See Comments)     DARVOCET=ACID REFLUX    Rofecoxib Other (See Comments)         Current Outpatient Medications:     acetaminophen (TYLENOL) 325 mg tablet, Take 2 tablets (650 mg total) by mouth every 6 (six) hours as needed for mild pain , Disp: 30 tablet, Rfl: 0    amLODIPine (NORVASC) 5 mg tablet, 5 mg 2 (two) times a day , Disp: , Rfl:     amoxicillin (AMOXIL) 500 mg capsule, Take 2,000 mg by mouth as needed (PRIOR TO DENTAL APPOINTMENTS), Disp: , Rfl:     atorvastatin (LIPITOR) 10 mg tablet, , Disp: , Rfl:     bumetanide (BUMEX) 1 mg tablet, Take 2 tablets (2 mg total) by mouth 2 (two) times a day for 14 days, Disp: 56 tablet, Rfl: 0    diclofenac sodium (VOLTAREN) 1 %, Apply 2 g topically 4 (four) times a day as needed, Disp: , Rfl:     ferrous sulfate 325 (65 Fe) mg tablet, Take 325 mg by mouth daily with breakfast, Disp: , Rfl:     furosemide (LASIX) 40 mg tablet, , Disp: , Rfl:     insulin glargine (LANTUS) 100 units/mL subcutaneous injection, Inject 12 Units under the skin daily at bedtime, Disp: 10 mL, Rfl: 0    ketoconazole (NIZORAL) 2 % cream, daily , Disp: , Rfl:     levothyroxine 50 mcg tablet, Take 50 mcg by mouth daily, Disp: , Rfl:     loperamide (IMODIUM A-D) 2 MG tablet, Take 2 mg by mouth 3 (three) times a day as needed for diarrhea, Disp: , Rfl:     metoprolol tartrate (LOPRESSOR) 50 mg tablet, Take 1 tablet (50 mg total) by mouth 2 (two) times a day , Disp: 60 tablet, Rfl: 2    potassium chloride (K-DUR,KLOR-CON) 20 mEq tablet, Take 20 mEq by mouth daily, Disp: , Rfl:     tetrahydrozoline (VISINE) 0 05 % ophthalmic solution, 1 drop 4 (four) times a day as needed for irritation, Disp: , Rfl:     aspirin 81 mg chewable tablet, Chew 81 mg daily  , Disp: , Rfl:     Glucagon, rDNA, (Glucagon Emergency) 1 MG KIT, , Disp: , Rfl:     Multiple Vitamins-Minerals (OCUVITE ADULT 50+ PO), Take by mouth daily, Disp: , Rfl:     nystatin (MYCOSTATIN) powder, Apply topically 2 (two) times a day, Disp: , Rfl:     oxyCODONE (ROXICODONE) 5 mg immediate release tablet, Take 0 5 tablets (2 5 mg total) by mouth every 4 (four) hours as needed for moderate pain or severe painMax Daily Amount: 15 mg (Patient not taking: Reported on 5/17/2021), Disp: 3 tablet, Rfl: 0

## 2021-05-21 ENCOUNTER — OFFICE VISIT (OUTPATIENT)
Dept: PHYSICAL THERAPY | Age: 86
End: 2021-05-21
Payer: MEDICARE

## 2021-05-21 DIAGNOSIS — I87.2 VENOUS INSUFFICIENCY: ICD-10-CM

## 2021-05-21 DIAGNOSIS — I89.0 LYMPHEDEMA: Primary | ICD-10-CM

## 2021-05-21 PROCEDURE — 97110 THERAPEUTIC EXERCISES: CPT

## 2021-05-21 PROCEDURE — 97140 MANUAL THERAPY 1/> REGIONS: CPT

## 2021-05-21 NOTE — PROGRESS NOTES
Daily Note     Today's date: 2021  Patient name: Chirag Sharp Day  : 1935  MRN: [id number]  Referring provider: Adal Feldman PA-C  Dx:   Encounter Diagnosis     ICD-10-CM    1  Lymphedema  I89 0    2  Venous insufficiency  I87 2                   Subjective: "I did get fitted for the compression wraps this week and now I am waiting for them "      Objective: See treatment diary below      Assessment: Tolerated treatment well  Patient would benefit from continued PT  Emphasis on le rom and strengthening and promotion of lymphatic activation       Plan: Continue per plan of care        Precautions: allergies: advil, rofecoxib, propoxyphene      Manuals 4/8/21 4/19 4/23 4/28 4/30 5/6 5/10 5/13 5/21     I eval            Mld massage and soft tissue mobilization  45 min man 45 min man 45 min man 45 min   45 min man      Girth measurements taken         Man 15 min                  There exer              Le lymphedema exer packet, ap, ankle circles, hip ext/int rot, bent knee fall outs, , gs, adductor sets, laq, knee bounces 10 reps each 10 reps while in PT        Ankle pumps, ankle circles, knee to chest aarom     Hip abd, add in supine          3 x 10    Nu step  15 min r 2 15 min r 2  15 min r 3  15 min r 3 15 min r 3     Laq, hip flexion, ankle pumps , w/c push ups  6 sets of 5    3 x 10 3  X 10    3 x 10 each    S/p transfer  W/c to mat    perf      perf     Sit to stand          2 x 10                 Ther Ex                                                                                                                     Ther Activity                                       Gait Training             With rolling walker cg of 1   20 ft x 4  20 ft x 4  30 ft x 2 30 ft x 2                  Modalities             30 mmHG with legs elevated trial compression pump         30 min

## 2021-05-24 ENCOUNTER — OFFICE VISIT (OUTPATIENT)
Dept: PHYSICAL THERAPY | Age: 86
End: 2021-05-24
Payer: MEDICARE

## 2021-05-24 DIAGNOSIS — I89.0 LYMPHEDEMA: Primary | ICD-10-CM

## 2021-05-24 PROCEDURE — 97110 THERAPEUTIC EXERCISES: CPT

## 2021-05-24 NOTE — PROGRESS NOTES
Daily Note     Today's date: 2021  Patient name: Kate Hutton  : 1935  MRN: [id number]  Referring provider: Nishant Cuevas PA-C  Dx:   Encounter Diagnosis     ICD-10-CM    1  Lymphedema  I89 0                   Subjective: Pts  Legs are decreasing in girth  Objective: See treatment diary below      Assessment: Tolerated treatment well  Patient would benefit from continued PT      Plan: Continue per plan of care        Precautions: allergies: advil, rofecoxib, propoxyphene      Manuals 4/8/21 4/19 4/23 4/28 4/30 5/6 5/10 5/13 5/21 5/24    I eval            Mld massage and soft tissue mobilization  45 min man 45 min man 45 min man 45 min   45 min man      Girth measurements taken         Man 15 min                  There exer              Le lymphedema exer packet, ap, ankle circles, hip ext/int rot, bent knee fall outs, , gs, adductor sets, laq, knee bounces 10 reps each 10 reps while in PT        Ankle pumps, ankle circles, knee to chest aarom     Hip abd, add in supine          3 x 10 30x   Nu step  15 min r 2 15 min r 2  15 min r 3  15 min r 3 15 min r 3  15 min   Laq, hip flexion, ankle pumps , w/c push ups  6 sets of 5    3 x 10 3  X 10    3 x 10 each # 30x   S/p transfer  W/c to mat    perf      perf  perf   Sit to stand          2 x 10 2 x 10                Ther Ex                                                                                                                     Ther Activity                                       Gait Training             With rolling walker cg of 1   20 ft x 4  20 ft x 4  30 ft x 2 30 ft x 2                  Modalities             30 mmHG with legs elevated trial compression pump         30 min

## 2021-05-27 ENCOUNTER — OFFICE VISIT (OUTPATIENT)
Dept: PHYSICAL THERAPY | Age: 86
End: 2021-05-27
Payer: MEDICARE

## 2021-05-27 DIAGNOSIS — I89.0 LYMPHEDEMA: Primary | ICD-10-CM

## 2021-05-27 PROCEDURE — 97110 THERAPEUTIC EXERCISES: CPT

## 2021-05-27 PROCEDURE — 97016 VASOPNEUMATIC DEVICE THERAPY: CPT

## 2021-05-27 NOTE — PROGRESS NOTES
Daily Note     Today's date: 2021  Patient name: Trevor Hutton  : 1935  MRN: [id number]  Referring provider: Jia Tarango PA-C  Dx:   Encounter Diagnosis     ICD-10-CM    1  Lymphedema  I89 0                   Subjective: Pt  Reports she is on diuretics and is up every two hours to use the bathroom and is tired  Needed to be given a bagel with cream cheese while at therapy as she did not get breakfast before coming  Objective: See treatment diary below      Assessment: Tolerated treatment well  Patient would benefit from continued PT      Plan: Continue per plan of care        Precautions: allergies: advil, rofecoxib, propoxyphene      Manuals 5/27 4/19 4/23 4/28 4/30 5/6 5/10 5/13 5/21 5/24                Mld massage and soft tissue mobilization  45 min man 39 min man 39 min man 39 min   39 min man      Girth measurements taken         Man 15 min                  There exer              Le lymphedema exer packet, ap, ankle circles, hip ext/int rot, bent knee fall outs, , gs, adductor sets, laq, knee bounces 10 reps each 10 reps while in PT        Ankle pumps, ankle circles, knee to chest aarom     Hip abd, add in supine          3 x 10 30x   Nu step 15 min 15 min r 2 15 min r 2  15 min r 3  15 min r 3 15 min r 3  15 min   Laq, hip flexion, ankle pumps , w/c push ups  6 sets of 5 30x5x   3 x 10 3  X 10    3 x 10 each # 30x   S/p transfer  W/c to mat    perf      perf  perf   Sit to stand          2 x 10 2 x 10                Ther Ex                                                                                                                     Ther Activity                                       Gait Training             With rolling walker cg of 1   20 ft x 4  20 ft x 4  30 ft x 2 30 ft x 2                  Modalities             30 mmHG with legs elevated trial compression pump         30 min

## 2021-06-03 ENCOUNTER — OFFICE VISIT (OUTPATIENT)
Dept: PHYSICAL THERAPY | Age: 86
End: 2021-06-03
Payer: MEDICARE

## 2021-06-03 DIAGNOSIS — I89.0 LYMPHEDEMA: Primary | ICD-10-CM

## 2021-06-03 DIAGNOSIS — I87.2 VENOUS INSUFFICIENCY: ICD-10-CM

## 2021-06-03 PROCEDURE — 97110 THERAPEUTIC EXERCISES: CPT

## 2021-06-04 ENCOUNTER — OFFICE VISIT (OUTPATIENT)
Dept: PHYSICAL THERAPY | Age: 86
End: 2021-06-04
Payer: MEDICARE

## 2021-06-04 DIAGNOSIS — I89.0 LYMPHEDEMA: Primary | ICD-10-CM

## 2021-06-04 DIAGNOSIS — I87.2 VENOUS INSUFFICIENCY: ICD-10-CM

## 2021-06-04 PROCEDURE — 97110 THERAPEUTIC EXERCISES: CPT

## 2021-06-04 PROCEDURE — 97140 MANUAL THERAPY 1/> REGIONS: CPT

## 2021-06-04 NOTE — PROGRESS NOTES
Daily Note     Today's date: 6/3/2021  Patient name: Rj Hutton  : 1935  MRN: [id number]  Referring provider: Brant Roberts PA-C  Dx:   Encounter Diagnosis     ICD-10-CM    1  Lymphedema  I89 0    2  Venous insufficiency  I87 2                   Subjective: "I get my leg wraps tomorrow "      Objective: See treatment diary below      Assessment: Tolerated treatment well  Patient would benefit from continued PT      Plan: Continue per plan of care        Precautions: allergies: advil, rofecoxib, propoxyphene      Manuals 5/27 6/3 4/23 4/28 4/30 5/6 5/10 5/13 5/21 5/24                Mld massage and soft tissue mobilization   45 min man 45 min man 45 min   39 min man      Girth measurements taken         Man 15 min                  There exer              Le lymphedema exer packet, ap, ankle circles, hip ext/int rot, bent knee fall outs, , gs, adductor sets, laq, knee bounces 10 reps each 10 reps while in PT        Ankle pumps, ankle circles, knee to chest aarom     Hip abd, add in supine          3 x 10 30x   Nu step 15 min 15 min r 3 15 min r 2  15 min r 3  15 min r 3 15 min r 3  15 min   Laq, hip flexion, ankle pumps , w/c push ups  6 sets of 5 30x5x 3 x  10  3 x 10 3  X 10    3 x 10 each # 30x   S/p transfer  W/c to mat    perf      perf  perf   Sit to stand          2 x 10 2 x 10                Ther Ex                                                                                                                     Ther Activity                                       Gait Training             With rolling walker cg of 1   20 ft x 4  20 ft x 4  30 ft x 2 30 ft x 2                  Modalities             30 mmHG with legs elevated trial compression pump         30 min

## 2021-06-05 NOTE — PROGRESS NOTES
Daily Note     Today's date: 2021  Patient name: Arcelia Adam Day  : 1935  MRN: [id number]  Referring provider: Robert Lopez PA-C  Dx:   Encounter Diagnosis     ICD-10-CM    1  Lymphedema  I89 0    2  Venous insufficiency  I87 2                   Subjective: Pt with good fit of juxta fit compression wraps to both le's   Objective: See treatment diary below      Assessment: Tolerated treatment well  Patient would benefit from continued PT      Plan: d/c of PT at this time set goals achieved       Precautions: allergies: advil, rofecoxib, propoxyphene      Manuals 5/27 6/3 6/4 4/28 4/30 5/6 5/10 5/13 5/21 5/24                Mld massage and soft tissue mobilization    45 min man 45 min   39 min man      Girth measurements taken   Man taken       Man 15 min                  There exer              Le lymphedema exer packet, ap, ankle circles, hip ext/int rot, bent knee fall outs, , gs, adductor sets, laq, knee bounces 10 reps each 10 reps while in PT  10 reps each      Ankle pumps, ankle circles, knee to chest aarom     Hip abd, add in supine          3 x 10 30x   Nu step 15 min 15 min r 3 15 min r 3  15 min r 3  15 min r 3 15 min r 3  15 min   Laq, hip flexion, ankle pumps , w/c push ups  6 sets of 5 30x5x 3 x  10 3 x 10 3 x 10 3  X 10    3 x 10 each # 30x   S/p transfer  W/c to mat    perf      perf  perf   Sit to stand          2 x 10 2 x 10                Ther Ex                                                                                                                     Ther Activity                                       Gait Training             With rolling walker cg of 1   20 ft x 2  20 ft x 4  30 ft x 2 30 ft x 2                  Modalities             30 mmHG with legs elevated trial compression pump         30 min

## 2021-06-08 ENCOUNTER — REMOTE DEVICE CLINIC VISIT (OUTPATIENT)
Dept: CARDIOLOGY CLINIC | Facility: CLINIC | Age: 86
End: 2021-06-08
Payer: MEDICARE

## 2021-06-08 DIAGNOSIS — Z95.0 CARDIAC PACEMAKER IN SITU: Primary | ICD-10-CM

## 2021-06-08 PROCEDURE — 93296 REM INTERROG EVL PM/IDS: CPT | Performed by: INTERNAL MEDICINE

## 2021-06-08 PROCEDURE — 93294 REM INTERROG EVL PM/LDLS PM: CPT | Performed by: INTERNAL MEDICINE

## 2021-06-08 NOTE — PROGRESS NOTES
Results for orders placed or performed in visit on 06/08/21   Cardiac EP device report    Narrative    BIOT DUAL CHAMBER  PPM (DDD MODE)/ ACTIVE SYSTEM IS MRI CONDITIONAL  BIOTRONIK TRANSMISSION: BATTERY STATUS "50% " AP 0%  100%  ALL AVAILABLE LEAD PARAMETERS WITHIN NORMAL LIMITS  NO SIGNIFICANT HIGH RATE EPISODES  NORMAL DEVICE FUNCTION  NC       Current Outpatient Medications:     acetaminophen (TYLENOL) 325 mg tablet, Take 2 tablets (650 mg total) by mouth every 6 (six) hours as needed for mild pain , Disp: 30 tablet, Rfl: 0    amLODIPine (NORVASC) 5 mg tablet, 5 mg 2 (two) times a day , Disp: , Rfl:     amoxicillin (AMOXIL) 500 mg capsule, Take 2,000 mg by mouth as needed (PRIOR TO DENTAL APPOINTMENTS), Disp: , Rfl:     aspirin 81 mg chewable tablet, Chew 81 mg daily  , Disp: , Rfl:     atorvastatin (LIPITOR) 10 mg tablet, , Disp: , Rfl:     bumetanide (BUMEX) 1 mg tablet, Take 2 tablets (2 mg total) by mouth 2 (two) times a day for 14 days, Disp: 56 tablet, Rfl: 0    diclofenac sodium (VOLTAREN) 1 %, Apply 2 g topically 4 (four) times a day as needed, Disp: , Rfl:     ferrous sulfate 325 (65 Fe) mg tablet, Take 325 mg by mouth daily with breakfast, Disp: , Rfl:     furosemide (LASIX) 40 mg tablet, , Disp: , Rfl:     Glucagon, rDNA, (Glucagon Emergency) 1 MG KIT, , Disp: , Rfl:     insulin glargine (LANTUS) 100 units/mL subcutaneous injection, Inject 12 Units under the skin daily at bedtime, Disp: 10 mL, Rfl: 0    ketoconazole (NIZORAL) 2 % cream, daily , Disp: , Rfl:     levothyroxine 50 mcg tablet, Take 50 mcg by mouth daily, Disp: , Rfl:     loperamide (IMODIUM A-D) 2 MG tablet, Take 2 mg by mouth 3 (three) times a day as needed for diarrhea, Disp: , Rfl:     metoprolol tartrate (LOPRESSOR) 50 mg tablet, Take 1 tablet (50 mg total) by mouth 2 (two) times a day , Disp: 60 tablet, Rfl: 2    Multiple Vitamins-Minerals (OCUVITE ADULT 50+ PO), Take by mouth daily, Disp: , Rfl:     nystatin (MYCOSTATIN) powder, Apply topically 2 (two) times a day, Disp: , Rfl:     oxyCODONE (ROXICODONE) 5 mg immediate release tablet, Take 0 5 tablets (2 5 mg total) by mouth every 4 (four) hours as needed for moderate pain or severe painMax Daily Amount: 15 mg (Patient not taking: Reported on 5/17/2021), Disp: 3 tablet, Rfl: 0    potassium chloride (K-DUR,KLOR-CON) 20 mEq tablet, Take 20 mEq by mouth daily, Disp: , Rfl:     tetrahydrozoline (VISINE) 0 05 % ophthalmic solution, 1 drop 4 (four) times a day as needed for irritation, Disp: , Rfl:

## 2021-07-16 ENCOUNTER — IN-CLINIC DEVICE VISIT (OUTPATIENT)
Dept: CARDIOLOGY CLINIC | Facility: CLINIC | Age: 86
End: 2021-07-16
Payer: MEDICARE

## 2021-07-16 DIAGNOSIS — Z95.0 PACEMAKER: Primary | ICD-10-CM

## 2021-07-16 PROCEDURE — 93280 PM DEVICE PROGR EVAL DUAL: CPT | Performed by: INTERNAL MEDICINE

## 2021-07-16 NOTE — PROGRESS NOTES
Results for orders placed or performed in visit on 07/16/21   Cardiac EP device report    Narrative    BIOT DUAL CHAMBER  PPM (DDD MODE)/ ACTIVE SYSTEM IS MRI CONDITIONAL  DEVICE INTERROGATED IN THE [facility] OFFICE/YEARLY  BATTERY ADEQUATE (50%/5 6 YRS)  AP 0%;  100% (DEPENDENT/CHB/DDD 50)  ALL LEAD PARAMETERS WITHIN NORMAL LIMITS  NO EPISODES  NO PROGRAMMING CHANGES MADE TO DEVICE PARAMETERS  NORMAL DEVICE FUNCTION   PL

## 2021-07-26 ENCOUNTER — HOSPITAL ENCOUNTER (OUTPATIENT)
Dept: NON INVASIVE DIAGNOSTICS | Facility: CLINIC | Age: 86
Discharge: HOME | End: 2021-07-26
Payer: MEDICARE

## 2021-07-26 DIAGNOSIS — I25.10 CORONARY ARTERY DISEASE INVOLVING NATIVE CORONARY ARTERY OF NATIVE HEART WITHOUT ANGINA PECTORIS: ICD-10-CM

## 2021-07-26 DIAGNOSIS — Z95.2 S/P TAVR (TRANSCATHETER AORTIC VALVE REPLACEMENT): ICD-10-CM

## 2021-07-26 DIAGNOSIS — Z95.5 STATUS POST INSERTION OF DRUG-ELUTING STENT INTO LEFT ANTERIOR DESCENDING (LAD) ARTERY: ICD-10-CM

## 2021-07-26 PROCEDURE — 93306 TTE W/DOPPLER COMPLETE: CPT | Performed by: INTERNAL MEDICINE

## 2021-07-26 PROCEDURE — 93306 TTE W/DOPPLER COMPLETE: CPT

## 2021-08-17 ENCOUNTER — HOSPITAL ENCOUNTER (INPATIENT)
Facility: HOSPITAL | Age: 86
LOS: 5 days | Discharge: NURSING, REHAB OR LONG-TERM CARE | DRG: 682 | End: 2021-08-22
Attending: EMERGENCY MEDICINE | Admitting: INTERNAL MEDICINE
Payer: MEDICARE

## 2021-08-17 ENCOUNTER — APPOINTMENT (EMERGENCY)
Dept: RADIOLOGY | Facility: HOSPITAL | Age: 86
DRG: 682 | End: 2021-08-17
Payer: MEDICARE

## 2021-08-17 DIAGNOSIS — R77.8 ELEVATED TROPONIN: ICD-10-CM

## 2021-08-17 DIAGNOSIS — S42.017D CLOSED NONDISPLACED FRACTURE OF STERNAL END OF RIGHT CLAVICLE WITH ROUTINE HEALING, SUBSEQUENT ENCOUNTER: ICD-10-CM

## 2021-08-17 DIAGNOSIS — N18.9 ACUTE KIDNEY INJURY SUPERIMPOSED ON CKD (HCC): ICD-10-CM

## 2021-08-17 DIAGNOSIS — R07.9 CHEST PAIN, UNSPECIFIED TYPE: Primary | ICD-10-CM

## 2021-08-17 DIAGNOSIS — N17.9 AKI (ACUTE KIDNEY INJURY) (HCC): ICD-10-CM

## 2021-08-17 DIAGNOSIS — N18.4 STAGE 4 CHRONIC KIDNEY DISEASE (HCC): ICD-10-CM

## 2021-08-17 DIAGNOSIS — I50.33 ACUTE ON CHRONIC DIASTOLIC HEART FAILURE (HCC): ICD-10-CM

## 2021-08-17 DIAGNOSIS — N17.9 ACUTE KIDNEY INJURY SUPERIMPOSED ON CKD (HCC): ICD-10-CM

## 2021-08-17 PROBLEM — R65.10 SIRS (SYSTEMIC INFLAMMATORY RESPONSE SYNDROME) (HCC): Status: ACTIVE | Noted: 2021-08-17

## 2021-08-17 PROBLEM — I50.9 ACUTE ON CHRONIC HEART FAILURE (HCC): Status: ACTIVE | Noted: 2021-08-17

## 2021-08-17 PROBLEM — E87.6 HYPOKALEMIA: Status: ACTIVE | Noted: 2021-08-17

## 2021-08-17 LAB
ALBUMIN SERPL BCP-MCNC: 3.5 G/DL (ref 3.5–5)
ALP SERPL-CCNC: 126 U/L (ref 46–116)
ALT SERPL W P-5'-P-CCNC: 36 U/L (ref 12–78)
ANION GAP SERPL CALCULATED.3IONS-SCNC: 10 MMOL/L (ref 4–13)
ANION GAP SERPL CALCULATED.3IONS-SCNC: 9 MMOL/L (ref 4–13)
APTT PPP: 26 SECONDS (ref 23–37)
AST SERPL W P-5'-P-CCNC: 28 U/L (ref 5–45)
ATRIAL RATE: 90 BPM
BACTERIA UR QL AUTO: ABNORMAL /HPF
BASOPHILS # BLD AUTO: 0.1 THOUSANDS/ΜL (ref 0–0.1)
BASOPHILS NFR BLD AUTO: 1 % (ref 0–1)
BILIRUB SERPL-MCNC: 0.71 MG/DL (ref 0.2–1)
BILIRUB UR QL STRIP: NEGATIVE
BUN SERPL-MCNC: 49 MG/DL (ref 5–25)
BUN SERPL-MCNC: 53 MG/DL (ref 5–25)
CALCIUM SERPL-MCNC: 10 MG/DL (ref 8.3–10.1)
CALCIUM SERPL-MCNC: 10.1 MG/DL (ref 8.3–10.1)
CHLORIDE SERPL-SCNC: 94 MMOL/L (ref 100–108)
CHLORIDE SERPL-SCNC: 97 MMOL/L (ref 100–108)
CLARITY UR: CLEAR
CO2 SERPL-SCNC: 32 MMOL/L (ref 21–32)
CO2 SERPL-SCNC: 36 MMOL/L (ref 21–32)
COLOR UR: YELLOW
CREAT SERPL-MCNC: 1.52 MG/DL (ref 0.6–1.3)
CREAT SERPL-MCNC: 1.75 MG/DL (ref 0.6–1.3)
EOSINOPHIL # BLD AUTO: 0.03 THOUSAND/ΜL (ref 0–0.61)
EOSINOPHIL NFR BLD AUTO: 0 % (ref 0–6)
ERYTHROCYTE [DISTWIDTH] IN BLOOD BY AUTOMATED COUNT: 12.9 % (ref 11.6–15.1)
GFR SERPL CREATININE-BSD FRML MDRD: 26 ML/MIN/1.73SQ M
GFR SERPL CREATININE-BSD FRML MDRD: 31 ML/MIN/1.73SQ M
GLUCOSE SERPL-MCNC: 270 MG/DL (ref 65–140)
GLUCOSE SERPL-MCNC: 292 MG/DL (ref 65–140)
GLUCOSE UR STRIP-MCNC: NEGATIVE MG/DL
HCT VFR BLD AUTO: 44.4 % (ref 34.8–46.1)
HGB BLD-MCNC: 13.9 G/DL (ref 11.5–15.4)
HGB UR QL STRIP.AUTO: ABNORMAL
IMM GRANULOCYTES # BLD AUTO: 0.1 THOUSAND/UL (ref 0–0.2)
IMM GRANULOCYTES NFR BLD AUTO: 1 % (ref 0–2)
INR PPP: 1.03 (ref 0.84–1.19)
KETONES UR STRIP-MCNC: NEGATIVE MG/DL
LEUKOCYTE ESTERASE UR QL STRIP: ABNORMAL
LYMPHOCYTES # BLD AUTO: 1.04 THOUSANDS/ΜL (ref 0.6–4.47)
LYMPHOCYTES NFR BLD AUTO: 5 % (ref 14–44)
MCH RBC QN AUTO: 29.7 PG (ref 26.8–34.3)
MCHC RBC AUTO-ENTMCNC: 31.3 G/DL (ref 31.4–37.4)
MCV RBC AUTO: 95 FL (ref 82–98)
MONOCYTES # BLD AUTO: 1.52 THOUSAND/ΜL (ref 0.17–1.22)
MONOCYTES NFR BLD AUTO: 8 % (ref 4–12)
NEUTROPHILS # BLD AUTO: 17.09 THOUSANDS/ΜL (ref 1.85–7.62)
NEUTS SEG NFR BLD AUTO: 85 % (ref 43–75)
NITRITE UR QL STRIP: NEGATIVE
NON-SQ EPI CELLS URNS QL MICRO: ABNORMAL /HPF
NRBC BLD AUTO-RTO: 0 /100 WBCS
NT-PROBNP SERPL-MCNC: 1806 PG/ML
P AXIS: 65 DEGREES
PH UR STRIP.AUTO: 7 [PH] (ref 4.5–8)
PLATELET # BLD AUTO: 379 THOUSANDS/UL (ref 149–390)
PMV BLD AUTO: 10.8 FL (ref 8.9–12.7)
POTASSIUM SERPL-SCNC: 2.7 MMOL/L (ref 3.5–5.3)
POTASSIUM SERPL-SCNC: 3.1 MMOL/L (ref 3.5–5.3)
PR INTERVAL: 176 MS
PROT SERPL-MCNC: 8.5 G/DL (ref 6.4–8.2)
PROT UR STRIP-MCNC: NEGATIVE MG/DL
PROTHROMBIN TIME: 13.6 SECONDS (ref 11.6–14.5)
QRS AXIS: -70 DEGREES
QRSD INTERVAL: 198 MS
QT INTERVAL: 470 MS
QTC INTERVAL: 574 MS
RBC # BLD AUTO: 4.68 MILLION/UL (ref 3.81–5.12)
RBC #/AREA URNS AUTO: ABNORMAL /HPF
SODIUM SERPL-SCNC: 139 MMOL/L (ref 136–145)
SODIUM SERPL-SCNC: 139 MMOL/L (ref 136–145)
SP GR UR STRIP.AUTO: 1.02 (ref 1–1.03)
T WAVE AXIS: 94 DEGREES
TROPONIN I SERPL-MCNC: 0.11 NG/ML
TROPONIN I SERPL-MCNC: 0.12 NG/ML
TROPONIN I SERPL-MCNC: 0.14 NG/ML
TROPONIN I SERPL-MCNC: 0.15 NG/ML
TSH SERPL DL<=0.05 MIU/L-ACNC: 1.79 UIU/ML (ref 0.36–3.74)
UROBILINOGEN UR QL STRIP.AUTO: 0.2 E.U./DL
VENTRICULAR RATE: 90 BPM
WBC # BLD AUTO: 19.88 THOUSAND/UL (ref 4.31–10.16)
WBC #/AREA URNS AUTO: ABNORMAL /HPF

## 2021-08-17 PROCEDURE — 99285 EMERGENCY DEPT VISIT HI MDM: CPT | Performed by: PHYSICIAN ASSISTANT

## 2021-08-17 PROCEDURE — NC001 PR NO CHARGE: Performed by: INTERNAL MEDICINE

## 2021-08-17 PROCEDURE — 99223 1ST HOSP IP/OBS HIGH 75: CPT | Performed by: INTERNAL MEDICINE

## 2021-08-17 PROCEDURE — 80048 BASIC METABOLIC PNL TOTAL CA: CPT | Performed by: INTERNAL MEDICINE

## 2021-08-17 PROCEDURE — 85730 THROMBOPLASTIN TIME PARTIAL: CPT | Performed by: PHYSICIAN ASSISTANT

## 2021-08-17 PROCEDURE — 84443 ASSAY THYROID STIM HORMONE: CPT | Performed by: INTERNAL MEDICINE

## 2021-08-17 PROCEDURE — 85610 PROTHROMBIN TIME: CPT | Performed by: PHYSICIAN ASSISTANT

## 2021-08-17 PROCEDURE — 83880 ASSAY OF NATRIURETIC PEPTIDE: CPT | Performed by: PHYSICIAN ASSISTANT

## 2021-08-17 PROCEDURE — 93005 ELECTROCARDIOGRAM TRACING: CPT

## 2021-08-17 PROCEDURE — 99285 EMERGENCY DEPT VISIT HI MDM: CPT

## 2021-08-17 PROCEDURE — 84484 ASSAY OF TROPONIN QUANT: CPT | Performed by: PHYSICIAN ASSISTANT

## 2021-08-17 PROCEDURE — 71045 X-RAY EXAM CHEST 1 VIEW: CPT

## 2021-08-17 PROCEDURE — 85025 COMPLETE CBC W/AUTO DIFF WBC: CPT | Performed by: PHYSICIAN ASSISTANT

## 2021-08-17 PROCEDURE — 93010 ELECTROCARDIOGRAM REPORT: CPT | Performed by: INTERNAL MEDICINE

## 2021-08-17 PROCEDURE — 81001 URINALYSIS AUTO W/SCOPE: CPT

## 2021-08-17 PROCEDURE — 80053 COMPREHEN METABOLIC PANEL: CPT | Performed by: PHYSICIAN ASSISTANT

## 2021-08-17 PROCEDURE — 84484 ASSAY OF TROPONIN QUANT: CPT | Performed by: INTERNAL MEDICINE

## 2021-08-17 PROCEDURE — 36415 COLL VENOUS BLD VENIPUNCTURE: CPT | Performed by: PHYSICIAN ASSISTANT

## 2021-08-17 RX ORDER — AMLODIPINE BESYLATE 5 MG/1
5 TABLET ORAL 2 TIMES DAILY
Status: DISCONTINUED | OUTPATIENT
Start: 2021-08-17 | End: 2021-08-18

## 2021-08-17 RX ORDER — NYSTATIN 100000 [USP'U]/G
POWDER TOPICAL 2 TIMES DAILY
Status: DISCONTINUED | OUTPATIENT
Start: 2021-08-17 | End: 2021-08-22 | Stop reason: HOSPADM

## 2021-08-17 RX ORDER — HEPARIN SODIUM 5000 [USP'U]/ML
5000 INJECTION, SOLUTION INTRAVENOUS; SUBCUTANEOUS EVERY 8 HOURS SCHEDULED
Status: DISCONTINUED | OUTPATIENT
Start: 2021-08-17 | End: 2021-08-22 | Stop reason: HOSPADM

## 2021-08-17 RX ORDER — POTASSIUM CHLORIDE 20 MEQ/1
40 TABLET, EXTENDED RELEASE ORAL 2 TIMES DAILY
Status: DISCONTINUED | OUTPATIENT
Start: 2021-08-17 | End: 2021-08-18

## 2021-08-17 RX ORDER — LEVOTHYROXINE SODIUM 0.05 MG/1
50 TABLET ORAL
Status: DISCONTINUED | OUTPATIENT
Start: 2021-08-18 | End: 2021-08-22 | Stop reason: HOSPADM

## 2021-08-17 RX ORDER — ACETAMINOPHEN 325 MG/1
650 TABLET ORAL EVERY 6 HOURS PRN
Status: DISCONTINUED | OUTPATIENT
Start: 2021-08-17 | End: 2021-08-22 | Stop reason: HOSPADM

## 2021-08-17 RX ORDER — ATORVASTATIN CALCIUM 40 MG/1
40 TABLET, FILM COATED ORAL
Status: DISCONTINUED | OUTPATIENT
Start: 2021-08-17 | End: 2021-08-22 | Stop reason: HOSPADM

## 2021-08-17 RX ORDER — ASPIRIN 81 MG/1
81 TABLET, CHEWABLE ORAL ONCE
Status: COMPLETED | OUTPATIENT
Start: 2021-08-17 | End: 2021-08-17

## 2021-08-17 RX ORDER — ASPIRIN 81 MG/1
81 TABLET, CHEWABLE ORAL DAILY
Status: DISCONTINUED | OUTPATIENT
Start: 2021-08-18 | End: 2021-08-22 | Stop reason: HOSPADM

## 2021-08-17 RX ORDER — ASPIRIN 81 MG/1
162 TABLET, CHEWABLE ORAL ONCE
Status: COMPLETED | OUTPATIENT
Start: 2021-08-17 | End: 2021-08-17

## 2021-08-17 RX ORDER — POTASSIUM CHLORIDE 20 MEQ/1
20 TABLET, EXTENDED RELEASE ORAL DAILY
Status: DISCONTINUED | OUTPATIENT
Start: 2021-08-18 | End: 2021-08-17

## 2021-08-17 RX ORDER — INSULIN GLARGINE 100 [IU]/ML
12 INJECTION, SOLUTION SUBCUTANEOUS
Status: DISCONTINUED | OUTPATIENT
Start: 2021-08-17 | End: 2021-08-22 | Stop reason: HOSPADM

## 2021-08-17 RX ORDER — METOPROLOL TARTRATE 50 MG/1
50 TABLET, FILM COATED ORAL 2 TIMES DAILY
Status: DISCONTINUED | OUTPATIENT
Start: 2021-08-17 | End: 2021-08-22 | Stop reason: HOSPADM

## 2021-08-17 RX ORDER — POTASSIUM CHLORIDE 20 MEQ/1
20 TABLET, EXTENDED RELEASE ORAL ONCE
Status: COMPLETED | OUTPATIENT
Start: 2021-08-17 | End: 2021-08-17

## 2021-08-17 RX ORDER — TETRAHYDROZOLINE HCL 0.05 %
1 DROPS OPHTHALMIC (EYE) 4 TIMES DAILY PRN
Status: DISCONTINUED | OUTPATIENT
Start: 2021-08-17 | End: 2021-08-22 | Stop reason: HOSPADM

## 2021-08-17 RX ORDER — FUROSEMIDE 10 MG/ML
80 INJECTION INTRAMUSCULAR; INTRAVENOUS
Status: DISCONTINUED | OUTPATIENT
Start: 2021-08-17 | End: 2021-08-18

## 2021-08-17 RX ORDER — NITROGLYCERIN 0.4 MG/1
0.4 TABLET SUBLINGUAL ONCE
Status: COMPLETED | OUTPATIENT
Start: 2021-08-17 | End: 2021-08-17

## 2021-08-17 RX ORDER — FERROUS SULFATE 325(65) MG
325 TABLET ORAL
Status: DISCONTINUED | OUTPATIENT
Start: 2021-08-18 | End: 2021-08-22 | Stop reason: HOSPADM

## 2021-08-17 RX ORDER — POTASSIUM CHLORIDE 20 MEQ/1
40 TABLET, EXTENDED RELEASE ORAL ONCE
Status: COMPLETED | OUTPATIENT
Start: 2021-08-17 | End: 2021-08-17

## 2021-08-17 RX ADMIN — ATORVASTATIN CALCIUM 40 MG: 40 TABLET, FILM COATED ORAL at 17:12

## 2021-08-17 RX ADMIN — NITROGLYCERIN 0.4 MG: 0.4 TABLET SUBLINGUAL at 11:43

## 2021-08-17 RX ADMIN — FUROSEMIDE 80 MG: 10 INJECTION, SOLUTION INTRAVENOUS at 17:19

## 2021-08-17 RX ADMIN — ASPIRIN 81 MG CHEWABLE TABLET 81 MG: 81 TABLET CHEWABLE at 11:42

## 2021-08-17 RX ADMIN — INSULIN GLARGINE 12 UNITS: 100 INJECTION, SOLUTION SUBCUTANEOUS at 22:25

## 2021-08-17 RX ADMIN — POTASSIUM CHLORIDE 40 MEQ: 1500 TABLET, EXTENDED RELEASE ORAL at 17:12

## 2021-08-17 RX ADMIN — CEFTRIAXONE SODIUM 1000 MG: 10 INJECTION, POWDER, FOR SOLUTION INTRAVENOUS at 19:28

## 2021-08-17 RX ADMIN — ASPIRIN 81 MG CHEWABLE TABLET 162 MG: 81 TABLET CHEWABLE at 11:42

## 2021-08-17 RX ADMIN — POTASSIUM CHLORIDE 20 MEQ: 1500 TABLET, EXTENDED RELEASE ORAL at 15:48

## 2021-08-17 RX ADMIN — AMLODIPINE BESYLATE 5 MG: 5 TABLET ORAL at 17:12

## 2021-08-17 RX ADMIN — HEPARIN SODIUM 5000 UNITS: 5000 INJECTION INTRAVENOUS; SUBCUTANEOUS at 17:11

## 2021-08-17 RX ADMIN — POTASSIUM CHLORIDE 40 MEQ: 1500 TABLET, EXTENDED RELEASE ORAL at 13:38

## 2021-08-17 RX ADMIN — METOPROLOL TARTRATE 50 MG: 50 TABLET, FILM COATED ORAL at 17:11

## 2021-08-17 NOTE — ED PROVIDER NOTES
History  Chief Complaint   Patient presents with    Chest Pain     Pt c/o chest pain since last night  Denies pain now, denies SOB  80year old female presents from the emergency department with complaints of chest pain  States that she has had some intermittent chest pain over the course of the night which he describes as a center of her chest and being tight or clive  Denies associated shortness of breath  She has not had associated nausea, vomiting, or sweating  The pain radiates around her back not to her arm, jaw, or head  History of coronary artery disease with previous stenting  He has a history of hypertension, hyperlipidemia, and diabetes  States she did not tell nursing staff at facility she resides about the pain she was experiencing over night  History provided by:  Patient   used: No        Prior to Admission Medications   Prescriptions Last Dose Informant Patient Reported? Taking? Glucagon, rDNA, (Glucagon Emergency) 1 MG KIT  Outside Facility (Specify) Yes No   Multiple Vitamins-Minerals (OCUVITE ADULT 50+ PO)  Outside Facility (Specify) Yes No   Sig: Take by mouth daily   acetaminophen (TYLENOL) 325 mg tablet  Outside Facility (Specify) No No   Sig: Take 2 tablets (650 mg total) by mouth every 6 (six) hours as needed for mild pain  amLODIPine (NORVASC) 5 mg tablet  Outside Facility (Specify) Yes No   Si mg 2 (two) times a day    amoxicillin (AMOXIL) 500 mg capsule  Outside Facility (Specify) Yes No   Sig: Take 2,000 mg by mouth as needed (PRIOR TO DENTAL APPOINTMENTS)   aspirin 81 mg chewable tablet  Outside Facility (Specify) Yes No   Sig: Chew 81 mg daily     atorvastatin (LIPITOR) 10 mg tablet  Outside Facility (Specify) Yes No   bumetanide (BUMEX) 1 mg tablet  Outside Facility (Specify) No No   Sig: Take 2 tablets (2 mg total) by mouth 2 (two) times a day for 14 days   diclofenac sodium (VOLTAREN) 1 %  Outside Facility (Specify) Yes No   Sig: Apply 2 g topically 4 (four) times a day as needed   ferrous sulfate 325 (65 Fe) mg tablet  Outside Facility (Specify) Yes No   Sig: Take 325 mg by mouth daily with breakfast   furosemide (LASIX) 40 mg tablet  Outside Facility (Specify) Yes No   insulin glargine (LANTUS) 100 units/mL subcutaneous injection  Outside Facility (Specify) No No   Sig: Inject 12 Units under the skin daily at bedtime   ketoconazole (NIZORAL) 2 % cream  Outside Facility (Specify) Yes No   Sig: daily    levothyroxine 50 mcg tablet  Outside Facility (Specify) Yes No   Sig: Take 50 mcg by mouth daily   loperamide (IMODIUM A-D) 2 MG tablet  Outside Facility (Specify) Yes No   Sig: Take 2 mg by mouth 3 (three) times a day as needed for diarrhea   metoprolol tartrate (LOPRESSOR) 50 mg tablet  Outside Facility (Specify) No No   Sig: Take 1 tablet (50 mg total) by mouth 2 (two) times a day     nystatin (MYCOSTATIN) powder  Outside Facility (Specify) Yes No   Sig: Apply topically 2 (two) times a day   oxyCODONE (ROXICODONE) 5 mg immediate release tablet  Outside Facility (Specify) No No   Sig: Take 0 5 tablets (2 5 mg total) by mouth every 4 (four) hours as needed for moderate pain or severe painMax Daily Amount: 15 mg   Patient not taking: Reported on 2021   potassium chloride (K-DUR,KLOR-CON) 20 mEq tablet  Outside Facility (Specify) Yes No   Sig: Take 20 mEq by mouth daily   tetrahydrozoline (VISINE) 0 05 % ophthalmic solution  Outside Facility (Specify) Yes No   Si drop 4 (four) times a day as needed for irritation      Facility-Administered Medications: None       Past Medical History:   Diagnosis Date    Anemia     Resolved 3/1/2017     Arthritis     Knee - Resolved 3/1/2017     Blind     CAD (coronary artery disease)     s/p HERNAN 2016    Calcaneal spur     CHF (congestive heart failure) (HCC)     Chronic knee instability     Resolved 3/1/2017     Chronic pain syndrome     Resolved 3/1/2017     Dementia ([facility] [address] )     Last assessed 11/1/2017     Diabetes mellitus ([facility] [address] )     non-insulin depdenent    Disease of thyroid gland     Diverticulitis     Essential thrombocytopenia (HCC)     Last assessed 11/1/2017     GERD (gastroesophageal reflux disease)     History of aortic valve replacement     Resolved 3/1/2017     History of bilateral knee replacement     Resolved 3/1/2017     History of TIA (transient ischemic attack)     no residual deficits    Hyperlipidemia     Hypertension     Hypoxia     on home O2 QHS    Leukocytosis     Resolved 3/1/2017     Lumbar post-laminectomy syndrome     Resolved 3/1/2017     Meniere disease     Osteopenia     Pacemaker     [facility]B-Biotronik in 2/2015    S/P TAVR (transcatheter aortic valve replacement)     Resolved 3/1/2017     Severe aortic stenosis     Thrombocytosis ([facility] [address] )     Resolved 4/5/2017     Type 2 diabetes mellitus ([facility] [address] )     Last assessed 11/1/2017        Past Surgical History:   Procedure Laterality Date    APPENDECTOMY      BACK SURGERY      Arthrodesis     CARDIAC PACEMAKER PLACEMENT      CHOLECYSTECTOMY      EYE SURGERY      FRACTURE SURGERY Right 01/02/2018    femur    HYSTERECTOMY      MD EGD TRANSORAL BIOPSY SINGLE/MULTIPLE N/A 11/9/2016    Procedure: ESOPHAGOGASTRODUODENOSCOPY (EGD); Surgeon: Lauren Ashton MD;  Location: [facility];   Service: Gastroenterology    MD OPEN RX FEMUR FX+INTRAMED GARRETT Right 1/2/2018    Procedure: INSERTION NAIL IM FEMUR ANTEGRADE (TROCHANTERIC) RIGHT;  Surgeon: Matty Meneses MD;  [facility]: [facility];  Service: Orthopedics    MD REPLACE AORTIC VALVE OPENFEMORAL ARTERY APPROACH N/A 7/26/2016    Procedure: TRANSFEMORAL TAVR WITH 23MM LAROSE LILY S3 VALVE; JOSE ALFREDO ;  Surgeon: Theodora Ohara DO;  [facility];  Service: Cardiac Surgery    SMALL INTESTINE SURGERY      TONSILLECTOMY      TOTAL KNEE ARTHROPLASTY      VARICOSE VEIN SURGERY      VENTRAL HERNIA REPAIR         Family History   Problem Relation Age of Onset    Cancer Child     Coronary artery disease Father         Native artery     Stroke Family     Osteopenia Family     Other Family         Pituitary disease    Polycythemia Family      I have reviewed and agree with the history as documented  E-Cigarette/Vaping    E-Cigarette Use Never User      E-Cigarette/Vaping Substances     Social History     Tobacco Use    Smoking status: Never Smoker    Smokeless tobacco: Never Used   Vaping Use    Vaping Use: Never used   Substance Use Topics    Alcohol use: Not Currently    Drug use: No       Review of Systems   Constitutional: Negative for activity change, appetite change, chills and fever  HENT: Negative for congestion, dental problem, drooling, ear discharge, ear pain, mouth sores, nosebleeds, rhinorrhea, sore throat and trouble swallowing  Eyes: Negative for pain, discharge and itching  Respiratory: Negative for cough, chest tightness, shortness of breath and wheezing  Cardiovascular: Positive for chest pain  Negative for palpitations  Gastrointestinal: Positive for nausea  Negative for abdominal pain, blood in stool, constipation, diarrhea and vomiting  Endocrine: Negative for cold intolerance and heat intolerance  Genitourinary: Negative for difficulty urinating, dysuria, flank pain, frequency and urgency  Musculoskeletal: Positive for back pain  Skin: Negative for rash and wound  Allergic/Immunologic: Negative for food allergies and immunocompromised state  Neurological: Negative for dizziness, seizures, syncope, weakness, numbness and headaches  Psychiatric/Behavioral: Negative for agitation, behavioral problems and confusion  Physical Exam  Physical Exam  Vitals and nursing note reviewed  Constitutional:       General: She is not in acute distress  Appearance: She is not diaphoretic  HENT:      Head: Normocephalic and atraumatic        Right Ear: External ear normal       Left Ear: External ear normal       Mouth/Throat: Pharynx: No oropharyngeal exudate  Eyes:      Conjunctiva/sclera: Conjunctivae normal    Neck:      Vascular: No JVD  Trachea: No tracheal deviation  Cardiovascular:      Rate and Rhythm: Normal rate and regular rhythm  Heart sounds: Normal heart sounds  No murmur heard  No friction rub  No gallop  Pulmonary:      Effort: No respiratory distress  Breath sounds: No wheezing or rales  Chest:      Chest wall: No tenderness  Abdominal:      General: Bowel sounds are normal  There is no distension  Palpations: Abdomen is soft  Tenderness: There is no abdominal tenderness  There is no guarding  Musculoskeletal:         General: No tenderness or deformity  Normal range of motion  Lymphadenopathy:      Cervical: No cervical adenopathy  Skin:     General: Skin is warm and dry  Findings: No erythema or rash  Neurological:      General: No focal deficit present  Mental Status: She is alert and oriented to person, place, and time     Psychiatric:         Mood and Affect: Mood normal          Behavior: Behavior normal          Vital Signs  ED Triage Vitals   Temperature Pulse Respirations Blood Pressure SpO2   08/17/21 1117 08/17/21 1117 08/17/21 1117 08/17/21 1117 08/17/21 1117   98 8 °F (37 1 °C) 91 18 140/65 95 %      Temp Source Heart Rate Source Patient Position - Orthostatic VS BP Location FiO2 (%)   08/17/21 1117 08/17/21 1117 08/17/21 1117 08/17/21 1117 --   Oral Monitor Sitting Left arm       Pain Score       08/17/21 1243       No Pain           Vitals:    08/17/21 1117 08/17/21 1245 08/17/21 1400 08/17/21 1530   BP: 140/65 133/65 121/58 123/62   Pulse: 91 78 78 80   Patient Position - Orthostatic VS: Sitting Sitting Lying Lying         Visual Acuity      ED Medications  Medications   potassium chloride (K-DUR,KLOR-CON) CR tablet 40 mEq (has no administration in time range)   potassium chloride (K-DUR,KLOR-CON) CR tablet 20 mEq (has no administration in time range)   nitroglycerin (NITROSTAT) SL tablet 0 4 mg (0 4 mg Sublingual Given 8/17/21 1143)   aspirin chewable tablet 81 mg (81 mg Oral Given 8/17/21 1142)   aspirin chewable tablet 162 mg (162 mg Oral Given 8/17/21 1142)   potassium chloride (K-DUR,KLOR-CON) CR tablet 40 mEq (40 mEq Oral Given 8/17/21 1338)       Diagnostic Studies  Results Reviewed     Procedure Component Value Units Date/Time    Urine Microscopic [[id number]] Collected: 08/17/21 1525    Lab Status: No result Specimen: Urine, Clean Catch     Comprehensive metabolic panel [[id number]]  (Abnormal) Collected: 08/17/21 1142    Lab Status: Final result Specimen: Blood from Arm, Right Updated: 08/17/21 1304     Sodium 139 mmol/L      Potassium 2 7 mmol/L      Chloride 94 mmol/L      CO2 36 mmol/L      ANION GAP 9 mmol/L      BUN 49 mg/dL      Creatinine 1 52 mg/dL      Glucose 270 mg/dL      Calcium 10 1 mg/dL      AST 28 U/L      ALT 36 U/L      Alkaline Phosphatase 126 U/L      Total Protein 8 5 g/dL      Albumin 3 5 g/dL      Total Bilirubin 0 71 mg/dL      eGFR 31 ml/min/1 73sq m     Narrative:      [place] guidelines for Chronic Kidney Disease (CKD):     Stage 1 with normal or high GFR (GFR > 90 mL/min/1 73 square meters)    Stage 2 Mild CKD (GFR = 60-89 mL/min/1 73 square meters)    Stage 3A Moderate CKD (GFR = 45-59 mL/min/1 73 square meters)    Stage 3B Moderate CKD (GFR = 30-44 mL/min/1 73 square meters)    Stage 4 Severe CKD (GFR = 15-29 mL/min/1 73 square meters)    Stage 5 End Stage CKD (GFR <15 mL/min/1 73 square meters)  Note: GFR calculation is accurate only with a steady state creatinine    Urine Macroscopic, POC [[id number]]  (Abnormal) Collected: 08/17/21 1226    Lab Status: Final result Specimen: Urine Updated: 08/17/21 1228     Color, UA Yellow     Clarity, UA Clear     pH, UA 7 0     Leukocytes, UA Small     Nitrite, UA Negative     Protein, UA Negative mg/dl      Glucose, UA Negative mg/dl      Ketones, UA Negative mg/dl      Urobilinogen, UA 0 2 E U /dl      Bilirubin, UA Negative     Blood, UA Trace     Specific [place], UA 1 020    Narrative:      CLINITEK RESULT    NT-BNP PRO [[id number]]  (Abnormal) Collected: 08/17/21 1142    Lab Status: Final result Specimen: Blood from Arm, Right Updated: 08/17/21 1225     NT-proBNP 1,806 pg/mL     Troponin I [[id number]]  (Abnormal) Collected: 08/17/21 1142    Lab Status: Final result Specimen: Blood from Arm, Right Updated: 08/17/21 1219     Troponin I 0 15 ng/mL     Protime-INR [[id number]]  (Normal) Collected: 08/17/21 1142    Lab Status: Final result Specimen: Blood from Arm, Right Updated: 08/17/21 1211     Protime 13 6 seconds      INR 1 03    APTT [[id number]]  (Normal) Collected: 08/17/21 1142    Lab Status: Final result Specimen: Blood from Arm, Right Updated: 08/17/21 1211     PTT 26 seconds     CBC and differential [[id number]]  (Abnormal) Collected: 08/17/21 1142    Lab Status: Final result Specimen: Blood from Arm, Right Updated: 08/17/21 1203     WBC 19 88 Thousand/uL      RBC 4 68 Million/uL      Hemoglobin 13 9 g/dL      Hematocrit 44 4 %      MCV 95 fL      MCH 29 7 pg      MCHC 31 3 g/dL      RDW 12 9 %      MPV 10 8 fL      Platelets 011 Thousands/uL      nRBC 0 /100 WBCs      Neutrophils Relative 85 %      Immat GRANS % 1 %      Lymphocytes Relative 5 %      Monocytes Relative 8 %      Eosinophils Relative 0 %      Basophils Relative 1 %      Neutrophils Absolute 17 09 Thousands/µL      Immature Grans Absolute 0 10 Thousand/uL      Lymphocytes Absolute 1 04 Thousands/µL      Monocytes Absolute 1 52 Thousand/µL      Eosinophils Absolute 0 03 Thousand/µL      Basophils Absolute 0 10 Thousands/µL                  XR chest portable   ED Interpretation by Rusty Valencia PA-C (08/17 2262)   Poor inspiratory effort  No focal consolidation                   Procedures  ECG 12 Lead Documentation Only    Date/Time: 8/17/2021 11:45 AM  Performed by: Rusty Valencia ELLYN  Authorized by: Jia King PA-C     Indications / Diagnosis:  Chest pain  ECG reviewed by me, the ED Provider: yes    Patient location:  ED  Previous ECG:     Previous ECG:  Compared to current    Comparison ECG info:  3/1/21    Similarity:  No change  Interpretation:     Interpretation: abnormal    Rate:     ECG rate:  90    ECG rate assessment: normal    Rhythm:     Rhythm: paced    Pacing:     Capture:  Complete    Type of pacing:  Ventricular  Ectopy:     Ectopy: none    QRS:     QRS intervals: Wide  Conduction:     Conduction: abnormal      Abnormal conduction: complete LBBB    ST segments:     ST segments:  Normal  T waves:     T waves: normal               ED Course  ED Course as of Aug 17 1539   Tue Aug 17, 2021   1428 Cardiology made aware as per SLIM request                                               MDM  Number of Diagnoses or Management Options  Chest pain, unspecified type  Elevated troponin  Diagnosis management comments: Differential diagnosis includes but not limited to:  Acute coronary syndrome,  Gastritis, esophagitis         Amount and/or Complexity of Data Reviewed  Clinical lab tests: ordered and reviewed  Tests in the radiology section of CPT®: ordered and reviewed  Independent visualization of images, tracings, or specimens: yes        Disposition  Final diagnoses:   Chest pain, unspecified type   Elevated troponin     Time reflects when diagnosis was documented in both MDM as applicable and the Disposition within this note     Time User Action Codes Description Comment    8/17/2021  2:27 PM Nikolas Verdugo 26 [I21 4] NSTEMI (non-ST elevated myocardial infarction) ([facility] )     8/17/2021  2:28 PM Bita Verdugo Remove [I21 4] NSTEMI (non-ST elevated myocardial infarction) ([facility] )     8/17/2021  2:28 PM Bita Verdugo Add [R07 9] Chest pain, unspecified type     8/17/2021  2:28 PM Nikolas Verdugo 26 [R77 8] Elevated troponin       ED Disposition     ED Disposition Condition Date/Time Comment    Admit Stable Savbrianne Aug 17, 2021  2:27 PM Case was discussed with MEGAN and the patient's admission status was agreed to be Admission Status: inpatient status to the service of Dr Kieran Carrillo   Follow-up Information    None         Patient's Medications   Discharge Prescriptions    No medications on file     No discharge procedures on file      PDMP Review       Value Time User    PDMP Reviewed  Yes 2/14/2020 11:42 AM Bri Eduardo [place]          ED Provider  Electronically Signed by           Nicolette Harada, PA-C  08/17/21 1534

## 2021-08-17 NOTE — ASSESSMENT & PLAN NOTE
And no clear source of infection  Blood cultures drawn  Will continue to trend vitals and leukocytes

## 2021-08-17 NOTE — CONSULTS
Please see full consultation , previously documented in the chart  80-year-old female with CAD status post PCI of the LAD, aortic stenosis status post TAVR, admitted to the emergency room with prolonged episode of chest pain at rest, that woke her up at about 4:00 a m  and lasted several hours  It was eventually relieved after administration of sublingual nitroglycerin and aspirin in the ER  Since then she has had no recurrent chest pain  This is the 1st significant episode of chest pain since her prior revascularization  Her EKG showed atrial sensed and ventricular paced rhythm  Her cardiac troponin initially was mildly elevated at 0 15  ProBNP on admission was 1800  chest x-ray showed pulmonary vascular congestion  She has a history of chronic lower extremity edema which was labeled as lymphedema as per vascular surgery  She also has a component of diastolic heart failure and has been on chronic diuretic therapy with recent addition of metolazone that has lead to significant hypokalemia  She is fairly sedentary and generally gets around in a wheelchair  On examination, she is in no acute distress  Lungs are fairly clear but with decreased air entry  Heart sounds are regular with a grade 3 systolic murmur  Chronic mild lower extremity pitting edema noted  Labs, EKG and chest x-ray personally reviewed  Creatinine is 1 52 with a GFR of 31  Potassium is 2 7  ProBNP has gone up  Echo from last month personally reviewed  TAVR valve is functioning well  Grade 1 diastolic dysfunction with preserved EF noted      A/P:  CAD status post prior PCI of the LAD with atypical angina but marginally elevated initial cardiac enzymes:  Continue trend cardiac enzymes  If enzymes markedly elevated, add anticoagulation and consider cardiac catheterization otherwise continue medical therapy with the addition of antianginals  Acute on chronic diastolic heart failure:  Stop metolazone  Switched to IV Lasix  Monitor I's and O's and renal function  Supplement potassium  Elevated white count: Workup as per primary team   Urinalysis negative  Sick sinus syndrome status post dual-chamber pacemaker  Recent pacemaker check showed 100% ventricular pacing with atrial sense      Harry Schaffer MD      Consultation - Cardiology Team One  Adolfo Hutton 80 y o  female MRN: [id number]  Unit/Bed#: ED 19 Encounter: [id number]     Consults     Physician Requesting Consult: Dee Dee Doe MD  Reason for Consult / Principal Problem:  Chest pain        Assessment/ Plan     1  Chest pain with elevated troponin  Troponin 0 15, continue to trend  ECG reviewed showing V paced rhythm   Chest pain free, monitor symptoms   If troponin trends up recommend heparin gtt       2  Acute kidney injury  Current 1 52 on admission  Baseline creatinine 0 8-1 0  Monitor with diuretics      3  Acute on chronic diastolic heart failure  ProBNP 1806  Chest x-ray reviewed showing vascular congestion   Will start IV diuretics   Patient takes furosemide 80 mg PO BID and metolazone 2 5 mg every other day at [facility]   Monitor I/Os  Daily weights   Start 2 gram Na diet and 1800 ml fluid restriction      4  Hypokalemia  K 2 7  Replete     5  Leukocytosis   Followed by primary team   Consider CT of chest      6  Coronary artery disease with history of PCI/stenting to LAD in 2016  Continue home medication:  Aspirin, statin beta-blocker     7  Aortic stenosis status post TAVR in 2016     8  Hypertension average blood pressure 131/63  On amlodipine 5 mg p o  daily and metoprolol 50 mg p o  Twice a day, continue this admission     9  Hyperlipidemia  Continue statin      10   Sick sinus syndrome status post Bi[employer] dual-chamber permanent pacemaker        History of Present Illness         HPI: Adolfo Hutton is a 80y o  year old female who has coronary artery disease status post HERNAN x2 to LAD 2016, aortic stenosis status post TAVR in 2016, hypertension, hyperlipidemia, sick sinus syndrome status post Bi[employer]ik dual-chamber permanent pacemaker, chronic diastolic heart failure, diabetes, hypothyroidism and ambulatory dysfunction  She follows with cardiologist Dr Ben Devlin               She presents to [facility] ER 8/17/2021 from [facility] with complaint of chest pain  Patient reports around 3/4 am she woke up to use bathroom  Prior to getting OOB she reports she had 8/10 chest pain described as pressure that radiated to her back  She denies associated symptoms of SOB, diaphoresis or nausea  Chest pain last for hours until she came to the ER and was given SL nitroglycerin x 1 and aspirin  No further complaint of chest pain  She denies recent illness  No fever or chills  She does report swelling in her legs that has been going on for months  She came to the ER in March 2021 and was discharge back to [facility]  She now follows with vascular surgery for lymphedema  She is now taking furosemide 80 mg PO BID and metolazone 2 5 mg PO every other day recently added about 1 5 weeks ago  She reports her legs have gotten better  She denies orthopnea or abdominal bloating  Pro BNP on admission was elevated: 1800 and Chest xray showed vascular congestion       Echocardiogram 07/26/2021 showed EF 60%, no regional wall motion abnormalities, grade 1 diastolic dysfunction, mild-to-moderate mitral regurgitation, bioprosthetic aortic valve exhibiting normal function and mild-to-moderate tricuspid regurgitation       She lives at [facility]  She is very sedentary and mostly in wheel chair  She pivots to the wheelchair for transfers       EKG reviewed personally:  V paced  Ventricular rate 90 beats per minute     Telemetry reviewed personally:   V paced HR 70s     Review of Systems   Constitutional: Negative for chills, fever and malaise/fatigue  HENT: Negative for congestion  Cardiovascular: Positive for leg swelling   Negative for chest pain, dyspnea on exertion, orthopnea and palpitations  Respiratory: Negative for cough and shortness of breath  Musculoskeletal: Negative for falls  Gastrointestinal: Negative for bloating, nausea and vomiting  Neurological: Negative for dizziness and light-headedness  Psychiatric/Behavioral: Negative for altered mental status     All other systems reviewed and are negative      Historical Information         Medical History        Past Medical History:   Diagnosis Date    Anemia       Resolved 3/1/2017     Arthritis       Knee - Resolved 3/1/2017     Blind      CAD (coronary artery disease)       s/p HERNAN 6/2016    Calcaneal spur      CH[facility]congestive heart failure) ([facility])      Chronic knee instability       Resolved 3/1/2017     Chronic pain syndrome       Resolved 3/1/2017     Dementia ([facility] )       Last assessed 11/1/2017     Diabetes mellitus ([facility] )       non-insulin depdenent    Disease of thyroid gland      Diverticulitis      Essential thrombocytopenia ([facility])       Last assessed 11/1/2017     GERD (gastroesophageal reflux disease)      History of aortic valve replacement       Resolved 3/1/2017     History of bilateral knee replacement       Resolved 3/1/2017     History of TIA (transient ischemic attack)       no residual deficits    Hyperlipidemia      Hypertension      Hypoxia       on home O2 QHS    Leukocytosis       Resolved 3/1/2017     Lumbar post-laminectomy syndrome       Resolved 3/1/2017     Meniere disease      Osteopenia      Pacemaker       [employer] in 2/2015    S/P TAVR (transcatheter aortic valve replacement)       Resolved 3/1/2017     Severe aortic stenosis      Thrombocytosis ([facility])       Resolved 4/5/2017     Type 2 diabetes mellitus ([facility])       Last assessed 11/1/2017          Surgical History         Past Surgical History:   Procedure Laterality Date    APPENDECTOMY        BACK SURGERY         Arthrodesis     CARDIAC PACEMAKER PLACEMENT        CHOLECYSTECTOMY      EYE SURGERY        FRACTURE SURGERY Right 01/02/2018     femur    HYSTERECTOMY        SD EGD TRANSORAL BIOPSY SINGLE/MULTIPLE N/A 11/9/2016     Procedure: ESOPHAGOGASTRODUODENOSCOPY (EGD); Surgeon: Davin Esposito MD;  [facility]: [facility];   Service: Gastroenterology    SD OPEN RX FEMUR FX+INTRAMED GARRETT Right 1/2/2018     Procedure: INSERTION NAIL IM FEMUR ANTEGRADE (TROCHANTERIC) RIGHT;  Surgeon: Cody Kyle MD;  Location[facility];  Service: Orthopedics    SD REPLACE AORTIC VALVE OPENFEMORAL ARTERY APPROACH N/A 7/26/2016     Procedure: TRANSFEMORAL TAVR WITH 23MM LAROSE LILY S3 VALVE; JOSE ALFREDO ;  Surgeon: Efrain Dietz DO;  [facility];  Service: Cardiac Surgery    SMALL INTESTINE SURGERY        TONSILLECTOMY        TOTAL KNEE ARTHROPLASTY        VARICOSE VEIN SURGERY        VENTRAL HERNIA REPAIR             Social History          Substance and Sexual Activity   Alcohol Use Not Currently      Social History          Substance and Sexual Activity   Drug Use No      Social History          Tobacco Use   Smoking Status Never Smoker   Smokeless Tobacco Never Used      Family History:         Family History   Problem Relation Age of Onset    Cancer Child      Coronary artery disease Father           Native artery     Stroke Family      Osteopenia Family      Other Family           Pituitary disease    Polycythemia Family           Meds/Allergies   all current active meds have been reviewed and current meds:   Current Medications          Current Facility-Administered Medications   Medication Dose Route Frequency    potassium chloride (K-DUR,KLOR-CON) CR tablet 20 mEq  20 mEq Oral Once    potassium chloride (K-DUR,KLOR-CON) CR tablet 40 mEq  40 mEq Oral BID         No current facility-administered medications for this encounter               Allergies   Allergen Reactions    Advil [Ibuprofen] GI Intolerance    Propoxyphene Other (See Comments)       DARVOCET=ACID REFLUX    Rofecoxib Other (See Comments)         Objective   Vitals: Blood pressure 121/58, pulse 78, temperature 98 8 °F (37 1 °C), temperature source Oral, resp  rate 18, height 5' (1 524 m), weight 93 kg (205 lb), SpO2 92 %  ,      Body mass index is 40 04 kg/m²  ,      Systolic (93JAA), REN:069 , Min:121 , ZNX:169      Diastolic (05JZQ), NSJ:95, Min:58, Max:65              No intake or output data in the 24 hours ending 08/17/21 1435         Weight (last 2 days)      Date/Time   Weight     08/17/21 1117    93 (205)                    Invasive Devices            Peripheral Intravenous Line                     Peripheral IV 08/17/21 Right Antecubital <1 day                     Physical Exam  Constitutional:       General: She is not in acute distress  Appearance: She is obese  HENT:      Head: Normocephalic  Mouth/Throat:      Mouth: Mucous membranes are moist    Cardiovascular:      Rate and Rhythm: Normal rate and regular rhythm  Pulses: Normal pulses  Pulmonary:      Effort: Pulmonary effort is normal  No respiratory distress  Comments: Decreased in bases  RA   Abdominal:      General: Bowel sounds are normal       Palpations: Abdomen is soft  Musculoskeletal:         General: Swelling present  Normal range of motion  Cervical back: Neck supple  Comments: Trace bilateral LE edema    Skin:     General: Skin is warm and dry  Capillary Refill: Capillary refill takes less than 2 seconds  Neurological:      General: No focal deficit present  Mental Status: She is alert and oriented to person, place, and time     Psychiatric:         Mood and Affect: Mood normal                LABORATORY RESULTS:       Results from last 7 days   Lab Units 08/17/21  1142   TROPONIN I ng/mL 0 15*      CBC with diff:        Results from last 7 days   Lab Units 08/17/21  1142   WBC Thousand/uL 19 88*   HEMOGLOBIN g/dL 13 9   HEMATOCRIT % 44 4   MCV fL 95   PLATELETS Thousands/uL 379   MCH pg 29 7   MCHC g/dL 31 3*   RDW % 12 9   MPV fL 10 8   NRBC AUTO /100 WBCs 0         CMP:       Results from last 7 days   Lab Units 21  1142   POTASSIUM mmol/L 2 7*   CHLORIDE mmol/L 94*   CO2 mmol/L 36*   BUN mg/dL 49*   CREATININE mg/dL 1 52*   CALCIUM mg/dL 10 1   AST U/L 28   ALT U/L 36   ALK PHOS U/L 126*   EGFR ml/min/1 73sq m 31         BMP:       Results from last 7 days   Lab Units 21  1142   POTASSIUM mmol/L 2 7*   CHLORIDE mmol/L 94*   CO2 mmol/L 36*   BUN mg/dL 49*   CREATININE mg/dL 1 52*   CALCIUM mg/dL 10 1                  Lab Results   Component Value Date     NTBNP 1,806 (H) 2021     NTBNP 668 (H) 2021     NTBNP 535 (H) 2016              Results from last 7 days   Lab Units 21  1142   INR   1 03      Lipid Profile:         Lab Results   Component Value Date     CHOL 195 2015     CHOL 163 2015     CHOL 186 2014            Lab Results   Component Value Date     HDL 55 2017     HDL 34 (L) 2016     HDL 44 2016            Lab Results   Component Value Date     LDLCALC 44 2017     LDLCALC 105 (H) 2016     LDLCALC 120 (H) 2016            Lab Results   Component Value Date     TRIG 117 2017     TRIG 103 2016     TRIG 177 (H) 2016      Cardiac testing:   Results for orders placed during the hospital encounter of 21     Echo complete with contrast if indicated     Narrative  [facility] [facility]  [phone]     Transthoracic Echocardiogram  2D, M-mode, Doppler, and Color Doppler     Study date:  2021     Patient: Josh Pandey  MR number: [id number]  Account number: [de-identified]  : 1935  Age: 80 years  Gender: Female  Status: Outpatient  Location: [address] [place] [address] and Vascular Center  Height: 62 in  Weight: 192 5 lb  BP: 158/ 88 mmHg     Indications: Assess left ventricular function   Evaluate prosthetic aortic valve      Diagnoses: I25 10 - Atherosclerotic heart disease of native coronary artery without angina pectoris     Sonographer:  ALMA Chris  Primary Physician:  Jameson Brewster MD  Referring Physician:  Iain Vasquez DO  Group:  [facility]  Interpreting Physician:  Elizabeth Williamson MD     SUMMARY     LEFT VENTRICLE:  Systolic function was normal  Ejection fraction was estimated to be 60 %  There were no regional wall motion abnormalities  Doppler parameters were consistent with abnormal left ventricular relaxation (grade 1 diastolic dysfunction)      MITRAL VALVE:  There was moderate annular calcification  There was mild to moderate regurgitation      AORTIC VALVE:  A 23 mm ([employer] [facility] TAVR) bioprosthesis was present  It exhibited normal function  There was mild to moderate central regurgitation  There was no significant perivalvular regurgitation  Valve mean gradient was 14 mmHg  Estimated aortic valve area (by VTI) was 1 73 cmï¾²     TRICUSPID VALVE:  There was mild to moderate regurgitation  Pulmonary artery systolic pressure was within the normal range      HISTORY: PRIOR HISTORY: CAD s/p PCI, Aortic stenosis s/p TAVR in 2016     PROCEDURE: The study was performed in the [facility] and [facility] Center  This was a routine study  The transthoracic approach was used  The study included complete 2D imaging, M-mode, complete spectral Doppler, and color Doppler  The  heart rate was 71 bpm, at the start of the study  Images were obtained from the parasternal, apical, subcostal, and suprasternal notch acoustic windows  Image quality was adequate      LEFT VENTRICLE: Size was normal  Systolic function was normal  Ejection fraction was estimated to be 60 %  There were no regional wall motion abnormalities   Wall thickness was normal  DOPPLER: Doppler parameters were consistent with  abnormal left ventricular relaxation (grade 1 diastolic dysfunction)      RIGHT VENTRICLE: The size was normal  Systolic function was normal  Wall thickness was normal      LEFT ATRIUM: Size was at the upper limits of normal      RIGHT ATRIUM: Size was normal      MITRAL VALVE: There was moderate annular calcification  Valve structure was normal  There was normal leaflet separation  DOPPLER: The transmitral velocity was within the normal range  There was no evidence for stenosis  There was mild to  moderate regurgitation      AORTIC VALVE: A 23 mm ([employer] TAVR) bioprosthesis was present  It exhibited normal function  DOPPLER: There was no evidence for stenosis  There was mild to moderate central regurgitation  There was no significant perivalvular  regurgitation      TRICUSPID VALVE: The valve structure was normal  There was normal leaflet separation  DOPPLER: The transtricuspid velocity was within the normal range  There was no evidence for stenosis  There was mild to moderate regurgitation  Pulmonary  artery systolic pressure was within the normal range  Estimated peak PA pressure was 30 mmHg      PULMONIC VALVE: Not well visualized  DOPPLER: The transpulmonic velocity was within the normal range  There was no significant regurgitation      PERICARDIUM: There was no pericardial effusion   The pericardium was normal in appearance      AORTA: The root exhibited normal size      SYSTEMIC VEINS: IVC: The inferior vena cava was normal in size      MEASUREMENT TABLES     2D MEASUREMENTS  LVOT   (Reference normals)  Diam   20 mm   (--)     DOPPLER MEASUREMENTS  LVOT   (Reference normals)  Peak soren   124 cm/s   (--)  Mean soren   98 cm/s   (--)  VTI   25 5 cm   (--)  Peak gradient   5 mmHg   (--)  Mean gradient   3 mmHg   (--)  Stroke vol   80 11 ml   (--)  Aortic valve   (Reference normals)  Peak soren   217 cm/s   (--)  Mean soren   169 cm/s   (--)  VTI   46 cm   (--)  Peak gradient   19 mmHg   (--)  Mean gradient   14 mmHg   (--)  Obstr index, VTI   0 55    (--)  Valve area, VTI   1 73 cmï¾²   (--)  Area index, VTI   0 92 cmï¾²/mï¾²   (--)  Obstr index, Vmax   0 57    (--)  Valve area, Vmax   1 79 cmï¾²   (--)  Area index, Vmax   0 95 cmï¾²/mï¾²   (--)  Obstr index, Vmean   0 58    (--)  Valve area, Vmean   1 82 cmï¾²   (--)  Area index, Vmean   0 97 cmï¾²/mï¾²   (--)     SYSTEM MEASUREMENT TABLES     2D  %FS: 39 79 %  Ao Diam: 2 43 cm  EDV(Teich): 101 53 ml  EF(Teich): 70 38 %  ESV(Teich): 30 07 ml  IVSd: 1 cm  LA Area: 16 81 cm2  LA Diam: 3 7 cm  LVEDV MOD A4C: 88 07 ml  LVEF MOD A4C: 66 49 %  LVESV MOD A4C: 29 52 ml  LVIDd: 4 68 cm  LVIDs: 2 82 cm  LVLd A4C: 6 72 cm  LVLs A4C: 5 23 cm  LVOT Diam: 2 03 cm  LVPWd: 1 02 cm  RA Area: 13 74 cm2  RVIDd: 3 32 cm  SV MOD A4C: 58 56 ml  SV(Teich): 71 46 ml     CF  MR Trace: 4 3 cm2  TR Trace: 5 06 cm2     CW  AR Dec [place]: 2 2 m/s2  AR Dec Time: 1516 05 ms  AR PHT: 439 65 ms  AR Vmax: 3 34 m/s  AR maxP 54 mmHg  AV Env  Ti: 266 41 ms  AV MaxP 67 mmHg  AV VTI: 42 45 cm  AV Vmax: 2 1 m/s  AV Vmean: 1 62 m/s  AV meanP 44 mmHg  MV PHT: 42 01 ms  MV VTI: 33 05 cm  MV Vmax: 1 68 m/s  MV Vmean: 1 m/s  MV maxP 3 mmHg  MV meanP 52 mmHg  MVA By PHT: 5 24 cm2  TR MaxP 38 mmHg  TR Vmax: 2 42 m/s     MM  TAPSE: 1 85 cm     PW  LARA (VTI): 2 24 cm2  LARA Vmax: 1 96 cm2  AVAI (VTI): 0 cm2/m2  AVAI Vmax: 0 cm2/m2  E' Sept: 0 06 m/s  E/E' Sept: 19 54  LVOT Env  Ti: 300 67 ms  LVOT VTI: 29 48 cm  LVOT Vmax: 1 27 m/s  LVOT Vmean: 0 98 m/s  LVOT maxP 47 mmHg  LVOT meanP 22 mmHg  LVSI Dopp: 50 63 ml/m2  LVSV Dopp: 95 18 ml  MV A Donnell: 1 34 m/s  MV Dec [place]: 7 65 m/s2  MV DecT: 153 78 ms  MV E Donnell: 1 18 m/s  MV E/A Ratio: 0 88  MVA (VTI): 2 88 cm2     [facility] Accredited Echocardiography Laboratory     Prepared and electronically signed by  Radha Lopez MD  Signed 2021 13:33:44     No results found for this or any previous visit      No valid procedures specified    No results found for this or any previous visit            Imaging:   Echo complete with contrast if indicated     Result Date: 2021  Narrative: [address] [facility], [address] [facility] [phone] Transthoracic Echocardiogram 2D, M-mode, Doppler, and Color Doppler Study date:  2021 Patient: Crystal Moralez MR number: [id number] Account number: [de-identified] : 1935 Age: 80 years Gender: Female Status: Outpatient Location: [facility] Height: 62 in Weight: 192 5 lb BP: 158/ 88 mmHg Indications: Assess left ventricular function  Evaluate prosthetic aortic valve  Diagnoses: I25 10 - Atherosclerotic heart disease of native coronary artery without angina pectoris Sonographer:  ALMA Villagran Primary Physician:  Jamarcus Romero MD Referring Physician:  Vikas Yuen DO [facility]  [facility] Interpreting Physician:  Elen Wilson MD SUMMARY LEFT VENTRICLE: Systolic function was normal  Ejection fraction was estimated to be 60 %  There were no regional wall motion abnormalities  Doppler parameters were consistent with abnormal left ventricular relaxation (grade 1 diastolic dysfunction)  MITRAL VALVE: There was moderate annular calcification  There was mild to moderate regurgitation  AORTIC VALVE: A 23 mm ([employer] TAVR) bioprosthesis was present  It exhibited normal function  There was mild to moderate central regurgitation  There was no significant perivalvular regurgitation  Valve mean gradient was 14 mmHg  Estimated aortic valve area (by VTI) was 1 73 cmï¾²  TRICUSPID VALVE: There was mild to moderate regurgitation  Pulmonary artery systolic pressure was within the normal range  HISTORY: PRIOR HISTORY: CAD s/p PCI, Aortic stenosis s/p TAVR in 2016 PROCEDURE: The study was performed in the [facility]  This was a routine study  The transthoracic approach was used  The study included complete 2D imaging, M-mode, complete spectral Doppler, and color Doppler   The heart rate was 71 bpm, at the start of the study  Images were obtained from the parasternal, apical, subcostal, and suprasternal notch acoustic windows  Image quality was adequate  LEFT VENTRICLE: Size was normal  Systolic function was normal  Ejection fraction was estimated to be 60 %  There were no regional wall motion abnormalities  Wall thickness was normal  DOPPLER: Doppler parameters were consistent with abnormal left ventricular relaxation (grade 1 diastolic dysfunction)  RIGHT VENTRICLE: The size was normal  Systolic function was normal  Wall thickness was normal  LEFT ATRIUM: Size was at the upper limits of normal  RIGHT ATRIUM: Size was normal  MITRAL VALVE: There was moderate annular calcification  Valve structure was normal  There was normal leaflet separation  DOPPLER: The transmitral velocity was within the normal range  There was no evidence for stenosis  There was mild to moderate regurgitation  AORTIC VALVE: A 23 mm ([employer] TAVR) bioprosthesis was present  It exhibited normal function  DOPPLER: There was no evidence for stenosis  There was mild to moderate central regurgitation  There was no significant perivalvular regurgitation  TRICUSPID VALVE: The valve structure was normal  There was normal leaflet separation  DOPPLER: The transtricuspid velocity was within the normal range  There was no evidence for stenosis  There was mild to moderate regurgitation  Pulmonary artery systolic pressure was within the normal range  Estimated peak PA pressure was 30 mmHg  PULMONIC VALVE: Not well visualized  DOPPLER: The transpulmonic velocity was within the normal range  There was no significant regurgitation  PERICARDIUM: There was no pericardial effusion  The pericardium was normal in appearance  AORTA: The root exhibited normal size  SYSTEMIC VEINS: IVC: The inferior vena cava was normal in size   MEASUREMENT TABLES 2D MEASUREMENTS LVOT   (Reference normals) Diam   20 mm   (--) DOPPLER MEASUREMENTS LVOT   (Reference normals) Peak donnell   124 cm/s   (--) Mean donnell   98 cm/s   (--) VTI   25 5 cm   (--) Peak gradient   5 mmHg   (--) Mean gradient   3 mmHg   (--) Stroke vol   80 11 ml   (--) Aortic valve   (Reference normals) Peak donnell   217 cm/s   (--) Mean donnell   169 cm/s   (--) VTI   46 cm   (--) Peak gradient   19 mmHg   (--) Mean gradient   14 mmHg   (--) Obstr index, VTI   0 55    (--) Valve area, VTI   1 73 cmï¾²   (--) Area index, VTI   0 92 cmï¾²/mï¾²   (--) Obstr index, Vmax   0 57    (--) Valve area, Vmax   1 79 cmï¾²   (--) Area index, Vmax   0 95 cmï¾²/mï¾²   (--) Obstr index, Vmean   0 58    (--) Valve area, Vmean   1 82 cmï¾²   (--) Area index, Vmean   0 97 cmï¾²/mï¾²   (--) SYSTEM MEASUREMENT TABLES 2D %FS: 39 79 % Ao Diam: 2 43 cm EDV(Teich): 101 53 ml EF(Teich): 70 38 % ESV(Teich): 30 07 ml IVSd: 1 cm LA Area: 16 81 cm2 LA Diam: 3 7 cm LVEDV MOD A4C: 88 07 ml LVEF MOD A4C: 66 49 % LVESV MOD A4C: 29 52 ml LVIDd: 4 68 cm LVIDs: 2 82 cm LVLd A4C: 6 72 cm LVLs A4C: 5 23 cm LVOT Diam: 2 03 cm LVPWd: 1 02 cm RA Area: 13 74 cm2 RVIDd: 3 32 cm SV MOD A4C: 58 56 ml SV(Teich): 71 46 ml CF MR Trace: 4 3 cm2 TR Trace: 5 06 cm2 CW AR Dec Lynn: 2 2 m/s2 AR Dec Time: 1516 05 ms AR PHT: 439 65 ms AR Vmax: 3 34 m/s AR maxP 54 mmHg AV Env  Ti: 266 41 ms AV MaxP 67 mmHg AV VTI: 42 45 cm AV Vmax: 2 1 m/s AV Vmean: 1 62 m/s AV meanP 44 mmHg MV PHT: 42 01 ms MV VTI: 33 05 cm MV Vmax: 1 68 m/s MV Vmean: 1 m/s MV maxP 3 mmHg MV meanP 52 mmHg MVA By PHT: 5 24 cm2 TR MaxP 38 mmHg TR Vmax: 2 42 m/s MM TAPSE: 1 85 cm PW LARA (VTI): 2 24 cm2 LARA Vmax: 1 96 cm2 AVAI (VTI): 0 cm2/m2 AVAI Vmax: 0 cm2/m2 E' Sept: 0 06 m/s E/E' Sept: 19 54 LVOT Env  Ti: 300 67 ms LVOT VTI: 29 48 cm LVOT Vmax: 1 27 m/s LVOT Vmean: 0 98 m/s LVOT maxP 47 mmHg LVOT meanP 22 mmHg LVSI Dopp: 50 63 ml/m2 LVSV Dopp: 95 18 ml MV A Donnell: 1 34 m/s MV Dec Lynn: 7 65 m/s2 MV DecT: 153 78 ms MV E Donnell: 1 18 m/s MV E/A Ratio: 0 88 MVA (VTI): 2 88 cm2 [address] [facility] Prepared and electronically signed by Charli Ramirez MD Signed 26-Jul-2021 13:33:44      US bedside procedure     Result Date: 8/17/2021  Narrative: 1 [id number]  2 [id number] 161 [id number] 1     Thank you for allowing us to participate in this patient's care  This pt will follow up with Dr Maurilio Wilde once discharged       Counseling / Coordination of Care  Total floor / unit time spent today 45 minutes  Greater than 50% of total time was spent with the patient and / or family counseling and / or coordination of care  A description of the counseling / coordination of care: Review of history, current assessment, development of a plan         Code Status: Prior     ** Please Note: Dragon 360 Dictation voice to text software may have been used in the creation of this document   **            Cosigned by: Shani Morales MD at 8/17/2021  3:51 PM

## 2021-08-17 NOTE — CONSULTS
Consultation - Cardiology Team One  Seng Hutton 80 y o  female MRN: [id number]  Unit/Bed#: ED 23 Encounter: [id number]    Consults    Physician Requesting Consult: Kieran Early MD  Reason for Consult / Principal Problem:  Chest pain      Assessment/ Plan    1  Chest pain with elevated troponin  Troponin 0 15, continue to trend  ECG reviewed showing V paced rhythm   Chest pain free, monitor symptoms   If troponin trends up recommend heparin gtt  2  Acute kidney injury  Current 1 52 on admission  Baseline creatinine 0 8-1 0  Monitor with diuretics     3  Acute on chronic diastolic heart failure  ProBNP 1806  Chest x-ray reviewed showing vascular congestion   Will start IV diuretics   Patient takes furosemide 80 mg PO BID and metolazone 2 5 mg every other day at [employer] [facility]   Monitor I/Os  Daily weights   Start 2 gram Na diet and 1800 ml fluid restriction     4  Hypokalemia  K 2 7  Replete    5  Leukocytosis   Followed by primary team   Consider CT of chest     6  Coronary artery disease with history of PCI/stenting to LAD in 2016  Continue home medication:  Aspirin, statin beta-blocker    7  Aortic stenosis status post TAVR in 2016    8  Hypertension average blood pressure 131/63  On amlodipine 5 mg p o  daily and metoprolol 50 mg p o  Twice a day, continue this admission    9  Hyperlipidemia  Continue statin     10  Sick sinus syndrome status post Biotronik dual-chamber permanent pacemaker      History of Present Illness   HPI: Seng Hutton is a 80y o  year old female who has coronary artery disease status post HERNAN x2 to LAD 2016, aortic stenosis status post TAVR in 2016, hypertension, hyperlipidemia, sick sinus syndrome status post Biotronik dual-chamber permanent pacemaker, chronic diastolic heart failure, diabetes, hypothyroidism and ambulatory dysfunction  She follows with cardiologist Dr Estella Lobo  She presents to [facility] ER 8/17/2021 from [facility] with complaint of chest pain   Patient reports around 3/4 am she woke up to use bathroom  Prior to getting OOB she reports she had 8/10 chest pain described as pressure that radiated to her back  She denies associated symptoms of SOB, diaphoresis or nausea  Chest pain last for hours until she came to the ER and was given SL nitroglycerin x 1 and aspirin  No further complaint of chest pain  She denies recent illness  No fever or chills  She does report swelling in her legs that has been going on for months  She came to the ER in March 2021 and [facility]  She now follows with vascular surgery for lymphedema  She is now taking furosemide 80 mg PO BID and metolazone 2 5 mg PO every other day recently added about 1 5 weeks ago  She reports her legs have gotten better  She denies orthopnea or abdominal bloating  Pro BNP on admission was elevated: 1800 and Chest xray showed vascular congestion  Echocardiogram 07/26/2021 showed EF 60%, no regional wall motion abnormalities, grade 1 diastolic dysfunction, mild-to-moderate mitral regurgitation, bioprosthetic aortic valve exhibiting normal function and mild-to-moderate tricuspid regurgitation  She lives at [place] [address]  She is very sedentary and mostly in wheel chair  She pivots to the wheelchair for transfers  EKG reviewed personally:  V paced  Ventricular rate 90 beats per minute    Telemetry reviewed personally:   V paced HR 70s    Review of Systems   Constitutional: Negative for chills, fever and malaise/fatigue  HENT: Negative for congestion  Cardiovascular: Positive for leg swelling  Negative for chest pain, dyspnea on exertion, orthopnea and palpitations  Respiratory: Negative for cough and shortness of breath  Musculoskeletal: Negative for falls  Gastrointestinal: Negative for bloating, nausea and vomiting  Neurological: Negative for dizziness and light-headedness  Psychiatric/Behavioral: Negative for altered mental status     All other systems reviewed and are negative  Historical Information   Past Medical History:   Diagnosis Date    Anemia     Resolved 3/1/2017     Arthritis     Knee - Resolved 3/1/2017     Blind     CAD (coronary artery disease)     s/p HERNAN 6/2016    Calcaneal spur     CHF (congestive heart failure) (HCC)     Chronic knee instability     Resolved 3/1/2017     Chronic pain syndrome     Resolved 3/1/2017     Dementia ([facility] )     Last assessed 11/1/2017     Diabetes mellitus ([facility] )     non-insulin depdenent    Disease of thyroid gland     Diverticulitis     Essential thrombocytopenia ([facility] )     Last assessed 11/1/2017     GERD (gastroesophageal reflux disease)     History of aortic valve replacement     Resolved 3/1/2017     History of bilateral knee replacement     Resolved 3/1/2017     History of TIA (transient ischemic attack)     no residual deficits    Hyperlipidemia     Hypertension     Hypoxia     on home O2 QHS    Leukocytosis     Resolved 3/1/2017     Lumbar post-laminectomy syndrome     Resolved 3/1/2017     Meniere disease     Osteopenia     Pacemaker     CHB-Biotronik in 2/2015    S/P TAVR (transcatheter aortic valve replacement)     Resolved 3/1/2017     Severe aortic stenosis     Thrombocytosis ([facility] )     Resolved 4/5/2017     Type 2 diabetes mellitus ([facility] )     Last assessed 11/1/2017      Past Surgical History:   Procedure Laterality Date    APPENDECTOMY      BACK SURGERY      Arthrodesis     CARDIAC PACEMAKER PLACEMENT      CHOLECYSTECTOMY      EYE SURGERY      FRACTURE SURGERY Right 01/02/2018    femur    HYSTERECTOMY      IN EGD TRANSORAL BIOPSY SINGLE/MULTIPLE N/A 11/9/2016    Procedure: ESOPHAGOGASTRODUODENOSCOPY (EGD); Surgeon: Rex Clement MD;  [facility]: [facility];   Service: Gastroenterology    IN OPEN RX FEMUR FX+INTRAMED GARRETT Right 1/2/2018    Procedure: INSERTION NAIL IM FEMUR ANTEGRADE (TROCHANTERIC) RIGHT;  Surgeon: Ronda Robles MD;  Location: [facility]; Service: Orthopedics    NE REPLACE AORTIC VALVE OPENFEMORAL ARTERY APPROACH N/A 7/26/2016    Procedure: TRANSFEMORAL TAVR WITH 23MM LAROSE LILY S3 VALVE; JOSE ALFREDO ;  Surgeon: Valerie Jensen DO;  [facility];  Service: Cardiac Surgery    SMALL INTESTINE SURGERY      TONSILLECTOMY      TOTAL KNEE ARTHROPLASTY      VARICOSE VEIN SURGERY      VENTRAL HERNIA REPAIR       Social History     Substance and Sexual Activity   Alcohol Use Not Currently     Social History     Substance and Sexual Activity   Drug Use No     Social History     Tobacco Use   Smoking Status Never Smoker   Smokeless Tobacco Never Used     Family History:   Family History   Problem Relation Age of Onset    Cancer Child     Coronary artery disease Father         Native artery     Stroke Family     Osteopenia Family     Other Family         Pituitary disease    Polycythemia Family        Meds/Allergies   all current active meds have been reviewed and current meds:   Current Facility-Administered Medications   Medication Dose Route Frequency    potassium chloride (K-DUR,KLOR-CON) CR tablet 20 mEq  20 mEq Oral Once    potassium chloride (K-DUR,KLOR-CON) CR tablet 40 mEq  40 mEq Oral BID     No current facility-administered medications for this encounter  Allergies   Allergen Reactions    Advil [Ibuprofen] GI Intolerance    Propoxyphene Other (See Comments)     DARVOCET=ACID REFLUX    Rofecoxib Other (See Comments)       Objective   Vitals: Blood pressure 121/58, pulse 78, temperature 98 8 °F (37 1 °C), temperature source Oral, resp  rate 18, height 5' (1 524 m), weight 93 kg (205 lb), SpO2 92 %  ,     Body mass index is 40 04 kg/m²  ,     Systolic (09RMV), FGA:712 , Min:121 , AOY:494     Diastolic (53EUZ), XDV:12, Min:58, Max:65          No intake or output data in the 24 hours ending 08/17/21 1435  Weight (last 2 days)     Date/Time   Weight    08/17/21 1117   93 (205)            Invasive Devices     Peripheral Intravenous Line            Peripheral IV 08/17/21 Right Antecubital <1 day                Physical Exam  Constitutional:       General: She is not in acute distress  Appearance: She is obese  HENT:      Head: Normocephalic  Mouth/Throat:      Mouth: Mucous membranes are moist    Cardiovascular:      Rate and Rhythm: Normal rate and regular rhythm  Pulses: Normal pulses  Pulmonary:      Effort: Pulmonary effort is normal  No respiratory distress  Comments: Decreased in bases  RA   Abdominal:      General: Bowel sounds are normal       Palpations: Abdomen is soft  Musculoskeletal:         General: Swelling present  Normal range of motion  Cervical back: Neck supple  Comments: Trace bilateral LE edema    Skin:     General: Skin is warm and dry  Capillary Refill: Capillary refill takes less than 2 seconds  Neurological:      General: No focal deficit present  Mental Status: She is alert and oriented to person, place, and time     Psychiatric:         Mood and Affect: Mood normal            LABORATORY RESULTS:  Results from last 7 days   Lab Units 08/17/21  1142   TROPONIN I ng/mL 0 15*     CBC with diff:   Results from last 7 days   Lab Units 08/17/21  1142   WBC Thousand/uL 19 88*   HEMOGLOBIN g/dL 13 9   HEMATOCRIT % 44 4   MCV fL 95   PLATELETS Thousands/uL 379   MCH pg 29 7   MCHC g/dL 31 3*   RDW % 12 9   MPV fL 10 8   NRBC AUTO /100 WBCs 0       CMP:  Results from last 7 days   Lab Units 08/17/21  1142   POTASSIUM mmol/L 2 7*   CHLORIDE mmol/L 94*   CO2 mmol/L 36*   BUN mg/dL 49*   CREATININE mg/dL 1 52*   CALCIUM mg/dL 10 1   AST U/L 28   ALT U/L 36   ALK PHOS U/L 126*   EGFR ml/min/1 73sq m 31       BMP:  Results from last 7 days   Lab Units 08/17/21  1142   POTASSIUM mmol/L 2 7*   CHLORIDE mmol/L 94*   CO2 mmol/L 36*   BUN mg/dL 49*   CREATININE mg/dL 1 52*   CALCIUM mg/dL 10 1          Lab Results   Component Value Date    NTBNP 1,806 (H) 08/17/2021    NTBNP 668 (H) 2021    NTBNP 535 (H) 2016       Results from last 7 days   Lab Units 21  1142   INR  1 03     Lipid Profile:   Lab Results   Component Value Date    CHOL 195 2015    CHOL 163 2015    CHOL 186 2014     Lab Results   Component Value Date    HDL 55 2017    HDL 34 (L) 2016    HDL 44 2016     Lab Results   Component Value Date    LDLCALC 44 2017    LDLCALC 105 (H) 2016    LDLCALC 120 (H) 2016     Lab Results   Component Value Date    TRIG 117 2017    TRIG 103 2016    TRIG 177 (H) 2016     Cardiac testing:   Results for orders placed during the hospital encounter of 21    Echo complete with contrast if indicated    Narrative  [place] [address], [address] [facility]  [phone]    Transthoracic Echocardiogram  2D, M-mode, Doppler, and Color Doppler    Study date:  2021    Patient: Tim Cortez  MR number: [id number]  Account number: [de-identified]  : 1935  Age: 80 years  Gender: Female  Status: Outpatient  Location: [address] [place] [address] and Vascular Center  Height: 62 in  Weight: 192 5 lb  BP: 158/ 88 mmHg    Indications: Assess left ventricular function  Evaluate prosthetic aortic valve  Diagnoses: I25 10 - Atherosclerotic heart disease of native coronary artery without angina pectoris    Sonographer:  ALMA Soriano  Primary Physician:  Atif Bowman MD  Referring Physician:  Christiane Ortega DO  Group:  [facility]  Interpreting Physician:  Bobby Garcia MD    SUMMARY    LEFT VENTRICLE:  Systolic function was normal  Ejection fraction was estimated to be 60 %  There were no regional wall motion abnormalities  Doppler parameters were consistent with abnormal left ventricular relaxation (grade 1 diastolic dysfunction)  MITRAL VALVE:  There was moderate annular calcification  There was mild to moderate regurgitation      AORTIC VALVE:  A 23 mm Patricia Quails Madeline TAVR) bioprosthesis was present  It exhibited normal function  There was mild to moderate central regurgitation  There was no significant perivalvular regurgitation  Valve mean gradient was 14 mmHg  Estimated aortic valve area (by VTI) was 1 73 cmï¾²  TRICUSPID VALVE:  There was mild to moderate regurgitation  Pulmonary artery systolic pressure was within the normal range  HISTORY: PRIOR HISTORY: CAD s/p PCI, Aortic stenosis s/p TAVR in 2016    PROCEDURE: The study was performed in the [facility] and Vascular Center  This was a routine study  The transthoracic approach was used  The study included complete 2D imaging, M-mode, complete spectral Doppler, and color Doppler  The  heart rate was 71 bpm, at the start of the study  Images were obtained from the parasternal, apical, subcostal, and suprasternal notch acoustic windows  Image quality was adequate  LEFT VENTRICLE: Size was normal  Systolic function was normal  Ejection fraction was estimated to be 60 %  There were no regional wall motion abnormalities  Wall thickness was normal  DOPPLER: Doppler parameters were consistent with  abnormal left ventricular relaxation (grade 1 diastolic dysfunction)  RIGHT VENTRICLE: The size was normal  Systolic function was normal  Wall thickness was normal     LEFT ATRIUM: Size was at the upper limits of normal     RIGHT ATRIUM: Size was normal     MITRAL VALVE: There was moderate annular calcification  Valve structure was normal  There was normal leaflet separation  DOPPLER: The transmitral velocity was within the normal range  There was no evidence for stenosis  There was mild to  moderate regurgitation  AORTIC VALVE: A 23 mm ([employer] Madeline TAVR) bioprosthesis was present  It exhibited normal function  DOPPLER: There was no evidence for stenosis  There was mild to moderate central regurgitation  There was no significant perivalvular  regurgitation      TRICUSPID VALVE: The valve structure was normal  There was normal leaflet separation  DOPPLER: The transtricuspid velocity was within the normal range  There was no evidence for stenosis  There was mild to moderate regurgitation  Pulmonary  artery systolic pressure was within the normal range  Estimated peak PA pressure was 30 mmHg  PULMONIC VALVE: Not well visualized  DOPPLER: The transpulmonic velocity was within the normal range  There was no significant regurgitation  PERICARDIUM: There was no pericardial effusion  The pericardium was normal in appearance  AORTA: The root exhibited normal size  SYSTEMIC VEINS: IVC: The inferior vena cava was normal in size      MEASUREMENT TABLES    2D MEASUREMENTS  LVOT   (Reference normals)  Diam   20 mm   (--)    DOPPLER MEASUREMENTS  LVOT   (Reference normals)  Peak soren   124 cm/s   (--)  Mean soren   98 cm/s   (--)  VTI   25 5 cm   (--)  Peak gradient   5 mmHg   (--)  Mean gradient   3 mmHg   (--)  Stroke vol   80 11 ml   (--)  Aortic valve   (Reference normals)  Peak soren   217 cm/s   (--)  Mean soren   169 cm/s   (--)  VTI   46 cm   (--)  Peak gradient   19 mmHg   (--)  Mean gradient   14 mmHg   (--)  Obstr index, VTI   0 55    (--)  Valve area, VTI   1 73 cmï¾²   (--)  Area index, VTI   0 92 cmï¾²/mï¾²   (--)  Obstr index, Vmax   0 57    (--)  Valve area, Vmax   1 79 cmï¾²   (--)  Area index, Vmax   0 95 cmï¾²/mï¾²   (--)  Obstr index, Vmean   0 58    (--)  Valve area, Vmean   1 82 cmï¾²   (--)  Area index, Vmean   0 97 cmï¾²/mï¾²   (--)    SYSTEM MEASUREMENT TABLES    2D  %FS: 39 79 %  Ao Diam: 2 43 cm  EDV(Teich): 101 53 ml  EF(Teich): 70 38 %  ESV(Teich): 30 07 ml  IVSd: 1 cm  LA Area: 16 81 cm2  LA Diam: 3 7 cm  LVEDV MOD A4C: 88 07 ml  LVEF MOD A4C: 66 49 %  LVESV MOD A4C: 29 52 ml  LVIDd: 4 68 cm  LVIDs: 2 82 cm  LVLd A4C: 6 72 cm  LVLs A4C: 5 23 cm  LVOT Diam: 2 03 cm  LVPWd: 1 02 cm  RA Area: 13 74 cm2  RVIDd: 3 32 cm  SV MOD A4C: 58 56 ml  SV(Teich): 71 46 ml    CF  MR Trace: 4 3 cm2  TR Trace: 5 06 cm2    CW  AR Dec [place]: 2 2 m/s2  AR Dec Time: 1516 05 ms  AR PHT: 439 65 ms  AR Vmax: 3 34 m/s  AR maxP 54 mmHg  AV Env  Ti: 266 41 ms  AV MaxP 67 mmHg  AV VTI: 42 45 cm  AV Vmax: 2 1 m/s  AV Vmean: 1 62 m/s  AV meanP 44 mmHg  MV PHT: 42 01 ms  MV VTI: 33 05 cm  MV Vmax: 1 68 m/s  MV Vmean: 1 m/s  MV maxP 3 mmHg  MV meanP 52 mmHg  MVA By PHT: 5 24 cm2  TR MaxP 38 mmHg  TR Vmax: 2 42 m/s    MM  TAPSE: 1 85 cm    PW  LARA (VTI): 2 24 cm2  LARA Vmax: 1 96 cm2  AVAI (VTI): 0 cm2/m2  AVAI Vmax: 0 cm2/m2  E' Sept: 0 06 m/s  E/E' Sept: 19 54  LVOT Env  Ti: 300 67 ms  LVOT VTI: 29 48 cm  LVOT Vmax: 1 27 m/s  LVOT Vmean: 0 98 m/s  LVOT maxP 47 mmHg  LVOT meanP 22 mmHg  LVSI Dopp: 50 63 ml/m2  LVSV Dopp: 95 18 ml  MV A Donnell: 1 34 m/s  MV Dec [place]: 7 65 m/s2  MV DecT: 153 78 ms  MV E [place]: 1 18 m/s  MV E/A Ratio: 0 88  MVA (VTI): 2 88 cm2    [facility] Accredited Echocardiography Laboratory    Prepared and electronically signed by    Jackye Hashimoto, MD  Signed 2021 13:33:44    No results found for this or any previous visit  No valid procedures specified  No results found for this or any previous visit  Imaging:   Echo complete with contrast if indicated    Result Date: 2021  Narrative: [address] [place] [address], [address] [place] [address] [phone] Transthoracic Echocardiogram 2D, M-mode, Doppler, and Color Doppler Study date:  2021 Patient: Omaira Benedict MR number: [id number] Account number: [de-identified] : 1935 Age: 80 years Gender: Female Status: Outpatient Location: [address] and Vascular Center Height: 62 in Weight: 192 5 lb BP: 158/ 88 mmHg Indications: Assess left ventricular function  Evaluate prosthetic aortic valve   Diagnoses: I25 10 - Atherosclerotic heart disease of native coronary artery without angina pectoris Sonographer:  ALMA Turner Primary Physician:  Viola Garcia MD Referring Physician: Nevin Ayala DO Group:  [facility] Interpreting Physician:  Charli Ramirez MD SUMMARY LEFT VENTRICLE: Systolic function was normal  Ejection fraction was estimated to be 60 %  There were no regional wall motion abnormalities  Doppler parameters were consistent with abnormal left ventricular relaxation (grade 1 diastolic dysfunction)  MITRAL VALVE: There was moderate annular calcification  There was mild to moderate regurgitation  AORTIC VALVE: A 23 mm ([employer] Madeline TAVR) bioprosthesis was present  It exhibited normal function  There was mild to moderate central regurgitation  There was no significant perivalvular regurgitation  Valve mean gradient was 14 mmHg  Estimated aortic valve area (by VTI) was 1 73 cmï¾²  TRICUSPID VALVE: There was mild to moderate regurgitation  Pulmonary artery systolic pressure was within the normal range  HISTORY: PRIOR HISTORY: CAD s/p PCI, Aortic stenosis s/p TAVR in 2016 PROCEDURE: The study was performed in the [facility] and Vascular [facility]  This was a routine study  The transthoracic approach was used  The study included complete 2D imaging, M-mode, complete spectral Doppler, and color Doppler  The heart rate was 71 bpm, at the start of the study  Images were obtained from the parasternal, apical, subcostal, and suprasternal notch acoustic windows  Image quality was adequate  LEFT VENTRICLE: Size was normal  Systolic function was normal  Ejection fraction was estimated to be 60 %  There were no regional wall motion abnormalities  Wall thickness was normal  DOPPLER: Doppler parameters were consistent with abnormal left ventricular relaxation (grade 1 diastolic dysfunction)  RIGHT VENTRICLE: The size was normal  Systolic function was normal  Wall thickness was normal  LEFT ATRIUM: Size was at the upper limits of normal  RIGHT ATRIUM: Size was normal  MITRAL VALVE: There was moderate annular calcification   Valve structure was normal  There was normal leaflet separation  DOPPLER: The transmitral velocity was within the normal range  There was no evidence for stenosis  There was mild to moderate regurgitation  AORTIC VALVE: A 23 mm ([employer] Madeline TAVR) bioprosthesis was present  It exhibited normal function  DOPPLER: There was no evidence for stenosis  There was mild to moderate central regurgitation  There was no significant perivalvular regurgitation  TRICUSPID VALVE: The valve structure was normal  There was normal leaflet separation  DOPPLER: The transtricuspid velocity was within the normal range  There was no evidence for stenosis  There was mild to moderate regurgitation  Pulmonary artery systolic pressure was within the normal range  Estimated peak PA pressure was 30 mmHg  PULMONIC VALVE: Not well visualized  DOPPLER: The transpulmonic velocity was within the normal range  There was no significant regurgitation  PERICARDIUM: There was no pericardial effusion  The pericardium was normal in appearance  AORTA: The root exhibited normal size  SYSTEMIC VEINS: IVC: The inferior vena cava was normal in size   MEASUREMENT TABLES 2D MEASUREMENTS LVOT   (Reference normals) Diam   20 mm   (--) DOPPLER MEASUREMENTS LVOT   (Reference normals) Peak soren   124 cm/s   (--) Mean soren   98 cm/s   (--) VTI   25 5 cm   (--) Peak gradient   5 mmHg   (--) Mean gradient   3 mmHg   (--) Stroke vol   80 11 ml   (--) Aortic valve   (Reference normals) Peak soren   217 cm/s   (--) Mean soren   169 cm/s   (--) VTI   46 cm   (--) Peak gradient   19 mmHg   (--) Mean gradient   14 mmHg   (--) Obstr index, VTI   0 55    (--) Valve area, VTI   1 73 cmï¾²   (--) Area index, VTI   0 92 cmï¾²/mï¾²   (--) Obstr index, Vmax   0 57    (--) Valve area, Vmax   1 79 cmï¾²   (--) Area index, Vmax   0 95 cmï¾²/mï¾²   (--) Obstr index, Vmean   0 58    (--) Valve area, Vmean   1 82 cmï¾²   (--) Area index, Vmean   0 97 cmï¾²/mï¾²   (--) SYSTEM MEASUREMENT TABLES 2D %FS: 39 79 % Ao Diam: 2 43 cm EDV(Teich): 101 53 ml EF(Teich): 70 38 % ESV(Teich): 30 07 ml IVSd: 1 cm LA Area: 16 81 cm2 LA Diam: 3 7 cm LVEDV MOD A4C: 88 07 ml LVEF MOD A4C: 66 49 % LVESV MOD A4C: 29 52 ml LVIDd: 4 68 cm LVIDs: 2 82 cm LVLd A4C: 6 72 cm LVLs A4C: 5 23 cm LVOT Diam: 2 03 cm LVPWd: 1 02 cm RA Area: 13 74 cm2 RVIDd: 3 32 cm SV MOD A4C: 58 56 ml SV(Teich): 71 46 ml CF MR Trace: 4 3 cm2 TR Trace: 5 06 cm2 CW AR Dec [place]: 2 2 m/s2 AR Dec Time: 1516 05 ms AR PHT: 439 65 ms AR Vmax: 3 34 m/s AR maxP 54 mmHg AV Env  Ti: 266 41 ms AV MaxP 67 mmHg AV VTI: 42 45 cm AV Vmax: 2 1 m/s AV Vmean: 1 62 m/s AV meanP 44 mmHg MV PHT: 42 01 ms MV VTI: 33 05 cm MV Vmax: 1 68 m/s MV Vmean: 1 m/s MV maxP 3 mmHg MV meanP 52 mmHg MVA By PHT: 5 24 cm2 TR MaxP 38 mmHg TR Vmax: 2 42 m/s MM TAPSE: 1 85 cm PW LARA (VTI): 2 24 cm2 LARA Vmax: 1 96 cm2 AVAI (VTI): 0 cm2/m2 AVAI Vmax: 0 cm2/m2 E' Sept: 0 06 m/s E/E' Sept: 19 54 LVOT Env  Ti: 300 67 ms LVOT VTI: 29 48 cm LVOT Vmax: 1 27 m/s LVOT Vmean: 0 98 m/s LVOT maxP 47 mmHg LVOT meanP 22 mmHg LVSI Dopp: 50 63 ml/m2 LVSV Dopp: 95 18 ml MV A Donnell: 1 34 m/s MV Dec [place]: 7 65 m/s2 MV DecT: 153 78 ms MV E Donnell: 1 18 m/s MV E/A Ratio: 0 88 MVA (VTI): 2 88 cm2 [facility] Accredited Echocardiography Laboratory Prepared and electronically signed by Stacey Hinton MD Signed 2021 13:33:44     US bedside procedure    Result Date: 2021  Narrative: [id number]  2 [phone] [id number]    Thank you for allowing us to participate in this patient's care  This pt will follow up with Dr Deuce Gomez once discharged  Counseling / Coordination of Care  Total floor / unit time spent today 45 minutes  Greater than 50% of total time was spent with the patient and / or family counseling and / or coordination of care  A description of the counseling / coordination of care: Review of history, current assessment, development of a plan        Code Status: Prior    ** Please Note: Dragon 360 Dictation voice to text software may have been used in the creation of this document   **

## 2021-08-17 NOTE — H&P
[facility]  H&P- Juleen Teresa Day 1935, 80 y o  female MRN: [id number]  Unit/Bed#: ED 19 Encounter: [id number]  Primary Care Provider: Fang Casas MD   Date and time admitted to hospital: 8/17/2021 11:12 AM    * Acute on chronic heart failure [facility])  Assessment & Plan  Wt Readings from Last 3 Encounters:   08/17/21 93 kg (205 lb)   02/19/20 87 5 kg (193 lb)   02/11/20 87 6 kg (193 lb 2 oz)       Aggressively diurese 80 mg IV Lasix twice 1st dose already given by cardiology are for p m  Has gained 6 kilos  Will monitor input output and daily weights      SIRS (systemic inflammatory response syndrome) (HCC)  Assessment & Plan  And no clear source of infection  Blood cultures drawn  Will continue to trend vitals and leukocytes    Chest pain  Assessment & Plan  Will from sleep yesterday with chest pain substernal radiating to the back associated with diaphoresis and shortness of breath  Chest pain has since resolved however troponin is elevated 0 15 when compared to prior this is elevated  Will trend discussed with Cardiology if troponin bumps further will start heparin drip and consider cardiac catheterization once renal function is stable and volume status stable  CKD (chronic kidney disease)  Assessment & Plan  Lab Results   Component Value Date    EGFR 31 08/17/2021    EGFR 51 03/01/2021    EGFR 64 02/11/2020    CREATININE 1 52 (H) 08/17/2021    CREATININE 1 01 03/01/2021    CREATININE 0 84 02/11/2020   Patient has baseline creatinine of 0 8-1 now creatinine of 1 52 - Giovanni I    Likely in the setting of volume overload will recheck tomorrow after Lasix    Hypokalemia  Assessment & Plan  Potassium is 2 7 will replace  Unclear etiology is on Lasix at home      VTE Prophylaxis: Heparin  / sequential compression device   Code Status: Full code  POLST: There is no POLST form on file for this patient (pre-hospital)  Discussion with family: Discussed with daughter at bedside  Anticipated Length of Stay:  Patient will be admitted on an Inpatient basis with an anticipated length of stay of  More than 2 midnights  Justification for Hospital Stay: chest pain,elevated troponin  Total Time for Visit, including Counseling / Coordination of Care: 45 minutes  Greater than 50% of this total time spent on direct patient counseling and coordination of care  Chief Complaint:   Chest pain  History of Present Illness:    Brenda Hutton is a 80 y o  female who presents with substernal chest pain radiating to her back which came on overnight and woke her from her sleep  She does have a background of retaining fluid and was on Lasix outpatient  Chest x-ray is consistent vascular congestion and her initial troponin 0 15 and she is not known to have chronically elevated troponin  Her chest pain has since resolved she was also complaining of shortness of Breath which is also improved but not entirely resolved  On physical exam she appears volume overloaded she was seen by cardiology in the ED who started on Lasix 80 mg twice a day 1st dose already given  She appears to gained weight approximately 6 kg over the last month and also states that her lower extremities have been more swollen than usual over the last week or so  She is fulfilling SIRS criteria on admission however there is no clear source of infection and she denies any cough fevers or chills  Additionally he does have an elevated creatinine when compared to baseline possibly secondary to volume overload  Review of Systems:    Review of Systems   Constitutional: Positive for unexpected weight change  Negative for activity change, appetite change, chills, diaphoresis, fatigue and fever  HENT: Negative  Eyes: Negative  Respiratory: Positive for shortness of breath  Cardiovascular: Positive for chest pain and leg swelling  Gastrointestinal: Negative  Endocrine: Negative  Genitourinary: Negative  Musculoskeletal: Negative  Skin: Negative  Allergic/Immunologic: Negative  Neurological: Negative  Hematological: Negative  Psychiatric/Behavioral: Negative  Past Medical and Surgical History:     Past Medical History:   Diagnosis Date    Anemia     Resolved 3/1/2017     Arthritis     Knee - Resolved 3/1/2017     Blind     CAD (coronary artery disease)     s/p HERNAN 6/2016    Calcaneal spur     [facility]     Chronic knee instability     Resolved 3/1/2017     Chronic pain syndrome     Resolved 3/1/2017     Dementia ([facility] )     Last assessed 11/1/2017     Diabetes mellitus ([facility] )     non-insulin depdenent    Disease of thyroid gland     Diverticulitis     Essential thrombocytopenia ([facility] )     Last assessed 11/1/2017     GERD (gastroesophageal reflux disease)     History of aortic valve replacement     Resolved 3/1/2017     History of bilateral knee replacement     Resolved 3/1/2017     History of TIA (transient ischemic attack)     no residual deficits    Hyperlipidemia     Hypertension     Hypoxia     on home O2 QHS    Leukocytosis     Resolved 3/1/2017     Lumbar post-laminectomy syndrome     Resolved 3/1/2017     Meniere disease     Osteopenia     Pacemaker     CHB-Biotronik in 2/2015    S/P TAVR (transcatheter aortic valve replacement)     Resolved 3/1/2017     Severe aortic stenosis     Thrombocytosis ([facility] )     Resolved 4/5/2017     Type 2 diabetes mellitus ([facility] )     Last assessed 11/1/2017        Past Surgical History:   Procedure Laterality Date    APPENDECTOMY      BACK SURGERY      Arthrodesis     CARDIAC PACEMAKER PLACEMENT      CHOLECYSTECTOMY      EYE SURGERY      FRACTURE SURGERY Right 01/02/2018    femur    HYSTERECTOMY      MO EGD TRANSORAL BIOPSY SINGLE/MULTIPLE N/A 11/9/2016    Procedure: ESOPHAGOGASTRODUODENOSCOPY (EGD); Surgeon: Taylor Oliva MD;  Location: [facility];   Service: Gastroenterology    MO OPEN RX FEMUR FX+INTRAMED GARRETT Right 1/2/2018    Procedure: INSERTION NAIL IM FEMUR ANTEGRADE (TROCHANTERIC) RIGHT;  Surgeon: Jassi Lopez MD;  Location: [facility];  Service: Orthopedics    ID REPLACE AORTIC VALVE OPENFEMORAL ARTERY APPROACH N/A 7/26/2016    Procedure: TRANSFEMORAL TAVR WITH 23MM LAROSE LILY S3 VALVE; JOSE ALFREDO ;  Surgeon: Lawrence Diop DO;  Location: [facility];  Service: Cardiac Surgery    SMALL INTESTINE SURGERY      TONSILLECTOMY      TOTAL KNEE ARTHROPLASTY      VARICOSE VEIN SURGERY      VENTRAL HERNIA REPAIR         Meds/Allergies:    Prior to Admission medications    Medication Sig Start Date End Date Taking? Authorizing Provider   acetaminophen (TYLENOL) 325 mg tablet Take 2 tablets (650 mg total) by mouth every 6 (six) hours as needed for mild pain  7/28/16   Goran Au PA-C   amLODIPine (NORVASC) 5 mg tablet 5 mg 2 (two) times a day  7/31/19   Historical Provider, MD   amoxicillin (AMOXIL) 500 mg capsule Take 2,000 mg by mouth as needed (PRIOR TO DENTAL APPOINTMENTS)    Historical Provider, MD   aspirin 81 mg chewable tablet Chew 81 mg daily      Historical Provider, MD   atorvastatin (LIPITOR) 10 mg tablet  7/31/19   Historical Provider, MD   bumetanide (BUMEX) 1 mg tablet Take 2 tablets (2 mg total) by mouth 2 (two) times a day for 14 days 3/1/21 5/17/21  Sami Marc MD   diclofenac sodium (VOLTAREN) 1 % Apply 2 g topically 4 (four) times a day as needed    Historical Provider, MD   ferrous sulfate 325 (65 Fe) mg tablet Take 325 mg by mouth daily with breakfast    Historical Provider, MD   furosemide (LASIX) 40 mg tablet  3/13/21   Historical Provider, MD   Glucagon, rDNA, (Glucagon Emergency) 1 MG KIT  4/19/21   Historical Provider, MD   insulin glargine (LANTUS) 100 units/mL subcutaneous injection Inject 12 Units under the skin daily at bedtime 2/14/20   KASIE De Dios   ketoconazole (NIZORAL) 2 % cream daily  7/17/19   Historical Provider, MD   levothyroxine 50 mcg tablet Take 50 mcg by mouth daily    Historical Provider, MD   loperamide (IMODIUM A-D) 2 MG tablet Take 2 mg by mouth 3 (three) times a day as needed for diarrhea    Historical Provider, MD   metoprolol tartrate (LOPRESSOR) 50 mg tablet Take 1 tablet (50 mg total) by mouth 2 (two) times a day  7/28/16   GAYLE TerrellC   Multiple Vitamins-Minerals (OCUVITE ADULT 50+ PO) Take by mouth daily    Historical Provider, MD   nystatin (MYCOSTATIN) powder Apply topically 2 (two) times a day    Historical Provider, MD   oxyCODONE (ROXICODONE) 5 mg immediate release tablet Take 0 5 tablets (2 5 mg total) by mouth every 4 (four) hours as needed for moderate pain or severe painMax Daily Amount: 15 mg  Patient not taking: Reported on 5/17/2021 2/14/20   KASIE De Dios   potassium chloride (K-DUR,KLOR-CON) 20 mEq tablet Take 20 mEq by mouth daily    Historical Provider, MD   tetrahydrozoline (VISINE) 0 05 % ophthalmic solution 1 drop 4 (four) times a day as needed for irritation    Historical Provider, MD     I have reviewed home medications with patient personally  Allergies: Allergies   Allergen Reactions    Advil [Ibuprofen] GI Intolerance    Propoxyphene Other (See Comments)     DARVOCET=ACID REFLUX    Rofecoxib Other (See Comments)       Social History:     Marital Status:     Occupation:  Retired  Patient Pre-hospital Living Situation:  Lives with daughter  Patient Pre-hospital Level of Mobility:  Fully mobile  Patient Pre-hospital Diet Restrictions:  None  Substance Use History:   Social History     Substance and Sexual Activity   Alcohol Use Not Currently     Social History     Tobacco Use   Smoking Status Never Smoker   Smokeless Tobacco Never Used     Social History     Substance and Sexual Activity   Drug Use No       Family History:    Family History   Problem Relation Age of Onset    Cancer Child     Coronary artery disease Father         Native artery     Stroke Family     Osteopenia Family     Other Family         Pituitary disease    Polycythemia Family        Physical Exam:     Vitals:   Blood Pressure: 123/62 (08/17/21 1530)  Pulse: 80 (08/17/21 1530)  Temperature: 98 8 °F (37 1 °C) (08/17/21 1117)  Temp Source: Oral (08/17/21 1117)  Respirations: 18 (08/17/21 1400)  Height: 5' (152 4 cm) (08/17/21 1117)  Weight - Scale: 93 kg (205 lb) (08/17/21 1117)  SpO2: 96 % (08/17/21 1530)    Physical Exam  Constitutional:       General: She is not in acute distress  Appearance: She is not ill-appearing, toxic-appearing or diaphoretic  HENT:      Head: Normocephalic  Nose: Nose normal       Mouth/Throat:      Mouth: Mucous membranes are moist    Eyes:      General: No scleral icterus  Right eye: No discharge  Left eye: No discharge  Pupils: Pupils are equal, round, and reactive to light  Cardiovascular:      Rate and Rhythm: Normal rate  Heart sounds: Murmur heard  Comments: Hepatojugular reflux  Pulmonary:      Effort: Pulmonary effort is normal  No respiratory distress  Breath sounds: No stridor  No wheezing or rales  Chest:      Chest wall: No tenderness  Abdominal:      General: There is no distension  Palpations: There is no mass  Tenderness: There is no abdominal tenderness  There is no right CVA tenderness, left CVA tenderness or guarding  Hernia: No hernia is present  Musculoskeletal:         General: Swelling present  No deformity  Right lower leg: Edema present  Left lower leg: Edema present  Skin:     Coloration: Skin is pale  Findings: No bruising  Neurological:      General: No focal deficit present  Mental Status: She is alert  Cranial Nerves: No cranial nerve deficit  Sensory: No sensory deficit  Motor: No weakness        Coordination: Coordination normal       Gait: Gait normal       Deep Tendon Reflexes: Reflexes normal    Psychiatric:         Mood and Affect: Mood normal  Additional Data:     Lab Results: I have personally reviewed pertinent reports  Results from last 7 days   Lab Units 08/17/21  1142   WBC Thousand/uL 19 88*   HEMOGLOBIN g/dL 13 9   HEMATOCRIT % 44 4   PLATELETS Thousands/uL 379   NEUTROS PCT % 85*   LYMPHS PCT % 5*   MONOS PCT % 8   EOS PCT % 0     Results from last 7 days   Lab Units 08/17/21  1142   SODIUM mmol/L 139   POTASSIUM mmol/L 2 7*   CHLORIDE mmol/L 94*   CO2 mmol/L 36*   BUN mg/dL 49*   CREATININE mg/dL 1 52*   ANION GAP mmol/L 9   CALCIUM mg/dL 10 1   ALBUMIN g/dL 3 5   TOTAL BILIRUBIN mg/dL 0 71   ALK PHOS U/L 126*   ALT U/L 36   AST U/L 28   GLUCOSE RANDOM mg/dL 270*     Results from last 7 days   Lab Units 08/17/21  1142   INR  1 03                   Imaging: I have personally reviewed pertinent reports  XR chest portable   ED Interpretation by Luz Elena Nielson PA-C (08/17 1255)   Poor inspiratory effort  No focal consolidation  EKG, Pathology, and Other Studies Reviewed on Admission:   · EKG:     Allscripts / Epic Records Reviewed: Yes     ** Please Note: This note has been constructed using a voice recognition system   **

## 2021-08-17 NOTE — ASSESSMENT & PLAN NOTE
Lab Results   Component Value Date    EGFR 31 08/17/2021    EGFR 51 03/01/2021    EGFR 64 02/11/2020    CREATININE 1 52 (H) 08/17/2021    CREATININE 1 01 03/01/2021    CREATININE 0 84 02/11/2020   Patient has baseline creatinine of 0 8-1 now creatinine of 1 52 - Giovanni I    Likely in the setting of volume overload will recheck tomorrow after Lasix

## 2021-08-17 NOTE — ASSESSMENT & PLAN NOTE
Wt Readings from Last 3 Encounters:   08/17/21 93 kg (205 lb)   02/19/20 87 5 kg (193 lb)   02/11/20 87 6 kg (193 lb 2 oz)       Aggressively diurese 80 mg IV Lasix twice 1st dose already given by cardiology are for p m  Has gained 6 kilos      Will monitor input output and daily weights

## 2021-08-17 NOTE — ASSESSMENT & PLAN NOTE
Will from sleep yesterday with chest pain substernal radiating to the back associated with diaphoresis and shortness of breath  Chest pain has since resolved however troponin is elevated 0 15 when compared to prior this is elevated  Will trend discussed with Cardiology if troponin bumps further will start heparin drip and consider cardiac catheterization once renal function is stable and volume status stable

## 2021-08-18 PROBLEM — R79.89 ELEVATED TROPONIN LEVEL NOT DUE MYOCARDIAL INFARCTION: Status: ACTIVE | Noted: 2021-08-18

## 2021-08-18 PROBLEM — I49.5 SICK SINUS SYNDROME (HCC): Status: ACTIVE | Noted: 2021-08-18

## 2021-08-18 PROBLEM — N18.9 ACUTE KIDNEY INJURY SUPERIMPOSED ON CKD (HCC): Status: ACTIVE | Noted: 2021-08-18

## 2021-08-18 PROBLEM — N17.9 ACUTE KIDNEY INJURY SUPERIMPOSED ON CKD (HCC): Status: ACTIVE | Noted: 2021-08-18

## 2021-08-18 PROBLEM — R77.8 ELEVATED TROPONIN LEVEL NOT DUE MYOCARDIAL INFARCTION: Status: ACTIVE | Noted: 2021-08-18

## 2021-08-18 LAB
ANION GAP SERPL CALCULATED.3IONS-SCNC: 11 MMOL/L (ref 4–13)
ANION GAP SERPL CALCULATED.3IONS-SCNC: 12 MMOL/L (ref 4–13)
BUN SERPL-MCNC: 49 MG/DL (ref 5–25)
BUN SERPL-MCNC: 50 MG/DL (ref 5–25)
CALCIUM SERPL-MCNC: 9.5 MG/DL (ref 8.3–10.1)
CALCIUM SERPL-MCNC: 9.6 MG/DL (ref 8.3–10.1)
CHLORIDE SERPL-SCNC: 96 MMOL/L (ref 100–108)
CHLORIDE SERPL-SCNC: 98 MMOL/L (ref 100–108)
CO2 SERPL-SCNC: 31 MMOL/L (ref 21–32)
CO2 SERPL-SCNC: 32 MMOL/L (ref 21–32)
CREAT SERPL-MCNC: 1.4 MG/DL (ref 0.6–1.3)
CREAT SERPL-MCNC: 1.6 MG/DL (ref 0.6–1.3)
ERYTHROCYTE [DISTWIDTH] IN BLOOD BY AUTOMATED COUNT: 12.8 % (ref 11.6–15.1)
GFR SERPL CREATININE-BSD FRML MDRD: 29 ML/MIN/1.73SQ M
GFR SERPL CREATININE-BSD FRML MDRD: 34 ML/MIN/1.73SQ M
GLUCOSE SERPL-MCNC: 184 MG/DL (ref 65–140)
GLUCOSE SERPL-MCNC: 191 MG/DL (ref 65–140)
GLUCOSE SERPL-MCNC: 213 MG/DL (ref 65–140)
HCT VFR BLD AUTO: 42.3 % (ref 34.8–46.1)
HGB BLD-MCNC: 14 G/DL (ref 11.5–15.4)
MCH RBC QN AUTO: 30.6 PG (ref 26.8–34.3)
MCHC RBC AUTO-ENTMCNC: 33.1 G/DL (ref 31.4–37.4)
MCV RBC AUTO: 92 FL (ref 82–98)
PLATELET # BLD AUTO: 381 THOUSANDS/UL (ref 149–390)
PMV BLD AUTO: 10.9 FL (ref 8.9–12.7)
POTASSIUM SERPL-SCNC: 2.6 MMOL/L (ref 3.5–5.3)
POTASSIUM SERPL-SCNC: 3 MMOL/L (ref 3.5–5.3)
RBC # BLD AUTO: 4.58 MILLION/UL (ref 3.81–5.12)
SODIUM SERPL-SCNC: 139 MMOL/L (ref 136–145)
SODIUM SERPL-SCNC: 141 MMOL/L (ref 136–145)
TROPONIN I SERPL-MCNC: 0.09 NG/ML
WBC # BLD AUTO: 16.41 THOUSAND/UL (ref 4.31–10.16)

## 2021-08-18 PROCEDURE — 99232 SBSQ HOSP IP/OBS MODERATE 35: CPT | Performed by: INTERNAL MEDICINE

## 2021-08-18 PROCEDURE — 85027 COMPLETE CBC AUTOMATED: CPT | Performed by: INTERNAL MEDICINE

## 2021-08-18 PROCEDURE — 99233 SBSQ HOSP IP/OBS HIGH 50: CPT | Performed by: INTERNAL MEDICINE

## 2021-08-18 PROCEDURE — 84484 ASSAY OF TROPONIN QUANT: CPT | Performed by: INTERNAL MEDICINE

## 2021-08-18 PROCEDURE — 82948 REAGENT STRIP/BLOOD GLUCOSE: CPT

## 2021-08-18 PROCEDURE — 80048 BASIC METABOLIC PNL TOTAL CA: CPT | Performed by: INTERNAL MEDICINE

## 2021-08-18 PROCEDURE — 87081 CULTURE SCREEN ONLY: CPT | Performed by: INTERNAL MEDICINE

## 2021-08-18 RX ORDER — POTASSIUM CHLORIDE 20 MEQ/1
40 TABLET, EXTENDED RELEASE ORAL 2 TIMES DAILY
Status: DISCONTINUED | OUTPATIENT
Start: 2021-08-18 | End: 2021-08-19

## 2021-08-18 RX ORDER — POTASSIUM CHLORIDE 14.9 MG/ML
20 INJECTION INTRAVENOUS
Status: DISCONTINUED | OUTPATIENT
Start: 2021-08-18 | End: 2021-08-18

## 2021-08-18 RX ORDER — AMLODIPINE BESYLATE 5 MG/1
5 TABLET ORAL DAILY
Status: DISCONTINUED | OUTPATIENT
Start: 2021-08-18 | End: 2021-08-22 | Stop reason: HOSPADM

## 2021-08-18 RX ORDER — POTASSIUM CHLORIDE 20 MEQ/1
20 TABLET, EXTENDED RELEASE ORAL ONCE
Status: DISCONTINUED | OUTPATIENT
Start: 2021-08-18 | End: 2021-08-18

## 2021-08-18 RX ORDER — FUROSEMIDE 80 MG
80 TABLET ORAL
Status: DISCONTINUED | OUTPATIENT
Start: 2021-08-18 | End: 2021-08-22 | Stop reason: HOSPADM

## 2021-08-18 RX ADMIN — CEFTRIAXONE SODIUM 1000 MG: 10 INJECTION, POWDER, FOR SOLUTION INTRAVENOUS at 17:41

## 2021-08-18 RX ADMIN — AMLODIPINE BESYLATE 5 MG: 5 TABLET ORAL at 12:12

## 2021-08-18 RX ADMIN — FUROSEMIDE 80 MG: 80 TABLET ORAL at 17:41

## 2021-08-18 RX ADMIN — ATORVASTATIN CALCIUM 40 MG: 40 TABLET, FILM COATED ORAL at 17:33

## 2021-08-18 RX ADMIN — METOPROLOL TARTRATE 50 MG: 50 TABLET, FILM COATED ORAL at 17:33

## 2021-08-18 RX ADMIN — METOPROLOL TARTRATE 50 MG: 50 TABLET, FILM COATED ORAL at 12:13

## 2021-08-18 RX ADMIN — HEPARIN SODIUM 5000 UNITS: 5000 INJECTION INTRAVENOUS; SUBCUTANEOUS at 00:42

## 2021-08-18 RX ADMIN — FUROSEMIDE 80 MG: 10 INJECTION, SOLUTION INTRAVENOUS at 08:13

## 2021-08-18 RX ADMIN — POTASSIUM CHLORIDE 20 MEQ: 14.9 INJECTION, SOLUTION INTRAVENOUS at 09:30

## 2021-08-18 RX ADMIN — FERROUS SULFATE TAB 325 MG (65 MG ELEMENTAL FE) 325 MG: 325 (65 FE) TAB at 12:22

## 2021-08-18 RX ADMIN — ASPIRIN 81 MG: 81 TABLET, CHEWABLE ORAL at 12:12

## 2021-08-18 RX ADMIN — POTASSIUM CHLORIDE 40 MEQ: 1500 TABLET, EXTENDED RELEASE ORAL at 17:33

## 2021-08-18 RX ADMIN — POTASSIUM CHLORIDE 40 MEQ: 1500 TABLET, EXTENDED RELEASE ORAL at 12:21

## 2021-08-18 RX ADMIN — NYSTATIN: 100000 POWDER TOPICAL at 09:17

## 2021-08-18 RX ADMIN — HEPARIN SODIUM 5000 UNITS: 5000 INJECTION INTRAVENOUS; SUBCUTANEOUS at 17:34

## 2021-08-18 RX ADMIN — HEPARIN SODIUM 5000 UNITS: 5000 INJECTION INTRAVENOUS; SUBCUTANEOUS at 08:13

## 2021-08-18 RX ADMIN — INSULIN GLARGINE 12 UNITS: 100 INJECTION, SOLUTION SUBCUTANEOUS at 21:25

## 2021-08-18 NOTE — PLAN OF CARE
Problem: Prexisting or High Potential for Compromised Skin Integrity  Goal: Skin integrity is maintained or improved  Description: INTERVENTIONS:  - Identify patients at risk for skin breakdown  - Assess and monitor skin integrity  - Assess and monitor nutrition and hydration status  - Monitor labs   - Assess for incontinence   - Turn and reposition patient  - Assist with mobility/ambulation  - Relieve pressure over bony prominences  - Avoid friction and shearing  - Provide appropriate hygiene as needed including keeping skin clean and dry  - Evaluate need for skin moisturizer/barrier cream  - Collaborate with interdisciplinary team   - Patient/family teaching  - Consider wound care consult   Outcome: Progressing     Problem: Potential for Falls  Goal: Patient will remain free of falls  Description: INTERVENTIONS:  - Educate patient/family on patient safety including physical limitations  - Instruct patient to call for assistance with activity   - Consult OT/PT to assist with strengthening/mobility   - Keep Call bell within reach  - Keep bed low and locked with side rails adjusted as appropriate  - Keep care items and personal belongings within reach  - Initiate and maintain comfort rounds  - Make Fall Risk Sign visible to staff  - Offer Toileting every Hours, in advance of need  - Initiate/Maintain alarm  - Obtain necessary fall risk management equipment:   - Apply yellow socks and bracelet for high fall risk patients  - Consider moving patient to room near nurses station  Outcome: Progressing     Problem: MOBILITY - ADULT  Goal: Maintain or return to baseline ADL function  Description: INTERVENTIONS:  -  Assess patient's ability to carry out ADLs; assess patient's baseline for ADL function and identify physical deficits which impact ability to perform ADLs (bathing, care of mouth/teeth, toileting, grooming, dressing, etc )  - Assess/evaluate cause of self-care deficits   - Assess range of motion  - Assess patient's mobility; develop plan if impaired  - Assess patient's need for assistive devices and provide as appropriate  - Encourage maximum independence but intervene and supervise when necessary  - Involve family in performance of ADLs  - Assess for home care needs following discharge   - Consider OT consult to assist with ADL evaluation and planning for discharge  - Provide patient education as appropriate  Outcome: Progressing  Goal: Maintains/Returns to pre admission functional level  Description: INTERVENTIONS:  - Perform BMAT or MOVE assessment daily    - Set and communicate daily mobility goal to care team and patient/family/caregiver  - Collaborate with rehabilitation services on mobility goals if consulted  - Perform Range of Motion times a day  - Reposition patient every  hours    - Dangle patient  times a day  - Stand patient  times a day  - Ambulate patient  times a day  - Out of bed to chair  times a day   - Out of bed for meals times a day  - Out of bed for toileting  - Record patient progress and toleration of activity level   Outcome: Progressing

## 2021-08-18 NOTE — ASSESSMENT & PLAN NOTE
- mild bump in troponin secondary to acute on chronic CHF  - small further cardiac workup warranted at this time  - continue with IV diuresis

## 2021-08-18 NOTE — ASSESSMENT & PLAN NOTE
- creatinine 1 75 on admission; baseline 0 81 0  - likely cardiorenal in nature    - monitor with IV diuresis

## 2021-08-18 NOTE — PROGRESS NOTES
General Cardiology   Progress Note -  Team One   Lonedell Grave Day 80 y o  female MRN: [id number]    Unit/Bed#: S -01 Encounter: [id number]    Assessment/ Plan     1  Chest pain with elevated troponin likely non MI elevated troponin in the setting of #2 and #3   Troponin 0 15, 0 14/0 12/0 11/0 09  ECG reviewed showing V paced rhythm   Chest pain free, monitor symptoms      2  Acute kidney injury  Current 1 40 today from 1 52 on admission  Baseline creatinine 0 8-1 0  Monitor with diuretics      3  Acute on chronic diastolic heart failure  ProBNP 1806  Chest x-ray on admission showed vascular congestion   Continue IV diuretics   Patient takes furosemide 80 mg PO BID and metolazone 2 5 mg every other day at [facility] I/Os (inaccurate)   Daily weights 205 lbs   Continue 2 gram Na diet and 1800 ml fluid restriction      4  Hypokalemia  K 2 6  Replete     5  Coronary artery disease with history of PCI/stenting to LAD in 2016  Continue home medication:  Aspirin, statin beta-blocker     6  Aortic stenosis status post TAVR in 2016     7  Hypertension average blood pressure 131/63  On amlodipine 5 mg p o  daily and metoprolol 50 mg p o  Twice a day, continue this admission     8  Hyperlipidemia  Continue statin      9  Sick sinus syndrome status post [employer] dual-chamber permanent pacemaker       Subjective  Patient denies chest pain or SOB  She is complaining of R arm pain from potassium infusion  No fever or chills  Review of Systems   Constitutional: Positive for malaise/fatigue  Negative for chills and fever  HENT: Negative for congestion  Cardiovascular: Positive for leg swelling  Negative for chest pain, orthopnea and palpitations  Respiratory: Negative for shortness of breath  Musculoskeletal: Negative for falls  Gastrointestinal: Negative for bloating, nausea and vomiting  Neurological: Negative for dizziness and light-headedness     Psychiatric/Behavioral: Negative for altered mental status  All other systems reviewed and are negative  Objective:   Vitals: Blood pressure 127/62, pulse 99, temperature 98 4 °F (36 9 °C), temperature source Oral, resp  rate 18, height 5' (1 524 m), weight 93 4 kg (205 lb 14 6 oz), SpO2 (!) 89 %  ,       Body mass index is 40 21 kg/m²  ,     Systolic (66AQO), PXH:519 , Min:120 , MHZ:020     Diastolic (04SYZ), WEK:24, Min:58, Max:66          Intake/Output Summary (Last 24 hours) at 8/18/2021 1053  Last data filed at 8/18/2021 0542  Gross per 24 hour   Intake --   Output 400 ml   Net -400 ml     Weight (last 2 days)     Date/Time   Weight    08/18/21 0542   93 4 (205 91)    08/18/21 0007   93 4 (205 91)    08/17/21 1117   93 (205)              Physical Exam  Constitutional:       General: She is not in acute distress  HENT:      Head: Normocephalic  Mouth/Throat:      Mouth: Mucous membranes are moist    Cardiovascular:      Rate and Rhythm: Normal rate and regular rhythm  Pulses: Normal pulses  Heart sounds: No murmur heard  Pulmonary:      Effort: Pulmonary effort is normal       Breath sounds: Normal breath sounds  Comments: Decreased in bases   RA  Abdominal:      General: Bowel sounds are normal       Palpations: Abdomen is soft  Musculoskeletal:         General: Swelling present  Normal range of motion  Cervical back: Neck supple  Comments: Trace bilateral LE edema    Skin:     General: Skin is warm and dry  Capillary Refill: Capillary refill takes less than 2 seconds  Neurological:      General: No focal deficit present  Mental Status: She is alert and oriented to person, place, and time     Psychiatric:         Mood and Affect: Mood normal          LABORATORY RESULTS  Results from last 7 days   Lab Units 08/18/21  0034 08/17/21  2038 08/17/21  1725   TROPONIN I ng/mL 0 09* 0 11* 0 12*     CBC with diff:   Results from last 7 days   Lab Units 08/18/21  1007 08/17/21  1142   WBC Thousand/uL 16 41* 19 88*   HEMOGLOBIN g/dL 14 0 13 9   HEMATOCRIT % 42 3 44 4   MCV fL 92 95   PLATELETS Thousands/uL 381 379   MCH pg 30 6 29 7   MCHC g/dL 33 1 31 3*   RDW % 12 8 12 9   MPV fL 10 9 10 8   NRBC AUTO /100 WBCs  --  0       CMP:  Results from last 7 days   Lab Units 08/18/21 0542 08/17/21 2038 08/17/21  1142   POTASSIUM mmol/L 2 6* 3 1* 2 7*   CHLORIDE mmol/L 98* 97* 94*   CO2 mmol/L 31 32 36*   BUN mg/dL 50* 53* 49*   CREATININE mg/dL 1 40* 1 75* 1 52*   CALCIUM mg/dL 9 6 10 0 10 1   AST U/L  --   --  28   ALT U/L  --   --  36   ALK PHOS U/L  --   --  126*   EGFR ml/min/1 73sq m 34 26 31       BMP:  Results from last 7 days   Lab Units 08/18/21 0542 08/17/21 2038 08/17/21  1142   POTASSIUM mmol/L 2 6* 3 1* 2 7*   CHLORIDE mmol/L 98* 97* 94*   CO2 mmol/L 31 32 36*   BUN mg/dL 50* 53* 49*   CREATININE mg/dL 1 40* 1 75* 1 52*   CALCIUM mg/dL 9 6 10 0 10 1       Lab Results   Component Value Date    NTBNP 1,806 (H) 08/17/2021    NTBNP 668 (H) 03/01/2021    NTBNP 535 (H) 07/21/2016                           Results from last 7 days   Lab Units 08/17/21  1142   TSH 3RD GENERATON uIU/mL 1 791       Results from last 7 days   Lab Units 08/17/21  1142   INR  1 03       Lipid Profile:   Lab Results   Component Value Date    CHOL 195 07/09/2015    CHOL 163 02/20/2015    CHOL 186 02/20/2014     Lab Results   Component Value Date    HDL 55 08/01/2017    HDL 34 (L) 06/09/2016    HDL 44 02/04/2016     Lab Results   Component Value Date    LDLCALC 44 08/01/2017    LDLCALC 105 (H) 06/09/2016    LDLCALC 120 (H) 02/04/2016     Lab Results   Component Value Date    TRIG 117 08/01/2017    TRIG 103 06/09/2016    TRIG 177 (H) 02/04/2016       Cardiac testing:   Results for orders placed during the hospital encounter of 07/26/21    Echo complete with contrast if indicated    Narrative  [facility] [address] [facility]  [phone]    Transthoracic Echocardiogram  2D, M-mode, Doppler, and Color Doppler    Study date:  2021    Patient: Gayle Torres  MR number: [id number]  Account number: [de-identified]  : 1935  Age: 80 years  Gender: Female  Status: Outpatient  Location: [address] [place] [address]  Height: 62 in  Weight: 192 5 lb  BP: 158/ 88 mmHg    Indications: Assess left ventricular function  Evaluate prosthetic aortic valve  Diagnoses: I25 10 - Atherosclerotic heart disease of native coronary artery without angina pectoris    Sonographer:  ALMA Goddard  Primary Physician:  Guillaume Cardona MD  Referring Physician:  Fran Hinton DO  Group:  [facility]  Interpreting Physician:  Anmol May MD    SUMMARY    LEFT VENTRICLE:  Systolic function was normal  Ejection fraction was estimated to be 60 %  There were no regional wall motion abnormalities  Doppler parameters were consistent with abnormal left ventricular relaxation (grade 1 diastolic dysfunction)  MITRAL VALVE:  There was moderate annular calcification  There was mild to moderate regurgitation  AORTIC VALVE:  A 23 mm ([facility] TAVR) bioprosthesis was present  It exhibited normal function  There was mild to moderate central regurgitation  There was no significant perivalvular regurgitation  Valve mean gradient was 14 mmHg  Estimated aortic valve area (by VTI) was 1 73 cmï¾²  TRICUSPID VALVE:  There was mild to moderate regurgitation  Pulmonary artery systolic pressure was within the normal range  HISTORY: PRIOR HISTORY: CAD s/p PCI, Aortic stenosis s/p TAVR in 2016    PROCEDURE: The study was performed in the [address] [place] [address]  This was a routine study  The transthoracic approach was used  The study included complete 2D imaging, M-mode, complete spectral Doppler, and color Doppler  The  heart rate was 71 bpm, at the start of the study  Images were obtained from the parasternal, apical, subcostal, and suprasternal notch acoustic windows   Image quality was adequate  LEFT VENTRICLE: Size was normal  Systolic function was normal  Ejection fraction was estimated to be 60 %  There were no regional wall motion abnormalities  Wall thickness was normal  DOPPLER: Doppler parameters were consistent with  abnormal left ventricular relaxation (grade 1 diastolic dysfunction)  RIGHT VENTRICLE: The size was normal  Systolic function was normal  Wall thickness was normal     LEFT ATRIUM: Size was at the upper limits of normal     RIGHT ATRIUM: Size was normal     MITRAL VALVE: There was moderate annular calcification  Valve structure was normal  There was normal leaflet separation  DOPPLER: The transmitral velocity was within the normal range  There was no evidence for stenosis  There was mild to  moderate regurgitation  AORTIC VALVE: A 23 mm ([facility] TAVR) bioprosthesis was present  It exhibited normal function  DOPPLER: There was no evidence for stenosis  There was mild to moderate central regurgitation  There was no significant perivalvular  regurgitation  TRICUSPID VALVE: The valve structure was normal  There was normal leaflet separation  DOPPLER: The transtricuspid velocity was within the normal range  There was no evidence for stenosis  There was mild to moderate regurgitation  Pulmonary  artery systolic pressure was within the normal range  Estimated peak PA pressure was 30 mmHg  PULMONIC VALVE: Not well visualized  DOPPLER: The transpulmonic velocity was within the normal range  There was no significant regurgitation  PERICARDIUM: There was no pericardial effusion  The pericardium was normal in appearance  AORTA: The root exhibited normal size  SYSTEMIC VEINS: IVC: The inferior vena cava was normal in size      MEASUREMENT TABLES    2D MEASUREMENTS  LVOT   (Reference normals)  Diam   20 mm   (--)    DOPPLER MEASUREMENTS  LVOT   (Reference normals)  Peak soren   124 cm/s   (--)  Mean soren   98 cm/s   (--)  VTI   25 5 cm   (--)  Peak gradient   5 mmHg   (--)  Mean gradient   3 mmHg   (--)  Stroke vol   80 11 ml   (--)  Aortic valve   (Reference normals)  Peak donnell   217 cm/s   (--)  Mean donnell   169 cm/s   (--)  VTI   46 cm   (--)  Peak gradient   19 mmHg   (--)  Mean gradient   14 mmHg   (--)  Obstr index, VTI   0 55    (--)  Valve area, VTI   1 73 cmï¾²   (--)  Area index, VTI   0 92 cmï¾²/mï¾²   (--)  Obstr index, Vmax   0 57    (--)  Valve area, Vmax   1 79 cmï¾²   (--)  Area index, Vmax   0 95 cmï¾²/mï¾²   (--)  Obstr index, Vmean   0 58    (--)  Valve area, Vmean   1 82 cmï¾²   (--)  Area index, Vmean   0 97 cmï¾²/mï¾²   (--)    SYSTEM MEASUREMENT TABLES    2D  %FS: 39 79 %  Ao Diam: 2 43 cm  EDV(Teich): 101 53 ml  EF(Teich): 70 38 %  ESV(Teich): 30 07 ml  IVSd: 1 cm  LA Area: 16 81 cm2  LA Diam: 3 7 cm  LVEDV MOD A4C: 88 07 ml  LVEF MOD A4C: 66 49 %  LVESV MOD A4C: 29 52 ml  LVIDd: 4 68 cm  LVIDs: 2 82 cm  LVLd A4C: 6 72 cm  LVLs A4C: 5 23 cm  LVOT Diam: 2 03 cm  LVPWd: 1 02 cm  RA Area: 13 74 cm2  RVIDd: 3 32 cm  SV MOD A4C: 58 56 ml  SV(Teich): 71 46 ml    CF  MR Trace: 4 3 cm2  TR Trace: 5 06 cm2    CW  AR Dec [place]: 2 2 m/s2  AR Dec Time: 1516 05 ms  AR PHT: 439 65 ms  AR Vmax: 3 34 m/s  AR maxP 54 mmHg  AV Env  Ti: 266 41 ms  AV MaxP 67 mmHg  AV VTI: 42 45 cm  AV Vmax: 2 1 m/s  AV Vmean: 1 62 m/s  AV meanP 44 mmHg  MV PHT: 42 01 ms  MV VTI: 33 05 cm  MV Vmax: 1 68 m/s  MV Vmean: 1 m/s  MV maxP 3 mmHg  MV meanP 52 mmHg  MVA By PHT: 5 24 cm2  TR MaxP 38 mmHg  TR Vmax: 2 42 m/s    MM  TAPSE: 1 85 cm    PW  LARA (VTI): 2 24 cm2  LARA Vmax: 1 96 cm2  AVAI (VTI): 0 cm2/m2  AVAI Vmax: 0 cm2/m2  E' Sept: 0 06 m/s  E/E' Sept: 19 54  LVOT Env  Ti: 300 67 ms  LVOT VTI: 29 48 cm  LVOT Vmax: 1 27 m/s  LVOT Vmean: 0 98 m/s  LVOT maxP 47 mmHg  LVOT meanP 22 mmHg  LVSI Dopp: 50 63 ml/m2  LVSV Dopp: 95 18 ml  MV A Donnell: 1 34 m/s  MV Dec [place]: 7 65 m/s2  MV DecT: 153 78 ms  MV E Donnell: 1 18 m/s  MV E/A Ratio: 0 88  MVA (VTI): 2 88 cm2    [facility] Accredited Echocardiography Laboratory    Prepared and electronically signed by    Ji Barakat MD  Signed 26-Jul-2021 13:33:44    No results found for this or any previous visit  No results found for this or any previous visit  No valid procedures specified  No results found for this or any previous visit      Meds/Allergies   all current active meds have been reviewed and current meds:   Current Facility-Administered Medications   Medication Dose Route Frequency    acetaminophen (TYLENOL) tablet 650 mg  650 mg Oral Q6H PRN    amLODIPine (NORVASC) tablet 5 mg  5 mg Oral BID    aspirin chewable tablet 81 mg  81 mg Oral Daily    atorvastatin (LIPITOR) tablet 40 mg  40 mg Oral Daily With Dinner    ceftriaxone (ROCEPHIN) 1 g/50 mL in dextrose IVPB  1,000 mg Intravenous Q24H    ferrous sulfate tablet 325 mg  325 mg Oral Daily With Breakfast    furosemide (LASIX) injection 80 mg  80 mg Intravenous BID (diuretic)    heparin (porcine) subcutaneous injection 5,000 Units  5,000 Units Subcutaneous Q8H [address]    insulin glargine (LANTUS) subcutaneous injection 12 Units 0 12 mL  12 Units Subcutaneous HS    levothyroxine tablet 50 mcg  50 mcg Oral Early Morning    metoprolol tartrate (LOPRESSOR) tablet 50 mg  50 mg Oral BID    nystatin (MYCOSTATIN) powder   Topical BID    potassium chloride 20 mEq IVPB (premix)  20 mEq Intravenous Q2H    tetrahydrozoline (VISINE) 0 05 % ophthalmic solution 1 drop  1 drop Both Eyes 4x Daily PRN     Medications Prior to Admission   Medication    acetaminophen (TYLENOL) 325 mg tablet    amLODIPine (NORVASC) 5 mg tablet    amoxicillin (AMOXIL) 500 mg capsule    aspirin 81 mg chewable tablet    atorvastatin (LIPITOR) 10 mg tablet    bumetanide (BUMEX) 1 mg tablet    diclofenac sodium (VOLTAREN) 1 %    ferrous sulfate 325 (65 Fe) mg tablet    furosemide (LASIX) 40 mg tablet    Glucagon, rDNA, (Glucagon Emergency) 1 MG KIT    insulin glargine (LANTUS) 100 units/mL subcutaneous injection    ketoconazole (NIZORAL) 2 % cream    levothyroxine 50 mcg tablet    loperamide (IMODIUM A-D) 2 MG tablet    metoprolol tartrate (LOPRESSOR) 50 mg tablet    Multiple Vitamins-Minerals (OCUVITE ADULT 50+ PO)    nystatin (MYCOSTATIN) powder    oxyCODONE (ROXICODONE) 5 mg immediate release tablet    potassium chloride (K-DUR,KLOR-CON) 20 mEq tablet    tetrahydrozoline (VISINE) 0 05 % ophthalmic solution     Counseling / Coordination of Care  Total floor / unit time spent today 20 minutes  Greater than 50% of total time was spent with the patient and / or family counseling and / or coordination of care  ** Please Note: Dragon 360 Dictation voice to text software may have been used in the creation of this document   **

## 2021-08-18 NOTE — PROGRESS NOTES
[facility]  Progress Note Darrol Lab Day 1935, 80 y o  female MRN: [id number]  Unit/Bed#: S -01 Encounter: [id number]  Primary Care Provider: Malissa Collet, MD   Date and time admitted to hospital: 8/17/2021 11:12 AM    * Acute on chronic heart failure ([address] )  Assessment & Plan  - last echocardiogram showing preserved systolic function, but with grade 1 diastolic dysfunction  - presents this hospitalization chest pain and volume overload    - continue IV diuresis with IV Lasix 80 mg b i d   - monitor renal function closely while on IV diuretics  - continue low-sodium diet  - monitor in's and out's with daily weights  - appreciate cardiology recommendations      Hypokalemia  Assessment & Plan  - complicated by diuresis    - replace as necessary  - monitor for dysrhythmia  - recheck in a m  Sick sinus syndrome (HCC)  Assessment & Plan  - status post dual chamber pacer; recent check showing ventricular pacing    Elevated troponin level not due myocardial infarction  Assessment & Plan  - mild bump in troponin secondary to acute on chronic CHF  - small further cardiac workup warranted at this time  - continue with IV diuresis    Acute kidney injury superimposed on CKD (HCC)  Assessment & Plan  - creatinine 1 75 on admission; baseline 0 81 0  - likely cardiorenal in nature    - monitor with IV diuresis      VTE Pharmacologic Prophylaxis: VTE Score: 5 High Risk (Score >/= 5) - Pharmacological DVT Prophylaxis Ordered: heparin  Sequential Compression Devices Ordered  Patient Centered Rounds: I performed bedside rounds with nursing staff today  Discussions with Specialists or Other Care Team Provider: None    Education and Discussions with Family / Patient: Updated  (daughter) via phone  Time Spent for Care: 25 minutes  More than 50% of total time spent on counseling and coordination of care as described above      Current Length of Stay: 1 day(s)  Current Patient Status: Inpatient   Certification Statement: The patient will continue to require additional inpatient hospital stay due to Continue diuresis and volume management  Discharge Plan: Anticipate discharge in 48-72 hrs to prior assisted or independent living facility  Code Status: Level 1 - Full Code    Subjective:   Patient examined  Following up for acute on chronic CHF  Patient seems to be responding to diuresis, though in's and out's have not been minor accurately  Otherwise patient is feeling well  Chest pain has resolved  She was eating lunch during our evaluation  Objective:     Vitals:   Temp (24hrs), Av 5 °F (36 9 °C), Min:98 4 °F (36 9 °C), Max:98 5 °F (36 9 °C)    Temp:  [98 4 °F (36 9 °C)-98 5 °F (36 9 °C)] 98 4 °F (36 9 °C)  HR:  [] 104  Resp:  [18] 18  BP: (120-142)/(58-66) 135/66  SpO2:  [89 %-96 %] 89 %  Body mass index is 40 21 kg/m²  Input and Output Summary (last 24 hours): Intake/Output Summary (Last 24 hours) at 2021 1335  Last data filed at 2021 1201  Gross per 24 hour   Intake --   Output 1200 ml   Net -1200 ml     PHYSICAL EXAM:    Vitals signs reviewed  Constitutional   Awake and cooperative  NAD  Head/Neck   Normocephalic  Atraumatic  HEENT   No scleral icterus  EOMI  Heart   Regular rate and rhythm  No murmers  Lungs   Lungs clear bilaterally  Respirations unlabored  Decreased at bases bilaterally  Abdomen   Soft  Nontender  Nondistended  Skin   Skin color normal  No rashes  Extremities   No deformities  Plus one bilateral lower extremity edema  Neuro   Alert and oriented  No new deficits  Psych   Mood stable   Affect normal          Additional Data:     Labs:  Results from last 7 days   Lab Units 21  1007 21  1142   WBC Thousand/uL 16 41* 19 88*   HEMOGLOBIN g/dL 14 0 13 9   HEMATOCRIT % 42 3 44 4   PLATELETS Thousands/uL 381 379   NEUTROS PCT %  --  85*   LYMPHS PCT %  --  5*   MONOS PCT %  --  8   EOS PCT %  --  0 Results from last 7 days   Lab Units 08/18/21  0542 08/17/21  1142   SODIUM mmol/L 141 139   POTASSIUM mmol/L 2 6* 2 7*   CHLORIDE mmol/L 98* 94*   CO2 mmol/L 31 36*   BUN mg/dL 50* 49*   CREATININE mg/dL 1 40* 1 52*   ANION GAP mmol/L 12 9   CALCIUM mg/dL 9 6 10 1   ALBUMIN g/dL  --  3 5   TOTAL BILIRUBIN mg/dL  --  0 71   ALK PHOS U/L  --  126*   ALT U/L  --  36   AST U/L  --  28   GLUCOSE RANDOM mg/dL 191* 270*     Results from last 7 days   Lab Units 08/17/21  1142   INR  1 03                   Lines/Drains:  Invasive Devices     Peripheral Intravenous Line            Peripheral IV 08/17/21 Right Antecubital 1 day                  Telemetry:  Telemetry Orders (From admission, onward)             48 Hour Telemetry Monitoring  Continuous x 48 hours     Question:  Reason for 48 Hour Telemetry  Answer:  Acute MI, chest pain - R/O MI, or unstable angina                 Indication for Continued Telemetry Use: Acute CHF on >200 mg lasix/day or equivalent dose or with new reduced EF  Imaging: No pertinent imaging reviewed      Recent Cultures (last 7 days):         Last 24 Hours Medication List:   Current Facility-Administered Medications   Medication Dose Route Frequency Provider Last Rate    acetaminophen  650 mg Oral Q6H PRN Jean Paul Davis MD      amLODIPine  5 mg Oral Daily KASIE Turner      aspirin  81 mg Oral Daily Jean Paul Davis MD      atorvastatin  40 mg Oral Daily With Lucero Chand MD      cefTRIAXone  1,000 mg Intravenous Q24H Jean Paul Davis MD 1,000 mg (08/17/21 1928)    ferrous sulfate  325 mg Oral Daily With Breakfast Jean Paul Davis MD      furosemide  80 mg Intravenous BID (diuretic) Jean Paul Davis MD      heparin (porcine)  5,000 Units Subcutaneous [facility] Jean Paul Davis MD      insulin glargine  12 Units Subcutaneous HS Jean Paul Davis MD      levothyroxine  50 mcg Oral Early Morning Jean Paul Davis MD      metoprolol tartrate  50 mg Oral BID Kvng Hernandes Carlota Fajardo MD      nystatin   Topical BID Kayla Andrade MD      potassium chloride  20 mEq Oral Once KASIE Aguila      potassium chloride  40 mEq Oral BID KASIE Aguila      tetrahydrozoline  1 drop Both Eyes 4x Daily PRN Kayla Andrade MD          Today, Patient Was Seen By: Marco A Durham DO    **Please Note: This note may have been constructed using a voice recognition system  **

## 2021-08-18 NOTE — ASSESSMENT & PLAN NOTE
- last echocardiogram showing preserved systolic function, but with grade 1 diastolic dysfunction  - presents this hospitalization chest pain and volume overload    - continue IV diuresis with IV Lasix 80 mg b i d   - monitor renal function closely while on IV diuretics  - continue low-sodium diet  - monitor in's and out's with daily weights  - appreciate cardiology recommendations

## 2021-08-19 PROBLEM — N39.0 UTI (URINARY TRACT INFECTION): Status: ACTIVE | Noted: 2021-08-19

## 2021-08-19 PROBLEM — I50.33 ACUTE ON CHRONIC DIASTOLIC HEART FAILURE (HCC): Status: ACTIVE | Noted: 2021-08-17

## 2021-08-19 LAB
ANION GAP SERPL CALCULATED.3IONS-SCNC: 11 MMOL/L (ref 4–13)
BUN SERPL-MCNC: 56 MG/DL (ref 5–25)
CALCIUM SERPL-MCNC: 9.2 MG/DL (ref 8.3–10.1)
CHLORIDE SERPL-SCNC: 96 MMOL/L (ref 100–108)
CO2 SERPL-SCNC: 31 MMOL/L (ref 21–32)
CREAT SERPL-MCNC: 1.59 MG/DL (ref 0.6–1.3)
ERYTHROCYTE [DISTWIDTH] IN BLOOD BY AUTOMATED COUNT: 12.6 % (ref 11.6–15.1)
GFR SERPL CREATININE-BSD FRML MDRD: 29 ML/MIN/1.73SQ M
GLUCOSE SERPL-MCNC: 170 MG/DL (ref 65–140)
GLUCOSE SERPL-MCNC: 176 MG/DL (ref 65–140)
HCT VFR BLD AUTO: 40.7 % (ref 34.8–46.1)
HGB BLD-MCNC: 12.9 G/DL (ref 11.5–15.4)
MCH RBC QN AUTO: 29.8 PG (ref 26.8–34.3)
MCHC RBC AUTO-ENTMCNC: 31.7 G/DL (ref 31.4–37.4)
MCV RBC AUTO: 94 FL (ref 82–98)
MRSA NOSE QL CULT: NORMAL
PLATELET # BLD AUTO: 368 THOUSANDS/UL (ref 149–390)
PMV BLD AUTO: 10.8 FL (ref 8.9–12.7)
POTASSIUM SERPL-SCNC: 2.9 MMOL/L (ref 3.5–5.3)
RBC # BLD AUTO: 4.33 MILLION/UL (ref 3.81–5.12)
SODIUM SERPL-SCNC: 138 MMOL/L (ref 136–145)
WBC # BLD AUTO: 12.95 THOUSAND/UL (ref 4.31–10.16)

## 2021-08-19 PROCEDURE — 80048 BASIC METABOLIC PNL TOTAL CA: CPT | Performed by: NURSE PRACTITIONER

## 2021-08-19 PROCEDURE — 99223 1ST HOSP IP/OBS HIGH 75: CPT | Performed by: INTERNAL MEDICINE

## 2021-08-19 PROCEDURE — 97167 OT EVAL HIGH COMPLEX 60 MIN: CPT

## 2021-08-19 PROCEDURE — 99233 SBSQ HOSP IP/OBS HIGH 50: CPT | Performed by: INTERNAL MEDICINE

## 2021-08-19 PROCEDURE — 97163 PT EVAL HIGH COMPLEX 45 MIN: CPT

## 2021-08-19 PROCEDURE — 83036 HEMOGLOBIN GLYCOSYLATED A1C: CPT | Performed by: NURSE PRACTITIONER

## 2021-08-19 PROCEDURE — 82948 REAGENT STRIP/BLOOD GLUCOSE: CPT

## 2021-08-19 PROCEDURE — 85027 COMPLETE CBC AUTOMATED: CPT | Performed by: INTERNAL MEDICINE

## 2021-08-19 RX ORDER — CEFUROXIME AXETIL 250 MG/1
500 TABLET ORAL EVERY 12 HOURS SCHEDULED
Status: DISCONTINUED | OUTPATIENT
Start: 2021-08-19 | End: 2021-08-19

## 2021-08-19 RX ORDER — CEFUROXIME AXETIL 250 MG/1
500 TABLET ORAL EVERY 24 HOURS
Status: DISCONTINUED | OUTPATIENT
Start: 2021-08-19 | End: 2021-08-21

## 2021-08-19 RX ORDER — POTASSIUM CHLORIDE 20 MEQ/1
40 TABLET, EXTENDED RELEASE ORAL
Status: DISCONTINUED | OUTPATIENT
Start: 2021-08-19 | End: 2021-08-21

## 2021-08-19 RX ADMIN — HEPARIN SODIUM 5000 UNITS: 5000 INJECTION INTRAVENOUS; SUBCUTANEOUS at 00:45

## 2021-08-19 RX ADMIN — HEPARIN SODIUM 5000 UNITS: 5000 INJECTION INTRAVENOUS; SUBCUTANEOUS at 08:57

## 2021-08-19 RX ADMIN — ATORVASTATIN CALCIUM 40 MG: 40 TABLET, FILM COATED ORAL at 18:32

## 2021-08-19 RX ADMIN — METOPROLOL TARTRATE 50 MG: 50 TABLET, FILM COATED ORAL at 08:57

## 2021-08-19 RX ADMIN — METOPROLOL TARTRATE 50 MG: 50 TABLET, FILM COATED ORAL at 18:32

## 2021-08-19 RX ADMIN — POTASSIUM CHLORIDE 40 MEQ: 1500 TABLET, EXTENDED RELEASE ORAL at 18:32

## 2021-08-19 RX ADMIN — FUROSEMIDE 80 MG: 80 TABLET ORAL at 18:32

## 2021-08-19 RX ADMIN — POTASSIUM CHLORIDE 40 MEQ: 1500 TABLET, EXTENDED RELEASE ORAL at 11:32

## 2021-08-19 RX ADMIN — CEFUROXIME AXETIL 500 MG: 250 TABLET ORAL at 11:48

## 2021-08-19 RX ADMIN — INSULIN GLARGINE 12 UNITS: 100 INJECTION, SOLUTION SUBCUTANEOUS at 21:40

## 2021-08-19 RX ADMIN — POTASSIUM CHLORIDE 40 MEQ: 1500 TABLET, EXTENDED RELEASE ORAL at 08:58

## 2021-08-19 RX ADMIN — FUROSEMIDE 80 MG: 80 TABLET ORAL at 08:57

## 2021-08-19 RX ADMIN — AMLODIPINE BESYLATE 5 MG: 5 TABLET ORAL at 08:57

## 2021-08-19 RX ADMIN — ASPIRIN 81 MG: 81 TABLET, CHEWABLE ORAL at 08:57

## 2021-08-19 RX ADMIN — NYSTATIN 1 APPLICATION: 100000 POWDER TOPICAL at 09:26

## 2021-08-19 RX ADMIN — NYSTATIN 1 APPLICATION: 100000 POWDER TOPICAL at 21:40

## 2021-08-19 RX ADMIN — FERROUS SULFATE TAB 325 MG (65 MG ELEMENTAL FE) 325 MG: 325 (65 FE) TAB at 08:57

## 2021-08-19 RX ADMIN — LEVOTHYROXINE SODIUM 50 MCG: 50 TABLET ORAL at 05:24

## 2021-08-19 RX ADMIN — HEPARIN SODIUM 5000 UNITS: 5000 INJECTION INTRAVENOUS; SUBCUTANEOUS at 18:32

## 2021-08-19 NOTE — ASSESSMENT & PLAN NOTE
- last echocardiogram showing preserved systolic function, but with grade 1 diastolic dysfunction  - presents this hospitalization chest pain and volume overload    - transitioned back to po lasix 80mg BID  - continue low-sodium diet  - monitor in's and out's with daily weights  - appreciate cardiology recommendations

## 2021-08-19 NOTE — PLAN OF CARE
Problem: PHYSICAL THERAPY ADULT  Goal: Performs mobility at highest level of function for planned discharge setting  See evaluation for individualized goals  Description: Treatment/Interventions: Functional transfer training, LE strengthening/ROM, Therapeutic exercise, Endurance training, Patient/family training, Equipment eval/education, Bed mobility  Equipment Recommended: Wheelchair (pt has one)       See flowsheet documentation for full assessment, interventions and recommendations  Note: Prognosis: Fair  Problem List: Decreased strength, Decreased endurance, Impaired balance, Decreased mobility, Obesity, Pain  Assessment: Pt is a 80 y o  female seen for PT evaluation s/p admit to [address] [place] [address] on 8/17/2021 w/ Acute on chronic diastolic heart failure ([facility] [address] )  Order placed for PT  Comorbidities affecting pt's physical performance at time of assessment include: DM, HTN, obesity and dementia  Personal factors affecting pt at time of IE include: advanced age and inability to ambulate household distances  Prior to admission, pt was living at [address] [place] [address] and was requiring supervision for SPT to and from [facility] at baseline  Upon evaluation: Pt requires max A x2 for bed mobility and mod A x 2 for sit to stand  Unable to weight shift fully or advance/clear either LE limiting further mobility  (Please find full objective findings from PT assessment regarding body systems outlined above)  Impairments and limitations also listed above, especially due to  weakness, impaired balance, decreased endurance, pain, decreased activity tolerance, fall risk and decreased cognition  The following objective measures performed on IE also reveal limitations: Barthel Index 30/100  Pt's clinical presentation is currently unstable/unpredictable seen in pt's presentation of limited insight into deficits, fall risk, and significant decline in functional mobility compared to baseline    Pt to benefit from continued skilled PT tx while in hospital and upon DC to address deficits as defined above and maximize level of functional mobility  From PT/mobility standpoint, recommendation at time of d/c would be inpatient rehab  Recommend progression of sit to stand transfers and SPT as appropriate  PT Discharge Recommendation: Post acute rehabilitation services          See flowsheet documentation for full assessment

## 2021-08-19 NOTE — CONSULTS
[address] 80 y o  female MRN: [id number]  Unit/Bed#: S -01 Encounter: [id number]    ASSESSMENT and PLAN:  1  Acute kidney injury:  · Baseline creatinine unclear, appears to be near 1 although patient tells me that in March when labs were done she had a lot of swelling and fluid retention therefore there may be a dilutional effect  Prior to that last labs were in February 2020 creatinine was 0 8  · Urine protein creatinine ratio less than 0 4 3 January 2018 with no follow-up studies  · Creatinine 1 52 on admission  Patient given several doses of IV diuretic and creatinine increased to 1 75  Diuretic subsequently adjusted and is now on Lasix 80 mg twice a day  · Creatinine plateauing in the mid 1s which may be baseline for euvolemia  · Workup:    · Urinalysis:  Negative protein, no RBCs, 2-4 WBCs, innumerable bacteria, nitrate negative, trace blood, small leuks  · Etiology:  · Elevation in creatinine may be related to diuresis and may reflect euvolemia or improved volume status  · Prior creatinine may have been lower in the setting of volume overload  · Urine negative for protein and blood, no evidence of glomerular process  · Plan/recommendations:  · Will likely need to tolerate a higher creatinine for euvolemia  · No change in diuretic at this time  · Check labs in the a m  2  Chest pain:  · Cardiology following  · History of CAD with prior stent  · Pain-free at this time  3  Acute on Chronic dCHF:  · Chest x-ray on admission no acute cardiopulmonary disease  · Recent echocardiogram July 2021:  Ejection fraction 15%, grade 1 diastolic dysfunction, mild to moderate MR  Bioprosthetic aortic valve with no significant perivalvular regurgitation  Mild to moderate central regurgitation noted    · Outpatient diuretics:  Lasix 80 mg twice a day, metolazone 2 5 mg every other day  · Concern for volume overload patient was given several doses of IV diuretic  · Patient reports trying to maintain low-salt diet but there is a lot of salt in the food on a usual basis at [address] [place] [address] where she lives  · Fluid restriction 1800 mL per day  · May need the addition of metolazone for weight gain  4  Lower extremity edema/lymphedema:    · Follows with vascular as an outpatient  On lymphedema pump therapy  5  Hypertension:    · Currently on amlodipine and metoprolol  Blood pressure acceptable  6  Other:    · Aortic stenosis with prior TAVR in 2016  · Hypokalemia:  On K Dur 40 mEq 3 times a day  · Hyperlipidemia:  On statin  · History of complete heart block status post permanent pacemaker      HISTORY OF PRESENT ILLNESS:  Requesting Physician: Conor Durham, *  Reason for Consult:  Acute kidney injury    Odilia Hutton is a 80 y o  female with a past medical history of chronic CHF, CAD with prior PCI 2016, TAVR 2016, complete heart block status post permanent pacemaker February 2015, essential hypertension, hyperlipidemia, lower extremity lymphedema who was admitted to [facility] after presenting with chest pain  Victorina Pereira tells me that she had anterior and posterior chest pain that was severe and unremitting  She was admitted on 08/17 and not aggressively diuresed  Patient tells me she was never short of breath  She is comfortably resting in bed at this time  She tells me she is nonambulatory on a regular basis and uses a wheelchair to get around at [address] [place] [address] where she resides  She tries to follow a low-salt diet but she tells me there is a lot of salt in the food  Creatinine in the mid 1s prompting nephrology consult with a baseline previously closer to 0 8-1 0      PAST MEDICAL HISTORY:  Past Medical History:   Diagnosis Date    Anemia     Resolved 3/1/2017     Arthritis     Knee - Resolved 3/1/2017     Blind     CAD (coronary artery disease)     s/p HERNAN 6/2016    Calcaneal spur     CHF (congestive heart failure) (HCC)     Chronic knee instability     Resolved 3/1/2017     Chronic pain syndrome     Resolved 3/1/2017     Dementia ([facility] )     Last assessed 11/1/2017     Diabetes mellitus ([facility] )     non-insulin depdenent    Disease of thyroid gland     Diverticulitis     Essential thrombocytopenia ([facility] )     Last assessed 11/1/2017     GERD (gastroesophageal reflux disease)     History of aortic valve replacement     Resolved 3/1/2017     History of bilateral knee replacement     Resolved 3/1/2017     History of TIA (transient ischemic attack)     no residual deficits    Hyperlipidemia     Hypertension     Hypoxia     on home O2 QHS    Leukocytosis     Resolved 3/1/2017     Lumbar post-laminectomy syndrome     Resolved 3/1/2017     Meniere disease     Osteopenia     Pacemaker     [email or web address] in 2/2015    S/P TAVR (transcatheter aortic valve replacement)     Resolved 3/1/2017     Severe aortic stenosis     Thrombocytosis ([facility] )     Resolved 4/5/2017     Type [facility] diabetes mellitus [facility]     Last assessed 11/1/2017        PAST SURGICAL HISTORY:  Past Surgical History:   Procedure Laterality Date    APPENDECTOMY      BACK SURGERY      Arthrodesis     CARDIAC PACEMAKER PLACEMENT      CHOLECYSTECTOMY      EYE SURGERY      FRACTURE SURGERY Right 01/02/2018    femur    HYSTERECTOMY      WI EGD TRANSORAL BIOPSY SINGLE/MULTIPLE N/A 11/9/2016    Procedure: ESOPHAGOGASTRODUODENOSCOPY (EGD); Surgeon: Ren Denney MD;  Location: [facility];   Service: Gastroenterology    WI OPEN RX FEMUR FX+INTRAMED GARRETT Right 1/2/2018    Procedure: INSERTION NAIL IM FEMUR ANTEGRADE (TROCHANTERIC) RIGHT;  Surgeon: Aneudy Camejo MD;  Location[facility];  Service: Orthopedics    WI REPLACE AORTIC VALVE OPENFEMORAL ARTERY APPROACH N/A 7/26/2016    Procedure: TRANSFEMORAL TAVR WITH 23MM LAROSE LILY S3 VALVE; JOSE ALFREDO ;  Surgeon: Migue Leiva DO;  [facility];  Service: Cardiac Surgery    SMALL INTESTINE SURGERY      TONSILLECTOMY      TOTAL KNEE ARTHROPLASTY      VARICOSE VEIN SURGERY      VENTRAL HERNIA REPAIR         ALLERGIES:  Allergies   Allergen Reactions    Advil [Ibuprofen] GI Intolerance    Propoxyphene Other (See Comments)     DARVOCET=ACID REFLUX    Rofecoxib Other (See Comments)       SOCIAL HISTORY:  Social History     Substance and Sexual Activity   Alcohol Use Not Currently     Social History     Substance and Sexual Activity   Drug Use No     Social History     Tobacco Use   Smoking Status Never Smoker   Smokeless Tobacco Never Used       FAMILY HISTORY:  Family History   Problem Relation Age of Onset    Cancer Child     Coronary artery disease Father         Native artery     Stroke Family     Osteopenia Family     Other Family         Pituitary disease    Polycythemia Family        MEDICATIONS:    Current Facility-Administered Medications:     acetaminophen (TYLENOL) tablet 650 mg, 650 mg, Oral, Q6H PRN, Raúl Phoenix MD    amLODIPine (NORVASC) tablet 5 mg, 5 mg, Oral, Daily, KASIE Dickey, 5 mg at 08/19/21 0857    aspirin chewable tablet 81 mg, 81 mg, Oral, Daily, Raúl Phoenix MD, 81 mg at 08/19/21 0857    atorvastatin (LIPITOR) tablet 40 mg, 40 mg, Oral, Daily With Kirsty Morales MD, 40 mg at 08/18/21 1733    cefuroxime (CEFTIN) tablet 500 mg, 500 mg, Oral, Q24H, Ree Hollow Starsinic, DO, 500 mg at 08/19/21 1148    ferrous sulfate tablet 325 mg, 325 mg, Oral, Daily With Breakfast, Raúl Phoenix MD, 325 mg at 08/19/21 0857    furosemide (LASIX) tablet 80 mg, 80 mg, Oral, BID (diuretic), Ree Hollow Starsinic, DO, 80 mg at 08/19/21 0857    heparin (porcine) subcutaneous injection 5,000 Units, 5,000 Units, Subcutaneous, Q8H [facility], 5,000 Units at 08/19/21 0857 **AND** [CANCELED] Platelet count, , , Once, Raúl Phoenix MD    insulin glargine (LANTUS) subcutaneous injection 12 Units 0 12 mL, 12 Units, Subcutaneous, HS, Raúl Phoenix MD, 12 Units at 08/18/21 2125    levothyroxine tablet 50 mcg, 50 mcg, Oral, Early Morning, Kieran Early MD, 50 mcg at 08/19/21 0524    metoprolol tartrate (LOPRESSOR) tablet 50 mg, 50 mg, Oral, BID, Kieran Early MD, 50 mg at 08/19/21 0857    nystatin (MYCOSTATIN) powder, , Topical, BID, Kieran Early MD, 1 application at 00/34/32 0926    potassium chloride (K-DUR,KLOR-CON) CR tablet 40 mEq, 40 mEq, Oral, TID With Meals, Saw Fisher, PABLITONP, 40 mEq at 08/19/21 1132    tetrahydrozoline (VISINE) 0 05 % ophthalmic solution 1 drop, 1 drop, Both Eyes, 4x Daily PRN, Kieran Early MD    REVIEW OF SYSTEMS:  Constitutional:  Mild fatigue  No fevers or chills  HENT: Negative for postnasal drip  Eyes: Negative for visual disturbance  Respiratory:  Denies shortness of breath or cough at rest    Cardiovascular:  Anterior and posterior chest pain which brought the patient to the hospital   Resolved after admission  No chest pain at this time    Gastrointestinal: Negative for abdominal pain, constipation, diarrhea, nausea and vomiting  Genitourinary: No dysuria, hematuria  Musculoskeletal:  No unusual arthralgias or myalgias   Skin: Negative for rash  Neurological: Negative for focal weakness  Hematological: Negative for  bleeding  Psychiatric/Behavioral: Negative for confusion and sleep disturbance  All the systems were reviewed and were negative except as documented on the HPI      PHYSICAL EXAM:  Current Weight: Weight - Scale: 94 1 kg (207 lb 7 3 oz)  First Weight: Weight - Scale: 93 kg (205 lb)  Vitals:    08/18/21 1606 08/18/21 2139 08/19/21 0600 08/19/21 0627   BP:  147/67  130/65   BP Location:  Left arm  Right arm   Pulse:  82  85   Resp:  18  19   Temp:  98 3 °F (36 8 °C)  98 2 °F (36 8 °C)   TempSrc:  Oral  Oral   SpO2:  91%  92%   Weight:   94 1 kg (207 lb 7 3 oz)    Height: 5' (1 524 m)          Intake/Output Summary (Last 24 hours) at 8/19/2021 1309  Last data filed at 8/19/2021 0900  Gross per 24 hour   Intake 860 ml   Output 1360 ml   Net -500 ml     Physical Exam  Constitutional: General: She is not in acute distress  Appearance: She is well-developed  She is not ill-appearing, toxic-appearing or diaphoretic  HENT:      Head: Normocephalic and atraumatic  Nose: Nose normal  No congestion  Mouth/Throat:      Mouth: Mucous membranes are moist       Pharynx: No oropharyngeal exudate  Eyes:      General: No scleral icterus  Right eye: No discharge  Left eye: No discharge  Extraocular Movements: Extraocular movements intact  Conjunctiva/sclera: Conjunctivae normal    Neck:      Vascular: No carotid bruit or JVD  Trachea: No tracheal deviation  Cardiovascular:      Rate and Rhythm: Normal rate and regular rhythm  Heart sounds: Murmur heard  No friction rub  No gallop  Pulmonary:      Effort: Pulmonary effort is normal       Breath sounds: Normal breath sounds  Abdominal:      General: Bowel sounds are normal  There is no distension  Palpations: Abdomen is soft  There is no mass  Tenderness: There is no abdominal tenderness  There is no guarding or rebound  Musculoskeletal:         General: No swelling or tenderness  Normal range of motion  Cervical back: Normal range of motion and neck supple  No rigidity or tenderness  Right lower le+ Edema present  Left lower le+ Edema present  Comments: Wearing stockings bilaterally   Skin:     General: Skin is warm and dry  Capillary Refill: Capillary refill takes less than 2 seconds  Coloration: Skin is not jaundiced or pale  Findings: No erythema or rash  Neurological:      Mental Status: She is alert and oriented to person, place, and time  Psychiatric:         Mood and Affect: Mood normal          Behavior: Behavior normal          Thought Content:  Thought content normal          Judgment: Judgment normal            Invasive Devices:      Lab Results:   Results from last 7 days   Lab Units 21  1033 21  5527 08/18/21  1609 08/18/21  1007 08/18/21  0542 08/17/21  1142   WBC Thousand/uL 12 95*  --   --  16 41*  --  19 88*   HEMOGLOBIN g/dL 12 9  --   --  14 0  --  13 9   HEMATOCRIT % 40 7  --   --  42 3  --  44 4   PLATELETS Thousands/uL 368  --   --  381  --  379   POTASSIUM mmol/L  --  2 9* 3 0*  --  2 6* 2 7*   CHLORIDE mmol/L  --  96* 96*  --  98* 94*   CO2 mmol/L  --  31 32  --  31 36*   BUN mg/dL  --  56* 49*  --  50* 49*   CREATININE mg/dL  --  1 59* 1 60*  --  1 40* 1 52*   CALCIUM mg/dL  --  9 2 9 5  --  9 6 10 1   ALK PHOS U/L  --   --   --   --   --  126*   ALT U/L  --   --   --   --   --  36   AST U/L  --   --   --   --   --  28     Other Studies:

## 2021-08-19 NOTE — PROGRESS NOTES
General Cardiology   Progress Note -  Team One   Trent Smith Day 80 y o  female MRN: [id number]    Unit/Bed#: S -01 Encounter: [id number]    Assessment/ Plan    1  Chest pain with elevated troponin likely non MI elevated troponin in the setting of CHF   Troponin 0 15, 0 14/0 12/0 11/0 09  ECG reviewed showing V paced rhythm   Patient continues to be chest pain free      2  Acute on chronic diastolic heart failure  ProBNP 1806  Chest x-ray on admission showed vascular congestion   Patient takes furosemide 80 mg PO BID and metolazone 2 5 mg every other day at [facility]   Patient received only 2 doses of IV diuretics this admission and now back on furosemide 80 mg PO BID  Oral diuretics started by primary team yesterday due to creatinine trending up  Will d/w attending regarding discharge diuretic regimen recommendations   Monitor I/Os -940 ml in 24 hours   Daily weights 207 lbs today but bed scale   Continue 2 gram Na diet and 1800 ml fluid restriction     3  Hypokalemia   K 2 9  Replete    4  Acute kidney injury  Current 1 59 today   Baseline creatinine 0 8-1 0  Continue to monitor      5  Coronary artery disease with history of PCI/stenting to LAD in 2016  Continue home medication:  Aspirin, statin beta-blocker     6  Aortic stenosis status post TAVR in 2016     7  Hypertension average blood pressure 138/66  On amlodipine 5 mg p o  daily and metoprolol 50 mg p o  Twice a day, continue this admission     8   Hyperlipidemia  Continue statin      9  Sick sinus syndrome status post Biotronik dual-chamber permanent pacemaker     Subjective  Patient reports she is feeling much better since admission  She denies SOB, chest pain or palpitations  No fever or chills  Review of Systems   Constitutional: Negative for chills, fever and malaise/fatigue  Cardiovascular: Negative for chest pain, leg swelling, orthopnea and palpitations  Respiratory: Negative for shortness of breath      Musculoskeletal: Negative for falls  Gastrointestinal: Negative for bloating, nausea and vomiting  Neurological: Negative for dizziness and light-headedness  Psychiatric/Behavioral: Negative for altered mental status  All other systems reviewed and are negative  Objective:   Vitals: Blood pressure 130/65, pulse 85, temperature 98 2 °F (36 8 °C), temperature source Oral, resp  rate 19, height 5' (1 524 m), weight 94 1 kg (207 lb 7 3 oz), SpO2 92 %  ,       Body mass index is 40 52 kg/m²  ,     Systolic (37AYJ), WHR:485 , Min:130 , HOK:735     Diastolic (12UNX), XOI:95, Min:65, Max:67          Intake/Output Summary (Last 24 hours) at 8/19/2021 0931  Last data filed at 8/19/2021 0900  Gross per 24 hour   Intake 860 ml   Output 1680 ml   Net -820 ml     Weight (last 2 days)     Date/Time   Weight    08/19/21 0600   94 1 (207 45)    08/18/21 1603   93 4 (205 91)    08/18/21 0542   93 4 (205 91)    08/18/21 0007   93 4 (205 91)    08/17/21 1117   93 (205)            Physical Exam  Constitutional:       Appearance: She is obese  HENT:      Head: Normocephalic  Mouth/Throat:      Mouth: Mucous membranes are moist    Cardiovascular:      Rate and Rhythm: Normal rate and regular rhythm  Pulses: Normal pulses  Pulmonary:      Effort: Pulmonary effort is normal  No respiratory distress  Breath sounds: Normal breath sounds  Comments: RA  Abdominal:      General: Bowel sounds are normal       Palpations: Abdomen is soft  Musculoskeletal:         General: Swelling present  Normal range of motion  Cervical back: Neck supple  Comments: Trace lower extremity edema bilaterally    Skin:     General: Skin is warm and dry  Capillary Refill: Capillary refill takes less than 2 seconds  Neurological:      General: No focal deficit present  Mental Status: She is alert and oriented to person, place, and time     Psychiatric:         Mood and Affect: Mood normal          LABORATORY RESULTS  Results from last 7 days Lab Units 08/18/21  0034 08/17/21 2038 08/17/21  1725   TROPONIN I ng/mL 0 09* 0 11* 0 12*     CBC with diff:   Results from last 7 days   Lab Units 08/18/21  1007 08/17/21  1142   WBC Thousand/uL 16 41* 19 88*   HEMOGLOBIN g/dL 14 0 13 9   HEMATOCRIT % 42 3 44 4   MCV fL 92 95   PLATELETS Thousands/uL 381 379   MCH pg 30 6 29 7   MCHC g/dL 33 1 31 3*   RDW % 12 8 12 9   MPV fL 10 9 10 8   NRBC AUTO /100 WBCs  --  0       CMP:  Results from last 7 days   Lab Units 08/19/21  0625 08/18/21  1609 08/18/21  0542 08/17/21 2038 08/17/21  1142   POTASSIUM mmol/L 2 9* 3 0* 2 6* 3 1* 2 7*   CHLORIDE mmol/L 96* 96* 98* 97* 94*   CO2 mmol/L 31 32 31 32 36*   BUN mg/dL 56* 49* 50* 53* 49*   CREATININE mg/dL 1 59* 1 60* 1 40* 1 75* 1 52*   CALCIUM mg/dL 9 2 9 5 9 6 10 0 10 1   AST U/L  --   --   --   --  28   ALT U/L  --   --   --   --  36   ALK PHOS U/L  --   --   --   --  126*   EGFR ml/min/1 73sq m 29 29 34 26 31       BMP:  Results from last 7 days   Lab Units 08/19/21  0625 08/18/21  1609 08/18/21  0542 08/17/21 2038 08/17/21  1142   POTASSIUM mmol/L 2 9* 3 0* 2 6* 3 1* 2 7*   CHLORIDE mmol/L 96* 96* 98* 97* 94*   CO2 mmol/L 31 32 31 32 36*   BUN mg/dL 56* 49* 50* 53* 49*   CREATININE mg/dL 1 59* 1 60* 1 40* 1 75* 1 52*   CALCIUM mg/dL 9 2 9 5 9 6 10 0 10 1       Lab Results   Component Value Date    NTBNP 1,806 (H) 08/17/2021    NTBNP 668 (H) 03/01/2021    NTBNP 535 (H) 07/21/2016       Results from last 7 days   Lab Units 08/17/21  1142   TSH 3RD GENERATON uIU/mL 1 791       Results from last 7 days   Lab Units 08/17/21  1142   INR  1 03       Lipid Profile:   Lab Results   Component Value Date    CHOL 195 07/09/2015    CHOL 163 02/20/2015    CHOL 186 02/20/2014     Lab Results   Component Value Date    HDL 55 08/01/2017    HDL 34 (L) 06/09/2016    HDL 44 02/04/2016     Lab Results   Component Value Date    LDLCALC 44 08/01/2017    LDLCALC 105 (H) 06/09/2016    LDLCALC 120 (H) 02/04/2016     Lab Results   Component Value Date    TRIG 117 2017    TRIG 103 2016    TRIG 177 (H) 2016       Cardiac testing:   Results for orders placed during the hospital encounter of 21    Echo complete with contrast if indicated    Narrative  [facility] [address] [facility]  [phone]    Transthoracic Echocardiogram  2D, M-mode, Doppler, and Color Doppler    Study date:  2021    Patient: Bobo King  MR number: [id number]  Account number: [de-identified]  : 1935  Age: 80 years  Gender: Female  Status: Outpatient  Location: [facility] [place]  Height: 62 in  Weight: 192 5 lb  BP: 158/ 88 mmHg    Indications: Assess left ventricular function  Evaluate prosthetic aortic valve  Diagnoses: I25 10 - Atherosclerotic heart disease of native coronary artery without angina pectoris    Sonographer:  ALMA Fall  Primary Physician:  Lindsey Khan MD  Referring Physician:  Karma Macias DO  Group:  [facility]  Interpreting Physician:  Sarthak Bailey MD    SUMMARY    LEFT VENTRICLE:  Systolic function was normal  Ejection fraction was estimated to be 60 %  There were no regional wall motion abnormalities  Doppler parameters were consistent with abnormal left ventricular relaxation (grade 1 diastolic dysfunction)  MITRAL VALVE:  There was moderate annular calcification  There was mild to moderate regurgitation  AORTIC VALVE:  A 23 mm ([facility] TAVR) bioprosthesis was present  It exhibited normal function  There was mild to moderate central regurgitation  There was no significant perivalvular regurgitation  Valve mean gradient was 14 mmHg  Estimated aortic valve area (by VTI) was 1 73 cmï¾²  TRICUSPID VALVE:  There was mild to moderate regurgitation  Pulmonary artery systolic pressure was within the normal range      HISTORY: PRIOR HISTORY: CAD s/p PCI, Aortic stenosis s/p TAVR in 2016    PROCEDURE: The study was performed in the [facility]  This was a routine study  The transthoracic approach was used  The study included complete 2D imaging, M-mode, complete spectral Doppler, and color Doppler  The  heart rate was 71 bpm, at the start of the study  Images were obtained from the parasternal, apical, subcostal, and suprasternal notch acoustic windows  Image quality was adequate  LEFT VENTRICLE: Size was normal  Systolic function was normal  Ejection fraction was estimated to be 60 %  There were no regional wall motion abnormalities  Wall thickness was normal  DOPPLER: Doppler parameters were consistent with  abnormal left ventricular relaxation (grade 1 diastolic dysfunction)  RIGHT VENTRICLE: The size was normal  Systolic function was normal  Wall thickness was normal     LEFT ATRIUM: Size was at the upper limits of normal     RIGHT ATRIUM: Size was normal     MITRAL VALVE: There was moderate annular calcification  Valve structure was normal  There was normal leaflet separation  DOPPLER: The transmitral velocity was within the normal range  There was no evidence for stenosis  There was mild to  moderate regurgitation  AORTIC VALVE: A 23 mm ([facility] TAVR) bioprosthesis was present  It exhibited normal function  DOPPLER: There was no evidence for stenosis  There was mild to moderate central regurgitation  There was no significant perivalvular  regurgitation  TRICUSPID VALVE: The valve structure was normal  There was normal leaflet separation  DOPPLER: The transtricuspid velocity was within the normal range  There was no evidence for stenosis  There was mild to moderate regurgitation  Pulmonary  artery systolic pressure was within the normal range  Estimated peak PA pressure was 30 mmHg  PULMONIC VALVE: Not well visualized  DOPPLER: The transpulmonic velocity was within the normal range  There was no significant regurgitation  PERICARDIUM: There was no pericardial effusion   The pericardium was normal in appearance  AORTA: The root exhibited normal size  SYSTEMIC VEINS: IVC: The inferior vena cava was normal in size  MEASUREMENT TABLES    2D MEASUREMENTS  LVOT   (Reference normals)  Diam   20 mm   (--)    DOPPLER MEASUREMENTS  LVOT   (Reference normals)  Peak soren   124 cm/s   (--)  Mean soren   98 cm/s   (--)  VTI   25 5 cm   (--)  Peak gradient   5 mmHg   (--)  Mean gradient   3 mmHg   (--)  Stroke vol   80 11 ml   (--)  Aortic valve   (Reference normals)  Peak soren   217 cm/s   (--)  Mean soren   169 cm/s   (--)  VTI   46 cm   (--)  Peak gradient   19 mmHg   (--)  Mean gradient   14 mmHg   (--)  Obstr index, VTI   0 55    (--)  Valve area, VTI   1 73 cmï¾²   (--)  Area index, VTI   0 92 cmï¾²/mï¾²   (--)  Obstr index, Vmax   0 57    (--)  Valve area, Vmax   1 79 cmï¾²   (--)  Area index, Vmax   0 95 cmï¾²/mï¾²   (--)  Obstr index, Vmean   0 58    (--)  Valve area, Vmean   1 82 cmï¾²   (--)  Area index, Vmean   0 97 cmï¾²/mï¾²   (--)    SYSTEM MEASUREMENT TABLES    2D  %FS: 39 79 %  Ao Diam: 2 43 cm  EDV(Teich): 101 53 ml  EF(Teich): 70 38 %  ESV(Teich): 30 07 ml  IVSd: 1 cm  LA Area: 16 81 cm2  LA Diam: 3 7 cm  LVEDV MOD A4C: 88 07 ml  LVEF MOD A4C: 66 49 %  LVESV MOD A4C: 29 52 ml  LVIDd: 4 68 cm  LVIDs: 2 82 cm  LVLd A4C: 6 72 cm  LVLs A4C: 5 23 cm  LVOT Diam: 2 03 cm  LVPWd: 1 02 cm  RA Area: 13 74 cm2  RVIDd: 3 32 cm  SV MOD A4C: 58 56 ml  SV(Teich): 71 46 ml    CF  MR Trace: 4 3 cm2  TR Trace: 5 06 cm2    CW  AR Dec Randolph: 2 2 m/s2  AR Dec Time: 1516 05 ms  AR PHT: 439 65 ms  AR Vmax: 3 34 m/s  AR maxP 54 mmHg  AV Env  Ti: 266 41 ms  AV MaxP 67 mmHg  AV VTI: 42 45 cm  AV Vmax: 2 1 m/s  AV Vmean: 1 62 m/s  AV meanP 44 mmHg  MV PHT: 42 01 ms  MV VTI: 33 05 cm  MV Vmax: 1 68 m/s  MV Vmean: 1 m/s  MV maxP 3 mmHg  MV meanP 52 mmHg  MVA By PHT: 5 24 cm2  TR MaxP 38 mmHg  TR Vmax: 2 42 m/s    MM  TAPSE: 1 85 cm    PW  LARA (VTI): 2 24 cm2  LARA Vmax: 1 96 cm2  AVAI (VTI): 0 cm2/m2  AVAI Vmax: 0 cm2/m2  E' Sept: 0 06 m/s  E/E' Sept:  54  LVOT Env  Ti: 300 67 ms  LVOT VTI: 29 48 cm  LVOT Vmax: 1 27 m/s  LVOT Vmean: 0 98 m/s  LVOT maxP 47 mmHg  LVOT meanP 22 mmHg  LVSI Dopp: 50 63 ml/m2  LVSV Dopp: 95 18 ml  MV A Donnell: 1 34 m/s  MV Dec Cowlitz: 7 65 m/s2  MV DecT: 153 78 ms  MV E Donnell: 1 18 m/s  MV E/A Ratio: 0 88  MVA (VTI): 2 88 cm2    [employer] Accredited Echocardiography Laboratory    Prepared and electronically signed by    Colton Zendejas MD  Signed 2021 13:33:44    No results found for this or any previous visit  No results found for this or any previous visit  No valid procedures specified  No results found for this or any previous visit      Meds/Allergies   all current active meds have been reviewed and current meds:   Current Facility-Administered Medications   Medication Dose Route Frequency    acetaminophen (TYLENOL) tablet 650 mg  650 mg Oral Q6H PRN    amLODIPine (NORVASC) tablet 5 mg  5 mg Oral Daily    aspirin chewable tablet 81 mg  81 mg Oral Daily    atorvastatin (LIPITOR) tablet 40 mg  40 mg Oral Daily With Dinner    ceftriaxone (ROCEPHIN) 1 g/50 mL in dextrose IVPB  1,000 mg Intravenous Q24H    ferrous sulfate tablet 325 mg  325 mg Oral Daily With Breakfast    furosemide (LASIX) tablet 80 mg  80 mg Oral BID (diuretic)    heparin (porcine) subcutaneous injection 5,000 Units  5,000 Units Subcutaneous Q8H [address]    insulin glargine (LANTUS) subcutaneous injection 12 Units 0 12 mL  12 Units Subcutaneous HS    levothyroxine tablet 50 mcg  50 mcg Oral Early Morning    metoprolol tartrate (LOPRESSOR) tablet 50 mg  50 mg Oral BID    nystatin (MYCOSTATIN) powder   Topical BID    potassium chloride (K-DUR,KLOR-CON) CR tablet 40 mEq  40 mEq Oral TID With Meals    tetrahydrozoline (VISINE) 0 05 % ophthalmic solution 1 drop  1 drop Both Eyes 4x Daily PRN     Medications Prior to Admission   Medication    acetaminophen (TYLENOL) 325 mg tablet    amLODIPine (NORVASC) 5 mg tablet    amoxicillin (AMOXIL) 500 mg capsule    aspirin 81 mg chewable tablet    atorvastatin (LIPITOR) 10 mg tablet    bumetanide (BUMEX) 1 mg tablet    diclofenac sodium (VOLTAREN) 1 %    ferrous sulfate 325 (65 Fe) mg tablet    furosemide (LASIX) 40 mg tablet    Glucagon, rDNA, (Glucagon Emergency) 1 MG KIT    insulin glargine (LANTUS) 100 units/mL subcutaneous injection    ketoconazole (NIZORAL) 2 % cream    levothyroxine 50 mcg tablet    loperamide (IMODIUM A-D) 2 MG tablet    metoprolol tartrate (LOPRESSOR) 50 mg tablet    Multiple Vitamins-Minerals (OCUVITE ADULT 50+ PO)    nystatin (MYCOSTATIN) powder    oxyCODONE (ROXICODONE) 5 mg immediate release tablet    potassium chloride (K-DUR,KLOR-CON) 20 mEq tablet    tetrahydrozoline (VISINE) 0 05 % ophthalmic solution     Counseling / Coordination of Care  Total floor / unit time spent today 20 minutes  Greater than 50% of total time was spent with the patient and / or family counseling and / or coordination of care  ** Please Note: Dragon 360 Dictation voice to text software may have been used in the creation of this document   **

## 2021-08-19 NOTE — PLAN OF CARE
Problem: OCCUPATIONAL THERAPY ADULT  Goal: Performs self-care activities at highest level of function for planned discharge setting  See evaluation for individualized goals  Description: Treatment Interventions: ADL retraining, Functional transfer training, UE strengthening/ROM, Endurance training, Patient/family training, Equipment evaluation/education, Compensatory technique education, Activityengagement          See flowsheet documentation for full assessment, interventions and recommendations  Outcome: Progressing  Note: Limitation: Decreased UE ROM, Decreased ADL status, Decreased Safe judgement during ADL, Decreased UE strength, Decreased endurance, Decreased high-level ADLs, Decreased self-care trans (limited function of RUE)  Prognosis: Fair  Assessment: Pt is a 80 y o  female seen for OT evaluation s/p admit to [facility] on 8/17/2021 w/ Acute on chronic diastolic heart failure ([facility] [address] )  Comorbidities affecting pt's functional performance at time of assessment are listed above  Personal factors affecting pt at time of IE include:limited home support (pt reports "short staffed because of the walk-out" at [facility]), and advanced age, inability to carry out basic ADLs  Prior to admission, pt was reportedly receiving assistance with all basic self cares, however pt reports "doing what I can" such as assisting to don a shirt or standing with little support while staff would pull up pants or complete toileting hygiene  Upon evaluation: Pt requires max (A)x2 for bed mobility as well as mod (A)x2 for sit<>stand  Pt unable to progress toward chair as pt felt "feet glued to the ground " Pt was able to roll R/L with mod/max (A)  Pt presents below baseline level of independence 2* the following deficits impacting occupational performance: weakness, decreased ROM, decreased strength, decreased balance, decreased tolerance, impaired problem solving, decreased safety awareness and increased pain   Pt to benefit from continued skilled OT tx while in the hospital to address deficits as defined above and maximize level of functional independence w ADL's and functional mobility  Occupational Performance areas to address include: grooming, bathing/shower, toilet hygiene, dressing, community mobility, social participation and leisure participation  Pt with score of 25/100 on the Barthel Index  Pt's raw score on the AM-PAC Daily activity inpatient short form is 13, standardized score is 32 03, less than 39 4  Patients at this level are likely to benefit from DC to post acute inpatient rehab  Based on OT evaluation, the assessment(s) completed, performance deficits listed, and current level of function, pt identified as a HIGH complexity evaluation  From OT standpoint, recommendation at time of d/c would be post acute inpatient rehab (return to facility with OT services if facility able to assist pt at current level)        OT Discharge Recommendation: Post acute rehabilitation services

## 2021-08-19 NOTE — ASSESSMENT & PLAN NOTE
- mild bump in troponin secondary to acute on chronic CHF  - small further cardiac workup warranted at this time  - continue with diuresis

## 2021-08-19 NOTE — PHYSICAL THERAPY NOTE
PHYSICAL THERAPY EVALUATION  NAME: Rosraio VALDES Day  AGE:   80 y o    MRN:  [id number]  ADMIT DX: Chest pain [R07 9]  Elevated troponin [R77 8]  Chest pain, unspecified type [R07 9]    PMH:   Past Medical History:   Diagnosis Date    Anemia     Resolved 3/1/2017     Arthritis     Knee - Resolved 3/1/2017     Blind     CAD (coronary artery disease)     s/p HERNAN 6/2016    Calcaneal spur     CH[facility]congest[facility] failure) ([facility]     Chronic knee instability     Resolved 3/1/2017     Chronic pain syndrome     Resolved 3/1/2017     Dementia ([facility] )     Last assessed 11/1/2017     Diabetes mellitus ([place] [address] )     non-insulin depdenent    Disease of thyroid gland     Diverticulitis     Essential thrombocytopenia ([facility])     Last assessed 11/1/2017     GERD (gastroesophageal reflux disease)     History of aortic valve replacement     Resolved 3/1/2017     History of bilateral knee replacement     Resolved 3/1/2017     History of TIA (transient ischemic attack)     no residual deficits    Hyperlipidemia     Hypertension     Hypoxia     on home O2 QHS    Leukocytosis     Resolved 3/1/2017     Lumbar post-laminectomy syndrome     Resolved 3/1/2017     Meniere disease     Osteopenia     Pacemaker     [facility]    S/P TAVR (transcatheter aortic valve replacement)     Resolved 3/1/2017     Severe aortic stenosis     Thrombocytosis ([facility] )     Resolved 4/5/2017     Type 2 diabetes mellitus ([facility] )     Last assessed 11/1/2017      LENGTH OF STAY: 2       08/19/21 1231   PT Last Visit   PT Visit Date 08/19/21   Note Type   Note type Evaluation   Pain Assessment   Pain Assessment Tool FLACC   Pain Rating: FLACC (Rest) - Face 0   Pain Rating: FLACC (Rest) - Legs 0   Pain Rating: FLACC (Rest) - Activity 0   Pain Rating: FLACC (Rest) - Cry 0   Pain Rating: FLACC (Rest) - Consolability 0   Score: FLACC (Rest) 0   Pain Rating: FLACC (Activity) - Face 1   Pain Rating: FLACC (Activity) - Legs 1   Pain Rating: FLACC (Activity) - Activity 1   Pain Rating: FLACC (Activity) - Cry 1   Pain Rating: FLACC (Activity) - Consolability 1   Score: FLACC (Activity) 5   Home Living   Type of Home Assisted living  ( [facility] )   Home Layout One level   [facility] [address]   Additional Comments Pt reports she was performing SPT with supervision most recently at baseline  Pt also reports she had been ambulating short distances with assist, however short staffing resulted in decreased mobility  Prior Function   Level of [place] Needs assistance with ADLs and functional mobility   Lives With Facility staff   Receives Help From Personal care attendant   ADL Assistance Needs assistance   IADLs Needs assistance   Falls in the last 6 months 1 to 4   Restrictions/Precautions   Weight Bearing Precautions Per Order No   Other Precautions Telemetry; Fall Risk;Pain   General   Family/Caregiver Present No   Cognition   Overall Cognitive Status impaired   Arousal/Participation Cooperative   Orientation Level Oriented to person;Oriented to place;Oriented to situation   Following Commands Follows one step commands with increased time or repetition   Comments Pt identified by name and   RLE Assessment   RLE Assessment X   Strength RLE   RLE Overall Strength 2+/5  (functionally)   LLE Assessment   LLE Assessment X   Strength LLE   LLE Overall Strength 2+/5  (functionally)   Bed Mobility   Rolling R 3  Moderate assistance   Additional items Bedrails; Increased time required;Verbal cues; Assist x 2   Rolling L 2  Maximal assistance   Additional items Assist x 2;Bedrails; Increased time required;Verbal cues   Supine to Sit 2  Maximal assistance   Additional items Assist x 2;HOB elevated; Bedrails; Increased time required;Verbal cues;LE management   Sit to Supine 2  Maximal assistance   Additional items Assist x 2; Increased time required;Verbal cues;LE management   Additional Comments initially has a posterior lean and mild c/o dizziness, however resolves quickly and able to hold balance: Fair at EOB   Transfers   Sit to Stand 3  Moderate assistance   Additional items Assist x 2; Increased time required;Verbal cues  (pt leaning BLEs against bed)   Stand to Sit 3  Moderate assistance   Additional items Assist x 2; Increased time required;Verbal cues   Stand pivot Unable to assess   Additional Comments Not appropriate for SPT as pt unable to weight shift fully and unable to clear or advance either LE from floor  Ambulation/Elevation   Gait pattern Not appropriate   Balance   Static Sitting Fair   Static Standing Zero  (Ax2)   Endurance Deficit   Endurance Deficit Yes   Endurance Deficit Description limited standing tolerance, fatigue, degradation of posture   Activity Tolerance   Activity Tolerance Patient limited by fatigue   Nurse Made Aware Per RN, pt appropriate to evaluate   Assessment   Prognosis Fair   Problem List Decreased strength;Decreased endurance; Impaired balance;Decreased mobility;Obesity;Pain   Goals   Patient Goals to be able to move better   [facility] Expiration Date 08/28/21   Short Term Goal #1 Pt will be able to: (1) perform bed mobility with mod A x1 to promote upright posture and OOB mobility (2) perform sit to stand with mod A x1 to decrease burden of care (3) perform SPT with mod A x2 as appropriate to promote increased sitting tolerance and preparation for ambulation (4) increase standing balance by 1 grade to decrease risk of falls    PT Treatment Day 0   Plan   Treatment/Interventions Functional transfer training;LE strengthening/ROM; Therapeutic exercise; Endurance training;Patient/family training;Equipment eval/education; Bed mobility   PT Frequency Other (Comment)  (3-5x/wk)   Recommendation   PT Discharge Recommendation Post acute rehabilitation services   Equipment Recommended Wheelchair  (pt has one)   Yuriy 8 in Bed Without Bedrails 1   Lying on Back to Sitting on Edge of Flat Bed 1 Moving Bed to Chair 1   Standing Up From Chair 1   Walk in Room 1   Climb 3-5 Stairs 1   Basic Mobility Inpatient Raw Score 6   Turning Head Towards Sound 4   Follow Simple Instructions 4   Low Function Basic Mobility Raw Score 14   Low Function Basic Mobility Standardized Score 22 01   Barthel Index   Feeding 5   Bathing 0   Grooming Score 5   Dressing Score 5   Bladder Score 0   Bowels Score 10   Toilet Use Score 0   Transfers (Bed/Chair) Score 5   Mobility (Level Surface) Score 0   Stairs Score 0   Barthel Index Score 30     The patient's AM-PAC Basic Mobility Inpatient Short Form Low Function Raw Score 14 , Standardized Score is 22 01  A standardized score less 42 9 suggests the patient may benefit from discharge to post-acute rehab services  Please also refer to the recommendation of the Physical Therapist for safe discharge planning  Pt was seen for a co-eval with OT due to potential need for significant physical assist, poor pain control, impaired mental status, limiting behaviors, and poor adherence to precautions  Assessment: Pt is a 80 y o  female seen for PT evaluation s/p admit to [address] [place] [address] on 8/17/2021 w/ Acute on chronic diastolic heart failure ([facility] )  Order placed for PT  Comorbidities affecting pt's physical performance at time of assessment include: DM, HTN, obesity and dementia  Personal factors affecting pt at time of IE include: advanced age and inability to ambulate household distances  Prior to admission, pt was living at [address] [place] [address] and was requiring supervision for SPT to and from [facility] at baseline  Upon evaluation: Pt requires max A x2 for bed mobility and mod A x 2 for sit to stand  Unable to weight shift fully or advance/clear either LE limiting further mobility  (Please find full objective findings from PT assessment regarding body systems outlined above)   Impairments and limitations also listed above, especially due to  weakness, impaired balance, decreased endurance, pain, decreased activity tolerance, fall risk and decreased cognition  The following objective measures performed on IE also reveal limitations: Barthel Index 30/100  Pt's clinical presentation is currently unstable/unpredictable seen in pt's presentation of limited insight into deficits, fall risk, and significant decline in functional mobility compared to baseline  Pt to benefit from continued skilled PT tx while in hospital and upon DC to address deficits as defined above and maximize level of functional mobility  From PT/mobility standpoint, recommendation at time of d/c would be inpatient rehab  Recommend progression of sit to stand transfers and SPT as appropriate        Laury Sargent, PT,DPT

## 2021-08-19 NOTE — ASSESSMENT & PLAN NOTE
- acute uncomplicated cystitis  - transition to p o   Ceftin today; will complete 5 day course of therapy on 08/21

## 2021-08-19 NOTE — ASSESSMENT & PLAN NOTE
- creatinine 1 75 on admission; baseline 0 8-1 0  - mild improvement thus far    - continue on home diuretic regimen  - check renal ultrasound to rule out obstructive process  - daily BMP  - Nephro consult

## 2021-08-19 NOTE — PROGRESS NOTES
[facility]  Progress Note Calderon [address] 1935, 80 y o  female MRN: [id number]  Unit/Bed#: S -01 Encounter: [id number]  Primary Care Provider: Aaron Obrien MD   Date and time admitted to hospital: 8/17/2021 11:12 AM    * Acute on chronic heart failure ([address] )  Assessment & Plan  - last echocardiogram showing preserved systolic function, but with grade 1 diastolic dysfunction  - presents this hospitalization chest pain and volume overload    - transitioned back to po lasix 80mg BID  - continue low-sodium diet  - monitor in's and out's with daily weights  - appreciate cardiology recommendations      Hypokalemia  Assessment & Plan  - complicated by diuresis  - 8/19: remains low at 2 9    - replace as necessary  - monitor for dysrhythmia  - recheck in a m       UTI (urinary tract infection)  Assessment & Plan  - acute uncomplicated cystitis  - transition to p o  Ceftin today; will complete 5 day course of therapy on 08/21    Sick sinus syndrome (HCC)  Assessment & Plan  - status post dual chamber pacer; recent check showing ventricular pacing    Elevated troponin level not due myocardial infarction  Assessment & Plan  - mild bump in troponin secondary to acute on chronic CHF  - small further cardiac workup warranted at this time  - continue with diuresis    Acute kidney injury superimposed on CKD (HCC)  Assessment & Plan  - creatinine 1 75 on admission; baseline 0 8-1 0  - mild improvement thus far    - continue on home diuretic regimen  - check renal ultrasound to rule out obstructive process  - daily BMP  - Nephro consult     Chest pain  Assessment & Plan  - resolved      VTE Pharmacologic Prophylaxis: VTE Score: 5 High Risk (Score >/= 5) - Pharmacological DVT Prophylaxis Ordered: heparin  Sequential Compression Devices Ordered  Patient Centered Rounds: I performed bedside rounds with nursing staff today    Discussions with Specialists or Other Care Team Provider: None    Education and Discussions with Family / Patient: Updated  (daughter) via phone  Time Spent for Care: 20 minutes  More than 50% of total time spent on counseling and coordination of care as described above  Current Length of Stay: 2 day(s)  Current Patient Status: Inpatient   Certification Statement: The patient will continue to require additional inpatient hospital stay due to hypokalemia and acute kidney injury  Discharge Plan: Anticipate discharge in 48 hrs to rehab facility  Code Status: Level 1 - Full Code    Subjective:   Patient examined  Following up for acute/chronic CHF, MAYELIN, hypokalemia  Patient in good spirits today  She says she feels back to normal   Edema in the legs has improved  Otherwise no new complaints  Eating appropriately, voiding,     Objective:     Vitals:   Temp (24hrs), Av 3 °F (36 8 °C), Min:98 2 °F (36 8 °C), Max:98 3 °F (36 8 °C)    Temp:  [98 2 °F (36 8 °C)-98 3 °F (36 8 °C)] 98 2 °F (36 8 °C)  HR:  [] 85  Resp:  [18-19] 19  BP: (130-147)/(65-67) 130/65  SpO2:  [91 %-92 %] 92 %  Body mass index is 40 52 kg/m²  Input and Output Summary (last 24 hours): Intake/Output Summary (Last 24 hours) at 2021 1009  Last data filed at 2021 0900  Gross per 24 hour   Intake 860 ml   Output 1680 ml   Net -820 ml     PHYSICAL EXAM:    Vitals signs reviewed  Constitutional   Awake and cooperative  NAD  Head/Neck   Normocephalic  Atraumatic  HEENT   No scleral icterus  EOMI  Heart   Regular rate and rhythm  No murmers  Lungs   Lungs clear bilaterally  Respirations unlabored  Decreased at bases bilaterally  Abdomen   Soft  Nontender  Nondistended  Skin   Skin color normal  No rashes  Extremities   No deformities  Trace bilateral lower extremity edema  Neuro   Alert and oriented  No new deficits  Psych   Mood stable   Affect normal          Additional Data:     Labs:  Results from last 7 days   Lab Units 21  1007 21  1142   WBC Thousand/uL 16 41* 19 88*   HEMOGLOBIN g/dL 14 0 13 9   HEMATOCRIT % 42 3 44 4   PLATELETS Thousands/uL 381 379   NEUTROS PCT %  --  85*   LYMPHS PCT %  --  5*   MONOS PCT %  --  8   EOS PCT %  --  0     Results from last 7 days   Lab Units 08/19/21  0625 08/17/21  1142   SODIUM mmol/L 138 139   POTASSIUM mmol/L 2 9* 2 7*   CHLORIDE mmol/L 96* 94*   CO2 mmol/L 31 36*   BUN mg/dL 56* 49*   CREATININE mg/dL 1 59* 1 52*   ANION GAP mmol/L 11 9   CALCIUM mg/dL 9 2 10 1   ALBUMIN g/dL  --  3 5   TOTAL BILIRUBIN mg/dL  --  0 71   ALK PHOS U/L  --  126*   ALT U/L  --  36   AST U/L  --  28   GLUCOSE RANDOM mg/dL 176* 270*     Results from last 7 days   Lab Units 08/17/21  1142   INR  1 03     Results from last 7 days   Lab Units 08/19/21  0626 08/18/21  2055   POC GLUCOSE mg/dl 170* 184*               Lines/Drains:  Invasive Devices     Peripheral Intravenous Line            Peripheral IV 08/17/21 Right Antecubital 1 day                  Telemetry:  Telemetry Orders (From admission, onward)             48 Hour Telemetry Monitoring  Continuous x 48 hours     Question:  Reason for 48 Hour Telemetry  Answer:  Acute MI, chest pain - R/O MI, or unstable angina                 Indication for Continued Telemetry Use: Metabolic/electrolyte disturbance with high probability of dysrhythmia           Imaging: No pertinent imaging reviewed      Recent Cultures (last 7 days):         Last 24 Hours Medication List:   Current Facility-Administered Medications   Medication Dose Route Frequency Provider Last Rate    acetaminophen  650 mg Oral Q6H PRN Patricia Moyer MD      amLODIPine  5 mg Oral Daily KASIE Elder      aspirin  81 mg Oral Daily Patricia Moyer MD      atorvastatin  40 mg Oral Daily With Carmen Godfrey MD      cefuroxime  500 mg Oral Q12H [address] [place] [address],       ferrous sulfate  325 mg Oral Daily With Breakfast Patricia Moyer MD      furosemide  80 mg Oral BID (diuretic) Akosua Salcido DO Marva      heparin (porcine)  5,000 Units Subcutaneous [facility] Omkar Morton MD      insulin glargine  12 Units Subcutaneous HS Omkar Morton MD      levothyroxine  50 mcg Oral Early Morning Omkar Morton MD      metoprolol tartrate  50 mg Oral BID Omkar Morton MD      nystatin   Topical BID Omkar Morton MD      potassium chloride  40 mEq Oral TID With Meals KASIE Casanova      tetrahydrozoline  1 drop Both Eyes 4x Daily PRN Omkar Morton MD          Today, Patient Was Seen By: Chilo Durham DO    **Please Note: This note may have been constructed using a voice recognition system  **

## 2021-08-19 NOTE — ASSESSMENT & PLAN NOTE
- complicated by diuresis  - 8/19: remains low at 2 9    - replace as necessary  - monitor for dysrhythmia  - recheck in a m

## 2021-08-19 NOTE — CASE MANAGEMENT
LOS: 1  Readmission: none  Bundle: none  Unplanned Readmission Risk: 17 (green)    Patient admitted due to acute on chronic diastolic CHF  Patient is a resident at [address] [place] [address] ([facility])  Call made to facility ([phone]) and spoke with Alejandro Jauregui states that prior to hospitalization, patient was non-ambulatory in a wheelchair, but only required min assist/supervision with transfers from her lift recliner/bed/toilet to the wheelchair  States that once in the wheelchair, patient was unable to self-propel, so staff would push  Reviewed PT eval with Alejandro Jauregui  At present, patient requiring mod-max assist x2 with bed mobility and transfers  PT/OT evals and H&P forwarded to Alejandro Jauregui for their records (fax: [phone])  Alejandro Jauregui in agreement with recommendation for SNF prior to patient's return to [facility]  Attempt made to meet with patient to discuss same, but patient is on the phone  Requested this writer return a little later on for conversation  Will continue to follow      LORENZO Abebe  8/19/2021  2:40 PM

## 2021-08-20 ENCOUNTER — APPOINTMENT (INPATIENT)
Dept: ULTRASOUND IMAGING | Facility: HOSPITAL | Age: 86
DRG: 682 | End: 2021-08-20
Payer: MEDICARE

## 2021-08-20 LAB
ANION GAP SERPL CALCULATED.3IONS-SCNC: 11 MMOL/L (ref 4–13)
BUN SERPL-MCNC: 59 MG/DL (ref 5–25)
CALCIUM SERPL-MCNC: 9.6 MG/DL (ref 8.3–10.1)
CHLORIDE SERPL-SCNC: 98 MMOL/L (ref 100–108)
CO2 SERPL-SCNC: 30 MMOL/L (ref 21–32)
CREAT SERPL-MCNC: 1.47 MG/DL (ref 0.6–1.3)
GFR SERPL CREATININE-BSD FRML MDRD: 32 ML/MIN/1.73SQ M
GLUCOSE SERPL-MCNC: 166 MG/DL (ref 65–140)
GLUCOSE SERPL-MCNC: 199 MG/DL (ref 65–140)
POTASSIUM SERPL-SCNC: 2.8 MMOL/L (ref 3.5–5.3)
POTASSIUM SERPL-SCNC: 2.8 MMOL/L (ref 3.5–5.3)
SODIUM SERPL-SCNC: 139 MMOL/L (ref 136–145)

## 2021-08-20 PROCEDURE — 99232 SBSQ HOSP IP/OBS MODERATE 35: CPT | Performed by: INTERNAL MEDICINE

## 2021-08-20 PROCEDURE — 99233 SBSQ HOSP IP/OBS HIGH 50: CPT | Performed by: INTERNAL MEDICINE

## 2021-08-20 PROCEDURE — 82948 REAGENT STRIP/BLOOD GLUCOSE: CPT

## 2021-08-20 PROCEDURE — 76770 US EXAM ABDO BACK WALL COMP: CPT

## 2021-08-20 PROCEDURE — 80048 BASIC METABOLIC PNL TOTAL CA: CPT | Performed by: INTERNAL MEDICINE

## 2021-08-20 PROCEDURE — 84132 ASSAY OF SERUM POTASSIUM: CPT | Performed by: INTERNAL MEDICINE

## 2021-08-20 RX ORDER — POTASSIUM CHLORIDE 14.9 MG/ML
20 INJECTION INTRAVENOUS ONCE
Status: DISCONTINUED | OUTPATIENT
Start: 2021-08-20 | End: 2021-08-22 | Stop reason: HOSPADM

## 2021-08-20 RX ADMIN — FUROSEMIDE 80 MG: 80 TABLET ORAL at 17:24

## 2021-08-20 RX ADMIN — INSULIN GLARGINE 12 UNITS: 100 INJECTION, SOLUTION SUBCUTANEOUS at 22:36

## 2021-08-20 RX ADMIN — METOPROLOL TARTRATE 50 MG: 50 TABLET, FILM COATED ORAL at 17:24

## 2021-08-20 RX ADMIN — NYSTATIN: 100000 POWDER TOPICAL at 09:16

## 2021-08-20 RX ADMIN — POTASSIUM CHLORIDE 40 MEQ: 1500 TABLET, EXTENDED RELEASE ORAL at 17:25

## 2021-08-20 RX ADMIN — POTASSIUM CHLORIDE 40 MEQ: 1500 TABLET, EXTENDED RELEASE ORAL at 09:14

## 2021-08-20 RX ADMIN — POTASSIUM CHLORIDE 40 MEQ: 1500 TABLET, EXTENDED RELEASE ORAL at 12:30

## 2021-08-20 RX ADMIN — FERROUS SULFATE TAB 325 MG (65 MG ELEMENTAL FE) 325 MG: 325 (65 FE) TAB at 09:27

## 2021-08-20 RX ADMIN — ASPIRIN 81 MG: 81 TABLET, CHEWABLE ORAL at 09:14

## 2021-08-20 RX ADMIN — HEPARIN SODIUM 5000 UNITS: 5000 INJECTION INTRAVENOUS; SUBCUTANEOUS at 17:24

## 2021-08-20 RX ADMIN — FUROSEMIDE 80 MG: 80 TABLET ORAL at 09:14

## 2021-08-20 RX ADMIN — METOPROLOL TARTRATE 50 MG: 50 TABLET, FILM COATED ORAL at 09:15

## 2021-08-20 RX ADMIN — ATORVASTATIN CALCIUM 40 MG: 40 TABLET, FILM COATED ORAL at 17:24

## 2021-08-20 RX ADMIN — HEPARIN SODIUM 5000 UNITS: 5000 INJECTION INTRAVENOUS; SUBCUTANEOUS at 09:15

## 2021-08-20 RX ADMIN — LEVOTHYROXINE SODIUM 50 MCG: 50 TABLET ORAL at 05:54

## 2021-08-20 RX ADMIN — NYSTATIN 1 APPLICATION: 100000 POWDER TOPICAL at 22:36

## 2021-08-20 RX ADMIN — AMLODIPINE BESYLATE 5 MG: 5 TABLET ORAL at 09:15

## 2021-08-20 RX ADMIN — CEFUROXIME AXETIL 500 MG: 250 TABLET ORAL at 09:16

## 2021-08-20 RX ADMIN — HEPARIN SODIUM 5000 UNITS: 5000 INJECTION INTRAVENOUS; SUBCUTANEOUS at 03:05

## 2021-08-20 NOTE — ASSESSMENT & PLAN NOTE
- complicated by diuresis  - 8/20: remains low at 2 8meq potassium TID    - replace as necessary  - monitor for dysrhythmia  - recheck in a m   - appreciate nephrology assistance; may need to consider changing diuretic regimen

## 2021-08-20 NOTE — PROGRESS NOTES
NEPHROLOGY PROGRESS NOTE   Ames Shiver Day 80 y o  female MRN: [id number]  Unit/Bed#: S -01 Encounter: [id number]    ASSESSMENT & PLAN:  80 y o  female with a past medical history of chronic CHF, CAD with prior PCI 2016, TAVR 2016, complete heart block status post permanent pacemaker February 2015, essential hypertension, hyperlipidemia, lower extremity lymphedema who was admitted to S  after presenting with chest pain  Nephrology consulted for elevated creatinine  Acute kidney injury, POA  -Baseline creatinine:0 8-1 0 mg/dl  -Admission creatinine:1 52  mg/dl  - Work up:   · UA with microscopy:no rbc and trace blood with no protein  · Imaging: XR chest - no acute cardio pulmonary disease   -Etiology:  Possibly cardiorenal syndrome  Will also check kidney ultrasound and bladder scan to rule out post renal cause  Less likely GN  -Hospital Course: renal function improving to cr 1 47  -Plan:   · Check kidney ultrasound and bladder scan  · Continue diuretics as mentioned below  Continue to monitor renal function  Avoid hypotension  · Avoid nephrotoxins and dose all medications per EGFR  · Avoid hypotension  BP/HTN  -Currently BP stable  -Plan:  Continue current medication-amlodipine and metoprolol    Acute on chronic diastolic CHF, elevated proBNP  -cardiology following  -official chest x-ray reading did not show any cardiopulmonary disease but as per Cardiology note patient had pulmonary congestion  -recent echocardiogram from July 2021 showed ejection fraction 60% with grade 1 diastolic dysfunction, mild-to-moderate MR  -home diuretics included lasix 80 mg twice daily, metolazone 2 5 every other day at [facility]  -currently on oral Lasix 80 mg twice daily since 08/18 which is her home dose  Clinically appears euvolemic  Need to restart on metolazone but holding off on it for today as potassium level is on lower side    Possibility started on metolazone tomorrow  Continue fluid restriction 1800 mL per day    Hypokalemia:  Replaced with IV as well as oral potassium chloride  Check magnesium level with blood work tomorrow, monitor electrolytes and replace as needed  Currently on oral potassium chloride 40 mEq t i d   Will give 20 mEq IV in addition  Chronic lower extremity edema due to lymphedema:  Continue compression stockings  She is also on lymphedema pump therapy    Chest pain with elevated troponin, seen by Cardiology, likely non MI elevated troponin in the setting of CHF  Currently chest pain-free, Cardiology following  History of aortic stenosis with prior TAVR in 2016    Discussed with primary team     SUBJECTIVE:  Lower extremity edema improving, denies any shortness of breath, no chest pain     OBJECTIVE:  Current Weight: Weight - Scale: 94 6 kg (208 lb 8 9 oz)  Vitals:    08/20/21 0710   BP: 133/60   Pulse: 78   Resp: 18   Temp: 98 °F (36 7 °C)   SpO2: 94%       Intake/Output Summary (Last 24 hours) at 8/20/2021 1051  Last data filed at 8/19/2021 2051  Gross per 24 hour   Intake 600 ml   Output 800 ml   Net -200 ml       Physical Exam  General:  Ill looking, awake  Eyes: Conjunctivae pink,  Sclera anicteric  ENT: lips and mucous membranes moist  Neck: supple   Chest: Clear to Auscultation both lungs,  no crackles, ronchus or wheezing    CVS: S1 & S2 present, normal rate, regular rhythm, systolic murmur+   Abdomen: soft, non-tender, non-distended, Bowel sounds normoactive  Extremities: trace edema of  Feet   Skin: no rash  Neuro: awake, alert, oriented  Psych: Mood and affect appropriate     Medications:    Current Facility-Administered Medications:     acetaminophen (TYLENOL) tablet 650 mg, 650 mg, Oral, Q6H PRN, Gaurav Jones MD    amLODIPine (NORVASC) tablet 5 mg, 5 mg, Oral, Daily, KASIE Joseph, 5 mg at 08/20/21 0915    aspirin chewable tablet 81 mg, 81 mg, Oral, Daily, Gaurav Jones MD, 81 mg at 08/20/21 0914   atorvastatin (LIPITOR) tablet 40 mg, 40 mg, Oral, Daily With Petra Diaz MD, 40 mg at 08/19/21 1832    cefuroxime (CEFTIN) tablet 500 mg, 500 mg, Oral, Q24H, Fred Durham, DO, 500 mg at 08/20/21 4152    ferrous sulfate tablet 325 mg, 325 mg, Oral, Daily With Breakfast, Tej Fink MD, 325 mg at 08/20/21 0949    furosemide (LASIX) tablet 80 mg, 80 mg, Oral, BID (diuretic), Fred Durham, DO, 80 mg at 08/20/21 0914    heparin (porcine) subcutaneous injection 5,000 Units, 5,000 Units, Subcutaneous, Q8H [address], 5,000 Units at 08/20/21 0915 **AND** [CANCELED] Platelet count, , , Once, Tej Fink MD    insulin glargine (LANTUS) subcutaneous injection 12 Units 0 12 mL, 12 Units, Subcutaneous, HS, Tej Fink MD, 12 Units at 08/19/21 2140    levothyroxine tablet 50 mcg, 50 mcg, Oral, Early Morning, Tej Fink MD, 50 mcg at 08/20/21 0554    metoprolol tartrate (LOPRESSOR) tablet 50 mg, 50 mg, Oral, BID, Tej Fink MD, 50 mg at 08/20/21 0915    nystatin (MYCOSTATIN) powder, , Topical, BID, Tej Fink MD, Given at 08/20/21 8502    potassium chloride (K-DUR,KLOR-CON) CR tablet 40 mEq, 40 mEq, Oral, TID With Meals, PABLITO DrummondNP, 40 mEq at 08/20/21 0914    tetrahydrozoline (VISINE) 0 05 % ophthalmic solution 1 drop, 1 drop, Both Eyes, 4x Daily PRN, Tej Fink MD    Invasive Devices:        Lab Results:   Results from last 7 days   Lab Units 08/20/21  0705 08/19/21  1033 08/19/21  0625 08/18/21  1609 08/18/21  1007 08/17/21  1142   WBC Thousand/uL  --  12 95*  --   --  16 41* 19 88*   HEMOGLOBIN g/dL  --  12 9  --   --  14 0 13 9   HEMATOCRIT %  --  40 7  --   --  42 3 44 4   PLATELETS Thousands/uL  --  368  --   --  381 379   POTASSIUM mmol/L 2 8*  2 8*  --  2 9* 3 0*  --  2 7*   CHLORIDE mmol/L 98*  --  96* 96*  --  94*   CO2 mmol/L 30  --  31 32  --  36*   BUN mg/dL 59*  --  56* 49*  --  49*   CREATININE mg/dL 1 47*  --  1 59* 1 60*  --  1 52* CALCIUM mg/dL 9 6  --  9 2 9 5  --  10 1   ALK PHOS U/L  --   --   --   --   --  126*   ALT U/L  --   --   --   --   --  36   AST U/L  --   --   --   --   --  28       Previous work up:        Portions of the record may have been created with voice recognition software  Occasional wrong word or "sound a like" substitutions may have occurred due to the inherent limitations of voice recognition software  Read the chart carefully and recognize, using context, where substitutions have occurred  If you have any questions, please contact the dictating provider

## 2021-08-20 NOTE — CASE MANAGEMENT
Return call received from patient's daughter, Nick Hernandez ([phone])  Confirmed agreement for transfer to rehab at discharge  Relays agreement with referral to [facility], but reports that she may consider other rehab locations as well  Daughter aware that [facility] has availability and can accept patient at discharge and that if she'd like to consider other locations, she can discuss preferences with case management  Listing that includes quality reviews forwarded to her via email ([email or web address])  Daughter to review same and discuss with patient; will be in touch if she would like additional referrals to be sent       LORENZO Fuentes  8/20/2021  2:55 PM

## 2021-08-20 NOTE — PROGRESS NOTES
[facility]  Progress Note Jeffrey Sings Day 1935, 80 y o  female MRN: [id number]  Unit/Bed#: S -01 Encounter: [id number]  Primary Care Provider: Trino Vidal MD   Date and time admitted to hospital: 8/17/2021 11:12 AM    * Acute on chronic diastolic heart failure ([address] )  Assessment & Plan  - last echocardiogram showing preserved systolic function, but with grade 1 diastolic dysfunction  - presents this hospitalization chest pain and volume overload  - euvolemic at this point    - transitioned back to po lasix 80mg BID  - continue low-sodium diet  - monitor in's and out's with daily weights  - appreciate cardiology recommendations      Hypokalemia  Assessment & Plan  - complicated by diuresis  - 8/20: remains low at 2 8meq potassium TID    - replace as necessary  - monitor for dysrhythmia  - recheck in a m   - appreciate nephrology assistance; may need to consider changing diuretic regimen      UTI (urinary tract infection)  Assessment & Plan  - acute uncomplicated cystitis  - p o  Ceftin to complete 5 day course of therapy on 08/21    Sick sinus syndrome [facility])  Assessment & Plan  - status post dual chamber pacer; recent check showing ventricular pacing    Elevated troponin level not due myocardial infarction  Assessment & Plan  - mild bump in troponin secondary to acute on chronic CHF  - small further cardiac workup warranted at this time  - continue with diuresis    Acute kidney injury superimposed on CKD (HCC)  Assessment & Plan  - creatinine 1 75 on admission; baseline 0 8-1 0  - mild improvement thus far 1 4    - continue on home diuretic regimen  - check renal ultrasound to rule out obstructive process  - daily BMP  - Nephro consult     Chest pain  Assessment & Plan  - resolved        VTE Pharmacologic Prophylaxis: VTE Score: 5 High Risk (Score >/= 5) - Pharmacological DVT Prophylaxis Ordered: heparin  Sequential Compression Devices Ordered      Patient Centered Rounds: I performed bedside rounds with nursing staff today  Discussions with Specialists or Other Care Team Provider: None    Education and Discussions with Family / Patient: Updated  (daughter) via phone  Time Spent for Care: 20 minutes  More than 50% of total time spent on counseling and coordination of care as described above  Current Length of Stay: 3 day(s)  Current Patient Status: Inpatient   Certification Statement: The patient will continue to require additional inpatient hospital stay due to hypokalemia and acute kidney injury  Discharge Plan: Anticipate discharge in 48 hrs to rehab facility  Code Status: Level 1 - Full Code    Subjective:   Patient examined  Following up for acute/chronic CHF, MAYELIN, hypokalemia  Patient resting comfortably today  She has some abdominal pain at the site of heparin injection but otherwise feeling well  No new complaints  Potassium unfortunately remains low despite replacement  Objective:     Vitals:   Temp (24hrs), Av 2 °F (36 8 °C), Min:98 °F (36 7 °C), Max:98 4 °F (36 9 °C)    Temp:  [98 °F (36 7 °C)-98 4 °F (36 9 °C)] 98 °F (36 7 °C)  HR:  [78-90] 78  Resp:  [18-19] 18  BP: (116-133)/(57-60) 133/60  SpO2:  [90 %-94 %] 94 %  Body mass index is 40 73 kg/m²  Input and Output Summary (last 24 hours): Intake/Output Summary (Last 24 hours) at 2021 1342  Last data filed at 2021 1145  Gross per 24 hour   Intake 600 ml   Output 1300 ml   Net -700 ml     PHYSICAL EXAM:    Vitals signs reviewed  Constitutional   Awake and cooperative  NAD  Head/Neck   Normocephalic  Atraumatic  HEENT   No scleral icterus  EOMI  Heart   Regular rate and rhythm  No murmers  Lungs   Lungs clear bilaterally  Respirations unlabored  Decreased at bases bilaterally  Abdomen   Soft  Nontender  Nondistended  Skin   Skin color normal  No rashes  Extremities   No deformities  Trace bilateral lower extremity edema  Neuro   Alert and oriented   No new deficits  Psych   Mood stable  Affect normal          Additional Data:     Labs:  Results from last 7 days   Lab Units 08/19/21  1033 08/17/21  1142   WBC Thousand/uL 12 95* 19 88*   HEMOGLOBIN g/dL 12 9 13 9   HEMATOCRIT % 40 7 44 4   PLATELETS Thousands/uL 368 379   NEUTROS PCT %  --  85*   LYMPHS PCT %  --  5*   MONOS PCT %  --  8   EOS PCT %  --  0     Results from last 7 days   Lab Units 08/20/21  0705 08/17/21  1142   SODIUM mmol/L 139 139   POTASSIUM mmol/L 2 8*  2 8* 2 7*   CHLORIDE mmol/L 98* 94*   CO2 mmol/L 30 36*   BUN mg/dL 59* 49*   CREATININE mg/dL 1 47* 1 52*   ANION GAP mmol/L 11 9   CALCIUM mg/dL 9 6 10 1   ALBUMIN g/dL  --  3 5   TOTAL BILIRUBIN mg/dL  --  0 71   ALK PHOS U/L  --  126*   ALT U/L  --  36   AST U/L  --  28   GLUCOSE RANDOM mg/dL 166* 270*     Results from last 7 days   Lab Units 08/17/21  1142   INR  1 03     Results from last 7 days   Lab Units 08/19/21  0626 08/18/21  2055   POC GLUCOSE mg/dl 170* 184*               Lines/Drains:  Invasive Devices     Peripheral Intravenous Line            Peripheral IV 08/17/21 Right Antecubital 3 days                  Telemetry:  Telemetry Orders (From admission, onward)             48 Hour Telemetry Monitoring  Continuous x 48 hours     Question:  Reason for 48 Hour Telemetry  Answer:  Acute MI, chest pain - R/O MI, or unstable angina                 Indication for Continued Telemetry Use: Metabolic/electrolyte disturbance with high probability of dysrhythmia           Imaging: No pertinent imaging reviewed      Recent Cultures (last 7 days):         Last 24 Hours Medication List:   Current Facility-Administered Medications   Medication Dose Route Frequency Provider Last Rate    acetaminophen  650 mg Oral Q6H PRN Win Arnold MD      amLODIPine  5 mg Oral Daily KASIE Tyson      aspirin  81 mg Oral Daily Win Arnold MD      atorvastatin  40 mg Oral Daily With Jaciel Nicole MD      cefuroxime  500 mg Oral Q24H Lizzy Durham DO      ferrous sulfate  325 mg Oral Daily With Breakfast Ursula Stephenson MD      furosemide  80 mg Oral BID (diuretic) Lizzy Durham DO      heparin (porcine)  5,000 Units Subcutaneous [facility] Ursula Stephenson MD      insulin glargine  12 Units Subcutaneous HS Ursula Stephenson MD      levothyroxine  50 mcg Oral Early Morning Ursula Stephenson MD      metoprolol tartrate  50 mg Oral BID Ursula Stephenson MD      nystatin   Topical BID Ursula Stephenson MD      potassium chloride  40 mEq Oral TID With Meals KASIE Brooke      potassium chloride  20 mEq Intravenous Once Clifford Miller MD      tetrahydrozoline  1 drop Both Eyes 4x Daily PRN Ursula Stephenson MD          Today, Patient Was Seen By: Lizzy Durham DO    **Please Note: This note may have been constructed using a voice recognition system  **

## 2021-08-20 NOTE — ASSESSMENT & PLAN NOTE
- last echocardiogram showing preserved systolic function, but with grade 1 diastolic dysfunction  - presents this hospitalization chest pain and volume overload  - euvolemic at this point    - transitioned back to po lasix 80mg BID  - continue low-sodium diet  - monitor in's and out's with daily weights  - appreciate cardiology recommendations

## 2021-08-20 NOTE — CASE MANAGEMENT
Met with patient at bedside to discuss discharge plan  Patient presents as alert, oriented x4  Confirms that she lives at [address] [facility] and reports that she was wheelchair bound but previously independent with transfers prior to admission  Patient denies any history of rehab, but understands [facility] recommendation that she return to their skilled nursing unit rather than assisted living at discharge  Patient agreeable to same  Spoke with Cy Stanley at [facility] - confirms ability for patient to be admitted to their SNF at d/c and that they will have availability through the weekend if needed  Patient is fully vaccinated, but will need a COVID test prior to her STR admission  Patient reports that she has four children; only three of whom are still alive  Daughter, German Low, is her POA and lives closest to her  She does have a son in [place] and a daughter in [place] - reports good contact with all three  Patient states that daughter, Denice Gill, would be  and agreeable for this writer to reach out to her to discuss d/c plan  Call made to Denice Gill, but phone number currently not working  Patient aware of same  Call made to her son, Moisés Meneses ([phone]), and VM left requesting return call  Patient's potassium remains low; anticipate d/c to [facility] once medically stable       LORENZO Don  8/20/2021  1:51 PM

## 2021-08-20 NOTE — PLAN OF CARE
Problem: Nutrition/Hydration-ADULT  Goal: Nutrient/Hydration intake appropriate for improving, restoring or maintaining nutritional needs  Description: Monitor and assess patient's nutrition/hydration status for malnutrition  Collaborate with interdisciplinary team and initiate plan and interventions as ordered  Monitor patient's weight and dietary intake as ordered or per policy  Utilize nutrition screening tool and intervene as necessary  Determine patient's food preferences and provide high-protein, high-caloric foods as appropriate       INTERVENTIONS:  - Monitor oral intake, urinary output, labs, and treatment plans  - Assess nutrition and hydration status and recommend course of action  - Evaluate amount of meals eaten  - Assist patient with eating if necessary   - Allow adequate time for meals  - Recommend/ encourage appropriate diets, oral nutritional supplements, and vitamin/mineral supplements  - Order, calculate, and assess calorie counts as needed  - Recommend, monitor, and adjust tube feedings and TPN/PPN based on assessed needs  - Assess need for intravenous fluids  - Provide specific nutrition/hydration education as appropriate  - Include patient/family/caregiver in decisions related to nutrition  Outcome: Progressing     Problem: Prexisting or High Potential for Compromised Skin Integrity  Goal: Skin integrity is maintained or improved  Description: INTERVENTIONS:  - Identify patients at risk for skin breakdown  - Assess and monitor skin integrity  - Assess and monitor nutrition and hydration status  - Monitor labs   - Assess for incontinence   - Turn and reposition patient  - Assist with mobility/ambulation  - Relieve pressure over bony prominences  - Avoid friction and shearing  - Provide appropriate hygiene as needed including keeping skin clean and dry  - Evaluate need for skin moisturizer/barrier cream  - Collaborate with interdisciplinary team   - Patient/family teaching  - Consider wound care consult   Outcome: Progressing

## 2021-08-20 NOTE — PROGRESS NOTES
Cardiology Progress Note - Nelida Hewitt Day 80 y o  female MRN: [id number]    Unit/Bed#: S -01 Encounter: [id number]        Assessment/Recommendations:    Principal Problem:    Acute on chronic diastolic heart failure (HCC)  Active Problems:    Hypokalemia    Chest pain    Acute kidney injury superimposed on CKD (HCC)    Elevated troponin level not due myocardial infarction    Sick sinus syndrome (HCC)    UTI (urinary tract infection)    1  Chest pain with elevated troponin:  likely non MI elevated troponin in the setting of CHF   Patient continues to be chest pain free   Continue medical therapy      2  Acute on chronic diastolic heart failure  ProBNP 1806  Chest x-ray on admission showed vascular congestion   Patient was on  furosemide 80 mg PO BID and metolazone 2 5 mg every other day at [facility]   Metolazone has been stopped now  Still in negative fluid balance , on high dose of oral Lasix  Appreciate Nephrology help  Continue 2 gram Na diet and 1800 ml fluid restriction      3  Hypokalemia   K 2 8 , persistent despite supplementation  Replete   Possibly diuretics may need to be reduced at some point      4  Acute kidney injury  Current 1 47 today   Baseline creatinine 0 8-1 0  Continue to monitor   Nephrology notes reviewed      5  Coronary artery disease with history of PCI/stenting to LAD in 2016  Continue home medication:  Aspirin, statin beta-blocker     6  Aortic stenosis status post TAVR in 2016     7  Sick sinus syndrome status post [employer] dual-chamber permanent pacemaker      Subjective:     Overnight events reviewed  She has no dyspnea at rest   She is diuresing slowly, negative balance noted  Renal function is trending in the favorable direction  She denies any chest pain  No dyspnea at rest   Does not feel like she is ready to go home  Still feels weak  Potassium still very low despite supplementation, most likely secondary to diuretics  Nephrology notes reviewed  Appreciate their help  Review of Systems   Constitutional: Positive for fatigue  HENT: Negative for congestion  Respiratory: Negative for shortness of breath and wheezing  Cardiovascular: Positive for leg swelling  Negative for chest pain and palpitations  Endocrine: Positive for polyuria  Musculoskeletal: Positive for arthralgias  Neurological: Positive for weakness  Hematological: Does not bruise/bleed easily  Psychiatric/Behavioral: Positive for sleep disturbance               Current Facility-Administered Medications:     acetaminophen (TYLENOL) tablet 650 mg, 650 mg, Oral, Q6H PRN, Kieran Early MD    amLODIPine (NORVASC) tablet 5 mg, 5 mg, Oral, Daily, KASIE Chang, 5 mg at 08/19/21 0857    aspirin chewable tablet 81 mg, 81 mg, Oral, Daily, Kieran Early MD, 81 mg at 08/19/21 0857    atorvastatin (LIPITOR) tablet 40 mg, 40 mg, Oral, Daily With Cherl Libman, MD, 40 mg at 08/19/21 1832    cefuroxime (CEFTIN) tablet 500 mg, 500 mg, Oral, Q24H, Marci Durham DO, 500 mg at 08/19/21 1148    ferrous sulfate tablet 325 mg, 325 mg, Oral, Daily With Breakfast, Kieran Early MD, 325 mg at 08/19/21 0857    furosemide (LASIX) tablet 80 mg, 80 mg, Oral, BID (diuretic), Marci Durham DO, 80 mg at 08/19/21 1832    heparin (porcine) subcutaneous injection 5,000 Units, 5,000 Units, Subcutaneous, Q8H [address], 5,000 Units at 08/20/21 0305 **AND** [CANCELED] Platelet count, , , Once, Kieran Early MD    insulin glargine (LANTUS) subcutaneous injection 12 Units 0 12 mL, 12 Units, Subcutaneous, HS, Kieran Early MD, 12 Units at 08/19/21 2140    levothyroxine tablet 50 mcg, 50 mcg, Oral, Early Morning, Kieran Early MD, 50 mcg at 08/20/21 0554    metoprolol tartrate (LOPRESSOR) tablet 50 mg, 50 mg, Oral, BID, Kieran Early MD, 50 mg at 08/19/21 1832    nystatin (MYCOSTATIN) powder, , Topical, BID, Kieran Early MD, 1 application at 94/61/49 1096   potassium chloride (K-DUR,KLOR-CON) CR tablet 40 mEq, 40 mEq, Oral, TID With Meals, Ciro Giang, CRNP, 40 mEq at 21 1832    tetrahydrozoline (VISINE) 0 05 % ophthalmic solution 1 drop, 1 drop, Both Eyes, 4x Daily PRN, Win Arnold MD     Objective:     Vitals:   Blood pressure 133/60, pulse 78, temperature 98 °F (36 7 °C), temperature source Oral, resp  rate 18, height 5' (1 524 m), weight 94 6 kg (208 lb 8 9 oz), SpO2 94 %  Body mass index is 40 73 kg/m²  Orthostatic Blood Pressures      Most Recent Value   Blood Pressure  133/60 filed at 2021 0710   Patient Position - Orthostatic VS  Lying filed at 2021 3131         Systolic (12ZRL), GJ , Min:116 , ELQ:093     Diastolic (35SGZ), ZDH:05, Min:57, Max:60      Intake/Output Summary (Last 24 hours) at 2021 0844  Last data filed at 2021  Gross per 24 hour   Intake 720 ml   Output 1280 ml   Net -560 ml     Weight (last 2 days)     Date/Time   Weight    21 0600   94 6 (208 56)    21 0600   94 1 (207 45)    21 1603   93 4 (205 91)    21 0542   93 4 (205 91)    21 0007   93 4 (205 91)              Physical Exam  Constitutional:       General: She is not in acute distress  HENT:      Head: Normocephalic  Cardiovascular:      Rate and Rhythm: Normal rate and regular rhythm  Heart sounds: Murmur heard  Pulmonary:      Effort: No respiratory distress  Breath sounds: No wheezing or rhonchi  Abdominal:      Tenderness: There is no abdominal tenderness  Musculoskeletal:         General: No deformity  Skin:     General: Skin is warm and dry  Neurological:      General: No focal deficit present  Mental Status: She is alert     Psychiatric:         Mood and Affect: Mood normal            Labs & Results:    Results from last 7 days   Lab Units 21  0034 21  2038 21  1725   TROPONIN I ng/mL 0 09* 0 11* 0 12*     Results from last 7 days   Lab Units 21  1039 08/18/21  1007 08/17/21  1142   WBC Thousand/uL 12 95* 16 41* 19 88*   HEMOGLOBIN g/dL 12 9 14 0 13 9   HEMATOCRIT % 40 7 42 3 44 4   PLATELETS Thousands/uL 368 381 379         Results from last 7 days   Lab Units 08/20/21  0705 08/19/21  0625 08/18/21  1609 08/17/21  1142   POTASSIUM mmol/L 2 8*  2 8* 2 9* 3 0* 2 7*   CHLORIDE mmol/L 98* 96* 96* 94*   CO2 mmol/L 30 31 32 36*   BUN mg/dL 59* 56* 49* 49*   CREATININE mg/dL 1 47* 1 59* 1 60* 1 52*   CALCIUM mg/dL 9 6 9 2 9 5 10 1   ALK PHOS U/L  --   --   --  126*   ALT U/L  --   --   --  36   AST U/L  --   --   --  28     Results from last 7 days   Lab Units 08/17/21  1142   INR  1 03   PTT seconds 26         Recent Labs     08/17/21  1142   NTBNP 1,806*          Available cardiology studies, imaging and lab results independently reviewed

## 2021-08-20 NOTE — ASSESSMENT & PLAN NOTE
- creatinine 1 75 on admission; baseline 0 8-1 0  - mild improvement thus far 1 4    - continue on home diuretic regimen  - check renal ultrasound to rule out obstructive process  - daily BMP  - Nephro consult

## 2021-08-21 LAB
ANION GAP SERPL CALCULATED.3IONS-SCNC: 12 MMOL/L (ref 4–13)
BUN SERPL-MCNC: 59 MG/DL (ref 5–25)
CALCIUM SERPL-MCNC: 10.1 MG/DL (ref 8.3–10.1)
CHLORIDE SERPL-SCNC: 102 MMOL/L (ref 100–108)
CO2 SERPL-SCNC: 27 MMOL/L (ref 21–32)
CREAT SERPL-MCNC: 1.42 MG/DL (ref 0.6–1.3)
GFR SERPL CREATININE-BSD FRML MDRD: 33 ML/MIN/1.73SQ M
GLUCOSE SERPL-MCNC: 200 MG/DL (ref 65–140)
GLUCOSE SERPL-MCNC: 213 MG/DL (ref 65–140)
MAGNESIUM SERPL-MCNC: 2.4 MG/DL (ref 1.6–2.6)
POTASSIUM SERPL-SCNC: 3.8 MMOL/L (ref 3.5–5.3)
SODIUM SERPL-SCNC: 141 MMOL/L (ref 136–145)

## 2021-08-21 PROCEDURE — 82948 REAGENT STRIP/BLOOD GLUCOSE: CPT

## 2021-08-21 PROCEDURE — 83735 ASSAY OF MAGNESIUM: CPT | Performed by: INTERNAL MEDICINE

## 2021-08-21 PROCEDURE — 99232 SBSQ HOSP IP/OBS MODERATE 35: CPT | Performed by: INTERNAL MEDICINE

## 2021-08-21 PROCEDURE — 80048 BASIC METABOLIC PNL TOTAL CA: CPT | Performed by: INTERNAL MEDICINE

## 2021-08-21 RX ORDER — POTASSIUM CHLORIDE 20 MEQ/1
20 TABLET, EXTENDED RELEASE ORAL 2 TIMES DAILY
Status: DISCONTINUED | OUTPATIENT
Start: 2021-08-21 | End: 2021-08-22

## 2021-08-21 RX ORDER — POTASSIUM CHLORIDE 20 MEQ/1
40 TABLET, EXTENDED RELEASE ORAL 2 TIMES DAILY
Status: DISCONTINUED | OUTPATIENT
Start: 2021-08-21 | End: 2021-08-21

## 2021-08-21 RX ADMIN — METOPROLOL TARTRATE 50 MG: 50 TABLET, FILM COATED ORAL at 17:53

## 2021-08-21 RX ADMIN — POTASSIUM CHLORIDE 40 MEQ: 1500 TABLET, EXTENDED RELEASE ORAL at 07:30

## 2021-08-21 RX ADMIN — METOPROLOL TARTRATE 50 MG: 50 TABLET, FILM COATED ORAL at 08:22

## 2021-08-21 RX ADMIN — INSULIN LISPRO 1 UNITS: 100 INJECTION, SOLUTION INTRAVENOUS; SUBCUTANEOUS at 23:33

## 2021-08-21 RX ADMIN — LEVOTHYROXINE SODIUM 50 MCG: 50 TABLET ORAL at 05:49

## 2021-08-21 RX ADMIN — HEPARIN SODIUM 5000 UNITS: 5000 INJECTION INTRAVENOUS; SUBCUTANEOUS at 01:47

## 2021-08-21 RX ADMIN — AMLODIPINE BESYLATE 5 MG: 5 TABLET ORAL at 08:23

## 2021-08-21 RX ADMIN — FUROSEMIDE 80 MG: 80 TABLET ORAL at 17:53

## 2021-08-21 RX ADMIN — HEPARIN SODIUM 5000 UNITS: 5000 INJECTION INTRAVENOUS; SUBCUTANEOUS at 08:23

## 2021-08-21 RX ADMIN — FUROSEMIDE 80 MG: 80 TABLET ORAL at 08:23

## 2021-08-21 RX ADMIN — NYSTATIN: 100000 POWDER TOPICAL at 08:23

## 2021-08-21 RX ADMIN — POTASSIUM CHLORIDE 20 MEQ: 1500 TABLET, EXTENDED RELEASE ORAL at 17:53

## 2021-08-21 RX ADMIN — HEPARIN SODIUM 5000 UNITS: 5000 INJECTION INTRAVENOUS; SUBCUTANEOUS at 17:53

## 2021-08-21 RX ADMIN — FERROUS SULFATE TAB 325 MG (65 MG ELEMENTAL FE) 325 MG: 325 (65 FE) TAB at 08:23

## 2021-08-21 RX ADMIN — ATORVASTATIN CALCIUM 40 MG: 40 TABLET, FILM COATED ORAL at 17:53

## 2021-08-21 RX ADMIN — ASPIRIN 81 MG: 81 TABLET, CHEWABLE ORAL at 08:23

## 2021-08-21 RX ADMIN — INSULIN GLARGINE 12 UNITS: 100 INJECTION, SOLUTION SUBCUTANEOUS at 23:33

## 2021-08-21 RX ADMIN — NYSTATIN: 100000 POWDER TOPICAL at 23:33

## 2021-08-21 RX ADMIN — CEFUROXIME AXETIL 500 MG: 250 TABLET ORAL at 10:49

## 2021-08-21 NOTE — PROGRESS NOTES
NEPHROLOGY PROGRESS NOTE    Kash Reis Day 80 y o  female MRN: [id number]  Unit/Bed#: S -01 Encounter: [id number]  Reason for Consult:  Acute renal failure    Patient was sleeping when I went into the room I awakened her and she was in good spirits lying flat with no shortness of breath and stated that she had no acute changes or problems overnight  ASSESSMENT/PLAN:  1  Renal    Apparently the patient's baseline creatinine was around 1 and was admitted with a creatinine 1 7  Throughout the hospitalization she has been on diuretic and her latest creatinine is 1 4 so it is improved  She has now been placed on maintenance diuretic of Lasix 80 mg twice a day  I will reduce her potassium to 20 mEq b i d  As it was at a higher dose  She did have hypokalemia which has resolved by this morning and will continue to monitor  I personally reviewed the renal ultrasound report done 8/20 and was negative for hydronephrosis  The patient had been admitted for congestive heart failure she appears to be improved clinically  Continue current maintenance oral diuretic  Monitor labs and volume status  Cardiology following as well  2  Cardiac    History of prior TAVR  Admitted for congestive heart failure now on maintenance diuretic  Cardiology following  SUBJECTIVE:  Review of Systems   Constitutional: Positive for malaise/fatigue  Negative for chills, fever and night sweats  HENT: Negative  Eyes: Negative  Cardiovascular: Negative for chest pain, dyspnea on exertion, leg swelling and orthopnea  Respiratory: Negative  Negative for cough, shortness of breath, sputum production and wheezing  Gastrointestinal: Negative for abdominal pain, diarrhea, nausea and vomiting  Genitourinary: Positive for bladder incontinence  Negative for dysuria, flank pain and incomplete emptying  External catheter present   Neurological: Negative for dizziness, focal weakness, headaches and light-headedness  Psychiatric/Behavioral: Negative for altered mental status, depression, hallucinations and hypervigilance  OBJECTIVE:  Current Weight: Weight - Scale: 93 6 kg (206 lb 5 6 oz)  Vitals:Temp (24hrs), Av 9 °F (36 6 °C), Min:97 7 °F (36 5 °C), Max:98 2 °F (36 8 °C)  Current: Temperature: 97 7 °F (36 5 °C)   Blood pressure 140/65, pulse 87, temperature 97 7 °F (36 5 °C), temperature source Oral, resp  rate 18, height 5' (1 524 m), weight 93 6 kg (206 lb 5 6 oz), SpO2 94 %  , Body mass index is 40 3 kg/m²  Intake/Output Summary (Last 24 hours) at 2021 1151  Last data filed at 2021 1128  Gross per 24 hour   Intake 220 ml   Output 900 ml   Net -680 ml       Physical Exam: /65 (BP Location: Left arm)   Pulse 87   Temp 97 7 °F (36 5 °C) (Oral)   Resp 18   Ht 5' (1 524 m)   Wt 93 6 kg (206 lb 5 6 oz)   SpO2 94%   BMI 40 30 kg/m²   Physical Exam  Constitutional:       General: She is not in acute distress  Appearance: She is not toxic-appearing or diaphoretic  HENT:      Head: Normocephalic and atraumatic  Mouth/Throat:      Mouth: Mucous membranes are dry  Eyes:      General: No scleral icterus  Extraocular Movements: Extraocular movements intact  Cardiovascular:      Rate and Rhythm: Normal rate and regular rhythm  Heart sounds: No friction rub  No gallop  Pulmonary:      Effort: Pulmonary effort is normal  No respiratory distress  Breath sounds: Normal breath sounds  No wheezing, rhonchi or rales  Abdominal:      General: Bowel sounds are normal  There is no distension  Palpations: Abdomen is soft  Tenderness: There is no abdominal tenderness  There is no rebound  Musculoskeletal:      Cervical back: Normal range of motion and neck supple  Neurological:      Mental Status: She is alert and oriented to person, place, and time  Mental status is at baseline           Medications:    Current Facility-Administered Medications:     acetaminophen (TYLENOL) tablet 650 mg, 650 mg, Oral, Q6H PRN, Edis Portillo MD    amLODIPine (NORVASC) tablet 5 mg, 5 mg, Oral, Daily, KASIE Christine, 5 mg at 08/21/21 9573    aspirin chewable tablet 81 mg, 81 mg, Oral, Daily, Edis Portillo MD, 81 mg at 08/21/21 3557    atorvastatin (LIPITOR) tablet 40 mg, 40 mg, Oral, Daily With Von Wooten MD, 40 mg at 08/20/21 1724    cefuroxime (CEFTIN) tablet 500 mg, 500 mg, Oral, Q24H, Elizabeth Durham DO, 500 mg at 08/21/21 1049    ferrous sulfate tablet 325 mg, 325 mg, Oral, Daily With Breakfast, Edis Portillo MD, 325 mg at 08/21/21 6706    furosemide (LASIX) tablet 80 mg, 80 mg, Oral, BID (diuretic), Elizabeth Durham DO, 80 mg at 08/21/21 2304    heparin (porcine) subcutaneous injection 5,000 Units, 5,000 Units, Subcutaneous, Q8H [address], 5,000 Units at 08/21/21 2548 **AND** [CANCELED] Platelet count, , , Once, Edis Portillo MD    insulin glargine (LANTUS) subcutaneous injection 12 Units 0 12 mL, 12 Units, Subcutaneous, HS, Edis Portillo MD, 12 Units at 08/20/21 2236    levothyroxine tablet 50 mcg, 50 mcg, Oral, Early Morning, Edis Portillo MD, 50 mcg at 08/21/21 0549    metoprolol tartrate (LOPRESSOR) tablet 50 mg, 50 mg, Oral, BID, Edis Portillo MD, 50 mg at 08/21/21 3695    nystatin (MYCOSTATIN) powder, , Topical, BID, Edis Portillo MD, Given at 08/21/21 7097    potassium chloride (K-DUR,KLOR-CON) CR tablet 40 mEq, 40 mEq, Oral, BID, Elizabeth Durham, DO    potassium chloride 20 mEq IVPB (premix), 20 mEq, Intravenous, Once, Zuly Burch MD    tetrahydrozoline (VISINE) 0 05 % ophthalmic solution 1 drop, 1 drop, Both Eyes, 4x Daily PRN, Edis Portillo MD    Laboratory Results:  Lab Results   Component Value Date    WBC 12 95 (H) 08/19/2021    HGB 12 9 08/19/2021    HCT 40 7 08/19/2021    MCV 94 08/19/2021     08/19/2021     Lab Results   Component Value Date    SODIUM 141 08/21/2021    K 3 8 08/21/2021     08/21/2021    CO2 27 08/21/2021    BUN 59 (H) 08/21/2021    CREATININE 1 42 (H) 08/21/2021    GLUC 200 (H) 08/21/2021    CALCIUM 10 1 08/21/2021     Lab Results   Component Value Date    CALCIUM 10 1 08/21/2021     No results found for: LABPROT

## 2021-08-21 NOTE — PROGRESS NOTES
Cardiology Progress Note - Trent Luis Day 80 y o  female MRN: [id number]    Unit/Bed#: S -01 Encounter: [id number]        Subjective:    No significant events overnight  Feeling better  Review of Systems   Cardiovascular: Negative for chest pain, leg swelling and palpitations  Respiratory: Negative for shortness of breath  Objective:   Vitals: Blood pressure 140/65, pulse 87, temperature 97 7 °F (36 5 °C), temperature source Oral, resp  rate 18, height 5' (1 524 m), weight 93 6 kg (206 lb 5 6 oz), SpO2 94 %  , Body mass index is 40 3 kg/m² ,   Orthostatic Blood Pressures      Most Recent Value   Blood Pressure  140/65 filed at 08/21/2021 0712   Patient Position - Orthostatic VS  Lying filed at 08/21/2021 1769         Systolic (35ZXT), FWR:423 , Min:124 , BPS:464     Diastolic (37KUI), RVU:87, Min:65, Max:66      Intake/Output Summary (Last 24 hours) at 8/21/2021 1005  Last data filed at 8/20/2021 2249  Gross per 24 hour   Intake --   Output 800 ml   Net -800 ml     Weight (last 2 days)     Date/Time   Weight    08/21/21 0600   93 6 (206 35)    08/20/21 0600   94 6 (208 56)    08/19/21 0600   94 1 (207 45)                  Telemetry Review: Off    Physical Exam  Cardiovascular:      Rate and Rhythm: Normal rate and regular rhythm  Heart sounds: Normal heart sounds  No murmur heard  No friction rub  No gallop  Pulmonary:      Breath sounds: Normal breath sounds  No wheezing or rales             Laboratory Results:  Results from last 7 days   Lab Units 08/18/21  0034 08/17/21  2038 08/17/21  1725   TROPONIN I ng/mL 0 09* 0 11* 0 12*       CBC with diff:   Results from last 7 days   Lab Units 08/19/21  1033 08/18/21  1007 08/17/21  1142   WBC Thousand/uL 12 95* 16 41* 19 88*   HEMOGLOBIN g/dL 12 9 14 0 13 9   HEMATOCRIT % 40 7 42 3 44 4   MCV fL 94 92 95   PLATELETS Thousands/uL 368 381 379   MCH pg 29 8 30 6 29 7   MCHC g/dL 31 7 33 1 31 3*   RDW % 12 6 12 8 12 9   MPV fL 10 8 10 9 10 8   NRBC AUTO /100 WBCs  --   --  0         CMP:  Results from last 7 days   Lab Units 21  0604 21  0705 21  0625 21  1609 21  0542 218 21  1142   POTASSIUM mmol/L 3 8 2 8*  2 8* 2 9* 3 0* 2 6* 3 1* 2 7*   CHLORIDE mmol/L 102 98* 96* 96* 98* 97* 94*   CO2 mmol/L 27 30 31 32 31 32 36*   BUN mg/dL 59* 59* 56* 49* 50* 53* 49*   CREATININE mg/dL 1 42* 1 47* 1 59* 1 60* 1 40* 1 75* 1 52*   CALCIUM mg/dL 10 1 9 6 9 2 9 5 9 6 10 0 10 1   AST U/L  --   --   --   --   --   --  28   ALT U/L  --   --   --   --   --   --  36   ALK PHOS U/L  --   --   --   --   --   --  126*   EGFR ml/min/1 73sq m 33 32 29 29 34 26 31         BMP:  Results from last 7 days   Lab Units 21  0604 21  0705 21  0625 21  1609 21  0542 21  1142   POTASSIUM mmol/L 3 8 2 8*  2 8* 2 9* 3 0* 2 6* 3 1* 2 7*   CHLORIDE mmol/L 102 98* 96* 96* 98* 97* 94*   CO2 mmol/L 27 30 31 32 31 32 36*   BUN mg/dL 59* 59* 56* 49* 50* 53* 49*   CREATININE mg/dL 1 42* 1 47* 1 59* 1 60* 1 40* 1 75* 1 52*   CALCIUM mg/dL 10 1 9 6 9 2 9 5 9 6 10 0 10 1       BNP:     Magnesium:   Results from last 7 days   Lab Units 21  0604   MAGNESIUM mg/dL 2 4       Coags:   Results from last 7 days   Lab Units 21  1142   PTT seconds 26   INR  1 03       TSH:       Lipid Profile:             Cardiac testing:   Results for orders placed during the hospital encounter of 21    Echo complete with contrast if indicated    Narrative  [facility], [address] [facility]  [phone]    Transthoracic Echocardiogram  2D, M-mode, Doppler, and Color Doppler    Study date:  2021    Patient: Ronda Calix  MR number: [id number]  Account number: [de-identified]  : 1935  Age: 80 years  Gender: Female  Status: Outpatient  Location: [facility]  Height: 62 in  Weight: 192 5 lb  BP: 158/ 88 mmHg    Indications: Assess left ventricular function  Evaluate prosthetic aortic valve  Diagnoses: I25 10 - Atherosclerotic heart disease of native coronary artery without angina pectoris    Sonographer:  Lucia Jeans, RCS  Primary Physician:  Clayton Nathan MD  Referring Physician:  Sebastien Anaya DO  Group:  [facility]  Interpreting Physician:  Juan Manuel Godfrey MD    SUMMARY    LEFT VENTRICLE:  Systolic function was normal  Ejection fraction was estimated to be 60 %  There were no regional wall motion abnormalities  Doppler parameters were consistent with abnormal left ventricular relaxation (grade 1 diastolic dysfunction)  MITRAL VALVE:  There was moderate annular calcification  There was mild to moderate regurgitation  AORTIC VALVE:  A 23 mm ([facility] TAVR) bioprosthesis was present  It exhibited normal function  There was mild to moderate central regurgitation  There was no significant perivalvular regurgitation  Valve mean gradient was 14 mmHg  Estimated aortic valve area (by VTI) was 1 73 cmï¾²  TRICUSPID VALVE:  There was mild to moderate regurgitation  Pulmonary artery systolic pressure was within the normal range  HISTORY: PRIOR HISTORY: CAD s/p PCI, Aortic stenosis s/p TAVR in 2016    PROCEDURE: The study was performed in the [address] [place] [address] [facility]  This was a routine study  The transthoracic approach was used  The study included complete 2D imaging, M-mode, complete spectral Doppler, and color Doppler  The  heart rate was 71 bpm, at the start of the study  Images were obtained from the parasternal, apical, subcostal, and suprasternal notch acoustic windows  Image quality was adequate  LEFT VENTRICLE: Size was normal  Systolic function was normal  Ejection fraction was estimated to be 60 %  There were no regional wall motion abnormalities   Wall thickness was normal  DOPPLER: Doppler parameters were consistent with  abnormal left ventricular relaxation (grade 1 diastolic dysfunction)  RIGHT VENTRICLE: The size was normal  Systolic function was normal  Wall thickness was normal     LEFT ATRIUM: Size was at the upper limits of normal     RIGHT ATRIUM: Size was normal     MITRAL VALVE: There was moderate annular calcification  Valve structure was normal  There was normal leaflet separation  DOPPLER: The transmitral velocity was within the normal range  There was no evidence for stenosis  There was mild to  moderate regurgitation  AORTIC VALVE: A 23 mm ([facility] TAVR) bioprosthesis was present  It exhibited normal function  DOPPLER: There was no evidence for stenosis  There was mild to moderate central regurgitation  There was no significant perivalvular  regurgitation  TRICUSPID VALVE: The valve structure was normal  There was normal leaflet separation  DOPPLER: The transtricuspid velocity was within the normal range  There was no evidence for stenosis  There was mild to moderate regurgitation  Pulmonary  artery systolic pressure was within the normal range  Estimated peak PA pressure was 30 mmHg  PULMONIC VALVE: Not well visualized  DOPPLER: The transpulmonic velocity was within the normal range  There was no significant regurgitation  PERICARDIUM: There was no pericardial effusion  The pericardium was normal in appearance  AORTA: The root exhibited normal size  SYSTEMIC VEINS: IVC: The inferior vena cava was normal in size      MEASUREMENT TABLES    2D MEASUREMENTS  LVOT   (Reference normals)  Diam   20 mm   (--)    DOPPLER MEASUREMENTS  LVOT   (Reference normals)  Peak soren   124 cm/s   (--)  Mean soren   98 cm/s   (--)  VTI   25 5 cm   (--)  Peak gradient   5 mmHg   (--)  Mean gradient   3 mmHg   (--)  Stroke vol   80 11 ml   (--)  Aortic valve   (Reference normals)  Peak soren   217 cm/s   (--)  Mean soren   169 cm/s   (--)  VTI   46 cm   (--)  Peak gradient   19 mmHg   (--)  Mean gradient   14 mmHg   (--)  Obstr index, VTI   0 55    (--)  Valve area, VTI   1 73 cmï¾²   (--)  Area index, VTI   0 92 cmï¾²/mï¾²   (--)  Obstr index, Vmax   0 57    (--)  Valve area, Vmax   1 79 cmï¾²   (--)  Area index, Vmax   0 95 cmï¾²/mï¾²   (--)  Obstr index, Vmean   0 58    (--)  Valve area, Vmean   1 82 cmï¾²   (--)  Area index, Vmean   0 97 cmï¾²/mï¾²   (--)    SYSTEM MEASUREMENT TABLES    2D  %FS: 39 79 %  Ao Diam: 2 43 cm  EDV(Teich): 101 53 ml  EF(Teich): 70 38 %  ESV(Teich): 30 07 ml  IVSd: 1 cm  LA Area: 16 81 cm2  LA Diam: 3 7 cm  LVEDV MOD A4C: 88 07 ml  LVEF MOD A4C: 66 49 %  LVESV MOD A4C: 29 52 ml  LVIDd: 4 68 cm  LVIDs: 2 82 cm  LVLd A4C: 6 72 cm  LVLs A4C: 5 23 cm  LVOT Diam: 2 03 cm  LVPWd: 1 02 cm  RA Area: 13 74 cm2  RVIDd: 3 32 cm  SV MOD A4C: 58 56 ml  SV(Teich): 71 46 ml    CF  MR Trace: 4 3 cm2  TR Trace: 5 06 cm2    CW  AR Dec Franklin: 2 2 m/s2  AR Dec Time: 1516 05 ms  AR PHT: 439 65 ms  AR Vmax: 3 34 m/s  AR maxP 54 mmHg  AV Env  Ti: 266 41 ms  AV MaxP 67 mmHg  AV VTI: 42 45 cm  AV Vmax: 2 1 m/s  AV Vmean: 1 62 m/s  AV meanP 44 mmHg  MV PHT: 42 01 ms  MV VTI: 33 05 cm  MV Vmax: 1 68 m/s  MV Vmean: 1 m/s  MV maxP 3 mmHg  MV meanP 52 mmHg  MVA By PHT: 5 24 cm2  TR MaxP 38 mmHg  TR Vmax: 2 42 m/s    MM  TAPSE: 1 85 cm    PW  LARA (VTI): 2 24 cm2  LARA Vmax: 1 96 cm2  AVAI (VTI): 0 cm2/m2  AVAI Vmax: 0 cm2/m2  E' Sept: 0 06 m/s  E/E' Sept: 19 54  LVOT Env  Ti: 300 67 ms  LVOT VTI: 29 48 cm  LVOT Vmax: 1 27 m/s  LVOT Vmean: 0 98 m/s  LVOT maxP 47 mmHg  LVOT meanP 22 mmHg  LVSI Dopp: 50 63 ml/m2  LVSV Dopp: 95 18 ml  MV A Donnell: 1 34 m/s  MV Dec Franklin: 7 65 m/s2  MV DecT: 153 78 ms  MV E Donnell: 1 18 m/s  MV E/A Ratio: 0 88  MVA (VTI): 2 88 cm2    [employer] Accredited Echocardiography [facility]    Prepared and electronically signed by    Elen Wilson MD  Signed 2021 13:33:44    No results found for this or any previous visit  No results found for this or any previous visit      No results found for this or any previous visit        Meds/Allergies   Current Facility-Administered Medications   Medication Dose Route Frequency Provider Last Rate    acetaminophen  650 mg Oral Q6H PRN Manjinder Watts MD      amLODIPine  5 mg Oral Daily KASIE Lui      aspirin  81 mg Oral Daily Manjinder Watts MD      atorvastatin  40 mg Oral Daily With Beatrice Sánchez MD      cefuroxime  500 mg Oral Q24H Bennybrianne Durham, DO      ferrous sulfate  325 mg Oral Daily With Breakfast Manjinder Watts MD      furosemide  80 mg Oral BID (diuretic) Evertonnye Vashti Durham,       heparin (porcine)  5,000 Units Subcutaneous [facility] Manjinder Watts MD      insulin glargine  12 Units Subcutaneous HS Manjinder Watts MD      levothyroxine  50 mcg Oral Early Morning Manjinder Watts MD      metoprolol tartrate  50 mg Oral BID Manjinder Watts MD      nystatin   Topical BID Manjinder Watts MD      potassium chloride  40 mEq Oral BID Veronique Durham,       potassium chloride  20 mEq Intravenous Once Martin Rivera MD      tetrahydrozoline  1 drop Both Eyes 4x Daily PRN Manjinder Watts MD          Medications Prior to Admission   Medication    acetaminophen (TYLENOL) 325 mg tablet    amLODIPine (NORVASC) 5 mg tablet    amoxicillin (AMOXIL) 500 mg capsule    aspirin 81 mg chewable tablet    atorvastatin (LIPITOR) 10 mg tablet    bumetanide (BUMEX) 1 mg tablet    diclofenac sodium (VOLTAREN) 1 %    ferrous sulfate 325 (65 Fe) mg tablet    furosemide (LASIX) 40 mg tablet    Glucagon, rDNA, (Glucagon Emergency) 1 MG KIT    insulin glargine (LANTUS) 100 units/mL subcutaneous injection    ketoconazole (NIZORAL) 2 % cream    levothyroxine 50 mcg tablet    loperamide (IMODIUM A-D) 2 MG tablet    metoprolol tartrate (LOPRESSOR) 50 mg tablet    Multiple Vitamins-Minerals (OCUVITE ADULT 50+ PO)    nystatin (MYCOSTATIN) powder    oxyCODONE (ROXICODONE) 5 mg immediate release tablet    potassium chloride (K-DUR,KLOR-CON) 20 mEq tablet    tetrahydrozoline (VISINE) 0 05 % ophthalmic solution       Assessment:  Principal Problem:    Acute on chronic diastolic heart failure (HCC)  Active Problems:    Hypokalemia    Chest pain    Acute kidney injury superimposed on CKD ([facility])    Elevated troponin level not due myocardial infarction    Sick sinus syndrome (HCC)    UTI (urinary tract infection)      Impression:  1  Acute on chronic diastolic heart failure - improving on IV furosemide  2  Non-MI troponin elevation - due to CHF  3  Hypokalemia - improving  4  CAD  5  S/P TAVR  6  SSS s/p PPM  7  MAYELIN on CKD - slow improvement  Plan:  1  Continue IV diuretics  2  Watch renal function and potassium

## 2021-08-21 NOTE — ASSESSMENT & PLAN NOTE
- complicated by diuresis  - finally normalized 8/21    - cont K 20meq BID  - replace as necessary  - monitor for dysrhythmia  - recheck in a m   - appreciate nephrology assistance

## 2021-08-21 NOTE — PLAN OF CARE
Problem: Prexisting or High Potential for Compromised Skin Integrity  Goal: Skin integrity is maintained or improved  Description: INTERVENTIONS:  - Identify patients at risk for skin breakdown  - Assess and monitor skin integrity  - Assess and monitor nutrition and hydration status  - Monitor labs   - Assess for incontinence   - Turn and reposition patient  - Assist with mobility/ambulation  - Relieve pressure over bony prominences  - Avoid friction and shearing  - Provide appropriate hygiene as needed including keeping skin clean and dry  - Evaluate need for skin moisturizer/barrier cream  - Collaborate with interdisciplinary team   - Patient/family teaching  - Consider wound care consult   Outcome: Progressing     Problem: MOBILITY - ADULT  Goal: Maintain or return to baseline ADL function  Description: INTERVENTIONS:  -  Assess patient's ability to carry out ADLs; assess patient's baseline for ADL function and identify physical deficits which impact ability to perform ADLs (bathing, care of mouth/teeth, toileting, grooming, dressing, etc )  - Assess/evaluate cause of self-care deficits   - Assess range of motion  - Assess patient's mobility; develop plan if impaired  - Assess patient's need for assistive devices and provide as appropriate  - Encourage maximum independence but intervene and supervise when necessary  - Involve family in performance of ADLs  - Assess for home care needs following discharge   - Consider OT consult to assist with ADL evaluation and planning for discharge  - Provide patient education as appropriate  Outcome: Progressing  Goal: Maintains/Returns to pre admission functional level  Description: INTERVENTIONS:  - Perform BMAT or MOVE assessment daily    - Set and communicate daily mobility goal to care team and patient/family/caregiver  - Collaborate with rehabilitation services on mobility goals if consulted  - Perform Range of Motion  times a day    - Reposition patient every hours   - Dangle patient  times a day  - Stand patient  times a day  - Ambulate patient  times a day  - Out of bed to chair  times a day   - Out of bed for meals  times a day  - Out of bed for toileting  - Record patient progress and toleration of activity level   Outcome: Progressing

## 2021-08-21 NOTE — CASE MANAGEMENT
CM updated per SLIM, patient is a tentative discharge in 24hrs  CM sent updated referral to [facility], [facility] re:plan of care  Waiting for response

## 2021-08-21 NOTE — ASSESSMENT & PLAN NOTE
- creatinine 1 75 on admission; baseline 0 8-1 0  - mild improvement thus far 1 4  - renal US negative    - continue on home diuretic regimen  - daily BMP  - Nephro consult

## 2021-08-21 NOTE — PROGRESS NOTES
[facility]  Progress Note Karen Clack Day 1935, 80 y o  female MRN: [id number]  Unit/Bed#: S -01 Encounter: [id number]  Primary Care Provider: Sandra Middleton MD   Date and time admitted to hospital: 8/17/2021 11:12 AM    * Acute on chronic diastolic heart failure ([address] )  Assessment & Plan  - last echocardiogram showing preserved systolic function, but with grade 1 diastolic dysfunction  - presents this hospitalization chest pain and volume overload  - euvolemic at this point    - transitioned back to po lasix 80mg BID  - continue low-sodium diet  - monitor in's and out's with daily weights  - appreciate cardiology recommendations      Hypokalemia  Assessment & Plan  - complicated by diuresis  - finally normalized 8/21    - cont K 20meq BID  - replace as necessary  - monitor for dysrhythmia  - recheck in a m   - appreciate nephrology assistance      UTI (urinary tract infection)  Assessment & Plan  - acute uncomplicated cystitis  - p o  Ceftin to complete 5 day course of therapy on 08/21    Sick sinus syndrome [facility])  Assessment & Plan  - status post dual chamber pacer; recent check showing ventricular pacing    Elevated troponin level not due myocardial infarction  Assessment & Plan  - mild bump in troponin secondary to acute on chronic CHF  - no further cardiac workup warranted at this time  - continue with diuresis    Acute kidney injury superimposed on CKD (HCC)  Assessment & Plan  - creatinine 1 75 on admission; baseline 0 8-1 0  - mild improvement thus far 1 4  - renal US negative    - continue on home diuretic regimen  - daily BMP  - Nephro consult     Chest pain  Assessment & Plan  - resolved        VTE Pharmacologic Prophylaxis: VTE Score: 5 High Risk (Score >/= 5) - Pharmacological DVT Prophylaxis Ordered: heparin  Sequential Compression Devices Ordered  Patient Centered Rounds: I performed bedside rounds with nursing staff today    Discussions with Specialists or Other Care Team Provider: None    Education and Discussions with Family / Patient: Updated  (daughter) via phone  Time Spent for Care: 20 minutes  More than 50% of total time spent on counseling and coordination of care as described above  Current Length of Stay: 4 day(s)  Current Patient Status: Inpatient   Certification Statement: The patient will continue to require additional inpatient hospital stay due to potassium monitoring and dishcarge planning  Discharge Plan: Anticipate discharge tomorrow to rehab facility  Code Status: Level 1 - Full Code    Subjective:   Patient examined  Following up for acute/chronic CHF, MAYELIN, hypokalemia  Patient resting comfortably in bed today  No new complaints  Clinically stable  Potassium finally normalized  Objective:     Vitals:   Temp (24hrs), Av 8 °F (36 6 °C), Min:97 7 °F (36 5 °C), Max:97 9 °F (36 6 °C)    Temp:  [97 7 °F (36 5 °C)-97 9 °F (36 6 °C)] 97 7 °F (36 5 °C)  HR:  [87-97] 87  Resp:  [18] 18  BP: (140-141)/(65-66) 140/65  SpO2:  [94 %] 94 %  Body mass index is 40 3 kg/m²  Input and Output Summary (last 24 hours): Intake/Output Summary (Last 24 hours) at 2021 1515  Last data filed at 2021 1128  Gross per 24 hour   Intake 220 ml   Output 900 ml   Net -680 ml     PHYSICAL EXAM:    Vitals signs reviewed  Constitutional   Awake and cooperative  NAD  Head/Neck   Normocephalic  Atraumatic  HEENT   No scleral icterus  EOMI  Heart   Regular rate and rhythm  No murmers  Lungs   Lungs clear bilaterally  Respirations unlabored  Decreased at bases bilaterally  Abdomen   Soft  Nontender  Nondistended  Skin   Skin color normal  No rashes  Extremities   No deformities  Trace bilateral lower extremity edema  Neuro   Alert and oriented  No new deficits  Psych   Mood stable   Affect normal          Additional Data:     Labs:  Results from last 7 days   Lab Units 21  1033 21  1142   WBC Thousand/uL 12 95* 19 88*   HEMOGLOBIN g/dL 12 9 13 9   HEMATOCRIT % 40 7 44 4   PLATELETS Thousands/uL 368 379   NEUTROS PCT %  --  85*   LYMPHS PCT %  --  5*   MONOS PCT %  --  8   EOS PCT %  --  0     Results from last 7 days   Lab Units 08/21/21  0604 08/17/21  1142   SODIUM mmol/L 141 139   POTASSIUM mmol/L 3 8 2 7*   CHLORIDE mmol/L 102 94*   CO2 mmol/L 27 36*   BUN mg/dL 59* 49*   CREATININE mg/dL 1 42* 1 52*   ANION GAP mmol/L 12 9   CALCIUM mg/dL 10 1 10 1   ALBUMIN g/dL  --  3 5   TOTAL BILIRUBIN mg/dL  --  0 71   ALK PHOS U/L  --  126*   ALT U/L  --  36   AST U/L  --  28   GLUCOSE RANDOM mg/dL 200* 270*     Results from last 7 days   Lab Units 08/17/21  1142   INR  1 03     Results from last 7 days   Lab Units 08/20/21  2235 08/19/21  0626 08/18/21  2055   POC GLUCOSE mg/dl 199* 170* 184*               Lines/Drains:  Invasive Devices     Peripheral Intravenous Line            Peripheral IV 08/17/21 Right Antecubital 4 days                  Telemetry:  Telemetry Orders (From admission, onward)             48 Hour Telemetry Monitoring  Continuous x 48 hours     Question:  Reason for 48 Hour Telemetry  Answer:  Acute MI, chest pain - R/O MI, or unstable angina                 Indication for Continued Telemetry Use: Metabolic/electrolyte disturbance with high probability of dysrhythmia           Imaging: No pertinent imaging reviewed      Recent Cultures (last 7 days):         Last 24 Hours Medication List:   Current Facility-Administered Medications   Medication Dose Route Frequency Provider Last Rate    acetaminophen  650 mg Oral Q6H PRN Alexandria Barajas MD      amLODIPine  5 mg Oral Daily KASIE Stern      aspirin  81 mg Oral Daily Alexandria Barajas MD      atorvastatin  40 mg Oral Daily With Mark Anthony Xiao MD      ferrous sulfate  325 mg Oral Daily With Breakfast Alexandria Barajas MD      furosemide  80 mg Oral BID (diuretic) Valentin Durham DO      heparin (porcine)  5,000 Units Subcutaneous Q8H [facility] Haydee Tavares MD      insulin glargine  12 Units Subcutaneous HS Haydee Tavares MD      levothyroxine  50 mcg Oral Early Morning Haydee Tavares MD      metoprolol tartrate  50 mg Oral BID Haydee Tavares MD      nystatin   Topical BID Haydee Tavares MD      potassium chloride  20 mEq Oral BID Tone Lazo MD      potassium chloride  20 mEq Intravenous Once Dom Palmer MD      tetrahydrozoline  1 drop Both Eyes 4x Daily PRN Haydee Tavares MD          Today, Patient Was Seen By: Ivone Durham DO    **Please Note: This note may have been constructed using a voice recognition system  **

## 2021-08-21 NOTE — ASSESSMENT & PLAN NOTE
- mild bump in troponin secondary to acute on chronic CHF  - no further cardiac workup warranted at this time  - continue with diuresis

## 2021-08-21 NOTE — PLAN OF CARE
Problem: Prexisting or High Potential for Compromised Skin Integrity  Goal: Skin integrity is maintained or improved  Description: INTERVENTIONS:  - Identify patients at risk for skin breakdown  - Assess and monitor skin integrity  - Assess and monitor nutrition and hydration status  - Monitor labs   - Assess for incontinence   - Turn and reposition patient  - Assist with mobility/ambulation  - Relieve pressure over bony prominences  - Avoid friction and shearing  - Provide appropriate hygiene as needed including keeping skin clean and dry  - Evaluate need for skin moisturizer/barrier cream  - Collaborate with interdisciplinary team   - Patient/family teaching  - Consider wound care consult   Outcome: Progressing     Problem: Potential for Falls  Goal: Patient will remain free of falls  Description: INTERVENTIONS:  - Educate patient/family on patient safety including physical limitations  - Instruct patient to call for assistance with activity   - Consult OT/PT to assist with strengthening/mobility   - Keep Call bell within reach  - Keep bed low and locked with side rails adjusted as appropriate  - Keep care items and personal belongings within reach  - Initiate and maintain comfort rounds  - Make Fall Risk Sign visible to staff  - Offer Toileting every  Hours, in advance of need  - Initiate/Maintain alarm  - Obtain necessary fall risk management equipment:   - Apply yellow socks and bracelet for high fall risk patients  - Consider moving patient to room near nurses station  Outcome: Progressing     Problem: MOBILITY - ADULT  Goal: Maintain or return to baseline ADL function  Description: INTERVENTIONS:  -  Assess patient's ability to carry out ADLs; assess patient's baseline for ADL function and identify physical deficits which impact ability to perform ADLs (bathing, care of mouth/teeth, toileting, grooming, dressing, etc )  - Assess/evaluate cause of self-care deficits   - Assess range of motion  - Assess patient's mobility; develop plan if impaired  - Assess patient's need for assistive devices and provide as appropriate  - Encourage maximum independence but intervene and supervise when necessary  - Involve family in performance of ADLs  - Assess for home care needs following discharge   - Consider OT consult to assist with ADL evaluation and planning for discharge  - Provide patient education as appropriate  Outcome: Progressing  Goal: Maintains/Returns to pre admission functional level  Description: INTERVENTIONS:  - Perform BMAT or MOVE assessment daily    - Set and communicate daily mobility goal to care team and patient/family/caregiver  - Collaborate with rehabilitation services on mobility goals if consulted  - Perform Range of Motion  times a day  - Reposition patient every  hours  - Dangle patient  times a day  - Stand patient  times a day  - Ambulate patient  times a day  - Out of bed to chair  times a day   - Out of bed for meals times a day  - Out of bed for toileting  - Record patient progress and toleration of activity level   Outcome: Progressing     Problem: Nutrition/Hydration-ADULT  Goal: Nutrient/Hydration intake appropriate for improving, restoring or maintaining nutritional needs  Description: Monitor and assess patient's nutrition/hydration status for malnutrition  Collaborate with interdisciplinary team and initiate plan and interventions as ordered  Monitor patient's weight and dietary intake as ordered or per policy  Utilize nutrition screening tool and intervene as necessary  Determine patient's food preferences and provide high-protein, high-caloric foods as appropriate       INTERVENTIONS:  - Monitor oral intake, urinary output, labs, and treatment plans  - Assess nutrition and hydration status and recommend course of action  - Evaluate amount of meals eaten  - Assist patient with eating if necessary   - Allow adequate time for meals  - Recommend/ encourage appropriate diets, oral nutritional supplements, and vitamin/mineral supplements  - Order, calculate, and assess calorie counts as needed  - Recommend, monitor, and adjust tube feedings and TPN/PPN based on assessed needs  - Assess need for intravenous fluids  - Provide specific nutrition/hydration education as appropriate  - Include patient/family/caregiver in decisions related to nutrition  Outcome: Progressing

## 2021-08-22 VITALS
WEIGHT: 210.1 LBS | DIASTOLIC BLOOD PRESSURE: 88 MMHG | TEMPERATURE: 98.5 F | RESPIRATION RATE: 16 BRPM | BODY MASS INDEX: 41.25 KG/M2 | OXYGEN SATURATION: 93 % | HEART RATE: 88 BPM | HEIGHT: 60 IN | SYSTOLIC BLOOD PRESSURE: 132 MMHG

## 2021-08-22 LAB
ANION GAP SERPL CALCULATED.3IONS-SCNC: 9 MMOL/L (ref 4–13)
BUN SERPL-MCNC: 56 MG/DL (ref 5–25)
CALCIUM SERPL-MCNC: 10 MG/DL (ref 8.3–10.1)
CHLORIDE SERPL-SCNC: 101 MMOL/L (ref 100–108)
CO2 SERPL-SCNC: 28 MMOL/L (ref 21–32)
CREAT SERPL-MCNC: 1.51 MG/DL (ref 0.6–1.3)
EST. AVERAGE GLUCOSE BLD GHB EST-MCNC: 146 MG/DL
GFR SERPL CREATININE-BSD FRML MDRD: 31 ML/MIN/1.73SQ M
GLUCOSE SERPL-MCNC: 181 MG/DL (ref 65–140)
GLUCOSE SERPL-MCNC: 184 MG/DL (ref 65–140)
GLUCOSE SERPL-MCNC: 237 MG/DL (ref 65–140)
HBA1C MFR BLD: 6.7 %
POTASSIUM SERPL-SCNC: 3.6 MMOL/L (ref 3.5–5.3)
SARS-COV-2 RNA RESP QL NAA+PROBE: NEGATIVE
SODIUM SERPL-SCNC: 138 MMOL/L (ref 136–145)

## 2021-08-22 PROCEDURE — U0005 INFEC AGEN DETEC AMPLI PROBE: HCPCS | Performed by: INTERNAL MEDICINE

## 2021-08-22 PROCEDURE — 97530 THERAPEUTIC ACTIVITIES: CPT

## 2021-08-22 PROCEDURE — 99239 HOSP IP/OBS DSCHRG MGMT >30: CPT | Performed by: INTERNAL MEDICINE

## 2021-08-22 PROCEDURE — U0003 INFECTIOUS AGENT DETECTION BY NUCLEIC ACID (DNA OR RNA); SEVERE ACUTE RESPIRATORY SYNDROME CORONAVIRUS 2 (SARS-COV-2) (CORONAVIRUS DISEASE [COVID-19]), AMPLIFIED PROBE TECHNIQUE, MAKING USE OF HIGH THROUGHPUT TECHNOLOGIES AS DESCRIBED BY CMS-2020-01-R: HCPCS | Performed by: INTERNAL MEDICINE

## 2021-08-22 PROCEDURE — 80048 BASIC METABOLIC PNL TOTAL CA: CPT | Performed by: INTERNAL MEDICINE

## 2021-08-22 PROCEDURE — 99232 SBSQ HOSP IP/OBS MODERATE 35: CPT | Performed by: INTERNAL MEDICINE

## 2021-08-22 PROCEDURE — 82948 REAGENT STRIP/BLOOD GLUCOSE: CPT

## 2021-08-22 RX ORDER — POTASSIUM CHLORIDE 20 MEQ/1
20 TABLET, EXTENDED RELEASE ORAL DAILY
Status: DISCONTINUED | OUTPATIENT
Start: 2021-08-23 | End: 2021-08-22 | Stop reason: HOSPADM

## 2021-08-22 RX ORDER — OXYCODONE HYDROCHLORIDE 5 MG/1
2.5 TABLET ORAL EVERY 4 HOURS PRN
Qty: 3 TABLET | Refills: 0 | Status: SHIPPED | OUTPATIENT
Start: 2021-08-22

## 2021-08-22 RX ORDER — FUROSEMIDE 40 MG/1
80 TABLET ORAL 2 TIMES DAILY
Refills: 0
Start: 2021-08-22

## 2021-08-22 RX ADMIN — ACETAMINOPHEN 650 MG: 325 TABLET, FILM COATED ORAL at 11:14

## 2021-08-22 RX ADMIN — LEVOTHYROXINE SODIUM 50 MCG: 50 TABLET ORAL at 05:14

## 2021-08-22 RX ADMIN — HEPARIN SODIUM 5000 UNITS: 5000 INJECTION INTRAVENOUS; SUBCUTANEOUS at 01:25

## 2021-08-22 RX ADMIN — HEPARIN SODIUM 5000 UNITS: 5000 INJECTION INTRAVENOUS; SUBCUTANEOUS at 08:59

## 2021-08-22 RX ADMIN — INSULIN LISPRO 1 UNITS: 100 INJECTION, SOLUTION INTRAVENOUS; SUBCUTANEOUS at 09:00

## 2021-08-22 RX ADMIN — FUROSEMIDE 80 MG: 80 TABLET ORAL at 08:58

## 2021-08-22 RX ADMIN — POTASSIUM CHLORIDE 20 MEQ: 1500 TABLET, EXTENDED RELEASE ORAL at 08:58

## 2021-08-22 RX ADMIN — NYSTATIN: 100000 POWDER TOPICAL at 08:59

## 2021-08-22 RX ADMIN — ASPIRIN 81 MG: 81 TABLET, CHEWABLE ORAL at 08:59

## 2021-08-22 RX ADMIN — FERROUS SULFATE TAB 325 MG (65 MG ELEMENTAL FE) 325 MG: 325 (65 FE) TAB at 09:00

## 2021-08-22 RX ADMIN — AMLODIPINE BESYLATE 5 MG: 5 TABLET ORAL at 08:59

## 2021-08-22 RX ADMIN — METOPROLOL TARTRATE 50 MG: 50 TABLET, FILM COATED ORAL at 08:59

## 2021-08-22 NOTE — CASE MANAGEMENT
[facility] is able to accept for inpatient today  Covid swab requested and hard script for any narcotics/benzos  CM spoke with patient's daughter Rea Neely on the phone, [phone]  CM introduced self and role  CM updated daughter Rea Neely [facility] is able to offer an inpatient rehab bed  Daughter requesting [facility] at discharge  CM discussed transportation at discharge  Daughter requesting CM assist with transport  Daughter is setting out shortly  CM will f/u with daughter via phone re:transport time  CM reviewed with daughter on the phone re:IMM  Patient's daughter expressed understanding of her Medicare rights  No other questions/concerns noted at this time  IMM reviewed with patient's caregiver, patient's caregiver agrees with discharge determination    CM sent referral to [facility]  Waiting for transport time

## 2021-08-22 NOTE — PROGRESS NOTES
Cardiology Progress Note - Sumanth Garrett Day 80 y o  female MRN: [id number]    Unit/Bed#: S -01 Encounter: [id number]        Subjective:    No significant events overnight  Some nausea and fatigue  Review of Systems   Constitutional: Positive for malaise/fatigue  Cardiovascular: Negative for chest pain, leg swelling and palpitations  Respiratory: Negative for shortness of breath  Objective:   Vitals: Blood pressure 132/88, pulse 88, temperature 98 5 °F (36 9 °C), temperature source Oral, resp  rate 16, height 5' (1 524 m), weight 95 3 kg (210 lb 1 6 oz), SpO2 93 %  , Body mass index is 41 03 kg/m² ,   Orthostatic Blood Pressures      Most Recent Value   Blood Pressure  132/88 filed at 08/22/2021 0700   Patient Position - Orthostatic VS  Lying filed at 08/22/2021 1736         Systolic (09QGI), CGL:828 , Min:127 , MXP:203     Diastolic (13WBW), RVC:58, Min:59, Max:88      Intake/Output Summary (Last 24 hours) at 8/22/2021 0935  Last data filed at 8/22/2021 0900  Gross per 24 hour   Intake --   Output 1400 ml   Net -1400 ml     Weight (last 2 days)     Date/Time   Weight    08/22/21 0600   95 3 (210 1)    08/21/21 0600   93 6 (206 35)    08/20/21 0600   94 6 (208 56)                  Telemetry Review: Off    Physical Exam  Cardiovascular:      Rate and Rhythm: Normal rate and regular rhythm  Heart sounds: Normal heart sounds  No murmur heard  No friction rub  No gallop  Pulmonary:      Breath sounds: Normal breath sounds  No wheezing or rales             Laboratory Results:  Results from last 7 days   Lab Units 08/18/21  0034 08/17/21  2038 08/17/21  1725   TROPONIN I ng/mL 0 09* 0 11* 0 12*       CBC with diff:   Results from last 7 days   Lab Units 08/19/21  1033 08/18/21  1007 08/17/21  1142   WBC Thousand/uL 12 95* 16 41* 19 88*   HEMOGLOBIN g/dL 12 9 14 0 13 9   HEMATOCRIT % 40 7 42 3 44 4   MCV fL 94 92 95   PLATELETS Thousands/uL 368 381 379   MCH pg 29 8 30 6 29 7   MCHC g/dL 31 7 33 1 31 3*   RDW % 12 6 12 8 12 9   MPV fL 10 8 10 9 10 8   NRBC AUTO /100 WBCs  --   --  0         CMP:  Results from last 7 days   Lab Units 21  0604 21  0705 21  0625 21  1609 21  0542 21  2038 21  1142   POTASSIUM mmol/L 3 8 2 8*  2 8* 2 9* 3 0* 2 6* 3 1* 2 7*   CHLORIDE mmol/L 102 98* 96* 96* 98* 97* 94*   CO2 mmol/L 27 30 31 32 31 32 36*   BUN mg/dL 59* 59* 56* 49* 50* 53* 49*   CREATININE mg/dL 1 42* 1 47* 1 59* 1 60* 1 40* 1 75* 1 52*   CALCIUM mg/dL 10 1 9 6 9 2 9 5 9 6 10 0 10 1   AST U/L  --   --   --   --   --   --  28   ALT U/L  --   --   --   --   --   --  36   ALK PHOS U/L  --   --   --   --   --   --  126*   EGFR ml/min/1 73sq m 33 32 29 29 34 26 31         BMP:  Results from last 7 days   Lab Units 21  0604 21  0705 21  0625 21  1609 21  0542 218 21  1142   POTASSIUM mmol/L 3 8 2 8*  2 8* 2 9* 3 0* 2 6* 3 1* 2 7*   CHLORIDE mmol/L 102 98* 96* 96* 98* 97* 94*   CO2 mmol/L 27 30 31 32 31 32 36*   BUN mg/dL 59* 59* 56* 49* 50* 53* 49*   CREATININE mg/dL 1 42* 1 47* 1 59* 1 60* 1 40* 1 75* 1 52*   CALCIUM mg/dL 10 1 9 6 9 2 9 5 9 6 10 0 10 1       BNP:     Magnesium:   Results from last 7 days   Lab Units 21  0604   MAGNESIUM mg/dL 2 4       Coags:   Results from last 7 days   Lab Units 21  1142   PTT seconds 26   INR  1 03       TSH:       Lipid Profile:             Cardiac testing:   Results for orders placed during the hospital encounter of 21    Echo complete with contrast if indicated    Narrative  [place] [address], [facility]  [phone]    Transthoracic Echocardiogram  2D, M-mode, Doppler, and Color Doppler    Study date:  2021    Patient: Vivian Cortés  MR number: [id number]  Account number: [de-identified]  : 1935  Age: 80 years  Gender: Female  Status: Outpatient  Location: [facility]  Height: 62 in  Weight: 192 5 lb  BP: 158/ 88 mmHg    Indications: Assess left ventricular function  Evaluate prosthetic aortic valve  Diagnoses: I25 10 - Atherosclerotic heart disease of native coronary artery without angina pectoris    Sonographer:  ALMA Vigil  Primary Physician:  Salomon Seay MD  Referring Physician:  Celine Adorno DO  Group:  [facility]  Interpreting Physician:  Colton Zendejas MD    SUMMARY    LEFT VENTRICLE:  Systolic function was normal  Ejection fraction was estimated to be 60 %  There were no regional wall motion abnormalities  Doppler parameters were consistent with abnormal left ventricular relaxation (grade 1 diastolic dysfunction)  MITRAL VALVE:  There was moderate annular calcification  There was mild to moderate regurgitation  AORTIC VALVE:  A 23 mm ([facility] TAVR) bioprosthesis was present  It exhibited normal function  There was mild to moderate central regurgitation  There was no significant perivalvular regurgitation  Valve mean gradient was 14 mmHg  Estimated aortic valve area (by VTI) was 1 73 cmï¾²  TRICUSPID VALVE:  There was mild to moderate regurgitation  Pulmonary artery systolic pressure was within the normal range  HISTORY: PRIOR HISTORY: CAD s/p PCI, Aortic stenosis s/p TAVR in 2016    PROCEDURE: The study was performed in the [address] [place] [address] and Vascular Center  This was a routine study  The transthoracic approach was used  The study included complete 2D imaging, M-mode, complete spectral Doppler, and color Doppler  The  heart rate was 71 bpm, at the start of the study  Images were obtained from the parasternal, apical, subcostal, and suprasternal notch acoustic windows  Image quality was adequate  LEFT VENTRICLE: Size was normal  Systolic function was normal  Ejection fraction was estimated to be 60 %  There were no regional wall motion abnormalities   Wall thickness was normal  DOPPLER: Doppler parameters were consistent with  abnormal left ventricular relaxation (grade 1 diastolic dysfunction)  RIGHT VENTRICLE: The size was normal  Systolic function was normal  Wall thickness was normal     LEFT ATRIUM: Size was at the upper limits of normal     RIGHT ATRIUM: Size was normal     MITRAL VALVE: There was moderate annular calcification  Valve structure was normal  There was normal leaflet separation  DOPPLER: The transmitral velocity was within the normal range  There was no evidence for stenosis  There was mild to  moderate regurgitation  AORTIC VALVE: A 23 mm ([facility] TAVR) bioprosthesis was present  It exhibited normal function  DOPPLER: There was no evidence for stenosis  There was mild to moderate central regurgitation  There was no significant perivalvular  regurgitation  TRICUSPID VALVE: The valve structure was normal  There was normal leaflet separation  DOPPLER: The transtricuspid velocity was within the normal range  There was no evidence for stenosis  There was mild to moderate regurgitation  Pulmonary  artery systolic pressure was within the normal range  Estimated peak PA pressure was 30 mmHg  PULMONIC VALVE: Not well visualized  DOPPLER: The transpulmonic velocity was within the normal range  There was no significant regurgitation  PERICARDIUM: There was no pericardial effusion  The pericardium was normal in appearance  AORTA: The root exhibited normal size  SYSTEMIC VEINS: IVC: The inferior vena cava was normal in size      MEASUREMENT TABLES    2D MEASUREMENTS  LVOT   (Reference normals)  Diam   20 mm   (--)    DOPPLER MEASUREMENTS  LVOT   (Reference normals)  Peak soren   124 cm/s   (--)  Mean soren   98 cm/s   (--)  VTI   25 5 cm   (--)  Peak gradient   5 mmHg   (--)  Mean gradient   3 mmHg   (--)  Stroke vol   80 11 ml   (--)  Aortic valve   (Reference normals)  Peak soren   217 cm/s   (--)  Mean soren   169 cm/s   (--)  VTI   46 cm   (--)  Peak gradient   19 mmHg   (--)  Mean gradient   14 mmHg (--)  Obstr index, VTI   0 55    (--)  Valve area, VTI   1 73 cmï¾²   (--)  Area index, VTI   0 92 cmï¾²/mï¾²   (--)  Obstr index, Vmax   0 57    (--)  Valve area, Vmax   1 79 cmï¾²   (--)  Area index, Vmax   0 95 cmï¾²/mï¾²   (--)  Obstr index, Vmean   0 58    (--)  Valve area, Vmean   1 82 cmï¾²   (--)  Area index, Vmean   0 97 cmï¾²/mï¾²   (--)    SYSTEM MEASUREMENT TABLES    2D  %FS: 39 79 %  Ao Diam: 2 43 cm  EDV(Teich): 101 53 ml  EF(Teich): 70 38 %  ESV(Teich): 30 07 ml  IVSd: 1 cm  LA Area: 16 81 cm2  LA Diam: 3 7 cm  LVEDV MOD A4C: 88 07 ml  LVEF MOD A4C: 66 49 %  LVESV MOD A4C: 29 52 ml  LVIDd: 4 68 cm  LVIDs: 2 82 cm  LVLd A4C: 6 72 cm  LVLs A4C: 5 23 cm  LVOT Diam: 2 03 cm  LVPWd: 1 02 cm  RA Area: 13 74 cm2  RVIDd: 3 32 cm  SV MOD A4C: 58 56 ml  SV(Teich): 71 46 ml    CF  MR Trace: 4 3 cm2  TR Trace: 5 06 cm2    CW  AR Dec Perry: 2 2 m/s2  AR Dec Time: 1516 05 ms  AR PHT: 439 65 ms  AR Vmax: 3 34 m/s  AR maxP 54 mmHg  AV Env  Ti: 266 41 ms  AV MaxP 67 mmHg  AV VTI: 42 45 cm  AV Vmax: 2 1 m/s  AV Vmean: 1 62 m/s  AV meanP 44 mmHg  MV PHT: 42 01 ms  MV VTI: 33 05 cm  MV Vmax: 1 68 m/s  MV Vmean: 1 m/s  MV maxP 3 mmHg  MV meanP 52 mmHg  MVA By PHT: 5 24 cm2  TR MaxP 38 mmHg  TR Vmax: 2 42 m/s    MM  TAPSE: 1 85 cm    PW  LARA (VTI): 2 24 cm2  LARA Vmax: 1 96 cm2  AVAI (VTI): 0 cm2/m2  AVAI Vmax: 0 cm2/m2  E' Sept: 0 06 m/s  E/E' Sept: 19 54  LVOT Env  Ti: 300 67 ms  LVOT VTI: 29 48 cm  LVOT Vmax: 1 27 m/s  LVOT Vmean: 0 98 m/s  LVOT maxP 47 mmHg  LVOT meanP 22 mmHg  LVSI Dopp: 50 63 ml/m2  LVSV Dopp: 95 18 ml  MV A Donnell: 1 34 m/s  MV Dec Perry: 7 65 m/s2  MV DecT: 153 78 ms  MV E Donnell: 1 18 m/s  MV E/A Ratio: 0 88  MVA (VTI): 2 88 cm2    [employer] Accredited Echocardiography Laboratory    Prepared and electronically signed by    Blossom Meza MD  Signed 2021 13:33:44    No results found for this or any previous visit      No results found for this or any previous visit  No results found for this or any previous visit        Meds/Allergies   Current Facility-Administered Medications   Medication Dose Route Frequency Provider Last Rate    acetaminophen  650 mg Oral Q6H PRN Rashid Ro MD      amLODIPine  5 mg Oral Daily Edvin MealKASIE mosher      aspirin  81 mg Oral Daily Rashid Ro MD      atorvastatin  40 mg Oral Daily With Victoriano Flynn MD      ferrous sulfate  325 mg Oral Daily With Breakfast Rashid Ro MD      furosemide  80 mg Oral BID (diuretic) Renetta Durham DO      heparin (porcine)  5,000 Units Subcutaneous [facility] Rashid Ro MD      insulin glargine  12 Units Subcutaneous HS Rashid Ro MD      insulin lispro  1-5 Units Subcutaneous TID [facility] BertrandKASIE Wisdom      insulin lispro  1-5 Units Subcutaneous HS KASIE Wild      levothyroxine  50 mcg Oral Early Morning Rashid Ro MD      metoprolol tartrate  50 mg Oral BID Rashid Ro MD      nystatin   Topical BID Rashid Ro MD      potassium chloride  20 mEq Oral BID [place] MD      potassium chloride  20 mEq Intravenous Once Ilda Guerrero MD      tetrahydrozoline  1 drop Both Eyes 4x Daily PRN Rashid Ro MD          Medications Prior to Admission   Medication    acetaminophen (TYLENOL) 325 mg tablet    amLODIPine (NORVASC) 5 mg tablet    amoxicillin (AMOXIL) 500 mg capsule    aspirin 81 mg chewable tablet    atorvastatin (LIPITOR) 10 mg tablet    bumetanide (BUMEX) 1 mg tablet    diclofenac sodium (VOLTAREN) 1 %    ferrous sulfate 325 (65 Fe) mg tablet    furosemide (LASIX) 40 mg tablet    Glucagon, rDNA, (Glucagon Emergency) 1 MG KIT    insulin glargine (LANTUS) 100 units/mL subcutaneous injection    ketoconazole (NIZORAL) 2 % cream    levothyroxine 50 mcg tablet    loperamide (IMODIUM A-D) 2 MG tablet    metoprolol tartrate (LOPRESSOR) 50 mg tablet    Multiple Vitamins-Minerals (OCUVITE ADULT 50+ PO)    nystatin (MYCOSTATIN) powder    oxyCODONE (ROXICODONE) 5 mg immediate release tablet    potassium chloride (K-DUR,KLOR-CON) 20 mEq tablet    tetrahydrozoline (VISINE) 0 05 % ophthalmic solution       Assessment:  Principal Problem:    Acute on chronic diastolic heart failure (HCC)  Active Problems:    Diabetes mellitus ([facility])    Hypokalemia    Chest pain    Acute kidney injury superimposed on CKD ([facility])    Elevated troponin level not due myocardial infarction    Sick sinus syndrome ([facility])    UTI (urinary tract infection)      Impression:  1  Acute on chronic diastolic heart failure - improving on oral diuretics  2  Non-MI troponin elevation - due to CHF  3  Hypokalemia - improving  4  CAD  5  S/P TAVR  6  SSS s/p PPM  7  MAYELIN on CKD - slow improvement  Plan:  1  Continue oral diuretics  2  Watch renal function and potassium

## 2021-08-22 NOTE — PROGRESS NOTES
NEPHROLOGY PROGRESS NOTE     Roxanna Day 80 y o  female MRN: [id number]  Unit/Bed#: S -01 Encounter: [id number]  Reason for Consult:  Acute renal failure    Patient was lying in bed she was tired states she was little nauseous but was in no distress and comfortable from a respiratory standpoint  ASSESSMENT/PLAN:  1  Renal    Patient acute renal insufficiency with baseline creatinine of around 1  Creatinine now has been running around 1 5 and I suspect it is due to some effective volume depletion  She had been placed on her maintenance diuretic equivalent dose Lasix 80 mg twice a day as she tends to take bumetanide at home 2 mg twice a day  She will also be on potassium would recommend 20 mEq daily on discharge  I discussed this with the primary attending  Creatinine is 1 5 and may represent new baseline but again she is not eating well  Continue current maintenance diuretic on discharge  KCl 20 mEq p o  Q day    2  Cardiac    History of to have are admitted with volume overload cardiology was following and now on oral diuretic  SUBJECTIVE:  Review of Systems   Constitutional: Positive for malaise/fatigue  Negative for chills, fever and night sweats  HENT: Negative  Eyes: Negative  Cardiovascular: Negative for chest pain, dyspnea on exertion, leg swelling and orthopnea  Respiratory: Negative for cough, shortness of breath and sputum production  Gastrointestinal: Positive for nausea  Negative for abdominal pain, diarrhea and vomiting  Genitourinary: Positive for bladder incontinence  Negative for dysuria, flank pain and hematuria  Neurological: Positive for weakness  Negative for dizziness, focal weakness and headaches  Psychiatric/Behavioral: Negative for altered mental status, depression, hallucinations and hypervigilance         OBJECTIVE:  Current Weight: Weight - Scale: 95 3 kg (210 lb 1 6 oz)  Vitals:Temp (24hrs), Av 3 °F (36 8 °C), Min:98 °F (36 7 °C), Max:98 5 °F (36 9 °C)  Current: Temperature: 98 5 °F (36 9 °C)   Blood pressure 132/88, pulse 88, temperature 98 5 °F (36 9 °C), temperature source Oral, resp  rate 16, height 5' (1 524 m), weight 95 3 kg (210 lb 1 6 oz), SpO2 93 %  , Body mass index is 41 03 kg/m²  Intake/Output Summary (Last 24 hours) at 8/22/2021 1138  Last data filed at 8/22/2021 0900  Gross per 24 hour   Intake --   Output 800 ml   Net -800 ml       Physical Exam: /88 (BP Location: Left arm)   Pulse 88   Temp 98 5 °F (36 9 °C) (Oral)   Resp 16   Ht 5' (1 524 m)   Wt 95 3 kg (210 lb 1 6 oz)   SpO2 93%   BMI 41 03 kg/m²   Physical Exam  Constitutional:       General: She is not in acute distress  Appearance: She is not toxic-appearing or diaphoretic  HENT:      Head: Normocephalic and atraumatic  Mouth/Throat:      Mouth: Mucous membranes are dry  Eyes:      General: No scleral icterus  Extraocular Movements: Extraocular movements intact  Cardiovascular:      Rate and Rhythm: Normal rate and regular rhythm  Heart sounds: No friction rub  No gallop  Pulmonary:      Effort: Pulmonary effort is normal  No respiratory distress  Breath sounds: Normal breath sounds  No wheezing, rhonchi or rales  Abdominal:      General: Bowel sounds are normal  There is no distension  Palpations: Abdomen is soft  Tenderness: There is no abdominal tenderness  There is no rebound  Musculoskeletal:      Cervical back: Normal range of motion and neck supple  Neurological:      Mental Status: She is alert           Medications:    Current Facility-Administered Medications:     acetaminophen (TYLENOL) tablet 650 mg, 650 mg, Oral, Q6H PRN, Shelly Olivo MD, 650 mg at 08/22/21 1114    amLODIPine (NORVASC) tablet 5 mg, 5 mg, Oral, Daily, KASIE Godinez, 5 mg at 08/22/21 2589    aspirin chewable tablet 81 mg, 81 mg, Oral, Daily, Shelly Olivo MD, 81 mg at 08/22/21 0859    atorvastatin (LIPITOR) tablet 40 mg, 40 mg, Oral, Daily With Cherl Libman, MD, 40 mg at 08/21/21 1753    ferrous sulfate tablet 325 mg, 325 mg, Oral, Daily With Breakfast, Kieran Early MD, 325 mg at 08/22/21 0900    furosemide (LASIX) tablet 80 mg, 80 mg, Oral, BID (diuretic), Mariposa Montrose Starsinic, DO, 80 mg at 08/22/21 0858    heparin (porcine) subcutaneous injection 5,000 Units, 5,000 Units, Subcutaneous, Q8H [address], 5,000 Units at 08/22/21 0859 **AND** [CANCELED] Platelet count, , , Once, Kieran Early MD    insulin glargine (LANTUS) subcutaneous injection 12 Units 0 12 mL, 12 Units, Subcutaneous, HS, Kieran Early MD, 12 Units at 08/21/21 2333    insulin lispro (HumaLOG) 100 units/mL subcutaneous injection 1-5 Units, 1-5 Units, Subcutaneous, TID AC, 1 Units at 08/22/21 0900 **AND** Fingerstick Glucose (POCT), , , TID AC, KASIE Diggs    insulin lispro (HumaLOG) 100 units/mL subcutaneous injection 1-5 Units, 1-5 Units, Subcutaneous, HS, KASIE Diggs, 1 Units at 08/21/21 2333    levothyroxine tablet 50 mcg, 50 mcg, Oral, Early Morning, Kieran Early MD, 50 mcg at 08/22/21 0514    metoprolol tartrate (LOPRESSOR) tablet 50 mg, 50 mg, Oral, BID, Kieran Early MD, 50 mg at 08/22/21 0859    nystatin (MYCOSTATIN) powder, , Topical, BID, Kieran Early MD, Given at 08/22/21 0859    potassium chloride (K-DUR,KLOR-CON) CR tablet 20 mEq, 20 mEq, Oral, BID, Jimmie Sharp MD, 20 mEq at 08/22/21 0858    potassium chloride 20 mEq IVPB (premix), 20 mEq, Intravenous, Once, Peewee Baker MD    tetrahydrozoline (VISINE) 0 05 % ophthalmic solution 1 drop, 1 drop, Both Eyes, 4x Daily PRN, Kieran Early MD    Laboratory Results:  Lab Results   Component Value Date    WBC 12 95 (H) 08/19/2021    HGB 12 9 08/19/2021    HCT 40 7 08/19/2021    MCV 94 08/19/2021     08/19/2021     Lab Results   Component Value Date    SODIUM 138 08/22/2021    K 3 6 08/22/2021     08/22/2021    CO2 28 08/22/2021    BUN 56 (H) 08/22/2021 CREATININE 1 51 (H) 08/22/2021    GLUC 237 (H) 08/22/2021    CALCIUM 10 0 08/22/2021     Lab Results   Component Value Date    CALCIUM 10 0 08/22/2021     No results found for: LABPROT

## 2021-08-22 NOTE — PLAN OF CARE
Problem: PHYSICAL THERAPY ADULT  Goal: Performs mobility at highest level of function for planned discharge setting  See evaluation for individualized goals  Description: Treatment/Interventions: Functional transfer training, LE strengthening/ROM, Therapeutic exercise, Endurance training, Patient/family training, Equipment eval/education, Bed mobility  Equipment Recommended: Wheelchair (pt has one)       See flowsheet documentation for full assessment, interventions and recommendations  Outcome: Progressing  Note: Prognosis: Fair  Problem List: Decreased strength, Decreased endurance, Impaired balance, Decreased mobility, Obesity, Pain  Assessment: Patient in poor positioning sliding down and bed and is agreebale to participate and repositioning  Participated in bed mobility rolling to L and R side with increased difficulty to L side and increased assistance to hold positioning for hygenie  Supine to sit with max ax1 and increased time to complete transition to EOB using bed pad to assist  Pt tolerated sitting EOB x 12 minutes with mod ax1 progressing to min ax1 for sitting balance  STS trials not approrpiate as patient with compliants of nausea and requesting return to supine in bed  Requires max ax2 for return sit to supine  Continue to focus on OOB mobility with progression of bed mobility, sitting tolerance and transfers as appropriate and able  PT Discharge Recommendation: Post acute rehabilitation services          See flowsheet documentation for full assessment

## 2021-08-22 NOTE — ASSESSMENT & PLAN NOTE
- complicated by diuresis  - normalized 8/21    - per nephrology recommendations will resume 20 mEq daily on discharge  - Nephrology to arrange follow-up post discharge

## 2021-08-22 NOTE — CASE MANAGEMENT
CM updated per [facility], patient's transport time is 1345 with [place] [facility]  CM updated receiving facility and nursing with transport time  CM provided update to patient's daughter Elizabet Sim at   Elizabet Sim updated transport time is [phone] today and Covid results negative  No other questions/concerns noted at this time       CM faxed negative Covid results to B at [phone]

## 2021-08-22 NOTE — ASSESSMENT & PLAN NOTE
- last echocardiogram showing preserved systolic function, but with grade 1 diastolic dysfunction  - presents this hospitalization chest pain and volume overload  - euvolemic at this point    - transitioned back to po lasix 80mg BID; will resume this regimen on discharge per Nephrology recommendations  - continue low-sodium diet  - monitor in's and out's with daily weights  - monitored by Nephrology and cardiology while inpatient

## 2021-08-22 NOTE — DISCHARGE SUMMARY
[facility]  Discharge- Tiarra Grave Day 1935, 80 y o  female MRN: [id number]  Unit/Bed#: S -01 Encounter: [id number]  Primary Care Provider: Halima Vásquez MD   Date and time admitted to hospital: 8/17/2021 11:12 AM    * Acute on chronic diastolic heart failure ([place] [address] )  Assessment & Plan  - last echocardiogram showing preserved systolic function, but with grade 1 diastolic dysfunction  - presents this hospitalization chest pain and volume overload  - euvolemic at this point    - transitioned back to po lasix 80mg BID; will resume this regimen on discharge per Nephrology recommendations  - continue low-sodium diet  - monitor in's and out's with daily weights  - monitored by Nephrology and cardiology while inpatient    Hypokalemia  Assessment & Plan  - complicated by diuresis  - normalized 8/21    - per nephrology recommendations will resume 20 mEq daily on discharge  - Nephrology to arrange follow-up post discharge    UTI (urinary tract infection)  Assessment & Plan  - acute uncomplicated cystitis  - p o  Ceftin to complete 5 day course of therapy on 08/21    Sick sinus syndrome [facility])  Assessment & Plan  - status post dual chamber pacer; recent check showing ventricular pacing    Elevated troponin level not due myocardial infarction  Assessment & Plan  - mild bump in troponin secondary to acute on chronic CHF  - no further cardiac workup warranted at this time  - continue with diuresis    Acute kidney injury superimposed on CKD ([place] [address] )  Assessment & Plan  - creatinine 1 75 on admission; baseline 0 8-1 0  - renal US negative  - creatinine has stabilized around 1 4-1 5    - nephrology consulted during hospitalizations; has recommended resuming home Lasix regimen 80 mg b i d , and p o  Lasix 20 mEq daily on discharge  They will be arranging outpatient follow-up      Chest pain  Assessment & Plan  - resolved; likely secondary to acute on chronic CHF    Diabetes mellitus ([place] [address] )  Assessment & Plan  - resume home regimen on discharge      Medical Problems     Resolved Problems  Date Reviewed: 8/22/2021    None              Discharging Physician / Practitioner: Hilda Nogueira DO  PCP: Mayank Sierra MD  Admission Date:   Admission Orders (From admission, onward)     Ordered        08/17/21 202 MARILEE Badillo  Once                   Discharge Date: 08/22/21    Consultations During Hospital Stay:  · Nephrology  · Cardiology    Procedures Performed:   · None    Significant Findings / Test Results:   · Acute kidney injury superimposed on chronic kidney disease  · Significant hypokalemia    Incidental Findings:   · None     Test Results Pending at Discharge (will require follow up): · None     Outpatient Tests Requested:  · Recommend repeat BMP in 1 week post discharge  Complications:  None    Reason for Admission:  Chest pain    Hospital Course:   Mary Alice Hutton is a 80 y o  female patient who originally presented to the hospital on 8/17/2021 due to chest pain  She is found have a mildly elevated troponin in the emergency room and referred to the hospitalist service for further evaluation and management  Ultimately her elevation in troponin was thought to be secondary to acute on chronic CHF  Thus, she was diuresed with assistance from Nephrology and Cardiology  Her hospitalization was complicated by persistent hypokalemia and acute kidney injury which eventually normalized with replacement protocol  Renal function was slightly off baseline at the time of discharge, however cleared from discharge standpoint by Nephrology who will be following up as an outpatient  Otherwise patient was asymptomatic at the time of discharge  All questions and concerns were addressed  Patient's daughter at bedside, discharge plan discussed at length  Please see above list of diagnoses and related plan for additional information       Condition at Discharge: good    Discharge Day Visit / Exam:   Subjective: Patient seen examined at the day of discharge  Patient doing well  She appears euvolemic  Decreased appetite today but otherwise feeling well  Vitals: Blood Pressure: 132/88 (08/22/21 0700)  Pulse: 88 (08/22/21 0700)  Temperature: 98 5 °F (36 9 °C) (08/22/21 0700)  Temp Source: Oral (08/22/21 0700)  Respirations: 16 (08/22/21 0700)  Height: 5' (152 4 cm) (08/18/21 1606)  Weight - Scale: 95 3 kg (210 lb 1 6 oz) (08/22/21 0600)  SpO2: 93 % (08/22/21 0700)    PHYSICAL EXAM:    Vitals signs reviewed  Constitutional   Awake and cooperative  NAD  Head/Neck   Normocephalic  Atraumatic  HEENT   No scleral icterus  EOMI  Heart   Regular rate and rhythm  No murmers  Lungs   Clear to auscultation bilaterally  Respirations unlaboured  Abdomen   Soft  Nontender  Nondistended  Small area of tenderness around heparin injection site  Skin   Skin color normal  No rashes  Extremities   No deformities  No peripheral edema  Neuro   Alert and oriented  No new deficits  Psych   Mood stable  Affect normal          Discussion with Family: Updated  (daughter) at bedside  Discharge instructions/Information to patient and family:   See after visit summary for information provided to patient and family  Provisions for Follow-Up Care:  See after visit summary for information related to follow-up care and any pertinent home health orders  Disposition:   [facility]    Planned Readmission: NO     Discharge Statement:  I spent 35 minutes discharging the patient  This time was spent on the day of discharge  I had direct contact with the patient on the day of discharge  Greater than 50% of the total time was spent examining patient, answering all patient questions, arranging and discussing plan of care with patient as well as directly providing post-discharge instructions  Additional time then spent on discharge activities      Discharge Medications:  See after visit summary for reconciled discharge medications provided to patient and/or family        **Please Note: This note may have been constructed using a voice recognition system**

## 2021-08-22 NOTE — ASSESSMENT & PLAN NOTE
- creatinine 1 75 on admission; baseline 0 8-1 0  - renal US negative  - creatinine has stabilized around 1 4-1 5    - nephrology consulted during hospitalizations; has recommended resuming home Lasix regimen 80 mg b i d , and p o  Lasix 20 mEq daily on discharge  They will be arranging outpatient follow-up

## 2021-08-25 ENCOUNTER — HOSPITAL ENCOUNTER (INPATIENT)
Facility: HOSPITAL | Age: 86
LOS: 11 days | Discharge: NURSING, REHAB OR LONG-TERM CARE | DRG: 872 | End: 2021-09-05
Admitting: INTERNAL MEDICINE
Payer: MEDICARE

## 2021-08-25 ENCOUNTER — APPOINTMENT (EMERGENCY)
Dept: RADIOLOGY | Facility: HOSPITAL | Age: 86
DRG: 872 | End: 2021-08-25
Payer: MEDICARE

## 2021-08-25 DIAGNOSIS — R07.9 CHEST PAIN, UNSPECIFIED TYPE: ICD-10-CM

## 2021-08-25 DIAGNOSIS — R11.2 INTRACTABLE VOMITING WITH NAUSEA, UNSPECIFIED VOMITING TYPE: ICD-10-CM

## 2021-08-25 DIAGNOSIS — K56.600 PARTIAL SMALL BOWEL OBSTRUCTION (HCC): ICD-10-CM

## 2021-08-25 DIAGNOSIS — N17.9 AKI (ACUTE KIDNEY INJURY) (HCC): ICD-10-CM

## 2021-08-25 DIAGNOSIS — E87.6 HYPOKALEMIA: ICD-10-CM

## 2021-08-25 DIAGNOSIS — J18.9 PNEUMONIA OF LEFT LOWER LOBE DUE TO INFECTIOUS ORGANISM: ICD-10-CM

## 2021-08-25 DIAGNOSIS — Z95.2 S/P TAVR (TRANSCATHETER AORTIC VALVE REPLACEMENT): ICD-10-CM

## 2021-08-25 DIAGNOSIS — R33.9 URINARY RETENTION: ICD-10-CM

## 2021-08-25 DIAGNOSIS — I25.10 CORONARY ARTERY DISEASE INVOLVING NATIVE CORONARY ARTERY OF NATIVE HEART WITHOUT ANGINA PECTORIS: ICD-10-CM

## 2021-08-25 DIAGNOSIS — A41.9 SEPSIS (HCC): ICD-10-CM

## 2021-08-25 DIAGNOSIS — K56.609 SBO (SMALL BOWEL OBSTRUCTION) (HCC): ICD-10-CM

## 2021-08-25 DIAGNOSIS — N39.0 UTI (URINARY TRACT INFECTION): Primary | ICD-10-CM

## 2021-08-25 LAB
ALBUMIN SERPL BCP-MCNC: 3.9 G/DL (ref 3.4–4.8)
ALP SERPL-CCNC: 92.4 U/L (ref 35–140)
ALT SERPL W P-5'-P-CCNC: 27 U/L (ref 5–54)
ANION GAP SERPL CALCULATED.3IONS-SCNC: 17 MMOL/L (ref 4–13)
APTT PPP: 21 SECONDS (ref 23–31)
AST SERPL W P-5'-P-CCNC: 26 U/L (ref 15–41)
BACTERIA UR QL AUTO: ABNORMAL /HPF
BASOPHILS # BLD AUTO: 0.03 THOUSANDS/ΜL (ref 0–0.1)
BASOPHILS NFR BLD AUTO: 0 % (ref 0–1)
BILIRUB SERPL-MCNC: 0.71 MG/DL (ref 0.3–1.2)
BILIRUB UR QL STRIP: NEGATIVE
BNP SERPL-MCNC: 164.1 PG/ML (ref 1–100)
BUN SERPL-MCNC: 71 MG/DL (ref 6–20)
CALCIUM SERPL-MCNC: 9.7 MG/DL (ref 8.4–10.2)
CHLORIDE SERPL-SCNC: 93 MMOL/L (ref 96–108)
CLARITY UR: ABNORMAL
CO2 SERPL-SCNC: 28 MMOL/L (ref 22–33)
COLOR UR: YELLOW
CREAT SERPL-MCNC: 1.75 MG/DL (ref 0.4–1.1)
EOSINOPHIL # BLD AUTO: 0 THOUSAND/ΜL (ref 0–0.61)
EOSINOPHIL NFR BLD AUTO: 0 % (ref 0–6)
ERYTHROCYTE [DISTWIDTH] IN BLOOD BY AUTOMATED COUNT: 13.7 % (ref 11.6–15.1)
GFR SERPL CREATININE-BSD FRML MDRD: 26 ML/MIN/1.73SQ M
GLUCOSE SERPL-MCNC: 222 MG/DL (ref 65–140)
GLUCOSE UR STRIP-MCNC: NEGATIVE MG/DL
HCT VFR BLD AUTO: 43.5 % (ref 34.8–46.1)
HGB BLD-MCNC: 14.1 G/DL (ref 11.5–15.4)
HGB UR QL STRIP.AUTO: ABNORMAL
IMM GRANULOCYTES # BLD AUTO: 0.17 THOUSAND/UL (ref 0–0.2)
IMM GRANULOCYTES NFR BLD AUTO: 1 % (ref 0–2)
INR PPP: 1.01 (ref 0.9–1.1)
KETONES UR STRIP-MCNC: NEGATIVE MG/DL
LEUKOCYTE ESTERASE UR QL STRIP: ABNORMAL
LIPASE SERPL-CCNC: 46 U/L (ref 13–60)
LYMPHOCYTES # BLD AUTO: 0.42 THOUSANDS/ΜL (ref 0.6–4.47)
LYMPHOCYTES NFR BLD AUTO: 2 % (ref 14–44)
MCH RBC QN AUTO: 29.7 PG (ref 26.8–34.3)
MCHC RBC AUTO-ENTMCNC: 32.4 G/DL (ref 31.4–37.4)
MCV RBC AUTO: 92 FL (ref 82–98)
MONOCYTES # BLD AUTO: 1.27 THOUSAND/ΜL (ref 0.17–1.22)
MONOCYTES NFR BLD AUTO: 6 % (ref 4–12)
NEUTROPHILS # BLD AUTO: 21.26 THOUSANDS/ΜL (ref 1.85–7.62)
NEUTS SEG NFR BLD AUTO: 91 % (ref 43–75)
NITRITE UR QL STRIP: NEGATIVE
NON-SQ EPI CELLS URNS QL MICRO: ABNORMAL /HPF
PH UR STRIP.AUTO: 5.5 [PH]
PLATELET # BLD AUTO: 406 THOUSANDS/UL (ref 149–390)
PMV BLD AUTO: 11.1 FL (ref 8.9–12.7)
POTASSIUM SERPL-SCNC: 3.9 MMOL/L (ref 3.5–5)
PROT SERPL-MCNC: 8 G/DL (ref 6.4–8.3)
PROT UR STRIP-MCNC: ABNORMAL MG/DL
PROTHROMBIN TIME: 11.4 SECONDS (ref 9.5–12.1)
RBC # BLD AUTO: 4.75 MILLION/UL (ref 3.81–5.12)
RBC #/AREA URNS AUTO: ABNORMAL /HPF
SODIUM SERPL-SCNC: 138 MMOL/L (ref 133–145)
SP GR UR STRIP.AUTO: 1.01 (ref 1–1.03)
TROPONIN I SERPL-MCNC: 0.03 NG/ML (ref 0–0.07)
UROBILINOGEN UR QL STRIP.AUTO: 0.2 E.U./DL
WBC # BLD AUTO: 23.15 THOUSAND/UL (ref 4.31–10.16)
WBC #/AREA URNS AUTO: ABNORMAL /HPF

## 2021-08-25 PROCEDURE — 85610 PROTHROMBIN TIME: CPT

## 2021-08-25 PROCEDURE — 36415 COLL VENOUS BLD VENIPUNCTURE: CPT

## 2021-08-25 PROCEDURE — 87181 SC STD AGAR DILUTION PER AGT: CPT

## 2021-08-25 PROCEDURE — 80053 COMPREHEN METABOLIC PANEL: CPT

## 2021-08-25 PROCEDURE — 85730 THROMBOPLASTIN TIME PARTIAL: CPT

## 2021-08-25 PROCEDURE — 99223 1ST HOSP IP/OBS HIGH 75: CPT | Performed by: INTERNAL MEDICINE

## 2021-08-25 PROCEDURE — 87077 CULTURE AEROBIC IDENTIFY: CPT | Performed by: PHYSICIAN ASSISTANT

## 2021-08-25 PROCEDURE — 87186 SC STD MICRODIL/AGAR DIL: CPT

## 2021-08-25 PROCEDURE — 84484 ASSAY OF TROPONIN QUANT: CPT

## 2021-08-25 PROCEDURE — 1123F ACP DISCUSS/DSCN MKR DOCD: CPT

## 2021-08-25 PROCEDURE — 96374 THER/PROPH/DIAG INJ IV PUSH: CPT

## 2021-08-25 PROCEDURE — 85025 COMPLETE CBC W/AUTO DIFF WBC: CPT

## 2021-08-25 PROCEDURE — 87186 SC STD MICRODIL/AGAR DIL: CPT | Performed by: PHYSICIAN ASSISTANT

## 2021-08-25 PROCEDURE — 87040 BLOOD CULTURE FOR BACTERIA: CPT | Performed by: PHYSICIAN ASSISTANT

## 2021-08-25 PROCEDURE — 83880 ASSAY OF NATRIURETIC PEPTIDE: CPT

## 2021-08-25 PROCEDURE — 99285 EMERGENCY DEPT VISIT HI MDM: CPT

## 2021-08-25 PROCEDURE — 93005 ELECTROCARDIOGRAM TRACING: CPT

## 2021-08-25 PROCEDURE — 87077 CULTURE AEROBIC IDENTIFY: CPT

## 2021-08-25 PROCEDURE — 87081 CULTURE SCREEN ONLY: CPT | Performed by: INTERNAL MEDICINE

## 2021-08-25 PROCEDURE — 87086 URINE CULTURE/COLONY COUNT: CPT

## 2021-08-25 PROCEDURE — 71045 X-RAY EXAM CHEST 1 VIEW: CPT

## 2021-08-25 PROCEDURE — 83690 ASSAY OF LIPASE: CPT

## 2021-08-25 PROCEDURE — 96361 HYDRATE IV INFUSION ADD-ON: CPT

## 2021-08-25 PROCEDURE — 81001 URINALYSIS AUTO W/SCOPE: CPT

## 2021-08-25 RX ORDER — ASPIRIN 81 MG/1
81 TABLET, CHEWABLE ORAL DAILY
Status: DISCONTINUED | OUTPATIENT
Start: 2021-08-26 | End: 2021-08-27

## 2021-08-25 RX ORDER — HEPARIN SODIUM 5000 [USP'U]/ML
5000 INJECTION, SOLUTION INTRAVENOUS; SUBCUTANEOUS EVERY 8 HOURS SCHEDULED
Status: DISCONTINUED | OUTPATIENT
Start: 2021-08-25 | End: 2021-08-28

## 2021-08-25 RX ORDER — AMLODIPINE BESYLATE 5 MG/1
5 TABLET ORAL 2 TIMES DAILY
Status: DISCONTINUED | OUTPATIENT
Start: 2021-08-25 | End: 2021-08-27

## 2021-08-25 RX ORDER — ATORVASTATIN CALCIUM 10 MG/1
10 TABLET, FILM COATED ORAL
Status: DISCONTINUED | OUTPATIENT
Start: 2021-08-25 | End: 2021-09-05 | Stop reason: HOSPADM

## 2021-08-25 RX ORDER — POTASSIUM CHLORIDE 20 MEQ/1
20 TABLET, EXTENDED RELEASE ORAL DAILY
Status: DISCONTINUED | OUTPATIENT
Start: 2021-08-26 | End: 2021-08-27

## 2021-08-25 RX ORDER — ACETAMINOPHEN 325 MG/1
650 TABLET ORAL EVERY 6 HOURS PRN
Status: DISCONTINUED | OUTPATIENT
Start: 2021-08-25 | End: 2021-09-05 | Stop reason: HOSPADM

## 2021-08-25 RX ORDER — SODIUM CHLORIDE, SODIUM LACTATE, POTASSIUM CHLORIDE, CALCIUM CHLORIDE 600; 310; 30; 20 MG/100ML; MG/100ML; MG/100ML; MG/100ML
50 INJECTION, SOLUTION INTRAVENOUS CONTINUOUS
Status: DISCONTINUED | OUTPATIENT
Start: 2021-08-25 | End: 2021-08-28

## 2021-08-25 RX ORDER — SODIUM CHLORIDE 9 MG/ML
1000 INJECTION, SOLUTION INTRAVENOUS CONTINUOUS
Status: DISCONTINUED | OUTPATIENT
Start: 2021-08-25 | End: 2021-08-28

## 2021-08-25 RX ORDER — ONDANSETRON 2 MG/ML
4 INJECTION INTRAMUSCULAR; INTRAVENOUS ONCE
Status: COMPLETED | OUTPATIENT
Start: 2021-08-25 | End: 2021-08-25

## 2021-08-25 RX ORDER — OXYCODONE HYDROCHLORIDE 5 MG/1
2.5 TABLET ORAL EVERY 4 HOURS PRN
Status: DISCONTINUED | OUTPATIENT
Start: 2021-08-25 | End: 2021-09-05 | Stop reason: HOSPADM

## 2021-08-25 RX ORDER — ONDANSETRON 2 MG/ML
4 INJECTION INTRAMUSCULAR; INTRAVENOUS EVERY 6 HOURS PRN
Status: DISCONTINUED | OUTPATIENT
Start: 2021-08-25 | End: 2021-08-27

## 2021-08-25 RX ORDER — METOPROLOL TARTRATE 50 MG/1
50 TABLET, FILM COATED ORAL 2 TIMES DAILY
Status: DISCONTINUED | OUTPATIENT
Start: 2021-08-25 | End: 2021-08-27

## 2021-08-25 RX ORDER — NYSTATIN 100000 [USP'U]/G
POWDER TOPICAL 2 TIMES DAILY
Status: DISCONTINUED | OUTPATIENT
Start: 2021-08-25 | End: 2021-09-05 | Stop reason: HOSPADM

## 2021-08-25 RX ORDER — FUROSEMIDE 80 MG
80 TABLET ORAL 2 TIMES DAILY
Status: DISCONTINUED | OUTPATIENT
Start: 2021-08-25 | End: 2021-08-25

## 2021-08-25 RX ORDER — LEVOTHYROXINE SODIUM 0.05 MG/1
50 TABLET ORAL
Status: DISCONTINUED | OUTPATIENT
Start: 2021-08-26 | End: 2021-09-05 | Stop reason: HOSPADM

## 2021-08-25 RX ORDER — ONDANSETRON 2 MG/ML
INJECTION INTRAMUSCULAR; INTRAVENOUS
Status: COMPLETED
Start: 2021-08-25 | End: 2021-08-25

## 2021-08-25 RX ORDER — INSULIN GLARGINE 100 [IU]/ML
12 INJECTION, SOLUTION SUBCUTANEOUS
Status: DISCONTINUED | OUTPATIENT
Start: 2021-08-25 | End: 2021-08-27

## 2021-08-25 RX ORDER — LOPERAMIDE HYDROCHLORIDE 2 MG/1
2 CAPSULE ORAL 3 TIMES DAILY PRN
Status: DISCONTINUED | OUTPATIENT
Start: 2021-08-25 | End: 2021-08-28

## 2021-08-25 RX ORDER — FERROUS SULFATE 325(65) MG
325 TABLET ORAL
Status: DISCONTINUED | OUTPATIENT
Start: 2021-08-26 | End: 2021-08-27

## 2021-08-25 RX ORDER — MAGNESIUM HYDROXIDE/ALUMINUM HYDROXICE/SIMETHICONE 120; 1200; 1200 MG/30ML; MG/30ML; MG/30ML
30 SUSPENSION ORAL EVERY 6 HOURS PRN
Status: DISCONTINUED | OUTPATIENT
Start: 2021-08-25 | End: 2021-09-05 | Stop reason: HOSPADM

## 2021-08-25 RX ORDER — FUROSEMIDE 40 MG/1
40 TABLET ORAL 2 TIMES DAILY
Status: DISCONTINUED | OUTPATIENT
Start: 2021-08-26 | End: 2021-08-27

## 2021-08-25 RX ADMIN — HEPARIN SODIUM 5000 UNITS: 5000 INJECTION INTRAVENOUS; SUBCUTANEOUS at 21:12

## 2021-08-25 RX ADMIN — SODIUM CHLORIDE, SODIUM LACTATE, POTASSIUM CHLORIDE, AND CALCIUM CHLORIDE 75 ML/HR: .6; .31; .03; .02 INJECTION, SOLUTION INTRAVENOUS at 21:07

## 2021-08-25 RX ADMIN — INSULIN GLARGINE 12 UNITS: 100 INJECTION, SOLUTION SUBCUTANEOUS at 21:12

## 2021-08-25 RX ADMIN — AMLODIPINE BESYLATE 5 MG: 5 TABLET ORAL at 20:37

## 2021-08-25 RX ADMIN — ONDANSETRON 4 MG: 2 INJECTION INTRAMUSCULAR; INTRAVENOUS at 17:07

## 2021-08-25 RX ADMIN — VANCOMYCIN HYDROCHLORIDE 750 MG: 750 INJECTION, SOLUTION INTRAVENOUS at 21:17

## 2021-08-25 RX ADMIN — SODIUM CHLORIDE 1000 ML/HR: 0.9 INJECTION, SOLUTION INTRAVENOUS at 17:33

## 2021-08-25 RX ADMIN — METOPROLOL TARTRATE 50 MG: 50 TABLET, FILM COATED ORAL at 20:38

## 2021-08-25 RX ADMIN — PIPERACILLIN AND TAZOBACTAM 2.25 G: 2; .25 INJECTION, POWDER, LYOPHILIZED, FOR SOLUTION INTRAVENOUS at 22:28

## 2021-08-25 RX ADMIN — ATORVASTATIN CALCIUM 10 MG: 10 TABLET, FILM COATED ORAL at 20:39

## 2021-08-25 RX ADMIN — FUROSEMIDE 80 MG: 80 TABLET ORAL at 20:39

## 2021-08-25 RX ADMIN — FAMOTIDINE 20 MG: 10 INJECTION, SOLUTION INTRAVENOUS at 18:47

## 2021-08-25 RX ADMIN — PIPERACILLIN AND TAZOBACTAM 2.25 G: 2; .25 INJECTION, POWDER, LYOPHILIZED, FOR SOLUTION INTRAVENOUS at 18:47

## 2021-08-25 NOTE — ED PROVIDER NOTES
History  Chief Complaint   Patient presents with    Weakness - Generalized     increased weakness, chest pain after vomiting, brought in by EMS from [facility], 90% on RA, does not use chronic O3    80year-old female history multiple medical problems including coronary artery disease pacemaker congestive heart failure non-insulin dependent diabetes GERD presents secondary to persistent vomiting from nursing facility day  Patient was recently discharged [facility] facility for what appeared to be a chest pain workup patient had elevated troponin cardiology consult felt that it was secondary just due to a flare-up of congestive heart failure and not an ischemic event patient also had renal insufficiency and was treated with aggressive Lasix diuresis 80 mg b i d  Since patient has been home she has had persistent ongoing vomiting and increasing weakness  EMS was called today secondary to patient having persistent vomiting and patient being generally weak  Patient arrives awake diaphoretic and vomiting here  Patient is vomiting of appear to be slightly dark material although she says she has been drinking mostly diet Coke call all day  No reported vomiting of blood no reported melena hematochezia  Patient also had a recent urinary tract infection when she was treated inpatient as well  Patient states that her chest pain only started with the vomiting and after vomiting  Prior to Admission Medications   Prescriptions Last Dose Informant Patient Reported? Taking? Glucagon, rDNA, (Glucagon Emergency) 1 MG KIT  Outside Facility (Specify) Yes No   Multiple Vitamins-Minerals (OCUVITE ADULT 50+ PO)  Outside Facility (Specify) Yes No   Sig: Take by mouth daily   acetaminophen (TYLENOL) 325 mg tablet  Outside Facility (Specify) No No   Sig: Take 2 tablets (650 mg total) by mouth every 6 (six) hours as needed for mild pain     amLODIPine (NORVASC) 5 mg tablet  Outside Facility (Specify) Yes No   Si mg 2 (two) times a day    amoxicillin (AMOXIL) 500 mg capsule  Outside Facility (Specify) Yes No   Sig: Take 2,000 mg by mouth as needed (PRIOR TO DENTAL APPOINTMENTS)   aspirin 81 mg chewable tablet  Outside Facility (Specify) Yes No   Sig: Chew 81 mg daily  atorvastatin (LIPITOR) 10 mg tablet  Outside Facility (Specify) Yes No   diclofenac sodium (VOLTAREN) 1 %  Outside Facility (Specify) Yes No   Sig: Apply 2 g topically 4 (four) times a day as needed   ferrous sulfate 325 (65 Fe) mg tablet  Outside Facility (Specify) Yes No   Sig: Take 325 mg by mouth daily with breakfast   furosemide (LASIX) 40 mg tablet   No No   Sig: Take 2 tablets (80 mg total) by mouth 2 (two) times a day   insulin glargine (LANTUS) 100 units/mL subcutaneous injection  Outside Facility (Specify) No No   Sig: Inject 12 Units under the skin daily at bedtime   ketoconazole (NIZORAL) 2 % cream  Outside Facility (Specify) Yes No   Sig: daily    levothyroxine 50 mcg tablet  Outside Facility (Specify) Yes No   Sig: Take 50 mcg by mouth daily   loperamide (IMODIUM A-D) 2 MG tablet  Outside Facility (Specify) Yes No   Sig: Take 2 mg by mouth 3 (three) times a day as needed for diarrhea   metoprolol tartrate (LOPRESSOR) 50 mg tablet  Outside Facility (Specify) No No   Sig: Take 1 tablet (50 mg total) by mouth 2 (two) times a day     nystatin (MYCOSTATIN) powder  Outside Facility (Specify) Yes No   Sig: Apply topically 2 (two) times a day   oxyCODONE (ROXICODONE) 5 mg immediate release tablet   No No   Sig: Take 0 5 tablets (2 5 mg total) by mouth every 4 (four) hours as needed for moderate pain or severe pain for up to 10 dosesMax Daily Amount: 15 mg   potassium chloride (K-DUR,KLOR-CON) 20 mEq tablet  Outside Facility (Specify) Yes No   Sig: Take 20 mEq by mouth daily   tetrahydrozoline (VISINE) 0 05 % ophthalmic solution  Outside Facility (Specify) Yes No   Si drop 4 (four) times a day as needed for irritation Facility-Administered Medications: None       Past Medical History:   Diagnosis Date    Anemia     Resolved 3/1/2017     Arthritis     Knee - Resolved 3/1/2017     Blind     CAD (coronary artery disease)     s/p HERNAN 6/2016    Calcaneal spur     [facility]     Chronic knee instability     Resolved 3/1/2017     Chronic pain syndrome     Resolved 3/1/2017     Dementia ([facility] )     Last assessed 11/1/2017     Diabetes mellitus ([facility] )     non-insulin depdenent    Disease of thyroid gland     Diverticulitis     Essential thrombocytopenia ([facility] )     Last assessed 11/1/2017     GERD (gastroesophageal reflux disease)     History of aortic valve replacement     Resolved 3/1/2017     History of bilateral knee replacement     Resolved 3/1/2017     History of TIA (transient ischemic attack)     no residual deficits    Hyperlipidemia     Hypertension     Hypoxia     on home O2 QHS    Leukocytosis     Resolved 3/1/2017     Lumbar post-laminectomy syndrome     Resolved 3/1/2017     Meniere disease     Osteopenia     Pacemaker     [facility]-Biotronik in 2/2015    S/P TAVR (transcatheter aortic valve replacement)     Resolved 3/1/2017     Severe aortic stenosis     Thrombocytosis ([facility] )     Resolved 4/5/2017     Type 2 diabetes mellitus ([facility] )     Last assessed 11/1/2017        Past Surgical History:   Procedure Laterality Date    APPENDECTOMY      BACK SURGERY      Arthrodesis     CARDIAC PACEMAKER PLACEMENT      CHOLECYSTECTOMY      EYE SURGERY      FRACTURE SURGERY Right 01/02/2018    femur    HYSTERECTOMY      SD EGD TRANSORAL BIOPSY SINGLE/MULTIPLE N/A 11/9/2016    Procedure: ESOPHAGOGASTRODUODENOSCOPY (EGD); Surgeon: Chelsi Ruth MD;  Location: [facility];   Service: Gastroenterology    SD OPEN RX FEMUR FX+INTRAMED GARRETT Right 1/2/2018    Procedure: INSERTION NAIL IM FEMUR ANTEGRADE (TROCHANTERIC) RIGHT;  Surgeon: Alexander Gao MD;  Location: [facility];  Service: normal       Nose: Nose normal       Mouth/Throat:      Mouth: Mucous membranes are moist    Eyes:      Conjunctiva/sclera: Conjunctivae normal       Pupils: Pupils are equal, round, and reactive to light  Cardiovascular:      Rate and Rhythm: Regular rhythm  Tachycardia present  Heart sounds: Normal heart sounds  Pulmonary:      Effort: Pulmonary effort is normal       Breath sounds: Normal breath sounds  Abdominal:      General: Bowel sounds are normal       Palpations: Abdomen is soft  Comments: Abdomen is with out localizing tenderness or distention she does have nonspecific diffuse tenderness to deep palpation   Musculoskeletal:         General: Normal range of motion  Cervical back: Normal range of motion and neck supple  Skin:     General: Skin is warm  Capillary Refill: Capillary refill takes less than 2 seconds  Neurological:      Mental Status: She is alert and oriented to person, place, and time     Psychiatric:         Behavior: Behavior normal          Vital Signs  ED Triage Vitals   Temperature Pulse Respirations Blood Pressure SpO2   08/25/21 1707 08/25/21 1707 08/25/21 1707 08/25/21 1707 08/25/21 1707   99 2 °F (37 3 °C) (!) 125 22 148/72 92 %      Temp Source Heart Rate Source Patient Position - Orthostatic VS BP Location FiO2 (%)   08/25/21 1707 08/25/21 1707 08/25/21 1707 08/25/21 1746 --   Oral Monitor Lying Left arm       Pain Score       08/25/21 1746       No Pain           Vitals:    08/25/21 1707 08/25/21 1746   BP: 148/72 126/60   Pulse: (!) 125 (!) 108   Patient Position - Orthostatic VS: Lying Lying         Visual Acuity      ED Medications  Medications   sodium chloride 0 9 % infusion (1,000 mL/hr Intravenous New Bag 8/25/21 1733)   piperacillin-tazobactam (ZOSYN) 2 25 g in sodium chloride 0 9 % 50 mL IVPB (has no administration in time range)   vancomycin (VANCOCIN) 1,250 mg in sodium chloride 0 9 % 250 mL IVPB (has no administration in time range) ondansetron [facility] injection 4 mg (4 mg Intravenous Given 8/25/21 1707)       Diagnostic Studies  Results Reviewed     Procedure Component Value Units Date/Time    CBC and differential [[id number]]  (Abnormal) Collected: 08/25/21 1734    Lab Status: Final result Specimen: Blood from Arm, Right Updated: 08/25/21 1819     WBC 23 15 Thousand/uL      RBC 4 75 Million/uL      Hemoglobin 14 1 g/dL      Hematocrit 43 5 %      MCV 92 fL      MCH 29 7 pg      MCHC 32 4 g/dL      RDW 13 7 %      MPV 11 1 fL      Platelets 564 Thousands/uL      Neutrophils Relative 91 %      Immat GRANS % 1 %      Lymphocytes Relative 2 %      Monocytes Relative 6 %      Eosinophils Relative 0 %      Basophils Relative 0 %      Neutrophils Absolute 21 26 Thousands/µL      Immature Grans Absolute 0 17 Thousand/uL      Lymphocytes Absolute 0 42 Thousands/µL      Monocytes Absolute 1 27 Thousand/µL      Eosinophils Absolute 0 00 Thousand/µL      Basophils Absolute 0 03 Thousands/µL     Narrative: This is an appended report  These results have been appended to a previously verified report  Urine Microscopic [[id number]]  (Abnormal) Collected: 08/25/21 1739    Lab Status: Final result Specimen: Urine, Indwelling Deal Catheter Updated: 08/25/21 1813     RBC, UA Innumerable /hpf      WBC, UA Innumerable /hpf      Epithelial Cells Moderate /hpf      Bacteria, UA Innumerable /hpf     Urine culture [[id number]] Collected: 08/25/21 1739    Lab Status:  In process Specimen: Urine, Indwelling Deal Catheter Updated: 08/25/21 1812    Protime-INR [[id number]]  (Normal) Collected: 08/25/21 1734    Lab Status: Final result Specimen: Blood from Arm, Right Updated: 08/25/21 1812     Protime 11 4 seconds      INR 1 01    Narrative:      INR Reference Ranges:  No Anticoagulant, Normal:           0 9-1 1  Standard Dose, Oral Anticoagulant:  2 0-3 0  High Dose, Oral Anticoagulant:      2 5-3 5    APTT [[id number]]  (Abnormal) Collected: 08/25/21 1734    Lab Status: Final result Specimen: Blood from Arm, Right Updated: 08/25/21 1812     PTT 21 seconds     Troponin I [[id number]]  (Normal) Collected: 08/25/21 1734    Lab Status: Final result Specimen: Blood from Arm, Right Updated: 08/25/21 1808     Troponin I 0 03 ng/mL     Lipase [[id number]]  (Normal) Collected: 08/25/21 1734    Lab Status: Final result Specimen: Blood from Arm, Right Updated: 08/25/21 1808     Lipase 46 u/L     Comprehensive metabolic panel [[id number]]  (Abnormal) Collected: 08/25/21 1734    Lab Status: Final result Specimen: Blood from Arm, Right Updated: 08/25/21 1808     Sodium 138 mmol/L      Potassium 3 9 mmol/L      Chloride 93 mmol/L      CO2 28 mmol/L      ANION GAP 17 mmol/L      BUN 71 mg/dL      Creatinine 1 75 mg/dL      Glucose 222 mg/dL      Calcium 9 7 mg/dL      AST 26 U/L      ALT 27 U/L      Alkaline Phosphatase 92 4 U/L      Total Protein 8 0 g/dL      Albumin 3 9 g/dL      Total Bilirubin 0 71 mg/dL      eGFR 26 ml/min/1 73sq m     Narrative:      [place] guidelines for Chronic Kidney Disease (CKD):     Stage 1 with normal or high GFR (GFR > 90 mL/min/1 73 square meters)    Stage 2 Mild CKD (GFR = 60-89 mL/min/1 73 square meters)    Stage 3A Moderate CKD (GFR = 45-59 mL/min/1 73 square meters)    Stage 3B Moderate CKD (GFR = 30-44 mL/min/1 73 square meters)    Stage 4 Severe CKD (GFR = 15-29 mL/min/1 73 square meters)    Stage 5 End Stage CKD (GFR <15 mL/min/1 73 square meters)  Note: GFR calculation is accurate only with a steady state creatinine    UA w Reflex to Microscopic w Reflex to Culture [[id number]]  (Abnormal) Collected: 08/25/21 1739    Lab Status: Final result Specimen: Urine, Indwelling Deal Catheter Updated: 08/25/21 1756     Color, UA Yellow     Clarity, UA Cloudy     Specific Gravity, UA 1 015     pH, UA 5 5     Leukocytes, UA 3+     Nitrite, UA Negative     Protein, UA Trace mg/dl      Glucose, UA Negative mg/dl      Ketones, UA Negative mg/dl      Urobilinogen, UA 0 2 E U /dl      Bilirubin, UA Negative     Blood, UA 3+    B-Type Natriuretic Peptide(BNP) CA, GH, EA Campuses Only [[id number]] Collected: 08/25/21 0929    Lab Status: No result Specimen: Blood from Arm, Right                  XR chest 1 view portable   ED Interpretation by Kerry Gill MD (08/25 7058)   Left basilar consolidation vs small effusion  Procedures  Procedures         ED Course                                           MDM  Number of Diagnoses or Management Options  Diagnosis management comments: Mild emergency department patient received Zofran 4 mg IV for her nausea vomiting which did improve her symptoms she was also given Pepcid 20 mg IV patient had straight catheterization to remove urine and patient continued to proceed put out approximately 2000 cc of urine with this so Deal catheter was placed  Urinalysis does demonstrate what appears to be large amount of white cells consistent with a UTI  Patient chest x-ray which demonstrates left basilar patchy infiltrate which appears to be new from 1 week ago  Patient's pulse ox was slightly low at 92 to 93% requiring 2 L of O2  Patient was given a L normal saline started with Zosyn and vanco IV for UTI and likely hospital car possible pneumonia plan will be to admit patient to the hospital further evaluation and treat      Disposition  Final diagnoses:   None     ED Disposition     None      Follow-up Information    None         Patient's Medications   Discharge Prescriptions    No medications on file     No discharge procedures on file      PDMP Review       Value Time User    PDMP Reviewed  Yes 2/14/2020 11:42 AM Lizbeth Singleton [facility]          ED Provider  Electronically Signed by           Kerry Gill MD  08/27/21 8406

## 2021-08-26 LAB
ATRIAL RATE: 129 BPM
ATRIAL RATE: 129 BPM
ATRIAL RATE: 93 BPM
GLUCOSE SERPL-MCNC: 134 MG/DL (ref 65–140)
GLUCOSE SERPL-MCNC: 138 MG/DL (ref 65–140)
GLUCOSE SERPL-MCNC: 182 MG/DL (ref 65–140)
GLUCOSE SERPL-MCNC: 186 MG/DL (ref 65–140)
P AXIS: 0 DEGREES
P AXIS: 0 DEGREES
P AXIS: 74 DEGREES
PR INTERVAL: 170 MS
PR INTERVAL: 72 MS
PR INTERVAL: 72 MS
QRS AXIS: -69 DEGREES
QRS AXIS: -74 DEGREES
QRS AXIS: -74 DEGREES
QRSD INTERVAL: 169 MS
QRSD INTERVAL: 169 MS
QRSD INTERVAL: 192 MS
QT INTERVAL: 390 MS
QT INTERVAL: 390 MS
QT INTERVAL: 474 MS
QTC INTERVAL: 556 MS
QTC INTERVAL: 556 MS
QTC INTERVAL: 590 MS
T WAVE AXIS: 106 DEGREES
T WAVE AXIS: 91 DEGREES
T WAVE AXIS: 91 DEGREES
TROPONIN I SERPL-MCNC: 0.03 NG/ML (ref 0–0.07)
TROPONIN I SERPL-MCNC: 0.05 NG/ML (ref 0–0.07)
VENTRICULAR RATE: 122 BPM
VENTRICULAR RATE: 122 BPM
VENTRICULAR RATE: 93 BPM

## 2021-08-26 PROCEDURE — 82948 REAGENT STRIP/BLOOD GLUCOSE: CPT

## 2021-08-26 PROCEDURE — 93010 ELECTROCARDIOGRAM REPORT: CPT | Performed by: INTERNAL MEDICINE

## 2021-08-26 PROCEDURE — 84484 ASSAY OF TROPONIN QUANT: CPT | Performed by: INTERNAL MEDICINE

## 2021-08-26 PROCEDURE — C9113 INJ PANTOPRAZOLE SODIUM, VIA: HCPCS | Performed by: NURSE PRACTITIONER

## 2021-08-26 PROCEDURE — 93005 ELECTROCARDIOGRAM TRACING: CPT

## 2021-08-26 PROCEDURE — 97163 PT EVAL HIGH COMPLEX 45 MIN: CPT

## 2021-08-26 PROCEDURE — 99233 SBSQ HOSP IP/OBS HIGH 50: CPT | Performed by: INTERNAL MEDICINE

## 2021-08-26 RX ORDER — PANTOPRAZOLE SODIUM 40 MG/1
40 INJECTION, POWDER, FOR SOLUTION INTRAVENOUS EVERY 12 HOURS SCHEDULED
Status: DISCONTINUED | OUTPATIENT
Start: 2021-08-26 | End: 2021-09-05

## 2021-08-26 RX ADMIN — POTASSIUM CHLORIDE 20 MEQ: 1500 TABLET, EXTENDED RELEASE ORAL at 10:56

## 2021-08-26 RX ADMIN — ASPIRIN 81 MG: 81 TABLET, CHEWABLE ORAL at 10:56

## 2021-08-26 RX ADMIN — PIPERACILLIN AND TAZOBACTAM 2.25 G: 2; .25 INJECTION, POWDER, LYOPHILIZED, FOR SOLUTION INTRAVENOUS at 20:05

## 2021-08-26 RX ADMIN — AMLODIPINE BESYLATE 5 MG: 5 TABLET ORAL at 10:56

## 2021-08-26 RX ADMIN — INSULIN GLARGINE 12 UNITS: 100 INJECTION, SOLUTION SUBCUTANEOUS at 22:19

## 2021-08-26 RX ADMIN — FERROUS SULFATE TAB 325 MG (65 MG ELEMENTAL FE) 325 MG: 325 (65 FE) TAB at 10:56

## 2021-08-26 RX ADMIN — HEPARIN SODIUM 5000 UNITS: 5000 INJECTION INTRAVENOUS; SUBCUTANEOUS at 14:59

## 2021-08-26 RX ADMIN — METOPROLOL TARTRATE 50 MG: 50 TABLET, FILM COATED ORAL at 17:07

## 2021-08-26 RX ADMIN — ONDANSETRON 4 MG: 2 INJECTION INTRAMUSCULAR; INTRAVENOUS at 21:54

## 2021-08-26 RX ADMIN — ACETAMINOPHEN 650 MG: 325 TABLET, FILM COATED ORAL at 10:55

## 2021-08-26 RX ADMIN — ACETAMINOPHEN 650 MG: 325 TABLET, FILM COATED ORAL at 00:48

## 2021-08-26 RX ADMIN — SODIUM CHLORIDE, SODIUM LACTATE, POTASSIUM CHLORIDE, AND CALCIUM CHLORIDE 50 ML/HR: .6; .31; .03; .02 INJECTION, SOLUTION INTRAVENOUS at 17:05

## 2021-08-26 RX ADMIN — INSULIN LISPRO 1 UNITS: 100 INJECTION, SOLUTION INTRAVENOUS; SUBCUTANEOUS at 22:19

## 2021-08-26 RX ADMIN — INSULIN LISPRO 1 UNITS: 100 INJECTION, SOLUTION INTRAVENOUS; SUBCUTANEOUS at 17:26

## 2021-08-26 RX ADMIN — NYSTATIN: 100000 POWDER TOPICAL at 10:56

## 2021-08-26 RX ADMIN — INSULIN LISPRO 1 UNITS: 100 INJECTION, SOLUTION INTRAVENOUS; SUBCUTANEOUS at 00:46

## 2021-08-26 RX ADMIN — PIPERACILLIN AND TAZOBACTAM 2.25 G: 2; .25 INJECTION, POWDER, LYOPHILIZED, FOR SOLUTION INTRAVENOUS at 14:59

## 2021-08-26 RX ADMIN — PANTOPRAZOLE SODIUM 40 MG: 40 INJECTION, POWDER, FOR SOLUTION INTRAVENOUS at 22:22

## 2021-08-26 RX ADMIN — ATORVASTATIN CALCIUM 10 MG: 10 TABLET, FILM COATED ORAL at 17:05

## 2021-08-26 RX ADMIN — FUROSEMIDE 40 MG: 40 TABLET ORAL at 10:56

## 2021-08-26 RX ADMIN — HEPARIN SODIUM 5000 UNITS: 5000 INJECTION INTRAVENOUS; SUBCUTANEOUS at 06:17

## 2021-08-26 RX ADMIN — ALUMINA, MAGNESIA, AND SIMETHICONE ORAL SUSPENSION REGULAR STRENGTH 30 ML: 1200; 1200; 120 SUSPENSION ORAL at 17:05

## 2021-08-26 RX ADMIN — NYSTATIN: 100000 POWDER TOPICAL at 20:05

## 2021-08-26 RX ADMIN — METOPROLOL TARTRATE 50 MG: 50 TABLET, FILM COATED ORAL at 10:56

## 2021-08-26 RX ADMIN — FUROSEMIDE 40 MG: 40 TABLET ORAL at 17:07

## 2021-08-26 RX ADMIN — PIPERACILLIN AND TAZOBACTAM 2.25 G: 2; .25 INJECTION, POWDER, LYOPHILIZED, FOR SOLUTION INTRAVENOUS at 10:55

## 2021-08-26 RX ADMIN — AMLODIPINE BESYLATE 5 MG: 5 TABLET ORAL at 17:07

## 2021-08-26 RX ADMIN — LEVOTHYROXINE SODIUM 50 MCG: 50 TABLET ORAL at 06:18

## 2021-08-26 NOTE — PHYSICAL THERAPY NOTE
PHYSICAL THERAPY EVALUATION    NAME:  Adele Hutton  DATE: 08/26/21    AGE:   80 y o  Mrn:   [id number]  Length Of Stay: 1    ADMIT DX:  UTI (urinary tract infection) [N39 0]  Weakness [R53 1]  Pneumonia of left lower lobe due to infectious organism [J18 9]    Past Medical History:   Diagnosis Date    Anemia     Resolved 3/1/2017     Arthritis     Knee - Resolved 3/1/2017     Blind     CAD (coronary artery disease)     s/p HERNAN 6/2016    Calcaneal spur     [facility]     Chronic knee instability     Resolved 3/1/2017     Chronic pain syndrome     Resolved 3/1/2017     Dementia ([facility] )     Last assessed 11/1/2017     Diabetes mellitus [facility]     non-insulin depdenent    Disease of thyroid gland     Diverticulitis     Essential thrombocytopenia ([facility] )     Last assessed 11/1/2017     GERD (gastroesophageal reflux disease)     History of aortic valve replacement     Resolved 3/1/2017     History of bilateral knee replacement     Resolved 3/1/2017     History of TIA (transient ischemic attack)     no residual deficits    Hyperlipidemia     Hypertension     Hypoxia     on home O2 QHS    Leukocytosis     Resolved 3/1/2017     Lumbar post-laminectomy syndrome     Resolved 3/1/2017     Meniere disease     Osteopenia     Pacemaker     [facility]-[employer]ron[employer] in 2/2015    S/P TAVR (transcatheter aortic valve replacement)     Resolved 3/1/2017     Severe aortic stenosis     Thrombocytosis ([facility] )     Resolved 4/5/2017     Type 2 diabetes mellitus ([facility] )     Last assessed 11/1/2017      Past Surgical History:   Procedure Laterality Date    APPENDECTOMY      BACK SURGERY      Arthrodesis     CARDIAC PACEMAKER PLACEMENT      CHOLECYSTECTOMY      EYE SURGERY      FRACTURE SURGERY Right 01/02/2018    femur    HYSTERECTOMY      UT EGD TRANSORAL BIOPSY SINGLE/MULTIPLE N/A 11/9/2016    Procedure: ESOPHAGOGASTRODUODENOSCOPY (EGD); Surgeon: Corbin Milian MD;  [facility]: [facility];   Service: Gastroenterology    ME OPEN RX FEMUR FX+INTRAMED GARRETT Right 1/2/2018    Procedure: INSERTION NAIL IM FEMUR ANTEGRADE (TROCHANTERIC) RIGHT;  Surgeon: Pastor Lopez MD;  [facility];  Service: Orthopedics    ME REPLACE AORTIC VALVE OPENFEMORAL ARTERY APPROACH N/A 7/26/2016    Procedure: TRANSFEMORAL TAVR WITH 23MM LAROSE LILY S3 VALVE; JOSE ALFREDO ;  Surgeon: Liat Jenkins DO;  [facility];  Service: Cardiac Surgery    SMALL INTESTINE SURGERY      TONSILLECTOMY      TOTAL KNEE ARTHROPLASTY      VARICOSE VEIN SURGERY      VENTRAL HERNIA REPAIR         Performed at least 2 patient identifiers during session: Name, Kat Mcdonough and ID bracelet        08/26/21 1246   PT Last Visit   PT Visit Date 08/26/21   Note Type   Note type Evaluation   Pain Assessment   Pain Assessment Tool 0-10   Pain Score 6   Pain Location/Orientation Location: Chest   Pain Radiating Towards non radiating   Pain Onset/Description Descriptor: Pressure   Effect of Pain on Daily Activities limits comfort and activity tolerance   Patient's Stated Pain Goal 4   Hospital Pain Intervention(s) Emotional support;MD notified (Comment)  (RN & MD notified immediately)   Multiple Pain Sites No   Home Living   Type of Home Assisted living   Additional Comments Pt reports that at baseline she is a resident of [facility], however after recent hospitalization she was transitioned to [facility] until progression to ability to return to [facility]  Prior Function   Level of [place] Needs assistance with ADLs and functional mobility; Needs assistance with IADLs   Lives With Facility staff   Receives Help From Personal care attendant   ADL Assistance Needs assistance   IADLs Needs assistance   Falls in the last 6 months   (pt unable to recall)   Vocational Retired   Comments Pt reports being mostly WC bound, has assist x1 person for transfers to/from [facility] with RW   Pt is able to ambulate short distances with RW and Assist, however she reports she has not ambulated in "a long time" due to short staffing at facility  Restrictions/Precautions   Weight Bearing Precautions Per Order No   Other Precautions Chair Alarm; Bed Alarm;Multiple lines;Telemetry; Fall Risk;Pain;O2  (1L O2 via nC)   General   Additional Pertinent History Pt admitted 8/25/21 from SNF STR with increased weakness and chest pain, UTI, sepsis, PNA  Family/Caregiver Present No   Cognition   Overall Cognitive Status WFL  (questionable higher level cognition)   Arousal/Participation Alert   Orientation Level Oriented X4   Memory Within functional limits   Following Commands Follows one step commands with increased time or repetition   RUE Assessment   RUE Assessment WFL   LUE Assessment   LUE Assessment WFL   RLE Assessment   RLE Assessment X   Strength RLE   RLE Overall Strength 2/5   LLE Assessment   LLE Assessment X   Strength LLE   LLE Overall Strength 2/5   Coordination   Movements are Fluid and Coordinated 1   Sensation WFL   Light Touch   RLE Light Touch Grossly intact   LLE Light Touch Grossly intact   Bed Mobility   Supine to Sit Unable to assess   Sit to Supine Unable to assess   Additional Comments Functional mobility assessment deferred at this time as pt c/o onset of centralized chest pressure/pain w/o radiation, +nausea  RN notified immediately, RN and PCA present, completion of EKG began  Will follow up later in day / next day as able and appropriate for further evaluation of mobility  Transfers   Sit to Stand Unable to assess   Stand to Sit Unable to assess   Ambulation/Elevation   Gait pattern Not appropriate   Endurance Deficit   Endurance Deficit Yes   Activity Tolerance   Activity Tolerance Patient limited by pain   Medical Staff Made Aware Spoke with [address] with RN and PCA   Assessment   Prognosis Fair   Problem List Decreased strength;Decreased endurance; Impaired balance;Decreased mobility; Impaired judgement;Decreased safety awareness; Obesity;Pain   Assessment Pt seen for PT evaluation, cleared by RN Diane Cloud, activity orders of "up and oob as tolerated"  Pt admitted 8/25/2021 with worsening weakness, UTI, Sepsis ([facility] )  Comorbidities affecting pt's fnxl performance include: anemia, arthritis, CAD, chronic back pain, CKD, dementia, diabetes, falls, GERD, hyperlipidemia, pacemaker, TIA, TKA, aortic stenosis  Personal factors affecting pt at time of PT IE include: ambulating with assistive device, decreased cognition, positive fall history, visual impairments, inability to perform ADLS and inability to perform IADLS   PTA, pt was requiring assist for functional mobility with RW and WC, requiring assist for ADLS, in short term rehab and an assisted living resident  Pt scores 12/24 on Low Function AM-PAC objective tool w/ a standardized score of 18 33, indicating need for extensive rehab services upon d/c from the acute care setting  The Barthel Index outcome tool was used & pt scores 10 indicating total limitations of fnxl mobility/ADLS  Pt is at risk of falls d/t multiple comorbidities, h/o falls, impaired balance, impaired cognition, visual impairment, impaired insight/safety awareness, use of ambulatory aid, varying levels of pain , advanced age, acuity of medical illness and ongoing medical treatment of primary dx  Pt's clinical presentation is currently unstable/unpredictable seen in pt's presentation of vital sign response, changing level of pain, varying levels of cognitive performance, increased fall risk, new onset of impairment of functional mobility, decreased endurance and new onset of weakness  This PT IE requires high complexity clinical decision making, based on multiple medical/physiological/fnxl/social factors which affect pt's performance w/ evaluation & therapist judgement for disposition recommendations   Pt will benefit from continued PT treatment in order to address impairments, decrease risk of falls, maximize independence w/ fnxl mobility, & ensure safety w/ mobility for transition to next level of care  Based on pt presentation & impairments, pt would most appropriately benefit from post acute STR upon d/c  Goals   Patient Goals "to feel better"   [facility] Expiration Date 09/05/21   Short Term Goal #1 Patient PT goals established in order increase patient independence and decrease burden of care  Pt will complete all bed mobility in hospital bed with [facility], in order to promote increased OOB functional mobility to improve overall activity tolerance  Pt will complete all transfers with RW and MODA, in order to increase safety with functional mobility  Pt will ambulate >20ft with RW and MODA in order to increase safety with in facility distance functional mobility  Pt will demonstrate ability to self propel WC >25ft with S, in order to increase patient indep with mobility  Pt will improve B LE strength to >/= 3+/5 MMT throughout, in order to increase safety with functional mobility and decrease risk of falls  Pt will improve Low Function AM-PAC score to >/= 17/24 in order to increase independence with mobility and decrease burden of care  Pt will improve Barthel Index score to >/= 20/100 in order to increase independence and decrease risk of falls  Pt will tolerate >3hrs OOB in upright position, in order to improve muscular endurance and respiratory status  PT Treatment Day 0   Plan   Treatment/Interventions Functional transfer training;LE strengthening/ROM; Elevations; Therapeutic exercise; Endurance training;Patient/family training;Equipment eval/education;Gait training;Bed mobility;Continued evaluation;Spoke to MD;Spoke to nursing;OT   PT Frequency Other (Comment)  (3-5x/wk)   Recommendation   PT Discharge Recommendation Post acute rehabilitation services   AM-PAC Basic Mobility Inpatient   Turning in Bed Without Bedrails 1   Lying on Back to Sitting on Edge of Flat Bed 1   Moving Bed to Chair 1   Standing Up From Chair 1   Walk in Room 1   Climb 3-5 Stairs 1   Basic Mobility Inpatient Raw Score 6   Turning Head Towards Sound 3   Follow Simple Instructions 3   Low Function Basic Mobility Raw Score 12   Low Function Basic Mobility Standardized Score 18 33   Modified Bhavesh Scale   Modified [place] Scale 5   Barthel Index   Feeding 5   Bathing 0   Grooming Score 0   Dressing Score 0   Bladder Score 0   Bowels Score 5   Toilet Use Score 0   Transfers (Bed/Chair) Score 0   Mobility (Level Surface) Score 0   Stairs Score 0   Barthel Index Score 10       The patient's AM-PAC Basic Mobility Inpatient Short Form Low Function Raw Score 12 , Standardized Score is 18 33  A standardized score less 42 9 suggests the patient may benefit from discharge to post-acute rehab services  Please also refer to the recommendation of the Physical Therapist for safe discharge planning      Norma Lazo PT, DPT   Available via Clicker  [facility] # [id number]  [place] License - [id number]  [phone]/[id number]

## 2021-08-26 NOTE — ASSESSMENT & PLAN NOTE
· Amlodipine and metoprolol with holding parameters  · Patient was on Lasix 80 mg twice a day at home and because the patient came in with sepsis, at the start of this hospital course I have reduced the Lasix to 40 mg twice a day  Blood pressure 123/61, pulse 92, temperature 98 9 °F (37 2 °C), temperature source Tympanic, resp  rate 17, height 5' (1 524 m), weight 92 6 kg (204 lb 2 3 oz), SpO2 96 %

## 2021-08-26 NOTE — ASSESSMENT & PLAN NOTE
· Amlodipine and metoprolol with holding parameters  · Patient was on Lasix 80 mg twice a day at home and because the patient came in with sepsis, at the start of this hospital course I will reduce the Lasix to 40 mg twice a day    Blood pressure 134/68, pulse 98, temperature (!) 97 °F (36 1 °C), temperature source Tympanic, resp  rate 18, height 5' (1 524 m), weight 92 6 kg (204 lb 2 3 oz), SpO2 95 %

## 2021-08-26 NOTE — PROGRESS NOTES
[facility] [address]  [facility]   Progress Note Annie Chance Day 1935, 80 y o  female MRN: [id number]  Unit/Bed#: -Rohit Encounter: [id number]  Primary Care Provider: Lb Godfrey MD   Date and time admitted to hospital: 8/25/2021  4:58 PM    UTI (urinary tract infection)  Assessment & Plan  · See plan under sepsis  · Will give intravenous Zosyn for the patient  * Sepsis ([address] )  Assessment & Plan  · Patient hospitalized with sepsis secondary to urinary tract infection- as evidenced by high white count, tachycardia and tachypnea  · Will give intravenous Zosyn for the patient  · Patient to continue on intravenous fluids    Results from last 7 days   Lab Units 08/25/21  2044   BLOOD CULTURE  Received in Microbiology Lab  Culture in Progress  CKD (chronic kidney disease)  Assessment & Plan  Results from last 7 days   Lab Units 08/25/21  1734 08/22/21  1042 08/21/21  0604 08/20/21  0705   SODIUM mmol/L 138 138 141 139   POTASSIUM mmol/L 3 9 3 6 3 8 2 8*  2 8*   CHLORIDE mmol/L 93* 101 102 98*   CO2 mmol/L 28 28 27 30   ANION GAP mmol/L 17* 9 12 11   BUN mg/dL 71* 56* 59* 59*   CREATININE mg/dL 1 75* 1 51* 1 42* 1 47*   CALCIUM mg/dL 9 7 10 0 10 1 9 6   ALBUMIN g/dL 3 9  --   --   --    TOTAL BILIRUBIN mg/dL 0 71  --   --   --    ALK PHOS U/L 92 4  --   --   --    ALT U/L 27  --   --   --    AST U/L 26  --   --   --    EGFR ml/min/1 73sq m 26 31 33 32   GLUCOSE RANDOM mg/dL 222* 237* 200* 166*       Creatinine is greater than her baseline of 1 4-1 5 at 1 75 on admission  Will give fluids and monitor, decrease Lasix  Monitor BMP daily    Hypertension  Assessment & Plan  · Amlodipine and metoprolol with holding parameters  · Patient was on Lasix 80 mg twice a day at home and because the patient came in with sepsis, at the start of this hospital course I have reduced the Lasix to 40 mg twice a day  Blood pressure 123/61, pulse 92, temperature 98 9 °F (37 2 °C), temperature source Tympanic, resp   rate 17, height 5' (1 524 m), weight 92 6 kg (204 lb 2 3 oz), SpO2 96 %  Hyperlipidemia  Assessment & Plan  · Continue Lipitor  Chronic diastolic congestive heart failure (HCC)  Assessment & Plan  Wt Readings from Last 3 Encounters:   08/25/21 92 6 kg (204 lb 2 3 oz)   08/22/21 95 3 kg (210 lb 1 6 oz)   02/19/20 87 5 kg (193 lb)     Patient had a recent hospitalization for heart failure, was on Lasix 80 mg twice a day  Given that she has been admitted with sepsis secondary to UTI, will decrease Lasix to 40 mg twice a day and monitor  Diabetes mellitus [facility])  Assessment & Plan  Lab Results   Component Value Date    HGBA1C 6 7 (H) 08/19/2021       Recent Labs     08/26/21  0013 08/26/21  0748   POCGLU 182* 138       Blood Sugar Average: Last 72 hrs:  (P) 160   Will give insulin sliding scale  Patient on Lantus and blood sugar monitoring    Constipation  Assessment & Plan  · On bowel regimen with p r n  Milk of magnesium  Ambulatory dysfunction  Assessment & Plan  · Will get physical and occupational therapy for the patient  Frequent falls  Assessment & Plan  · PT and OT evaluations pending    Age-related cognitive decline  Assessment & Plan  · Monitor closely, I suspect patient has dementia  · Supportive care        TE Pharmacologic Prophylaxis: Heparin    Patient Centered Rounds: I have performed bedside rounds with nursing staff today  Discussions with Specialists or Other Care Team Provider:  Care team  Education and Discussions with Family / Patient:  Patient    Current Length of Stay: 1 day(s)  Current Patient Status: Inpatient     Certification Statement: The patient will continue to require additional inpatient hospital stay due to Sepsis [facility])  Discharge Plan / Estimated Discharge Date:  1-2 days    Code Status: Level 3 - DNAR and DNI  ______________________________________________________________________________    Subjective:   Patient seen and examined by me    Patient does have weakness and confusion  She is able to understand what is spoken to her and says that she does feel weak  Does not complain of any high fever, chills or rigors  Overnight events reviewed with the nurse  No chest pain, palpitations or diaphoresis at this point of time    Objective:   Vitals: Blood pressure 123/61, pulse 92, temperature 98 9 °F (37 2 °C), temperature source Tympanic, resp  rate 17, height 5' (1 524 m), weight 92 6 kg (204 lb 2 3 oz), SpO2 96 %      Physical Exam:   General appearance: alert, appears stated age and cooperative  Constitutional- looks a little weak  HEENT - atraumatic and normocephalic  Neck- supple  Skin - no fresh rash  Extremities no fresh focal deformities  Cardiovascular- S1-S2 heard  Respiratory- bilateral air entry present, no crackles or rhonchi  Skin - no fresh rash  Abdomen - normal bowel sounds present, no rebound tenderness  CNS- No fresh focal deficits  Psych- no acute psychosis     Additional Data:   Labs:  Results from last 7 days   Lab Units 08/25/21  1734 08/19/21  1033   WBC Thousand/uL 23 15* 12 95*   HEMOGLOBIN g/dL 14 1 12 9   HEMATOCRIT % 43 5 40 7   MCV fL 92 94   PLATELETS Thousands/uL 406* 368   INR  1 01  --      Results from last 7 days   Lab Units 08/25/21  1734 08/22/21  1042 08/21/21  0604 08/20/21  0705   SODIUM mmol/L 138 138 141 139   POTASSIUM mmol/L 3 9 3 6 3 8 2 8*  2 8*   CHLORIDE mmol/L 93* 101 102 98*   CO2 mmol/L 28 28 27 30   ANION GAP mmol/L 17* 9 12 11   BUN mg/dL 71* 56* 59* 59*   CREATININE mg/dL 1 75* 1 51* 1 42* 1 47*   CALCIUM mg/dL 9 7 10 0 10 1 9 6   ALBUMIN g/dL 3 9  --   --   --    TOTAL BILIRUBIN mg/dL 0 71  --   --   --    ALK PHOS U/L 92 4  --   --   --    ALT U/L 27  --   --   --    AST U/L 26  --   --   --    EGFR ml/min/1 73sq m 26 31 33 32   GLUCOSE RANDOM mg/dL 222* 237* 200* 166*     Results from last 7 days   Lab Units 08/21/21  0604   MAGNESIUM mg/dL 2 4     Results from last 7 days   Lab Units 08/26/21  0436 08/25/21  1734   TROPONIN I ng/mL 0 05 0 03              Results from last 7 days   Lab Units 08/26/21  0748 08/26/21  0013 08/22/21  1123 08/22/21  0825 08/21/21  2139 08/20/21  2235   POC GLUCOSE mg/dl 138 182* 184* 181* 213* 199*     Results from last 7 days   Lab Units 08/19/21  1033   HEMOGLOBIN A1C % 6 7*         * I Have Reviewed All Lab Data Listed Above  Cultures:   Results from last 7 days   Lab Units 08/25/21  2044   BLOOD CULTURE  Received in Microbiology Lab  Culture in Progress  Imaging:  Imaging Reports Reviewed Today Include:   XR chest 1 view portable    Result Date: 8/26/2021  Impression: Mild left base atelectasis with mild elevation of the left hemidiaphragm   Workstation performed: [id number]     Scheduled Meds:  Current Facility-Administered Medications   Medication Dose Route Frequency Provider Last Rate    acetaminophen  650 mg Oral Q6H PRN Amada Portillo MD      aluminum-magnesium hydroxide-simethicone  30 mL Oral Q6H PRN Amada Portillo MD      amLODIPine  5 mg Oral BID Amada Portillo MD      aspirin  81 mg Oral Daily Amada Portillo MD      atorvastatin  10 mg Oral Daily With Radha Petit MD      ferrous sulfate  325 mg Oral Daily With Emory Kayser, MD      furosemide  40 mg Oral BID Amada Portillo MD      heparin (porcine)  5,000 Units Subcutaneous [facility] Amada Portillo MD      insulin glargine  12 Units Subcutaneous HS Amada Portillo MD      insulin lispro  1-5 Units Subcutaneous TID [facility] Amada Portillo MD      insulin lispro  1-5 Units Subcutaneous HS Amada Portillo MD      lactated ringers  50 mL/hr Intravenous Continuous Amada Portillo MD 50 mL/hr (08/26/21 0005)    levothyroxine  50 mcg Oral Early Morning Amada Portillo MD      loperamide  2 mg Oral TID PRN Amada Portillo MD      magnesium hydroxide  30 mL Oral Daily PRN Amada Portillo MD      metoprolol tartrate  50 mg Oral BID Jered Camacho MD      nystatin   Topical BID Jered Camacho MD      ondansetron  4 mg Intravenous Q6H PRN Jered Camacho MD      oxyCODONE  2 5 mg Oral Q4H PRN Jered Camacho MD      piperacillin-tazobactam  2 25 g Intravenous Q6H Jered Camacho MD 2 25 g (08/25/21 2223)    potassium chloride  20 mEq Oral Daily Jered Camacho MD      sodium chloride  1,000 mL/hr Intravenous Continuous Jered Camacho MD Stopped (08/25/21 2024)       Jered Camacho MD  [facility] Internal Medicine    ** Please Note: This note has been constructed using a voice recognition system   **

## 2021-08-26 NOTE — QUICK NOTE
Patient is complaining of sudden onset chest pain, mid sternal area, burning sensation, 5/10 in intensity, no radiation, no aggravating factors, subsided by tylenol and associated with nausea  Denies SOB, lightheadedness or dizziness  Patient reports it could be related to potassium tablet she had this am    Reports similar chest pain yesterday which resolved spontaneously  She does have medical history of CAD s/p PCI to LAD in 2016, s/p TAVR, complete heart block s/p dual chamber pacemaker  Vital signs are stable  Cardiopulmonary exam is benign  No chest wall tenderness  Troponin on admission negative x 2  Stat EKG showed ventricular paced rhythm, no changes compared to priors  ISHAN score - 4  Atypical chest pain, likely medication induced, GERD  Needs to rule out ACS given risk factors  Will order troponin  Monitor with telemetry  Continue tylenol as needed  Mylanta as needed

## 2021-08-26 NOTE — ASSESSMENT & PLAN NOTE
Lab Results   Component Value Date    HGBA1C 6 7 (H) 08/19/2021       No results for input(s): POCGLU in the last 72 hours  Blood Sugar Average: Last 72 hrs:      Will give insulin sliding scale  Patient on Lantus

## 2021-08-26 NOTE — ASSESSMENT & PLAN NOTE
Lab Results   Component Value Date    HGBA1C 6 7 (H) 08/19/2021       Recent Labs     08/26/21  0013 08/26/21  0748   POCGLU 182* 138       Blood Sugar Average: Last 72 hrs:  (P) 160   Will give insulin sliding scale  Patient on Lantus and blood sugar monitoring Monitor blood pressure  Continue Lopressor, hydralazine, Cardura, clonidine and amlodipine  8/16-patient's blood pressure continues to be elevated, consider increasing hydralazine  P r n  Hydralazine for systolic blood pressure greater than 180

## 2021-08-26 NOTE — ASSESSMENT & PLAN NOTE
· Patient hospitalized with sepsis secondary to urinary tract infection- as evidenced by high white count, tachycardia and tachypnea  · Will give intravenous Zosyn for the patient  · Patient to continue on intravenous fluids

## 2021-08-26 NOTE — ASSESSMENT & PLAN NOTE
Results from last 7 days   Lab Units 08/25/21  1734 08/22/21  1042 08/21/21  0604 08/20/21  0705   SODIUM mmol/L 138 138 141 139   POTASSIUM mmol/L 3 9 3 6 3 8 2 8*  2 8*   CHLORIDE mmol/L 93* 101 102 98*   CO2 mmol/L 28 28 27 30   ANION GAP mmol/L 17* 9 12 11   BUN mg/dL 71* 56* 59* 59*   CREATININE mg/dL 1 75* 1 51* 1 42* 1 47*   CALCIUM mg/dL 9 7 10 0 10 1 9 6   ALBUMIN g/dL 3 9  --   --   --    TOTAL BILIRUBIN mg/dL 0 71  --   --   --    ALK PHOS U/L 92 4  --   --   --    ALT U/L 27  --   --   --    AST U/L 26  --   --   --    EGFR ml/min/1 73sq m 26 31 33 32   GLUCOSE RANDOM mg/dL 222* 237* 200* 166*       Creatinine is greater than her baseline of 1 4-1 5 at 1 75 on admission  Will give fluids and monitor, decrease Lasix  Monitor BMP daily

## 2021-08-26 NOTE — ASSESSMENT & PLAN NOTE
· Patient hospitalized with sepsis secondary to urinary tract infection- as evidenced by high white count, tachycardia and tachypnea  · Will give intravenous Zosyn for the patient  · Patient to continue on intravenous fluids    Results from last 7 days   Lab Units 08/25/21 2044   BLOOD CULTURE  Received in Microbiology Lab  Culture in Progress

## 2021-08-26 NOTE — H&P
Tin U  66   H&P- [place] 1935, 80 y o  female MRN: [id number]  Unit/Bed#: -01 Encounter: [id number]  Primary Care Provider: Beverly Acuña MD   Date and time admitted to hospital: 8/25/2021  4:58 PM    UTI (urinary tract infection)  Assessment & Plan  · See plan under sepsis  · Will give intravenous Zosyn for the patient    * Sepsis [facility])  Assessment & Plan  · Patient hospitalized with sepsis secondary to urinary tract infection- as evidenced by high white count, tachycardia and tachypnea  · Will give intravenous Zosyn for the patient  · Patient to continue on intravenous fluids    CKD (chronic kidney disease)  Assessment & Plan  Lab Results   Component Value Date    EGFR 26 08/25/2021    EGFR 31 08/22/2021    EGFR 33 08/21/2021    CREATININE 1 75 (H) 08/25/2021    CREATININE 1 51 (H) 08/22/2021    CREATININE 1 42 (H) 08/21/2021     Creatinine is greater than her baseline of 1 4-1 5 at 1 75 on admission  Will give fluids and monitor, decrease Lasix    Hypertension  Assessment & Plan  · Amlodipine and metoprolol with holding parameters  · Patient was on Lasix 80 mg twice a day at home and because the patient came in with sepsis, at the start of this hospital course I will reduce the Lasix to 40 mg twice a day    Blood pressure 134/68, pulse 98, temperature (!) 97 °F (36 1 °C), temperature source Tympanic, resp  rate 18, height 5' (1 524 m), weight 92 6 kg (204 lb 2 3 oz), SpO2 95 %          Hyperlipidemia  Assessment & Plan  · Continue Lipitor    Chronic diastolic congestive heart failure (HCC)  Assessment & Plan  Wt Readings from Last 3 Encounters:   08/25/21 92 6 kg (204 lb 2 3 oz)   08/22/21 95 3 kg (210 lb 1 6 oz)   02/19/20 87 5 kg (193 lb)     Patient had a recent hospitalization for heart failure, was on Lasix 80 mg twice a day  Given that she has been admitted with sepsis secondary to UTI, will decrease Lasix to 40 mg twice a day and monitor        Diabetes mellitus [facility])  Assessment & Plan  Lab Results   Component Value Date    HGBA1C 6 7 (H) 08/19/2021       No results for input(s): POCGLU in the last 72 hours  Blood Sugar Average: Last 72 hrs: Will give insulin sliding scale  Patient on Lantus    Constipation  Assessment & Plan  · On bowel regimen with p r n  Milk of magnesium    Ambulatory dysfunction  Assessment & Plan  · Will get physical and occupational therapy for the patient    Frequent falls  Assessment & Plan  · For PT and OT    Age-related cognitive decline  Assessment & Plan  · Monitor closely, I suspect patient has dementia        VTE Prophylaxis: Heparin  Code Status: Level 3 - DNAR and DNI  Anticipated Length of Stay:  Patient will be admitted on an Inpatient basis with an anticipated length of stay of  more than 2 midnights  Justification for Hospital Stay: Sepsis [facility])    Chief Complaint:     Chief Complaint   Patient presents with    Weakness - Generalized     increased weakness, chest pain after vomiting, brought in by EMS from [facility], 90% on RA, does not use chronic O2     History of Present Illness:    Adele Hutton is a 80 y o  female who presents with vomiting and increased weakness  Patient was recently hospitalized for heart failure at [facility] and was discharged  Patient has been feeling weak for several days right now  Patient was also mildly confused  The ER note mentions the patient did have some chest pain but patient is not complaining of any chest pain to me  Patient during the recent hospital course had aggressive Lasix to induce diuresis and was discharged on 80 mg twice a day  Patient has been having some vomiting and weakness  Because of the persistent vomiting and weakness patient was brought into the emergency room  Patient is a resident of skilled nursing facility at [facility]  Initially when she came in the patient was hypoxic and short of breath    Patient is much improved after getting antibiotics and fluids in the emergency room    Patient apparently had a recent urinary tract infection per the daughter who was at the bedside    Review of Systems:  General ROS: positive for  - fatigue  History obtained from child  General ROS: positive for  - fatigue  Psychological ROS: negative  Ophthalmic ROS: negative  Respiratory ROS: positive for - shortness of breath  Cardiovascular ROS: no chest pain or dyspnea on exertion  Gastrointestinal ROS: no abdominal pain, change in bowel habits, or black or bloody stools  Genito-Urinary ROS: positive for - dysuria  Musculoskeletal ROS: positive for - muscular weakness  Neurological ROS: no TIA or stroke symptoms  Hematological ROS- No active bleeding  Otherwise, all other 12 point review of systems normal         Past Medical and Surgical History:   Past Medical History:   Diagnosis Date    Anemia     Resolved 3/1/2017     Arthritis     Knee - Resolved 3/1/2017     Blind     CAD (coronary artery disease)     s/p HERNAN 6/2016    Calcaneal spur     CHF [facility]congestive heart failure) [facility])     Chronic knee instability     Resolved 3/1/2017     Chronic pain syndrome     Resolved 3/1/2017     Dementia ([facility] [address] )     Last assessed 11/1/2017     Diabetes mellitus ([facility] )     non-insulin depdenent    Disease of thyroid gland     Diverticulitis     Essential thrombocytopenia ([facility])     Last assessed 11/1/2017     GERD (gastroesophageal reflux disease)     History of aortic valve replacement     Resolved 3/1/2017     History of bilateral knee replacement     Resolved 3/1/2017     History of TIA (transient ischemic attack)     no residual deficits    Hyperlipidemia     Hypertension     Hypoxia     on home O2 QHS    Leukocytosis     Resolved 3/1/2017     Lumbar post-laminectomy syndrome     Resolved 3/1/2017     Meniere disease     Osteopenia     Pacemaker     [facility] 2/2015    S/P TAVR (transcatheter aortic valve replacement)     Resolved 3/1/2017  Severe aortic stenosis     Thrombocytosis (HCC)     Resolved 2017     Type 2 diabetes mellitus ([facility] [address] )     Last assessed 2017      Past Surgical History:   Procedure Laterality Date    APPENDECTOMY      BACK SURGERY      Arthrodesis     CARDIAC PACEMAKER PLACEMENT      CHOLECYSTECTOMY      EYE SURGERY      FRACTURE SURGERY Right 2018    femur    HYSTERECTOMY      NE EGD TRANSORAL BIOPSY SINGLE/MULTIPLE N/A 2016    Procedure: ESOPHAGOGASTRODUODENOSCOPY (EGD); Surgeon: Lauren Ashton MD;  Location: [facility]; Service: Gastroenterology    NE OPEN RX FEMUR FX+INTRAMED GARRETT Right 2018    Procedure: INSERTION NAIL IM FEMUR ANTEGRADE (TROCHANTERIC) RIGHT;  Surgeon: Matty Meneses MD;  Location: [facility];  Service: Orthopedics    NE REPLACE AORTIC VALVE OPENFEMORAL ARTERY APPROACH N/A 2016    Procedure: TRANSFEMORAL TAVR WITH 23MM LAROSE LILY S3 VALVE; JOSE ALFREDO ;  Surgeon: Theodora Ohara DO;  Location: [facility];  Service: Cardiac Surgery    SMALL INTESTINE SURGERY      TONSILLECTOMY      TOTAL KNEE ARTHROPLASTY      VARICOSE VEIN SURGERY      VENTRAL HERNIA REPAIR       Meds/Allergies: Allergies: Allergies   Allergen Reactions    Advil [Ibuprofen] GI Intolerance    Propoxyphene Other (See Comments)     DARVOCET=ACID REFLUX    Rofecoxib Other (See Comments)     Prior to Admission Medications   Prescriptions Last Dose Informant Patient Reported? Taking? Glucagon, rDNA, (Glucagon Emergency) 1 MG KIT  Outside Facility (Specify) Yes No   Multiple Vitamins-Minerals (OCUVITE ADULT 50+ PO)  Outside Facility (Specify) Yes No   Sig: Take by mouth daily   acetaminophen (TYLENOL) 325 mg tablet  Outside Facility (Specify) No No   Sig: Take 2 tablets (650 mg total) by mouth every 6 (six) hours as needed for mild pain     amLODIPine (NORVASC) 5 mg tablet  Outside Facility (Specify) Yes No   Si mg 2 (two) times a day    amoxicillin (AMOXIL) 500 mg capsule  Outside Facility (Specify) Yes No   Sig: Take 2,000 mg by mouth as needed (PRIOR TO DENTAL APPOINTMENTS)   aspirin 81 mg chewable tablet  Outside Facility (Specify) Yes No   Sig: Chew 81 mg daily  atorvastatin (LIPITOR) 10 mg tablet  Outside Facility (Specify) Yes No   diclofenac sodium (VOLTAREN) 1 %  Outside Facility (Specify) Yes No   Sig: Apply 2 g topically 4 (four) times a day as needed   ferrous sulfate 325 (65 Fe) mg tablet  Outside Facility (Specify) Yes No   Sig: Take 325 mg by mouth daily with breakfast   furosemide (LASIX) 40 mg tablet   No No   Sig: Take 2 tablets (80 mg total) by mouth 2 (two) times a day   insulin glargine (LANTUS) 100 units/mL subcutaneous injection  Outside Facility (Specify) No No   Sig: Inject 12 Units under the skin daily at bedtime   ketoconazole (NIZORAL) 2 % cream  Outside Facility (Specify) Yes No   Sig: daily    levothyroxine 50 mcg tablet  Outside Facility (Specify) Yes No   Sig: Take 50 mcg by mouth daily   loperamide (IMODIUM A-D) 2 MG tablet  Outside Facility (Specify) Yes No   Sig: Take 2 mg by mouth 3 (three) times a day as needed for diarrhea   metoprolol tartrate (LOPRESSOR) 50 mg tablet  Outside Facility (Specify) No No   Sig: Take 1 tablet (50 mg total) by mouth 2 (two) times a day     nystatin (MYCOSTATIN) powder  Outside Facility (Specify) Yes No   Sig: Apply topically 2 (two) times a day   oxyCODONE (ROXICODONE) 5 mg immediate release tablet   No No   Sig: Take 0 5 tablets (2 5 mg total) by mouth every 4 (four) hours as needed for moderate pain or severe pain for up to 10 dosesMax Daily Amount: 15 mg   potassium chloride (K-DUR,KLOR-CON) 20 mEq tablet  Outside Facility (Specify) Yes No   Sig: Take 20 mEq by mouth daily   tetrahydrozoline (VISINE) 0 05 % ophthalmic solution  Outside Facility (Specify) Yes No   Si drop 4 (four) times a day as needed for irritation      Facility-Administered Medications: None     Social History:     Social History     Socioeconomic History    Marital status:      Spouse name: Not on file    Number of children: Not on file    Years of education: Not on file    Highest education level: Not on file   Occupational History    Not on file   Tobacco Use    Smoking status: Never Smoker    Smokeless tobacco: Never Used   Vaping Use    Vaping Use: Never used   Substance and Sexual Activity    Alcohol use: Not Currently    Drug use: No    Sexual activity: Not on file   Other Topics Concern    Not on file   Social History Narrative    Lives in an independent group home      Social Determinants of Health     Financial Resource Strain:     Difficulty of Paying Living Expenses:    Food Insecurity:     Worried About Running Out of Food in the Last Year:     [address] in the Last Year:    Transportation Needs:     Lack of Transportation (Medical):      Lack of Transportation (Non-Medical):    Physical Activity:     Days of Exercise per Week:     Minutes of Exercise per Session:    Stress:     Feeling of Stress :    Social Connections:     Frequency of Communication with Friends and Family:     Frequency of Social Gatherings with Friends and Family:     Attends Oriental orthodox Services:     Active Member of Clubs or Organizations:     Attends Club or Organization Meetings:     Marital Status:    Intimate Partner Violence:     Fear of Current or Ex-Partner:     Emotionally Abused:     Physically Abused:     Sexually Abused:      Patient Pre-hospital Living Situation:  Lives at nursing home  Patient Pre-hospital Level of Mobility:  Limited mobility  Patient Pre-hospital Diet Restrictions:  No restriction    Family History:  Family History   Problem Relation Age of Onset    Cancer Child     Coronary artery disease Father         Native artery     Stroke Family     Osteopenia Family     Other Family         Pituitary disease    Polycythemia Family      Physical Exam:   Vitals:   Blood Pressure: 134/68 (08/25/21 1911)  Pulse: 98 (08/25/21 1911)  Temperature: (!) 97 °F (36 1 °C) (08/25/21 1911)  Temp Source: Tympanic (08/25/21 1911)  Respirations: 18 (08/25/21 1911)  Height: 5' (152 4 cm) (08/25/21 1911)  Weight - Scale: 92 6 kg (204 lb 2 3 oz) (08/25/21 1911)  SpO2: 95 % (08/25/21 1911)    General appearance: alert, slowed mentation, appears stated age and cooperative  Constitutional- looks a little weak  HEENT - atraumatic and normocephalic  Neck- supple  Skin - no fresh rash  Extremities no fresh focal deformities  Cardiovascular- S1-S2 heard  Respiratory- bilateral air entry present, no crackles or rhonchi  Skin - no fresh rash  Abdomen - normal bowel sounds present, no rebound tenderness  CNS- No fresh focal deficits  Psych- no acute psychosis     Lab Results: I have personally reviewed pertinent reports      Results from last 7 days   Lab Units 08/25/21  1734   WBC Thousand/uL 23 15*   HEMOGLOBIN g/dL 14 1   HEMATOCRIT % 43 5   PLATELETS Thousands/uL 406*   NEUTROS PCT % 91*   LYMPHS PCT % 2*   MONOS PCT % 6   EOS PCT % 0     Results from last 7 days   Lab Units 08/25/21  1734 08/22/21  1042 08/21/21  0604 08/20/21  0705 08/19/21  0625   SODIUM mmol/L 138 138 141 139 138   POTASSIUM mmol/L 3 9 3 6 3 8 2 8*  2 8* 2 9*   CHLORIDE mmol/L 93* 101 102 98* 96*   CO2 mmol/L 28 28 27 30 31   ANION GAP mmol/L 17* 9 12 11 11   BUN mg/dL 71* 56* 59* 59* 56*   CREATININE mg/dL 1 75* 1 51* 1 42* 1 47* 1 59*   CALCIUM mg/dL 9 7 10 0 10 1 9 6 9 2   ALBUMIN g/dL 3 9  --   --   --   --    TOTAL BILIRUBIN mg/dL 0 71  --   --   --   --    ALK PHOS U/L 92 4  --   --   --   --    ALT U/L 27  --   --   --   --    AST U/L 26  --   --   --   --    EGFR ml/min/1 73sq m 26 31 33 32 29   GLUCOSE RANDOM mg/dL 222* 237* 200* 166* 176*     Results from last 7 days   Lab Units 08/25/21  1734   INR  1 01     Results from last 7 days   Lab Units 08/25/21  1734   TROPONIN I ng/mL 0 03                  Results from last 7 days   Lab Units 08/25/21  1739   COLOR UA Yellow   CLARITY UA  Cloudy*   SPEC GRAV UA  1 015   PH UA  5 5   LEUKOCYTES UA  3+*   NITRITE UA  Negative   GLUCOSE UA mg/dl Negative   KETONES UA mg/dl Negative   UROBILINOGEN UA E U /dl 0 2   BILIRUBIN UA  Negative   BLOOD UA  3+*      Results from last 7 days   Lab Units 08/25/21  1739   RBC UA /hpf Innumerable*   WBC UA /hpf Innumerable*   EPITHELIAL CELLS WET PREP /hpf Moderate*   BACTERIA UA /hpf Innumerable*          Imaging: I have personally reviewed pertinent reports  No results found  EKG, Pathology, and Other Studies Reviewed on Admission:   EKG  Result Date: 08/25/21  Personally reviewed strips with impression of:  No acute ST or T-wave changes, tachycardia present    Epic Records Reviewed: Yes    MD Mohsen Casey  Internal Medicine    ** Please Note: This note has been constructed using a voice recognition system   **

## 2021-08-26 NOTE — ASSESSMENT & PLAN NOTE
Wt Readings from Last 3 Encounters:   08/25/21 92 6 kg (204 lb 2 3 oz)   08/22/21 95 3 kg (210 lb 1 6 oz)   02/19/20 87 5 kg (193 lb)     Patient had a recent hospitalization for heart failure, was on Lasix 80 mg twice a day  Given that she has been admitted with sepsis secondary to UTI, will decrease Lasix to 40 mg twice a day and monitor

## 2021-08-26 NOTE — ASSESSMENT & PLAN NOTE
Lab Results   Component Value Date    EGFR 26 08/25/2021    EGFR 31 08/22/2021    EGFR 33 08/21/2021    CREATININE 1 75 (H) 08/25/2021    CREATININE 1 51 (H) 08/22/2021    CREATININE 1 42 (H) 08/21/2021     Creatinine is greater than her baseline of 1 4-1 5 at 1 75 on admission  Will give fluids and monitor, decrease Lasix

## 2021-08-26 NOTE — PLAN OF CARE
Problem: MOBILITY - ADULT  Goal: Maintain or return to baseline ADL function  Description: INTERVENTIONS:  -  Assess patient's ability to carry out ADLs; assess patient's baseline for ADL function and identify physical deficits which impact ability to perform ADLs (bathing, care of mouth/teeth, toileting, grooming, dressing, etc )  - Assess/evaluate cause of self-care deficits   - Assess range of motion  - Assess patient's mobility; develop plan if impaired  - Assess patient's need for assistive devices and provide as appropriate  - Encourage maximum independence but intervene and supervise when necessary  - Involve family in performance of ADLs  - Assess for home care needs following discharge   - Consider OT consult to assist with ADL evaluation and planning for discharge  - Provide patient education as appropriate  Outcome: Progressing  Goal: Maintains/Returns to pre admission functional level  Description: INTERVENTIONS:  - Perform BMAT or MOVE assessment daily    - Set and communicate daily mobility goal to care team and patient/family/caregiver  - Collaborate with rehabilitation services on mobility goals if consulted  - Perform Range of Motion 2 times a day  - Reposition patient every 2 hours    - Dangle patient 2 times a day  - Stand patient 2 times a day  - Ambulate patient 2 times a day  - Out of bed to chair 2 times a day   - Out of bed for meals 2 times a day  - Out of bed for toileting  - Record patient progress and toleration of activity level   Outcome: Progressing     Problem: Potential for Falls  Goal: Patient will remain free of falls  Description: INTERVENTIONS:  - Educate patient/family on patient safety including physical limitations  - Instruct patient to call for assistance with activity   - Consult OT/PT to assist with strengthening/mobility   - Keep Call bell within reach  - Keep bed low and locked with side rails adjusted as appropriate  - Keep care items and personal belongings within reach  - Initiate and maintain comfort rounds  - Make Fall Risk Sign visible to staff  - Offer Toileting every 2 Hours, in advance of need  - Initiate/Maintain bed alarm  - Obtain necessary fall risk management equipment: raffi  - Apply yellow socks and bracelet for high fall risk patients  - Consider moving patient to room near nurses station  Outcome: Progressing     Problem: PAIN - ADULT  Goal: Verbalizes/displays adequate comfort level or baseline comfort level  Description: Interventions:  - Encourage patient to monitor pain and request assistance  - Assess pain using appropriate pain scale  - Administer analgesics based on type and severity of pain and evaluate response  - Implement non-pharmacological measures as appropriate and evaluate response  - Consider cultural and social influences on pain and pain management  - Notify physician/advanced practitioner if interventions unsuccessful or patient reports new pain  Outcome: Progressing     Problem: INFECTION - ADULT  Goal: Absence or prevention of progression during hospitalization  Description: INTERVENTIONS:  - Assess and monitor for signs and symptoms of infection  - Monitor lab/diagnostic results  - Monitor all insertion sites, i e  indwelling lines, tubes, and drains  - Monitor endotracheal if appropriate and nasal secretions for changes in amount and color  - [place] appropriate cooling/warming therapies per order  - Administer medications as ordered  - Instruct and encourage patient and family to use good hand hygiene technique  - Identify and instruct in appropriate isolation precautions for identified infection/condition  Outcome: Progressing  Goal: Absence of fever/infection during neutropenic period  Description: INTERVENTIONS:  - Monitor WBC    Outcome: Progressing     Problem: SAFETY ADULT  Goal: Maintain or return to baseline ADL function  Description: INTERVENTIONS:  -  Assess patient's ability to carry out ADLs; assess patient's baseline for ADL function and identify physical deficits which impact ability to perform ADLs (bathing, care of mouth/teeth, toileting, grooming, dressing, etc )  - Assess/evaluate cause of self-care deficits   - Assess range of motion  - Assess patient's mobility; develop plan if impaired  - Assess patient's need for assistive devices and provide as appropriate  - Encourage maximum independence but intervene and supervise when necessary  - Involve family in performance of ADLs  - Assess for home care needs following discharge   - Consider OT consult to assist with ADL evaluation and planning for discharge  - Provide patient education as appropriate  Outcome: Progressing  Goal: Maintains/Returns to pre admission functional level  Description: INTERVENTIONS:  - Perform BMAT or MOVE assessment daily    - Set and communicate daily mobility goal to care team and patient/family/caregiver     - Collaborate with rehabilitation services on mobility goals if consulted    - Out of bed for toileting  - Record patient progress and toleration of activity level   Outcome: Progressing  Goal: Patient will remain free of falls  Description: INTERVENTIONS:  - Educate patient/family on patient safety including physical limitations  - Instruct patient to call for assistance with activity   - Consult OT/PT to assist with strengthening/mobility   - Keep Call bell within reach  - Keep bed low and locked with side rails adjusted as appropriate  - Keep care items and personal belongings within reach  - Initiate and maintain comfort rounds  - Make Fall Risk Sign visible to staff    - Apply yellow socks and bracelet for high fall risk patients  - Consider moving patient to room near nurses station  Outcome: Progressing     Problem: DISCHARGE PLANNING  Goal: Discharge to home or other facility with appropriate resources  Description: INTERVENTIONS:  - Identify barriers to discharge w/patient and caregiver  - Arrange for needed discharge resources and transportation as appropriate  - Identify discharge learning needs (meds, wound care, etc )  - Arrange for interpretive services to assist at discharge as needed  - Refer to Case Management Department for coordinating discharge planning if the patient needs post-hospital services based on physician/advanced practitioner order or complex needs related to functional status, cognitive ability, or social support system  Outcome: Progressing     Problem: Knowledge Deficit  Goal: Patient/family/caregiver demonstrates understanding of disease process, treatment plan, medications, and discharge instructions  Description: Complete learning assessment and assess knowledge base    Interventions:  - Provide teaching at level of understanding  - Provide teaching via preferred learning methods  Outcome: Progressing

## 2021-08-27 ENCOUNTER — APPOINTMENT (INPATIENT)
Dept: RADIOLOGY | Facility: HOSPITAL | Age: 86
DRG: 872 | End: 2021-08-27
Payer: MEDICARE

## 2021-08-27 PROBLEM — R11.10 VOMITING: Status: ACTIVE | Noted: 2021-08-27

## 2021-08-27 LAB
ANION GAP SERPL CALCULATED.3IONS-SCNC: 11 MMOL/L (ref 4–13)
ATRIAL RATE: 91 BPM
BUN SERPL-MCNC: 41 MG/DL (ref 6–20)
CALCIUM SERPL-MCNC: 8.9 MG/DL (ref 8.4–10.2)
CHLORIDE SERPL-SCNC: 99 MMOL/L (ref 96–108)
CO2 SERPL-SCNC: 36 MMOL/L (ref 22–33)
CREAT SERPL-MCNC: 1.23 MG/DL (ref 0.4–1.1)
ERYTHROCYTE [DISTWIDTH] IN BLOOD BY AUTOMATED COUNT: 13.3 % (ref 11.6–15.1)
GFR SERPL CREATININE-BSD FRML MDRD: 40 ML/MIN/1.73SQ M
GLUCOSE SERPL-MCNC: 126 MG/DL (ref 65–140)
GLUCOSE SERPL-MCNC: 147 MG/DL (ref 65–140)
GLUCOSE SERPL-MCNC: 155 MG/DL (ref 65–140)
GLUCOSE SERPL-MCNC: 156 MG/DL (ref 65–140)
GLUCOSE SERPL-MCNC: 168 MG/DL (ref 65–140)
GLUCOSE SERPL-MCNC: 181 MG/DL (ref 65–140)
HCT VFR BLD AUTO: 39.8 % (ref 34.8–46.1)
HGB BLD-MCNC: 12.5 G/DL (ref 11.5–15.4)
MCH RBC QN AUTO: 29.6 PG (ref 26.8–34.3)
MCHC RBC AUTO-ENTMCNC: 31.4 G/DL (ref 31.4–37.4)
MCV RBC AUTO: 94 FL (ref 82–98)
MRSA NOSE QL CULT: NORMAL
P AXIS: 0 DEGREES
PLATELET # BLD AUTO: 383 THOUSANDS/UL (ref 149–390)
PMV BLD AUTO: 10.9 FL (ref 8.9–12.7)
POTASSIUM SERPL-SCNC: 3.1 MMOL/L (ref 3.5–5)
PR INTERVAL: 172 MS
QRS AXIS: -74 DEGREES
QRSD INTERVAL: 186 MS
QT INTERVAL: 464 MS
QTC INTERVAL: 572 MS
RBC # BLD AUTO: 4.22 MILLION/UL (ref 3.81–5.12)
SODIUM SERPL-SCNC: 146 MMOL/L (ref 133–145)
T WAVE AXIS: 107 DEGREES
VENTRICULAR RATE: 91 BPM
WBC # BLD AUTO: 13.35 THOUSAND/UL (ref 4.31–10.16)

## 2021-08-27 PROCEDURE — C9113 INJ PANTOPRAZOLE SODIUM, VIA: HCPCS | Performed by: NURSE PRACTITIONER

## 2021-08-27 PROCEDURE — 97110 THERAPEUTIC EXERCISES: CPT

## 2021-08-27 PROCEDURE — 85027 COMPLETE CBC AUTOMATED: CPT | Performed by: INTERNAL MEDICINE

## 2021-08-27 PROCEDURE — 80048 BASIC METABOLIC PNL TOTAL CA: CPT | Performed by: INTERNAL MEDICINE

## 2021-08-27 PROCEDURE — 99223 1ST HOSP IP/OBS HIGH 75: CPT | Performed by: INTERNAL MEDICINE

## 2021-08-27 PROCEDURE — 74022 RADEX COMPL AQT ABD SERIES: CPT

## 2021-08-27 PROCEDURE — 99233 SBSQ HOSP IP/OBS HIGH 50: CPT | Performed by: INTERNAL MEDICINE

## 2021-08-27 PROCEDURE — 93010 ELECTROCARDIOGRAM REPORT: CPT | Performed by: INTERNAL MEDICINE

## 2021-08-27 PROCEDURE — 82948 REAGENT STRIP/BLOOD GLUCOSE: CPT

## 2021-08-27 PROCEDURE — 99222 1ST HOSP IP/OBS MODERATE 55: CPT | Performed by: INTERNAL MEDICINE

## 2021-08-27 PROCEDURE — 93005 ELECTROCARDIOGRAM TRACING: CPT

## 2021-08-27 PROCEDURE — 97530 THERAPEUTIC ACTIVITIES: CPT

## 2021-08-27 RX ORDER — INSULIN GLARGINE 100 [IU]/ML
8 INJECTION, SOLUTION SUBCUTANEOUS
Status: DISCONTINUED | OUTPATIENT
Start: 2021-08-27 | End: 2021-08-28

## 2021-08-27 RX ORDER — POLYETHYLENE GLYCOL 3350 17 G/17G
17 POWDER, FOR SOLUTION ORAL DAILY
Status: DISCONTINUED | OUTPATIENT
Start: 2021-08-27 | End: 2021-08-28

## 2021-08-27 RX ORDER — ONDANSETRON 2 MG/ML
4 INJECTION INTRAMUSCULAR; INTRAVENOUS EVERY 4 HOURS PRN
Status: DISCONTINUED | OUTPATIENT
Start: 2021-08-27 | End: 2021-09-05 | Stop reason: HOSPADM

## 2021-08-27 RX ORDER — POTASSIUM CHLORIDE 14.9 MG/ML
20 INJECTION INTRAVENOUS ONCE
Status: COMPLETED | OUTPATIENT
Start: 2021-08-27 | End: 2021-08-27

## 2021-08-27 RX ORDER — FUROSEMIDE 10 MG/ML
20 INJECTION INTRAMUSCULAR; INTRAVENOUS DAILY
Status: DISCONTINUED | OUTPATIENT
Start: 2021-08-27 | End: 2021-08-27

## 2021-08-27 RX ADMIN — NYSTATIN: 100000 POWDER TOPICAL at 10:46

## 2021-08-27 RX ADMIN — PIPERACILLIN AND TAZOBACTAM 2.25 G: 2; .25 INJECTION, POWDER, LYOPHILIZED, FOR SOLUTION INTRAVENOUS at 02:22

## 2021-08-27 RX ADMIN — PIPERACILLIN AND TAZOBACTAM 3.38 G: 3; .375 INJECTION, POWDER, FOR SOLUTION INTRAVENOUS at 17:56

## 2021-08-27 RX ADMIN — ONDANSETRON 4 MG: 2 INJECTION INTRAMUSCULAR; INTRAVENOUS at 12:26

## 2021-08-27 RX ADMIN — HEPARIN SODIUM 5000 UNITS: 5000 INJECTION INTRAVENOUS; SUBCUTANEOUS at 22:29

## 2021-08-27 RX ADMIN — POTASSIUM CHLORIDE 20 MEQ: 14.9 INJECTION, SOLUTION INTRAVENOUS at 12:26

## 2021-08-27 RX ADMIN — POLYETHYLENE GLYCOL 3350 17 G: 17 POWDER, FOR SOLUTION ORAL at 14:59

## 2021-08-27 RX ADMIN — INSULIN GLARGINE 8 UNITS: 100 INJECTION, SOLUTION SUBCUTANEOUS at 22:28

## 2021-08-27 RX ADMIN — HEPARIN SODIUM 5000 UNITS: 5000 INJECTION INTRAVENOUS; SUBCUTANEOUS at 14:59

## 2021-08-27 RX ADMIN — PANTOPRAZOLE SODIUM 40 MG: 40 INJECTION, POWDER, FOR SOLUTION INTRAVENOUS at 10:36

## 2021-08-27 RX ADMIN — PIPERACILLIN AND TAZOBACTAM 2.25 G: 2; .25 INJECTION, POWDER, LYOPHILIZED, FOR SOLUTION INTRAVENOUS at 10:36

## 2021-08-27 RX ADMIN — NYSTATIN: 100000 POWDER TOPICAL at 20:04

## 2021-08-27 RX ADMIN — ONDANSETRON 4 MG: 2 INJECTION INTRAMUSCULAR; INTRAVENOUS at 06:10

## 2021-08-27 RX ADMIN — ONDANSETRON 4 MG: 2 INJECTION INTRAMUSCULAR; INTRAVENOUS at 20:04

## 2021-08-27 RX ADMIN — PANTOPRAZOLE SODIUM 40 MG: 40 INJECTION, POWDER, FOR SOLUTION INTRAVENOUS at 20:03

## 2021-08-27 NOTE — PLAN OF CARE
Problem: PHYSICAL THERAPY ADULT  Goal: Performs mobility at highest level of function for planned discharge setting  See evaluation for individualized goals  Description: Treatment/Interventions: Functional transfer training, LE strengthening/ROM, Elevations, Therapeutic exercise, Endurance training, Patient/family training, Equipment eval/education, Gait training, Bed mobility, Continued evaluation, Spoke to MD, Spoke to nursing, OT     See flowsheet documentation for full assessment, interventions and recommendations  Outcome: Progressing  Note: Prognosis: Fair  Problem List: Decreased strength, Decreased range of motion, Decreased endurance, Impaired balance, Decreased mobility, Impaired judgement, Decreased safety awareness, Obesity, Decreased skin integrity  Assessment: Pt seen for PT treatment today with focus on further mobility training as able  Pt c/o ongoing nausea and extreme fatigue, requires increased stimulation/encouragement for active engagement and participation in session  Pt adamantly refusing attempt at short sit EOB, despite therapist maximal encouragement  Pt requires MAX-TOTAL A for rolling R<>L in bed, TOTAL A for semi-supine>long sit in bed (no active participation)  Pt completes above outlined LE AAROM exercises with 100% verbal and tactile cues from therapist, pt with eyes closed t/o exercises  T/o session pt appears diaphoretic however in no acute distress  Pt was left semi-supine in bed, bed alarm engaged, needs within reach, lines intact, care returned to RN  Continue to recommend STR upon d/c as pt requires extensive further skilled PT services to maximize indep and decrease burden of care  PT Discharge Recommendation: Post acute rehabilitation services     See flowsheet documentation for full assessment

## 2021-08-27 NOTE — PROGRESS NOTES
T[facility]  66   Progress Note Bernadene Fraction Day 1935, 80 y o  female MRN: [id number]  Unit/Bed#: MS Steiner-Rohit Encounter: [id number]  Primary Care Provider: Elizabet Escudero MD   Date and time admitted to hospital: 8/25/2021  4:58 PM    * Sepsis [facility])  Assessment & Plan    Results from last 7 days   Lab Units 08/25/21  2044 08/25/21  1739   BLOOD CULTURE  Gram Negative Han*  --    GRAM STAIN RESULT  Gram negative rods*  --    URINE CULTURE   --  >100,000 cfu/ml Gram Negative Han*     Patient meets sepsis criteria on admission  Sepsis likely secondary to UTI  Blood culture grew Gram-negative rods  Urine culture grew Gram-negative rods  Currently on Zosyn  ID consult, appreciate recommendation  Follow-up final culture and sensitivity results  Monitor WBC/hemodynamics      Vomiting  Assessment & Plan  She had multiple episodes of vomiting since last night  Kept NPO overnight  She did vomit this morning, brownish/greenish liquid  No fresh blood  GI consult, recommended to advance to clear liquid diet  Continue Zofran  QTC appears to be prolonged due to V paced rhythm with widened QRS  Corrected QTC less than 470      UTI (urinary tract infection)  Assessment & Plan  · See plan under sepsis  · Will give intravenous Zosyn for the patient      CKD (chronic kidney disease)  Assessment & Plan  Results from last 7 days   Lab Units 08/27/21  0613 08/25/21  1734 08/22/21  1042 08/21/21  0604   SODIUM mmol/L 146* 138 138 141   POTASSIUM mmol/L 3 1* 3 9 3 6 3 8   CHLORIDE mmol/L 99 93* 101 102   CO2 mmol/L 36* 28 28 27   ANION GAP mmol/L 11 17* 9 12   BUN mg/dL 41* 71* 56* 59*   CREATININE mg/dL 1 23* 1 75* 1 51* 1 42*   CALCIUM mg/dL 8 9 9 7 10 0 10 1   ALBUMIN g/dL  --  3 9  --   --    TOTAL BILIRUBIN mg/dL  --  0 71  --   --    ALK PHOS U/L  --  92 4  --   --    ALT U/L  --  27  --   --    AST U/L  --  26  --   --    EGFR ml/min/1 73sq m 40 26 31 33   GLUCOSE RANDOM mg/dL 147* 222* 237* 200*       Creatinine is greater than her baseline of 1 4-1 5 at 1 75 on admission  Creatinine is improving  Monitor BMP    Hypertension  Assessment & Plan  · Amlodipine and metoprolol with holding parameters  · Will hold Lasix currently in the setting of sepsis  Hyperlipidemia  Assessment & Plan  · Continue Lipitor  Chronic diastolic congestive heart failure (HCC)  Assessment & Plan  Wt Readings from Last 3 Encounters:   08/25/21 92 6 kg (204 lb 2 3 oz)   08/22/21 95 3 kg (210 lb 1 6 oz)   02/19/20 87 5 kg (193 lb)     Currently lasix on hold in light of sepsis  I's and O's, daily weight  Cardiology on board    Diabetes mellitus [facility])  Assessment & Plan  Lab Results   Component Value Date    HGBA1C 6 7 (H) 08/19/2021       Recent Labs     08/26/21  1610 08/26/21  2149 08/27/21  0648 08/27/21  1118   POCGLU 186* 168* 156* 155*       Blood Sugar Average: Last 72 hrs:  (P) 159 [id number]   Will give insulin sliding scale  Patient on Lantus and blood sugar monitoring    Constipation  Assessment & Plan  · On bowel regimen with p r n  Milk of magnesium  Ambulatory dysfunction  Assessment & Plan  · PT OT consult, recommended acute post rehab    Frequent falls  Assessment & Plan  · PT and OT evaluations , recommend acute post rehab    Age-related cognitive decline  Assessment & Plan  · Monitor closely, I suspect patient has dementia  · Supportive care      VTE Pharmacologic Prophylaxis:   VTE Score: 5 High Risk (Score >/= 5) - Pharmacological DVT Prophylaxis Ordered: Heparin  Sequential Compression Devices Ordered  Mechanical VTE Prophylaxis in Place: Yes    Patient Centered Rounds: I have performed bedside rounds with nursing staff today  Discussions with Specialists or Other Care Team Provider: yes    Current Length of Stay: 2 day(s)    Current Patient Status: Inpatient     Discharge Plan / Estimated Discharge Date: TBD    Code Status: Level 3 - DNAR and DNI      Subjective:   Patient was seen and examined the bedside  Patient reports her chest pain is improved  However complaining of nausea  She does have multiple episodes of vomiting overnight reported per RN  She was currently NPO  Not given any oral medication this morning  She had 1 episode of vomiting this morning, brownish/greenish liquidy vomitus, no fresh blood in it  Denies any abdominal pain  She reports she thinks she had bowel movement however she did not recall when was the last bowel movement  Objective:     Vitals:   Temp (24hrs), Av 5 °F (36 9 °C), Min:98 1 °F (36 7 °C), Max:99 2 °F (37 3 °C)    Temp:  [98 1 °F (36 7 °C)-99 2 °F (37 3 °C)] 98 4 °F (36 9 °C)  HR:  [77-92] 85  Resp:  [16-18] 18  BP: (118-145)/(54-67) 126/54  SpO2:  [90 %-99 %] 95 %  Body mass index is 39 87 kg/m²  Input and Output Summary (last 24 hours): Intake/Output Summary (Last 24 hours) at 2021 1607  Last data filed at 2021 0900  Gross per 24 hour   Intake --   Output 1900 ml   Net -1900 ml       Physical Exam:     Physical Exam  Vitals and nursing note reviewed  Constitutional:       General: She is not in acute distress  Appearance: She is well-developed  She is ill-appearing  HENT:      Head: Normocephalic and atraumatic  Eyes:      General: No scleral icterus  Conjunctiva/sclera: Conjunctivae normal    Cardiovascular:      Rate and Rhythm: Normal rate and regular rhythm  Pulses: Normal pulses  Heart sounds: No murmur heard  No gallop  Pulmonary:      Effort: Pulmonary effort is normal  No respiratory distress  Breath sounds: Normal breath sounds  No wheezing or rales  Abdominal:      General: Bowel sounds are normal  There is no distension  Palpations: Abdomen is soft  Tenderness: There is no abdominal tenderness  Musculoskeletal:      Cervical back: Neck supple  Right lower leg: No edema  Left lower leg: No edema  Skin:     General: Skin is warm and dry     Neurological:      Mental Status: She is alert  Mental status is at baseline     Psychiatric:         Mood and Affect: Mood normal          Behavior: Behavior normal          Additional Data:     Labs:  Results from last 7 days   Lab Units 08/27/21  0613 08/25/21  1734   WBC Thousand/uL 13 35* 23 15*   HEMOGLOBIN g/dL 12 5 14 1   HEMATOCRIT % 39 8 43 5   PLATELETS Thousands/uL 383 406*   NEUTROS PCT %  --  91*   LYMPHS PCT %  --  2*   MONOS PCT %  --  6   EOS PCT %  --  0     Results from last 7 days   Lab Units 08/27/21  0613 08/25/21  1734   SODIUM mmol/L 146* 138   POTASSIUM mmol/L 3 1* 3 9   CHLORIDE mmol/L 99 93*   CO2 mmol/L 36* 28   BUN mg/dL 41* 71*   CREATININE mg/dL 1 23* 1 75*   ANION GAP mmol/L 11 17*   CALCIUM mg/dL 8 9 9 7   ALBUMIN g/dL  --  3 9   TOTAL BILIRUBIN mg/dL  --  0 71   ALK PHOS U/L  --  92 4   ALT U/L  --  27   AST U/L  --  26   GLUCOSE RANDOM mg/dL 147* 222*     Results from last 7 days   Lab Units 08/25/21  1734   INR  1 01     Results from last 7 days   Lab Units 08/27/21  1118 08/27/21  0648 08/26/21  2149 08/26/21  1610 08/26/21  1054 08/26/21  0748 08/26/21  0013 08/22/21  1123 08/22/21  0825 08/21/21  2139 08/20/21  2235   POC GLUCOSE mg/dl 155* 156* 168* 186* 134 138 182* 184* 181* 213* 199*               Imaging: Reviewed radiology reports from this admission including: chest xray    Recent Cultures (last 7 days):     Results from last 7 days   Lab Units 08/25/21  2044 08/25/21  1739   BLOOD CULTURE  Gram Negative Han*  --    GRAM STAIN RESULT  Gram negative rods*  --    URINE CULTURE   --  >100,000 cfu/ml Gram Negative Han*       Lines/Drains:  Invasive Devices     Peripheral Intravenous Line            Peripheral IV 08/25/21 Left;Ventral (anterior) Forearm 2 days    Peripheral IV 08/25/21 Right Antecubital 1 day          Drain            Urethral Catheter Non-latex 16 Fr  1 day                Telemetry:   Telemetry Orders (From admission, onward)             48 Hour Telemetry Monitoring  Continuous x 48 hours Question:  Reason for 48 Hour Telemetry  Answer:  Acute MI, chest pain - R/O MI, or unstable angina                    Last 24 Hours Medication List:   Current Facility-Administered Medications   Medication Dose Route Frequency Provider Last Rate    acetaminophen  650 mg Oral Q6H PRN Mason Never, MD      aluminum-magnesium hydroxide-simethicone  30 mL Oral Q6H PRN Henrique Never, MD      aspirin  81 mg Oral Daily Henrique Never, MD      atorvastatin  10 mg Oral Daily With Fortino Grajeda MD      heparin (porcine)  5,000 Units Subcutaneous [facility] Henrique Never, MD      insulin glargine  8 Units Subcutaneous HS Ei Charity Anna MD      [START ON 8/28/2021] insulin lispro  1-5 Units Subcutaneous TID AC Ei Charity Anna MD      lactated ringers  50 mL/hr Intravenous Continuous Henrique Never, MD 50 mL/hr (08/26/21 1705)    levothyroxine  50 mcg Oral Early Morning Mason Never, MD      loperamide  2 mg Oral TID PRN Henrique Never, MD      magnesium hydroxide  30 mL Oral Daily PRN Henrique Never, MD      metoprolol tartrate  50 mg Oral BID Mason Never, MD      nystatin   Topical BID Henrique Never, MD      ondansetron  4 mg Intravenous Q4H PRN Magdalene Magana MD      oxyCODONE  2 5 mg Oral Q4H PRN Henrique Never, MD      pantoprazole  40 mg Intravenous Q12H [facility] KASIE Womack      piperacillin-tazobactam  3 375 g Intravenous Q8H Jaxon Moreira DO      polyethylene glycol  17 g Oral Daily WPS Betzy, KASIE      sodium chloride  1,000 mL/hr Intravenous Continuous Henrique Never, MD Stopped (08/25/21 8987)        Today, Patient Was Seen By: Magdalene Magana MD    ** Please Note: This note has been constructed using a voice recognition system   **

## 2021-08-27 NOTE — ASSESSMENT & PLAN NOTE
She had multiple episodes of vomiting since last night  Kept NPO overnight  She did vomit this morning, brownish/greenish liquid  No fresh blood  GI consult, recommended to advance to clear liquid diet  Continue Zofran  QTC appears to be prolonged due to V paced rhythm with widened QRS    Corrected QTC less than 470

## 2021-08-27 NOTE — ASSESSMENT & PLAN NOTE
Results from last 7 days   Lab Units 08/27/21  0613 08/25/21  1734 08/22/21  1042 08/21/21  0604   SODIUM mmol/L 146* 138 138 141   POTASSIUM mmol/L 3 1* 3 9 3 6 3 8   CHLORIDE mmol/L 99 93* 101 102   CO2 mmol/L 36* 28 28 27   ANION GAP mmol/L 11 17* 9 12   BUN mg/dL 41* 71* 56* 59*   CREATININE mg/dL 1 23* 1 75* 1 51* 1 42*   CALCIUM mg/dL 8 9 9 7 10 0 10 1   ALBUMIN g/dL  --  3 9  --   --    TOTAL BILIRUBIN mg/dL  --  0 71  --   --    ALK PHOS U/L  --  92 4  --   --    ALT U/L  --  27  --   --    AST U/L  --  26  --   --    EGFR ml/min/1 73sq m 40 26 31 33   GLUCOSE RANDOM mg/dL 147* 222* 237* 200*       Creatinine is greater than her baseline of 1 4-1 5 at 1 75 on admission  Creatinine is improving  Monitor BMP

## 2021-08-27 NOTE — CONSULTS
Consultation - Infectious Disease   Tiarra Grave Day 80 y o  female MRN: [id number]  Unit/Bed#: -01 Encounter: [id number]    Extensive review of the medical records in Epic including review of the notes, radiographs, and laboratory results  IMPRESSION/RECOMMENDATIONS:   1  Sepsis, POA:  Tachycardia, tachypnea, leukocytosis  Source of sepsis appears to be complicated UTI  Temperature curve improving, WBC is downtrending  a  Follow-up blood cultures  b  Antibiotic therapy as below  c  CBC in am   d  Monitor clinical response, temperature curve  2  Complicated UTI in the setting of urine retention: This is the likely source of sepsis and bacteremia  Deal cath inserted in ED due to urine retention- pt had urine output of nearly 2L following straight cath and Deal insertion  Urine cultures and blood culture both have growth of Gram-negative rods  Recent renal US was unremarkable  a  Follow urine and blood cultures  b  Continue Zosyn but increase to 3 375 IV q8 hours  3  GNR bacteremia: source of bacteremia is UTI   a  Follow urine and blood cultures  b  Continue Zosyn iv as above  4  CKD stage 3:  Baseline creatinine of around 1 4-1 5 is reported  a  Renal dose medications, avoid nephrotoxins  b  Repeat creatinine in a m   5  Diabetes mellitus type 2 with mild hyperglycemia:  Noted HbA1c of 6 7 on 08/19/2021  This is a risk factor for infection  a  Glycemic control per primary team  6  Morbid obesity with BMI of 39    My recommendations were discussed with the patient in detail who verbalized understanding  My recommendations were discussed with Dr Odilia Segovia, from the primary service  Thank you for allowing me to participate in the care of this patient  ID service will formally re-evaluate this patient on Monday  Please contact ID attending on call with questions prn this weekend      HISTORY OF PRESENT ILLNESS:  Reason for Consult: GNR bacteremia  CC: generalized weakness  HPI: Hastings Grave Day is a 80y o  year old female with PMH of CAD, severe aortic stenosis, s/p AVR in Mar 2017, CHF, DM2, dementia who was admitted on 8/25  Patient presented with generalized weakness, nausea, vomiting  She reports feeling generally weak for the past several days leading up to hospitalization  Due to persistent symptoms of generalized weakness and vomiting she presented to the emergency room from [facility]  Upon arrival, patient was noted to be short of breath and mildly hypoxic with O2S 90%  Pt was admitted given concerns of sepsis due to UTI  She was treated empirically with Zosyn IV  Her blood cultures returned positive for gram-negative rods  Pt had low-grade temp yesterday which resolved with Tylenol  She has had ongoing nausea and vomited also  She reports right hip pain  She has had generalized weakness with limited mobility while working with PT today  Pt reports a prior hx of UTI and says her last UTI was in Feb or March 2021  REVIEW OF SYSTEMS:  Constitutional: +generalized weakness  Negative for fever, chills  HENT: Negative runny nose, sore throat, congestion  Eyes: Negative for change in vision  Respiratory: Negative cough, shortness of breath  Cardiovascular: +chest pain (previously reported)  Gastrointestinal: +nausea, vomiting  Negative for abdominal pain, diarrhea  Genitourinary:  +Urine frequency and burning  Negative for hematuria  Musculoskeletal: +RT hip pain  Skin: Negative for rash, wound  Neurological: Negative for syncope, focal weakness  Hematological: Negative for excessive bruising, bleeding  Psychiatric/Behavioral: Negative for confusion, agitation        PAST MEDICAL HISTORY:  Past Medical History:   Diagnosis Date    Anemia     Resolved 3/1/2017     Arthritis     Knee - Resolved 3/1/2017     Blind     CAD (coronary artery disease)     s/p HERNAN 6/2016    Calcaneal spur     CHF (congestive heart failure) (HCC)     Chronic knee instability     Resolved 3/1/2017     Chronic pain syndrome     Resolved 3/1/2017     Dementia ([facility] [address] )     Last assessed 11/1/2017     Diabetes mellitus ([facility] [address] )     non-insulin depdenent    Disease of thyroid gland     Diverticulitis     Essential thrombocytopenia ([facility] [address] )     Last assessed 11/1/2017     GERD (gastroesophageal reflux disease)     History of aortic valve replacement     Resolved 3/1/2017     History of bilateral knee replacement     Resolved 3/1/2017     History of TIA (transient ischemic attack)     no residual deficits    Hyperlipidemia     Hypertension     Hypoxia     on home O2 QHS    Leukocytosis     Resolved 3/1/2017     Lumbar post-laminectomy syndrome     Resolved 3/1/2017     Meniere disease     Osteopenia     Pacemaker     CHB-Biotronik in 2/2015    S/P TAVR (transcatheter aortic valve replacement)     Resolved 3/1/2017     Severe aortic stenosis     Thrombocytosis ([facility] [address] )     Resolved 4/5/2017     Type 2 diabetes mellitus ([facility] [address] )     Last assessed 11/1/2017      Past Surgical History:   Procedure Laterality Date    APPENDECTOMY      BACK SURGERY      Arthrodesis     CARDIAC PACEMAKER PLACEMENT      CHOLECYSTECTOMY      EYE SURGERY      FRACTURE SURGERY Right 01/02/2018    femur    HYSTERECTOMY      [place] EGD TRANSORAL BIOPSY SINGLE/MULTIPLE N/A 11/9/2016    Procedure: ESOPHAGOGASTRODUODENOSCOPY (EGD); Surgeon: Ernie Villalobos MD;  [facility]: [facility];   Service: Gastroenterology    [place] OPEN RX FEMUR FX+INTRAMED GARRETT Right 1/2/2018    Procedure: INSERTION NAIL IM FEMUR ANTEGRADE (TROCHANTERIC) RIGHT;  Surgeon: Hakan Madison MD;  [facility];  Service: Orthopedics    [place] REPLACE AORTIC VALVE OPENFEMORAL ARTERY APPROACH N/A 7/26/2016    Procedure: TRANSFEMORAL TAVR WITH 23MM LAROSE LILY S3 VALVE; JOSE ALFREDO ;  Surgeon: Silvino Braun DO;  [facility];  Service: Cardiac Surgery    SMALL INTESTINE SURGERY      TONSILLECTOMY      TOTAL KNEE ARTHROPLASTY      VARICOSE VEIN SURGERY      VENTRAL HERNIA REPAIR       FAMILY HISTORY:  Family History   Problem Relation Age of Onset    Cancer Child     Coronary artery disease Father         Native artery     Stroke Family     Osteopenia Family     Other Family         Pituitary disease    Polycythemia Family      SOCIAL HISTORY:  Social History   Social History     Substance and Sexual Activity   Alcohol Use Not Currently     Social History     Substance and Sexual Activity   Drug Use No     Social History     Tobacco Use   Smoking Status Never Smoker   Smokeless Tobacco Never Used     ALLERGIES:  Allergies   Allergen Reactions    Advil [Ibuprofen] GI Intolerance    Propoxyphene Other (See Comments)     DARVOCET=ACID REFLUX    Rofecoxib Other (See Comments)     MEDICATIONS:  All current active medications have been reviewed    Antibiotics: Zosyn since 8/25  Scheduled Meds:  Current Facility-Administered Medications   Medication Dose Route Frequency Provider Last Rate    acetaminophen  650 mg Oral Q6H PRN Kj Spence MD      aluminum-magnesium hydroxide-simethicone  30 mL Oral Q6H PRN Kj Spence MD      aspirin  81 mg Oral Daily Kj Spence MD      atorvastatin  10 mg Oral Daily With Kendra Velez MD      heparin (porcine)  5,000 Units Subcutaneous [facility] Kj Spence MD      insulin glargine  8 Units Subcutaneous HS Ei Ella Mathur MD      insulin lispro  1-5 Units Subcutaneous Q6H Ei Ella Mathur MD      lactated ringers  50 mL/hr Intravenous Continuous Kj Spence MD 50 mL/hr (08/26/21 1705)    levothyroxine  50 mcg Oral Early Morning Kj Spence MD      loperamide  2 mg Oral TID PRN Kj Spence MD      magnesium hydroxide  30 mL Oral Daily PRN Kj Spence MD      metoprolol tartrate  50 mg Oral BID Kj Spence MD      nystatin   Topical BID Kj Spence MD      ondansetron  4 mg Intravenous Q4H PRN Vitaly Pandey MD      oxyCODONE  2 5 mg Oral Q4H PRN Danielle Tracey MD      pantoprazole  40 mg Intravenous Q12H [facility] KASIE Womack      piperacillin-tazobactam  2 25 g Intravenous Q6H Danielle Tracey MD 2 25 g (21 1036)    potassium chloride  20 mEq Intravenous Once Vitaly Pandey MD 20 mEq (21 1226)    sodium chloride  1,000 mL/hr Intravenous Continuous Danielle Tracey MD Stopped (21)     Continuous Infusions:lactated ringers, 50 mL/hr, Last Rate: 50 mL/hr (21 1705)  sodium chloride, 1,000 mL/hr, Last Rate: Stopped (21)      PRN Meds:   acetaminophen    aluminum-magnesium hydroxide-simethicone    loperamide    magnesium hydroxide    ondansetron    oxyCODONE     PHYSICAL EXAM:  Temp:  [98 1 °F (36 7 °C)-99 2 °F (37 3 °C)] 99 2 °F (37 3 °C)  HR:  [77-92] 82  Resp:  [16-18] 16  BP: (118-145)/(57-67) 118/57  SpO2:  [90 %-99 %] 96 %  Temp (24hrs), Av 5 °F (36 9 °C), Min:98 1 °F (36 7 °C), Max:99 2 °F (37 3 °C)  Current: Temperature: 99 2 °F (37 3 °C)    Intake/Output Summary (Last 24 hours) at 2021 1316  Last data filed at 2021 0900  Gross per 24 hour   Intake --   Output 2800 ml   Net -2800 ml     General Appearance:  Elderly woman, resting in bed, appears chronically ill, no acute distress   Head:  Normocephalic, without obvious abnormality, atraumatic   Eyes:  EOMI, Conjunctiva pink and sclera anicteric, both eyes   Nose: O2 2L via NC intact, nares normal, mucosa normal, no drainage   Throat: Oropharynx moist    Neck: Supple   Lungs:  Clear to auscultation bilaterally, respirations unlabored   Heart:   S1, S2, regular rate,rhythm, no murmur   LT ACW PPM noted with well healed surgical scar, no erythema or tenderness   Abdomen: Obese, soft, non-tender, non-distended   :  Deal catheter present draining urine with some sediment, no suprapubic or flank tenderness   Extremities:   No distal leg edema b/l   Skin: Healed b/l knee replacement scars, no knee tenderness on exam  Healed surgical scar RT hip  No rash   Neurologic: Alert and oriented x  3, conversant, fluent speech   Psychiatric: Calm, cooperative with exam and interview     LABS, IMAGING, & OTHER STUDIES:  Lab Results:  I have personally reviewed pertinent labs and cultures  Results from last 7 days   Lab Units 08/27/21  0613 08/25/21  1734   WBC Thousand/uL 13 35* 23 15*   HEMOGLOBIN g/dL 12 5 14 1   PLATELETS Thousands/uL 383 406*     Results from last 7 days   Lab Units 08/27/21  0613 08/25/21  1734 08/22/21  1042   SODIUM mmol/L 146* 138 138   POTASSIUM mmol/L 3 1* 3 9 3 6   CHLORIDE mmol/L 99 93* 101   CO2 mmol/L 36* 28 28   BUN mg/dL 41* 71* 56*   CREATININE mg/dL 1 23* 1 75* 1 51*   EGFR ml/min/1 73sq m 40 26 31   CALCIUM mg/dL 8 9 9 7 10 0   AST U/L  --  26  --    ALT U/L  --  27  --    ALK PHOS U/L  --  92 4  --      Results from last 7 days   Lab Units 08/25/21 2051 08/25/21 2044 08/25/21  1739   BLOOD CULTURE   --  Gram Negative Han*  --    GRAM STAIN RESULT   --  Gram negative rods*  --    URINE CULTURE   --   --  >100,000 cfu/ml Gram Negative Han*   MRSA CULTURE ONLY  No Methicillin Resistant Staphlyococcus aureus (MRSA) isolated  --   --      Imaging Studies:   8/20 US kidney/bladder:  No hydronephrosis, no perinephric fluid collection    8/25 pCXR:  No acute cardiopulmonary disease  Image personally reviewed  I have personally reviewed pertinent imaging study reports

## 2021-08-27 NOTE — ASSESSMENT & PLAN NOTE
Wt Readings from Last 3 Encounters:   08/25/21 92 6 kg (204 lb 2 3 oz)   08/22/21 95 3 kg (210 lb 1 6 oz)   02/19/20 87 5 kg (193 lb)     Currently lasix on hold in light of sepsis  I's and O's, daily weight  Cardiology on board

## 2021-08-27 NOTE — ASSESSMENT & PLAN NOTE
Lab Results   Component Value Date    HGBA1C 6 7 (H) 08/19/2021       Recent Labs     08/26/21  1610 08/26/21  2149 08/27/21  0648 08/27/21  1118   POCGLU 186* 168* 156* 155*       Blood Sugar Average: Last 72 hrs:  (P) 159 [id number]   Will give insulin sliding scale  Patient on Lantus and blood sugar monitoring

## 2021-08-27 NOTE — CONSULTS
Consultation - Cardiology   Adolfo Vásquez Day 80 y o  female MRN: [id number]  Unit/Bed#: -01 Encounter: [id number]    Assessment:  Principal Problem:    Sepsis ([facility] )  Active Problems:    Age-related cognitive decline    Frequent falls    Ambulatory dysfunction    Constipation    Diabetes mellitus ([facility] )    Chronic diastolic congestive heart failure (HCC)    Hyperlipidemia    Hypertension    CKD (chronic kidney disease)    UTI (urinary tract infection)    Vomiting      Sepsis  Positive blood culture  Gram-negative kindra  Infectious Disease consultation was noted  history of coronary artery disease with prior PCI  Current chest pain seems atypical related more to GI pathology and not coronary disease  V paced on her ECG  Recent exacerbation of diastolic heart failure  In the setting of sepsis, her diuretic was decreased on admission  Prior transcatheter aortic valve replacement  Plan:      The chest pain is atypical   This does not seem suspicious for an acute coronary syndrome  Her ECG is not helpful because of paced rhythm  I do not think we need to trend troponin  GI evaluation is noted  Continue her usual medications with regards to coronary disease  Volume status appears stable  Her diuretic was decreased in the setting of sepsis, but as this is adequately treated she should be resumed on her usual dose  She had a recent exacerbation of heart failure and also has chronic kidney disease  We need to monitor this carefully  While the QTC is noted as being prolonged, her QRS is wide given the paced rhythm  She has no ventricular arrhythmias noted on telemetry or otherwise  I think it is best to treat her nausea and think it is safe to give her Zofran As needed  with regards to the bacteremia, she does have a device in place and also has a bioprosthetic valve    If the bacteremia does not clear, are otherwise recommended from an ID standpoint, she may need JOSE ALFREDO to adequately visualize the valve and device leads, which would require transfer to one of the other campuses  History of Present Illness   Physician Requesting Consult: Shlomo Cross MD  Reason for Consult / Principal Problem: chest pain, prolonged QTc, diastolic CHF  HPI: Tiarra Hutton is a 80y o  year old female who   Recently had a 5 day admission at the [address] [place] [address] from the 17th to the 22nd of this month  She had presented with chest pain  No evidence of acute coronary syndrome  Some acute on chronic diastolic heart failure  Diuretics were adjusted  Discharged to rehab, and she returned here because of nausea, vomiting, abdominal pain  She is now found to have UTI and Gram-negative rods growing in a blood culture  Cardiac history includes coronary disease with prior PCI of the LAD  She had a transcatheter aortic valve replacement in the past   She has a dual chamber pacer in place  Most recent echo was in July  Her EF was preserved  TAVR bioprosthesis was normally functioning  Today, she complained of some chest pain  Shortly after, she vomited  She said after that, she was feeling better  This does not feel similar to her prior angina  ECG is paced  Troponin on admission was unremarkable  Currently, she denies any symptoms of chest pain to me  Additionally, I was asked about the safety of use of Zofran because her QRS is wide and her QT is being calculated as prolonged  Her volume status has appeared stable  On admission, because she was septic, her diuretic dose was decreased in half      Consults      Historical Information   Past Medical History:   Diagnosis Date    Anemia     Resolved 3/1/2017     Arthritis     Knee - Resolved 3/1/2017     Blind     CAD (coronary artery disease)     s/p HERNAN 6/2016    Calcaneal spur     CHF (congestive heart failure) (HCC)     Chronic knee instability     Resolved 3/1/2017     Chronic pain syndrome Resolved 3/1/2017     Dementia ([facility] )     Last assessed 11/1/2017     Diabetes mellitus ([facility] )     non-insulin depdenent    Disease of thyroid gland     Diverticulitis     Essential thrombocytopenia ([facility] )     Last assessed 11/1/2017     GERD (gastroesophageal reflux disease)     History of aortic valve replacement     Resolved 3/1/2017     History of bilateral knee replacement     Resolved 3/1/2017     History of TIA (transient ischemic attack)     no residual deficits    Hyperlipidemia     Hypertension     Hypoxia     on home O2 QHS    Leukocytosis     Resolved 3/1/2017     Lumbar post-laminectomy syndrome     Resolved 3/1/2017     Meniere disease     Osteopenia     Pacemaker     [employer] in 2/2015    S/P TAVR (transcatheter aortic valve replacement)     Resolved 3/1/2017     Severe aortic stenosis     Thrombocytosis ([facility] )     Resolved 4/5/2017     Type 2 diabetes mellitus ([facility] )     Last assessed 11/1/2017      Past Surgical History:   Procedure Laterality Date    APPENDECTOMY      BACK SURGERY      Arthrodesis     CARDIAC PACEMAKER PLACEMENT      CHOLECYSTECTOMY      EYE SURGERY      FRACTURE SURGERY Right 01/02/2018    femur    HYSTERECTOMY      AR EGD TRANSORAL BIOPSY SINGLE/MULTIPLE N/A 11/9/2016    Procedure: ESOPHAGOGASTRODUODENOSCOPY (EGD); Surgeon: Sinai Aviles MD;  [facility];   Service: Gastroenterology    AR OPEN RX FEMUR FX+INTRAMED GARRETT Right 1/2/2018    Procedure: INSERTION NAIL IM FEMUR ANTEGRADE (TROCHANTERIC) RIGHT;  Surgeon: Alma Rosa Matrínez MD;  [facility];  Service: Orthopedics    AR REPLACE AORTIC VALVE OPENFEMORAL ARTERY APPROACH N/A 7/26/2016    Procedure: TRANSFEMORAL TAVR WITH 23MM LAROSE LILY S3 VALVE; JOSE ALFREDO ;  Surgeon: Christophe Ceron DO;  [facility];  Service: Cardiac Surgery    SMALL INTESTINE SURGERY      TONSILLECTOMY      TOTAL KNEE ARTHROPLASTY      VARICOSE VEIN SURGERY      VENTRAL HERNIA REPAIR       Social History     Substance and Sexual Activity   Alcohol Use Not Currently     Social History     Substance and Sexual Activity   Drug Use No     Social History     Tobacco Use   Smoking Status Never Smoker   Smokeless Tobacco Never Used     Family History: non-contributory    Meds/Allergies     Current Facility-Administered Medications:     acetaminophen (TYLENOL) tablet 650 mg, 650 mg, Oral, Q6H PRN, Eliel Smith MD, 650 mg at 08/26/21 1055    aluminum-magnesium hydroxide-simethicone (MYLANTA) oral suspension 30 mL, 30 mL, Oral, Q6H PRN, Eliel Smith MD, 30 mL at 08/26/21 1705    aspirin chewable tablet 81 mg, 81 mg, Oral, Daily, Eliel Smith MD, 81 mg at 08/26/21 1056    atorvastatin (LIPITOR) tablet 10 mg, 10 mg, Oral, Daily With Ian Zavala MD, 10 mg at 08/26/21 1705    heparin (porcine) subcutaneous injection 5,000 Units, 5,000 Units, Subcutaneous, Q8H [address], 5,000 Units at 08/27/21 1459 **AND** Platelet count, , , Once, Eliel Smith MD    insulin glargine (LANTUS) subcutaneous injection 8 Units 0 08 mL, 8 Units, Subcutaneous, HS, Donell Ruiz MD    [START ON 8/28/2021] insulin lispro (HumaLOG) 100 units/mL subcutaneous injection 1-5 Units, 1-5 Units, Subcutaneous, TID AC **AND** Fingerstick Glucose (POCT), , , TID AC, Donell Ruiz MD    lactated ringers infusion, 50 mL/hr, Intravenous, Continuous, Eliel Smith MD, Last Rate: 50 mL/hr at 08/26/21 1705, 50 mL/hr at 08/26/21 1705    levothyroxine tablet 50 mcg, 50 mcg, Oral, Early Morning, Eliel Smith MD, 50 mcg at 08/26/21 5311    loperamide (IMODIUM) capsule 2 mg, 2 mg, Oral, TID PRN, Eliel Smith MD    magnesium hydroxide (MILK OF MAGNESIA) oral suspension 30 mL, 30 mL, Oral, Daily PRN, Eliel Smith MD    metoprolol tartrate (LOPRESSOR) tablet 50 mg, 50 mg, Oral, BID, Eliel Smith MD, 50 mg at 08/26/21 1707    nystatin (MYCOSTATIN) powder, , Topical, BID, Marie Vega MD, Given at 08/27/21 1046    ondansetron (ZOFRAN) injection 4 mg, 4 mg, Intravenous, Q4H PRN, Jose Day MD, 4 mg at 08/27/21 1226    oxyCODONE (ROXICODONE) IR tablet 2 5 mg, 2 5 mg, Oral, Q4H PRN, Marie Vega MD    pantoprazole (PROTONIX) injection 40 mg, 40 mg, Intravenous, Q12H [facility], Camilla Lj, PABLITONP, 40 mg at 08/27/21 1036    piperacillin-tazobactam (ZOSYN) 3 375 g in sodium chloride 0 9 % 100 mL IVPB, 3 375 g, Intravenous, Q8H, Jaxon Moreira DO    polyethylene glycol (MIRALAX) packet 17 g, 17 g, Oral, Daily, [employer], CRNP, 17 g at 08/27/21 1459    sodium chloride 0 9 % infusion, 1,000 mL/hr, Intravenous, Continuous, Marie Vega MD, Stopped at 08/25/21 1857  Allergies   Allergen Reactions    Advil [Ibuprofen] GI Intolerance    Propoxyphene Other (See Comments)     DARVOCET=ACID REFLUX    Rofecoxib Other (See Comments)       Objective   Vitals: Blood pressure 126/54, pulse 85, temperature 98 4 °F (36 9 °C), temperature source Oral, resp  rate 18, height 5' (1 524 m), weight 92 6 kg (204 lb 2 3 oz), SpO2 95 %  , Body mass index is 39 87 kg/m² ,   Orthostatic Blood Pressures      Most Recent Value   Blood Pressure  126/54 filed at 08/27/2021 1537   Patient Position - Orthostatic VS  Lying filed at 08/27/2021 1537            Intake/Output Summary (Last 24 hours) at 8/27/2021 1743  Last data filed at 8/27/2021 0900  Gross per 24 hour   Intake --   Output 1900 ml   Net -1900 ml       Invasive Devices     Peripheral Intravenous Line            Peripheral IV 08/25/21 Left;Ventral (anterior) Forearm 2 days    Peripheral IV 08/25/21 Right Antecubital 1 day          Drain            Urethral Catheter Non-latex 16 Fr  2 days                Physical Exam:  GEN: Anabel N Day A&O x 3  Laying flat in bed  Chronically ill appearing    HEENT: pupils equal, round, and reactive to light; extraocular muscles intact  NECK: supple, no carotid bruits   HEART: regular  + SANTO   LUNGS: clear to auscultation bilaterally; no wheezes, rales, or rhonchi   ABDOMEN: normal bowel sounds, soft, no tenderness, no distention  EXTREMITIES: mild edema  NEURO: nonfocal   SKIN: normal without suspicious lesions on exposed skin    Lab Results:     Results from last 7 days   Lab Units 08/26/21  1322 08/26/21  0436 08/25/21  1734   TROPONIN I ng/mL 0 03 0 05 0 03     Results from last 7 days   Lab Units 08/27/21  0613 08/25/21  1734   WBC Thousand/uL 13 35* 23 15*   HEMOGLOBIN g/dL 12 5 14 1   HEMATOCRIT % 39 8 43 5   PLATELETS Thousands/uL 383 406*         Results from last 7 days   Lab Units 08/27/21  0613 08/25/21  1734 08/22/21  1042   POTASSIUM mmol/L 3 1* 3 9 3 6   CHLORIDE mmol/L 99 93* 101   CO2 mmol/L 36* 28 28   BUN mg/dL 41* 71* 56*   CREATININE mg/dL 1 23* 1 75* 1 51*   CALCIUM mg/dL 8 9 9 7 10 0   ALK PHOS U/L  --  92 4  --    ALT U/L  --  27  --    AST U/L  --  26  --      Results from last 7 days   Lab Units 08/25/21  1734   INR  1 01   PTT seconds 21*     Results from last 7 days   Lab Units 08/21/21  0604   MAGNESIUM mg/dL 2 4       Imaging: I have personally reviewed pertinent films in PACS  XR chest 1 view portable    Result Date: 8/26/2021  Narrative: CHEST INDICATION:   chest pain  COMPARISON:  Chest radiograph from 2/10/2020 and chest CT from 7/14/2016  EXAM PERFORMED/VIEWS:  XR CHEST PORTABLE FINDINGS: Heart at upper limit of normal in size, TAVR, left subclavian pacemaker leads in right atrium and right ventricle  Coronary stents  Mild left base atelectasis with mild elevation of the left hemidiaphragm  No effusion or pneumothorax  Mild curvature of the spine  Impression: Mild left base atelectasis with mild elevation of the left hemidiaphragm  Workstation performed: [id number]     XR chest portable    Result Date: 8/18/2021  Narrative: CHEST INDICATION:   CP  COMPARISON:  3/1/2021 EXAM PERFORMED/VIEWS:  XR CHEST PORTABLE FINDINGS:  Hypoventilation is present    Bipolar pacemaker appears similar position to the prior study  The patient is status post TAVR  Cardiomediastinal silhouette appears unremarkable  The lungs are clear  No pneumothorax or pleural effusion  Degenerative changes of the right shoulder are noted  Impression: No acute cardiopulmonary disease  Workstation performed: [id number]     XR abdomen obstruction series    Result Date: 8/27/2021  Narrative: OBSTRUCTION SERIES INDICATION:   constipation/N/V, please evaluate stool pattern  COMPARISON:  8/22/2007; chest radiograph 8/25/2021 EXAM PERFORMED/VIEWS:  XR ABDOMEN OBSTRUCTION SERIES  2:47 PM FINDINGS: The study is technically limited due to patient body habitus  Gas is distributed throughout nondilated loops of large and small bowel which may reflect a mild ileus  Early/partial obstruction not excluded  No free air beneath the hemidiaphragms  No pathologic calcifications or soft tissue masses evident  Osteopenia present diffusely with degenerative change throughout the spine  Status post PLIF L4-5  Status post ORIF right intertrochanteric fracture  Examination of the chest reveals a normal cardiomediastinal silhouette  Small left greater than right pleural effusions noted  Transcatheter aortic valve replacement noted  Impression: Technically limited study  Nonspecific bowel gas pattern likely reflecting mild ileus  Early/partial obstruction is not excludable  Follow-up as clinically dictated  Workstation performed: [id number]     US kidney and bladder    Result Date: 8/21/2021  Narrative: RENAL ULTRASOUND INDICATION:   Renal failure  COMPARISON:  CT abdomen/pelvis 2/10/2020 TECHNIQUE:  Ultrasound of the retroperitoneum was performed with a curvilinear transducer utilizing volumetric sweeps and still imaging techniques  FINDINGS: KIDNEYS: Symmetric and normal size  Right kidney:  10 1 x 5 4 x 5 2 cm  Left kidney:  10 1 x 5 2 x 4 3 cm  Right kidney Normal echogenicity and contour   No suspicious masses detected  No hydronephrosis  No shadowing calculi  No perinephric fluid collections  Left kidney Normal echogenicity and contour  No suspicious masses detected  1 4 cm simple cyst in the midportion  No hydronephrosis  No shadowing calculi  No perinephric fluid collections  URETERS: Nonvisualized  BLADDER: Normally distended  No focal thickening or mass lesions  Bilateral ureteral jets detected  Impression: No hydronephrosis  Workstation performed: [id number]     [address] bedside procedure    Result Date: 8/17/2021  Narrative: 1 2 840 [id number]  2 446 161 [id number] 80 1      EKG: V paced    Telemetry: No significant arrhythmias seen   V paced

## 2021-08-27 NOTE — PHYSICAL THERAPY NOTE
PHYSICAL THERAPY TREATMENT  TIME: 6749-0712    NAME:  Argelia Hutton  DATE: 08/27/21    AGE:   80 y o  Mrn:   [id number]  Length Of Stay: 2    ADMIT DX:  UTI (urinary tract infection) [N39 0]  Weakness [R53 1]  Pneumonia of left lower lobe due to infectious organism [J18 9]    Past Medical History:   Diagnosis Date    Anemia     Resolved 3/1/2017     Arthritis     Knee - Resolved 3/1/2017     Blind     CAD (coronary artery disease)     s/p HERNAN 6/2016    Calcaneal spur     CHF (congestive heart failure) (HCC)     Chronic knee instability     Resolved 3/1/2017     Chronic pain syndrome     Resolved 3/1/2017     Dementia ([facility] )     Last assessed 11/1/2017     Diabetes mellitus ([facility] )     non-insulin depdenent    Disease of thyroid gland     Diverticulitis     Essential [facility] )     Last assessed 11/1/2017     GERD (gastroesophageal reflux disease)     History of aortic valve replacement     Resolved 3/1/2017     History of bilateral knee replacement     Resolved 3/1/2017     History of TIA (transient ischemic attack)     no residual deficits    Hyperlipidemia     Hypertension     Hypoxia     on home O2 QHS    Leukocytosis     Resolved 3/1/2017     Lumbar post-laminectomy syndrome     Resolved 3/1/2017     Meniere disease     Osteopenia     Pacemaker     [employer] in 2/2015    S/P TAVR (transcatheter aortic valve replacement)     Resolved 3/1/2017     Severe aortic stenosis     Thrombocytosis ([facility] )     Resolved 4/5/2017     Type 2 diabetes mellitus ([facility] )     Last assessed 11/1/2017      Past Surgical History:   Procedure Laterality Date    APPENDECTOMY      BACK SURGERY      Arthrodesis     CARDIAC PACEMAKER PLACEMENT      CHOLECYSTECTOMY      EYE SURGERY      FRACTURE SURGERY Right 01/02/2018    femur    HYSTERECTOMY      [place] EGD TRANSORAL BIOPSY SINGLE/MULTIPLE N/A 11/9/2016    Procedure: ESOPHAGOGASTRODUODENOSCOPY (EGD);   Surgeon: Balbir Burrows MD;  [facility];  Service: Gastroenterology    SD OPEN RX FEMUR FX+INTRAMED GARRETT Right 1/2/2018    Procedure: INSERTION NAIL IM FEMUR ANTEGRADE (TROCHANTERIC) RIGHT;  Surgeon: Alma Rosa Martínez MD;  [facility];  Service: Orthopedics    SD REPLACE AORTIC VALVE OPENFEMORAL ARTERY APPROACH N/A 7/26/2016    Procedure: TRANSFEMORAL TAVR WITH 23MM LAROSE LILY S3 VALVE; JOSE ALFREDO ;  Surgeon: Christophe Ceron DO;  [facility];  Service: Cardiac Surgery    SMALL INTESTINE SURGERY      TONSILLECTOMY      TOTAL KNEE ARTHROPLASTY      VARICOSE VEIN SURGERY      VENTRAL HERNIA REPAIR         Performed at least 2 patient identifiers during session: Name and ID bracelet        08/27/21 1418   PT Last Visit   PT Visit Date 08/27/21   Note Type   Note Type Treatment   Pain Assessment   Pain Assessment Tool 0-10   Pain Score 5   Pain Location/Orientation Orientation: Right;Location: Hip   Pain Radiating Towards non radiating   Pain Onset/Description Frequency: Intermittent; Descriptor: Aching   Effect of Pain on Daily Activities limits comfort and activity tolerance   Patient's Stated Pain Goal No pain   Hospital Pain Intervention(s) Repositioned   Multiple Pain Sites No   Restrictions/Precautions   Weight Bearing Precautions Per Order No   Other Precautions Bed Alarm;Multiple lines;Telemetry; Fall Risk;Pain   General   Chart Reviewed Yes   Additional Pertinent History Pt admitted 8/25/21 from [facility] with increased weakness and chest pain, UTI, sepsis, PNA     Response to Previous Treatment Patient unable to report, no changes reported from family or staff   Family/Caregiver Present No   Cognition   Overall Cognitive Status WFL  (questionable higher level cognition)   Arousal/Participation Lethargic;Persistent stimuli required   Attention Difficulty attending to directions   Following Commands Follows one step commands with increased time or repetition   Subjective   Subjective "I'm too tired "   Bed Mobility   Rolling R 2 Maximal assistance   Rolling L 1  Dependent   Additional Comments Semi-supine to/from long sit in bed with total A, pt providing no active effort to mobility  Transfers   Sit to Stand Unable to assess   Stand to Sit Unable to assess   Stand pivot Unable to assess   Ambulation/Elevation   Gait pattern Not tested; Not appropriate   Endurance Deficit   Endurance Deficit Yes   Activity Tolerance   Activity Tolerance Patient limited by fatigue   Medical Staff Made Aware Spoke with [id number] with RN Francia Gutierrez   Exercises   Heelslides 10 reps;AAROM; Bilateral  (x2 sets in semi-supine position)   Hip Abduction 10 reps;AAROM; Bilateral  (x2 sets in semi-supine position)   Hip Adduction 10 reps;AAROM; Bilateral  (x2 sets in semi-supine position)   Knee AROM Short Arc Quad 10 reps;AAROM; Bilateral  (x2 sets in semi-supine position)   Ankle Pumps 10 reps;AAROM; Bilateral  (x2 sets in semi-supine position)   Assessment   Prognosis Fair   Problem List Decreased strength;Decreased range of motion;Decreased endurance; Impaired balance;Decreased mobility; Impaired judgement;Decreased safety awareness; Obesity; Decreased skin integrity   Assessment Pt seen for PT treatment today with focus on further mobility training as able  Pt c/o ongoing nausea and extreme fatigue, requires increased stimulation/encouragement for active engagement and participation in session  Pt adamantly refusing attempt at short sit EOB, despite therapist maximal encouragement  Pt requires MAX-TOTAL A for rolling R<>L in bed, TOTAL A for semi-supine>long sit in bed (no active participation)  Pt completes above outlined LE AAROM exercises with 100% verbal and tactile cues from therapist, pt with eyes closed t/o exercises  T/o session pt appears diaphoretic however in no acute distress  Pt was left semi-supine in bed, bed alarm engaged, needs within reach, lines intact, care returned to RN   Continue to recommend STR upon d/c as pt requires extensive further skilled PT services to maximize indep and decrease burden of care  Goals   Patient Goals "to feel better"   STG Expiration Date 09/05/21   Short Term Goal #1 Patient PT goals established in order increase patient independence and decrease burden of care  Pt will complete all bed mobility in hospital bed with [facility], in order to promote increased OOB functional mobility to improve overall activity tolerance  Pt will complete all transfers with RW and MODA, in order to increase safety with functional mobility  Pt will ambulate >20ft with RW and MODA in order to increase safety with in facility distance functional mobility  Pt will demonstrate ability to self propel WC >25ft with S, in order to increase patient indep with mobility  Pt will improve B LE strength to >/= 3+/5 MMT throughout, in order to increase safety with functional mobility and decrease risk of falls  Pt will improve Low Function AM-PAC score to >/= 17/24 in order to increase independence with mobility and decrease burden of care  Pt will improve Barthel Index score to >/= 20/100 in order to increase independence and decrease risk of falls  Pt will tolerate >3hrs OOB in upright position, in order to improve muscular endurance and respiratory status  PT Treatment Day 1   Plan   Treatment/Interventions Functional transfer training;LE strengthening/ROM; Therapeutic exercise; Endurance training;Patient/family training;Equipment eval/education; Bed mobility;Gait training;Continued evaluation;Spoke to MD;Spoke to nursing;Spoke to case management;OT   Progress Slow progress, decreased activity tolerance   PT Frequency Other (Comment)  (3-5x/wk)   Recommendation   PT Discharge Recommendation Post acute rehabilitation services   AM-PAC Basic Mobility Inpatient   Turning in Bed Without Bedrails 1   Lying on Back to Sitting on Edge of Flat Bed 1   Moving Bed to Chair 1   Standing Up From Chair 1   Walk in Room 1   Climb 3-5 Stairs 1   Basic Mobility Inpatient Raw Score 6   Turning Head Towards Sound 2   Follow Simple Instructions 2   Low Function Basic Mobility Raw Score 10   Low Function Basic Mobility Standardized Score 14 65     The patient's AM-PAC Basic Mobility Inpatient Short Form Low Function Raw Score 10 , Standardized Score is 14 65  A standardized score less 42 9 suggests the patient may benefit from discharge to post-acute rehab services  Please also refer to the recommendation of the Physical Therapist for safe discharge planning      Derrick Mckinney PT, DPT   Available via [employer]  [facility] # [id number]  [place] License - [id number]  8/27/2466

## 2021-08-27 NOTE — ASSESSMENT & PLAN NOTE
Results from last 7 days   Lab Units 08/25/21 2044 08/25/21  1739   BLOOD CULTURE  Gram Negative Han*  --    GRAM STAIN RESULT  Gram negative rods*  --    URINE CULTURE   --  >100,000 cfu/ml Gram Negative Han*     Patient meets sepsis criteria on admission  Sepsis likely secondary to UTI  Blood culture grew Gram-negative rods    Urine culture grew Gram-negative rods  Currently on Zosyn  ID consult, appreciate recommendation  Follow-up final culture and sensitivity results  Monitor WBC/hemodynamics

## 2021-08-27 NOTE — ASSESSMENT & PLAN NOTE
· Amlodipine and metoprolol with holding parameters  · Will hold Lasix currently in the setting of sepsis

## 2021-08-27 NOTE — MALNUTRITION/BMI
This medical record reflects one or more clinical indicators suggestive of malnutrition and/or morbid obesity  Malnutrition Findings:              BMI Findings:  Adult BMI Classifications: Morbid Obesity 40-44 9     Body mass index is 39 87 kg/m²  See Nutrition note dated 8/27/2021 for additional details  Completed nutrition assessment is viewable in the nutrition documentation

## 2021-08-27 NOTE — CONSULTS
Consultation - [facility]   Sophronia Heft Day 80 y o  female MRN: [id number]  Unit/Bed#: -01 Encounter: [id number]        Inpatient consult to gastroenterology  Consult performed by: KASIE Berumen  Consult ordered by: Marlene Eldridge MD          Reason for Consult / Principal Problem:     Nausea/vomiting      ASSESSMENT AND PLAN:      1  Nausea/vomiting with brown vomitus   Nausea/vomiting may be secondary to underlying UTI, GERD, gastritis, gastroparesis, or PUD  Daughter reports patient had coke prior to vomiting last night  Will rule out obstruction given history of constipation and no recent bowel movements reported by nursing  Pt denies any abdominal pain or discomfort  Hgb dropped from 14 to 12 9 this morning however suspect this is mostly secondary to hemodilution  Rectal exam revealed brown stool  There is no evidence of overt GI bleeding       -continue to treat underlying UTI, ID consulted to due gram negative bacteremia  -continue PPI b i d   -Monitor CBC  -Zofran p r n     -obtain abdomen XR obstructive series  -Start Miralax daily per pt's home regimen  -start clear liquid diet, advance as tolerated  -Consider EGD pending course if cleared by cardiology     Thank you for the consultation  Case will be discussed with Dr Ariel Dumont  ______________________________________________________________________    HPI:  This is a 80 y o  female with past medical history of TIA, CAD s/p HERNAN 2016, CHF, Pacemaker, Aortic stenosis s/p TAVR, HTN, HLD, Dementia, DM type 2, CKD, hypothyroidism, GERD, diverticulitis s/p colon resection, chronic constipation, iron deficiency anemia on iron supplementation, and cholecystectomy presented to [facility] on 08/25/2021 from [facility] with generalized weakness, poor appetite, nausea/vomiting, and chest pain after vomiting  In the ED, she was tachycardic with   TMax 101 3  BUN 71, creatinine 1 75, WBC 23 15    Urinalysis positive for UTI   Chest x-ray showed mild left base atelectasis with mild elevation of left hemidiaphragm  Troponins negative x2  She was given 1000 mL bolus and started on IV antibiotics  Also started on PPI b i d  Last night she was noted to have brown vomitus, she was made NPO, and GI consulted due to concern for upper GI bleeding  Hemoglobin 14 1 on presentation, this morning is 12 5, suspect drop is likely hemodilutional due to fluids given in the ED  Nursing reports patient has any bowel movements recently  Pt reports she normally gets Miralax daily for constipation at the NH but this was held due to NPO status this morning  Rectal exam revealed soft brown stool  Pt denies any further nausea/vomiting, denies any abdominal pain or chest pain  She isn't on any blood thinners  She denies history of gastric ulcers  She reports poor appetite over the last week, but nausea/vomiting only started day of admission  She does not know if she's had EGD in the past,  last colonoscopy 2016 with sigmoid diverticulosis  Denies alcohol, tobacco, or drug use  Denies family history of colon cancer  REVIEW OF SYSTEMS:    CONSTITUTIONAL: Denies any fever, chills, rigors, and weight loss  HEENT: No earache or tinnitus  Denies hearing loss or visual disturbances  CARDIOVASCULAR: No chest pain or palpitations  RESPIRATORY: Denies any cough, hemoptysis, shortness of breath or dyspnea on exertion  GASTROINTESTINAL: As noted in the History of Present Illness  GENITOURINARY: No problems with urination  Denies any hematuria or dysuria  NEUROLOGIC: No dizziness or vertigo, denies headaches  MUSCULOSKELETAL: Denies any muscle or joint pain  SKIN: Denies skin rashes or itching  ENDOCRINE: Denies excessive thirst  Denies intolerance to heat or cold  PSYCHOSOCIAL: Denies depression or anxiety  Denies any recent memory loss         Historical Information   Past Medical History:   Diagnosis Date    Anemia     Resolved 3/1/2017     Arthritis     Knee - Resolved 3/1/2017     Blind     CAD (coronary artery disease)     s/p HERNAN 6/2016    Calcaneal spur     [facility]     Chronic knee instability     Resolved 3/1/2017     Chronic pain syndrome     Resolved 3/1/2017     Dementia ([facility] [address] )     Last assessed 11/1/2017     Diabetes mellitus ([facility] [place] [address] )     non-insulin depdenent    Disease of thyroid gland     Diverticulitis     Essential thrombocytopenia ([facility] [place] [address] )     Last assessed 11/1/2017     GERD (gastroesophageal reflux disease)     History of aortic valve replacement     Resolved 3/1/2017     History of bilateral knee replacement     Resolved 3/1/2017     History of TIA (transient ischemic attack)     no residual deficits    Hyperlipidemia     Hypertension     Hypoxia     on home O2 QHS    Leukocytosis     Resolved 3/1/2017     Lumbar post-laminectomy syndrome     Resolved 3/1/2017     Meniere disease     Osteopenia     Pacemaker     CHB-Biotronik in 2/2015    S/P TAVR (transcatheter aortic valve replacement)     Resolved 3/1/2017     Severe aortic stenosis     Thrombocytosis ([facility] [place] [address] )     Resolved 4/5/2017     Type 2 diabetes mellitus ([facility] [place] [address] )     Last assessed 11/1/2017      Past Surgical History:   Procedure Laterality Date    APPENDECTOMY      BACK SURGERY      Arthrodesis     CARDIAC PACEMAKER PLACEMENT      CHOLECYSTECTOMY      EYE SURGERY      FRACTURE SURGERY Right 01/02/2018    femur    HYSTERECTOMY      IN EGD TRANSORAL BIOPSY SINGLE/MULTIPLE N/A 11/9/2016    Procedure: ESOPHAGOGASTRODUODENOSCOPY (EGD); Surgeon: Yeimi Cardoza MD;  Location: [facility];   Service: Gastroenterology    IN OPEN RX FEMUR FX+INTRAMED GARRETT Right 1/2/2018    Procedure: INSERTION NAIL IM FEMUR ANTEGRADE (TROCHANTERIC) RIGHT;  Surgeon: Marcus Rees MD;  Location[facility];  Service: Orthopedics    IN REPLACE AORTIC VALVE OPENFEMORAL ARTERY APPROACH N/A 7/26/2016    Procedure: TRANSFEMORAL TAVR WITH 23MM LAROSE LILY S3 VALVE; JOSE ALFREDO ;  Surgeon: Ramin Flor DO;  [facility];  Service: Cardiac Surgery    SMALL INTESTINE SURGERY      TONSILLECTOMY      TOTAL KNEE ARTHROPLASTY      VARICOSE VEIN SURGERY      VENTRAL HERNIA REPAIR       Social History   Social History     Substance and Sexual Activity   Alcohol Use Not Currently     Social History     Substance and Sexual Activity   Drug Use No     Social History     Tobacco Use   Smoking Status Never Smoker   Smokeless Tobacco Never Used     Family History   Problem Relation Age of Onset    Cancer Child     Coronary artery disease Father         Native artery     Stroke Family     Osteopenia Family     Other Family         Pituitary disease    Polycythemia Family        Meds/Allergies     Medications Prior to Admission   Medication    acetaminophen (TYLENOL) 325 mg tablet    amLODIPine (NORVASC) 5 mg tablet    amoxicillin (AMOXIL) 500 mg capsule    aspirin 81 mg chewable tablet    atorvastatin (LIPITOR) 10 mg tablet    diclofenac sodium (VOLTAREN) 1 %    ferrous sulfate 325 (65 Fe) mg tablet    furosemide (LASIX) 40 mg tablet    Glucagon, rDNA, (Glucagon Emergency) 1 MG KIT    insulin glargine (LANTUS) 100 units/mL subcutaneous injection    ketoconazole (NIZORAL) 2 % cream    levothyroxine 50 mcg tablet    loperamide (IMODIUM A-D) 2 MG tablet    metoprolol tartrate (LOPRESSOR) 50 mg tablet    Multiple Vitamins-Minerals (OCUVITE ADULT 50+ PO)    nystatin (MYCOSTATIN) powder    oxyCODONE (ROXICODONE) 5 mg immediate release tablet    potassium chloride (K-DUR,KLOR-CON) 20 mEq tablet    tetrahydrozoline (VISINE) 0 05 % ophthalmic solution     Current Facility-Administered Medications   Medication Dose Route Frequency    acetaminophen (TYLENOL) tablet 650 mg  650 mg Oral Q6H PRN    aluminum-magnesium hydroxide-simethicone (MYLANTA) oral suspension 30 mL  30 mL Oral Q6H PRN    aspirin chewable tablet 81 mg  81 mg Oral Daily    atorvastatin (LIPITOR) tablet 10 mg  10 mg Oral Daily With Dinner    heparin (porcine) subcutaneous injection 5,000 Units  5,000 Units Subcutaneous Q8H [address]    insulin glargine (LANTUS) subcutaneous injection 8 Units 0 08 mL  8 Units Subcutaneous HS    insulin lispro (HumaLOG) 100 units/mL subcutaneous injection 1-5 Units  1-5 Units Subcutaneous Q6H    lactated ringers infusion  50 mL/hr Intravenous Continuous    levothyroxine tablet 50 mcg  50 mcg Oral Early Morning    loperamide (IMODIUM) capsule 2 mg  2 mg Oral TID PRN    magnesium hydroxide (MILK OF MAGNESIA) oral suspension 30 mL  30 mL Oral Daily PRN    metoprolol tartrate (LOPRESSOR) tablet 50 mg  50 mg Oral BID    nystatin (MYCOSTATIN) powder   Topical BID    ondansetron (ZOFRAN) injection 4 mg  4 mg Intravenous Q4H PRN    oxyCODONE (ROXICODONE) IR tablet 2 5 mg  2 5 mg Oral Q4H PRN    pantoprazole (PROTONIX) injection 40 mg  40 mg Intravenous Q12H CORDELIA    piperacillin-tazobactam (ZOSYN) 2 25 g in sodium chloride 0 9 % 50 mL IVPB  2 25 g Intravenous Q6H    potassium chloride 20 mEq IVPB (premix)  20 mEq Intravenous Once    sodium chloride 0 9 % infusion  1,000 mL/hr Intravenous Continuous       Allergies   Allergen Reactions    Advil [Ibuprofen] GI Intolerance    Propoxyphene Other (See Comments)     DARVOCET=ACID REFLUX    Rofecoxib Other (See Comments)           Objective     Blood pressure 118/57, pulse 82, temperature 99 2 °F (37 3 °C), temperature source Tympanic, resp  rate 16, height 5' (1 524 m), weight 92 6 kg (204 lb 2 3 oz), SpO2 96 %  Body mass index is 39 87 kg/m²  Intake/Output Summary (Last 24 hours) at 8/27/2021 1254  Last data filed at 8/27/2021 0900  Gross per 24 hour   Intake --   Output 2800 ml   Net -2800 ml         PHYSICAL EXAM:      General Appearance:   Alert, cooperative, no distressl; AOx3   HEENT:   Normocephalic, atraumatic, anicteric       Neck:  Supple, symmetrical, trachea midline   Lungs:   Clear to auscultation bilaterally; no rales, rhonchi or wheezing; respirations unlabored    Heart[de-identified]   Regular rate and rhythm; no murmur, rub, or gallop     Abdomen:   Soft, non-tender, slightly distended; normal bowel sounds; no masses, no organomegaly    Genitalia:   Deferred    Rectal:   Deferred    Extremities:  No cyanosis, clubbing or edema    Pulses:  2+ and symmetric all extremities    Skin:  No jaundice, rashes, or lesions    Lymph nodes:  No palpable cervical lymphadenopathy        Lab Results:   Admission on 08/25/2021   Component Date Value    WBC 08/25/2021 23 15*    RBC 08/25/2021 4 75     Hemoglobin 08/25/2021 14 1     Hematocrit 08/25/2021 43 5     MCV 08/25/2021 92     MCH 08/25/2021 29 7     MCHC 08/25/2021 32 4     RDW 08/25/2021 13 7     MPV 08/25/2021 11 1     Platelets 79/92/8512 406*    Neutrophils Relative 08/25/2021 91*    Immat GRANS % 08/25/2021 1     Lymphocytes Relative 08/25/2021 2*    Monocytes Relative 08/25/2021 6     Eosinophils Relative 08/25/2021 0     Basophils Relative 08/25/2021 0     Neutrophils Absolute 08/25/2021 21 26*    Immature Grans Absolute 08/25/2021 0 17     Lymphocytes Absolute 08/25/2021 0 42*    Monocytes Absolute 08/25/2021 1 27*    Eosinophils Absolute 08/25/2021 0 00     Basophils Absolute 08/25/2021 0 03     Sodium 08/25/2021 138     Potassium 08/25/2021 3 9     Chloride 08/25/2021 93*    CO2 08/25/2021 28     ANION GAP 08/25/2021 17*    BUN 08/25/2021 71*    Creatinine 08/25/2021 1 75*    Glucose 08/25/2021 222*    Calcium 08/25/2021 9 7     AST 08/25/2021 26     ALT 08/25/2021 27     Alkaline Phosphatase 08/25/2021 92 4     Total Protein 08/25/2021 8 0     Albumin 08/25/2021 3 9     Total Bilirubin 08/25/2021 0 71     eGFR 08/25/2021 26     Troponin I 08/25/2021 0 03     Protime 08/25/2021 11 4     INR 08/25/2021 1 01     PTT 08/25/2021 21*    BNP 08/25/2021 164 1*    Lipase 08/25/2021 46     Color, UA 08/25/2021 Yellow  Clarity, UA 08/25/2021 Cloudy*    Specific Gravity, UA 08/25/2021 1 015     pH, UA 08/25/2021 5 5     Leukocytes, UA 08/25/2021 3+*    Nitrite, UA 08/25/2021 Negative     Protein, UA 08/25/2021 Trace*    Glucose, UA 08/25/2021 Negative     Ketones, UA 08/25/2021 Negative     Urobilinogen, UA 08/25/2021 0 2     Bilirubin, UA 08/25/2021 Negative     Blood, UA 08/25/2021 3+*    RBC, UA 08/25/2021 Innumerable*    WBC, UA 08/25/2021 Innumerable*    Epithelial Cells 08/25/2021 Moderate*    Bacteria, UA 08/25/2021 Innumerable*    Urine Culture 08/25/2021 >100,000 cfu/ml Gram Negative Han*    MRSA Culture Only 08/25/2021 No Methicillin Resistant Staphlyococcus aureus (MRSA) isolated     Blood Culture 08/25/2021 Gram Negative Han*    Gram Stain Result 08/25/2021 Gram negative rods*    POC Glucose 08/26/2021 182*    Troponin I 08/26/2021 0 05     POC Glucose 08/26/2021 138     POC Glucose 08/26/2021 134     Troponin I 08/26/2021 0 03     Ventricular Rate 08/26/2021 93     Atrial Rate 08/26/2021 93     CT Interval 08/26/2021 170     QRSD Interval 08/26/2021 192     QT Interval 08/26/2021 474     QTC Interval 08/26/2021 590     P Axis 08/26/2021 74     QRS Axis 08/26/2021 -69     T Wave Axis 08/26/2021 106     Ventricular Rate 08/25/2021 122     Atrial Rate 08/25/2021 129     CT Interval 08/25/2021 72     QRSD Interval 08/25/2021 169     QT Interval 08/25/2021 390     QTC Interval 08/25/2021 556     P Axis 08/25/2021 0     QRS Axis 08/25/2021 -74     T Wave Axis 08/25/2021 91     Ventricular Rate 08/25/2021 122     Atrial Rate 08/25/2021 129     CT Interval 08/25/2021 72     QRSD Interval 08/25/2021 169     QT Interval 08/25/2021 390     QTC Interval 08/25/2021 556     P Axis 08/25/2021 0     QRS Axis 08/25/2021 -74     T Wave Axis 08/25/2021 91     POC Glucose 08/26/2021 186*    POC Glucose 08/26/2021 168*    WBC 08/27/2021 13 35*    RBC 08/27/2021 4 22     Hemoglobin 08/27/2021 12 5     Hematocrit 08/27/2021 39 8     MCV 08/27/2021 94     MCH 08/27/2021 29 6     MCHC 08/27/2021 31 4     RDW 08/27/2021 13 3     Platelets 22/98/4233 383     MPV 08/27/2021 10 9     Sodium 08/27/2021 146*    Potassium 08/27/2021 3 1*    Chloride 08/27/2021 99     CO2 08/27/2021 36*    ANION GAP 08/27/2021 11     BUN 08/27/2021 41*    Creatinine 08/27/2021 1 23*    Glucose 08/27/2021 147*    Calcium 08/27/2021 8 9     eGFR 08/27/2021 40     POC Glucose 08/27/2021 156*       Imaging Studies: I have personally reviewed pertinent imaging studies

## 2021-08-28 ENCOUNTER — APPOINTMENT (INPATIENT)
Dept: CT IMAGING | Facility: HOSPITAL | Age: 86
DRG: 872 | End: 2021-08-28
Payer: MEDICARE

## 2021-08-28 ENCOUNTER — APPOINTMENT (INPATIENT)
Dept: RADIOLOGY | Facility: HOSPITAL | Age: 86
DRG: 872 | End: 2021-08-28
Payer: MEDICARE

## 2021-08-28 PROBLEM — K56.600 PARTIAL SMALL BOWEL OBSTRUCTION (HCC): Status: ACTIVE | Noted: 2021-08-27

## 2021-08-28 PROBLEM — E87.0 HYPERNATREMIA: Status: ACTIVE | Noted: 2021-08-28

## 2021-08-28 LAB
ANION GAP SERPL CALCULATED.3IONS-SCNC: 10 MMOL/L (ref 4–13)
BACTERIA BLD CULT: ABNORMAL
BACTERIA UR CULT: ABNORMAL
BASOPHILS # BLD AUTO: 0.03 THOUSANDS/ΜL (ref 0–0.1)
BASOPHILS NFR BLD AUTO: 0 % (ref 0–1)
BUN SERPL-MCNC: 41 MG/DL (ref 6–20)
CALCIUM SERPL-MCNC: 9.4 MG/DL (ref 8.4–10.2)
CHLORIDE SERPL-SCNC: 103 MMOL/L (ref 96–108)
CO2 SERPL-SCNC: 37 MMOL/L (ref 22–33)
CREAT SERPL-MCNC: 1.29 MG/DL (ref 0.4–1.1)
EOSINOPHIL # BLD AUTO: 0.1 THOUSAND/ΜL (ref 0–0.61)
EOSINOPHIL NFR BLD AUTO: 1 % (ref 0–6)
ERYTHROCYTE [DISTWIDTH] IN BLOOD BY AUTOMATED COUNT: 13.3 % (ref 11.6–15.1)
GFR SERPL CREATININE-BSD FRML MDRD: 38 ML/MIN/1.73SQ M
GLUCOSE SERPL-MCNC: 101 MG/DL (ref 65–140)
GLUCOSE SERPL-MCNC: 106 MG/DL (ref 65–140)
GLUCOSE SERPL-MCNC: 131 MG/DL (ref 65–140)
GLUCOSE SERPL-MCNC: 133 MG/DL (ref 65–140)
GLUCOSE SERPL-MCNC: 97 MG/DL (ref 65–140)
GRAM STN SPEC: ABNORMAL
HCT VFR BLD AUTO: 39.5 % (ref 34.8–46.1)
HGB BLD-MCNC: 12.2 G/DL (ref 11.5–15.4)
IMM GRANULOCYTES # BLD AUTO: 0.15 THOUSAND/UL (ref 0–0.2)
IMM GRANULOCYTES NFR BLD AUTO: 1 % (ref 0–2)
LYMPHOCYTES # BLD AUTO: 1.56 THOUSANDS/ΜL (ref 0.6–4.47)
LYMPHOCYTES NFR BLD AUTO: 13 % (ref 14–44)
MAGNESIUM SERPL-MCNC: 2.3 MG/DL (ref 1.6–2.6)
MCH RBC QN AUTO: 30 PG (ref 26.8–34.3)
MCHC RBC AUTO-ENTMCNC: 30.9 G/DL (ref 31.4–37.4)
MCV RBC AUTO: 97 FL (ref 82–98)
MONOCYTES # BLD AUTO: 1.36 THOUSAND/ΜL (ref 0.17–1.22)
MONOCYTES NFR BLD AUTO: 11 % (ref 4–12)
NEUTROPHILS # BLD AUTO: 9.26 THOUSANDS/ΜL (ref 1.85–7.62)
NEUTS SEG NFR BLD AUTO: 74 % (ref 43–75)
PLATELET # BLD AUTO: 400 THOUSANDS/UL (ref 149–390)
PMV BLD AUTO: 11.2 FL (ref 8.9–12.7)
POTASSIUM SERPL-SCNC: 3.2 MMOL/L (ref 3.5–5)
POTASSIUM SERPL-SCNC: 3.3 MMOL/L (ref 3.5–5)
RBC # BLD AUTO: 4.07 MILLION/UL (ref 3.81–5.12)
SODIUM SERPL-SCNC: 150 MMOL/L (ref 133–145)
WBC # BLD AUTO: 12.46 THOUSAND/UL (ref 4.31–10.16)

## 2021-08-28 PROCEDURE — 99223 1ST HOSP IP/OBS HIGH 75: CPT | Performed by: SURGERY

## 2021-08-28 PROCEDURE — 99232 SBSQ HOSP IP/OBS MODERATE 35: CPT | Performed by: INTERNAL MEDICINE

## 2021-08-28 PROCEDURE — 84132 ASSAY OF SERUM POTASSIUM: CPT | Performed by: INTERNAL MEDICINE

## 2021-08-28 PROCEDURE — 99233 SBSQ HOSP IP/OBS HIGH 50: CPT | Performed by: INTERNAL MEDICINE

## 2021-08-28 PROCEDURE — 83735 ASSAY OF MAGNESIUM: CPT | Performed by: INTERNAL MEDICINE

## 2021-08-28 PROCEDURE — 74176 CT ABD & PELVIS W/O CONTRAST: CPT

## 2021-08-28 PROCEDURE — 85025 COMPLETE CBC W/AUTO DIFF WBC: CPT | Performed by: INTERNAL MEDICINE

## 2021-08-28 PROCEDURE — C9113 INJ PANTOPRAZOLE SODIUM, VIA: HCPCS | Performed by: NURSE PRACTITIONER

## 2021-08-28 PROCEDURE — 80048 BASIC METABOLIC PNL TOTAL CA: CPT | Performed by: INTERNAL MEDICINE

## 2021-08-28 PROCEDURE — G1004 CDSM NDSC: HCPCS

## 2021-08-28 PROCEDURE — 71045 X-RAY EXAM CHEST 1 VIEW: CPT

## 2021-08-28 PROCEDURE — 82948 REAGENT STRIP/BLOOD GLUCOSE: CPT

## 2021-08-28 RX ORDER — POTASSIUM CHLORIDE 20 MEQ/1
20 TABLET, EXTENDED RELEASE ORAL DAILY
Status: DISCONTINUED | OUTPATIENT
Start: 2021-08-28 | End: 2021-08-28

## 2021-08-28 RX ORDER — FUROSEMIDE 80 MG
80 TABLET ORAL
Status: DISCONTINUED | OUTPATIENT
Start: 2021-08-28 | End: 2021-08-28

## 2021-08-28 RX ORDER — POTASSIUM CHLORIDE 20 MEQ/1
40 TABLET, EXTENDED RELEASE ORAL ONCE
Status: COMPLETED | OUTPATIENT
Start: 2021-08-28 | End: 2021-08-28

## 2021-08-28 RX ORDER — POTASSIUM CHLORIDE 14.9 MG/ML
20 INJECTION INTRAVENOUS ONCE
Status: COMPLETED | OUTPATIENT
Start: 2021-08-28 | End: 2021-08-29

## 2021-08-28 RX ORDER — POTASSIUM CHLORIDE 14.9 MG/ML
20 INJECTION INTRAVENOUS ONCE
Status: COMPLETED | OUTPATIENT
Start: 2021-08-28 | End: 2021-08-28

## 2021-08-28 RX ORDER — DEXTROSE AND SODIUM CHLORIDE 5; .45 G/100ML; G/100ML
75 INJECTION, SOLUTION INTRAVENOUS CONTINUOUS
Status: DISCONTINUED | OUTPATIENT
Start: 2021-08-28 | End: 2021-08-29

## 2021-08-28 RX ADMIN — METOPROLOL TARTRATE 25 MG: 25 TABLET, FILM COATED ORAL at 08:47

## 2021-08-28 RX ADMIN — POTASSIUM CHLORIDE 20 MEQ: 14.9 INJECTION, SOLUTION INTRAVENOUS at 15:54

## 2021-08-28 RX ADMIN — PANTOPRAZOLE SODIUM 40 MG: 40 INJECTION, POWDER, FOR SOLUTION INTRAVENOUS at 08:46

## 2021-08-28 RX ADMIN — HEPARIN SODIUM 5000 UNITS: 5000 INJECTION INTRAVENOUS; SUBCUTANEOUS at 13:36

## 2021-08-28 RX ADMIN — POTASSIUM CHLORIDE 20 MEQ: 14.9 INJECTION, SOLUTION INTRAVENOUS at 23:47

## 2021-08-28 RX ADMIN — PANTOPRAZOLE SODIUM 40 MG: 40 INJECTION, POWDER, FOR SOLUTION INTRAVENOUS at 20:52

## 2021-08-28 RX ADMIN — FUROSEMIDE 80 MG: 80 TABLET ORAL at 08:47

## 2021-08-28 RX ADMIN — NYSTATIN: 100000 POWDER TOPICAL at 10:59

## 2021-08-28 RX ADMIN — DEXTROSE AND SODIUM CHLORIDE 50 ML/HR: 5; .45 INJECTION, SOLUTION INTRAVENOUS at 10:58

## 2021-08-28 RX ADMIN — NYSTATIN: 100000 POWDER TOPICAL at 18:21

## 2021-08-28 RX ADMIN — MEROPENEM 1000 MG: 1 INJECTION, POWDER, FOR SOLUTION INTRAVENOUS at 23:48

## 2021-08-28 RX ADMIN — POTASSIUM CHLORIDE 40 MEQ: 1500 TABLET, EXTENDED RELEASE ORAL at 08:46

## 2021-08-28 RX ADMIN — MEROPENEM 1000 MG: 1 INJECTION, POWDER, FOR SOLUTION INTRAVENOUS at 13:36

## 2021-08-28 RX ADMIN — HEPARIN SODIUM 5000 UNITS: 5000 INJECTION INTRAVENOUS; SUBCUTANEOUS at 05:04

## 2021-08-28 RX ADMIN — MEROPENEM 1000 MG: 1 INJECTION, POWDER, FOR SOLUTION INTRAVENOUS at 01:12

## 2021-08-28 RX ADMIN — POTASSIUM CHLORIDE 20 MEQ: 1500 TABLET, EXTENDED RELEASE ORAL at 08:46

## 2021-08-28 RX ADMIN — LEVOTHYROXINE SODIUM 50 MCG: 50 TABLET ORAL at 05:04

## 2021-08-28 NOTE — ASSESSMENT & PLAN NOTE
Patient had multiple episodes of vomiting yesterday  Currently no vomiting however she complains of abdominal distention  Abdomen is test and distended compared to yesterday  Stat CT abdomen pelvis showed dilated small bowel loops in the mid to lower abdomen with normal caliber the distal ileum, suggest incomplete low-grade partial small-bowel obstruction  Will keep NPO  NG tube insertion  Surgery consult  Serial abdominal examination  Continue Zofran  QTC appears to be prolonged due to V paced rhythm with widened QRS    Corrected QTC less than 470

## 2021-08-28 NOTE — ASSESSMENT & PLAN NOTE
Potassium 3 2  Patient was given oral potassium and oral Lasix this morning  Will give IV potassium 20 mEq  Repeat potassium in p m

## 2021-08-28 NOTE — ASSESSMENT & PLAN NOTE
Results from last 7 days   Lab Units 08/25/21 2044 08/25/21  1739   BLOOD CULTURE  Escherichia coli ESBL*  --    GRAM STAIN RESULT  Gram negative rods*  --    URINE CULTURE   --  >100,000 cfu/ml Escherichia coli ESBL*     Patient meets sepsis criteria on admission  Sepsis likely secondary to UTI  Blood culture and urine culture growing ESBL E coli  ID consult, appreciate recommendation  Currently on meropenem  Monitor WBC/hemodynamics

## 2021-08-28 NOTE — ASSESSMENT & PLAN NOTE
Results from last 7 days   Lab Units 08/28/21  0458 08/27/21  0613 08/25/21  1734 08/22/21  1042   SODIUM mmol/L 150* 146* 138 138   POTASSIUM mmol/L 3 2* 3 1* 3 9 3 6   CHLORIDE mmol/L 103 99 93* 101   CO2 mmol/L 37* 36* 28 28   ANION GAP mmol/L 10 11 17* 9   BUN mg/dL 41* 41* 71* 56*   CREATININE mg/dL 1 29* 1 23* 1 75* 1 51*   CALCIUM mg/dL 9 4 8 9 9 7 10 0   ALBUMIN g/dL  --   --  3 9  --    TOTAL BILIRUBIN mg/dL  --   --  0 71  --    ALK PHOS U/L  --   --  92 4  --    ALT U/L  --   --  27  --    AST U/L  --   --  26  --    EGFR ml/min/1 73sq m 38 40 26 31   GLUCOSE RANDOM mg/dL 97 147* 222* 237*       Creatinine is greater than her baseline of 1 4-1 5  Currently creatinine at baseline  Monitor BMP

## 2021-08-28 NOTE — PROGRESS NOTES
[facility]  Progress Note - Mary Alice Gonzalez Day 80 y o  female MRN: [id number]    Unit/Bed#: -01 Encounter: [id number]    Assessment/Plan:    1  Partial small-bowel obstruction-recommend IV fluids  NG tube as recommended previously  Monitor electrolytes and replete as necessary   -NPO  2  Coffee-ground emesis-likely secondary to erosive gastritis versus esophagitis  -can continue IV PPI b i d  -given stable hemoglobin, will hold off on endoscopic evaluation at this time  3  Hypernatremia-management as per primary team     4  UTI- antibiotics as per primary team       Subjective:   Patient is seen and examined at bedside  Reports nausea, had an episode of coffee-ground emesis yesterday  No further reports of coffee-ground emesis or reports of melena  Upright KUB was notable for mild ileus  Patient was noted to be more distended today therefore a CT scan was obtained  CT scan was notable for dilated small bowel loops in the mid to lower abdomen with normal caliber in the distal ileum concerning for partial low-grade small bowel obstruction  Hemoglobin of 12 2 today  Objective:     Vitals: Blood pressure 143/76, pulse 94, temperature 98 2 °F (36 8 °C), temperature source Tympanic, resp  rate 18, height 5' (1 524 m), weight 92 6 kg (204 lb 2 3 oz), SpO2 97 %  ,Body mass index is 39 87 kg/m²  Intake/Output Summary (Last 24 hours) at 8/28/2021 1524  Last data filed at 8/28/2021 1401  Gross per 24 hour   Intake --   Output 2150 ml   Net -2150 ml       Physical Exam:    GEN: wn/wd, NAD  HEENT: MMM,  anciteric  CV: RRR, no m/r/g  CHEST: CTA b/l, no w/r/r  ABD:  Distended abdomen, NG tube in place, hypoactive bowel sounds    NEURO: aaox3      Invasive Devices     Peripheral Intravenous Line            Peripheral IV 08/25/21 Left;Ventral (anterior) Forearm 3 days    Peripheral IV 08/25/21 Right Antecubital 2 days          Drain            Urethral Catheter Non-latex 16 Fr  2 days NG/OG Tube Nasogastric Right nare <1 day                        Lab, Imaging and other studies:     Admission on 08/25/2021   Component Date Value    WBC 08/25/2021 23 15*    RBC 08/25/2021 4 75     Hemoglobin 08/25/2021 14 1     Hematocrit 08/25/2021 43 5     MCV 08/25/2021 92     MCH 08/25/2021 29 7     MCHC 08/25/2021 32 4     RDW 08/25/2021 13 7     MPV 08/25/2021 11 1     Platelets 02/35/6066 406*    Neutrophils Relative 08/25/2021 91*    Immat GRANS % 08/25/2021 1     Lymphocytes Relative 08/25/2021 2*    Monocytes Relative 08/25/2021 6     Eosinophils Relative 08/25/2021 0     Basophils Relative 08/25/2021 0     Neutrophils Absolute 08/25/2021 21 26*    Immature Grans Absolute 08/25/2021 0 17     Lymphocytes Absolute 08/25/2021 0 42*    Monocytes Absolute 08/25/2021 1 27*    Eosinophils Absolute 08/25/2021 0 00     Basophils Absolute 08/25/2021 0 03     Sodium 08/25/2021 138     Potassium 08/25/2021 3 9     Chloride 08/25/2021 93*    CO2 08/25/2021 28     ANION GAP 08/25/2021 17*    BUN 08/25/2021 71*    Creatinine 08/25/2021 1 75*    Glucose 08/25/2021 222*    Calcium 08/25/2021 9 7     AST 08/25/2021 26     ALT 08/25/2021 27     Alkaline Phosphatase 08/25/2021 92 4     Total Protein 08/25/2021 8 0     Albumin 08/25/2021 3 9     Total Bilirubin 08/25/2021 0 71     eGFR 08/25/2021 26     Troponin I 08/25/2021 0 03     Protime 08/25/2021 11 4     INR 08/25/2021 1 01     PTT 08/25/2021 21*    BNP 08/25/2021 164 1*    Lipase 08/25/2021 46     Color, UA 08/25/2021 Yellow     Clarity, UA 08/25/2021 Cloudy*    Specific Gravity, UA 08/25/2021 1 015     pH, UA 08/25/2021 5 5     Leukocytes, UA 08/25/2021 3+*    Nitrite, UA 08/25/2021 Negative     Protein, UA 08/25/2021 Trace*    Glucose, UA 08/25/2021 Negative     Ketones, UA 08/25/2021 Negative     Urobilinogen, UA 08/25/2021 0 2     Bilirubin, UA 08/25/2021 Negative     Blood, UA 08/25/2021 3+*    RBC, UA 08/25/2021 Innumerable*    WBC, UA 08/25/2021 Innumerable*    Epithelial Cells 08/25/2021 Moderate*    Bacteria, UA 08/25/2021 Innumerable*    Urine Culture 08/25/2021 >100,000 cfu/ml Escherichia coli ESBL*    MRSA Culture Only 08/25/2021 No Methicillin Resistant Staphlyococcus aureus (MRSA) isolated     Blood Culture 08/25/2021 Escherichia coli ESBL*    Gram Stain Result 08/25/2021 Gram negative rods*    POC Glucose 08/26/2021 182*    Troponin I 08/26/2021 0 05     POC Glucose 08/26/2021 138     POC Glucose 08/26/2021 134     Troponin I 08/26/2021 0 03     Ventricular Rate 08/26/2021 93     Atrial Rate 08/26/2021 93     NC Interval 08/26/2021 170     QRSD Interval 08/26/2021 192     QT Interval 08/26/2021 474     QTC Interval 08/26/2021 590     P Axis 08/26/2021 74     QRS Axis 08/26/2021 -69     T Wave Axis 08/26/2021 106     Ventricular Rate 08/25/2021 122     Atrial Rate 08/25/2021 129     NC Interval 08/25/2021 72     QRSD Interval 08/25/2021 169     QT Interval 08/25/2021 390     QTC Interval 08/25/2021 556     P Axis 08/25/2021 0     QRS Axis 08/25/2021 -74     T Wave Axis 08/25/2021 91     Ventricular Rate 08/25/2021 122     Atrial Rate 08/25/2021 129     NC Interval 08/25/2021 72     QRSD Interval 08/25/2021 169     QT Interval 08/25/2021 390     QTC Interval 08/25/2021 556     P Axis 08/25/2021 0     QRS Axis 08/25/2021 -74     T Wave Axis 08/25/2021 91     POC Glucose 08/26/2021 186*    POC Glucose 08/26/2021 168*    WBC 08/27/2021 13 35*    RBC 08/27/2021 4 22     Hemoglobin 08/27/2021 12 5     Hematocrit 08/27/2021 39 8     MCV 08/27/2021 94     MCH 08/27/2021 29 6     MCHC 08/27/2021 31 4     RDW 08/27/2021 13 3     Platelets 02/13/6540 383     MPV 08/27/2021 10 9     Sodium 08/27/2021 146*    Potassium 08/27/2021 3 1*    Chloride 08/27/2021 99     CO2 08/27/2021 36*    ANION GAP 08/27/2021 11     BUN 08/27/2021 41*    Creatinine 08/27/2021 1 23*    Glucose 08/27/2021 147*    Calcium 08/27/2021 8 9     eGFR 08/27/2021 40     POC Glucose 08/27/2021 156*    POC Glucose 08/27/2021 155*    Ventricular Rate 08/27/2021 91     Atrial Rate 08/27/2021 91     MD Interval 08/27/2021 172     QRSD Interval 08/27/2021 186     QT Interval 08/27/2021 464     QTC Interval 08/27/2021 572     P Axis 08/27/2021 0     QRS Axis 08/27/2021 -74     T Wave Axis 08/27/2021 107     POC Glucose 08/27/2021 181*    POC Glucose 08/27/2021 126     Sodium 08/28/2021 150*    Potassium 08/28/2021 3 2*    Chloride 08/28/2021 103     CO2 08/28/2021 37*    ANION GAP 08/28/2021 10     BUN 08/28/2021 41*    Creatinine 08/28/2021 1 29*    Glucose 08/28/2021 97     Calcium 08/28/2021 9 4     eGFR 08/28/2021 38     WBC 08/28/2021 12 46*    RBC 08/28/2021 4 07     Hemoglobin 08/28/2021 12 2     Hematocrit 08/28/2021 39 5     MCV 08/28/2021 97     MCH 08/28/2021 30 0     MCHC 08/28/2021 30 9*    RDW 08/28/2021 13 3     MPV 08/28/2021 11 2     Platelets 86/67/1310 400*    Neutrophils Relative 08/28/2021 74     Immat GRANS % 08/28/2021 1     Lymphocytes Relative 08/28/2021 13*    Monocytes Relative 08/28/2021 11     Eosinophils Relative 08/28/2021 1     Basophils Relative 08/28/2021 0     Neutrophils Absolute 08/28/2021 9 26*    Immature Grans Absolute 08/28/2021 0 15     Lymphocytes Absolute 08/28/2021 1 56     Monocytes Absolute 08/28/2021 1 36*    Eosinophils Absolute 08/28/2021 0 10     Basophils Absolute 08/28/2021 0 03     POC Glucose 08/28/2021 101     POC Glucose 08/28/2021 106     Magnesium 08/28/2021 2 3          I have personally reviewed pertinent films in PACS    Current Facility-Administered Medications   Medication Dose Route Frequency    acetaminophen (TYLENOL) tablet 650 mg  650 mg Oral Q6H PRN    aluminum-magnesium hydroxide-simethicone (MYLANTA) oral suspension 30 mL  30 mL Oral Q6H PRN    atorvastatin (LIPITOR) tablet 10 mg 10 mg Oral Daily With Dinner    dextrose 5 % and sodium chloride 0 45 % infusion  50 mL/hr Intravenous Continuous    heparin (porcine) subcutaneous injection 5,000 Units  5,000 Units Subcutaneous Q8H [facility]    insulin lispro (HumaLOG) 100 units/mL subcutaneous injection 1-5 Units  1-5 Units Subcutaneous Q6H    levothyroxine tablet 50 mcg  50 mcg Oral Early Morning    magnesium hydroxide (MILK OF MAGNESIA) oral suspension 30 mL  30 mL Oral Daily PRN    meropenem (MERREM) 1,000 mg in sodium chloride 0 9 % 100 mL IVPB  1,000 mg Intravenous Q12H    metoprolol tartrate (LOPRESSOR) tablet 25 mg  25 mg Oral BID    nystatin (MYCOSTATIN) powder   Topical BID    ondansetron (ZOFRAN) injection 4 mg  4 mg Intravenous Q4H PRN    oxyCODONE (ROXICODONE) IR tablet 2 5 mg  2 5 mg Oral Q4H PRN    pantoprazole (PROTONIX) injection 40 mg  40 mg Intravenous Q12H [facility]    potassium chloride 20 mEq IVPB (premix)  20 mEq Intravenous Once

## 2021-08-28 NOTE — ASSESSMENT & PLAN NOTE
Lab Results   Component Value Date    HGBA1C 6 7 (H) 08/19/2021       Recent Labs     08/27/21  1625 08/27/21  2115 08/28/21  0802 08/28/21  1116   POCGLU 181* 126 101 106       Blood Sugar Average: Last 72 hrs:  (P) 363 [id number]   Keep NPO  Currently on D5 half NS  Accu-Cheks every 6 hourly and sliding scale insulin  Hypoglycemic protocol

## 2021-08-28 NOTE — PROGRESS NOTES
[facility]  66   Progress Note Jeffrey Sings Day 1935, 80 y o  female MRN: [id number]  Unit/Bed#: [facility]-01 Encounter: [id number]  Primary Care Provider: Trino Vidal MD   Date and time admitted to hospital: 8/25/2021  4:58 PM    * Sepsis [facility])  Assessment & Plan    Results from last 7 days   Lab Units 08/25/21  2044 08/25/21  1739   BLOOD CULTURE  Escherichia coli ESBL*  --    GRAM STAIN RESULT  Gram negative rods*  --    URINE CULTURE   --  >100,000 cfu/ml Escherichia coli ESBL*     Patient meets sepsis criteria on admission  Sepsis likely secondary to UTI  Blood culture and urine culture growing ESBL E coli  ID consult, appreciate recommendation  Currently on meropenem  Monitor WBC/hemodynamics    Hypernatremia  Assessment & Plan  Sodium 150  Hypernatremia likely due to poor oral intake/dehydration  Currently patient is NPO  Will give gentle hydration with dextrose 5% and 0 45% NS  Monitor BMP    Partial small bowel obstruction [facility])  Assessment & Plan  Patient had multiple episodes of vomiting yesterday  Currently no vomiting however she complains of abdominal distention  Abdomen is test and distended compared to yesterday  Stat CT abdomen pelvis showed dilated small bowel loops in the mid to lower abdomen with normal caliber the distal ileum, suggest incomplete low-grade partial small-bowel obstruction  Will keep NPO  NG tube insertion  Surgery consult  Serial abdominal examination  Continue Zofran  QTC appears to be prolonged due to V paced rhythm with widened QRS    Corrected QTC less than 470      UTI (urinary tract infection)  Assessment & Plan  Urine culture growing ESBL E coli  Switched to meropenem last night    CKD (chronic kidney disease)  Assessment & Plan  Results from last 7 days   Lab Units 08/28/21  0458 08/27/21  0613 08/25/21  1734 08/22/21  1042   SODIUM mmol/L 150* 146* 138 138   POTASSIUM mmol/L 3 2* 3 1* 3 9 3 6   CHLORIDE mmol/L 103 99 93* 101   CO2 mmol/L 37* 36* 28 28   ANION GAP mmol/L 10 11 17* 9   BUN mg/dL 41* 41* 71* 56*   CREATININE mg/dL 1 29* 1 23* 1 75* 1 51*   CALCIUM mg/dL 9 4 8 9 9 7 10 0   ALBUMIN g/dL  --   --  3 9  --    TOTAL BILIRUBIN mg/dL  --   --  0 71  --    ALK PHOS U/L  --   --  92 4  --    ALT U/L  --   --  27  --    AST U/L  --   --  26  --    EGFR ml/min/1 73sq m 38 40 26 31   GLUCOSE RANDOM mg/dL 97 147* 222* 237*       Creatinine is greater than her baseline of 1 4-1 5  Currently creatinine at baseline  Monitor BMP    Hypokalemia  Assessment & Plan  Potassium 3 2  Patient was given oral potassium and oral Lasix this morning  Will give IV potassium 20 mEq  Repeat potassium in p m  Hypertension  Assessment & Plan  · Amlodipine and metoprolol with holding parameters  · Will hold Lasix currently in the setting of sepsis  Hyperlipidemia  Assessment & Plan  Continue Lipitor      Chronic diastolic congestive heart failure (HCC)  Assessment & Plan  Wt Readings from Last 3 Encounters:   08/25/21 92 6 kg (204 lb 2 3 oz)   08/22/21 95 3 kg (210 lb 1 6 oz)   02/19/20 87 5 kg (193 lb)     Currently lasix on hold in light of sepsis  I's and O's, daily weight  Cardiology on board    Diabetes mellitus [facility])  Assessment & Plan  Lab Results   Component Value Date    HGBA1C 6 7 (H) 08/19/2021       Recent Labs     08/27/21  1625 08/27/21  2115 08/28/21  0802 08/28/21  1116   POCGLU 181* 126 101 106       Blood Sugar Average: Last 72 hrs:  (P) 342 [id number]   Keep NPO  Currently on D5 half NS  Accu-Cheks every 6 hourly and sliding scale insulin  Hypoglycemic protocol    Constipation  Assessment & Plan  Hold stool softer currently due to suspected partial small bowel obstruction    Ambulatory dysfunction  Assessment & Plan  PT OT consult, recommended acute post rehab    Frequent falls  Assessment & Plan  PT and OT evaluations , recommend acute post rehab    Age-related cognitive decline  Assessment & Plan  Supportive care        VTE Pharmacologic Prophylaxis:   VTE Score: 5 High Risk (Score >/= 5) - Pharmacological DVT Prophylaxis Contraindicated  Sequential Compression Devices Ordered  Mechanical VTE Prophylaxis in Place: Yes    Patient Centered Rounds: I have performed bedside rounds with nursing staff today  Discussions with Specialists or Other Care Team Provider: yes    Education and Discussions with Family / Patient: Updated  (son) at bedside  Current Length of Stay: 3 day(s)    Current Patient Status: Inpatient     Discharge Plan / Estimated Discharge Date: TBD    Code Status: Level 3 - DNAR and DNI      Subjective:   Patient was seen and examined the bedside  Patient reports of abdominal distension and lower abdominal pain  Denies any nausea, vomiting  Denies any fever, chills, chest pain, palpitation, lightheadedness or dizziness  Reports of constipation  She reports she is not able to pass gas  Objective:     Vitals:   Temp (24hrs), Av 9 °F (36 6 °C), Min:97 4 °F (36 3 °C), Max:98 2 °F (36 8 °C)    Temp:  [97 4 °F (36 3 °C)-98 2 °F (36 8 °C)] 98 2 °F (36 8 °C)  HR:  [67-94] 94  Resp:  [17-18] 18  BP: (115-146)/(67-76) 143/76  SpO2:  [95 %-97 %] 97 %  Body mass index is 39 87 kg/m²  Input and Output Summary (last 24 hours): Intake/Output Summary (Last 24 hours) at 2021 1717  Last data filed at 2021 1401  Gross per 24 hour   Intake --   Output 2150 ml   Net -2150 ml       Physical Exam:     Physical Exam  Vitals and nursing note reviewed  Constitutional:       General: She is not in acute distress  Appearance: She is well-developed  She is ill-appearing  HENT:      Head: Normocephalic and atraumatic  Eyes:      General: No scleral icterus  Conjunctiva/sclera: Conjunctivae normal    Cardiovascular:      Rate and Rhythm: Normal rate and regular rhythm  Pulses: Normal pulses  Heart sounds: No murmur heard  No gallop      Pulmonary:      Effort: Pulmonary effort is normal  No respiratory distress  Breath sounds: Normal breath sounds  No wheezing or rales  Abdominal:      General: There is distension  Palpations: Abdomen is soft  Tenderness: There is no abdominal tenderness  Comments: Bowel sounds are increased   Musculoskeletal:      Cervical back: Neck supple  Right lower leg: No edema  Left lower leg: No edema  Skin:     General: Skin is warm and dry  Neurological:      Mental Status: She is alert  Mental status is at baseline     Psychiatric:         Mood and Affect: Mood normal          Behavior: Behavior normal           Additional Data:     Labs:  Results from last 7 days   Lab Units 08/28/21  0458   WBC Thousand/uL 12 46*   HEMOGLOBIN g/dL 12 2   HEMATOCRIT % 39 5   PLATELETS Thousands/uL 400*   NEUTROS PCT % 74   LYMPHS PCT % 13*   MONOS PCT % 11   EOS PCT % 1     Results from last 7 days   Lab Units 08/28/21  0458 08/25/21  1734   SODIUM mmol/L 150* 138   POTASSIUM mmol/L 3 2* 3 9   CHLORIDE mmol/L 103 93*   CO2 mmol/L 37* 28   BUN mg/dL 41* 71*   CREATININE mg/dL 1 29* 1 75*   ANION GAP mmol/L 10 17*   CALCIUM mg/dL 9 4 9 7   ALBUMIN g/dL  --  3 9   TOTAL BILIRUBIN mg/dL  --  0 71   ALK PHOS U/L  --  92 4   ALT U/L  --  27   AST U/L  --  26   GLUCOSE RANDOM mg/dL 97 222*     Results from last 7 days   Lab Units 08/25/21  1734   INR  1 01     Results from last 7 days   Lab Units 08/28/21  1648 08/28/21  1116 08/28/21  0802 08/27/21  2115 08/27/21  1625 08/27/21  1118 08/27/21  0648 08/26/21  2149 08/26/21  1610 08/26/21  1054 08/26/21  0748 08/26/21  0013   POC GLUCOSE mg/dl 131 106 101 126 181* 155* 156* 168* 186* 134 138 182*               Imaging: Reviewed radiology reports from this admission including: abdominal/pelvic CT scan    Recent Cultures (last 7 days):     Results from last 7 days   Lab Units 08/25/21  2044 08/25/21  1739   BLOOD CULTURE  Escherichia coli ESBL*  --    GRAM STAIN RESULT  Gram negative rods*  --    URINE CULTURE   -- >100,000 cfu/ml Escherichia coli ESBL*       Lines/Drains:  Invasive Devices     Peripheral Intravenous Line            Peripheral IV 08/25/21 Left;Ventral (anterior) Forearm 3 days    Peripheral IV 08/25/21 Right Antecubital 2 days          Drain            Urethral Catheter Non-latex 16 Fr  2 days    NG/OG Tube Nasogastric Right nare <1 day                Telemetry:   Telemetry Orders (From admission, onward)             48 Hour Telemetry Monitoring  Continuous x 48 hours     Question:  Reason for 48 Hour Telemetry  Answer:  Arrhythmias Requiring Medical Therapy (eg  SVT, Vtach/fib, Bradycardia, Uncontrolled A-fib)                    Last 24 Hours Medication List:   Current Facility-Administered Medications   Medication Dose Route Frequency Provider Last Rate    acetaminophen  650 mg Oral Q6H PRN Nader Alfaro MD      aluminum-magnesium hydroxide-simethicone  30 mL Oral Q6H PRN Nader Alfaro MD      atorvastatin  10 mg Oral Daily With Constantine Daley MD      dextrose 5 % and sodium chloride 0 45 %  50 mL/hr Intravenous Continuous Stacy Pringle MD 50 mL/hr (08/28/21 1058)    insulin lispro  1-5 Units Subcutaneous Q6H Donell Barahona MD      levothyroxine  50 mcg Oral Early Morning Nader Alfaro MD      magnesium hydroxide  30 mL Oral Daily PRN Nader Alfaro MD      meropenem  1,000 mg Intravenous Q12H Jaxon Moreira DO 1,000 mg (08/28/21 1336)    metoprolol tartrate  25 mg Oral BID Naedr Alfaro MD      nystatin   Topical BID Nader Alfaro MD      ondansetron  4 mg Intravenous Q4H PRN Stacy Pringle MD      oxyCODONE  2 5 mg Oral Q4H PRN Nader Alfaro MD      pantoprazole  40 mg Intravenous Q12H [facility] KASIE Womack      potassium chloride  20 mEq Intravenous Once Stacy Pringle MD 20 mEq (08/28/21 1304)        Today, Patient Was Seen By: Stacy Pringle MD    ** Please Note: This note has been constructed using a voice recognition system  **

## 2021-08-28 NOTE — QUICK NOTE
ID Quick Note:  Urine cx with growth of ESBL E coli  D/C pip-tazo iv  Start meropenem 1g iv q12 hours  Anticipate minimum 7 day treatment duration

## 2021-08-28 NOTE — ASSESSMENT & PLAN NOTE
Sodium 150  Hypernatremia likely due to poor oral intake/dehydration  Currently patient is NPO  Will give gentle hydration with dextrose 5% and 0 45% NS    Monitor BMP

## 2021-08-28 NOTE — CONSULTS
Consultation - General Surgery   Karissa Dunne Day 80 y o  female MRN: [id number]  Unit/Bed#: -01 Encounter: [id number]    Assessment/Plan     Assessment:  Patient was admitted with a UTI and was found to have small-bowel obstruction  Small-bowel obstruction adhesive in nature  Plan: We will start with insertion of NG tube  Please keep the patient NPO  Serial abdominal exam   IV fluids  History of Present Illness   History, ROS and PFSH unobtainable from any source due to    80year-old female patient with past medical history of TIA, CAD, CHF, pacemaker, aortic stenosis status post TAVR, hypertension, dementia, diabetes, hypothyroidism, GERD, chronic constipation, anemia presented with generalized weakness, poor appetite, nausea and vomiting  Patient after a full workup was found to have UTI  Patient was also found to have hematemesis  She was seen by Cardiology, Gastroenterology  patient was also found to have abdominal distension this morning   A CT scan abdomen and pelvis was performed which showed small bowel obstruction  Patient tells me that she has not passed any flatus  She does not know when was her last bowel movement  She has not vomited since last night when she was made NPO  No complaints of abdominal pain  Patient has history of colectomy for diverticulitis  She is not on any anticoagulation currently  Consults    Review of Systems   Constitutional: Negative  HENT: Negative  Eyes: Negative  Respiratory: Negative  Cardiovascular: Negative  Gastrointestinal: Positive for abdominal distention, constipation and vomiting  Negative for abdominal pain, blood in stool and diarrhea  Endocrine: Negative  Genitourinary: Negative  Musculoskeletal: Negative  Skin: Negative  Allergic/Immunologic: Negative  Neurological: Negative  Hematological: Negative  Psychiatric/Behavioral: Negative          Historical Information   Past Medical History:   Diagnosis Date  Anemia     Resolved 3/1/2017     Arthritis     Knee - Resolved 3/1/2017     Blind     CAD (coronary artery disease)     s/p HERNAN 6/2016    Calcaneal spur     CH[facility]estive [facility] failure) [facility]     Chronic knee instability     Resolved 3/1/2017     Chronic pain syndrome     Resolved 3/1/2017     Dementia ([facility] [address] )     Last assessed 11/1/2017     Diabetes mellitus ([facility] [address] )     non-insulin depdenent    Disease of thyroid gland     Diverticulitis     Essential thrombocytopenia ([facility] [address] )     Last assessed 11/1/2017     GERD (gastroesophageal reflux disease)     History of aortic valve replacement     Resolved 3/1/2017     History of bilateral knee replacement     Resolved 3/1/2017     History of TIA (transient ischemic attack)     no residual deficits    Hyperlipidemia     Hypertension     Hypoxia     on home O2 QHS    Leukocytosis     Resolved 3/1/2017     Lumbar post-laminectomy syndrome     Resolved 3/1/2017     Meniere disease     Osteopenia     Pacemaker     [facility]-Biotronik in 2/2015    S/P TAVR (transcatheter aortic valve replacement)     Resolved 3/1/2017     Severe aortic stenosis     Thrombocytosis ([facility] [address] )     Resolved 4/5/2017     Type 2 diabetes mellitus ([facility] )     Last assessed 11/1/2017      Past Surgical History:   Procedure Laterality Date    APPENDECTOMY      BACK SURGERY      Arthrodesis     CARDIAC PACEMAKER PLACEMENT      CHOLECYSTECTOMY      EYE SURGERY      FRACTURE SURGERY Right 01/02/2018    femur    HYSTERECTOMY      ID EGD TRANSORAL BIOPSY SINGLE/MULTIPLE N/A 11/9/2016    Procedure: ESOPHAGOGASTRODUODENOSCOPY (EGD); Surgeon: Sukhi Veliz MD;  Location: [facility];   Service: Gastroenterology    ID OPEN RX FEMUR FX+INTRAMED GARRETT Right 1/2/2018    Procedure: INSERTION NAIL IM FEMUR ANTEGRADE (TROCHANTERIC) RIGHT;  Surgeon: Radha Ribera MD;  [facility];  Service: Orthopedics    ID REPLACE AORTIC VALVE OPENFEMORAL ARTERY APPROACH N/A 7/26/2016    Procedure: TRANSFEMORAL TAVR WITH 23MM LAROSE LILY S3 VALVE; JOSE ALFREDO ;  Surgeon: April Escalante DO;  [facility];  Service: Cardiac Surgery    SMALL INTESTINE SURGERY      TONSILLECTOMY      TOTAL KNEE ARTHROPLASTY      VARICOSE VEIN SURGERY      VENTRAL HERNIA REPAIR       Social History   Social History     Substance and Sexual Activity   Alcohol Use Not Currently     Social History     Substance and Sexual Activity   Drug Use No     E-Cigarette/Vaping    E-Cigarette Use Never User      E-Cigarette/Vaping Substances     Social History     Tobacco Use   Smoking Status Never Smoker   Smokeless Tobacco Never Used     Family History:   Family History   Problem Relation Age of Onset    Cancer Child     Coronary artery disease Father         Native artery     Stroke Family     Osteopenia Family     Other Family         Pituitary disease    Polycythemia Family        Meds/Allergies   all current active meds have been reviewed, current meds:   Current Facility-Administered Medications   Medication Dose Route Frequency    acetaminophen (TYLENOL) tablet 650 mg  650 mg Oral Q6H PRN    aluminum-magnesium hydroxide-simethicone (MYLANTA) oral suspension 30 mL  30 mL Oral Q6H PRN    atorvastatin (LIPITOR) tablet 10 mg  10 mg Oral Daily With Dinner    dextrose 5 % and sodium chloride 0 45 % infusion  50 mL/hr Intravenous Continuous    heparin (porcine) subcutaneous injection 5,000 Units  5,000 Units Subcutaneous Q8H [facility]    insulin lispro (HumaLOG) 100 units/mL subcutaneous injection 1-5 Units  1-5 Units Subcutaneous Q6H    levothyroxine tablet 50 mcg  50 mcg Oral Early Morning    magnesium hydroxide (MILK OF MAGNESIA) oral suspension 30 mL  30 mL Oral Daily PRN    meropenem (MERREM) 1,000 mg in sodium chloride 0 9 % 100 mL IVPB  1,000 mg Intravenous Q12H    metoprolol tartrate (LOPRESSOR) tablet 25 mg  25 mg Oral BID    nystatin (MYCOSTATIN) powder   Topical BID    ondansetron (ZOFRAN) injection 4 mg  4 mg Intravenous Q4H PRN    oxyCODONE (ROXICODONE) IR tablet 2 5 mg  2 5 mg Oral Q4H PRN    pantoprazole (PROTONIX) injection 40 mg  40 mg Intravenous Q12H [facility]    potassium chloride 20 mEq IVPB (premix)  20 mEq Intravenous Once    and PTA meds:   Prior to Admission Medications   Prescriptions Last Dose Informant Patient Reported? Taking? Glucagon, rDNA, (Glucagon Emergency) 1 MG KIT Unknown at Unknown time Outside Facility (Specify) Yes No   Multiple Vitamins-Minerals (OCUVITE ADULT 50+ PO) Unknown at Unknown time Outside Facility (Specify) Yes No   Sig: Take by mouth daily   acetaminophen (TYLENOL) 325 mg tablet Unknown at Unknown time Outside Facility (Specify) No No   Sig: Take 2 tablets (650 mg total) by mouth every 6 (six) hours as needed for mild pain  amLODIPine (NORVASC) 5 mg tablet Unknown at Unknown time Outside Facility (Specify) Yes No   Si mg 2 (two) times a day    amoxicillin (AMOXIL) 500 mg capsule Unknown at Unknown time Outside Facility (Specify) Yes No   Sig: Take 2,000 mg by mouth as needed (PRIOR TO DENTAL APPOINTMENTS)   aspirin 81 mg chewable tablet Unknown at Unknown time Outside Facility (Specify) Yes No   Sig: Chew 81 mg daily     atorvastatin (LIPITOR) 10 mg tablet Unknown at Unknown time Outside Facility (Specify) Yes No   diclofenac sodium (VOLTAREN) 1 % Unknown at Unknown time Outside Facility (Specify) Yes No   Sig: Apply 2 g topically 4 (four) times a day as needed   ferrous sulfate 325 (65 Fe) mg tablet Unknown at Unknown time Outside Facility (Specify) Yes No   Sig: Take 325 mg by mouth daily with breakfast   furosemide (LASIX) 40 mg tablet Unknown at Unknown time  No No   Sig: Take 2 tablets (80 mg total) by mouth 2 (two) times a day   insulin glargine (LANTUS) 100 units/mL subcutaneous injection  Outside Facility (Specify) No No   Sig: Inject 12 Units under the skin daily at bedtime   ketoconazole (NIZORAL) 2 % cream Unknown at Unknown time Outside Facility (Specify) Yes No Sig: daily    levothyroxine 50 mcg tablet Unknown at Unknown time Outside Facility (Specify) Yes No   Sig: Take 50 mcg by mouth daily   loperamide (IMODIUM A-D) 2 MG tablet Unknown at Unknown time Outside Facility (Specify) Yes No   Sig: Take 2 mg by mouth 3 (three) times a day as needed for diarrhea   metoprolol tartrate (LOPRESSOR) 50 mg tablet Unknown at Unknown time Outside Facility (Specify) No No   Sig: Take 1 tablet (50 mg total) by mouth 2 (two) times a day     nystatin (MYCOSTATIN) powder  Outside Facility (Specify) Yes No   Sig: Apply topically 2 (two) times a day   oxyCODONE (ROXICODONE) 5 mg immediate release tablet Unknown at Unknown time  No No   Sig: Take 0 5 tablets (2 5 mg total) by mouth every 4 (four) hours as needed for moderate pain or severe pain for up to 10 dosesMax Daily Amount: 15 mg   potassium chloride (K-DUR,KLOR-CON) 20 mEq tablet Unknown at Unknown time Outside Facility (Specify) Yes No   Sig: Take 20 mEq by mouth daily   tetrahydrozoline (VISINE) 0 05 % ophthalmic solution Unknown at Unknown time Outside Facility (Specify) Yes No   Si drop 4 (four) times a day as needed for irritation      Facility-Administered Medications: None     Allergies   Allergen Reactions    Advil [Ibuprofen] GI Intolerance    Propoxyphene Other (See Comments)     DARVOCET=ACID REFLUX    Rofecoxib Other (See Comments)       Objective   First Vitals:   Blood Pressure: 148/72 (21)  Pulse: (!) 125 (21)  Temperature: 99 2 °F (37 3 °C) (21)  Temp Source: Oral (21)  Respirations: 22 (21)  Height: 5' (152 4 cm) (21)  Weight - Scale: 92 6 kg (204 lb 2 3 oz) (21)  SpO2: 92 % (21)    Current Vitals:   Blood Pressure: 146/70 (21 1149)  Pulse: 82 (21 1149)  Temperature: 97 9 °F (36 6 °C) (21 114)  Temp Source: Tympanic (21 1149)  Respirations: 18 (21 1149)  Height: 5' (152 4 cm) (21 1911)  Weight - Scale: 92 6 kg (204 lb 2 3 oz) (08/25/21 1911)  SpO2: 97 % (08/28/21 1149)      Intake/Output Summary (Last 24 hours) at 8/28/2021 1423  Last data filed at 8/28/2021 1401  Gross per 24 hour   Intake --   Output 2150 ml   Net -2150 ml       Invasive Devices     Peripheral Intravenous Line            Peripheral IV 08/25/21 Left;Ventral (anterior) Forearm 3 days    Peripheral IV 08/25/21 Right Antecubital 2 days          Drain            Urethral Catheter Non-latex 16 Fr  2 days    NG/OG Tube Nasogastric Right nare <1 day                Physical Exam  Vitals reviewed  Constitutional:       Appearance: She is obese  HENT:      Head: Normocephalic and atraumatic  Mouth/Throat:      Mouth: Mucous membranes are moist    Eyes:      Pupils: Pupils are equal, round, and reactive to light  Cardiovascular:      Rate and Rhythm: Normal rate and regular rhythm  Pulses: Normal pulses  Heart sounds: Normal heart sounds  Pulmonary:      Effort: Pulmonary effort is normal       Breath sounds: Normal breath sounds  Abdominal:      General: There is distension  Palpations: Abdomen is soft  There is no mass  Tenderness: There is no abdominal tenderness  There is no guarding or rebound  Hernia: No hernia is present  Comments: Midline scar present  Area of distension mostly in the epigastric region  Musculoskeletal:      Cervical back: Normal range of motion  Neurological:      Mental Status: She is alert  Lab Results:   I have personally reviewed pertinent lab results    , CBC:   Lab Results   Component Value Date    WBC 12 46 (H) 08/28/2021    HGB 12 2 08/28/2021    HCT 39 5 08/28/2021    MCV 97 08/28/2021     (H) 08/28/2021    MCH 30 0 08/28/2021    MCHC 30 9 (L) 08/28/2021    RDW 13 3 08/28/2021    MPV 11 2 08/28/2021   , CMP:   Lab Results   Component Value Date    SODIUM 150 (H) 08/28/2021    K 3 2 (L) 08/28/2021     08/28/2021    CO2 37 (H) 08/28/2021    BUN 41 (H) 08/28/2021    CREATININE 1 29 (H) 08/28/2021    CALCIUM 9 4 08/28/2021    EGFR 38 08/28/2021   , Coagulation: No results found for: PT, INR, APTT, Urinalysis: No results found for: Sylvia Dimes, SPECGRAV, PHUR, LEUKOCYTESUR, NITRITE, PROTEINUA, GLUCOSEU, KETONESU, BILIRUBINUR, BLOODU, Amylase: No results found for: AMYLASE, Lipase: No results found for: LIPASE  Imaging: I have personally reviewed pertinent reports  and I have personally reviewed pertinent films in PACS  EKG, Pathology, and Other Studies: I have personally reviewed pertinent reports  and I have personally reviewed pertinent films in PACS    Counseling / Coordination of Care  Total floor / unit time spent today 45 minutes  Greater than 50% of total time was spent with the patient and / or family counseling and / or coordination of care  A description of the counseling / coordination of care: Darya Armenta

## 2021-08-28 NOTE — PLAN OF CARE
Problem: MOBILITY - ADULT  Goal: Maintain or return to baseline ADL function  Description: INTERVENTIONS:  -  Assess patient's ability to carry out ADLs; assess patient's baseline for ADL function and identify physical deficits which impact ability to perform ADLs (bathing, care of mouth/teeth, toileting, grooming, dressing, etc )  - Assess/evaluate cause of self-care deficits   - Assess range of motion  - Assess patient's mobility; develop plan if impaired  - Assess patient's need for assistive devices and provide as appropriate  - Encourage maximum independence but intervene and supervise when necessary  - Involve family in performance of ADLs  - Assess for home care needs following discharge   - Consider OT consult to assist with ADL evaluation and planning for discharge  - Provide patient education as appropriate  Outcome: Progressing  Goal: Maintains/Returns to pre admission functional level  Description: INTERVENTIONS:  - Perform BMAT or MOVE assessment daily    - Set and communicate daily mobility goal to care team and patient/family/caregiver  - Collaborate with rehabilitation services on mobility goals if consulted  - Reposition patient every 2 hours    - Dangle patient 3 times a day  - Out of bed to chair 3 times a day   - Out of bed for meals 3 times a day  - Out of bed for toileting  - Record patient progress and toleration of activity level   Outcome: Progressing     Problem: Potential for Falls  Goal: Patient will remain free of falls  Description: INTERVENTIONS:  - Educate patient/family on patient safety including physical limitations  - Instruct patient to call for assistance with activity   - Consult OT/PT to assist with strengthening/mobility   - Keep Call bell within reach  - Keep bed low and locked with side rails adjusted as appropriate  - Keep care items and personal belongings within reach  - Initiate and maintain comfort rounds  - Make Fall Risk Sign visible to staff  - Offer Toileting every 2 Hours, in advance of need  - Initiate/Maintain alarm  - Obtain necessary fall risk management equipment:   - Apply yellow socks and bracelet for high fall risk patients  - Consider moving patient to room near nurses station  Outcome: Progressing     Problem: Nutrition/Hydration-ADULT  Goal: Nutrient/Hydration intake appropriate for improving, restoring or maintaining nutritional needs  Description: Monitor and assess patient's nutrition/hydration status for malnutrition  Collaborate with interdisciplinary team and initiate plan and interventions as ordered  Monitor patient's weight and dietary intake as ordered or per policy  Utilize nutrition screening tool and intervene as necessary  Determine patient's food preferences and provide high-protein, high-caloric foods as appropriate       INTERVENTIONS:  - Monitor oral intake, urinary output, labs, and treatment plans  - Assess nutrition and hydration status and recommend course of action  - Evaluate amount of meals eaten  - Assist patient with eating if necessary   - Allow adequate time for meals  - Recommend/ encourage appropriate diets, oral nutritional supplements, and vitamin/mineral supplements  - Order, calculate, and assess calorie counts as needed  - Recommend, monitor, and adjust tube feedings and TPN/PPN based on assessed needs  - Assess need for intravenous fluids  - Provide specific nutrition/hydration education as appropriate  - Include patient/family/caregiver in decisions related to nutrition  Outcome: Progressing     Problem: PAIN - ADULT  Goal: Verbalizes/displays adequate comfort level or baseline comfort level  Description: Interventions:  - Encourage patient to monitor pain and request assistance  - Assess pain using appropriate pain scale  - Administer analgesics based on type and severity of pain and evaluate response  - Implement non-pharmacological measures as appropriate and evaluate response  - Consider cultural and social influences on pain and pain management  - Notify physician/advanced practitioner if interventions unsuccessful or patient reports new pain  Outcome: Progressing     Problem: INFECTION - ADULT  Goal: Absence or prevention of progression during hospitalization  Description: INTERVENTIONS:  - Assess and monitor for signs and symptoms of infection  - Monitor lab/diagnostic results  - Monitor all insertion sites, i e  indwelling lines, tubes, and drains  - Monitor endotracheal if appropriate and nasal secretions for changes in amount and color  - Warren appropriate cooling/warming therapies per order  - Administer medications as ordered  - Instruct and encourage patient and family to use good hand hygiene technique  - Identify and instruct in appropriate isolation precautions for identified infection/condition  Outcome: Progressing  Goal: Absence of fever/infection during neutropenic period  Description: INTERVENTIONS:  - Monitor WBC    Outcome: Progressing     Problem: SAFETY ADULT  Goal: Maintain or return to baseline ADL function  Description: INTERVENTIONS:  -  Assess patient's ability to carry out ADLs; assess patient's baseline for ADL function and identify physical deficits which impact ability to perform ADLs (bathing, care of mouth/teeth, toileting, grooming, dressing, etc )  - Assess/evaluate cause of self-care deficits   - Assess range of motion  - Assess patient's mobility; develop plan if impaired  - Assess patient's need for assistive devices and provide as appropriate  - Encourage maximum independence but intervene and supervise when necessary  - Involve family in performance of ADLs  - Assess for home care needs following discharge   - Consider OT consult to assist with ADL evaluation and planning for discharge  - Provide patient education as appropriate  Outcome: Progressing  Goal: Maintains/Returns to pre admission functional level  Description: INTERVENTIONS:  - Perform BMAT or MOVE assessment daily    - Set and communicate daily mobility goal to care team and patient/family/caregiver  - Collaborate with rehabilitation services on mobility goals if consulted  - Perform Range of Motion 4 times a day  - Reposition patient every 2 hours    - Dangle patient 3 times a day  - Out of bed to chair 3 times a day   - Out of bed for meals 3 times a day  - Out of bed for toileting  - Record patient progress and toleration of activity level   Outcome: Progressing  Goal: Patient will remain free of falls  Description: INTERVENTIONS:  - Educate patient/family on patient safety including physical limitations  - Instruct patient to call for assistance with activity   - Consult OT/PT to assist with strengthening/mobility   - Keep Call bell within reach  - Keep bed low and locked with side rails adjusted as appropriate  - Keep care items and personal belongings within reach  - Initiate and maintain comfort rounds  - Make Fall Risk Sign visible to staff  - Offer Toileting every 2 Hours, in advance of need  - Initiate/Maintain alarm  - Obtain necessary fall risk management equipment:   - Apply yellow socks and bracelet for high fall risk patients  - Consider moving patient to room near nurses station  Outcome: Progressing     Problem: DISCHARGE PLANNING  Goal: Discharge to home or other facility with appropriate resources  Description: INTERVENTIONS:  - Identify barriers to discharge w/patient and caregiver  - Arrange for needed discharge resources and transportation as appropriate  - Identify discharge learning needs (meds, wound care, etc )  - Arrange for interpretive services to assist at discharge as needed  - Refer to Case Management Department for coordinating discharge planning if the patient needs post-hospital services based on physician/advanced practitioner order or complex needs related to functional status, cognitive ability, or social support system  Outcome: Progressing     Problem: Knowledge Deficit  Goal: Patient/family/caregiver demonstrates understanding of disease process, treatment plan, medications, and discharge instructions  Description: Complete learning assessment and assess knowledge base    Interventions:  - Provide teaching at level of understanding  - Provide teaching via preferred learning methods  Outcome: Progressing     Problem: Prexisting or High Potential for Compromised Skin Integrity  Goal: Skin integrity is maintained or improved  Description: INTERVENTIONS:  - Identify patients at risk for skin breakdown  - Assess and monitor skin integrity  - Assess and monitor nutrition and hydration status  - Monitor labs   - Assess for incontinence   - Turn and reposition patient  - Assist with mobility/ambulation  - Relieve pressure over bony prominences  - Avoid friction and shearing  - Provide appropriate hygiene as needed including keeping skin clean and dry  - Evaluate need for skin moisturizer/barrier cream  - Collaborate with interdisciplinary team   - Patient/family teaching  - Consider wound care consult   Outcome: Progressing

## 2021-08-29 ENCOUNTER — APPOINTMENT (INPATIENT)
Dept: RADIOLOGY | Facility: HOSPITAL | Age: 86
DRG: 872 | End: 2021-08-29
Payer: MEDICARE

## 2021-08-29 ENCOUNTER — TELEPHONE (OUTPATIENT)
Dept: RADIOLOGY | Facility: HOSPITAL | Age: 86
End: 2021-08-29

## 2021-08-29 LAB
ANION GAP SERPL CALCULATED.3IONS-SCNC: 9 MMOL/L (ref 4–13)
BASOPHILS # BLD AUTO: 0.03 THOUSANDS/ΜL (ref 0–0.1)
BASOPHILS NFR BLD AUTO: 0 % (ref 0–1)
BUN SERPL-MCNC: 28 MG/DL (ref 6–20)
CALCIUM SERPL-MCNC: 9.6 MG/DL (ref 8.4–10.2)
CHLORIDE SERPL-SCNC: 106 MMOL/L (ref 96–108)
CO2 SERPL-SCNC: 37 MMOL/L (ref 22–33)
CREAT SERPL-MCNC: 0.96 MG/DL (ref 0.4–1.1)
EOSINOPHIL # BLD AUTO: 0.13 THOUSAND/ΜL (ref 0–0.61)
EOSINOPHIL NFR BLD AUTO: 1 % (ref 0–6)
ERYTHROCYTE [DISTWIDTH] IN BLOOD BY AUTOMATED COUNT: 13 % (ref 11.6–15.1)
GFR SERPL CREATININE-BSD FRML MDRD: 54 ML/MIN/1.73SQ M
GLUCOSE SERPL-MCNC: 124 MG/DL (ref 65–140)
GLUCOSE SERPL-MCNC: 126 MG/DL (ref 65–140)
GLUCOSE SERPL-MCNC: 137 MG/DL (ref 65–140)
GLUCOSE SERPL-MCNC: 149 MG/DL (ref 65–140)
HCT VFR BLD AUTO: 39.5 % (ref 34.8–46.1)
HGB BLD-MCNC: 12.2 G/DL (ref 11.5–15.4)
IMM GRANULOCYTES # BLD AUTO: 0.17 THOUSAND/UL (ref 0–0.2)
IMM GRANULOCYTES NFR BLD AUTO: 1 % (ref 0–2)
LYMPHOCYTES # BLD AUTO: 1.18 THOUSANDS/ΜL (ref 0.6–4.47)
LYMPHOCYTES NFR BLD AUTO: 9 % (ref 14–44)
MCH RBC QN AUTO: 30 PG (ref 26.8–34.3)
MCHC RBC AUTO-ENTMCNC: 30.9 G/DL (ref 31.4–37.4)
MCV RBC AUTO: 97 FL (ref 82–98)
MONOCYTES # BLD AUTO: 1.16 THOUSAND/ΜL (ref 0.17–1.22)
MONOCYTES NFR BLD AUTO: 9 % (ref 4–12)
NEUTROPHILS # BLD AUTO: 10.77 THOUSANDS/ΜL (ref 1.85–7.62)
NEUTS SEG NFR BLD AUTO: 80 % (ref 43–75)
PLATELET # BLD AUTO: 409 THOUSANDS/UL (ref 149–390)
PMV BLD AUTO: 10.8 FL (ref 8.9–12.7)
POTASSIUM SERPL-SCNC: 3.2 MMOL/L (ref 3.5–5)
RBC # BLD AUTO: 4.07 MILLION/UL (ref 3.81–5.12)
SODIUM SERPL-SCNC: 152 MMOL/L (ref 133–145)
WBC # BLD AUTO: 13.44 THOUSAND/UL (ref 4.31–10.16)

## 2021-08-29 PROCEDURE — 85025 COMPLETE CBC W/AUTO DIFF WBC: CPT | Performed by: INTERNAL MEDICINE

## 2021-08-29 PROCEDURE — C9113 INJ PANTOPRAZOLE SODIUM, VIA: HCPCS | Performed by: NURSE PRACTITIONER

## 2021-08-29 PROCEDURE — 99232 SBSQ HOSP IP/OBS MODERATE 35: CPT | Performed by: SURGERY

## 2021-08-29 PROCEDURE — 99233 SBSQ HOSP IP/OBS HIGH 50: CPT | Performed by: INTERNAL MEDICINE

## 2021-08-29 PROCEDURE — 99223 1ST HOSP IP/OBS HIGH 75: CPT | Performed by: INTERNAL MEDICINE

## 2021-08-29 PROCEDURE — 82948 REAGENT STRIP/BLOOD GLUCOSE: CPT

## 2021-08-29 PROCEDURE — 74022 RADEX COMPL AQT ABD SERIES: CPT

## 2021-08-29 PROCEDURE — 80048 BASIC METABOLIC PNL TOTAL CA: CPT | Performed by: INTERNAL MEDICINE

## 2021-08-29 RX ORDER — DEXTROSE, SODIUM CHLORIDE, AND POTASSIUM CHLORIDE 5; .2; .15 G/100ML; G/100ML; G/100ML
80 INJECTION INTRAVENOUS CONTINUOUS
Status: DISCONTINUED | OUTPATIENT
Start: 2021-08-29 | End: 2021-08-29 | Stop reason: CLARIF

## 2021-08-29 RX ORDER — POTASSIUM CHLORIDE 14.9 MG/ML
20 INJECTION INTRAVENOUS
Status: DISPENSED | OUTPATIENT
Start: 2021-08-29 | End: 2021-08-29

## 2021-08-29 RX ADMIN — POTASSIUM CHLORIDE 20 MEQ: 14.9 INJECTION, SOLUTION INTRAVENOUS at 21:30

## 2021-08-29 RX ADMIN — MEROPENEM 1000 MG: 1 INJECTION, POWDER, FOR SOLUTION INTRAVENOUS at 13:47

## 2021-08-29 RX ADMIN — NYSTATIN: 100000 POWDER TOPICAL at 10:09

## 2021-08-29 RX ADMIN — NYSTATIN: 100000 POWDER TOPICAL at 17:28

## 2021-08-29 RX ADMIN — PANTOPRAZOLE SODIUM 40 MG: 40 INJECTION, POWDER, FOR SOLUTION INTRAVENOUS at 10:01

## 2021-08-29 RX ADMIN — PANTOPRAZOLE SODIUM 40 MG: 40 INJECTION, POWDER, FOR SOLUTION INTRAVENOUS at 21:30

## 2021-08-29 RX ADMIN — POTASSIUM CHLORIDE: 2 INJECTION, SOLUTION, CONCENTRATE INTRAVENOUS at 17:46

## 2021-08-29 RX ADMIN — POTASSIUM CHLORIDE 20 MEQ: 14.9 INJECTION, SOLUTION INTRAVENOUS at 17:21

## 2021-08-29 NOTE — PROGRESS NOTES
Progress Note - General Surgery   Trisha Call Day 80 y o  female MRN: [id number]  Unit/Bed#: -01 Encounter: [id number]    Assessment:  I her small-bowel obstruction seems to be improving  Her abdomen is much softer today  It is less distended  Patient says she thinks she has passed flatus  Her NG tube output is dark as about 500 mL  I looked at the x-ray which shows large stool burden in the colon and some dilated small bowel loops  We will give Dulcolax suppositories  If she starts having abdominal pain she may benefit from is CT abdomen pelvis with oral contrast   Maintain NG tube for now  Plan:  Dulcolax suppositories  Maintain NG tube for now  Wait for bowel function to return  Subjective/Objective   Chief Complaint:  Small-bowel obstruction    Subjective:  No abdominal pain  She says she may have passed flatus  No bowel movements  Abdominal distention is improved  Objective:  Abdomen is mildly distended  Soft  No rebound or guarding  Blood pressure 146/69, pulse 90, temperature (!) 97 1 °F (36 2 °C), temperature source Tympanic, resp  rate 18, height 5' (1 524 m), weight 92 6 kg (204 lb 2 3 oz), SpO2 93 %  ,Body mass index is 39 87 kg/m²        Intake/Output Summary (Last 24 hours) at 8/29/2021 1335  Last data filed at 8/29/2021 1054  Gross per 24 hour   Intake --   Output 2550 ml   Net -2550 ml       Invasive Devices     Peripheral Intravenous Line            Peripheral IV 08/25/21 Left;Ventral (anterior) Forearm 4 days    Peripheral IV 08/25/21 Right Antecubital 3 days          Drain            Urethral Catheter Non-latex 16 Fr  3 days    NG/OG Tube Nasogastric Right nare <1 day                Physical Exam: /69 (BP Location: Right arm)   Pulse 90   Temp (!) 97 1 °F (36 2 °C) (Tympanic)   Resp 18   Ht 5' (1 524 m)   Wt 92 6 kg (204 lb 2 3 oz)   SpO2 93%   BMI 39 87 kg/m²   General appearance: alert and oriented, in no acute distress  Head: Normocephalic, without obvious abnormality, atraumatic  Eyes: negative findings: conjunctivae and sclerae normal  Lungs: clear to auscultation bilaterally  Heart: regular rate and rhythm, S1, S2 normal, no murmur, click, rub or gallop  Abdomen: normal findings: soft, non-tender    Lab, Imaging and other studies:  I have personally reviewed pertinent lab results    , CBC:   Lab Results   Component Value Date    WBC 13 44 (H) 08/29/2021    HGB 12 2 08/29/2021    HCT 39 5 08/29/2021    MCV 97 08/29/2021     (H) 08/29/2021    MCH 30 0 08/29/2021    MCHC 30 9 (L) 08/29/2021    RDW 13 0 08/29/2021    MPV 10 8 08/29/2021   , CMP:   Lab Results   Component Value Date    SODIUM 152 (H) 08/29/2021    K 3 2 (L) 08/29/2021     08/29/2021    CO2 37 (H) 08/29/2021    BUN 28 (H) 08/29/2021    CREATININE 0 96 08/29/2021    CALCIUM 9 6 08/29/2021    EGFR 54 08/29/2021     VTE Pharmacologic Prophylaxis: Sequential compression device (Venodyne)   VTE Mechanical Prophylaxis: sequential compression device

## 2021-08-29 NOTE — CONSULTS
Consultation - Nephrology   Priscila Hutton 80 y o  female MRN: [id number]  Unit/Bed#: -01 Encounter: [id number]    Inpatient consult to Nephrology  Consult performed by: Deanna Merrill MD  Consult ordered by: Opal Cooks, MD          History of Present Illness   Physician Requesting Consult: Opal Cooks, MD  Reason for Consult / Principal Problem:  Hypernatremia  Hx and PE limited by:  Somewhat limited patient is slightly lethargic    HPI: Priscila Hutton is a 80y o  year old female with PMH of hypothyroidism/CAD/CHF/blindness/diabetes mellitus type 2/TAVR/TIA/dyslipidemia/hypertension/GERD/dementia who was recently hospitalized for CHF at [facility] subsequently discharged  Now several days weakness mild confusion associated with vomiting and weakness subsequent brought into the emergency room  Course complicated by UTI and found to have small-bowel obstruction treated with NG tube and NPO and IV fluids  We are asked to see her for hypernatremia of 152  Sodium was 138 on admission  Intake/output:  -10 L if accurate    Patient currently denies any nausea vomiting or diarrhea  No abdominal pain at this time  Slightly lethargic      History obtained from patient, the chart and the old records which I completely reviewed        Review of systems:  General:  See HPI, currently feels improved  Cardiovascular:  No chest pain or shortness of breath or leg swelling  Respiratory:  No coughing  Gastrointestinal:  See HPI  Genitourinary:  No urinary symptoms at this time which is a Deal catheter in place  Neuro:  Denies any symptoms  All other systems were reviewed and are negative    Historical Information   Past Medical History:   Diagnosis Date    Anemia     Resolved 3/1/2017     Arthritis     Knee - Resolved 3/1/2017     Blind     CAD (coronary artery disease)     s/p HERNAN 6/2016    Calcaneal spur     CHF (congestive heart failure) (HCC)     Chronic knee instability     Resolved 3/1/2017     Chronic pain syndrome     Resolved 3/1/2017     Dementia ([facility] [address] )     Last assessed 11/1/2017     Diabetes mellitus ([facility] [address] )     non-insulin depdenent    Disease of thyroid gland     Diverticulitis     Essential thrombocytopenia ([facility] [address] )     Last assessed 11/1/2017     GERD (gastroesophageal reflux disease)     History of aortic valve replacement     Resolved 3/1/2017     History of bilateral knee replacement     Resolved 3/1/2017     History of TIA (transient ischemic attack)     no residual deficits    Hyperlipidemia     Hypertension     Hypoxia     on home O2 QHS    Leukocytosis     Resolved 3/1/2017     Lumbar post-laminectomy syndrome     Resolved 3/1/2017     Meniere disease     Osteopenia     Pacemaker     CHB-Biotronik in 2/2015    S/P TAVR (transcatheter aortic valve replacement)     Resolved 3/1/2017     Severe aortic stenosis     Thrombocytosis ([facility] [address] )     Resolved 4/5/2017     Type 2 diabetes mellitus ([facility] [address] )     Last assessed 11/1/2017      Past Surgical History:   Procedure Laterality Date    APPENDECTOMY      BACK SURGERY      Arthrodesis     CARDIAC PACEMAKER PLACEMENT      CHOLECYSTECTOMY      EYE SURGERY      FRACTURE SURGERY Right 01/02/2018    femur    HYSTERECTOMY      CA EGD TRANSORAL BIOPSY SINGLE/MULTIPLE N/A 11/9/2016    Procedure: ESOPHAGOGASTRODUODENOSCOPY (EGD); Surgeon: Ernie Villalobos MD;  [facility]: [facility];   Service: Gastroenterology    CA OPEN RX FEMUR FX+INTRAMED GARRETT Right 1/2/2018    Procedure: INSERTION NAIL IM FEMUR ANTEGRADE (TROCHANTERIC) RIGHT;  Surgeon: Hakan Madison MD;  [facility];  Service: Orthopedics    CA REPLACE AORTIC VALVE OPENFEMORAL ARTERY APPROACH N/A 7/26/2016    Procedure: TRANSFEMORAL TAVR WITH 23MM LAROSE LILY S3 VALVE; JOSE ALFREDO ;  Surgeon: Silvino Braun DO;  [facility];  Service: Cardiac Surgery    SMALL INTESTINE SURGERY      TONSILLECTOMY      TOTAL KNEE ARTHROPLASTY      VARICOSE VEIN SURGERY  VENTRAL HERNIA REPAIR       Social History   Social History     Substance and Sexual Activity   Alcohol Use Not Currently     Social History     Substance and Sexual Activity   Drug Use No     Social History     Tobacco Use   Smoking Status Never Smoker   Smokeless Tobacco Never Used     Family History   Problem Relation Age of Onset    Cancer Child     Coronary artery disease Father         Native artery     Stroke Family     Osteopenia Family     Other Family         Pituitary disease    Polycythemia Family        Meds/Allergies   all current active meds have been reviewed, current meds:   Current Facility-Administered Medications   Medication Dose Route Frequency    acetaminophen (TYLENOL) tablet 650 mg  650 mg Oral Q6H PRN    aluminum-magnesium hydroxide-simethicone (MYLANTA) oral suspension 30 mL  30 mL Oral Q6H PRN    atorvastatin (LIPITOR) tablet 10 mg  10 mg Oral Daily With Dinner    dextrose 5 % and sodium chloride 0 45 % infusion  75 mL/hr Intravenous Continuous    insulin lispro (HumaLOG) 100 units/mL subcutaneous injection 1-5 Units  1-5 Units Subcutaneous Q6H    levothyroxine tablet 50 mcg  50 mcg Oral Early Morning    magnesium hydroxide (MILK OF MAGNESIA) oral suspension 30 mL  30 mL Oral Daily PRN    meropenem (MERREM) 1,000 mg in sodium chloride 0 9 % 100 mL IVPB  1,000 mg Intravenous Q12H    metoprolol tartrate (LOPRESSOR) tablet 25 mg  25 mg Oral BID    nystatin (MYCOSTATIN) powder   Topical BID    ondansetron (ZOFRAN) injection 4 mg  4 mg Intravenous Q4H PRN    oxyCODONE (ROXICODONE) IR tablet 2 5 mg  2 5 mg Oral Q4H PRN    pantoprazole (PROTONIX) injection 40 mg  40 mg Intravenous Q12H [facility]    and PTA meds:    Medications Prior to Admission   Medication    acetaminophen (TYLENOL) 325 mg tablet    amLODIPine (NORVASC) 5 mg tablet    amoxicillin (AMOXIL) 500 mg capsule    aspirin 81 mg chewable tablet    atorvastatin (LIPITOR) 10 mg tablet    diclofenac sodium (VOLTAREN) 1 %    ferrous sulfate 325 (65 Fe) mg tablet    furosemide (LASIX) 40 mg tablet    Glucagon, rDNA, (Glucagon Emergency) 1 MG KIT    insulin glargine (LANTUS) 100 units/mL subcutaneous injection    ketoconazole (NIZORAL) 2 % cream    levothyroxine 50 mcg tablet    loperamide (IMODIUM A-D) 2 MG tablet    metoprolol tartrate (LOPRESSOR) 50 mg tablet    Multiple Vitamins-Minerals (OCUVITE ADULT 50+ PO)    nystatin (MYCOSTATIN) powder    oxyCODONE (ROXICODONE) 5 mg immediate release tablet    potassium chloride (K-DUR,KLOR-CON) 20 mEq tablet    tetrahydrozoline (VISINE) 0 05 % ophthalmic solution       Allergies   Allergen Reactions    Advil [Ibuprofen] GI Intolerance    Propoxyphene Other (See Comments)     DARVOCET=ACID REFLUX    Rofecoxib Other (See Comments)       Objective     Intake/Output Summary (Last 24 hours) at 8/29/2021 1546  Last data filed at 8/29/2021 1300  Gross per 24 hour   Intake --   Output 2100 ml   Net -2100 ml     Patient Vitals for the past 24 hrs:   BP Temp Temp src Pulse Resp SpO2   08/29/21 1441 147/73 98 1 °F (36 7 °C) Tympanic 92 18 92 %   08/29/21 1118 146/69 (!) 97 1 °F (36 2 °C) Tympanic 90 18 93 %   08/29/21 0815 -- 97 8 °F (36 6 °C) Tympanic -- -- 97 %   08/29/21 0731 137/67 97 8 °F (36 6 °C) Tympanic 89 18 97 %   08/28/21 2300 130/80 98 4 °F (36 9 °C) Tympanic 97 18 97 %       Invasive Devices:   Urethral Catheter Non-latex 16 Fr   (Active)   Amt returned on insertion(mL) 2500 mL 08/25/21 1810   Reasons to continue Urinary Catheter  Acute urinary retention/obstruction failing urinary retention protocol 08/28/21 2100   Goal for Removal Other (Comment) 08/28/21 2100   Site Assessment Clean;Skin intact 08/28/21 2100   Deal Care Done 08/29/21 0900   Collection Container Standard drainage bag 08/28/21 2100   Securement Method Securing device (Describe) 08/28/21 2100   Output (mL) 1000 mL 08/29/21 0505     Vitals:    08/29/21 1441   BP: 147/73   Pulse: 92   Resp: 18 Temp: 98 1 °F (36 7 °C)   SpO2: 92%     General:  Obese, no acute distress slightly lethargic  Skin:  No acute rash  Eyes:  No scleral icterus and noninjected, however complete ptosis of her right eyelid  ENT:  Normocephalic/atraumatic, mucous membranes dry  Neck:  Supple, no jugular venous distention, no carotid bruits, 1+ carotid upstroke, no thyromegaly and trachea midline  Back:  No overt CVA tenderness but difficult exam  Chest:  Clear to auscultation, poor respiratory effort, no use of accessory respiratory muscles  CVS:  Regular rate rhythm without a murmur rub or gallops appreciable  Abdomen:  Obese, soft and nontender with normal bowel sounds at this time no hepatosplenomegaly or bruits appreciable  Extremities:  No clubbing, no cyanosis, no edema and 1+ dorsalis pedis pulses, no significant arthritic changes in no femoral bruits  Neuro:  No gross focality but not very cooperative with the exam  Psych:  Alert and oriented to place    Current Weight: Weight - Scale: 92 6 kg (204 lb 2 3 oz)  First Weight: Weight - Scale: 92 6 kg (204 lb 2 3 oz)    Lab Results:  I have personally reviewed pertinent labs    CBC:   Lab Results   Component Value Date    WBC 13 44 (H) 08/29/2021    WBC 10 63 (H) 12/01/2015    RBC 4 07 08/29/2021    RBC 4 23 12/01/2015     CMP:   Lab Results   Component Value Date     12/01/2015     08/29/2021    CL 99 (L) 12/01/2015    CO2 37 (H) 08/29/2021    CO2 22 07/26/2016    ANIONGAP 8 12/01/2015    BUN 28 (H) 08/29/2021    BUN 13 12/01/2015    CREATININE 0 96 08/29/2021    CREATININE 1 12 12/01/2015    GLUCOSE 134 07/26/2016    GLUCOSE 123 12/01/2015    CALCIUM 9 6 08/29/2021    CALCIUM 9 3 12/01/2015    AST 26 08/25/2021    AST 18 02/19/2015    ALT 27 08/25/2021    ALT 19 02/19/2015    ALKPHOS 92 4 08/25/2021    ALKPHOS 78 02/19/2015    PROT 7 9 02/19/2015    BILITOT 0 32 02/19/2015    EGFR 54 08/29/2021     Phosphorus: No results found for: PHOS  Magnesium:   Lab Results Component Value Date    MG 2 3 08/28/2021    MG 1 9 02/20/2015     Urinalysis:   Lab Results   Component Value Date    COLORU Yellow 08/25/2021    COLORU Yellow 02/20/2014    CLARITYU Cloudy (A) 08/25/2021    CLARITYU Clear 02/20/2014    SPECGRAV 1 015 08/25/2021    SPECGRAV 1 019 02/20/2014    PHUR 5 5 08/25/2021    PHUR 7 0 08/17/2021    PHUR 7 0 02/20/2014    LEUKOCYTESUR 3+ (A) 08/25/2021    LEUKOCYTESUR Moderate (A) 02/20/2014    NITRITE Negative 08/25/2021    NITRITE Negative 02/20/2014    PROTEINUA Negative 02/20/2014    GLUCOSEU Negative 08/25/2021    GLUCOSEU Negative 02/20/2014    KETONESU Negative 08/25/2021    KETONESU Negative 02/20/2014    BILIRUBINUR Negative 08/25/2021    BILIRUBINUR Negative 02/20/2014    BLOODU 3+ (A) 08/25/2021    BLOODU Negative 02/20/2014     BMP:   Lab Results   Component Value Date    GLUCOSE 134 07/26/2016    GLUCOSE 123 12/01/2015    SODIUM 152 (H) 08/29/2021    CO2 37 (H) 08/29/2021    CO2 22 07/26/2016    BUN 28 (H) 08/29/2021    BUN 13 12/01/2015    CREATININE 0 96 08/29/2021    CREATININE 1 12 12/01/2015    CALCIUM 9 6 08/29/2021    CALCIUM 9 3 12/01/2015     ABGs:   Lab Results   Component Value Date    PH 7 449 07/26/2016     Radiology review:  KUB on 08/28/2021:  Left basilar opacity possible small pleural effusion with atelectasis possible superimposed pneumonia    CT of abdomen pelvis on 08/28/2021:  Dilated small bowel loops in the mid to lower abdomen with normal caliber the distal ileum suggesting incomplete low-grade partial small bowel obstruction, hepatic steatosis and small left effusion        Assessment/Plan   10y o  year old female with PMH of hypothyroidism/CAD/CHF/blindness/diabetes mellitus type 2/TAVR/TIA/dyslipidemia/hypertension/GERD/dementia who was recently hospitalized for CHF at [facility] in subsequently discharged    Now several days weakness mild confusion associated with vomiting and weakness subsequent brought into the emergency room   Course complicated by UTI and found to have small-bowel obstruction  We are asked to see her for hypernatremia    1  Hypernatremia:  Clearly related to water loss with NG tube suction and persistent diuresis without adequate oral/intravenous intake  Recommend:  · Change IV fluids to D5 quarter normal saline at 80 mL/hour, we have to monitor her clinical status given recent CHF  · Hold diuretic  · Monitor labs    2  Hypokalemia:  Most likely related to GI losses/urinary losses with NG tube suction (bicarbonate delivered to the distal tubule because of metabolic alkalosis in the setting of NG tube suction)  Magnesium acceptable  · Replete with IV potassium:  Could give 60 mEq at this time; add some to the maintenance IV fluid  · Monitor potassium      3  Metabolic alkalosis:  · Etiology:  Most compatible with NG tube suction, perpetuated by hypokalemia and volume contraction  · Replete potassium/chloride  · Check VBG in a m     4  Partial small-bowel obstruction being treated by surgery; also being treated for urinary sepsis per ID    5  Other problems:  · CAD  · UTI  · Age-related cognitive decline  · Frequent falls  · Diabetes mellitus     Portions of the record may have been created with voice recognition software  Occasional wrong word or "sound a like" substitutions may have occurred due to the inherent limitations of voice recognition software  Read the chart carefully and recognize, using context, where substitutions have occurred

## 2021-08-29 NOTE — ASSESSMENT & PLAN NOTE
Lab Results   Component Value Date    HGBA1C 6 7 (H) 08/19/2021       Recent Labs     08/28/21  2324 08/29/21  0640 08/29/21  1123 08/29/21  1611   POCGLU 133 137 149* 126       Blood Sugar Average: Last 72 hrs:  (P) 144 3125   Keep NPO  Currently on D5 half NS  Accu-Cheks every 6 hourly and sliding scale insulin  Hypoglycemic protocol

## 2021-08-29 NOTE — ASSESSMENT & PLAN NOTE
Sodium 152  Hypernatremia likely due to poor oral intake/dehydration  Currently patient is NPO  Will give gentle hydration with dextrose 5% and 0 45% NS  Monitor BMP    Nephrology input appreciated

## 2021-08-29 NOTE — ASSESSMENT & PLAN NOTE
Results from last 7 days   Lab Units 08/29/21  0548 08/28/21  2140 08/28/21  0458 08/27/21  0613 08/25/21  1734   SODIUM mmol/L 152*  --  150* 146* 138   POTASSIUM mmol/L 3 2* 3 3* 3 2* 3 1* 3 9   CHLORIDE mmol/L 106  --  103 99 93*   CO2 mmol/L 37*  --  37* 36* 28   ANION GAP mmol/L 9  --  10 11 17*   BUN mg/dL 28*  --  41* 41* 71*   CREATININE mg/dL 0 96  --  1 29* 1 23* 1 75*   CALCIUM mg/dL 9 6  --  9 4 8 9 9 7   ALBUMIN g/dL  --   --   --   --  3 9   TOTAL BILIRUBIN mg/dL  --   --   --   --  0 71   ALK PHOS U/L  --   --   --   --  92 4   ALT U/L  --   --   --   --  27   AST U/L  --   --   --   --  26   EGFR ml/min/1 73sq m 54  --  38 40 26   GLUCOSE RANDOM mg/dL 124  --  97 147* 222*       Creatinine is greater than her baseline of 1 4-1 5  Currently creatinine at baseline  Monitor BMP daily

## 2021-08-29 NOTE — ASSESSMENT & PLAN NOTE
Potassium 3 2 and will supplement potassium  Repeat labs in a m      Results from last 7 days   Lab Units 08/29/21  0548 08/28/21  2140 08/28/21  0458 08/27/21  0613 08/25/21  1734   SODIUM mmol/L 152*  --  150* 146* 138   POTASSIUM mmol/L 3 2* 3 3* 3 2* 3 1* 3 9   CHLORIDE mmol/L 106  --  103 99 93*   CO2 mmol/L 37*  --  37* 36* 28   ANION GAP mmol/L 9  --  10 11 17*   BUN mg/dL 28*  --  41* 41* 71*   CREATININE mg/dL 0 96  --  1 29* 1 23* 1 75*   CALCIUM mg/dL 9 6  --  9 4 8 9 9 7   ALBUMIN g/dL  --   --   --   --  3 9   TOTAL BILIRUBIN mg/dL  --   --   --   --  0 71   ALK PHOS U/L  --   --   --   --  92 4   ALT U/L  --   --   --   --  27   AST U/L  --   --   --   --  26   EGFR ml/min/1 73sq m 54  --  38 40 26   GLUCOSE RANDOM mg/dL 124  --  97 147* 222*

## 2021-08-29 NOTE — ASSESSMENT & PLAN NOTE
Patient had multiple episodes of vomiting yesterday  Currently no vomiting however she complains of abdominal distention  Abdomen is test and distended compared to yesterday  Stat CT abdomen pelvis showed dilated small bowel loops in the mid to lower abdomen with normal caliber the distal ileum, suggest incomplete low-grade partial small-bowel obstruction  Will keep NPO  NG tube insertion done  Surgery consult input appreciated  Serial abdominal examination  Continue Zofran  QTC appears to be prolonged due to V paced rhythm with widened QRS    Corrected QTC less than 470

## 2021-08-29 NOTE — ASSESSMENT & PLAN NOTE
· Amlodipine and metoprolol with holding parameters  · Will hold Lasix currently in the setting of sepsis  Blood pressure 147/73, pulse 92, temperature 98 1 °F (36 7 °C), temperature source Tympanic, resp  rate 18, height 5' (1 524 m), weight 92 6 kg (204 lb 2 3 oz), SpO2 92 %

## 2021-08-29 NOTE — PLAN OF CARE
Problem: MOBILITY - ADULT  Goal: Maintain or return to baseline ADL function  Description: INTERVENTIONS:  -  Assess patient's ability to carry out ADLs; assess patient's baseline for ADL function and identify physical deficits which impact ability to perform ADLs (bathing, care of mouth/teeth, toileting, grooming, dressing, etc )  - Assess/evaluate cause of self-care deficits   - Assess range of motion  - Assess patient's mobility; develop plan if impaired  - Assess patient's need for assistive devices and provide as appropriate  - Encourage maximum independence but intervene and supervise when necessary  - Involve family in performance of ADLs  - Assess for home care needs following discharge   - Consider OT consult to assist with ADL evaluation and planning for discharge  - Provide patient education as appropriate  Outcome: Progressing  Goal: Maintains/Returns to pre admission functional level  Description: INTERVENTIONS:  - Perform BMAT or MOVE assessment daily    - Set and communicate daily mobility goal to care team and patient/family/caregiver  - Collaborate with rehabilitation services on mobility goals if consulted  - Perform Range of Motion  times a day  - Reposition patient every  hours    - Dangle patient  times a day  - Stand patient  times a day  - Ambulate patient  times a day  - Out of bed to chair  times a day   - Out of bed for meals  times a day  - Out of bed for toileting  - Record patient progress and toleration of activity level   Outcome: Progressing     Problem: Potential for Falls  Goal: Patient will remain free of falls  Description: INTERVENTIONS:  - Educate patient/family on patient safety including physical limitations  - Instruct patient to call for assistance with activity   - Consult OT/PT to assist with strengthening/mobility   - Keep Call bell within reach  - Keep bed low and locked with side rails adjusted as appropriate  - Keep care items and personal belongings within reach  - Initiate and maintain comfort rounds  - Make Fall Risk Sign visible to staff  - Offer Toileting every  Hours, in advance of need  - Initiate/Maintain alarm  - Obtain necessary fall risk management equipment:   - Apply yellow socks and bracelet for high fall risk patients  - Consider moving patient to room near nurses station  Outcome: Progressing     Problem: Nutrition/Hydration-ADULT  Goal: Nutrient/Hydration intake appropriate for improving, restoring or maintaining nutritional needs  Description: Monitor and assess patient's nutrition/hydration status for malnutrition  Collaborate with interdisciplinary team and initiate plan and interventions as ordered  Monitor patient's weight and dietary intake as ordered or per policy  Utilize nutrition screening tool and intervene as necessary  Determine patient's food preferences and provide high-protein, high-caloric foods as appropriate       INTERVENTIONS:  - Monitor oral intake, urinary output, labs, and treatment plans  - Assess nutrition and hydration status and recommend course of action  - Evaluate amount of meals eaten  - Assist patient with eating if necessary   - Allow adequate time for meals  - Recommend/ encourage appropriate diets, oral nutritional supplements, and vitamin/mineral supplements  - Order, calculate, and assess calorie counts as needed  - Recommend, monitor, and adjust tube feedings and TPN/PPN based on assessed needs  - Assess need for intravenous fluids  - Provide specific nutrition/hydration education as appropriate  - Include patient/family/caregiver in decisions related to nutrition  Outcome: Progressing     Problem: PAIN - ADULT  Goal: Verbalizes/displays adequate comfort level or baseline comfort level  Description: Interventions:  - Encourage patient to monitor pain and request assistance  - Assess pain using appropriate pain scale  - Administer analgesics based on type and severity of pain and evaluate response  - Implement non-pharmacological measures as appropriate and evaluate response  - Consider cultural and social influences on pain and pain management  - Notify physician/advanced practitioner if interventions unsuccessful or patient reports new pain  Outcome: Progressing     Problem: INFECTION - ADULT  Goal: Absence or prevention of progression during hospitalization  Description: INTERVENTIONS:  - Assess and monitor for signs and symptoms of infection  - Monitor lab/diagnostic results  - Monitor all insertion sites, i e  indwelling lines, tubes, and drains  - Monitor endotracheal if appropriate and nasal secretions for changes in amount and color  - [place] appropriate cooling/warming therapies per order  - Administer medications as ordered  - Instruct and encourage patient and family to use good hand hygiene technique  - Identify and instruct in appropriate isolation precautions for identified infection/condition  Outcome: Progressing  Goal: Absence of fever/infection during neutropenic period  Description: INTERVENTIONS:  - Monitor WBC    Outcome: Progressing     Problem: SAFETY ADULT  Goal: Maintain or return to baseline ADL function  Description: INTERVENTIONS:  -  Assess patient's ability to carry out ADLs; assess patient's baseline for ADL function and identify physical deficits which impact ability to perform ADLs (bathing, care of mouth/teeth, toileting, grooming, dressing, etc )  - Assess/evaluate cause of self-care deficits   - Assess range of motion  - Assess patient's mobility; develop plan if impaired  - Assess patient's need for assistive devices and provide as appropriate  - Encourage maximum independence but intervene and supervise when necessary  - Involve family in performance of ADLs  - Assess for home care needs following discharge   - Consider OT consult to assist with ADL evaluation and planning for discharge  - Provide patient education as appropriate  Outcome: Progressing  Goal: Maintains/Returns to pre admission functional level  Description: INTERVENTIONS:  - Perform BMAT or MOVE assessment daily    - Set and communicate daily mobility goal to care team and patient/family/caregiver  - Collaborate with rehabilitation services on mobility goals if consulted  - Perform Range of Motion  times a day  - Reposition patient every  hours    - Dangle patient  times a day  - Stand patient  times a day  - Ambulate patient  times a day  - Out of bed to chair  times a day   - Out of bed for meals  times a day  - Out of bed for toileting  - Record patient progress and toleration of activity level   Outcome: Progressing  Goal: Patient will remain free of falls  Description: INTERVENTIONS:  - Educate patient/family on patient safety including physical limitations  - Instruct patient to call for assistance with activity   - Consult OT/PT to assist with strengthening/mobility   - Keep Call bell within reach  - Keep bed low and locked with side rails adjusted as appropriate  - Keep care items and personal belongings within reach  - Initiate and maintain comfort rounds  - Make Fall Risk Sign visible to staff  - Offer Toileting every  Hours, in advance of need  - Initiate/Maintain alarm  - Obtain necessary fall risk management equipment:  - Apply yellow socks and bracelet for high fall risk patients  - Consider moving patient to room near nurses station  Outcome: Progressing     Problem: DISCHARGE PLANNING  Goal: Discharge to home or other facility with appropriate resources  Description: INTERVENTIONS:  - Identify barriers to discharge w/patient and caregiver  - Arrange for needed discharge resources and transportation as appropriate  - Identify discharge learning needs (meds, wound care, etc )  - Arrange for interpretive services to assist at discharge as needed  - Refer to Case Management Department for coordinating discharge planning if the patient needs post-hospital services based on physician/advanced practitioner order or complex needs related to functional status, cognitive ability, or social support system  Outcome: Progressing     Problem: Knowledge Deficit  Goal: Patient/family/caregiver demonstrates understanding of disease process, treatment plan, medications, and discharge instructions  Description: Complete learning assessment and assess knowledge base    Interventions:  - Provide teaching at level of understanding  - Provide teaching via preferred learning methods  Outcome: Progressing     Problem: Prexisting or High Potential for Compromised Skin Integrity  Goal: Skin integrity is maintained or improved  Description: INTERVENTIONS:  - Identify patients at risk for skin breakdown  - Assess and monitor skin integrity  - Assess and monitor nutrition and hydration status  - Monitor labs   - Assess for incontinence   - Turn and reposition patient  - Assist with mobility/ambulation  - Relieve pressure over bony prominences  - Avoid friction and shearing  - Provide appropriate hygiene as needed including keeping skin clean and dry  - Evaluate need for skin moisturizer/barrier cream  - Collaborate with interdisciplinary team   - Patient/family teaching  - Consider wound care consult   Outcome: Progressing

## 2021-08-29 NOTE — ASSESSMENT & PLAN NOTE
Results from last 7 days   Lab Units 08/25/21 2044 08/25/21  1739   BLOOD CULTURE  Escherichia coli ESBL*  --    GRAM STAIN RESULT  Gram negative rods*  --    URINE CULTURE   --  >100,000 cfu/ml Escherichia coli ESBL*     Patient meets sepsis criteria on admission  Sepsis likely secondary to UTI  Blood culture and urine culture growing ESBL E coli  ID consult, appreciate recommendation  Currently on meropenem and we will continue the same  Monitor WBC/hemodynamics

## 2021-08-29 NOTE — ASSESSMENT & PLAN NOTE
Wt Readings from Last 3 Encounters:   08/25/21 92 6 kg (204 lb 2 3 oz)   08/22/21 95 3 kg (210 lb 1 6 oz)   02/19/20 87 5 kg (193 lb)     Currently lasix on hold in light of sepsis  I's and O's, daily weight  Cardiology on board    Input appreciated

## 2021-08-29 NOTE — TELEPHONE ENCOUNTER
Spoke with RN regarding 0600 obstruction series  She advised that patient will be able to come down after 0700  Informed RN that I would make morning tech aware and we would call to schedule

## 2021-08-29 NOTE — PROGRESS NOTES
[address]  66   Progress Note Juan A Mariano Day 1935, 80 y o  female MRN: [id number]  Unit/Bed#: -Rohit Encounter: [id number]  Primary Care Provider: Roberth Bueno MD   Date and time admitted to hospital: 8/25/2021  4:58 PM    UTI (urinary tract infection)  Assessment & Plan  Urine culture growing ESBL E coli  Switched to meropenem    * Sepsis ([address] )  Assessment & Plan    Results from last 7 days   Lab Units 08/25/21  2044 08/25/21  1739   BLOOD CULTURE  Escherichia coli ESBL*  --    GRAM STAIN RESULT  Gram negative rods*  --    URINE CULTURE   --  >100,000 cfu/ml Escherichia coli ESBL*     Patient meets sepsis criteria on admission  Sepsis likely secondary to UTI  Blood culture and urine culture growing ESBL E coli  ID consult, appreciate recommendation  Currently on meropenem and we will continue the same  Monitor WBC/hemodynamics    Hypernatremia  Assessment & Plan  Sodium 152  Hypernatremia likely due to poor oral intake/dehydration  Currently patient is NPO  Will give gentle hydration with dextrose 5% and 0 45% NS  Monitor BMP  Nephrology input appreciated    Partial small bowel obstruction [facility])  Assessment & Plan  Patient had multiple episodes of vomiting yesterday  Currently no vomiting however she complains of abdominal distention  Abdomen is test and distended compared to yesterday  Stat CT abdomen pelvis showed dilated small bowel loops in the mid to lower abdomen with normal caliber the distal ileum, suggest incomplete low-grade partial small-bowel obstruction  Will keep NPO  NG tube insertion done  Surgery consult input appreciated  Serial abdominal examination  Continue Zofran  QTC appears to be prolonged due to V paced rhythm with widened QRS    Corrected QTC less than 470      CKD (chronic kidney disease)  Assessment & Plan  Results from last 7 days   Lab Units 08/29/21  0548 08/28/21  2140 08/28/21  0458 08/27/21  0613 08/25/21  1734   SODIUM mmol/L 152*  --  150* 146* 138 POTASSIUM mmol/L 3 2* 3 3* 3 2* 3 1* 3 9   CHLORIDE mmol/L 106  --  103 99 93*   CO2 mmol/L 37*  --  37* 36* 28   ANION GAP mmol/L 9  --  10 11 17*   BUN mg/dL 28*  --  41* 41* 71*   CREATININE mg/dL 0 96  --  1 29* 1 23* 1 75*   CALCIUM mg/dL 9 6  --  9 4 8 9 9 7   ALBUMIN g/dL  --   --   --   --  3 9   TOTAL BILIRUBIN mg/dL  --   --   --   --  0 71   ALK PHOS U/L  --   --   --   --  92 4   ALT U/L  --   --   --   --  27   AST U/L  --   --   --   --  26   EGFR ml/min/1 73sq m 54  --  38 40 26   GLUCOSE RANDOM mg/dL 124  --  97 147* 222*       Creatinine is greater than her baseline of 1 4-1 5  Currently creatinine at baseline  Monitor BMP daily    Hypokalemia  Assessment & Plan  Potassium 3 2 and will supplement potassium  Repeat labs in a m  Results from last 7 days   Lab Units 08/29/21  0548 08/28/21  2140 08/28/21  0458 08/27/21  0613 08/25/21  1734   SODIUM mmol/L 152*  --  150* 146* 138   POTASSIUM mmol/L 3 2* 3 3* 3 2* 3 1* 3 9   CHLORIDE mmol/L 106  --  103 99 93*   CO2 mmol/L 37*  --  37* 36* 28   ANION GAP mmol/L 9  --  10 11 17*   BUN mg/dL 28*  --  41* 41* 71*   CREATININE mg/dL 0 96  --  1 29* 1 23* 1 75*   CALCIUM mg/dL 9 6  --  9 4 8 9 9 7   ALBUMIN g/dL  --   --   --   --  3 9   TOTAL BILIRUBIN mg/dL  --   --   --   --  0 71   ALK PHOS U/L  --   --   --   --  92 4   ALT U/L  --   --   --   --  27   AST U/L  --   --   --   --  26   EGFR ml/min/1 73sq m 54  --  38 40 26   GLUCOSE RANDOM mg/dL 124  --  97 147* 222*         Hypertension  Assessment & Plan  · Amlodipine and metoprolol with holding parameters  · Will hold Lasix currently in the setting of sepsis  Blood pressure 147/73, pulse 92, temperature 98 1 °F (36 7 °C), temperature source Tympanic, resp  rate 18, height 5' (1 524 m), weight 92 6 kg (204 lb 2 3 oz), SpO2 92 %          Hyperlipidemia  Assessment & Plan  Continue Lipitor for now    Chronic diastolic congestive heart failure (HCC)  Assessment & Plan  Wt Readings from Last 3 Encounters:   08/25/21 92 6 kg (204 lb 2 3 oz)   08/22/21 95 3 kg (210 lb 1 6 oz)   02/19/20 87 5 kg (193 lb)     Currently lasix on hold in light of sepsis  I's and O's, daily weight  Cardiology on board  Input appreciated    Diabetes mellitus [facility])  Assessment & Plan  Lab Results   Component Value Date    HGBA1C 6 7 (H) 08/19/2021       Recent Labs     08/28/21  2324 08/29/21  0640 08/29/21  1123 08/29/21  1611   POCGLU 133 137 149* 126       Blood Sugar Average: Last 72 hrs:  (P) 144 3125   Keep NPO  Currently on D5 half NS  Accu-Cheks every 6 hourly and sliding scale insulin  Hypoglycemic protocol  Constipation  Assessment & Plan  Hold stool softer currently due to suspected partial small bowel obstruction  Ambulatory dysfunction  Assessment & Plan  PT OT consult, recommended acute post rehab  Frequent falls  Assessment & Plan  PT and OT evaluations, recommend acute post rehab  Family in agreement    Age-related cognitive decline  Assessment & Plan  Supportive care  TE Pharmacologic Prophylaxis: Pharmacologic VTE Prophylaxis contraindicated due to Evidence of GI bleed    Patient Centered Rounds: I have performed bedside rounds with nursing staff today  Discussions with Specialists or Other Care Team Provider:  Nephrology  Education and Discussions with Family / Patient:  Patient    Current Length of Stay: 4 day(s)  Current Patient Status: Inpatient     Certification Statement: The patient will continue to require additional inpatient hospital stay due to Sepsis [facility])  Discharge Plan / Estimated Discharge Date:  2-3 days    Code Status: Level 3 - DNAR and DNI  ______________________________________________________________________________    Subjective:   Patient seen and examined by me  Patient is confused and is not able to give too much of subjective complaints  Abdominal distention seems better  No nausea or vomiting overnight  No further episodes of GI bleed    No chest pain, palpitations or diaphoresis  No high fever, chills or rigors at this point of time    Objective:   Vitals: Blood pressure 147/73, pulse 92, temperature 98 1 °F (36 7 °C), temperature source Tympanic, resp  rate 18, height 5' (1 524 m), weight 92 6 kg (204 lb 2 3 oz), SpO2 92 %      Physical Exam:   General appearance: alert, appears stated age and cooperative  Constitutional- looks a little weak  HEENT - atraumatic and normocephalic  Neck- supple  Skin - no fresh rash  Extremities no fresh focal deformities  Cardiovascular- S1-S2 heard  Respiratory- bilateral air entry present, no crackles or rhonchi  Skin - no fresh rash  Abdomen - normal bowel sounds present, no rebound tenderness  CNS- No fresh focal deficits  Psych- no acute psychosis     Additional Data:   Labs:  Results from last 7 days   Lab Units 08/29/21  0548 08/28/21  0458 08/27/21  0613 08/25/21  1734   WBC Thousand/uL 13 44* 12 46* 13 35* 23 15*   HEMOGLOBIN g/dL 12 2 12 2 12 5 14 1   HEMATOCRIT % 39 5 39 5 39 8 43 5   MCV fL 97 97 94 92   PLATELETS Thousands/uL 409* 400* 383 406*   INR   --   --   --  1 01     Results from last 7 days   Lab Units 08/29/21  0548 08/28/21  2140 08/28/21  0458 08/27/21  0613 08/25/21  1734   SODIUM mmol/L 152*  --  150* 146* 138   POTASSIUM mmol/L 3 2* 3 3* 3 2* 3 1* 3 9   CHLORIDE mmol/L 106  --  103 99 93*   CO2 mmol/L 37*  --  37* 36* 28   ANION GAP mmol/L 9  --  10 11 17*   BUN mg/dL 28*  --  41* 41* 71*   CREATININE mg/dL 0 96  --  1 29* 1 23* 1 75*   CALCIUM mg/dL 9 6  --  9 4 8 9 9 7   ALBUMIN g/dL  --   --   --   --  3 9   TOTAL BILIRUBIN mg/dL  --   --   --   --  0 71   ALK PHOS U/L  --   --   --   --  92 4   ALT U/L  --   --   --   --  27   AST U/L  --   --   --   --  26   EGFR ml/min/1 73sq m 54  --  38 40 26   GLUCOSE RANDOM mg/dL 124  --  97 147* 222*     Results from last 7 days   Lab Units 08/28/21  0458   MAGNESIUM mg/dL 2 3     Results from last 7 days   Lab Units 08/26/21  1322 08/26/21  0436 08/25/21  2134 TROPONIN I ng/mL 0 03 0 05 0 03              Results from last 7 days   Lab Units 08/29/21  1611 08/29/21  1123 08/29/21  0640 08/28/21  2324 08/28/21  1648 08/28/21  1116 08/28/21  0802 08/27/21  2115 08/27/21  1625 08/27/21  1118 08/27/21  0648 08/26/21  2149   POC GLUCOSE mg/dl 126 149* 137 133 131 106 101 126 181* 155* 156* 168*             * I Have Reviewed All Lab Data Listed Above  Cultures:   Results from last 7 days   Lab Units 08/25/21  2044 08/25/21  1739   BLOOD CULTURE  Escherichia coli ESBL*  --    GRAM STAIN RESULT  Gram negative rods*  --    URINE CULTURE   --  >100,000 cfu/ml Escherichia coli ESBL*             Imaging:  Imaging Reports Reviewed Today Include:   CT abdomen pelvis wo contrast    Result Date: 8/28/2021  Impression: Dilated small bowel loops in the mid to lower abdomen with normal caliber of the distal ileum, suggest  incomplete low-grade partial small bowel obstruction Hepatic steatosis No free air or pneumatosis No abnormal bowel wall thickening Small left effusion and left basilar consolidation with air bronchograms  This may be infective consolidation versus atelectasis Follow-up suggested at 3 months to demonstrate resolution Workstation performed: [id number]     XR chest portable    Result Date: 8/29/2021  Impression: Enteric tube in place with distal portion coursing below left hemidiaphragm LEFT basilar opacity, likely reflecting small to moderate pleural effusion with atelectasis  Possibility of superimposed pneumonia is not excluded  Workstation performed: [id number]     XR chest 1 view portable    Result Date: 8/26/2021  Impression: Mild left base atelectasis with mild elevation of the left hemidiaphragm  Workstation performed: [id number]     XR abdomen obstruction series    Result Date: 8/27/2021  Impression: Technically limited study  Nonspecific bowel gas pattern likely reflecting mild ileus  Early/partial obstruction is not excludable    Follow-up as clinically dictated  Workstation performed: [id number]     Scheduled Meds:  Current Facility-Administered Medications   Medication Dose Route Frequency Provider Last Rate    acetaminophen  650 mg Oral Q6H PRN Opal Cooks, MD      aluminum-magnesium hydroxide-simethicone  30 mL Oral Q6H PRN Opal Cooks, MD      atorvastatin  10 mg Oral Daily With Piter Armendariz MD      insulin lispro  1-5 Units Subcutaneous Q6H Ron Berman MD      levothyroxine  50 mcg Oral Early Morning Opal Cooks, MD      magnesium hydroxide  30 mL Oral Daily PRN Opal Cooks, MD      meropenem  1,000 mg Intravenous Q12H Jaxon Moreira DO 1,000 mg (08/29/21 1347)    metoprolol tartrate  25 mg Oral BID Opal Cooks, MD      nystatin   Topical BID Opal Cooks, MD      ondansetron  4 mg Intravenous Q4H PRN Ron Berman MD      oxyCODONE  2 5 mg Oral Q4H PRN Opal Cooks, MD      pantoprazole  40 mg Intravenous Q12H [address] KASIE Yañez      IV infusion builder   Intravenous Continuous Deanna Merrill MD      potassium chloride  20 mEq Intravenous Q2H Hildreth Gain, MD Opal Cooks, MD [address] [facility]    ** Please Note: This note has been constructed using a voice recognition system   **

## 2021-08-30 ENCOUNTER — APPOINTMENT (INPATIENT)
Dept: RADIOLOGY | Facility: HOSPITAL | Age: 86
DRG: 872 | End: 2021-08-30
Payer: MEDICARE

## 2021-08-30 PROBLEM — L89.159 SACRAL DECUBITUS ULCER: Status: ACTIVE | Noted: 2021-08-30

## 2021-08-30 LAB
ANION GAP SERPL CALCULATED.3IONS-SCNC: 8 MMOL/L (ref 4–13)
ATRIAL RATE: 93 BPM
BASE EX.OXY STD BLDV CALC-SCNC: 85.5 % (ref 60–80)
BASE EXCESS BLDV CALC-SCNC: 9.1 MMOL/L
BUN SERPL-MCNC: 19 MG/DL (ref 6–20)
CALCIUM SERPL-MCNC: 9.5 MG/DL (ref 8.4–10.2)
CHLORIDE SERPL-SCNC: 105 MMOL/L (ref 96–108)
CO2 SERPL-SCNC: 34 MMOL/L (ref 22–33)
CREAT SERPL-MCNC: 0.88 MG/DL (ref 0.4–1.1)
ERYTHROCYTE [DISTWIDTH] IN BLOOD BY AUTOMATED COUNT: 13 % (ref 11.6–15.1)
GFR SERPL CREATININE-BSD FRML MDRD: 60 ML/MIN/1.73SQ M
GLUCOSE SERPL-MCNC: 129 MG/DL (ref 65–140)
GLUCOSE SERPL-MCNC: 131 MG/DL (ref 65–140)
GLUCOSE SERPL-MCNC: 136 MG/DL (ref 65–140)
GLUCOSE SERPL-MCNC: 145 MG/DL (ref 65–140)
GLUCOSE SERPL-MCNC: 145 MG/DL (ref 65–140)
GLUCOSE SERPL-MCNC: 148 MG/DL (ref 65–140)
GLUCOSE SERPL-MCNC: 149 MG/DL (ref 65–140)
HCO3 BLDV-SCNC: 34.3 MMOL/L (ref 24–30)
HCT VFR BLD AUTO: 41.3 % (ref 34.8–46.1)
HGB BLD-MCNC: 12.8 G/DL (ref 11.5–15.4)
MAGNESIUM SERPL-MCNC: 2.2 MG/DL (ref 1.6–2.6)
MCH RBC QN AUTO: 29.9 PG (ref 26.8–34.3)
MCHC RBC AUTO-ENTMCNC: 31 G/DL (ref 31.4–37.4)
MCV RBC AUTO: 97 FL (ref 82–98)
O2 CT BLDV-SCNC: 16.1 ML/DL
P AXIS: 74 DEGREES
PCO2 BLDV: 48.7 MM HG (ref 42–50)
PH BLDV: 7.46 [PH] (ref 7.3–7.4)
PHOSPHATE SERPL-MCNC: 1.9 MG/DL (ref 2.5–5)
PLATELET # BLD AUTO: 427 THOUSANDS/UL (ref 149–390)
PMV BLD AUTO: 10.1 FL (ref 8.9–12.7)
PO2 BLDV: 51 MM HG (ref 35–45)
POTASSIUM SERPL-SCNC: 3.8 MMOL/L (ref 3.5–5)
PR INTERVAL: 170 MS
QRS AXIS: -69 DEGREES
QRSD INTERVAL: 192 MS
QT INTERVAL: 474 MS
QTC INTERVAL: 590 MS
RBC # BLD AUTO: 4.28 MILLION/UL (ref 3.81–5.12)
SODIUM SERPL-SCNC: 147 MMOL/L (ref 133–145)
T WAVE AXIS: 106 DEGREES
VENTRICULAR RATE: 93 BPM
WBC # BLD AUTO: 15.37 THOUSAND/UL (ref 4.31–10.16)

## 2021-08-30 PROCEDURE — 99232 SBSQ HOSP IP/OBS MODERATE 35: CPT | Performed by: INTERNAL MEDICINE

## 2021-08-30 PROCEDURE — 82948 REAGENT STRIP/BLOOD GLUCOSE: CPT

## 2021-08-30 PROCEDURE — 83735 ASSAY OF MAGNESIUM: CPT | Performed by: INTERNAL MEDICINE

## 2021-08-30 PROCEDURE — 82805 BLOOD GASES W/O2 SATURATION: CPT | Performed by: INTERNAL MEDICINE

## 2021-08-30 PROCEDURE — 85027 COMPLETE CBC AUTOMATED: CPT | Performed by: INTERNAL MEDICINE

## 2021-08-30 PROCEDURE — 93010 ELECTROCARDIOGRAM REPORT: CPT | Performed by: INTERNAL MEDICINE

## 2021-08-30 PROCEDURE — 99233 SBSQ HOSP IP/OBS HIGH 50: CPT | Performed by: INTERNAL MEDICINE

## 2021-08-30 PROCEDURE — 97167 OT EVAL HIGH COMPLEX 60 MIN: CPT

## 2021-08-30 PROCEDURE — C9113 INJ PANTOPRAZOLE SODIUM, VIA: HCPCS | Performed by: NURSE PRACTITIONER

## 2021-08-30 PROCEDURE — 84100 ASSAY OF PHOSPHORUS: CPT | Performed by: INTERNAL MEDICINE

## 2021-08-30 PROCEDURE — 80048 BASIC METABOLIC PNL TOTAL CA: CPT | Performed by: INTERNAL MEDICINE

## 2021-08-30 PROCEDURE — 74022 RADEX COMPL AQT ABD SERIES: CPT

## 2021-08-30 RX ORDER — DEXTROSE MONOHYDRATE 50 MG/ML
75 INJECTION, SOLUTION INTRAVENOUS CONTINUOUS
Status: DISCONTINUED | OUTPATIENT
Start: 2021-08-30 | End: 2021-09-02

## 2021-08-30 RX ADMIN — MEROPENEM 1000 MG: 1 INJECTION, POWDER, FOR SOLUTION INTRAVENOUS at 13:34

## 2021-08-30 RX ADMIN — PANTOPRAZOLE SODIUM 40 MG: 40 INJECTION, POWDER, FOR SOLUTION INTRAVENOUS at 09:32

## 2021-08-30 RX ADMIN — DEXTROSE 80 ML/HR: 5 SOLUTION INTRAVENOUS at 22:20

## 2021-08-30 RX ADMIN — METOPROLOL TARTRATE 25 MG: 25 TABLET, FILM COATED ORAL at 17:18

## 2021-08-30 RX ADMIN — ERTAPENEM SODIUM 1000 MG: 1 INJECTION, POWDER, LYOPHILIZED, FOR SOLUTION INTRAMUSCULAR; INTRAVENOUS at 20:35

## 2021-08-30 RX ADMIN — PANTOPRAZOLE SODIUM 40 MG: 40 INJECTION, POWDER, FOR SOLUTION INTRAVENOUS at 20:36

## 2021-08-30 RX ADMIN — DEXTROSE 80 ML/HR: 5 SOLUTION INTRAVENOUS at 09:30

## 2021-08-30 RX ADMIN — POTASSIUM CHLORIDE: 2 INJECTION, SOLUTION, CONCENTRATE INTRAVENOUS at 07:45

## 2021-08-30 RX ADMIN — MEROPENEM 1000 MG: 1 INJECTION, POWDER, FOR SOLUTION INTRAVENOUS at 01:03

## 2021-08-30 RX ADMIN — NYSTATIN: 100000 POWDER TOPICAL at 17:24

## 2021-08-30 RX ADMIN — ATORVASTATIN CALCIUM 10 MG: 10 TABLET, FILM COATED ORAL at 17:18

## 2021-08-30 RX ADMIN — NYSTATIN: 100000 POWDER TOPICAL at 10:53

## 2021-08-30 RX ADMIN — SODIUM PHOSPHATE, MONOBASIC, MONOHYDRATE 21 MMOL: 276; 142 INJECTION, SOLUTION INTRAVENOUS at 12:00

## 2021-08-30 NOTE — ASSESSMENT & PLAN NOTE
Lab Results   Component Value Date    HGBA1C 6 7 (H) 08/19/2021       Recent Labs     08/29/21  1611 08/30/21  0004 08/30/21  0651 08/30/21  1135   POCGLU 126 136 149* 145*       Blood Sugar Average: Last 72 hrs:  (P) 137 [id number]     Currently on D5 half NS  Accu-Cheks every 6 hourly and sliding scale insulin  Hypoglycemic protocol

## 2021-08-30 NOTE — QUICK NOTE
Patient seen patient has no complaints of abdominal pain  Her abdomen is soft and the only real physical findings are multiple ecchymoses from her heparin injections  I took a look at the CT scan and the films from yesterday and I am not sure this is an ileus versus a small-bowel obstruction    Plan is to give her couple enemas and then get a repeat  Set of abdominal films

## 2021-08-30 NOTE — ASSESSMENT & PLAN NOTE
· Amlodipine and metoprolol with holding parameters  · No longer septic, will resume home Lasix  Blood pressure 126/60, pulse 89, temperature (!) 96 8 °F (36 °C), temperature source Tympanic, resp  rate 17, height 5' (1 524 m), weight 92 6 kg (204 lb 2 3 oz), SpO2 94 %

## 2021-08-30 NOTE — ASSESSMENT & PLAN NOTE
Results from last 7 days   Lab Units 09/04/21  0531 09/03/21  0516 09/02/21  0600 09/01/21  0519 08/31/21  0607 08/30/21  0707 08/29/21  0548 08/28/21  2140   SODIUM mmol/L 141 139 139 138 146* 147* 152*  --    POTASSIUM mmol/L 3 8 3 1* 2 8* 3 2* 3 4* 3 8 3 2* 3 3*   CHLORIDE mmol/L 105 101 98 97 102 105 106  --    CO2 mmol/L 31 30 34* 33 36* 34* 37*  --    ANION GAP mmol/L 5 8 7 8 8 8 9  --    BUN mg/dL 12 11 13 13 16 19 28*  --    CREATININE mg/dL 0 73 0 69 0 72 0 78 0 83 0 88 0 96  --    CALCIUM mg/dL 8 7 8 1* 8 6 8 6 9 0 9 5 9 6  --    EGFR ml/min/1 73sq m 75 79 76 69 64 60 54  --    GLUCOSE RANDOM mg/dL 101 107 112 107 129 145* 124  --        Creatinine is greater than her baseline of 1 4-1 5  Currently creatinine at baseline  Monitor BMP daily

## 2021-08-30 NOTE — OCCUPATIONAL THERAPY NOTE
OT EVALUATION       08/30/21 0909   Note Type   Note type Evaluation   Restrictions/Precautions   Other Precautions Chair Alarm; Bed Alarm; Fall Risk;O2  (NG tube)   Pain Assessment   Pain Assessment Tool Pain Assessment not indicated - pt denies pain   Home Living   Type of Home SNF  (Vicky Golden recently was in long term )   [address] [facility],[address]   Additional Comments pt was transferring with assist to a wheelchair    Prior Function   Level of [place] Needs assistance with ADLs and functional mobility; Needs assistance with IADLs   Lives With Facility staff   Receives Help From Personal care attendant   ADL Assistance Needs assistance   IADLs Needs assistance   ADL   Eating Assistance 4  Minimal Assistance   Grooming Assistance 3  Moderate Assistance   UB Bathing Assistance 2  Maximal Assistance   LB Bathing Assistance 1  Total Assistance   UB Dressing Assistance 2  Maximal Assistance   LB Dressing Assistance 1  Total Assistance   Toileting Assistance  1  Total Assistance   Bed Mobility   Rolling R 2  Maximal assistance   Rolling L 2  Maximal assistance   Supine to Sit 1  Dependent   Transfers   Sit to Stand Unable to assess   Activity Tolerance   Activity Tolerance Patient limited by fatigue;Treatment limited secondary to medical complications (Comment)  (weakness)   RUE Assessment   RUE Assessment WFL  (grossly 3+/5 MMT)   LUE Assessment   LUE Assessment WFL  (grossly 3+/5 MMT)   Cognition   Overall Cognitive Status WFL   Arousal/Participation Cooperative   Attention Attends with cues to redirect   Orientation Level Oriented X4   Following Commands Follows one step commands with increased time or repetition   Comments pt lethargic at times but arousable    Assessment   Limitation Decreased ADL status; Decreased UE strength;Decreased Safe judgement during ADL;Decreased endurance;Decreased self-care trans;Decreased high-level ADLs  (decresaed balance and mobility )   Prognosis Good   Assessment Patient evaluated by Occupational Therapy  Patient admitted with Sepsis ([facility] [address] )  The patients occupational profile, medical and therapy history includes a extensive additional review of physical, cognitive, or psychosocial history related to current functional performance  Comorbidities affecting functional mobility and ADLS include: femur fx with sx, blind,  anemia, arthritis, CAD, chronic pain, CHF, dementia, diabetes, hypertension and pacemaker  Prior to admission, patient was requiring assist for functional mobility with walker limited distances, requiring assist for ADLS, requiring assist for IADLS and in short term rehab  The evaluation identifies the following performance deficits: weakness, impaired balance, decreased endurance, increased fall risk, new onset of impairment of functional mobility, decreased ADLS, decreased IADLS, decreased activity tolerance, decreased safety awareness, impaired judgement and decreased strength, that result in activity limitations and/or participation restrictions  This evaluation requires clinical decision making of high complexity, because the patient presents with comorbidites that affect occupational performance and required significant modification of tasks or assistance with consideration of multiple treatment options  The Barthel Index was used as a functional outcome tool presenting with a score of 5, indicating marked limitations of functional mobility and ADLS  The patient's raw score on the -PAC Daily Activity inpatient short form low function score is 18, standardized score is Low Function Daily Activity Standardized Score: 30 17  Patients with a standardized score less than 39 4 are likely to benefit from discharge to post-acute rehab services  Please refer to the recommendation of the Occupational Therapist for safe discharge planning    Patient will benefit from skilled Occupational Therapy services to address above deficits and facilitate a safe return to prior level of function  Goals   Patient Goals to feel better and get stronger   STG Time Frame   (1-7 days)   Short Term Goal  Goals established to promote patient goal of to feel better and get stronger:  Patient will increase standing tolerance to 1 minutes during ADL task to decrease assistance level and decrease fall risk; Patient will increase bed mobility to mod assist of 2 in preparation for ADLS and transfers; Patient will tolerate 10 minutes of UE ROM/strengthening to increase general activity tolerance and performance in ADLS/IADLS; Patient will improve functional activity tolerance to 10 minutes of sustained functional tasks to increase participation in basic self-care and decrease assistance level;  Patient will increase dynamic sitting balance to fair- to improve the ability to sit at edge of bed or on a chair for ADLS;  Patient will increase dynamic standing balance to poor+ to improve postural stability and decrease fall risk during standing ADLS and transfers  LTG Time Frame   (8-14 days)   Long Term Goal Patient will increase standing tolerance to 3 minutes during ADL task to decrease assistance level and decrease fall risk; Patient will increase bed mobility to mod assist in preparation for ADLS and transfers; Patient will tolerate 20 minutes of UE ROM/strengthening to increase general activity tolerance and performance in ADLS/IADLS; Patient will improve functional activity tolerance to 20 minutes of sustained functional tasks to increase participation in basic self-care and decrease assistance level;  Patient will increase dynamic sitting balance to fair to improve the ability to sit at edge of bed or on a chair for ADLS;  Patient will increase dynamic standing balance to fair- to improve postural stability and decrease fall risk during standing ADLS and transfers   Pt will score >/= 22/24 on AM-PAC Daily Activity Inpatient scale to promote safe independence with ADLs and functional mobility; Pt will score >/= 35/100 on Barthel Index in order to decrease caregiver assistance needed and increase ability to perform ADLs and functional mobility  Functional Transfer Goals   Pt Will Perform All Functional Transfers   (STG mod assist of 2 LTG mod assist )   ADL Goals   Pt Will Perform Eating   (STG supervision LTG Independent )   Pt Will Perform Grooming   (STG min assist LTG supervision )   Pt Will Perform Bathing   (STG mod assist LTG min assist )   Pt Will Perform UE Dressing   (STG mod assist LTG min assist )   Pt Will Perform LE Dressing   (STG mod assist LTG min assist )   Pt Will Perform Toileting   (STG mod assist LTG min assist )   Plan   Treatment Interventions ADL retraining;Functional transfer training;UE strengthening/ROM; Endurance training;Patient/family training;Equipment evaluation/education; Activityengagement; Compensatory technique education   Goal Expiration Date 09/13/21   OT Frequency 1-2x/wk   Recommendation   OT Discharge Recommendation Post acute rehabilitation services   AM-PAC Daily Activity Inpatient   Lower Body Dressing 1   Bathing 1   Toileting 1   Upper Body Dressing 2   Grooming 2   Eating 3   Daily Activity Raw Score 10   Turning Head Towards Sound 4   Follow Simple Instructions 4   Low Function Daily Activity Raw Score 18   Low Function Daily Activity Standardized Score 30 17   AM-PAC Applied Cognition Inpatient   Following a Speech/Presentation 4   Understanding Ordinary Conversation 4   Taking Medications 3   Remembering Where Things Are Placed or Put Away 3   Remembering List of 4-5 Errands 3   Taking Care of Complicated Tasks 2   Applied Cognition Raw Score 19   Applied Cognition Standardized Score 39 77   Barthel Index   Feeding 5   Bathing 0   Grooming Score 0   Dressing Score 0   Bladder Score 0   Bowels Score 0   Toilet Use Score 0   Transfers (Bed/Chair) Score 0   Mobility (Level Surface) Score 0   Stairs Score 0   Barthel Index Score 5   Milagro Trejo MS OTR/L

## 2021-08-30 NOTE — ASSESSMENT & PLAN NOTE
Results from last 7 days   Lab Units 08/25/21 2044 08/25/21  1739   BLOOD CULTURE  Escherichia coli ESBL*  --    GRAM STAIN RESULT  Gram negative rods*  --    URINE CULTURE   --  >100,000 cfu/ml Escherichia coli ESBL*     Patient meets sepsis criteria on admission  Sepsis likely secondary to UTI  Blood culture and urine culture growing ESBL E coli  ID consult, appreciate recommendation  Currently on meropenem- Day #3    To transition to Ertapenem as per ID guidance    Monitor WBC/hemodynamics

## 2021-08-30 NOTE — ASSESSMENT & PLAN NOTE
Replete accordingly      Results from last 7 days   Lab Units 08/30/21  0707 08/29/21  0548 08/28/21  2140 08/28/21  0458 08/27/21  0613 08/25/21  1734   SODIUM mmol/L 147* 152*  --  150* 146* 138   POTASSIUM mmol/L 3 8 3 2* 3 3* 3 2* 3 1* 3 9   CHLORIDE mmol/L 105 106  --  103 99 93*   CO2 mmol/L 34* 37*  --  37* 36* 28   ANION GAP mmol/L 8 9  --  10 11 17*   BUN mg/dL 19 28*  --  41* 41* 71*   CREATININE mg/dL 0 88 0 96  --  1 29* 1 23* 1 75*   CALCIUM mg/dL 9 5 9 6  --  9 4 8 9 9 7   ALBUMIN g/dL  --   --   --   --   --  3 9   TOTAL BILIRUBIN mg/dL  --   --   --   --   --  0 71   ALK PHOS U/L  --   --   --   --   --  92 4   ALT U/L  --   --   --   --   --  27   AST U/L  --   --   --   --   --  26   EGFR ml/min/1 73sq m 60 54  --  38 40 26   GLUCOSE RANDOM mg/dL 145* 124  --  97 147* 222*

## 2021-08-30 NOTE — ASSESSMENT & PLAN NOTE
Lab Results   Component Value Date    HGBA1C 6 7 (H) 08/19/2021       Recent Labs     09/03/21  2332 09/04/21  0534 09/04/21  0724 09/04/21  1102   POCGLU 132 107 100 136       Blood Sugar Average: Last 72 hrs:  (P) 129 3125     Accu-Cheks every 6 hourly and sliding scale insulin  Hypoglycemic protocol

## 2021-08-30 NOTE — ASSESSMENT & PLAN NOTE
Sacral decubitus ulcer reported by nursing staff    Appears to be stage 2 on exam  Wound care management as per nursing protocol     - inpatient wound care nurse following  - continue nursing intervention to redistribute pressure and minimize tissue damage   - daily wound assessment per unit protocol

## 2021-08-30 NOTE — ASSESSMENT & PLAN NOTE
Wt Readings from Last 3 Encounters:   08/25/21 92 6 kg (204 lb 2 3 oz)   08/22/21 95 3 kg (210 lb 1 6 oz)   02/19/20 87 5 kg (193 lb)     Currently lasix on hold in light of sepsis and reduced p o  Intake    I's and O's, daily weight  Resume home Lasix

## 2021-08-30 NOTE — ASSESSMENT & PLAN NOTE
Improved  Denies nausea and vomiting  No abdominal pain or distension  Was able to pass flatus  Per nurse's note, had a bowel movement  At this time, patient is medically cleared to be discharged

## 2021-08-30 NOTE — ASSESSMENT & PLAN NOTE
Results from last 7 days   Lab Units 08/30/21  0707 08/29/21  0548 08/28/21  2140 08/28/21  0458 08/27/21  0613 08/25/21  1734   SODIUM mmol/L 147* 152*  --  150* 146* 138   POTASSIUM mmol/L 3 8 3 2* 3 3* 3 2* 3 1* 3 9   CHLORIDE mmol/L 105 106  --  103 99 93*   CO2 mmol/L 34* 37*  --  37* 36* 28   ANION GAP mmol/L 8 9  --  10 11 17*   BUN mg/dL 19 28*  --  41* 41* 71*   CREATININE mg/dL 0 88 0 96  --  1 29* 1 23* 1 75*   CALCIUM mg/dL 9 5 9 6  --  9 4 8 9 9 7   ALBUMIN g/dL  --   --   --   --   --  3 9   TOTAL BILIRUBIN mg/dL  --   --   --   --   --  0 71   ALK PHOS U/L  --   --   --   --   --  92 4   ALT U/L  --   --   --   --   --  27   AST U/L  --   --   --   --   --  26   EGFR ml/min/1 73sq m 60 54  --  38 40 26   GLUCOSE RANDOM mg/dL 145* 124  --  97 147* 222*       Creatinine is greater than her baseline of 1 4-1 5  Currently creatinine at baseline  Monitor BMP daily

## 2021-08-30 NOTE — ASSESSMENT & PLAN NOTE
Urine culture growing ESBL E coli  She received 3 days of IV meropenem was subsequently switched to ertapenem on 8/30  Last dose today 9/3/21  Total carbapenem duration: #8 days  WBC now within normal limits  Infectious disease now signed off

## 2021-08-30 NOTE — ASSESSMENT & PLAN NOTE
Replete accordingly    Most likely due to metabolic alkalosis in setting of NG tube drainage - expected to improve as NG tube has been that removed    Results from last 7 days   Lab Units 09/04/21  0531 09/03/21  0516 09/02/21  0600 09/01/21  0519 08/31/21  0607 08/30/21  0707 08/29/21  0548 08/28/21  2140   SODIUM mmol/L 141 139 139 138 146* 147* 152*  --    POTASSIUM mmol/L 3 8 3 1* 2 8* 3 2* 3 4* 3 8 3 2* 3 3*   CHLORIDE mmol/L 105 101 98 97 102 105 106  --    CO2 mmol/L 31 30 34* 33 36* 34* 37*  --    ANION GAP mmol/L 5 8 7 8 8 8 9  --    BUN mg/dL 12 11 13 13 16 19 28*  --    CREATININE mg/dL 0 73 0 69 0 72 0 78 0 83 0 88 0 96  --    CALCIUM mg/dL 8 7 8 1* 8 6 8 6 9 0 9 5 9 6  --    EGFR ml/min/1 73sq m 75 79 76 69 64 60 54  --    GLUCOSE RANDOM mg/dL 101 107 112 107 129 145* 124  --      Will place on oral potassium supplementation on discharge

## 2021-08-30 NOTE — ASSESSMENT & PLAN NOTE
Now improved  Sodium of 147 today  Hypernatremia likely due to poor oral intake/dehydration    Monitor BMP      Nephrology input appreciated

## 2021-08-30 NOTE — ASSESSMENT & PLAN NOTE
Results from last 7 days   Lab Units 08/25/21 2044 08/25/21  1739   BLOOD CULTURE  Escherichia coli ESBL*  --    GRAM STAIN RESULT  Gram negative rods*  --    URINE CULTURE   --  >100,000 cfu/ml Escherichia coli ESBL*     Patient meets sepsis criteria on admission  Sepsis likely secondary to UTI  Blood culture and urine culture growing ESBL E coli  ID consult, appreciate recommendation  She received IV meropenem for 3 days and subsequently transition to Ertapenem on 8/30 by ID guidance  Last dose today  Total duration anticipated for a total duration of 7 days or as per clinical situation  Carbapenem duration: #8  Completed course of IV antibiotics  At this time patient is stable to be discharged

## 2021-08-30 NOTE — PROGRESS NOTES
[facility] 6 Month Well Child Check    ASSESSMENT & PLAN  Praveen Awad is a 6 m.o. who has normal growth and normal development.    Diagnoses and all orders for this visit:    Encounter for routine child health examination without abnormal findings    Developmentally appropriate behavior, meeting milestones.  Counseled on immunizations and they will bring her in this week for immunizations as well as height/weight check.  Next visit in person at nine months.     Total time on phone visit:  23 minutes    Return to clinic at 9 months or sooner as needed    IMMUNIZATIONS  Immunizations were reviewed and orders were placed as appropriate. and I have discussed the risks and benefits of all of the vaccine components with the patient/parents.  All questions have been answered.    REFERRALS  Dental: Recommend routine dental care as appropriate.  Other: No additional referrals were made at this time.    ANTICIPATORY GUIDANCE  I have reviewed age appropriate anticipatory guidance.    HEALTH HISTORY  Do you have any concerns that you'd like to discuss today?: General questions about table food, f/u on ear infection    Presumed ar infection end of February.  Fever and drainage and seemingly uncomfortable for two days . Did not bring her in for evaluation.  Didn't realize they should.  Since that time not complaining of any pain.  No further fever or drainage . No prior h/o otitis.     Continuing to use mix of breast milk and formula.  Mom's supply is okay though decreased.  Questions about transition to formula.  Questions about transition to table foods.       No question data found.    Do you have any significant health concerns in your family history?: No  No family history on file.  Since your last visit, have there been any major changes in your family, such as a move, job change, separation, divorce, or death in the family?: No  Has a lack of transportation kept you from medical appointments?: No    Who lives in your  [place] [address]  66   Progress Note [facility] Day 1935, 80 y o  female MRN: [id number]  Unit/Bed#: -01 Encounter: [id number]  Primary Care Provider: Roberth Bueno MD   Date and time admitted to hospital: 8/25/2021  4:58 PM    * Sepsis [facility])  Assessment & Plan    Results from last 7 days   Lab Units 08/25/21 2044 08/25/21  1739   BLOOD CULTURE  Escherichia coli ESBL*  --    GRAM STAIN RESULT  Gram negative rods*  --    URINE CULTURE   --  >100,000 cfu/ml Escherichia coli ESBL*     Patient meets sepsis criteria on admission  Sepsis likely secondary to UTI  Blood culture and urine culture growing ESBL E coli  ID consult, appreciate recommendation  Currently on meropenem- Day #3  To transition to Ertapenem as per ID guidance    Monitor WBC/hemodynamics    Sacral decubitus ulcer  Assessment & Plan  Sacral decubitus ulcer reported by nursing staff  Appears to be stage 2 on exam  Wound care management as per nursing protocol     - inpatient wound care consult placed      Hypernatremia  Assessment & Plan  Now improved  Sodium of 147 today  Hypernatremia likely due to poor oral intake/dehydration    Monitor BMP  Nephrology input appreciated    Partial small bowel obstruction [facility])  Assessment & Plan  Patient had multiple episodes of vomiting yesterday  Currently no vomiting however she complains of abdominal distention  Abdomen is test and distended compared to yesterday  Stat CT abdomen pelvis showed dilated small bowel loops in the mid to lower abdomen with normal caliber the distal ileum, suggest incomplete low-grade partial small-bowel obstruction  Will resume clear liquid diet today  NG tube draining bilious fluid  Surgery consult input appreciated  Continue serial abdominal examination  Continue Zofran  QTC appears to be prolonged due to V paced rhythm with widened QRS    Corrected QTC less than 470      UTI (urinary tract infection)  Assessment & Plan  Urine culture growing ESBL E coli  On meropenem Day #3  Switching to ertapenem as per ID guidance  CKD (chronic kidney disease)  Assessment & Plan  Results from last 7 days   Lab Units 08/30/21  0707 08/29/21  0548 08/28/21 2140 08/28/21 0458 08/27/21 0613 08/25/21  1734   SODIUM mmol/L 147* 152*  --  150* 146* 138   POTASSIUM mmol/L 3 8 3 2* 3 3* 3 2* 3 1* 3 9   CHLORIDE mmol/L 105 106  --  103 99 93*   CO2 mmol/L 34* 37*  --  37* 36* 28   ANION GAP mmol/L 8 9  --  10 11 17*   BUN mg/dL 19 28*  --  41* 41* 71*   CREATININE mg/dL 0 88 0 96  --  1 29* 1 23* 1 75*   CALCIUM mg/dL 9 5 9 6  --  9 4 8 9 9 7   ALBUMIN g/dL  --   --   --   --   --  3 9   TOTAL BILIRUBIN mg/dL  --   --   --   --   --  0 71   ALK PHOS U/L  --   --   --   --   --  92 4   ALT U/L  --   --   --   --   --  27   AST U/L  --   --   --   --   --  26   EGFR ml/min/1 73sq m 60 54  --  38 40 26   GLUCOSE RANDOM mg/dL 145* 124  --  97 147* 222*       Creatinine is greater than her baseline of 1 4-1 5  Currently creatinine at baseline  Monitor BMP daily    Hypokalemia  Assessment & Plan  Replete accordingly      Results from last 7 days   Lab Units 08/30/21  0707 08/29/21  0548 08/28/21 2140 08/28/21 0458 08/27/21 0613 08/25/21  1734   SODIUM mmol/L 147* 152*  --  150* 146* 138   POTASSIUM mmol/L 3 8 3 2* 3 3* 3 2* 3 1* 3 9   CHLORIDE mmol/L 105 106  --  103 99 93*   CO2 mmol/L 34* 37*  --  37* 36* 28   ANION GAP mmol/L 8 9  --  10 11 17*   BUN mg/dL 19 28*  --  41* 41* 71*   CREATININE mg/dL 0 88 0 96  --  1 29* 1 23* 1 75*   CALCIUM mg/dL 9 5 9 6  --  9 4 8 9 9 7   ALBUMIN g/dL  --   --   --   --   --  3 9   TOTAL BILIRUBIN mg/dL  --   --   --   --   --  0 71   ALK PHOS U/L  --   --   --   --   --  92 4   ALT U/L  --   --   --   --   --  27   AST U/L  --   --   --   --   --  26   EGFR ml/min/1 73sq m 60 54  --  38 40 26   GLUCOSE RANDOM mg/dL 145* 124  --  97 147* 222*         Hypertension  Assessment & Plan  · Amlodipine and metoprolol with holding parameters  · Will home?:  Mom and dad  Social History     Social History Narrative     Not on file     Do you have any concerns about losing your housing?: No  Is your housing safe and comfortable?: Yes  Who provides care for your child?:   center  How much screen time does your child have each day (phone, TV, laptop, tablet, computer)?: None    [place]  Depression Scale (EPDS) Risk Assessment: Not Completed- phone visit      Feeding/Nutrition:  What does your child eat?: Feeds at the breast about 10-15 minutes, bottles are given at  and are both breast and formula (enfamil) about 6oz  Is your child eating or drinking anything other than breast milk or formula?: No  Do you give your child vitamins?: yes  Have you been worried that you don't have enough food?: No    Sleep:  How many times does your child wake in the night?: 3   What time does your child go to bed?: 7:00pm   What time does your child wake up?: 7:00am   How many naps does your child take during the day?: 2 naps lasting anywhere from 30-60 minutes     Elimination:  Do you have any concerns about your child's bowels or bladder (peeing, pooping, constipation?):  No    TB Risk Assessment:  Has your child had any of the following?:  no known risk of TB    Dental  When was the last time your child saw the dentist?: Patient has not been seen by a dentist yet   Fluoride varnish not indicated. Teeth have not yet erupted. Fluoride not applied today.    VISION/HEARING  Do you have any concerns about your child's hearing?  No  Do you have any concerns about your child's vision?  No    DEVELOPMENT  Do you have any concerns about your child's development?  No  Screening tool used, reviewed with parent or guardian: PEDS- [place]: Path E: No concerns  Milestones (by observation/ exam/ report) 75-90% ile  PERSONAL/ SOCIAL/COGNITIVE:    Turns from strangers    Reaches for familiar people    Looks for objects when out of sight  LANGUAGE:    Laughs/ Squeals     Turns to voice/ name    Babbles  GROSS MOTOR:    Rolling    Pull to sit-no head lag    Sit with support  FINE MOTOR/ ADAPTIVE:    Puts objects in mouth    Raking grasp    Transfers hand to hand    There is no problem list on file for this patient.      MEASUREMENTS    Length:    Weight:    OFC:      PHYSICAL EXAM  No physical exam, phone visit completed today. Will return for height/weight with immunizations       hold Lasix currently in the setting of sepsis  Blood pressure 142/75, pulse 89, temperature 98 1 °F (36 7 °C), temperature source Tympanic, resp  rate 19, height 5' (1 524 m), weight 92 6 kg (204 lb 2 3 oz), SpO2 97 %  Hyperlipidemia  Assessment & Plan  Continue Lipitor for now    Chronic diastolic congestive heart failure (HCC)  Assessment & Plan  Wt Readings from Last 3 Encounters:   08/25/21 92 6 kg (204 lb 2 3 oz)   08/22/21 95 3 kg (210 lb 1 6 oz)   02/19/20 87 5 kg (193 lb)     Currently lasix on hold in light of sepsis  I's and O's, daily weight  Cardiology on board  Input appreciated    Diabetes mellitus [facility])  Assessment & Plan  Lab Results   Component Value Date    HGBA1C 6 7 (H) 08/19/2021       Recent Labs     08/30/21  0004 08/30/21  0651 08/30/21  1135 08/30/21  1453   POCGLU 136 149* 145* 148*       Blood Sugar Average: Last 72 hrs:  (P) 138 6     Currently on D5 half NS  Accu-Cheks every 6 hourly and sliding scale insulin  Hypoglycemic protocol  Constipation  Assessment & Plan  Hold stool softer currently due to suspected partial small bowel obstruction  Ambulatory dysfunction  Assessment & Plan  PT OT consult, recommended acute post rehab  Frequent falls  Assessment & Plan  PT and OT evaluations, recommend acute post rehab  Family in agreement    Age-related cognitive decline  Assessment & Plan  Supportive care  VTE Pharmacologic Prophylaxis:   VTE Score: 5 Moderate Risk (Score 3-4) - Pharmacological DVT Prophylaxis Contraindicated  Sequential Compression Devices Ordered  Mechanical VTE Prophylaxis in Place: Yes    Patient Centered Rounds: I have performed bedside rounds with nursing staff today      Discussions with Specialists or Other Care Team Provider: ID    Education and Discussions with Family / Patient: Patient    Current Length of Stay: 5 day(s)    Current Patient Status: Inpatient     Discharge Plan / Estimated Discharge Date: Anticipate discharge in 48 hrs to TBD    Code Status: Level 3 - DNAR and DNI      Subjective:   Patient seen and examined at bedside  Reports no complaints today  Denies nausea, vomiting, passage of flatus or bowel movement  Still tolerates NG tube drainage  Consult placed for wound care nurse  Objective:     Vitals:   Temp (24hrs), Av °F (36 7 °C), Min:97 7 °F (36 5 °C), Max:98 4 °F (36 9 °C)    Temp:  [97 7 °F (36 5 °C)-98 4 °F (36 9 °C)] 98 1 °F (36 7 °C)  HR:  [89-96] 89  Resp:  [18-19] 19  BP: (131-153)/(64-75) 142/75  SpO2:  [89 %-97 %] 97 %  Body mass index is 39 87 kg/m²  Input and Output Summary (last 24 hours): Intake/Output Summary (Last 24 hours) at 2021 1559  Last data filed at 2021 1500  Gross per 24 hour   Intake 1000 ml   Output 1650 ml   Net -650 ml       Physical Exam:     Physical Exam  Vitals reviewed  Constitutional:       General: She is not in acute distress  Appearance: She is not toxic-appearing  HENT:      Head: Normocephalic  Cardiovascular:      Rate and Rhythm: Normal rate and regular rhythm  Pulses: Normal pulses  Pulmonary:      Effort: No respiratory distress  Breath sounds: Normal breath sounds  No wheezing  Abdominal:      General: Bowel sounds are normal       Palpations: Abdomen is soft  Tenderness: There is no abdominal tenderness  Musculoskeletal:      Right lower leg: No edema  Left lower leg: No edema  Neurological:      General: No focal deficit present  Mental Status: She is alert and oriented to person, place, and time  Mental status is at baseline           Additional Data:     Labs:  Results from last 7 days   Lab Units 21  0708 21  0548   WBC Thousand/uL 15 37* 13 44*   HEMOGLOBIN g/dL 12 8 12 2   HEMATOCRIT % 41 3 39 5   PLATELETS Thousands/uL 427* 409*   NEUTROS PCT %  --  80*   LYMPHS PCT %  --  9*   MONOS PCT %  --  9   EOS PCT %  --  1     Results from last 7 days   Lab Units 21  0707 21  1734   SODIUM mmol/L 147* 138   POTASSIUM mmol/L 3 8 3 9   CHLORIDE mmol/L 105 93*   CO2 mmol/L 34* 28   BUN mg/dL 19 71*   CREATININE mg/dL 0 88 1 75*   ANION GAP mmol/L 8 17*   CALCIUM mg/dL 9 5 9 7   ALBUMIN g/dL  --  3 9   TOTAL BILIRUBIN mg/dL  --  0 71   ALK PHOS U/L  --  92 4   ALT U/L  --  27   AST U/L  --  26   GLUCOSE RANDOM mg/dL 145* 222*     Results from last 7 days   Lab Units 21  1734   INR  1 01     Results from last 7 days   Lab Units 21  1453 21  1135 21  0651 21  0004 21  1611 21  1123 21  0640 21  2324 21  1648 21  1116 21  0802 21  2115   POC GLUCOSE mg/dl 148* 145* 149* 136 126 149* 137 133 131 106 101 126               Imaging: Reviewed radiology reports from this admission including: xray(s)    Recent Cultures (last 7 days):     Results from last 7 days   Lab Units 21  2044 21  1739   BLOOD CULTURE  Escherichia coli ESBL*  --    GRAM STAIN RESULT  Gram negative rods*  --    URINE CULTURE   --  >100,000 cfu/ml Escherichia coli ESBL*       Lines/Drains:  Invasive Devices     Peripheral Intravenous Line            Peripheral IV 21 Right Antecubital 4 days    Peripheral IV 21 Dorsal (posterior); Left Hand <1 day          Drain            Urethral Catheter Non-latex 16 Fr  4 days    NG/OG Tube Nasogastric Right nare 2 days                Telemetry:   Telemetry Orders (From admission, onward)             48 Hour Telemetry Monitoring  Continuous x 48 hours        Question:  Reason for 48 Hour Telemetry  Answer:  Arrhythmias Requiring Medical Therapy (eg  SVT, Vtach/fib, Bradycardia, Uncontrolled A-fib)                    Last 24 Hours Medication List:   Current Facility-Administered Medications   Medication Dose Route Frequency Provider Last Rate    acetaminophen  650 mg Oral Q6H PRN Fracisco Tamayo MD      aluminum-magnesium hydroxide-simethicone  30 mL Oral Q6H PRN MD Luz Elena Drummond atorvastatin  10 mg Oral Daily With Erin Vidal MD      dextrose  80 mL/hr Intravenous Continuous Wanda Blanc MD 80 mL/hr (08/30/21 0930)    ertapenem  1,000 mg Intravenous Q24H Clay Savage MD      insulin lispro  1-5 Units Subcutaneous Q6H Zi Reyna MD      levothyroxine  50 mcg Oral Early Morning Julio Evans MD      magnesium hydroxide  30 mL Oral Daily PRN Julio Evans MD      metoprolol tartrate  25 mg Oral BID Julio Evans MD      nystatin   Topical BID Julio Evans MD      ondansetron  4 mg Intravenous Q4H PRN Zi Reyna MD      oxyCODONE  2 5 mg Oral Q4H PRN Julio Evans MD      pantoprazole  40 mg Intravenous Q12H [facility] KASIE Womack      sodium phosphate  21 mmol Intravenous Once Wanda Blanc MD 21 mmol (08/30/21 1200)        Today, Patient Was Seen By: Sam Cazares MD    ** Please Note: This note has been constructed using a voice recognition system   **

## 2021-08-30 NOTE — ASSESSMENT & PLAN NOTE
Patient had multiple episodes of vomiting yesterday  Currently no vomiting however she complains of abdominal distention  Abdomen is test and distended compared to yesterday  Stat CT abdomen pelvis showed dilated small bowel loops in the mid to lower abdomen with normal caliber the distal ileum, suggest incomplete low-grade partial small-bowel obstruction  Will resume clear liquid diet today  NG tube draining bilious fluid  Surgery consult input appreciated  Continue serial abdominal examination  Continue Zofran  QTC appears to be prolonged due to V paced rhythm with widened QRS    Corrected QTC less than 470

## 2021-08-30 NOTE — ASSESSMENT & PLAN NOTE
· Amlodipine and metoprolol with holding parameters  · Will hold Lasix currently in the setting of sepsis  Blood pressure 142/75, pulse 89, temperature 98 1 °F (36 7 °C), temperature source Tympanic, resp  rate 19, height 5' (1 524 m), weight 92 6 kg (204 lb 2 3 oz), SpO2 97 %

## 2021-08-30 NOTE — PROGRESS NOTES
Cardiology Progress Note - Hay Grave Day 80 y o  female MRN: [id number]    Unit/Bed#: -01 Encounter: [id number]      Assessment/Recommendations:  1  Chest pain: unlikely to be related to ACS  Currently CP free  Continued management for non-cardiac cause as per primary team   2   Sepsis: continued on IV antibiotics as per primary team and ID  Currently does not appear to need a JOSE ALFREDO to assess bio TAVR/PPM lead unless has clinical deterioration and bacteremia that does not seem to be clearing with antibiotics  3   CAD: with prior PCI  Continued on statin, B-blocker  4   Chronic diastolic CHF: volume status appears stable  Resume maintenance diuretic, once able from sepsis perspective  5   HTN: well controlled, continued on B-blocker  6   HLD: continued on statin  7   UTI: likely source of sepsis  8   Type 2 DM: as per primary team    9   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  10   No current cardiac recommendations  Continue current management as per primary team and call with questions  Subjective:   Patient seen and examined  No significant events overnight   ; pertinent negatives - chest pain, chest pressure/discomfort, dyspnea, irregular heart beat, lower extremity edema and palpitations  Objective:     Vitals: Blood pressure 131/66, pulse 96, temperature 97 8 °F (36 6 °C), temperature source Tympanic, resp  rate 18, height 5' (1 524 m), weight 92 6 kg (204 lb 2 3 oz), SpO2 94 %  , Body mass index is 39 87 kg/m² ,   Orthostatic Blood Pressures      Most Recent Value   Blood Pressure  131/66 filed at 08/30/2021 0736   Patient Position - Orthostatic VS  Lying filed at 08/30/2021 0736            Intake/Output Summary (Last 24 hours) at 8/30/2021 0856  Last data filed at 8/30/2021 0401  Gross per 24 hour   Intake 1000 ml   Output 2050 ml   Net -1050 ml       Physical Exam:    GEN: Tiarra Grave Day appears well, alert and oriented x 3, pleasant and cooperative   HEENT: pupils equal, round, and reactive to light; extraocular muscles intact  NECK: supple, no carotid bruits   HEART: regular rhythm, normal S1 and S2, +systolic murmur, no clicks, gallops or rubs   LUNGS: clear to auscultation bilaterally; no wheezes, rales, or rhonchi   ABDOMEN: normal bowel sounds, soft, no tenderness, no distention  EXTREMITIES: peripheral pulses normal; no clubbing, cyanosis, or edema  NEURO: no focal findings   SKIN: normal without suspicious lesions on exposed skin    Medications:      Current Facility-Administered Medications:     acetaminophen (TYLENOL) tablet 650 mg, 650 mg, Oral, Q6H PRN, Amada Portillo MD, 650 mg at 08/26/21 1055    aluminum-magnesium hydroxide-simethicone (MYLANTA) oral suspension 30 mL, 30 mL, Oral, Q6H PRN, Amada Portillo MD, 30 mL at 08/26/21 1705    atorvastatin (LIPITOR) tablet 10 mg, 10 mg, Oral, Daily With Michelle Cabrales MD, 10 mg at 08/26/21 1705    dextrose 5 % infusion, 80 mL/hr, Intravenous, Continuous, Jimmie Sharp MD    insulin lispro (HumaLOG) 100 units/mL subcutaneous injection 1-5 Units, 1-5 Units, Subcutaneous, Q6H **AND** Fingerstick Glucose (POCT), , , Q6H, Ei Jesi Vazquez MD    levothyroxine tablet 50 mcg, 50 mcg, Oral, Early Morning, Amada Portillo MD, 50 mcg at 08/28/21 0504    magnesium hydroxide (MILK OF MAGNESIA) oral suspension 30 mL, 30 mL, Oral, Daily PRN, Amada Portillo MD    meropenem (MERREM) 1,000 mg in sodium chloride 0 9 % 100 mL IVPB, 1,000 mg, Intravenous, Q12H, Jaxon Moreira DO, Last Rate: 100 mL/hr at 08/30/21 0103, 1,000 mg at 08/30/21 0103    metoprolol tartrate (LOPRESSOR) tablet 25 mg, 25 mg, Oral, BID, Amada Portillo MD, 25 mg at 08/28/21 0847    nystatin (MYCOSTATIN) powder, , Topical, BID, Amada Portillo MD, Given at 08/29/21 1728    ondansetron (ZOFRAN) injection 4 mg, 4 mg, Intravenous, Q4H PRN, Justine Estrella MD, 4 mg at 08/27/21 2004    oxyCODONE (ROXICODONE) IR tablet 2 5 mg, 2 5 mg, Oral, Q4H PRN, Shelly Mcguire Nancy Trotter MD    pantoprazole (PROTONIX) injection 40 mg, 40 mg, Intravenous, Q12H [facility] & intermediate, Camilla Cordobadillon, KASIE, 40 mg at 08/29/21 2130     Labs & Results:    Results from last 7 days   Lab Units 08/26/21  1322 08/26/21  0436 08/25/21  1734   TROPONIN I ng/mL 0 03 0 05 0 03     Results from last 7 days   Lab Units 08/30/21  0708 08/29/21  0548 08/28/21  0458   WBC Thousand/uL 15 37* 13 44* 12 46*   HEMOGLOBIN g/dL 12 8 12 2 12 2   HEMATOCRIT % 41 3 39 5 39 5   PLATELETS Thousands/uL 427* 409* 400*         Results from last 7 days   Lab Units 08/30/21  0707 08/29/21  0548 08/28/21  2140 08/28/21  0458 08/25/21  1734   POTASSIUM mmol/L 3 8 3 2* 3 3* 3 2* 3 9   CHLORIDE mmol/L 105 106  --  103 93*   CO2 mmol/L 34* 37*  --  37* 28   BUN mg/dL 19 28*  --  41* 71*   CREATININE mg/dL 0 88 0 96  --  1 29* 1 75*   CALCIUM mg/dL 9 5 9 6  --  9 4 9 7   ALK PHOS U/L  --   --   --   --  92 4   ALT U/L  --   --   --   --  27   AST U/L  --   --   --   --  26     Results from last 7 days   Lab Units 08/25/21  1734   INR  1 01   PTT seconds 21*     Results from last 7 days   Lab Units 08/30/21  0707 08/28/21  0458   MAGNESIUM mg/dL 2 2 2 3       Echo: personally reviewed - EF 60%, G1DD, mild to mod MR, 23 mm ES TAVR bio AVR, mean gradient 14 mmHg, mild to mod TR    EKG personally reviewed by Cristina Perry MD

## 2021-08-30 NOTE — PLAN OF CARE
Problem: MOBILITY - ADULT  Goal: Maintain or return to baseline ADL function  Description: INTERVENTIONS:  -  Assess patient's ability to carry out ADLs; assess patient's baseline for ADL function and identify physical deficits which impact ability to perform ADLs (bathing, care of mouth/teeth, toileting, grooming, dressing, etc )  - Assess/evaluate cause of self-care deficits   - Assess range of motion  - Assess patient's mobility; develop plan if impaired  - Assess patient's need for assistive devices and provide as appropriate  - Encourage maximum independence but intervene and supervise when necessary  - Involve family in performance of ADLs  - Assess for home care needs following discharge   - Consider OT consult to assist with ADL evaluation and planning for discharge  - Provide patient education as appropriate  Outcome: Progressing  Goal: Maintains/Returns to pre admission functional level  Description: INTERVENTIONS:  - Perform BMAT or MOVE assessment daily    - Set and communicate daily mobility goal to care team and patient/family/caregiver  - Collaborate with rehabilitation services on mobility goals if consulted  - Perform Range of Motion  times a day  - Reposition patient every  hours    - Dangle patient  times a day  - Stand patient  times a day  - Ambulate patient  times a day  - Out of bed to chair  times a day   - Out of bed for meals  times a day  - Out of bed for toileting  - Record patient progress and toleration of activity level   Outcome: Progressing     Problem: Potential for Falls  Goal: Patient will remain free of falls  Description: INTERVENTIONS:  - Educate patient/family on patient safety including physical limitations  - Instruct patient to call for assistance with activity   - Consult OT/PT to assist with strengthening/mobility   - Keep Call bell within reach  - Keep bed low and locked with side rails adjusted as appropriate  - Keep care items and personal belongings within reach  - Initiate and maintain comfort rounds  - Make Fall Risk Sign visible to staff  - Offer Toileting every  Hours, in advance of need  - Initiate/Maintain alarm  - Obtain necessary fall risk management equipment:   - Apply yellow socks and bracelet for high fall risk patients  - Consider moving patient to room near nurses station  Outcome: Progressing     Problem: Nutrition/Hydration-ADULT  Goal: Nutrient/Hydration intake appropriate for improving, restoring or maintaining nutritional needs  Description: Monitor and assess patient's nutrition/hydration status for malnutrition  Collaborate with interdisciplinary team and initiate plan and interventions as ordered  Monitor patient's weight and dietary intake as ordered or per policy  Utilize nutrition screening tool and intervene as necessary  Determine patient's food preferences and provide high-protein, high-caloric foods as appropriate       INTERVENTIONS:  - Monitor oral intake, urinary output, labs, and treatment plans  - Assess nutrition and hydration status and recommend course of action  - Evaluate amount of meals eaten  - Assist patient with eating if necessary   - Allow adequate time for meals  - Recommend/ encourage appropriate diets, oral nutritional supplements, and vitamin/mineral supplements  - Order, calculate, and assess calorie counts as needed  - Recommend, monitor, and adjust tube feedings and TPN/PPN based on assessed needs  - Assess need for intravenous fluids  - Provide specific nutrition/hydration education as appropriate  - Include patient/family/caregiver in decisions related to nutrition  Outcome: Progressing     Problem: PAIN - ADULT  Goal: Verbalizes/displays adequate comfort level or baseline comfort level  Description: Interventions:  - Encourage patient to monitor pain and request assistance  - Assess pain using appropriate pain scale  - Administer analgesics based on type and severity of pain and evaluate response  - Implement non-pharmacological measures as appropriate and evaluate response  - Consider cultural and social influences on pain and pain management  - Notify physician/advanced practitioner if interventions unsuccessful or patient reports new pain  Outcome: Progressing     Problem: INFECTION - ADULT  Goal: Absence or prevention of progression during hospitalization  Description: INTERVENTIONS:  - Assess and monitor for signs and symptoms of infection  - Monitor lab/diagnostic results  - Monitor all insertion sites, i e  indwelling lines, tubes, and drains  - Monitor endotracheal if appropriate and nasal secretions for changes in amount and color  - [place] appropriate cooling/warming therapies per order  - Administer medications as ordered  - Instruct and encourage patient and family to use good hand hygiene technique  - Identify and instruct in appropriate isolation precautions for identified infection/condition  Outcome: Progressing  Goal: Absence of fever/infection during neutropenic period  Description: INTERVENTIONS:  - Monitor WBC    Outcome: Progressing     Problem: SAFETY ADULT  Goal: Maintain or return to baseline ADL function  Description: INTERVENTIONS:  -  Assess patient's ability to carry out ADLs; assess patient's baseline for ADL function and identify physical deficits which impact ability to perform ADLs (bathing, care of mouth/teeth, toileting, grooming, dressing, etc )  - Assess/evaluate cause of self-care deficits   - Assess range of motion  - Assess patient's mobility; develop plan if impaired  - Assess patient's need for assistive devices and provide as appropriate  - Encourage maximum independence but intervene and supervise when necessary  - Involve family in performance of ADLs  - Assess for home care needs following discharge   - Consider OT consult to assist with ADL evaluation and planning for discharge  - Provide patient education as appropriate  Outcome: Progressing  Goal: Maintains/Returns to pre admission functional level  Description: INTERVENTIONS:  - Perform BMAT or MOVE assessment daily    - Set and communicate daily mobility goal to care team and patient/family/caregiver  - Collaborate with rehabilitation services on mobility goals if consulted  - Perform Range of Motion  times a day  - Reposition patient every  hours    - Dangle patient  times a day  - Stand patient  times a day  - Ambulate patient  times a day  - Out of bed to chair  times a day   - Out of bed for meals  times a day  - Out of bed for toileting  - Record patient progress and toleration of activity level   Outcome: Progressing  Goal: Patient will remain free of falls  Description: INTERVENTIONS:  - Educate patient/family on patient safety including physical limitations  - Instruct patient to call for assistance with activity   - Consult OT/PT to assist with strengthening/mobility   - Keep Call bell within reach  - Keep bed low and locked with side rails adjusted as appropriate  - Keep care items and personal belongings within reach  - Initiate and maintain comfort rounds  - Make Fall Risk Sign visible to staff  - Offer Toileting every  Hours, in advance of need  - Initiate/Maintain alarm  - Obtain necessary fall risk management equipment:  - Apply yellow socks and bracelet for high fall risk patients  - Consider moving patient to room near nurses station  Outcome: Progressing     Problem: DISCHARGE PLANNING  Goal: Discharge to home or other facility with appropriate resources  Description: INTERVENTIONS:  - Identify barriers to discharge w/patient and caregiver  - Arrange for needed discharge resources and transportation as appropriate  - Identify discharge learning needs (meds, wound care, etc )  - Arrange for interpretive services to assist at discharge as needed  - Refer to Case Management Department for coordinating discharge planning if the patient needs post-hospital services based on physician/advanced practitioner order or complex needs related to functional status, cognitive ability, or social support system  Outcome: Progressing     Problem: Knowledge Deficit  Goal: Patient/family/caregiver demonstrates understanding of disease process, treatment plan, medications, and discharge instructions  Description: Complete learning assessment and assess knowledge base    Interventions:  - Provide teaching at level of understanding  - Provide teaching via preferred learning methods  Outcome: Progressing     Problem: Prexisting or High Potential for Compromised Skin Integrity  Goal: Skin integrity is maintained or improved  Description: INTERVENTIONS:  - Identify patients at risk for skin breakdown  - Assess and monitor skin integrity  - Assess and monitor nutrition and hydration status  - Monitor labs   - Assess for incontinence   - Turn and reposition patient  - Assist with mobility/ambulation  - Relieve pressure over bony prominences  - Avoid friction and shearing  - Provide appropriate hygiene as needed including keeping skin clean and dry  - Evaluate need for skin moisturizer/barrier cream  - Collaborate with interdisciplinary team   - Patient/family teaching  - Consider wound care consult   Outcome: Progressing

## 2021-08-30 NOTE — PROGRESS NOTES
NEPHROLOGY PROGRESS NOTE    Rianna Morton Day 80 y o  female MRN: [id number]  Unit/Bed#: -01 Encounter: [id number]  Reason for Consult:  Hypernatremia    The patient was sleeping when I went into the room I awakened her she was okay is little bit confused but really had no complaints except to sore throat  Other than that hemodynamically stable  ASSESSMENT/PLAN:     1  Hypernatremia    The patient developed hypernatremia was placed on hypotonic fluids and water deficit is improving a sodium concentration is lower  The fluid she was on was quarter normal saline with potassium added to it which actually lowers the amount of free water for that will be given to the patient because when you add these osmoles to IV fluids a reduce his free water  At this point I am just going to replace the deficit with D5W and over the next 24 hours it should correct to normal   If there is other electrolyte abnormalities such as hypokalemia would replete them separately as is being done  Change IV fluids to D5W 80 cc/hour  BMP a m  The patient also has hypophosphatemia I am going to order sodium Daniels 21 millimoles IV today  2  GI    Surgery is following the patient she either has small-bowel obstruction or ileus they will monitor with conservative treatment so far with NG tube  SUBJECTIVE:  Review of Systems   Constitutional: Positive for malaise/fatigue  Negative for chills, diaphoresis and fever  HENT: Positive for sore throat  Sore throat from NG tube  Eyes: Negative  Cardiovascular: Negative for chest pain, leg swelling, orthopnea and palpitations  Respiratory: Negative  Negative for cough, shortness of breath, sputum production and wheezing  Gastrointestinal: Negative for abdominal pain, diarrhea, nausea and vomiting  NG tube in   Genitourinary: [place] catheter in  Neurological: Positive for weakness  Negative for dizziness, focal weakness, headaches and light-headedness  Psychiatric/Behavioral: Negative for hallucinations and hypervigilance  The patient is not nervous/anxious  A little confused at times       OBJECTIVE:  Current Weight: Weight - Scale: 92 6 kg (204 lb 2 3 oz)  Vitals:Temp (24hrs), Av 8 °F (36 6 °C), Min:97 1 °F (36 2 °C), Max:98 4 °F (36 9 °C)  Current: Temperature: 97 8 °F (36 6 °C)   Blood pressure 131/66, pulse 96, temperature 97 8 °F (36 6 °C), temperature source Tympanic, resp  rate 18, height 5' (1 524 m), weight 92 6 kg (204 lb 2 3 oz), SpO2 94 %  , Body mass index is 39 87 kg/m²  Intake/Output Summary (Last 24 hours) at 2021 1051  Last data filed at 2021 0401  Gross per 24 hour   Intake 1000 ml   Output 2050 ml   Net -1050 ml       Physical Exam: /66 (BP Location: Right arm)   Pulse 96   Temp 97 8 °F (36 6 °C) (Tympanic)   Resp 18   Ht 5' (1 524 m)   Wt 92 6 kg (204 lb 2 3 oz)   SpO2 94%   BMI 39 87 kg/m²   Physical Exam  Constitutional:       General: She is not in acute distress  Appearance: She is not toxic-appearing or diaphoretic  HENT:      Head: Normocephalic and atraumatic  Nose:      Comments: NG tube in     Mouth/Throat:      Mouth: Mucous membranes are dry  Eyes:      General: No scleral icterus  Extraocular Movements: Extraocular movements intact  Cardiovascular:      Rate and Rhythm: Normal rate  Rhythm irregular  Heart sounds: No friction rub  No gallop  Comments: No significant pedal edema  Pulmonary:      Effort: Pulmonary effort is normal  No respiratory distress  Breath sounds: Normal breath sounds  No wheezing, rhonchi or rales  Abdominal:      General: There is no distension  Palpations: Abdomen is soft  Tenderness: There is no abdominal tenderness  There is no rebound  Comments: Soft bowel sounds   Musculoskeletal:      Cervical back: Normal range of motion and neck supple  Neurological:      Mental Status: She is alert  Medications:    Current Facility-Administered Medications:     acetaminophen (TYLENOL) tablet 650 mg, 650 mg, Oral, Q6H PRN, Wayne Conner MD, 650 mg at 08/26/21 1055    aluminum-magnesium hydroxide-simethicone (MYLANTA) oral suspension 30 mL, 30 mL, Oral, Q6H PRN, Wayne Conner MD, 30 mL at 08/26/21 1705    atorvastatin (LIPITOR) tablet 10 mg, 10 mg, Oral, Daily With Carlo Garcia MD, 10 mg at 08/26/21 1705    dextrose 5 % infusion, 80 mL/hr, Intravenous, Continuous, Kimberly Andrade MD, Last Rate: 80 mL/hr at 08/30/21 0930, 80 mL/hr at 08/30/21 0930    insulin lispro (HumaLOG) 100 units/mL subcutaneous injection 1-5 Units, 1-5 Units, Subcutaneous, Q6H **AND** Fingerstick Glucose (POCT), , , Q6H, Ei Vina Seip, MD    levothyroxine tablet 50 mcg, 50 mcg, Oral, Early Morning, Wayne Conner MD, 50 mcg at 08/28/21 0504    magnesium hydroxide (MILK OF MAGNESIA) oral suspension 30 mL, 30 mL, Oral, Daily PRN, Wayne Conner MD    meropenem (MERREM) 1,000 mg in sodium chloride 0 9 % 100 mL IVPB, 1,000 mg, Intravenous, Q12H, Jaxon Moreira DO, Last Rate: 100 mL/hr at 08/30/21 0103, 1,000 mg at 08/30/21 0103    metoprolol tartrate (LOPRESSOR) tablet 25 mg, 25 mg, Oral, BID, Wayne Conner MD, 25 mg at 08/28/21 0847    nystatin (MYCOSTATIN) powder, , Topical, BID, Wayne Conner MD, Given at 08/29/21 1728    ondansetron (ZOFRAN) injection 4 mg, 4 mg, Intravenous, Q4H PRN, Lesly Ruelas MD, 4 mg at 08/27/21 2004    oxyCODONE (ROXICODONE) IR tablet 2 5 mg, 2 5 mg, Oral, Q4H PRN, Wayne Conner MD    pantoprazole (PROTONIX) injection 40 mg, 40 mg, Intravenous, Q12H [facility], KASIE Hameed, 40 mg at 08/30/21 0932    Laboratory Results:  Lab Results   Component Value Date    WBC 15 37 (H) 08/30/2021    HGB 12 8 08/30/2021    HCT 41 3 08/30/2021    MCV 97 08/30/2021     (H) 08/30/2021     Lab Results   Component Value Date    SODIUM 147 (H) 08/30/2021    K 3 8 08/30/2021     08/30/2021    CO2 34 (H) 08/30/2021    BUN 19 08/30/2021    CREATININE 0 88 08/30/2021    GLUC 145 (H) 08/30/2021    CALCIUM 9 5 08/30/2021     Lab Results   Component Value Date    CALCIUM 9 5 08/30/2021    PHOS 1 9 (L) 08/30/2021     No results found for: LABPROT

## 2021-08-31 ENCOUNTER — APPOINTMENT (INPATIENT)
Dept: RADIOLOGY | Facility: HOSPITAL | Age: 86
DRG: 872 | End: 2021-08-31
Payer: MEDICARE

## 2021-08-31 LAB
ANION GAP SERPL CALCULATED.3IONS-SCNC: 8 MMOL/L (ref 4–13)
BUN SERPL-MCNC: 16 MG/DL (ref 6–20)
CALCIUM SERPL-MCNC: 9 MG/DL (ref 8.4–10.2)
CHLORIDE SERPL-SCNC: 102 MMOL/L (ref 96–108)
CO2 SERPL-SCNC: 36 MMOL/L (ref 22–33)
CREAT SERPL-MCNC: 0.83 MG/DL (ref 0.4–1.1)
GFR SERPL CREATININE-BSD FRML MDRD: 64 ML/MIN/1.73SQ M
GLUCOSE SERPL-MCNC: 118 MG/DL (ref 65–140)
GLUCOSE SERPL-MCNC: 129 MG/DL (ref 65–140)
GLUCOSE SERPL-MCNC: 132 MG/DL (ref 65–140)
GLUCOSE SERPL-MCNC: 139 MG/DL (ref 65–140)
GLUCOSE SERPL-MCNC: 141 MG/DL (ref 65–140)
POTASSIUM SERPL-SCNC: 3.4 MMOL/L (ref 3.5–5)
SODIUM SERPL-SCNC: 146 MMOL/L (ref 133–145)

## 2021-08-31 PROCEDURE — 99232 SBSQ HOSP IP/OBS MODERATE 35: CPT | Performed by: NURSE PRACTITIONER

## 2021-08-31 PROCEDURE — 99233 SBSQ HOSP IP/OBS HIGH 50: CPT | Performed by: INTERNAL MEDICINE

## 2021-08-31 PROCEDURE — C9113 INJ PANTOPRAZOLE SODIUM, VIA: HCPCS | Performed by: NURSE PRACTITIONER

## 2021-08-31 PROCEDURE — 99232 SBSQ HOSP IP/OBS MODERATE 35: CPT | Performed by: INTERNAL MEDICINE

## 2021-08-31 PROCEDURE — 74022 RADEX COMPL AQT ABD SERIES: CPT

## 2021-08-31 PROCEDURE — 82948 REAGENT STRIP/BLOOD GLUCOSE: CPT

## 2021-08-31 PROCEDURE — 80048 BASIC METABOLIC PNL TOTAL CA: CPT | Performed by: INTERNAL MEDICINE

## 2021-08-31 RX ORDER — POTASSIUM CHLORIDE 14.9 MG/ML
20 INJECTION INTRAVENOUS
Status: DISPENSED | OUTPATIENT
Start: 2021-08-31 | End: 2021-08-31

## 2021-08-31 RX ORDER — POTASSIUM CHLORIDE 29.8 MG/ML
40 INJECTION INTRAVENOUS ONCE
Status: DISCONTINUED | OUTPATIENT
Start: 2021-08-31 | End: 2021-08-31

## 2021-08-31 RX ADMIN — PANTOPRAZOLE SODIUM 40 MG: 40 INJECTION, POWDER, FOR SOLUTION INTRAVENOUS at 08:55

## 2021-08-31 RX ADMIN — METOPROLOL TARTRATE 25 MG: 25 TABLET, FILM COATED ORAL at 08:55

## 2021-08-31 RX ADMIN — POTASSIUM CHLORIDE 20 MEQ: 14.9 INJECTION, SOLUTION INTRAVENOUS at 10:14

## 2021-08-31 RX ADMIN — ERTAPENEM SODIUM 1000 MG: 1 INJECTION, POWDER, LYOPHILIZED, FOR SOLUTION INTRAMUSCULAR; INTRAVENOUS at 20:07

## 2021-08-31 RX ADMIN — METOPROLOL TARTRATE 25 MG: 25 TABLET, FILM COATED ORAL at 17:07

## 2021-08-31 RX ADMIN — LEVOTHYROXINE SODIUM 50 MCG: 50 TABLET ORAL at 05:10

## 2021-08-31 RX ADMIN — PANTOPRAZOLE SODIUM 40 MG: 40 INJECTION, POWDER, FOR SOLUTION INTRAVENOUS at 20:07

## 2021-08-31 RX ADMIN — ATORVASTATIN CALCIUM 10 MG: 10 TABLET, FILM COATED ORAL at 17:07

## 2021-08-31 RX ADMIN — DEXTROSE 100 ML/HR: 5 SOLUTION INTRAVENOUS at 20:00

## 2021-08-31 RX ADMIN — NYSTATIN: 100000 POWDER TOPICAL at 17:08

## 2021-08-31 RX ADMIN — DEXTROSE 100 ML/HR: 5 SOLUTION INTRAVENOUS at 10:17

## 2021-08-31 RX ADMIN — NYSTATIN: 100000 POWDER TOPICAL at 09:03

## 2021-08-31 NOTE — PROGRESS NOTES
NEPHROLOGY PROGRESS NOTE    Isamar Green Day 80 y o  female MRN: [id number]  Unit/Bed#: -01 Encounter: [id number]  Reason for Consult:  Hypernatremia    The patient was sleeping when I went into the room I awakened her  She just appeared weak debilitated said that her throat is a little bit sore from the NG tube  She was able to eat very little this morning as they started a liquid diet  ASSESSMENT/PLAN:  1  Hypernatremia    The patient has hypernatremia that has improved with hypotonic fluids but not that significantly over the last 24 hours  Will increase D5W to 100 cc/hour  Patient has a liquid diet ordered but she is really not eating that much so hopefully will see further reduction in water deficit over the next 24 hours with changes in IV fluids  Increase D5W to 100 cc/hour  BMP a m  2  Hypokalemia with metabolic alkalosis    The patient was having days of NG suction which will result in metabolic alkalosis  Metabolic alkalosis will result in urinary potassium losses with bicarbonate and also there is generally renin and aldosterone stimulation resulting in increased urinary potassium secretion  Will monitor as NG output is less  Will give potassium supplementation  KCl 40 mEq IV over 4 hours  BMP a m  3  GI    I reviewed the report from the obstruction series yesterday and showed stable findings suggesting partial mid to distal small-bowel obstruction  Surgery is following  4  UTI  Antibiotics per Infectious Disease  SUBJECTIVE:  Review of Systems   Constitutional: Positive for malaise/fatigue  Negative for chills, fever and night sweats  HENT: Negative  Eyes: Negative  Cardiovascular: Negative for chest pain, leg swelling, orthopnea and palpitations  Respiratory: Negative  Negative for cough, shortness of breath, sputum production and wheezing  Gastrointestinal: Negative  Negative for abdominal pain, diarrhea, flatus, nausea and vomiting          Able to eat a little bit no flatus  Genitourinary:        Catheter in place   Neurological: Positive for weakness  Negative for dizziness, focal weakness and headaches  Psychiatric/Behavioral: Negative for altered mental status, hallucinations and hypervigilance  The patient is not nervous/anxious  OBJECTIVE:  Current Weight: Weight - Scale: 92 6 kg (204 lb 2 3 oz)  Vitals:Temp (24hrs), Av 4 °F (36 9 °C), Min:98 °F (36 7 °C), Max:99 °F (37 2 °C)  Current: Temperature: 98 °F (36 7 °C)   Blood pressure 140/67, pulse 81, temperature 98 °F (36 7 °C), temperature source Tympanic, resp  rate 19, height 5' (1 524 m), weight 92 6 kg (204 lb 2 3 oz), SpO2 98 %  , Body mass index is 39 87 kg/m²  Intake/Output Summary (Last 24 hours) at 2021 0930  Last data filed at 2021 0452  Gross per 24 hour   Intake 1000 ml   Output 1150 ml   Net -150 ml       Physical Exam: /67 (BP Location: Left arm)   Pulse 81   Temp 98 °F (36 7 °C) (Tympanic)   Resp 19   Ht 5' (1 524 m)   Wt 92 6 kg (204 lb 2 3 oz)   SpO2 98%   BMI 39 87 kg/m²   Physical Exam  Constitutional:       General: She is not in acute distress  Appearance: She is ill-appearing  She is not toxic-appearing or diaphoretic  HENT:      Head: Normocephalic and atraumatic  Nose:      Comments: NG tube in     Mouth/Throat:      Mouth: Mucous membranes are dry  Eyes:      General: No scleral icterus  Extraocular Movements: Extraocular movements intact  Cardiovascular:      Rate and Rhythm: Normal rate and regular rhythm  Heart sounds: No friction rub  No gallop  Comments: No significant pretibial edema  Pulmonary:      Effort: Pulmonary effort is normal  No respiratory distress  Breath sounds: Normal breath sounds  No wheezing, rhonchi or rales  Abdominal:      General: There is no distension  Palpations: Abdomen is soft  Tenderness: There is no abdominal tenderness  There is no rebound        Comments: Soft bowel sounds   Musculoskeletal:      Cervical back: Normal range of motion and neck supple  Neurological:      Mental Status: She is alert and oriented to person, place, and time           Medications:    Current Facility-Administered Medications:     acetaminophen (TYLENOL) tablet 650 mg, 650 mg, Oral, Q6H PRN, Abdulaziz Moore MD, 650 mg at 08/26/21 1055    aluminum-magnesium hydroxide-simethicone (MYLANTA) oral suspension 30 mL, 30 mL, Oral, Q6H PRN, Abdulaziz Moore MD, 30 mL at 08/26/21 1705    atorvastatin (LIPITOR) tablet 10 mg, 10 mg, Oral, Daily With Maru Aguirre MD, 10 mg at 08/30/21 1718    dextrose 5 % infusion, 80 mL/hr, Intravenous, Continuous, Raimundo Acosta MD, Last Rate: 80 mL/hr at 08/30/21 2220, 80 mL/hr at 08/30/21 2220    ertapenem (INVanz) 1,000 mg in sodium chloride 0 9 % 50 mL IVPB, 1,000 mg, Intravenous, Q24H, Kvng Romero MD, Last Rate: 100 mL/hr at 08/30/21 2035, 1,000 mg at 08/30/21 2035    insulin lispro (HumaLOG) 100 units/mL subcutaneous injection 1-5 Units, 1-5 Units, Subcutaneous, Q6H **AND** Fingerstick Glucose (POCT), , , Q6H, Ei Anthony Benitez MD    levothyroxine tablet 50 mcg, 50 mcg, Oral, Early Morning, Abdulaziz Moore MD, 50 mcg at 08/31/21 0510    magnesium hydroxide (MILK OF MAGNESIA) oral suspension 30 mL, 30 mL, Oral, Daily PRN, Abdulaziz Moore MD    metoprolol tartrate (LOPRESSOR) tablet 25 mg, 25 mg, Oral, BID, Abdulaziz Moore MD, 25 mg at 08/31/21 0855    nystatin (MYCOSTATIN) powder, , Topical, BID, Abdulaziz Moore MD, Given at 08/31/21 0903    ondansetron (ZOFRAN) injection 4 mg, 4 mg, Intravenous, Q4H PRN, Marlene Eldridge MD, 4 mg at 08/27/21 2004    oxyCODONE (ROXICODONE) IR tablet 2 5 mg, 2 5 mg, Oral, Q4H PRN, Abdulaziz Moore MD    pantoprazole (PROTONIX) injection 40 mg, 40 mg, Intravenous, Q12H [address], KASIE Hameed, 40 mg at 08/31/21 0711    Laboratory Results:  Lab Results   Component Value Date WBC 15 37 (H) 08/30/2021    HGB 12 8 08/30/2021    HCT 41 3 08/30/2021    MCV 97 08/30/2021     (H) 08/30/2021     Lab Results   Component Value Date    SODIUM 146 (H) 08/31/2021    K 3 4 (L) 08/31/2021     08/31/2021    CO2 36 (H) 08/31/2021    BUN 16 08/31/2021    CREATININE 0 83 08/31/2021    GLUC 129 08/31/2021    CALCIUM 9 0 08/31/2021     Lab Results   Component Value Date    CALCIUM 9 0 08/31/2021    PHOS 1 9 (L) 08/30/2021     No results found for: LABPROT

## 2021-08-31 NOTE — PROGRESS NOTES
Progress Note - Infectious Disease   Bia Black Day 80 y o  female MRN: [id number]  Unit/Bed#: -01 Encounter: [id number]      Impression/Recommendations:  1  Sepsis, POA:  Tachycardia, tachypnea, leukocytosis   Source of sepsis appears to be complicated UTI  Patient is clinically improved  WBC stable but fever has resolved  a  Antibiotic therapy as below  b  CBC in am   c  Monitor temperature/WBC  2  Complicated UTI in the setting of urine retention:  This is the likely source of sepsis and bacteremia  Deal cath inserted in ED due to urine retention- pt had urine output of nearly 2L following straight cath and Deal insertion  Urine cultures and blood culture both have growth of ESBL producing E coli  Recent renal US was unremarkable    a  Continue IV ertapenem  b  Monitor temperature/WBC  3  ESBL producing E coli bacteremia: source of bacteremia is UTI  a  Antibiotic plan as in above  b  Treat x 7 days total, possibly longer depending on clinical response  4  CKD stage 3:   creatinine improved    a  Antibiotic at full dose  b  Monitor creatinine  5  Ileus versus SBO  NG tube is in place  Abdominal exam remains benign  6  Diabetes mellitus type 2 with mild hyperglycemia:  Noted HbA1c of 6 7 on 2021   This is a risk factor for infection  a  Glycemic control per primary team  7  Morbid obesity with BMI of 39     Discussed with patient in detail regarding the above plan      Antibiotics:  Ertapenem  Antibiotic # 4      Subjective:  Patient is comfortable  NG tube remains in place  No abdominal or flank pain  Temperature stays down  No chills  She is tolerating antibiotic well    No diarrhea      Objective:  Vitals:  Temp:  [98 °F (36 7 °C)-99 °F (37 2 °C)] 98 °F (36 7 °C)  HR:  [81-93] 81  Resp:  [17-19] 19  BP: (137-142)/(59-86) 140/67  SpO2:  [89 %-98 %] 98 %  Temp (24hrs), Av 4 °F (36 9 °C), Min:98 °F (36 7 °C), Max:99 °F (37 2 °C)  Current: Temperature: 98 °F (36 7 °C)    Physical Exam:     General: Awake, alert, cooperative, no distress  Neck:  Supple  No mass  No lymphadenopathy  Lungs: Expansion symmetric, no rales, no wheezing, respirations unlabored  Heart:  Regular rate and rhythm, S1 and S2 normal, no murmur  Abdomen: Soft, mildly distended, non-tender, decreased but present bowel sounds  Extremities: Stable mild leg edema  No erythema/warmth  No ulcer  Nontender to palpation  Skin:  No rash  Neuro: Moves all extremities  Invasive Devices     Peripheral Intravenous Line            Peripheral IV 08/30/21 Dorsal (posterior); Left Hand 1 day          Drain            Urethral Catheter Non-latex 16 Fr  5 days    NG/OG Tube Nasogastric Right nare 2 days                Labs studies:   I have personally reviewed pertinent labs  Results from last 7 days   Lab Units 08/31/21  0607 08/30/21  0707 08/29/21  0548 08/25/21  1734   POTASSIUM mmol/L 3 4* 3 8 3 2* 3 9   CHLORIDE mmol/L 102 105 106 93*   CO2 mmol/L 36* 34* 37* 28   BUN mg/dL 16 19 28* 71*   CREATININE mg/dL 0 83 0 88 0 96 1 75*   EGFR ml/min/1 73sq m 64 60 54 26   CALCIUM mg/dL 9 0 9 5 9 6 9 7   AST U/L  --   --   --  26   ALT U/L  --   --   --  27   ALK PHOS U/L  --   --   --  92 4     Results from last 7 days   Lab Units 08/30/21  0708 08/29/21  0548 08/28/21  0458   WBC Thousand/uL 15 37* 13 44* 12 46*   HEMOGLOBIN g/dL 12 8 12 2 12 2   PLATELETS Thousands/uL 427* 409* 400*     Results from last 7 days   Lab Units 08/25/21 2051 08/25/21  2044 08/25/21  1739   BLOOD CULTURE   --  Escherichia coli ESBL*  --    GRAM STAIN RESULT   --  Gram negative rods*  --    URINE CULTURE   --   --  >100,000 cfu/ml Escherichia coli ESBL*   MRSA CULTURE ONLY  No Methicillin Resistant Staphlyococcus aureus (MRSA) isolated  --   --        Imaging Studies:   I have personally reviewed pertinent imaging study reports and images in PACS  EKG, Pathology, and Other Studies:   I have personally reviewed pertinent reports

## 2021-08-31 NOTE — WOUND OSTOMY CARE
Consult Note - Wound   Dexter La[place] Hill Day 80 y o  female MRN: [id number]  Unit/Bed#: -01 Encounter: [id number]      History and Present Illness: Patient is seen for wound care consult today   Patient is a 80year old female that is admitted with sepsis   She is alert and pleasant for the assessment of the skin   Max A of 2 for rolling in the bed   NG tube in place for small bowel obstruction  Dark brown liquid in the cannister   Deal in place     No incontinence of bowel at the time of the assessment   Heels elevated and repositioned with wedges to turn and off load   Assessment Findings:   1  Bilateral heels dry and intact   2  Left buttocks - hospital acquired DTI red maroon in color   Edges attached and jessica wound with no noted induration or warmth boggy   Scant amount of serosanguinous drainage   Noted in media of maroon blistered area that has evolved   Suspect  Stage 3 stage 4 or unstageable with evolving DTI   Will place offloading orders, dressings  and specialty low air loss bed   Discussed and reviewed the plan of care with the RN   Skin Care orders:  1-Hydraguard to bilateral heels   BID and PRN  2-EHOB  cushion when out of bed in chair  3-Moisturize skin daily with skin nourishing cream  4-Elevate heels to offload pressure  5-Turn/reposition q2h for pressure re-distribution on skin  6  Left buttocks wound - cleanse with Normal saline then apply xeroform then top with allevyn foam cooper with a T and date change every other day   7  P- 500 low air loss mattress           Vitals: Blood pressure 140/67, pulse 81, temperature 98 °F (36 7 °C), temperature source Tympanic, resp  rate 19, height 5' (1 524 m), weight 92 6 kg (204 lb 2 3 oz), SpO2 98 %  ,Body mass index is 39 87 kg/m²        Wound 08/28/21 Pressure Injury Buttocks Left (Active)   Wound Image   08/31/21 0934   Wound Description Beefy red;Fragile;Light purple 08/31/21 0934   Pressure Injury Stage DTPI 08/31/21 0934   Jessica-wound Assessment Clean;Dry; Intact 08/31/21 0934   Wound Length (cm) 7 cm 08/31/21 0934   Wound Width (cm) 2 cm 08/31/21 0934   Wound Depth (cm) 0 1 cm 08/31/21 0934   Wound Surface Area (cm^2) 14 cm^2 08/31/21 0934   Wound Volume (cm^3) 1 4 cm^3 08/31/21 0934   Calculated Wound Volume (cm^3) 1 4 cm^3 08/31/21 0934   Drainage Amount Scant 08/31/21 0934   Drainage Description Serosanguineous 08/31/21 0934   Treatments Cleansed;Irrigation with NSS;Site care 08/31/21 0934   Dressing Foam, Silicon (eg  Allevyn, etc); Xeroform 08/31/21 0934   Dressing Changed Changed 08/31/21 0934   Patient Tolerance Tolerated well 08/31/21 0934         Wound care will follow weekly call or tiger text with questions or concerns     Emma Szymanski RN BSN CWOCN

## 2021-08-31 NOTE — PROGRESS NOTES
Progress Note - [facility]  Precilla Dys Day 80 y o  female MRN: [id number]    Unit/Bed#: -Rohit Encounter: [id number]      Assessment/Plan:  1  Partial small bowel obstruction  CT scan was notable for dilated small bowel loops in the mid to lower abdomen with normal caliber in the distal ileum concerning for partial low-grade small bowel obstruction Partial small-bowel  X-ray abdominal obstructive series on 08/30 showed Stable findings suggesting partial mid to distal small bowel obstruction   -continue supportive care and IV fluids  -Monitor electrolytes and replete as necessary   -NPO   -NGT to wall suction  -Management of small-bowel obstruction per surgery     2  Nausea and vomiting with brown emesis  Nausea/vomiting may be secondary to underlying UTI or small bowel obstruction  Daughter reports on admission that patient had coke prior to vomiting last night  Will rule out obstruction given history of constipation and no recent bowel movements reported by nursing  Pt denies any abdominal pain or discomfort  Per nursing note patient had small cell smear her brown stool  No signs of GI bleed   -Continue supportive care  -Continue IV PPI b i d  -Given stable hemoglobin and no further episodes of nausea vomiting will hold off on endoscopic evaluation at this time      3  Hypernatremia  -management as per nephrology      4  UTI  - antibiotics as per infectious disease physician       Will follow as needed  Call GI if needed        Subjective:   Lying in bed in no acute distress  Per nursing notes patient had a small smear of brown stool yesterday  Patient is unsure if she has been passing flatus or not  Denies nausea or vomiting  Denies acid reflux or heartburn  Denies abdominal pain  NG tube to wall suction pain for bile drainage  Objective:     Vitals: Blood pressure 140/67, pulse 81, temperature 98 °F (36 7 °C), temperature source Tympanic, resp   rate 19, height 5' (1 524 m), weight 92 6 kg (204 lb 2 3 oz), SpO2 98 %  ,Body mass index is 39 87 kg/m²  Intake/Output Summary (Last 24 hours) at 8/31/2021 0800  Last data filed at 8/31/2021 0452  Gross per 24 hour   Intake 1000 ml   Output 1150 ml   Net -150 ml       Physical Exam: Physical Exam  Vitals and nursing note reviewed  Constitutional:       General: She is not in acute distress  Cardiovascular:      Rate and Rhythm: Normal rate and regular rhythm  Pulses: Normal pulses  Heart sounds: Normal heart sounds  Pulmonary:      Effort: Pulmonary effort is normal  No respiratory distress  Breath sounds: No stridor  No wheezing, rhonchi or rales  Abdominal:      General: Bowel sounds are normal  There is no distension  Palpations: Abdomen is soft  There is no mass  Tenderness: There is no abdominal tenderness  There is no guarding or rebound  Hernia: No hernia is present  Comments: Right nare NG tube to wall suction pain for bile drainage  Musculoskeletal:      Right lower leg: Edema present  Left lower leg: Edema present  Comments: +1 edema to BLE  Skin:     General: Skin is warm and dry  Coloration: Skin is not jaundiced or pale  Neurological:      Mental Status: She is alert and oriented to person, place, and time  Psychiatric:         Mood and Affect: Mood normal          Invasive Devices     Peripheral Intravenous Line            Peripheral IV 08/30/21 Dorsal (posterior); Left Hand 1 day          Drain            Urethral Catheter Non-latex 16 Fr  5 days    NG/OG Tube Nasogastric Right nare 2 days                   Current Facility-Administered Medications:     acetaminophen (TYLENOL) tablet 650 mg, 650 mg, Oral, Q6H PRN, Kj Spence MD, 650 mg at 08/26/21 1055    aluminum-magnesium hydroxide-simethicone (MYLANTA) oral suspension 30 mL, 30 mL, Oral, Q6H PRN, Kj Spence MD, 30 mL at 08/26/21 1705    atorvastatin (LIPITOR) tablet 10 mg, 10 mg, Oral, Daily With Pepper Field MD, 10 mg at 08/30/21 1718    dextrose 5 % infusion, 80 mL/hr, Intravenous, Continuous, Wilson Yao MD, Last Rate: 80 mL/hr at 08/30/21 2220, 80 mL/hr at 08/30/21 2220    ertapenem (INVanz) 1,000 mg in sodium chloride 0 9 % 50 mL IVPB, 1,000 mg, Intravenous, Q24H, Nereida Garcia MD, Last Rate: 100 mL/hr at 08/30/21 2035, 1,000 mg at 08/30/21 2035    insulin lispro (HumaLOG) 100 units/mL subcutaneous injection 1-5 Units, 1-5 Units, Subcutaneous, Q6H **AND** Fingerstick Glucose (POCT), , , Q6H, Ei Yossi Aguilera MD    levothyroxine tablet 50 mcg, 50 mcg, Oral, Early Morning, Uday Mcgowan MD, 50 mcg at 08/31/21 0510    magnesium hydroxide (MILK OF MAGNESIA) oral suspension 30 mL, 30 mL, Oral, Daily PRN, Uday Mcgowan MD    metoprolol tartrate (LOPRESSOR) tablet 25 mg, 25 mg, Oral, BID, Uday Mcgowan MD, 25 mg at 08/30/21 1718    nystatin (MYCOSTATIN) powder, , Topical, BID, Uday Mcgowan MD, Given at 08/30/21 1724    ondansetron (ZOFRAN) injection 4 mg, 4 mg, Intravenous, Q4H PRN, Cinthia Watts MD, 4 mg at 08/27/21 2004    oxyCODONE (ROXICODONE) IR tablet 2 5 mg, 2 5 mg, Oral, Q4H PRN, Uday Mcgowan MD    pantoprazole (PROTONIX) injection 40 mg, 40 mg, Intravenous, Q12H [address], KAISE Hameed, 40 mg at 08/30/21 2036    Lab Results:   Recent Results (from the past 24 hour(s))   Fingerstick Glucose (POCT)    Collection Time: 08/30/21 11:35 AM   Result Value Ref Range    POC Glucose 145 (H) 65 - 140 mg/dl   Fingerstick Glucose (POCT)    Collection Time: 08/30/21  2:53 PM   Result Value Ref Range    POC Glucose 148 (H) 65 - 140 mg/dl   Fingerstick Glucose (POCT)    Collection Time: 08/30/21  8:18 PM   Result Value Ref Range    POC Glucose 131 65 - 140 mg/dl   Fingerstick Glucose (POCT)    Collection Time: 08/30/21 11:50 PM   Result Value Ref Range    POC Glucose 129 65 - 140 mg/dl   Fingerstick Glucose (POCT)    Collection Time: 08/31/21  5:03 AM   Result Value Ref Range    POC Glucose 118 65 - 140 mg/dl   Basic metabolic panel    Collection Time: 08/31/21  6:07 AM   Result Value Ref Range    Sodium 146 (H) 133 - 145 mmol/L    Potassium 3 4 (L) 3 5 - 5 0 mmol/L    Chloride 102 96 - 108 mmol/L    CO2 36 (H) 22 - 33 mmol/L    ANION GAP 8 4 - 13 mmol/L    BUN 16 6 - 20 mg/dL    Creatinine 0 83 0 40 - 1 10 mg/dL    Glucose 129 65 - 140 mg/dL    Calcium 9 0 8 4 - 10 2 mg/dL    eGFR 64 ml/min/1 73sq m             Imaging Studies: CT abdomen pelvis wo contrast    Result Date: 8/28/2021  Narrative: CT ABDOMEN AND PELVIS WITHOUT IV CONTRAST INDICATION:   Abdominal distension Abdominal pain, acute, nonlocalized lower abdominal pain, abdominal distention, intractable nausea and vomiting    COMPARISON:  None  TECHNIQUE:  CT examination of the abdomen and pelvis was performed without intravenous contrast   Axial, sagittal, and coronal 2D reformatted images were created from the source data and submitted for interpretation  Radiation dose length product (DLP) for this visit:  850 mGy-cm   This examination, like all CT scans performed in the [facility], was performed utilizing techniques to minimize radiation dose exposure, including the use of iterative reconstruction and automated exposure control  Enteric contrast was administered  FINDINGS: ABDOMEN LOWER CHEST:  Small right effusion seen Consolidation seen left lung base with left effusion LIVER/BILIARY TREE:  Hepatic steatosis seen GALLBLADDER:  Gallbladder not identified SPLEEN:  Spleen appears unremarkable PANCREAS:  No pancreatic ductal dilation or pancreatic atrophy ADRENAL GLANDS:  Unremarkable  KIDNEYS/URETERS:  No hydronephrosis seen No renal or ureteric calculi seen STOMACH AND BOWEL:  Dilation of the small bowel loops in the mid to lower abdomen seen    There is normal caliber of the distal small bowel loops Postsurgical changes from prior colonic surgery with colonic anastomosis The zone of transition is difficult to delineate on this noncontrast images Moderate amount of fecal debris in the rectum APPENDIX:  No findings to suggest appendicitis  ABDOMINOPELVIC CAVITY:  No ascites  No pneumoperitoneum  No lymphadenopathy  VESSELS:  Evaluation of the basilar limited due to lack of contrast PELVIS REPRODUCTIVE ORGANS:  Uterus is not identified URINARY BLADDER:  A Deal catheter is noted within the bladder  Air is noted in the urinary bladder likely due to recent instrumentation ABDOMINAL WALL/INGUINAL REGIONS:  Unremarkable  OSSEOUS STRUCTURES:  No acute compression collapse of the vertebra Lumbar fusion seen extending from L4 through L5 Transpedicular screws and     Impression: Dilated small bowel loops in the mid to lower abdomen with normal caliber of the distal ileum, suggest  incomplete low-grade partial small bowel obstruction Hepatic steatosis No free air or pneumatosis No abnormal bowel wall thickening Small left effusion and left basilar consolidation with air bronchograms  This may be infective consolidation versus atelectasis Follow-up suggested at 3 months to demonstrate resolution Workstation performed: [id number]     XR chest portable    Result Date: 8/29/2021  Narrative: CHEST INDICATION:   to access NG tube  COMPARISON:  August 25, 2021  EXAM PERFORMED/VIEWS:  XR CHEST PORTABLE FINDINGS:  Left-sided chest wall intracardiac device is identified  Leads are intact  Enteric tube in place with distal portion coursing below left hemidiaphragm  Mild enlargement of the cardiac silhouette  Status post TAVR  There is a left basilar opacity, likely reflecting small to moderate pleural effusion with atelectasis  Possibility of superimposed pneumonia is not excluded  Right lung is clear  No discrete pneumothorax  Degenerative changes in the glenohumeral joints bilaterally, right greater than left       Impression: Enteric tube in place with distal portion coursing below left hemidiaphragm LEFT basilar opacity, likely reflecting small to moderate pleural effusion with atelectasis  Possibility of superimposed pneumonia is not excluded  Workstation performed: [id number]     XR chest 1 view portable    Result Date: 8/26/2021  Narrative: CHEST INDICATION:   chest pain  COMPARISON:  Chest radiograph from 2/10/2020 and chest CT from 7/14/2016  EXAM PERFORMED/VIEWS:  XR CHEST PORTABLE FINDINGS: Heart at upper limit of normal in size, TAVR, left subclavian pacemaker leads in right atrium and right ventricle  Coronary stents  Mild left base atelectasis with mild elevation of the left hemidiaphragm  No effusion or pneumothorax  Mild curvature of the spine  Impression: Mild left base atelectasis with mild elevation of the left hemidiaphragm  Workstation performed: [id number]     XR chest portable    Result Date: 8/18/2021  Narrative: CHEST INDICATION:   CP  COMPARISON:  3/1/2021 EXAM PERFORMED/VIEWS:  XR CHEST PORTABLE FINDINGS:  Hypoventilation is present  Bipolar pacemaker appears similar position to the prior study  The patient is status post TAVR  Cardiomediastinal silhouette appears unremarkable  The lungs are clear  No pneumothorax or pleural effusion  Degenerative changes of the right shoulder are noted  Impression: No acute cardiopulmonary disease  Workstation performed: [id number]     XR abdomen obstruction series    Result Date: 8/30/2021  Narrative: OBSTRUCTION SERIES INDICATION:   Small-bowel obstruction  Please do study after enema given  COMPARISON:  8/29/2021 EXAM PERFORMED/VIEWS:  XR ABDOMEN OBSTRUCTION SERIES Images: 6 FINDINGS: NG tube remains within the upper stomach Findings consistent with partial mid to distal small bowel obstruction, unchanged No free air beneath the hemidiaphragms  No pathologic calcifications or soft tissue masses evident  Lower lumbar pedicle screw and kindra posterior fixation  Partially visualized right hip ORIF   Cardiomegaly and small left pleural effusion  TAVR  Left-sided cardiac pacer  Impression: Stable findings suggesting partial mid to distal small bowel obstruction Workstation performed: [id number]     XR abdomen obstruction series    Result Date: 8/30/2021  Narrative: OBSTRUCTION SERIES INDICATION:   Small bowel obstruction  COMPARISON:  CT 8/28/2021; plain film 8/27/2021 EXAM PERFORMED/VIEWS:  XR ABDOMEN OBSTRUCTION SERIES FINDINGS: Nasogastric tube terminates in the stomach  There is gas distributed throughout mildly distended loops of small bowel in the central abdomen with scattered air-fluid levels, with gas and stool in nondilated colon similar to the study of 8/27/2021 which may reflect ongoing partial small bowel obstruction  No free air beneath the hemidiaphragms  No pathologic calcifications or soft tissue masses evident  Patient status post PLIF L4-5  Degenerative change throughout the spine noted with mild mid lumbar levoscoliosis  The heart remains enlarged  Opacification left inferior hemithorax may reflect pleural effusion and/or atelectasis  Lungs are clear  Patient status post TAVR  Impression: Continued mild gaseous distention of several loops of small bowel out of proportion to large bowel distention suggesting ongoing partial small bowel obstruction  Workstation performed: [id number]     XR abdomen obstruction series    Result Date: 8/27/2021  Narrative: OBSTRUCTION SERIES INDICATION:   constipation/N/V, please evaluate stool pattern  COMPARISON:  8/22/2007; chest radiograph 8/25/2021 EXAM PERFORMED/VIEWS:  XR ABDOMEN OBSTRUCTION SERIES  2:47 PM FINDINGS: The study is technically limited due to patient body habitus  Gas is distributed throughout nondilated loops of large and small bowel which may reflect a mild ileus  Early/partial obstruction not excluded  No free air beneath the hemidiaphragms  No pathologic calcifications or soft tissue masses evident   Osteopenia present diffusely with degenerative change throughout the spine  Status post PLIF L4-5  Status post ORIF right intertrochanteric fracture  Examination of the chest reveals a normal cardiomediastinal silhouette  Small left greater than right pleural effusions noted  Transcatheter aortic valve replacement noted  Impression: Technically limited study  Nonspecific bowel gas pattern likely reflecting mild ileus  Early/partial obstruction is not excludable  Follow-up as clinically dictated  Workstation performed: [id number]     US kidney and bladder    Result Date: 8/21/2021  Narrative: RENAL ULTRASOUND INDICATION:   Renal failure  COMPARISON:  CT abdomen/pelvis 2/10/2020 TECHNIQUE:  Ultrasound of the retroperitoneum was performed with a curvilinear transducer utilizing volumetric sweeps and still imaging techniques  FINDINGS: KIDNEYS: Symmetric and normal size  Right kidney:  10 1 x 5 4 x 5 2 cm  Left kidney:  10 1 x 5 2 x 4 3 cm  Right kidney Normal echogenicity and contour  No suspicious masses detected  No hydronephrosis  No shadowing calculi  No perinephric fluid collections  Left kidney Normal echogenicity and contour  No suspicious masses detected  1 4 cm simple cyst in the midportion  No hydronephrosis  No shadowing calculi  No perinephric fluid collections  URETERS: Nonvisualized  BLADDER: Normally distended  No focal thickening or mass lesions  Bilateral ureteral jets detected  Impression: No hydronephrosis  Workstation performed: [id number]     [id number] [facility] bedside procedure    Result Date: 8/17/2021  Narrative: 1 2 840 [id number]  2 446 [id number] 80 1          KASIE Ballard      Please Note: "This note has been constructed using a voice recognition system  Therefore there may be syntax, spelling, and/or grammatical errors   Please call if you have any questions  "**

## 2021-08-31 NOTE — PLAN OF CARE
Problem: MOBILITY - ADULT  Goal: Maintain or return to baseline ADL function  Description: INTERVENTIONS:  -  Assess patient's ability to carry out ADLs; assess patient's baseline for ADL function and identify physical deficits which impact ability to perform ADLs (bathing, care of mouth/teeth, toileting, grooming, dressing, etc )  - Assess/evaluate cause of self-care deficits   - Assess range of motion  - Assess patient's mobility; develop plan if impaired  - Assess patient's need for assistive devices and provide as appropriate  - Encourage maximum independence but intervene and supervise when necessary  - Involve family in performance of ADLs  - Assess for home care needs following discharge   - Consider OT consult to assist with ADL evaluation and planning for discharge  - Provide patient education as appropriate  Outcome: Progressing  Goal: Maintains/Returns to pre admission functional level  Description: INTERVENTIONS:  - Perform BMAT or MOVE assessment daily    - Set and communicate daily mobility goal to care team and patient/family/caregiver  - Collaborate with rehabilitation services on mobility goals if consulted  - Perform Range of Motion  times a day  - Reposition patient every 2 hours    - Dangle patient times a day  - Stand patient times a day  - Ambulate patient times a day  - Out of bed to chair times a day   - Out of bed for meals times a day  - Out of bed for toileting  - Record patient progress and toleration of activity level   Outcome: Progressing     Problem: Potential for Falls  Goal: Patient will remain free of falls  Description: INTERVENTIONS:  - Educate patient/family on patient safety including physical limitations  - Instruct patient to call for assistance with activity   - Consult OT/PT to assist with strengthening/mobility   - Keep Call bell within reach  - Keep bed low and locked with side rails adjusted as appropriate  - Keep care items and personal belongings within reach  - Initiate and maintain comfort rounds  - Make Fall Risk Sign visible to staff  - Offer Toileting every 2 Hours, in advance of need  - Initiate/Maintain  bed alarm  - Obtain necessary fall risk management equipment:   - Apply yellow socks and bracelet for high fall risk patients  - Consider moving patient to room near nurses station  Outcome: Progressing     Problem: Nutrition/Hydration-ADULT  Goal: Nutrient/Hydration intake appropriate for improving, restoring or maintaining nutritional needs  Description: Monitor and assess patient's nutrition/hydration status for malnutrition  Collaborate with interdisciplinary team and initiate plan and interventions as ordered  Monitor patient's weight and dietary intake as ordered or per policy  Utilize nutrition screening tool and intervene as necessary  Determine patient's food preferences and provide high-protein, high-caloric foods as appropriate       INTERVENTIONS:  - Monitor oral intake, urinary output, labs, and treatment plans  - Assess nutrition and hydration status and recommend course of action  - Evaluate amount of meals eaten  - Assist patient with eating if necessary   - Allow adequate time for meals  - Recommend/ encourage appropriate diets, oral nutritional supplements, and vitamin/mineral supplements  - Order, calculate, and assess calorie counts as needed  - Recommend, monitor, and adjust tube feedings and TPN/PPN based on assessed needs  - Assess need for intravenous fluids  - Provide specific nutrition/hydration education as appropriate  - Include patient/family/caregiver in decisions related to nutrition  Outcome: Progressing     Problem: PAIN - ADULT  Goal: Verbalizes/displays adequate comfort level or baseline comfort level  Description: Interventions:  - Encourage patient to monitor pain and request assistance  - Assess pain using appropriate pain scale  - Administer analgesics based on type and severity of pain and evaluate response  - Implement non-pharmacological measures as appropriate and evaluate response  - Consider cultural and social influences on pain and pain management  - Notify physician/advanced practitioner if interventions unsuccessful or patient reports new pain  Outcome: Progressing     Problem: INFECTION - ADULT  Goal: Absence or prevention of progression during hospitalization  Description: INTERVENTIONS:  - Assess and monitor for signs and symptoms of infection  - Monitor lab/diagnostic results  - Monitor all insertion sites, i e  indwelling lines, tubes, and drains  - Administer medications as ordered  - Instruct and encourage patient and family to use good hand hygiene technique  - Identify and instruct in appropriate isolation precautions for identified infection/condition  Outcome: Progressing  Goal: Absence of fever/infection during neutropenic period  Description: INTERVENTIONS:  - Monitor WBC    Outcome: Progressing     Problem: SAFETY ADULT  Goal: Maintain or return to baseline ADL function  Description: INTERVENTIONS:  -  Assess patient's ability to carry out ADLs; assess patient's baseline for ADL function and identify physical deficits which impact ability to perform ADLs (bathing, care of mouth/teeth, toileting, grooming, dressing, etc )  - Assess/evaluate cause of self-care deficits   - Assess range of motion  - Assess patient's mobility; develop plan if impaired  - Assess patient's need for assistive devices and provide as appropriate  - Encourage maximum independence but intervene and supervise when necessary  - Involve family in performance of ADLs  - Assess for home care needs following discharge   - Consider OT consult to assist with ADL evaluation and planning for discharge  - Provide patient education as appropriate  Outcome: Progressing  Goal: Maintains/Returns to pre admission functional level  Description: INTERVENTIONS:  - Perform BMAT or MOVE assessment daily    - Set and communicate daily mobility goal to care team and patient/family/caregiver  - Collaborate with rehabilitation services on mobility goals if consulted  - Perform Range of Motion  times a day  - Reposition patient every 2 hours    - Dangle patient times a day  - Stand patient times a day  - Ambulate patient  times a day  - Out of bed to chair  times a day   - Out of bed for meals times a day  - Out of bed for toileting  - Record patient progress and toleration of activity level   Outcome: Progressing  Goal: Patient will remain free of falls  Description: INTERVENTIONS:  - Educate patient/family on patient safety including physical limitations  - Instruct patient to call for assistance with activity   - Consult OT/PT to assist with strengthening/mobility   - Keep Call bell within reach  - Keep bed low and locked with side rails adjusted as appropriate  - Keep care items and personal belongings within reach  - Initiate and maintain comfort rounds  - Make Fall Risk Sign visible to staff  - Offer Toileting every 2 Hours, in advance of need  - Initiate/Maintain  bed alarm  - Obtain necessary fall risk management equipment:   - Apply yellow socks and bracelet for high fall risk patients  - Consider moving patient to room near nurses station  Outcome: Progressing     Problem: DISCHARGE PLANNING  Goal: Discharge to home or other facility with appropriate resources  Description: INTERVENTIONS:  - Identify barriers to discharge w/patient and caregiver  - Arrange for needed discharge resources and transportation as appropriate  - Identify discharge learning needs (meds, wound care, etc )  - Arrange for interpretive services to assist at discharge as needed  - Refer to Case Management Department for coordinating discharge planning if the patient needs post-hospital services based on physician/advanced practitioner order or complex needs related to functional status, cognitive ability, or social support system  Outcome: Progressing     Problem: Knowledge Deficit  Goal: Patient/family/caregiver demonstrates understanding of disease process, treatment plan, medications, and discharge instructions  Description: Complete learning assessment and assess knowledge base    Interventions:  - Provide teaching at level of understanding  - Provide teaching via preferred learning methods  Outcome: Progressing     Problem: Prexisting or High Potential for Compromised Skin Integrity  Goal: Skin integrity is maintained or improved  Description: INTERVENTIONS:  - Identify patients at risk for skin breakdown  - Assess and monitor skin integrity  - Assess and monitor nutrition and hydration status  - Monitor labs   - Assess for incontinence   - Turn and reposition patient  - Assist with mobility/ambulation  - Relieve pressure over bony prominences  - Avoid friction and shearing  - Provide appropriate hygiene as needed including keeping skin clean and dry  - Evaluate need for skin moisturizer/barrier cream  - Collaborate with interdisciplinary team   - Patient/family teaching  - Consider wound care consult   Outcome: Progressing

## 2021-08-31 NOTE — PLAN OF CARE
Problem: MOBILITY - ADULT  Goal: Maintain or return to baseline ADL function  Description: INTERVENTIONS:  -  Assess patient's ability to carry out ADLs; assess patient's baseline for ADL function and identify physical deficits which impact ability to perform ADLs (bathing, care of mouth/teeth, toileting, grooming, dressing, etc )  - Assess/evaluate cause of self-care deficits   - Assess range of motion  - Assess patient's mobility; develop plan if impaired  - Assess patient's need for assistive devices and provide as appropriate  - Encourage maximum independence but intervene and supervise when necessary  - Involve family in performance of ADLs  - Assess for home care needs following discharge   - Consider OT consult to assist with ADL evaluation and planning for discharge  - Provide patient education as appropriate  Outcome: Progressing  Goal: Maintains/Returns to pre admission functional level  Description: INTERVENTIONS:  - Perform BMAT or MOVE assessment daily    - Set and communicate daily mobility goal to care team and patient/family/caregiver  - Collaborate with rehabilitation services on mobility goals if consulted  - Perform Range of Motion 3 times a day  - Reposition patient every 2  hours        - Out of bed to chair 3 times a day   - Out of bed for meals 3  times a day  - Out of bed for toileting  - Record patient progress and toleration of activity level   Outcome: Progressing     Problem: Potential for Falls  Goal: Patient will remain free of falls  Description: INTERVENTIONS:  - Educate patient/family on patient safety including physical limitations  - Instruct patient to call for assistance with activity   - Consult OT/PT to assist with strengthening/mobility   - Keep Call bell within reach  - Keep bed low and locked with side rails adjusted as appropriate  - Keep care items and personal belongings within reach  - Initiate and maintain comfort rounds  - Make Fall Risk Sign visible to staff  - Offer Toileting every  2 Hours, in advance of need  - Initiate/Maintain bed and chair alarm    - Apply yellow socks and bracelet for high fall risk patients  - Consider moving patient to room near nurses station  Outcome: Progressing     Problem: Nutrition/Hydration-ADULT  Goal: Nutrient/Hydration intake appropriate for improving, restoring or maintaining nutritional needs  Description: Monitor and assess patient's nutrition/hydration status for malnutrition  Collaborate with interdisciplinary team and initiate plan and interventions as ordered  Monitor patient's weight and dietary intake as ordered or per policy  Utilize nutrition screening tool and intervene as necessary  Determine patient's food preferences and provide high-protein, high-caloric foods as appropriate       INTERVENTIONS:  - Monitor oral intake, urinary output, labs, and treatment plans  - Assess nutrition and hydration status and recommend course of action  - Evaluate amount of meals eaten  - Assist patient with eating if necessary   - Allow adequate time for meals  - Recommend/ encourage appropriate diets, oral nutritional supplements, and vitamin/mineral supplements  - Order, calculate, and assess calorie counts as needed    - Assess need for intravenous fluids  - Provide specific nutrition/hydration education as appropriate  - Include patient/family/caregiver in decisions related to nutrition  Outcome: Progressing     Problem: PAIN - ADULT  Goal: Verbalizes/displays adequate comfort level or baseline comfort level  Description: Interventions:  - Encourage patient to monitor pain and request assistance  - Assess pain using appropriate pain scale  - Administer analgesics based on type and severity of pain and evaluate response  - Implement non-pharmacological measures as appropriate and evaluate response  - Consider cultural and social influences on pain and pain management  - Notify physician/advanced practitioner if interventions unsuccessful or patient reports new pain  Outcome: Progressing     Problem: INFECTION - ADULT  Goal: Absence or prevention of progression during hospitalization  Description: INTERVENTIONS:  - Assess and monitor for signs and symptoms of infection  - Monitor lab/diagnostic results  - Monitor all insertion sites, i e  indwelling lines, tubes, and drains  - Monitor endotracheal if appropriate and nasal secretions for changes in amount and color  - Taylor appropriate cooling/warming therapies per order  - Administer medications as ordered  - Instruct and encourage patient and family to use good hand hygiene technique  - Identify and instruct in appropriate isolation precautions for identified infection/condition  Outcome: Progressing  Goal: Absence of fever/infection during neutropenic period  Description: INTERVENTIONS:  - Monitor WBC    Outcome: Progressing     Problem: SAFETY ADULT  Goal: Maintain or return to baseline ADL function  Description: INTERVENTIONS:  -  Assess patient's ability to carry out ADLs; assess patient's baseline for ADL function and identify physical deficits which impact ability to perform ADLs (bathing, care of mouth/teeth, toileting, grooming, dressing, etc )  - Assess/evaluate cause of self-care deficits   - Assess range of motion  - Assess patient's mobility; develop plan if impaired  - Assess patient's need for assistive devices and provide as appropriate  - Encourage maximum independence but intervene and supervise when necessary  - Involve family in performance of ADLs  - Assess for home care needs following discharge   - Consider OT consult to assist with ADL evaluation and planning for discharge  - Provide patient education as appropriate  Outcome: Progressing  Goal: Maintains/Returns to pre admission functional level  Description: INTERVENTIONS:  - Perform BMAT or MOVE assessment daily    - Set and communicate daily mobility goal to care team and patient/family/caregiver     - Collaborate with rehabilitation services on mobility goals if consulted  - Perform Range of Motion 3 times a day  - Reposition patient every 2  hours  - Out of bed to chair 3 times a day   - Out of bed for meals 3 times a day  - Out of bed for toileting  - Record patient progress and toleration of activity level   Outcome: Progressing  Goal: Patient will remain free of falls  Description: INTERVENTIONS:  - Educate patient/family on patient safety including physical limitations  - Instruct patient to call for assistance with activity   - Consult OT/PT to assist with strengthening/mobility   - Keep Call bell within reach  - Keep bed low and locked with side rails adjusted as appropriate  - Keep care items and personal belongings within reach  - Initiate and maintain comfort rounds    - Initiate/Maintain  bed and chair alarm    - Apply yellow socks and bracelet for high fall risk patients  - Consider moving patient to room near nurses station  Outcome: Progressing     Problem: DISCHARGE PLANNING  Goal: Discharge to home or other facility with appropriate resources  Description: INTERVENTIONS:  - Identify barriers to discharge w/patient and caregiver  - Arrange for needed discharge resources and transportation as appropriate  - Identify discharge learning needs (meds, wound care, etc )  - Arrange for interpretive services to assist at discharge as needed  - Refer to Case Management Department for coordinating discharge planning if the patient needs post-hospital services based on physician/advanced practitioner order or complex needs related to functional status, cognitive ability, or social support system  Outcome: Progressing     Problem: Knowledge Deficit  Goal: Patient/family/caregiver demonstrates understanding of disease process, treatment plan, medications, and discharge instructions  Description: Complete learning assessment and assess knowledge base    Interventions:  - Provide teaching at level of understanding  - Provide teaching via preferred learning methods  Outcome: Progressing     Problem: Prexisting or High Potential for Compromised Skin Integrity  Goal: Skin integrity is maintained or improved  Description: INTERVENTIONS:  - Identify patients at risk for skin breakdown  - Assess and monitor skin integrity  - Assess and monitor nutrition and hydration status  - Monitor labs   - Assess for incontinence   - Turn and reposition patient  - Assist with mobility/ambulation  - Relieve pressure over bony prominences  - Avoid friction and shearing  - Provide appropriate hygiene as needed including keeping skin clean and dry  - Evaluate need for skin moisturizer/barrier cream  - Collaborate with interdisciplinary team   - Patient/family teaching  - Consider wound care consult   Outcome: Progressing

## 2021-08-31 NOTE — ASSESSMENT & PLAN NOTE
HCO of 34  Most likely due to NG tube drainage with consequential loss of gastric HCl  SpO2 of 98% on 2L NC O2    VBG revealed alkalemia of 7 4

## 2021-08-31 NOTE — PROGRESS NOTES
[facility]  66   Progress Note Ladi Guerin Day 1935, 80 y o  female MRN: [id number]  Unit/Bed#: -01 Encounter: [id number]  Primary Care Provider: Burt Salazar MD   Date and time admitted to hospital: 8/25/2021  4:58 PM    * Sepsis [facility])  Assessment & Plan    Results from last 7 days   Lab Units 08/25/21 2044 08/25/21  1739   BLOOD CULTURE  Escherichia coli ESBL*  --    GRAM STAIN RESULT  Gram negative rods*  --    URINE CULTURE   --  >100,000 cfu/ml Escherichia coli ESBL*     Patient meets sepsis criteria on admission  Sepsis likely secondary to UTI  Blood culture and urine culture growing ESBL E coli  ID consult, appreciate recommendation  She received IV meropenem for 3 days and subsequently transition to Ertapenem on 8/30 by ID guidance  Total duration anticipated for a total duration of 7 days or as per clinical situation  Carbapenem duration: #4    Monitor WBC/hemodynamics    Metabolic alkalosis  Assessment & Plan  HCO of 34  Most likely due to NG tube drainage with consequential loss of gastric HCl  SpO2 of 98% on 2L NC O2  VBG revealed alkalemia of 7 4    Sacral decubitus ulcer  Assessment & Plan  Sacral decubitus ulcer reported by nursing staff  Appears to be stage 2 on exam  Wound care management as per nursing protocol     - inpatient wound care consult placed  - continue nursing intervention to redistribute pressure and minimize tissue damage   - daily wound assessment  Hypernatremia  Assessment & Plan  Currently improving  Sodium of 145 today  Hypernatremia likely due to poor oral intake/dehydration  On D5W at 100cc/hr    Monitor BMP  Nephrology input appreciated    Partial small bowel obstruction [facility])  Assessment & Plan  Abdominal x-ray suggestive of partial small-bowel obstruction  Effluent of NG tube currently improving in color  General surgery on board    NG tube drainage was held for 2hours and subsequently put on some intermittent suction with minimal output  Surgery consult input appreciated  Continue serial abdominal examination  Continue ice chips for now and possibly transition to clear liquid diet tomorrow  Continue Zofran  QTC appears to be prolonged due to V paced rhythm with widened QRS  Corrected QTC less than 470    UTI (urinary tract infection)  Assessment & Plan  Urine culture growing ESBL E coli  She received 3 days of IV meropenem was subsequently switched to ertapenem on 8/30  Total carbapenem duration: #4 days  Infectious disease following, appreciate recommendation  CKD (chronic kidney disease)  Assessment & Plan  Results from last 7 days   Lab Units 08/31/21  0607 08/30/21  0707 08/29/21  0548 08/28/21 2140 08/28/21  0458 08/27/21  0613 08/25/21  1734   SODIUM mmol/L 146* 147* 152*  --  150* 146* 138   POTASSIUM mmol/L 3 4* 3 8 3 2* 3 3* 3 2* 3 1* 3 9   CHLORIDE mmol/L 102 105 106  --  103 99 93*   CO2 mmol/L 36* 34* 37*  --  37* 36* 28   ANION GAP mmol/L 8 8 9  --  10 11 17*   BUN mg/dL 16 19 28*  --  41* 41* 71*   CREATININE mg/dL 0 83 0 88 0 96  --  1 29* 1 23* 1 75*   CALCIUM mg/dL 9 0 9 5 9 6  --  9 4 8 9 9 7   ALBUMIN g/dL  --   --   --   --   --   --  3 9   TOTAL BILIRUBIN mg/dL  --   --   --   --   --   --  0 71   ALK PHOS U/L  --   --   --   --   --   --  92 4   ALT U/L  --   --   --   --   --   --  27   AST U/L  --   --   --   --   --   --  26   EGFR ml/min/1 73sq m 64 60 54  --  38 40 26   GLUCOSE RANDOM mg/dL 129 145* 124  --  97 147* 222*       Creatinine is greater than her baseline of 1 4-1 5  Currently creatinine at baseline  Monitor BMP daily    Hypokalemia  Assessment & Plan  Replete accordingly    Most likely due to metabolic alkalosis in setting of NG tube drainage    Results from last 7 days   Lab Units 08/31/21  0607 08/30/21  0707 08/29/21  0548 08/28/21 2140 08/28/21  0458 08/27/21  0613 08/25/21  1734   SODIUM mmol/L 146* 147* 152*  --  150* 146* 138   POTASSIUM mmol/L 3 4* 3 8 3 2* 3 3* 3 2* 3 1* 3 9   CHLORIDE mmol/L 102 105 106  --  103 99 93*   CO2 mmol/L 36* 34* 37*  --  37* 36* 28   ANION GAP mmol/L 8 8 9  --  10 11 17*   BUN mg/dL 16 19 28*  --  41* 41* 71*   CREATININE mg/dL 0 83 0 88 0 96  --  1 29* 1 23* 1 75*   CALCIUM mg/dL 9 0 9 5 9 6  --  9 4 8 9 9 7   ALBUMIN g/dL  --   --   --   --   --   --  3 9   TOTAL BILIRUBIN mg/dL  --   --   --   --   --   --  0 71   ALK PHOS U/L  --   --   --   --   --   --  92 4   ALT U/L  --   --   --   --   --   --  27   AST U/L  --   --   --   --   --   --  26   EGFR ml/min/1 73sq m 64 60 54  --  38 40 26   GLUCOSE RANDOM mg/dL 129 145* 124  --  97 147* 222*         Hypertension  Assessment & Plan  · Amlodipine and metoprolol with holding parameters  · Will hold Lasix currently in the setting of sepsis  Blood pressure 142/75, pulse 89, temperature 98 1 °F (36 7 °C), temperature source Tympanic, resp  rate 19, height 5' (1 524 m), weight 92 6 kg (204 lb 2 3 oz), SpO2 97 %  Hyperlipidemia  Assessment & Plan  Continue Lipitor for now    Chronic diastolic congestive heart failure (HCC)  Assessment & Plan  Wt Readings from Last 3 Encounters:   08/25/21 92 6 kg (204 lb 2 3 oz)   08/22/21 95 3 kg (210 lb 1 6 oz)   02/19/20 87 5 kg (193 lb)     Currently lasix on hold in light of sepsis and reduced p o  Intake  I's and O's, daily weight  Cardiology on board  Input appreciated    Diabetes mellitus [facility])  Assessment & Plan  Lab Results   Component Value Date    HGBA1C 6 7 (H) 08/19/2021       Recent Labs     08/30/21  0004 08/30/21  0651 08/30/21  1135 08/30/21  1453   POCGLU 136 149* 145* 148*       Blood Sugar Average: Last 72 hrs:  (P) 138 6     Currently on D5W  Accu-Cheks every 6 hourly and sliding scale insulin  Hypoglycemic protocol  Constipation  Assessment & Plan  Hold stool softer currently due to suspected partial small bowel obstruction  Ambulatory dysfunction  Assessment & Plan  PT and OT consult, recommended acute post rehab      Frequent falls  Assessment & Plan  PT and OT evaluations, recommend acute post rehab  Family in agreement    Age-related cognitive decline  Assessment & Plan  Supportive care  VTE Pharmacologic Prophylaxis:   VTE Score: 5 Moderate Risk (Score 3-4) - Pharmacological DVT Prophylaxis Contraindicated  Sequential Compression Devices Ordered  Mechanical VTE Prophylaxis in Place: Yes    Patient Centered Rounds: I have performed bedside rounds with nursing staff today  Discussions with Specialists or Other Care Team Provider: ID    Education and Discussions with Family / Patient: Patient    Current Length of Stay: 6 day(s)    Current Patient Status: Inpatient     Discharge Plan / Estimated Discharge Date: Anticipate discharge in 48 hrs to TBD    Code Status: Level 3 - DNAR and DNI      Subjective:   Patient seen and examined at bedside  Reports no complaints today  Denies nausea, vomiting, passage of flatus or bowel movement  Improvement in color in effluent of NG tube drainage      Objective:     Vitals:   Temp (24hrs), Av 2 °F (36 8 °C), Min:97 2 °F (36 2 °C), Max:99 °F (37 2 °C)    Temp:  [97 2 °F (36 2 °C)-99 °F (37 2 °C)] 97 2 °F (36 2 °C)  HR:  [81-93] 87  Resp:  [17-19] 18  BP: (136-141)/(59-86) 136/80  SpO2:  [96 %-98 %] 96 %  Body mass index is 39 87 kg/m²  Input and Output Summary (last 24 hours): Intake/Output Summary (Last 24 hours) at 2021 1536  Last data filed at 2021 1130  Gross per 24 hour   Intake 1000 ml   Output 2050 ml   Net -1050 ml       Physical Exam:     Physical Exam  Vitals reviewed  Constitutional:       General: She is not in acute distress  Appearance: She is not toxic-appearing  HENT:      Head: Normocephalic  Cardiovascular:      Rate and Rhythm: Normal rate and regular rhythm  Pulses: Normal pulses  Pulmonary:      Effort: No respiratory distress  Breath sounds: Normal breath sounds  No wheezing     Abdominal:      General: Bowel sounds are normal  Palpations: Abdomen is soft  Tenderness: There is no abdominal tenderness  Musculoskeletal:      Right lower leg: No edema  Left lower leg: No edema  Neurological:      General: No focal deficit present  Mental Status: She is alert and oriented to person, place, and time  Mental status is at baseline  Additional Data:     Labs:  Results from last 7 days   Lab Units 08/30/21  0708 08/29/21  0548   WBC Thousand/uL 15 37* 13 44*   HEMOGLOBIN g/dL 12 8 12 2   HEMATOCRIT % 41 3 39 5   PLATELETS Thousands/uL 427* 409*   NEUTROS PCT %  --  80*   LYMPHS PCT %  --  9*   MONOS PCT %  --  9   EOS PCT %  --  1     Results from last 7 days   Lab Units 08/31/21  0607 08/25/21  1734   SODIUM mmol/L 146* 138   POTASSIUM mmol/L 3 4* 3 9   CHLORIDE mmol/L 102 93*   CO2 mmol/L 36* 28   BUN mg/dL 16 71*   CREATININE mg/dL 0 83 1 75*   ANION GAP mmol/L 8 17*   CALCIUM mg/dL 9 0 9 7   ALBUMIN g/dL  --  3 9   TOTAL BILIRUBIN mg/dL  --  0 71   ALK PHOS U/L  --  92 4   ALT U/L  --  27   AST U/L  --  26   GLUCOSE RANDOM mg/dL 129 222*     Results from last 7 days   Lab Units 08/25/21  1734   INR  1 01     Results from last 7 days   Lab Units 08/31/21  1115 08/31/21  0503 08/30/21  2350 08/30/21  2018 08/30/21  1453 08/30/21  1135 08/30/21  0651 08/30/21  0004 08/29/21  1611 08/29/21  1123 08/29/21  0640 08/28/21  2324   POC GLUCOSE mg/dl 139 118 129 131 148* 145* 149* 136 126 149* 137 133               Imaging: Reviewed radiology reports from this admission including: xray(s)    Recent Cultures (last 7 days):     Results from last 7 days   Lab Units 08/25/21  2044 08/25/21  1739   BLOOD CULTURE  Escherichia coli ESBL*  --    GRAM STAIN RESULT  Gram negative rods*  --    URINE CULTURE   --  >100,000 cfu/ml Escherichia coli ESBL*       Lines/Drains:  Invasive Devices     Peripheral Intravenous Line            Peripheral IV 08/30/21 Dorsal (posterior); Left Hand 1 day          Drain            Urethral Catheter Non-latex 16 Fr  5 days    NG/OG Tube Nasogastric Right nare 3 days                Telemetry:        Last 24 Hours Medication List:   Current Facility-Administered Medications   Medication Dose Route Frequency Provider Last Rate    acetaminophen  650 mg Oral Q6H PRN Pam Gurrola MD      aluminum-magnesium hydroxide-simethicone  30 mL Oral Q6H PRN Pma Gurrola MD      atorvastatin  10 mg Oral Daily With Jasmeet Lemons MD      dextrose  100 mL/hr Intravenous Continuous Victor Manuel Espitia  mL/hr (08/31/21 1017)    ertapenem  1,000 mg Intravenous Q24H Ravi Escalante MD 1,000 mg (08/30/21 2035)    insulin lispro  1-5 Units Subcutaneous Q6H Donell Moore Asp, MD      levothyroxine  50 mcg Oral Early Morning Pam Gurrola MD      magnesium hydroxide  30 mL Oral Daily PRN Pam Gurrola MD      metoprolol tartrate  25 mg Oral BID Pam Gurrola MD      nystatin   Topical BID Pam Gurrola MD      ondansetron  4 mg Intravenous Q4H PRN aTnia Carpenter MD      oxyCODONE  2 5 mg Oral Q4H PRN Pam Gurrola MD      pantoprazole  40 mg Intravenous Q12H [address] K[address] Yañez          Today, Patient Was Seen By: Lorenza Fisher MD    ** Please Note: This note has been constructed using a voice recognition system   **

## 2021-08-31 NOTE — DISCHARGE INSTR - OTHER ORDERS
Skin Care orders:  1-Hydraguard to bilateral heels   BID and PRN  2-EHOB  cushion when out of bed in chair  3-Moisturize skin daily with skin nourishing cream  4-Elevate heels to offload pressure  5-Turn/reposition q2h for pressure re-distribution on skin  6  Left buttocks wound - cleanse with Normal saline then apply xeroform then top with allevyn foam cooper with a T and date change every other day   7   P- 500 low air loss mattress

## 2021-09-01 LAB
ANION GAP SERPL CALCULATED.3IONS-SCNC: 8 MMOL/L (ref 4–13)
BASOPHILS # BLD AUTO: 0.03 THOUSANDS/ΜL (ref 0–0.1)
BASOPHILS NFR BLD AUTO: 0 % (ref 0–1)
BUN SERPL-MCNC: 13 MG/DL (ref 6–20)
CALCIUM SERPL-MCNC: 8.6 MG/DL (ref 8.4–10.2)
CHLORIDE SERPL-SCNC: 97 MMOL/L (ref 96–108)
CO2 SERPL-SCNC: 33 MMOL/L (ref 22–33)
CREAT SERPL-MCNC: 0.78 MG/DL (ref 0.4–1.1)
EOSINOPHIL # BLD AUTO: 0.54 THOUSAND/ΜL (ref 0–0.61)
EOSINOPHIL NFR BLD AUTO: 4 % (ref 0–6)
ERYTHROCYTE [DISTWIDTH] IN BLOOD BY AUTOMATED COUNT: 12.7 % (ref 11.6–15.1)
GFR SERPL CREATININE-BSD FRML MDRD: 69 ML/MIN/1.73SQ M
GLUCOSE SERPL-MCNC: 103 MG/DL (ref 65–140)
GLUCOSE SERPL-MCNC: 107 MG/DL (ref 65–140)
GLUCOSE SERPL-MCNC: 121 MG/DL (ref 65–140)
GLUCOSE SERPL-MCNC: 122 MG/DL (ref 65–140)
GLUCOSE SERPL-MCNC: 123 MG/DL (ref 65–140)
GLUCOSE SERPL-MCNC: 130 MG/DL (ref 65–140)
HCT VFR BLD AUTO: 37.2 % (ref 34.8–46.1)
HGB BLD-MCNC: 11.5 G/DL (ref 11.5–15.4)
IMM GRANULOCYTES # BLD AUTO: 0.21 THOUSAND/UL (ref 0–0.2)
IMM GRANULOCYTES NFR BLD AUTO: 1 % (ref 0–2)
LYMPHOCYTES # BLD AUTO: 1.69 THOUSANDS/ΜL (ref 0.6–4.47)
LYMPHOCYTES NFR BLD AUTO: 11 % (ref 14–44)
MCH RBC QN AUTO: 29 PG (ref 26.8–34.3)
MCHC RBC AUTO-ENTMCNC: 30.9 G/DL (ref 31.4–37.4)
MCV RBC AUTO: 94 FL (ref 82–98)
MONOCYTES # BLD AUTO: 1.03 THOUSAND/ΜL (ref 0.17–1.22)
MONOCYTES NFR BLD AUTO: 7 % (ref 4–12)
NEUTROPHILS # BLD AUTO: 11.83 THOUSANDS/ΜL (ref 1.85–7.62)
NEUTS SEG NFR BLD AUTO: 77 % (ref 43–75)
PLATELET # BLD AUTO: 372 THOUSANDS/UL (ref 149–390)
PMV BLD AUTO: 10.6 FL (ref 8.9–12.7)
POTASSIUM SERPL-SCNC: 3.2 MMOL/L (ref 3.5–5)
RBC # BLD AUTO: 3.96 MILLION/UL (ref 3.81–5.12)
SODIUM SERPL-SCNC: 138 MMOL/L (ref 133–145)
WBC # BLD AUTO: 15.33 THOUSAND/UL (ref 4.31–10.16)

## 2021-09-01 PROCEDURE — 82948 REAGENT STRIP/BLOOD GLUCOSE: CPT

## 2021-09-01 PROCEDURE — 85025 COMPLETE CBC W/AUTO DIFF WBC: CPT | Performed by: INTERNAL MEDICINE

## 2021-09-01 PROCEDURE — 99233 SBSQ HOSP IP/OBS HIGH 50: CPT | Performed by: INTERNAL MEDICINE

## 2021-09-01 PROCEDURE — 99233 SBSQ HOSP IP/OBS HIGH 50: CPT | Performed by: STUDENT IN AN ORGANIZED HEALTH CARE EDUCATION/TRAINING PROGRAM

## 2021-09-01 PROCEDURE — 97530 THERAPEUTIC ACTIVITIES: CPT

## 2021-09-01 PROCEDURE — 99232 SBSQ HOSP IP/OBS MODERATE 35: CPT | Performed by: INTERNAL MEDICINE

## 2021-09-01 PROCEDURE — 80048 BASIC METABOLIC PNL TOTAL CA: CPT | Performed by: INTERNAL MEDICINE

## 2021-09-01 PROCEDURE — C9113 INJ PANTOPRAZOLE SODIUM, VIA: HCPCS | Performed by: NURSE PRACTITIONER

## 2021-09-01 RX ORDER — HEPARIN SODIUM 5000 [USP'U]/ML
5000 INJECTION, SOLUTION INTRAVENOUS; SUBCUTANEOUS EVERY 8 HOURS SCHEDULED
Status: DISCONTINUED | OUTPATIENT
Start: 2021-09-02 | End: 2021-09-05 | Stop reason: HOSPADM

## 2021-09-01 RX ADMIN — DEXTROSE 100 ML/HR: 5 SOLUTION INTRAVENOUS at 09:49

## 2021-09-01 RX ADMIN — NYSTATIN: 100000 POWDER TOPICAL at 08:46

## 2021-09-01 RX ADMIN — NYSTATIN: 100000 POWDER TOPICAL at 17:06

## 2021-09-01 RX ADMIN — ATORVASTATIN CALCIUM 10 MG: 10 TABLET, FILM COATED ORAL at 17:05

## 2021-09-01 RX ADMIN — PANTOPRAZOLE SODIUM 40 MG: 40 INJECTION, POWDER, FOR SOLUTION INTRAVENOUS at 08:46

## 2021-09-01 RX ADMIN — ERTAPENEM SODIUM 1000 MG: 1 INJECTION, POWDER, LYOPHILIZED, FOR SOLUTION INTRAMUSCULAR; INTRAVENOUS at 21:48

## 2021-09-01 RX ADMIN — PANTOPRAZOLE SODIUM 40 MG: 40 INJECTION, POWDER, FOR SOLUTION INTRAVENOUS at 21:47

## 2021-09-01 RX ADMIN — LEVOTHYROXINE SODIUM 50 MCG: 50 TABLET ORAL at 05:29

## 2021-09-01 RX ADMIN — METOPROLOL TARTRATE 25 MG: 25 TABLET, FILM COATED ORAL at 08:46

## 2021-09-01 RX ADMIN — METOPROLOL TARTRATE 25 MG: 25 TABLET, FILM COATED ORAL at 17:05

## 2021-09-01 NOTE — PROGRESS NOTES
NEPHROLOGY PROGRESS NOTE    Orlando Quick Day 80 y o  female MRN: [id number]  Unit/Bed#: -01 Encounter: [id number]  Reason for Consult:  Hypernatremia    The patient is awake alert able to hold conversation although she is weak  She actually told me [facility] consent she was on her [address]  vacation  NG tube is out  She denies passing flatus but has no nausea or vomiting  ASSESSMENT/PLAN:  1  Hypernatremia    Patient free water deficit due to small-bowel obstruction an limited free water intake on admission  With IV fluids and hypotonic fluids sodium concentration is normalized and water deficit is repleted  She has her NG tube output is not eating or drinking much so will continue with IV fluids to avoid dehydration but will reduce the rate  She had some mild hypokalemia IV repletion was ordered by another service and will monitor  Reduce D5W 75 cc/hour  Monitor on oral intake and continue to taper off IV fluids as input increases  2  GI    Patient was small-bowel obstruction NG tube is out no nausea or vomiting surgery is monitoring with conservative approach for now  3  Infectious Disease    Patient complicated urinary tract infection  Infectious Disease is seeing the patient and managing antibiotics for E coli UTI and bacteremia  SUBJECTIVE:  Review of Systems   Constitutional: Positive for malaise/fatigue  Negative for chills, fever and night sweats  HENT: Negative  Eyes: Negative  Cardiovascular: Negative  Negative for chest pain, dyspnea on exertion, leg swelling and orthopnea  Respiratory: Negative  Negative for cough, shortness of breath, sputum production and wheezing  Gastrointestinal: Negative for bloating, abdominal pain, flatus, nausea and vomiting  NG tube is out   Genitourinary: Deal catheter in   Neurological: Positive for weakness  Negative for dizziness, focal weakness, headaches and light-headedness     Psychiatric/Behavioral: Negative for altered mental status, hallucinations and hypervigilance  The patient is not nervous/anxious  OBJECTIVE:  Current Weight: Weight - Scale:  (no new wts since admit)  Vitals:Temp (24hrs), Av 7 °F (36 5 °C), Min:97 2 °F (36 2 °C), Max:98 1 °F (36 7 °C)  Current: Temperature: 98 1 °F (36 7 °C)   Blood pressure 134/72, pulse 91, temperature 98 1 °F (36 7 °C), temperature source Tympanic, resp  rate 20, height 5' (1 524 m), weight 92 6 kg (204 lb 2 3 oz), SpO2 95 %  , Body mass index is 39 87 kg/m²  Intake/Output Summary (Last 24 hours) at 2021 1045  Last data filed at 2021 1043  Gross per 24 hour   Intake 1000 ml   Output 1100 ml   Net -100 ml       Physical Exam: /72 (BP Location: Right arm)   Pulse 91   Temp 98 1 °F (36 7 °C) (Tympanic)   Resp 20   Ht 5' (1 524 m)   Wt 92 6 kg (204 lb 2 3 oz)   SpO2 95%   BMI 39 87 kg/m²   Physical Exam  Constitutional:       General: She is not in acute distress  Appearance: She is not toxic-appearing or diaphoretic  HENT:      Head: Normocephalic and atraumatic  Mouth/Throat:      Mouth: Mucous membranes are dry  Eyes:      General: No scleral icterus  Extraocular Movements: Extraocular movements intact  Cardiovascular:      Rate and Rhythm: Normal rate and regular rhythm  Heart sounds: No friction rub  No gallop  Comments: No significant edema  Pulmonary:      Effort: Pulmonary effort is normal  No respiratory distress  Breath sounds: Normal breath sounds  No wheezing, rhonchi or rales  Abdominal:      General: There is no distension  Palpations: Abdomen is soft  Tenderness: There is no abdominal tenderness  There is no rebound  Comments: Soft bowel sounds and scant   Musculoskeletal:      Cervical back: Normal range of motion and neck supple  Neurological:      Mental Status: She is alert and oriented to person, place, and time           Medications:    Current Facility-Administered Medications:     acetaminophen (TYLENOL) tablet 650 mg, 650 mg, Oral, Q6H PRN, Kilo Hanna MD, 650 mg at 08/26/21 1055    aluminum-magnesium hydroxide-simethicone (MYLANTA) oral suspension 30 mL, 30 mL, Oral, Q6H PRN, Kilo Hanna MD, 30 mL at 08/26/21 1705    atorvastatin (LIPITOR) tablet 10 mg, 10 mg, Oral, Daily With Dinner, Kilo Hanna MD, 10 mg at 08/31/21 1707    dextrose 5 % infusion, 100 mL/hr, Intravenous, Continuous, Nava Zimmerman MD, Last Rate: 100 mL/hr at 09/01/21 0949, 100 mL/hr at 09/01/21 0949    ertapenem (INVanz) 1,000 mg in sodium chloride 0 9 % 50 mL IVPB, 1,000 mg, Intravenous, Q24H, Yadi James MD, Last Rate: 100 mL/hr at 08/31/21 2007, 1,000 mg at 08/31/21 2007    insulin lispro (HumaLOG) 100 units/mL subcutaneous injection 1-5 Units, 1-5 Units, Subcutaneous, Q6H **AND** Fingerstick Glucose (POCT), , , Q6H, Ei Maxine Qureshi MD    levothyroxine tablet 50 mcg, 50 mcg, Oral, Early Morning, Kilo Hanna MD, 50 mcg at 09/01/21 0529    magnesium hydroxide (MILK OF MAGNESIA) oral suspension 30 mL, 30 mL, Oral, Daily PRN, Kilo Hanna MD    metoprolol tartrate (LOPRESSOR) tablet 25 mg, 25 mg, Oral, BID, Kilo Hanna MD, 25 mg at 09/01/21 0846    nystatin (MYCOSTATIN) powder, , Topical, BID, Kilo Hanna MD, Given at 09/01/21 0846    ondansetron (ZOFRAN) injection 4 mg, 4 mg, Intravenous, Q4H PRN, Ines Hernandez MD, 4 mg at 08/27/21 2004    oxyCODONE (ROXICODONE) IR tablet 2 5 mg, 2 5 mg, Oral, Q4H PRN, Kilo Hanna MD    pantoprazole (PROTONIX) injection 40 mg, 40 mg, Intravenous, Q12H [address], CamillaKASIE Mari, 40 mg at 09/01/21 0846    Laboratory Results:  Lab Results   Component Value Date    WBC 15 33 (H) 09/01/2021    HGB 11 5 09/01/2021    HCT 37 2 09/01/2021    MCV 94 09/01/2021     09/01/2021     Lab Results   Component Value Date    SODIUM 138 09/01/2021    K 3 2 (L) 09/01/2021    CL 97 09/01/2021    CO2 33 09/01/2021    BUN 13 09/01/2021    CREATININE 0 78 09/01/2021    GLUC 107 09/01/2021    CALCIUM 8 6 09/01/2021     Lab Results   Component Value Date    CALCIUM 8 6 09/01/2021    PHOS 1 9 (L) 08/30/2021     No results found for: LABPROT

## 2021-09-01 NOTE — QUICK NOTE
Response to coding query    Patient did have blackish vomiting personally seen by me on 8/27/2021  Suspicion of upper GI bleed  GI consulted   Dr Aaron Hawthorne taking care of the patient from 8/31/2021

## 2021-09-01 NOTE — PHYSICAL THERAPY NOTE
PHYSICAL THERAPY TREATMENT  TIME: 0319-2657    NAME:  Sherly Hutton  DATE: 09/01/21    AGE:   80 y o  Mrn:   [id number]  Length Of Stay: 7    ADMIT DX:  UTI (urinary tract infection) [N39 0]  Weakness [R53 1]  Pneumonia of left lower lobe due to infectious organism [J18 9]    Past Medical History:   Diagnosis Date    Anemia     Resolved 3/1/2017     Arthritis     Knee - Resolved 3/1/2017     Blind     CAD (coronary artery disease)     s/p HERNAN 6/2016    Calcaneal spur     CHF (congestive heart failure) (HCC)     Chronic knee instability     Resolved 3/1/2017     Chronic pain syndrome     Resolved 3/1/2017     Dementia ([facility] [address] )     Last assessed 11/1/2017     Diabetes mellitus ([facility] )     non-insulin depdenent    Disease of thyroid gland     Diverticulitis     Essential thrombocytopenia ([facility] [address] )     Last assessed 11/1/2017     GERD (gastroesophageal reflux disease)     History of aortic valve replacement     Resolved 3/1/2017     History of bilateral knee replacement     Resolved 3/1/2017     History of TIA (transient ischemic attack)     no residual deficits    Hyperlipidemia     Hypertension     Hypoxia     on home O2 QHS    Leukocytosis     Resolved 3/1/2017     Lumbar post-laminectomy syndrome     Resolved 3/1/2017     Meniere disease     Osteopenia     Pacemaker     [facility]-Biotronik in 2/2015    S/P TAVR (transcatheter aortic valve replacement)     Resolved 3/1/2017     Severe aortic stenosis     Thrombocytosis ([facility] [address] )     Resolved 4/5/2017     Type 2 diabetes mellitus ([facility] [address] )     Last assessed 11/1/2017      Past Surgical History:   Procedure Laterality Date    APPENDECTOMY      BACK SURGERY      Arthrodesis     CARDIAC PACEMAKER PLACEMENT      CHOLECYSTECTOMY      EYE SURGERY      FRACTURE SURGERY Right 01/02/2018    femur    HYSTERECTOMY      CO EGD TRANSORAL BIOPSY SINGLE/MULTIPLE N/A 11/9/2016    Procedure: ESOPHAGOGASTRODUODENOSCOPY (EGD);   Surgeon: Sarai Yousif MD;  Location: [facility];  Service: Gastroenterology    MA OPEN RX FEMUR FX+INTRAMED GARRETT Right 1/2/2018    Procedure: INSERTION NAIL IM FEMUR ANTEGRADE (TROCHANTERIC) RIGHT;  Surgeon: Tatiana Marquez MD;  [facility];  Service: Orthopedics    MA REPLACE AORTIC VALVE OPENFEMORAL ARTERY APPROACH N/A 7/26/2016    Procedure: TRANSFEMORAL TAVR WITH 23MM LAROSE LILY S3 VALVE; JOSE ALFREDO ;  Surgeon: Ger Macias DO;  [facility];  Service: Cardiac Surgery    SMALL INTESTINE SURGERY      TONSILLECTOMY      TOTAL KNEE ARTHROPLASTY      VARICOSE VEIN SURGERY      VENTRAL HERNIA REPAIR         Performed at least 2 patient identifiers during session: Name and ID bracelet        09/01/21 1402   PT Last Visit   PT Visit Date 09/01/21   Note Type   Note Type Treatment   Pain Assessment   Pain Assessment Tool 0-10   Pain Score 5   Pain Location/Orientation Orientation: Lower; Location: Abdomen   Pain Onset/Description Frequency: Intermittent; Descriptor: Cramping   Effect of Pain on Daily Activities limits comfort and activity tolerance   Patient's Stated Pain Goal No pain   Hospital Pain Intervention(s) Repositioned; Ambulation/increased activity   Multiple Pain Sites No   Restrictions/Precautions   Weight Bearing Precautions Per Order No   Other Precautions Bed Alarm; Fall Risk;O2;Pain   General   Chart Reviewed Yes   Additional Pertinent History Pt admitted 8/25/21 from [facility] with increased weakness and chest pain, UTI, sepsis, PNA  Response to Previous Treatment Patient with no complaints from previous session     Family/Caregiver Present Yes  (son present t/o session)   Cognition   Overall Cognitive Status WFL  (questionable higher level cognition)   Arousal/Participation Cooperative   Attention Attends with cues to redirect   Orientation Level Oriented to person;Oriented to place;Oriented to situation   Memory Within functional limits   Following Commands Follows one step commands without difficulty   Subjective Subjective "I haven't beed doing much moving of anything "   Bed Mobility   Rolling R 3  Moderate assistance   Additional items Assist x 1;Bedrails; Increased time required;Verbal cues;LE management  (trunk management)   Rolling L 3  Moderate assistance   Additional items Assist x 1;Bedrails; Increased time required;Verbal cues;LE management  (trunk management)   Supine to Sit 2  Maximal assistance   Additional items Assist x 1;HOB elevated; Bedrails; Increased time required;Verbal cues;LE management  (trunk management)   Sit to Supine 2  Maximal assistance   Additional items Assist x 1;Verbal cues;LE management  (trunk management)   Additional Comments Pt tolerated approx 20-25 minutes of functional sitting (static and dynamic tasks) while EOB  See assessment for details  Transfers   Sit to Stand Unable to assess   Stand to Sit Unable to assess   Stand pivot Unable to assess   Ambulation/Elevation   Gait pattern Not tested; Not appropriate   Balance   Static Sitting Fair   Dynamic Sitting Fair -   Endurance Deficit   Endurance Deficit Yes   Activity Tolerance   Activity Tolerance Patient limited by fatigue;Patient limited by pain   Medical Staff Made Aware Spoke with SLIM and CM   Nurse Made Aware Spoke with RN [address] pre/post session   Assessment   Prognosis Fair   Problem List Decreased strength;Decreased range of motion;Decreased endurance; Impaired balance;Decreased mobility; Decreased safety awareness; Obesity; Decreased skin integrity;Pain   Assessment Pt seen for PT treatment today with focus on upright positioning & bed mobility training  Pt presents semi-supine in bed, initially rolls eyes when therapist states I am from physical therapy, however with encouragement from therapist and pt's son whom was present, pt was agreeable to participate  Pt requires MAXA to achieve upright sitting position EOB from semi-supine   Pt initially requiring MAXA to maintain upright sitting balance however with cues for hand placement and increased trunk engagement, pt able to maintain sitting approx 20 minutes total with S  While sitting EOB, pt completed 2 sets x5 reps of lateral trunk lean to elbow prop, with midline recovery  Pt noted with increased difficulty achieving midline recovery from R elbow prop, initially requires JIMMY to assist however progresses to need no physical assistance  Pt required Mohsen 69 for return to semi-supine in bed  Multiple trials of rolling in bed with MODA, use of bed rail, therapist cues for positioning of LEs  At end of session pt was left semi-supine in bed, all needs in place and lines intact, RN aware of pt status  Overall pt making small but significant gains in functional mobility, continue PT POC as able and appropriate  Continue to recommend STR upon d/c in order to maximize pt independence and decrease burden of care  Goals   Patient Goals "to feel better"   STG Expiration Date 09/05/21   Short Term Goal #1 Patient PT goals established in order increase patient independence and decrease burden of care  Pt will complete all bed mobility in hospital bed with [facility], in order to promote increased OOB functional mobility to improve overall activity tolerance  Pt will complete all transfers with RW and MODA, in order to increase safety with functional mobility  Pt will ambulate >20ft with RW and MODA in order to increase safety with in facility distance functional mobility  Pt will demonstrate ability to self propel WC >25ft with S, in order to increase patient indep with mobility  Pt will improve B LE strength to >/= 3+/5 MMT throughout, in order to increase safety with functional mobility and decrease risk of falls  Pt will improve Low Function AM-PAC score to >/= 17/24 in order to increase independence with mobility and decrease burden of care  Pt will improve Barthel Index score to >/= 20/100 in order to increase independence and decrease risk of falls   Pt will tolerate >3hrs OOB in upright position, in order to improve muscular endurance and respiratory status  PT Treatment Day 2   Plan   Treatment/Interventions Functional transfer training;LE strengthening/ROM; Therapeutic exercise; Endurance training;Patient/family training;Equipment eval/education; Bed mobility;Gait training;Spoke to case management;Spoke to nursing;Continued evaluation;Spoke to MD;OT   Progress Slow progress, decreased activity tolerance   PT Frequency Other (Comment)  (3-5x/wk)   Recommendation   PT Discharge Recommendation Post acute rehabilitation services   AM-PAC Basic Mobility Inpatient   Turning in Bed Without Bedrails 2   Lying on Back to Sitting on Edge of Flat Bed 1   Moving Bed to Chair 1   Standing Up From Chair 1   Walk in Room 1   Climb 3-5 Stairs 1   Basic Mobility Inpatient Raw Score 7   Turning Head Towards Sound 4   Follow Simple Instructions 4   Low Function Basic Mobility Raw Score 15   Low Function Basic Mobility Standardized Score 23 9       The patient's AM-PAC Basic Mobility Inpatient Short Form Low Function Raw Score 15 , Standardized Score is 23 9  A standardized score less 42 9 suggests the patient may benefit from discharge to post-acute rehab services  Please also refer to the recommendation of the Physical Therapist for safe discharge planning          Behzad Mendenhall PT, DPT   Available via [employer]  [facility] # [id number]  [place] License - [id number]  7/2/6825

## 2021-09-01 NOTE — PROGRESS NOTES
Progress Note - Infectious Disease   Kash Reis Day 80 y o  female MRN: [id number]  Unit/Bed#: -01 Encounter: [id number]      Impression/Plan:  1  Sepsis, POA  Tachycardia, tachypnea, leukocytosis on admission   Source of sepsis appears to be complicated UTI   Patient is clinically improving   WBC stable and fever has resolved  -continue antibiotic therapy as below   -monitor fever curve, WBC count    2  Complicated UTI  Likely source of sepsis and bacteremia  Patient with urinary retention in the ED which predisposed to infection  Urine and blood cultures both growing ESBL E  Coli  Recent renal US was unremarkable     -continue Ertapenem, day 5 of likely 7 day course of antibiotics   -monitor temperature/WBC count    3  ESBL E  Coli bacteremia  Secondary to UTI  -Ertapenem as above    4  CKD3  Creatinine improved    5  SBO  NGT removed, patient tolerating clear liquids  Abdominal exam benign    6  Diabetes mellitus type 2  Noted HbA1c of 6 7 on 2021   This is a risk factor for infection   -Glycemic control per primary team    7  Morbid obesity  BMI of 39    ID will continue to follow  Antibiotics:  Ertapenem D5    Subjective:  Patient feeling better today, on clear liquid diet  NGT removed  She is afebrile with WBC 15  States she is passing gas  No abdominal pain, fever, chills, nausea, vomiting  Objective:  Vitals:  Temp:  [98 1 °F (36 7 °C)-99 °F (37 2 °C)] 99 °F (37 2 °C)  HR:  [71-91] 71  Resp:  [19-20] 19  BP: (128-134)/(55-72) 128/55  SpO2:  [95 %-98 %] 98 %  Temp (24hrs), Av 6 °F (37 °C), Min:98 1 °F (36 7 °C), Max:99 °F (37 2 °C)  Current: Temperature: 99 °F (37 2 °C)    Physical Exam:   General Appearance:  Alert, interactive, nontoxic, no acute distress  Throat: Oropharynx moist without lesions      Lungs:   Clear to auscultation bilaterally; no wheezes, rhonchi or rales; respirations unlabored   Heart:  RRR; no murmur, rub or gallop   Abdomen:   Soft, non-tender, non-distended, positive bowel sounds  Extremities: No clubbing, cyanosis or edema   Skin: No new rashes or lesions  No draining wounds noted  Labs:    All pertinent labs and imaging studies were personally reviewed  Results from last 7 days   Lab Units 09/01/21  0519 08/30/21  0708 08/29/21  0548   WBC Thousand/uL 15 33* 15 37* 13 44*   HEMOGLOBIN g/dL 11 5 12 8 12 2   PLATELETS Thousands/uL 372 427* 409*     Results from last 7 days   Lab Units 09/01/21  0519 08/31/21  0607 08/30/21  0707   SODIUM mmol/L 138 146* 147*   POTASSIUM mmol/L 3 2* 3 4* 3 8   CHLORIDE mmol/L 97 102 105   CO2 mmol/L 33 36* 34*   BUN mg/dL 13 16 19   CREATININE mg/dL 0 78 0 83 0 88   EGFR ml/min/1 73sq m 69 64 60   CALCIUM mg/dL 8 6 9 0 9 5                       Micro:  Results from last 7 days   Lab Units 08/25/21 2051 08/25/21 2044   BLOOD CULTURE   --  Escherichia coli ESBL*   GRAM STAIN RESULT   --  Gram negative rods*   MRSA CULTURE ONLY  No Methicillin Resistant Staphlyococcus aureus (MRSA) isolated  --      Blood cx 8/25: ESBL E  Coli  Urine Cx 8/25: ESBL E  Coli     Imaging:    CT A/P 8/28/21:  Dilated small bowel loops in the mid to lower abdomen with normal caliber of the distal ileum, suggest  incomplete low-grade partial small bowel obstruction

## 2021-09-01 NOTE — NURSING NOTE
0500 Pt accidentally pulled out NG tubing  Surgical doctor Michelle Boo is aware and no new order given

## 2021-09-01 NOTE — PLAN OF CARE
Problem: PHYSICAL THERAPY ADULT  Goal: Performs mobility at highest level of function for planned discharge setting  See evaluation for individualized goals  Description: Treatment/Interventions: Functional transfer training, LE strengthening/ROM, Elevations, Therapeutic exercise, Endurance training, Patient/family training, Equipment eval/education, Gait training, Bed mobility, Continued evaluation, Spoke to MD, Spoke to nursing, OT     See flowsheet documentation for full assessment, interventions and recommendations  Outcome: Progressing  Note: Prognosis: Fair  Problem List: Decreased strength, Decreased range of motion, Decreased endurance, Impaired balance, Decreased mobility, Decreased safety awareness, Obesity, Decreased skin integrity, Pain  Assessment: Pt seen for PT treatment today with focus on upright positioning & bed mobility training  Pt presents semi-supine in bed, initially rolls eyes when therapist states I am from physical therapy, however with encouragement from therapist and pt's son whom was present, pt was agreeable to participate  Pt requires MAXA to achieve upright sitting position EOB from semi-supine  Pt initially requiring MAXA to maintain upright sitting balance however with cues for hand placement and increased trunk engagement, pt able to maintain sitting approx 20 minutes total with S  While sitting EOB, pt completed 2 sets x5 reps of lateral trunk lean to elbow prop, with midline recovery  Pt noted with increased difficulty achieving midline recovery from R elbow prop, initially requires JIMMY to assist however progresses to need no physical assistance  Pt required Mohsen 69 for return to semi-supine in bed  Multiple trials of rolling in bed with MODA, use of bed rail, therapist cues for positioning of LEs  At end of session pt was left semi-supine in bed, all needs in place and lines intact, RN aware of pt status   Overall pt making small but significant gains in functional mobility, continue PT POC as able and appropriate  Continue to recommend STR upon d/c in order to maximize pt independence and decrease burden of care  PT Discharge Recommendation: Post acute rehabilitation services     See flowsheet documentation for full assessment

## 2021-09-01 NOTE — PLAN OF CARE
Problem: MOBILITY - ADULT  Goal: Maintain or return to baseline ADL function  Description: INTERVENTIONS:  -  Assess patient's ability to carry out ADLs; assess patient's baseline for ADL function and identify physical deficits which impact ability to perform ADLs (bathing, care of mouth/teeth, toileting, grooming, dressing, etc )  - Assess/evaluate cause of self-care deficits   - Assess range of motion  - Assess patient's mobility; develop plan if impaired  - Assess patient's need for assistive devices and provide as appropriate  - Encourage maximum independence but intervene and supervise when necessary  - Involve family in performance of ADLs  - Assess for home care needs following discharge   - Consider OT consult to assist with ADL evaluation and planning for discharge  - Provide patient education as appropriate  9/1/2021 0100 by Lucia Beckham RN  Outcome: Progressing  8/31/2021 2027 by Lucia Beckham RN  Outcome: Progressing  Goal: Maintains/Returns to pre admission functional level  Description: INTERVENTIONS:  - Perform BMAT or MOVE assessment daily    - Set and communicate daily mobility goal to care team and patient/family/caregiver  - Collaborate with rehabilitation services on mobility goals if consulted  - Perform Range of Motion times a day  - Reposition patient every 2 hours    - Dangle patient times a day  - Stand patient times a day  - Ambulate patient times a day  - Out of bed to chair times a day   - Out of bed for meals times a day  - Out of bed for toileting  - Record patient progress and toleration of activity level   9/1/2021 0100 by Lucia Beckham RN  Outcome: Progressing  8/31/2021 2027 by Lucia Beckham RN  Outcome: Progressing     Problem: Potential for Falls  Goal: Patient will remain free of falls  Description: INTERVENTIONS:  - Educate patient/family on patient safety including physical limitations  - Instruct patient to call for assistance with activity   - Consult OT/PT to assist with strengthening/mobility   - Keep Call bell within reach  - Keep bed low and locked with side rails adjusted as appropriate  - Keep care items and personal belongings within reach  - Initiate and maintain comfort rounds  - Make Fall Risk Sign visible to staff  - Offer Toileting every 2 Hours, in advance of need  - Initiate/Maintain bed alarm  - Obtain necessary fall risk management equipment:   - Apply yellow socks and bracelet for high fall risk patients  - Consider moving patient to room near nurses station  9/1/2021 0100 by Shantal Gao RN  Outcome: Progressing  8/31/2021 2027 by Shantal Gao RN  Outcome: Progressing     Problem: Nutrition/Hydration-ADULT  Goal: Nutrient/Hydration intake appropriate for improving, restoring or maintaining nutritional needs  Description: Monitor and assess patient's nutrition/hydration status for malnutrition  Collaborate with interdisciplinary team and initiate plan and interventions as ordered  Monitor patient's weight and dietary intake as ordered or per policy  Utilize nutrition screening tool and intervene as necessary  Determine patient's food preferences and provide high-protein, high-caloric foods as appropriate       INTERVENTIONS:  - Monitor oral intake, urinary output, labs, and treatment plans  - Assess nutrition and hydration status and recommend course of action  - Evaluate amount of meals eaten  - Assist patient with eating if necessary   - Allow adequate time for meals  - Recommend/ encourage appropriate diets, oral nutritional supplements, and vitamin/mineral supplements  - Order, calculate, and assess calorie counts as needed  - Recommend, monitor, and adjust tube feedings and TPN/PPN based on assessed needs  - Assess need for intravenous fluids  - Provide specific nutrition/hydration education as appropriate  - Include patient/family/caregiver in decisions related to nutrition  9/1/2021 0100 by Shantal Gao RN  Outcome: Progressing  8/31/2021 2027 by Checo Liang RN  Outcome: Progressing     Problem: PAIN - ADULT  Goal: Verbalizes/displays adequate comfort level or baseline comfort level  Description: Interventions:  - Encourage patient to monitor pain and request assistance  - Assess pain using appropriate pain scale  - Administer analgesics based on type and severity of pain and evaluate response  - Implement non-pharmacological measures as appropriate and evaluate response  - Consider cultural and social influences on pain and pain management  - Notify physician/advanced practitioner if interventions unsuccessful or patient reports new pain  9/1/2021 0100 by Checo Liang RN  Outcome: Progressing  8/31/2021 2027 by Checo Liang RN  Outcome: Progressing     Problem: INFECTION - ADULT  Goal: Absence or prevention of progression during hospitalization  Description: INTERVENTIONS:  - Assess and monitor for signs and symptoms of infection  - Monitor lab/diagnostic results  - Monitor all insertion sites, i e  indwelling lines, tubes, and drains  - Monitor endotracheal if appropriate and nasal secretions for changes in amount and color  - Administer medications as ordered  - Instruct and encourage patient and family to use good hand hygiene technique  - Identify and instruct in appropriate isolation precautions for identified infection/condition  9/1/2021 0100 by Checo Liang RN  Outcome: Progressing  8/31/2021 2027 by Checo Liang RN  Outcome: Progressing  Goal: Absence of fever/infection during neutropenic period  Description: INTERVENTIONS:  - Monitor WBC    9/1/2021 0100 by Checo Liang RN  Outcome: Progressing  8/31/2021 2027 by Checo Liang RN  Outcome: Progressing     Problem: SAFETY ADULT  Goal: Maintain or return to baseline ADL function  Description: INTERVENTIONS:  -  Assess patient's ability to carry out ADLs; assess patient's baseline for ADL function and identify physical deficits which impact ability to perform ADLs (bathing, care of mouth/teeth, toileting, grooming, dressing, etc )  - Assess/evaluate cause of self-care deficits   - Assess range of motion  - Assess patient's mobility; develop plan if impaired  - Assess patient's need for assistive devices and provide as appropriate  - Encourage maximum independence but intervene and supervise when necessary  - Involve family in performance of ADLs  - Assess for home care needs following discharge   - Consider OT consult to assist with ADL evaluation and planning for discharge  - Provide patient education as appropriate  9/1/2021 0100 by Eugene Shin RN  Outcome: Progressing  8/31/2021 2027 by Eguene Shin RN  Outcome: Progressing  Goal: Maintains/Returns to pre admission functional level  Description: INTERVENTIONS:  - Perform BMAT or MOVE assessment daily    - Set and communicate daily mobility goal to care team and patient/family/caregiver  - Collaborate with rehabilitation services on mobility goals if consulted  - Perform Range of Motion times a day  - Reposition patient every 2 hours    - Dangle patient times a day  - Stand patient  times a day  - Ambulate patient  times a day  - Out of bed to chair times a day   - Out of bed for meals times a day  - Out of bed for toileting  - Record patient progress and toleration of activity level   9/1/2021 0100 by Eugene Shin RN  Outcome: Progressing  8/31/2021 2027 by Eugene Shin RN  Outcome: Progressing  Goal: Patient will remain free of falls  Description: INTERVENTIONS:  - Educate patient/family on patient safety including physical limitations  - Instruct patient to call for assistance with activity   - Consult OT/PT to assist with strengthening/mobility   - Keep Call bell within reach  - Keep bed low and locked with side rails adjusted as appropriate  - Keep care items and personal belongings within reach  - Initiate and maintain comfort rounds  - Make Fall Risk Sign visible to staff  - Offer Toileting every Hours, in advance of need  - Initiate/Maintain bed alarm  - Obtain necessary fall risk management equipment:   - Apply yellow socks and bracelet for high fall risk patients  - Consider moving patient to room near nurses station  9/1/2021 0100 by Christel Marie RN  Outcome: Progressing  8/31/2021 2027 by Christel Marie RN  Outcome: Progressing     Problem: DISCHARGE PLANNING  Goal: Discharge to home or other facility with appropriate resources  Description: INTERVENTIONS:  - Identify barriers to discharge w/patient and caregiver  - Arrange for needed discharge resources and transportation as appropriate  - Identify discharge learning needs (meds, wound care, etc )  - Arrange for interpretive services to assist at discharge as needed  - Refer to Case Management Department for coordinating discharge planning if the patient needs post-hospital services based on physician/advanced practitioner order or complex needs related to functional status, cognitive ability, or social support system  9/1/2021 0100 by Christel Marie RN  Outcome: Progressing  8/31/2021 2027 by Christel Marie RN  Outcome: Progressing     Problem: Knowledge Deficit  Goal: Patient/family/caregiver demonstrates understanding of disease process, treatment plan, medications, and discharge instructions  Description: Complete learning assessment and assess knowledge base    Interventions:  - Provide teaching at level of understanding  - Provide teaching via preferred learning methods  9/1/2021 0100 by Christel Marie RN  Outcome: Progressing  8/31/2021 2027 by Christel Marie RN  Outcome: Progressing     Problem: Prexisting or High Potential for Compromised Skin Integrity  Goal: Skin integrity is maintained or improved  Description: INTERVENTIONS:  - Identify patients at risk for skin breakdown  - Assess and monitor skin integrity  - Assess and monitor nutrition and hydration status  - Monitor labs   - Assess for incontinence   - Turn and reposition patient  - Assist with mobility/ambulation  - Relieve pressure over bony prominences  - Avoid friction and shearing  - Provide appropriate hygiene as needed including keeping skin clean and dry  - Evaluate need for skin moisturizer/barrier cream  - Collaborate with interdisciplinary team   - Patient/family teaching  - Consider wound care consult   9/1/2021 0100 by Kathy Bernstein RN  Outcome: Progressing  8/31/2021 2027 by Kathy Bernstein RN  Outcome: Progressing 94

## 2021-09-01 NOTE — QUICK NOTE
Patient seen and examined  This claims any abdominal pain or nausea and states that she is hungry  Abdomen is soft to examination and there is no tenderness  Renal note is appreciated  Electrolytes better but white count is still high  Suggest starting clear liquid    The patient pulled her own tube out last night so we do not need to do a trial

## 2021-09-01 NOTE — PROGRESS NOTES
[facility]  66   Progress Note Zenaida Rahmanton Day 1935, 80 y o  female MRN: [id number]  Unit/Bed#: -[facility] Encounter: [id number]  Primary Care Provider: Mayank Sierra MD   Date and time admitted to hospital: 8/25/2021  4:58 PM    Partial small bowel obstruction [facility])  Assessment & Plan  Patient removed NG tube overnight  Denies vomiting or abdominal pain  General surgery recommend clear liquid diet  Will trial on clear liquid diet  * Sepsis ([address] )  Assessment & Plan    Results from last 7 days   Lab Units 08/25/21 2044 08/25/21  1739   BLOOD CULTURE  Escherichia coli ESBL*  --    GRAM STAIN RESULT  Gram negative rods*  --    URINE CULTURE   --  >100,000 cfu/ml Escherichia coli ESBL*     Patient meets sepsis criteria on admission  Sepsis likely secondary to UTI  Blood culture and urine culture growing ESBL E coli  ID consult, appreciate recommendation  She received IV meropenem for 3 days and subsequently transition to Ertapenem on 8/30 by ID guidance  Total duration anticipated for a total duration of 7 days or as per clinical situation  Carbapenem duration: #6    Monitor WBC/hemodynamics    UTI (urinary tract infection)  Assessment & Plan  Urine culture growing ESBL E coli  She received 3 days of IV meropenem was subsequently switched to ertapenem on 8/30  Total carbapenem duration: #6 days  Infectious disease following, appreciate recommendation  Sacral decubitus ulcer  Assessment & Plan  Sacral decubitus ulcer reported by nursing staff  Appears to be stage 2 on exam  Wound care management as per nursing protocol     - inpatient wound care nurse following  - continue nursing intervention to redistribute pressure and minimize tissue damage   - daily wound assessment  Metabolic alkalosis  Assessment & Plan  HCO of 34  Most likely due to NG tube drainage with consequential loss of gastric HCl  SpO2 of 98% on 2L NC O2    VBG revealed alkalemia of 7 4    Hypernatremia  Assessment & Plan  Improved  Appreciate nephrology input  CKD (chronic kidney disease)  Assessment & Plan  Results from last 7 days   Lab Units 08/31/21  0607 08/30/21  0707 08/29/21  0548 08/28/21 2140 08/28/21  0458 08/27/21  0613 08/25/21  1734   SODIUM mmol/L 146* 147* 152*  --  150* 146* 138   POTASSIUM mmol/L 3 4* 3 8 3 2* 3 3* 3 2* 3 1* 3 9   CHLORIDE mmol/L 102 105 106  --  103 99 93*   CO2 mmol/L 36* 34* 37*  --  37* 36* 28   ANION GAP mmol/L 8 8 9  --  10 11 17*   BUN mg/dL 16 19 28*  --  41* 41* 71*   CREATININE mg/dL 0 83 0 88 0 96  --  1 29* 1 23* 1 75*   CALCIUM mg/dL 9 0 9 5 9 6  --  9 4 8 9 9 7   ALBUMIN g/dL  --   --   --   --   --   --  3 9   TOTAL BILIRUBIN mg/dL  --   --   --   --   --   --  0 71   ALK PHOS U/L  --   --   --   --   --   --  92 4   ALT U/L  --   --   --   --   --   --  27   AST U/L  --   --   --   --   --   --  26   EGFR ml/min/1 73sq m 64 60 54  --  38 40 26   GLUCOSE RANDOM mg/dL 129 145* 124  --  97 147* 222*       Creatinine is greater than her baseline of 1 4-1 5  Currently creatinine at baseline  Monitor BMP daily    Hypokalemia  Assessment & Plan  Replete accordingly    Most likely due to metabolic alkalosis in setting of NG tube drainage - expected to improve as NG tube has been that removed    Results from last 7 days   Lab Units 09/01/21  0519 08/31/21  0607 08/30/21  0707 08/29/21  0548 08/28/21 2140 08/28/21 0458 08/27/21  0613 08/25/21  1734   SODIUM mmol/L 138 146* 147* 152*  --  150* 146* 138   POTASSIUM mmol/L 3 2* 3 4* 3 8 3 2* 3 3* 3 2* 3 1* 3 9   CHLORIDE mmol/L 97 102 105 106  --  103 99 93*   CO2 mmol/L 33 36* 34* 37*  --  37* 36* 28   ANION GAP mmol/L 8 8 8 9  --  10 11 17*   BUN mg/dL 13 16 19 28*  --  41* 41* 71*   CREATININE mg/dL 0 78 0 83 0 88 0 96  --  1 29* 1 23* 1 75*   CALCIUM mg/dL 8 6 9 0 9 5 9 6  --  9 4 8 9 9 7   ALBUMIN g/dL  --   --   --   --   --   --   --  3 9   TOTAL BILIRUBIN mg/dL  --   --   --   --   --   --   --  0 71   ALK PHOS U/L  --   -- --   --   --   --   --  92 4   ALT U/L  --   --   --   --   --   --   --  27   AST U/L  --   --   --   --   --   --   --  26   EGFR ml/min/1 73sq m 69 64 60 54  --  38 40 26   GLUCOSE RANDOM mg/dL 107 129 145* 124  --  97 147* 222*         Hypertension  Assessment & Plan  · Amlodipine and metoprolol with holding parameters  · Will hold Lasix currently in the setting of sepsis  Blood pressure 142/75, pulse 89, temperature 98 1 °F (36 7 °C), temperature source Tympanic, resp  rate 19, height 5' (1 524 m), weight 92 6 kg (204 lb 2 3 oz), SpO2 97 %  Hyperlipidemia  Assessment & Plan  Continue Lipitor for now    Chronic diastolic congestive heart failure (HCC)  Assessment & Plan  Wt Readings from Last 3 Encounters:   08/25/21 92 6 kg (204 lb 2 3 oz)   08/22/21 95 3 kg (210 lb 1 6 oz)   02/19/20 87 5 kg (193 lb)     Currently lasix on hold in light of sepsis and reduced p o  Intake  I's and O's, daily weight  Cardiology on board  Input appreciated    Diabetes mellitus [facility])  Assessment & Plan  Lab Results   Component Value Date    HGBA1C 6 7 (H) 08/19/2021       Recent Labs     08/30/21  0004 08/30/21  0651 08/30/21  1135 08/30/21  1453   POCGLU 136 149* 145* 148*       Blood Sugar Average: Last 72 hrs:  (P) 138 6     Currently on D5W  Accu-Cheks every 6 hourly and sliding scale insulin  Hypoglycemic protocol  Constipation  Assessment & Plan  Chronic  Hold stool softener      Ambulatory dysfunction  Assessment & Plan  PT and OT consult, recommended acute post rehab  Frequent falls  Assessment & Plan  PT and OT evaluations, recommend acute post rehab  Family in agreement    Age-related cognitive decline  Assessment & Plan  Supportive care  VTE Pharmacologic Prophylaxis:   VTE Score: 5 Moderate Risk (Score 3-4) - Pharmacological DVT Prophylaxis Contraindicated  Sequential Compression Devices Ordered      Mechanical VTE Prophylaxis in Place: Yes    Patient Centered Rounds: I have performed bedside rounds with nursing staff today  Discussions with Specialists or Other Care Team Provider: ID    Education and Discussions with Family / Patient: Patient    Current Length of Stay: 7 day(s)    Current Patient Status: Inpatient     Discharge Plan / Estimated Discharge Date: Anticipate discharge in 48 hrs to TBD    Code Status: Level 3 - DNAR and DNI      Subjective:   Patient seen and examined at bedside  She is a bit more alert and conversational today  She reports to be mostly wheelchair-bound at home  She expresses anticipation to participate in physical therapy today  Was able to crack jokes and vocalized expecting her son today  She denies abdominal pain, passage of flatus or stool  Denies any urge to defecate  Reports to have pulled out her NG tube accidentally, might be a good sign  Will monitor for abdominal pain, or progressive abdominal distention  Objective:     Vitals:   Temp (24hrs), Av 7 °F (36 5 °C), Min:97 2 °F (36 2 °C), Max:98 1 °F (36 7 °C)    Temp:  [97 2 °F (36 2 °C)-98 1 °F (36 7 °C)] 98 1 °F (36 7 °C)  HR:  [87-91] 91  Resp:  [18-20] 20  BP: (134-136)/(72-80) 134/72  SpO2:  [95 %-96 %] 95 %  Body mass index is 39 87 kg/m²  Input and Output Summary (last 24 hours): Intake/Output Summary (Last 24 hours) at 2021 1311  Last data filed at 2021 1043  Gross per 24 hour   Intake 1000 ml   Output 750 ml   Net 250 ml       Physical Exam:     Physical Exam  Vitals reviewed  Constitutional:       General: She is not in acute distress  Appearance: She is not toxic-appearing  HENT:      Head: Normocephalic  Cardiovascular:      Rate and Rhythm: Normal rate and regular rhythm  Pulses: Normal pulses  Pulmonary:      Effort: No respiratory distress  Breath sounds: Normal breath sounds  No wheezing  Abdominal:      General: Bowel sounds are normal       Palpations: Abdomen is soft  Tenderness: There is no abdominal tenderness     Musculoskeletal: Right lower leg: No edema  Left lower leg: No edema  Neurological:      General: No focal deficit present  Mental Status: She is alert and oriented to person, place, and time  Additional Data:     Labs:  Results from last 7 days   Lab Units 09/01/21  0519   WBC Thousand/uL 15 33*   HEMOGLOBIN g/dL 11 5   HEMATOCRIT % 37 2   PLATELETS Thousands/uL 372   NEUTROS PCT % 77*   LYMPHS PCT % 11*   MONOS PCT % 7   EOS PCT % 4     Results from last 7 days   Lab Units 09/01/21  0519 08/25/21  1734   SODIUM mmol/L 138 138   POTASSIUM mmol/L 3 2* 3 9   CHLORIDE mmol/L 97 93*   CO2 mmol/L 33 28   BUN mg/dL 13 71*   CREATININE mg/dL 0 78 1 75*   ANION GAP mmol/L 8 17*   CALCIUM mg/dL 8 6 9 7   ALBUMIN g/dL  --  3 9   TOTAL BILIRUBIN mg/dL  --  0 71   ALK PHOS U/L  --  92 4   ALT U/L  --  27   AST U/L  --  26   GLUCOSE RANDOM mg/dL 107 222*     Results from last 7 days   Lab Units 08/25/21  1734   INR  1 01     Results from last 7 days   Lab Units 09/01/21  1122 09/01/21  0541 09/01/21  0025 08/31/21  2103 08/31/21  1554 08/31/21  1115 08/31/21  0503 08/30/21  2350 08/30/21  2018 08/30/21  1453 08/30/21  1135 08/30/21  0651   POC GLUCOSE mg/dl 121 103 130 132 141* 139 118 129 131 148* 145* 149*               Imaging: Reviewed radiology reports from this admission including: xray(s)    Recent Cultures (last 7 days):     Results from last 7 days   Lab Units 08/25/21  2044 08/25/21  1739   BLOOD CULTURE  Escherichia coli ESBL*  --    GRAM STAIN RESULT  Gram negative rods*  --    URINE CULTURE   --  >100,000 cfu/ml Escherichia coli ESBL*       Lines/Drains:  Invasive Devices     Peripheral Intravenous Line            Peripheral IV 09/01/21 Dorsal (posterior); Left Forearm <1 day          Drain            Urethral Catheter Non-latex 16 Fr  6 days    NG/OG Tube Nasogastric Right nare 3 days                Telemetry:        Last 24 Hours Medication List:   Current Facility-Administered Medications   Medication Dose Route Frequency Provider Last Rate    acetaminophen  650 mg Oral Q6H PRN Narcisa Paredes MD      aluminum-magnesium hydroxide-simethicone  30 mL Oral Q6H PRN Narcisa Paredes MD      atorvastatin  10 mg Oral Daily With Veronica Dueñas MD      dextrose  75 mL/hr Intravenous Continuous Kade Bowling MD 75 mL/hr (09/01/21 1120)    ertapenem  1,000 mg Intravenous Q24H Shamika Brown MD 1,000 mg (08/31/21 2007)    [START ON 9/2/2021] heparin (porcine)  5,000 Units Subcutaneous Q8H [address] Loree Garcia MD      insulin lispro  1-5 Units Subcutaneous Q6H Donell Crawford MD      levothyroxine  50 mcg Oral Early Morning Narcisa Paredes MD      magnesium hydroxide  30 mL Oral Daily PRN Narcisa Paredes MD      metoprolol tartrate  25 mg Oral BID Narcisa Paredes MD      nystatin   Topical BID Narcisa Paredes MD      ondansetron  4 mg Intravenous Q4H PRN Flower Hobson MD      oxyCODONE  2 5 mg Oral Q4H PRN Narcisa Paredes MD      pantoprazole  40 mg Intravenous Q12H [address]ez          Today, Patient Was Seen By: Loree Garcia MD    ** Please Note: This note has been constructed using a voice recognition system   **

## 2021-09-01 NOTE — PLAN OF CARE
Problem: MOBILITY - ADULT  Goal: Maintain or return to baseline ADL function  Description: INTERVENTIONS:  -  Assess patient's ability to carry out ADLs; assess patient's baseline for ADL function and identify physical deficits which impact ability to perform ADLs (bathing, care of mouth/teeth, toileting, grooming, dressing, etc )  - Assess/evaluate cause of self-care deficits   - Assess range of motion  - Assess patient's mobility; develop plan if impaired  - Assess patient's need for assistive devices and provide as appropriate  - Encourage maximum independence but intervene and supervise when necessary  - Involve family in performance of ADLs  - Assess for home care needs following discharge   - Consider OT consult to assist with ADL evaluation and planning for discharge  - Provide patient education as appropriate  Outcome: Progressing  Goal: Maintains/Returns to pre admission functional level  Description: INTERVENTIONS:  - Perform BMAT or MOVE assessment daily    - Set and communicate daily mobility goal to care team and patient/family/caregiver  - Collaborate with rehabilitation services on mobility goals if consulted  - Perform Range of Motion 3 times a day  - Reposition patient every 2  hours      - Out of bed to chair 3  times a day   - Out of bed for meals 3  times a day  - Out of bed for toileting  - Record patient progress and toleration of activity level   Outcome: Progressing     Problem: Potential for Falls  Goal: Patient will remain free of falls  Description: INTERVENTIONS:  - Educate patient/family on patient safety including physical limitations  - Instruct patient to call for assistance with activity   - Consult OT/PT to assist with strengthening/mobility   - Keep Call bell within reach  - Keep bed low and locked with side rails adjusted as appropriate  - Keep care items and personal belongings within reach  - Initiate and maintain comfort rounds  - Make Fall Risk Sign visible to staff  - Offer Toileting every  2 Hours, in advance of need  - Apply yellow socks and bracelet for high fall risk patients  - Consider moving patient to room near nurses station  Outcome: Progressing     Problem: Nutrition/Hydration-ADULT  Goal: Nutrient/Hydration intake appropriate for improving, restoring or maintaining nutritional needs  Description: Monitor and assess patient's nutrition/hydration status for malnutrition  Collaborate with interdisciplinary team and initiate plan and interventions as ordered  Monitor patient's weight and dietary intake as ordered or per policy  Utilize nutrition screening tool and intervene as necessary  Determine patient's food preferences and provide high-protein, high-caloric foods as appropriate       INTERVENTIONS:  - Monitor oral intake, urinary output, labs, and treatment plans  - Assess nutrition and hydration status and recommend course of action  - Evaluate amount of meals eaten  - Assist patient with eating if necessary   - Allow adequate time for meals  - Recommend/ encourage appropriate diets, oral nutritional supplements, and vitamin/mineral supplements  - Order, calculate, and assess calorie counts as needed  - Recommend, monitor, and adjust tube feedings and TPN/PPN based on assessed needs  - Assess need for intravenous fluids  - Provide specific nutrition/hydration education as appropriate  - Include patient/family/caregiver in decisions related to nutrition  Outcome: Progressing     Problem: PAIN - ADULT  Goal: Verbalizes/displays adequate comfort level or baseline comfort level  Description: Interventions:  - Encourage patient to monitor pain and request assistance  - Assess pain using appropriate pain scale  - Administer analgesics based on type and severity of pain and evaluate response  - Implement non-pharmacological measures as appropriate and evaluate response  - Consider cultural and social influences on pain and pain management  - Notify physician/advanced practitioner if interventions unsuccessful or patient reports new pain  Outcome: Progressing     Problem: INFECTION - ADULT  Goal: Absence or prevention of progression during hospitalization  Description: INTERVENTIONS:  - Assess and monitor for signs and symptoms of infection  - Monitor lab/diagnostic results  - Monitor all insertion sites, i e  indwelling lines, tubes, and drains      - Administer medications as ordered  - Instruct and encourage patient and family to use good hand hygiene technique  - Identify and instruct in appropriate isolation precautions for identified infection/condition  Outcome: Progressing  Goal: Absence of fever/infection during neutropenic period  Description: INTERVENTIONS:  - Monitor WBC    Outcome: Progressing     Problem: SAFETY ADULT  Goal: Maintain or return to baseline ADL function  Description: INTERVENTIONS:  -  Assess patient's ability to carry out ADLs; assess patient's baseline for ADL function and identify physical deficits which impact ability to perform ADLs (bathing, care of mouth/teeth, toileting, grooming, dressing, etc )  - Assess/evaluate cause of self-care deficits   - Assess range of motion  - Assess patient's mobility; develop plan if impaired  - Assess patient's need for assistive devices and provide as appropriate  - Encourage maximum independence but intervene and supervise when necessary  - Involve family in performance of ADLs  - Assess for home care needs following discharge   - Consider OT consult to assist with ADL evaluation and planning for discharge  - Provide patient education as appropriate  Outcome: Progressing  Goal: Maintains/Returns to pre admission functional level  Description: INTERVENTIONS:  - Perform BMAT or MOVE assessment daily    - Set and communicate daily mobility goal to care team and patient/family/caregiver  - Collaborate with rehabilitation services on mobility goals if consulted  - Perform Range of Motion 3 times a day    - Reposition patient every  2  hours        - Out of bed to chair 3 times a day   - Out of bed for meals 3 times a day  - Out of bed for toileting  - Record patient progress and toleration of activity level   Outcome: Progressing  Goal: Patient will remain free of falls  Description: INTERVENTIONS:  - Educate patient/family on patient safety including physical limitations  - Instruct patient to call for assistance with activity   - Consult OT/PT to assist with strengthening/mobility   - Keep Call bell within reach  - Keep bed low and locked with side rails adjusted as appropriate  - Keep care items and personal belongings within reach  - Initiate and maintain comfort rounds  - Make Fall Risk Sign visible to staff  - Offer Toileting every  2 Hours, in advance of need  - Initiate/Maintain  bed and chair alarm    - Apply yellow socks and bracelet for high fall risk patients  - Consider moving patient to room near nurses station  Outcome: Progressing

## 2021-09-02 LAB
ANION GAP SERPL CALCULATED.3IONS-SCNC: 7 MMOL/L (ref 4–13)
BUN SERPL-MCNC: 13 MG/DL (ref 6–20)
CALCIUM SERPL-MCNC: 8.6 MG/DL (ref 8.4–10.2)
CHLORIDE SERPL-SCNC: 98 MMOL/L (ref 96–108)
CO2 SERPL-SCNC: 34 MMOL/L (ref 22–33)
CREAT SERPL-MCNC: 0.72 MG/DL (ref 0.4–1.1)
GFR SERPL CREATININE-BSD FRML MDRD: 76 ML/MIN/1.73SQ M
GLUCOSE SERPL-MCNC: 112 MG/DL (ref 65–140)
GLUCOSE SERPL-MCNC: 134 MG/DL (ref 65–140)
GLUCOSE SERPL-MCNC: 140 MG/DL (ref 65–140)
GLUCOSE SERPL-MCNC: 169 MG/DL (ref 65–140)
POTASSIUM SERPL-SCNC: 2.8 MMOL/L (ref 3.5–5)
SODIUM SERPL-SCNC: 139 MMOL/L (ref 133–145)

## 2021-09-02 PROCEDURE — 99233 SBSQ HOSP IP/OBS HIGH 50: CPT | Performed by: INTERNAL MEDICINE

## 2021-09-02 PROCEDURE — 82948 REAGENT STRIP/BLOOD GLUCOSE: CPT

## 2021-09-02 PROCEDURE — 99232 SBSQ HOSP IP/OBS MODERATE 35: CPT | Performed by: INTERNAL MEDICINE

## 2021-09-02 PROCEDURE — 80048 BASIC METABOLIC PNL TOTAL CA: CPT | Performed by: INTERNAL MEDICINE

## 2021-09-02 PROCEDURE — C9113 INJ PANTOPRAZOLE SODIUM, VIA: HCPCS | Performed by: NURSE PRACTITIONER

## 2021-09-02 PROCEDURE — 99233 SBSQ HOSP IP/OBS HIGH 50: CPT | Performed by: STUDENT IN AN ORGANIZED HEALTH CARE EDUCATION/TRAINING PROGRAM

## 2021-09-02 RX ORDER — POTASSIUM CHLORIDE 14.9 MG/ML
20 INJECTION INTRAVENOUS ONCE
Status: COMPLETED | OUTPATIENT
Start: 2021-09-02 | End: 2021-09-02

## 2021-09-02 RX ORDER — DEXTROSE, SODIUM CHLORIDE, AND POTASSIUM CHLORIDE 5; .2; .15 G/100ML; G/100ML; G/100ML
75 INJECTION INTRAVENOUS CONTINUOUS
Status: DISCONTINUED | OUTPATIENT
Start: 2021-09-02 | End: 2021-09-02

## 2021-09-02 RX ORDER — DEXTROSE, SODIUM CHLORIDE, AND POTASSIUM CHLORIDE 5; .45; .15 G/100ML; G/100ML; G/100ML
75 INJECTION INTRAVENOUS CONTINUOUS
Status: DISCONTINUED | OUTPATIENT
Start: 2021-09-02 | End: 2021-09-03

## 2021-09-02 RX ORDER — POTASSIUM CHLORIDE 29.8 MG/ML
40 INJECTION INTRAVENOUS ONCE
Status: DISCONTINUED | OUTPATIENT
Start: 2021-09-02 | End: 2021-09-02 | Stop reason: CLARIF

## 2021-09-02 RX ADMIN — HEPARIN SODIUM 5000 UNITS: 5000 INJECTION INTRAVENOUS; SUBCUTANEOUS at 06:54

## 2021-09-02 RX ADMIN — NYSTATIN: 100000 POWDER TOPICAL at 14:43

## 2021-09-02 RX ADMIN — DEXTROSE, SODIUM CHLORIDE, AND POTASSIUM CHLORIDE 75 ML/HR: 5; .45; .15 INJECTION INTRAVENOUS at 10:12

## 2021-09-02 RX ADMIN — DEXTROSE 75 ML/HR: 5 SOLUTION INTRAVENOUS at 00:19

## 2021-09-02 RX ADMIN — POTASSIUM CHLORIDE 20 MEQ: 14.9 INJECTION, SOLUTION INTRAVENOUS at 10:04

## 2021-09-02 RX ADMIN — POTASSIUM CHLORIDE 20 MEQ: 14.9 INJECTION, SOLUTION INTRAVENOUS at 14:37

## 2021-09-02 RX ADMIN — PANTOPRAZOLE SODIUM 40 MG: 40 INJECTION, POWDER, FOR SOLUTION INTRAVENOUS at 20:43

## 2021-09-02 RX ADMIN — ERTAPENEM SODIUM 1000 MG: 1 INJECTION, POWDER, LYOPHILIZED, FOR SOLUTION INTRAMUSCULAR; INTRAVENOUS at 20:43

## 2021-09-02 RX ADMIN — LEVOTHYROXINE SODIUM 50 MCG: 50 TABLET ORAL at 06:54

## 2021-09-02 RX ADMIN — METOPROLOL TARTRATE 25 MG: 25 TABLET, FILM COATED ORAL at 10:03

## 2021-09-02 RX ADMIN — ATORVASTATIN CALCIUM 10 MG: 10 TABLET, FILM COATED ORAL at 17:11

## 2021-09-02 RX ADMIN — METOPROLOL TARTRATE 25 MG: 25 TABLET, FILM COATED ORAL at 17:11

## 2021-09-02 RX ADMIN — PANTOPRAZOLE SODIUM 40 MG: 40 INJECTION, POWDER, FOR SOLUTION INTRAVENOUS at 10:04

## 2021-09-02 RX ADMIN — HEPARIN SODIUM 5000 UNITS: 5000 INJECTION INTRAVENOUS; SUBCUTANEOUS at 14:43

## 2021-09-02 RX ADMIN — HEPARIN SODIUM 5000 UNITS: 5000 INJECTION INTRAVENOUS; SUBCUTANEOUS at 21:14

## 2021-09-02 NOTE — PROGRESS NOTES
Progress Note - Infectious Disease   Dubois Graver Day 80 y o  female MRN: [id number]  Unit/Bed#: -01 Encounter: [id number]      Impression/Plan:  1  Sepsis, POA  Tachycardia, tachypnea, leukocytosis on admission   Source of sepsis appears to be complicated UTI   Patient is clinically improving   WBC stable and fever has resolved  -continue antibiotic therapy as below   -monitor fever curve, WBC count    2  Complicated UTI  Likely source of sepsis and bacteremia  Patient with urinary retention in the ED which predisposed to infection  Urine and blood cultures both growing ESBL E  Coli  Recent renal US was unremarkable     -continue Ertapenem, day 6 of likely 7 day course of antibiotics   -monitor temperature   -recheck CBC tomorrow  If leukocytosis improving can complete 7 day course of antibiotics    3  ESBL E  Coli bacteremia  Secondary to UTI  -Ertapenem as above    4  CKD3  Creatinine improved    5  SBO  NGT removed, patient tolerating clear liquids  Abdominal exam benign    6  Diabetes mellitus type 2  Noted HbA1c of 6 7 on 2021   This is a risk factor for infection   -Glycemic control per primary team    7  Morbid obesity  BMI of 39    ID will continue to follow  Antibiotics:  Ertapenem D6    Subjective:  Patient feeling better today, diet advanced to clear liquids  She is afebrile  Reports ongoing fatigue  She denies abdominal pain, diarrhea, nausea  Objective:  Vitals:  Temp:  [96 °F (35 6 °C)-98 8 °F (37 1 °C)] 97 9 °F (36 6 °C)  HR:  [72-79] 79  Resp:  [16-19] 16  BP: (118-125)/(50-68) 118/60  SpO2:  [94 %-99 %] 97 %  Temp (24hrs), Av 6 °F (36 4 °C), Min:96 °F (35 6 °C), Max:98 8 °F (37 1 °C)  Current: Temperature: 97 9 °F (36 6 °C)    Physical Exam:   General Appearance:  Alert, interactive, nontoxic, no acute distress  Throat: Oropharynx moist without lesions      Lungs:   Clear to auscultation bilaterally; no wheezes, rhonchi or rales; respirations unlabored   Heart:  RRR; no murmur, rub or gallop   Abdomen:   Soft, non-tender, non-distended, positive bowel sounds  Extremities: No clubbing, cyanosis or edema   Skin: No new rashes or lesions  No draining wounds noted  Labs:    All pertinent labs and imaging studies were personally reviewed  Results from last 7 days   Lab Units 09/01/21  0519 08/30/21  0708 08/29/21  0548   WBC Thousand/uL 15 33* 15 37* 13 44*   HEMOGLOBIN g/dL 11 5 12 8 12 2   PLATELETS Thousands/uL 372 427* 409*     Results from last 7 days   Lab Units 09/02/21  0600 09/01/21  0519 08/31/21  0607   SODIUM mmol/L 139 138 146*   POTASSIUM mmol/L 2 8* 3 2* 3 4*   CHLORIDE mmol/L 98 97 102   CO2 mmol/L 34* 33 36*   BUN mg/dL 13 13 16   CREATININE mg/dL 0 72 0 78 0 83   EGFR ml/min/1 73sq m 76 69 64   CALCIUM mg/dL 8 6 8 6 9 0                       Micro:      Blood cx 8/25: ESBL E  Coli  Urine Cx 8/25: ESBL E  Coli     Imaging:    CT A/P 8/28/21:  Dilated small bowel loops in the mid to lower abdomen with normal caliber of the distal ileum, suggest  incomplete low-grade partial small bowel obstruction

## 2021-09-02 NOTE — QUICK NOTE
Patient tolerated her NG tube out  Her abdomen is nondistended and nontender  She is actually awake and alert today for the 1st time  Please notice that her potassium is 2 8    You can start a diet

## 2021-09-02 NOTE — PROGRESS NOTES
NEPHROLOGY PROGRESS NOTE    Carlos Moore Day 80 y o  female MRN: [id number]  Unit/Bed#: -01 Encounter: [id number]  Reason for Consult:  Hypernatremia and hypokalemia    The patient was resting when I went into the room I awakened her she just looks very weak and tired  Talking to the nurse and the patient was no vomiting or issues overnight with GI distress  She is really not eating much at this present time and is just weak and debilitated  ASSESSMENT/PLAN:  1  Hypernatremia    Patient presented with small-bowel obstruction was not taking in free water developed free water deficit this has been repleted intravenously and this issue is resolved  She will be placed on maintenance fluids until eating adequately  2  Hypokalemia metabolic alkalosis    Prior NG suction and K depletion likely due to decreased intake from small-bowel obstruction  Also had increased urinary losses because generally renin and aldosterone will be stimulated with NG suction and effective volume depletion leading to increased urinary K losses as well as when there was an alkalosis the bicarbonate will drag CT eye on such as potassium with it and increased urinary losses  Will change maintenance fluids to D5 quarter normal saline with 20 mEq KCL at 75 cc/hour  KCl 40 mEq IV over 4 hours ordered as well  BMP a m  3  GI    Patient small-bowel obstruction clinically no vomiting but not really eating at her passing gas  Surgery is following with conservative management  4  Patient being treated for urinary tract infection with bacteremia  Infectious Disease following  SUBJECTIVE:  Review of Systems   Constitutional: Positive for malaise/fatigue  Negative for chills, diaphoresis and fever  HENT: Negative  Eyes: Negative  Cardiovascular: Negative for chest pain, dyspnea on exertion, leg swelling and orthopnea  Respiratory: Negative  Negative for cough, shortness of breath, sputum production and wheezing  Gastrointestinal: Negative for bloating, abdominal pain, diarrhea, flatus, nausea and vomiting  Genitourinary:        Urinary catheter present   Neurological: Positive for weakness  Negative for dizziness, focal weakness and headaches  Psychiatric/Behavioral: Negative for altered mental status, hallucinations and hypervigilance  The patient is not nervous/anxious  OBJECTIVE:  Current Weight: Weight - Scale:  (no new wts since admit)  Vitals:Temp (24hrs), Av 9 °F (36 6 °C), Min:96 °F (35 6 °C), Max:99 °F (37 2 °C)  Current: Temperature: (!) 96 °F (35 6 °C)   Blood pressure 125/50, pulse 77, temperature (!) 96 °F (35 6 °C), temperature source Tympanic, resp  rate 19, height 5' (1 524 m), weight 92 6 kg (204 lb 2 3 oz), SpO2 94 %  , Body mass index is 39 87 kg/m²  Intake/Output Summary (Last 24 hours) at 2021 0801  Last data filed at 2021 0604  Gross per 24 hour   Intake --   Output 2000 ml   Net -2000 ml       Physical Exam: /50 (BP Location: Right arm)   Pulse 77   Temp (!) 96 °F (35 6 °C) (Tympanic)   Resp 19   Ht 5' (1 524 m)   Wt 92 6 kg (204 lb 2 3 oz)   SpO2 94%   BMI 39 87 kg/m²   Physical Exam  Constitutional:       General: She is not in acute distress  Appearance: She is not toxic-appearing or diaphoretic  HENT:      Head: Normocephalic and atraumatic  Mouth/Throat:      Mouth: Mucous membranes are dry  Eyes:      General: No scleral icterus  Extraocular Movements: Extraocular movements intact  Cardiovascular:      Rate and Rhythm: Normal rate  Rhythm irregular  Heart sounds: No friction rub  No gallop  Comments: No significant edema  Pulmonary:      Effort: Pulmonary effort is normal  No respiratory distress  Breath sounds: No wheezing, rhonchi or rales  Abdominal:      General: There is no distension  Palpations: Abdomen is soft  Tenderness: There is no abdominal tenderness  There is no rebound        Comments: Faint bowel sounds   Musculoskeletal:      Cervical back: Normal range of motion and neck supple  Neurological:      Mental Status: She is alert           Medications:    Current Facility-Administered Medications:     acetaminophen (TYLENOL) tablet 650 mg, 650 mg, Oral, Q6H PRN, Jered Camacho MD, 650 mg at 08/26/21 1055    aluminum-magnesium hydroxide-simethicone (MYLANTA) oral suspension 30 mL, 30 mL, Oral, Q6H PRN, Jered Camacho MD, 30 mL at 08/26/21 1705    atorvastatin (LIPITOR) tablet 10 mg, 10 mg, Oral, Daily With Mari Seay MD, 10 mg at 09/01/21 1705    dextrose 5 % infusion, 75 mL/hr, Intravenous, Continuous, Cathy Baig MD, Last Rate: 75 mL/hr at 09/02/21 0019, 75 mL/hr at 09/02/21 0019    ertapenem (INVanz) 1,000 mg in sodium chloride 0 9 % 50 mL IVPB, 1,000 mg, Intravenous, Q24H, Quin Saez MD, Last Rate: 100 mL/hr at 09/01/21 2148, 1,000 mg at 09/01/21 2148    heparin (porcine) subcutaneous injection 5,000 Units, 5,000 Units, Subcutaneous, Q8H [facility], Catherine Rogers MD, 5,000 Units at 09/02/21 0654    insulin lispro (HumaLOG) 100 units/mL subcutaneous injection 1-5 Units, 1-5 Units, Subcutaneous, Q6H **AND** Fingerstick Glucose (POCT), , , Q6H, Ei Briseyda Astudillo MD    levothyroxine tablet 50 mcg, 50 mcg, Oral, Early Morning, Jered Camacho MD, 50 mcg at 09/02/21 0654    magnesium hydroxide (MILK OF MAGNESIA) oral suspension 30 mL, 30 mL, Oral, Daily PRN, Jered Camacho MD    metoprolol tartrate (LOPRESSOR) tablet 25 mg, 25 mg, Oral, BID, Jered Camacho MD, 25 mg at 09/01/21 1705    nystatin (MYCOSTATIN) powder, , Topical, BID, Jered Camacho MD, Given at 09/01/21 1706    ondansetron (ZOFRAN) injection 4 mg, 4 mg, Intravenous, Q4H PRN, Rainer Maradiaga MD, 4 mg at 08/27/21 2004    oxyCODONE (ROXICODONE) IR tablet 2 5 mg, 2 5 mg, Oral, Q4H PRN, Jered Camacho MD    pantoprazole (PROTONIX) injection 40 mg, 40 mg, Intravenous, Q12H [facility], KASIE Nunez, 40 mg at 09/01/21 1645    Laboratory Results:  Lab Results   Component Value Date    WBC 15 33 (H) 09/01/2021    HGB 11 5 09/01/2021    HCT 37 2 09/01/2021    MCV 94 09/01/2021     09/01/2021     Lab Results   Component Value Date    SODIUM 139 09/02/2021    K 2 8 (L) 09/02/2021    CL 98 09/02/2021    CO2 34 (H) 09/02/2021    BUN 13 09/02/2021    CREATININE 0 72 09/02/2021    GLUC 112 09/02/2021    CALCIUM 8 6 09/02/2021     Lab Results   Component Value Date    CALCIUM 8 6 09/02/2021    PHOS 1 9 (L) 08/30/2021     No results found for: LABPROT

## 2021-09-03 PROBLEM — E87.6 HYPOKALEMIA: Status: RESOLVED | Noted: 2021-08-17 | Resolved: 2021-09-03

## 2021-09-03 LAB
ANION GAP SERPL CALCULATED.3IONS-SCNC: 8 MMOL/L (ref 4–13)
BASOPHILS # BLD AUTO: 0.06 THOUSANDS/ΜL (ref 0–0.1)
BASOPHILS NFR BLD AUTO: 1 % (ref 0–1)
BUN SERPL-MCNC: 11 MG/DL (ref 6–20)
CALCIUM SERPL-MCNC: 8.1 MG/DL (ref 8.4–10.2)
CHLORIDE SERPL-SCNC: 101 MMOL/L (ref 96–108)
CO2 SERPL-SCNC: 30 MMOL/L (ref 22–33)
CREAT SERPL-MCNC: 0.69 MG/DL (ref 0.4–1.1)
EOSINOPHIL # BLD AUTO: 0.42 THOUSAND/ΜL (ref 0–0.61)
EOSINOPHIL NFR BLD AUTO: 4 % (ref 0–6)
ERYTHROCYTE [DISTWIDTH] IN BLOOD BY AUTOMATED COUNT: 12.5 % (ref 11.6–15.1)
GFR SERPL CREATININE-BSD FRML MDRD: 79 ML/MIN/1.73SQ M
GLUCOSE SERPL-MCNC: 107 MG/DL (ref 65–140)
GLUCOSE SERPL-MCNC: 116 MG/DL (ref 65–140)
GLUCOSE SERPL-MCNC: 121 MG/DL (ref 65–140)
GLUCOSE SERPL-MCNC: 132 MG/DL (ref 65–140)
GLUCOSE SERPL-MCNC: 143 MG/DL (ref 65–140)
GLUCOSE SERPL-MCNC: 172 MG/DL (ref 65–140)
HCT VFR BLD AUTO: 34.3 % (ref 34.8–46.1)
HGB BLD-MCNC: 10.9 G/DL (ref 11.5–15.4)
IMM GRANULOCYTES # BLD AUTO: 0.11 THOUSAND/UL (ref 0–0.2)
IMM GRANULOCYTES NFR BLD AUTO: 1 % (ref 0–2)
LYMPHOCYTES # BLD AUTO: 1.42 THOUSANDS/ΜL (ref 0.6–4.47)
LYMPHOCYTES NFR BLD AUTO: 14 % (ref 14–44)
MAGNESIUM SERPL-MCNC: 1.8 MG/DL (ref 1.6–2.6)
MCH RBC QN AUTO: 29.7 PG (ref 26.8–34.3)
MCHC RBC AUTO-ENTMCNC: 31.8 G/DL (ref 31.4–37.4)
MCV RBC AUTO: 94 FL (ref 82–98)
MONOCYTES # BLD AUTO: 0.85 THOUSAND/ΜL (ref 0.17–1.22)
MONOCYTES NFR BLD AUTO: 9 % (ref 4–12)
NEUTROPHILS # BLD AUTO: 7.01 THOUSANDS/ΜL (ref 1.85–7.62)
NEUTS SEG NFR BLD AUTO: 71 % (ref 43–75)
PLATELET # BLD AUTO: 302 THOUSANDS/UL (ref 149–390)
PMV BLD AUTO: 11.2 FL (ref 8.9–12.7)
POTASSIUM SERPL-SCNC: 3.1 MMOL/L (ref 3.5–5)
RBC # BLD AUTO: 3.67 MILLION/UL (ref 3.81–5.12)
SODIUM SERPL-SCNC: 139 MMOL/L (ref 133–145)
WBC # BLD AUTO: 9.87 THOUSAND/UL (ref 4.31–10.16)

## 2021-09-03 PROCEDURE — 82948 REAGENT STRIP/BLOOD GLUCOSE: CPT

## 2021-09-03 PROCEDURE — 99233 SBSQ HOSP IP/OBS HIGH 50: CPT | Performed by: INTERNAL MEDICINE

## 2021-09-03 PROCEDURE — 83735 ASSAY OF MAGNESIUM: CPT | Performed by: INTERNAL MEDICINE

## 2021-09-03 PROCEDURE — 99232 SBSQ HOSP IP/OBS MODERATE 35: CPT | Performed by: INTERNAL MEDICINE

## 2021-09-03 PROCEDURE — C9113 INJ PANTOPRAZOLE SODIUM, VIA: HCPCS | Performed by: NURSE PRACTITIONER

## 2021-09-03 PROCEDURE — 80048 BASIC METABOLIC PNL TOTAL CA: CPT | Performed by: INTERNAL MEDICINE

## 2021-09-03 PROCEDURE — 99233 SBSQ HOSP IP/OBS HIGH 50: CPT | Performed by: STUDENT IN AN ORGANIZED HEALTH CARE EDUCATION/TRAINING PROGRAM

## 2021-09-03 PROCEDURE — 85025 COMPLETE CBC W/AUTO DIFF WBC: CPT | Performed by: STUDENT IN AN ORGANIZED HEALTH CARE EDUCATION/TRAINING PROGRAM

## 2021-09-03 RX ORDER — POTASSIUM CHLORIDE 20MEQ/15ML
20 LIQUID (ML) ORAL 2 TIMES DAILY
Status: DISCONTINUED | OUTPATIENT
Start: 2021-09-03 | End: 2021-09-05 | Stop reason: HOSPADM

## 2021-09-03 RX ORDER — POTASSIUM CHLORIDE 20MEQ/15ML
20 LIQUID (ML) ORAL ONCE
Status: COMPLETED | OUTPATIENT
Start: 2021-09-03 | End: 2021-09-03

## 2021-09-03 RX ORDER — POTASSIUM CHLORIDE 14.9 MG/ML
20 INJECTION INTRAVENOUS ONCE
Status: COMPLETED | OUTPATIENT
Start: 2021-09-03 | End: 2021-09-03

## 2021-09-03 RX ORDER — FUROSEMIDE 80 MG
80 TABLET ORAL
Status: DISCONTINUED | OUTPATIENT
Start: 2021-09-04 | End: 2021-09-05 | Stop reason: HOSPADM

## 2021-09-03 RX ORDER — POLYETHYLENE GLYCOL 3350 17 G/17G
17 POWDER, FOR SOLUTION ORAL ONCE
Status: COMPLETED | OUTPATIENT
Start: 2021-09-03 | End: 2021-09-03

## 2021-09-03 RX ADMIN — POTASSIUM CHLORIDE 20 MEQ: 20 SOLUTION ORAL at 11:16

## 2021-09-03 RX ADMIN — METOPROLOL TARTRATE 25 MG: 25 TABLET, FILM COATED ORAL at 16:09

## 2021-09-03 RX ADMIN — DEXTROSE, SODIUM CHLORIDE, AND POTASSIUM CHLORIDE 75 ML/HR: 5; .45; .15 INJECTION INTRAVENOUS at 00:07

## 2021-09-03 RX ADMIN — ACETAMINOPHEN 650 MG: 325 TABLET, FILM COATED ORAL at 16:10

## 2021-09-03 RX ADMIN — PANTOPRAZOLE SODIUM 40 MG: 40 INJECTION, POWDER, FOR SOLUTION INTRAVENOUS at 21:53

## 2021-09-03 RX ADMIN — NYSTATIN: 100000 POWDER TOPICAL at 16:40

## 2021-09-03 RX ADMIN — PANTOPRAZOLE SODIUM 40 MG: 40 INJECTION, POWDER, FOR SOLUTION INTRAVENOUS at 10:06

## 2021-09-03 RX ADMIN — POTASSIUM CHLORIDE 20 MEQ: 14.9 INJECTION, SOLUTION INTRAVENOUS at 10:06

## 2021-09-03 RX ADMIN — POLYETHYLENE GLYCOL 3350 17 G: 17 POWDER, FOR SOLUTION ORAL at 12:15

## 2021-09-03 RX ADMIN — LEVOTHYROXINE SODIUM 50 MCG: 50 TABLET ORAL at 05:34

## 2021-09-03 RX ADMIN — HEPARIN SODIUM 5000 UNITS: 5000 INJECTION INTRAVENOUS; SUBCUTANEOUS at 16:07

## 2021-09-03 RX ADMIN — ATORVASTATIN CALCIUM 10 MG: 10 TABLET, FILM COATED ORAL at 16:07

## 2021-09-03 RX ADMIN — HEPARIN SODIUM 5000 UNITS: 5000 INJECTION INTRAVENOUS; SUBCUTANEOUS at 05:34

## 2021-09-03 RX ADMIN — METOPROLOL TARTRATE 25 MG: 25 TABLET, FILM COATED ORAL at 10:06

## 2021-09-03 RX ADMIN — POTASSIUM CHLORIDE 20 MEQ: 14.9 INJECTION, SOLUTION INTRAVENOUS at 11:16

## 2021-09-03 RX ADMIN — NYSTATIN 1 APPLICATION: 100000 POWDER TOPICAL at 10:10

## 2021-09-03 RX ADMIN — POTASSIUM CHLORIDE 20 MEQ: 20 SOLUTION ORAL at 16:09

## 2021-09-03 RX ADMIN — HEPARIN SODIUM 5000 UNITS: 5000 INJECTION INTRAVENOUS; SUBCUTANEOUS at 21:53

## 2021-09-03 RX ADMIN — POTASSIUM CHLORIDE 20 MEQ: 20 SOLUTION ORAL at 21:53

## 2021-09-03 RX ADMIN — ERTAPENEM SODIUM 1000 MG: 1 INJECTION, POWDER, LYOPHILIZED, FOR SOLUTION INTRAMUSCULAR; INTRAVENOUS at 16:06

## 2021-09-03 NOTE — PROGRESS NOTES
NEPHROLOGY PROGRESS NOTE    Trisha Call Day 80 y o  female MRN: [id number]  Unit/Bed#: -01 Encounter: [id number]  Reason for Consult:  Hypernatremia    The patient is awake and alert in speaking to her and the nurse the patient was able to eat her breakfast today which was regular food  She is passing gas and has no nausea or vomiting  Otherwise just debilitated and weak  ASSESSMENT/PLAN:  1  Hypernatremia    Patient presented with small-bowel obstruction at hypernatremia due to decreased free water or liquid intake  At this point with hypotonic fluids water deficits repleted sodium concentration remains normal   She is on maintenance IV fluids with some potassium and they are slightly hypotonic  Now that she is eating and really she just has mild hypokalemia will discontinue IV fluids and then check to make sure she is maintaining adequate intake to avoid recurrent water deficit  She has been ordered some IV potassium supplementation and now on standing oral supplementation starting today  Monitor electrolytes  2  Status post small-bowel obstruction was treated conservatively with NG tube surgery was following NG tube now is out and patient is able to eat  Surgery will follow  3  Urinary tract infection  Receiving antibiotics    SUBJECTIVE:  Review of Systems   Constitutional: Positive for malaise/fatigue  Negative for chills, fever and night sweats  HENT: Negative  Eyes: Negative  Cardiovascular: Negative for chest pain, dyspnea on exertion, orthopnea and palpitations  Respiratory: Negative  Negative for cough, shortness of breath, sputum production and wheezing  Musculoskeletal:        Left arm his burning receiving potassium infusion  Gastrointestinal: Positive for flatus  Negative for abdominal pain, diarrhea, nausea and vomiting  Genitourinary: Dela catheter in   Neurological: Positive for weakness   Negative for dizziness, focal weakness, headaches and light-headedness  Psychiatric/Behavioral: Negative for altered mental status, depression, hallucinations and hypervigilance  OBJECTIVE:  Current Weight: Weight - Scale:  (no new wts since admit)  Vitals:Temp (24hrs), Av 7 °F (37 1 °C), Min:97 9 °F (36 6 °C), Max:99 5 °F (37 5 °C)  Current: Temperature: 98 1 °F (36 7 °C)   Blood pressure 124/56, pulse 87, temperature 98 1 °F (36 7 °C), temperature source Tympanic, resp  rate 18, height 5' (1 524 m), weight 92 6 kg (204 lb 2 3 oz), SpO2 96 %  , Body mass index is 39 87 kg/m²  Intake/Output Summary (Last 24 hours) at 9/3/2021 1100  Last data filed at 9/3/2021 0859  Gross per 24 hour   Intake 120 ml   Output 1380 ml   Net -1260 ml       Physical Exam: /56 (BP Location: Right arm)   Pulse 87   Temp 98 1 °F (36 7 °C) (Tympanic)   Resp 18   Ht 5' (1 524 m)   Wt 92 6 kg (204 lb 2 3 oz)   SpO2 96%   BMI 39 87 kg/m²   Physical Exam  Constitutional:       General: She is not in acute distress  Appearance: She is not toxic-appearing or diaphoretic  HENT:      Head: Normocephalic and atraumatic  Mouth/Throat:      Mouth: Mucous membranes are dry  Eyes:      General: No scleral icterus  Extraocular Movements: Extraocular movements intact  Cardiovascular:      Rate and Rhythm: Normal rate and regular rhythm  Heart sounds: No friction rub  No gallop  Pulmonary:      Effort: Pulmonary effort is normal  No respiratory distress  Breath sounds: Normal breath sounds  No wheezing, rhonchi or rales  Abdominal:      General: There is no distension  Palpations: Abdomen is soft  Tenderness: There is no abdominal tenderness  There is no rebound  Comments: Bowel sounds present  Musculoskeletal:      Cervical back: Normal range of motion and neck supple  Neurological:      Mental Status: She is alert and oriented to person, place, and time     Psychiatric:         Mood and Affect: Mood normal          Behavior: Behavior normal          Thought Content:  Thought content normal          Judgment: Judgment normal          Medications:    Current Facility-Administered Medications:     acetaminophen (TYLENOL) tablet 650 mg, 650 mg, Oral, Q6H PRN, Fracisco Tamayo MD, 650 mg at 08/26/21 1055    aluminum-magnesium hydroxide-simethicone (MYLANTA) oral suspension 30 mL, 30 mL, Oral, Q6H PRN, Fracisco Tamayo MD, 30 mL at 08/26/21 1705    atorvastatin (LIPITOR) tablet 10 mg, 10 mg, Oral, Daily With Alberta Duque MD, 10 mg at 09/02/21 1711    dextrose 5 % and sodium chloride 0 45 % with KCl 20 mEq/L infusion, 75 mL/hr, Intravenous, Continuous, Medardo Brock MD, Last Rate: 75 mL/hr at 09/03/21 0007, 75 mL/hr at 09/03/21 0007    ertapenem (INVanz) 1,000 mg in sodium chloride 0 9 % 50 mL IVPB, 1,000 mg, Intravenous, Q24H, Pat Rivas MD, Last Rate: 100 mL/hr at 09/02/21 2043, 1,000 mg at 09/02/21 2043    heparin (porcine) subcutaneous injection 5,000 Units, 5,000 Units, Subcutaneous, Q8H [facility], Gris Roy MD, 5,000 Units at 09/03/21 0534    insulin lispro (HumaLOG) 100 units/mL subcutaneous injection 1-5 Units, 1-5 Units, Subcutaneous, Q6H, 1 Units at 09/02/21 1300 **AND** Fingerstick Glucose (POCT), , , Q6H, Ei Donell Nam MD    levothyroxine tablet 50 mcg, 50 mcg, Oral, Early Morning, Fracisco Tamayo MD, 50 mcg at 09/03/21 0534    magnesium hydroxide (MILK OF MAGNESIA) oral suspension 30 mL, 30 mL, Oral, Daily PRN, Fracisco Tamayo MD    metoprolol tartrate (LOPRESSOR) tablet 25 mg, 25 mg, Oral, BID, Fracisco Tamayo MD, 25 mg at 09/03/21 1006    nystatin (MYCOSTATIN) powder, , Topical, BID, Fracisco Tamayo MD, 1 application at 03/46/26 1010    ondansetron (ZOFRAN) injection 4 mg, 4 mg, Intravenous, Q4H PRN, Sarah Dueñas MD, 4 mg at 08/27/21 2004    oxyCODONE (ROXICODONE) IR tablet 2 5 mg, 2 5 mg, Oral, Q4H PRN, Fracisco Tamayo MD    pantoprazole (PROTONIX) injection 40 mg, 40 mg, Intravenous, Q12H [address], Camillacandi Calhoun, CRNP, 40 mg at 09/03/21 1006    potassium chloride 20 mEq IVPB (premix), 20 mEq, Intravenous, Once, Vanessa López MD, Last Rate: 50 mL/hr at 09/03/21 1006, 20 mEq at 09/03/21 1006    potassium chloride 20 mEq IVPB (premix), 20 mEq, Intravenous, Once, Vanessa óLpez MD    potassium chloride oral solution 20 mEq, 20 mEq, Oral, BID, Vanessa López MD    Laboratory Results:  Lab Results   Component Value Date    WBC 9 87 09/03/2021    HGB 10 9 (L) 09/03/2021    HCT 34 3 (L) 09/03/2021    MCV 94 09/03/2021     09/03/2021     Lab Results   Component Value Date    SODIUM 139 09/03/2021    K 3 1 (L) 09/03/2021     09/03/2021    CO2 30 09/03/2021    BUN 11 09/03/2021    CREATININE 0 69 09/03/2021    GLUC 107 09/03/2021    CALCIUM 8 1 (L) 09/03/2021     Lab Results   Component Value Date    CALCIUM 8 1 (L) 09/03/2021    PHOS 1 9 (L) 08/30/2021     No results found for: LABPROT

## 2021-09-03 NOTE — QUICK NOTE
Patient examined this morning  She is more alert and she was sitting up in bed starting to eat  She states that she is passing gas and the nurse told me that she had a bowel movement  Patient totally disc claims abdominal pain and she is not tender on exam   I think this was an ileus secondary to a urinary tract infection and hypokalemia    I will sign off; do not hesitate to call again if you have a problem

## 2021-09-03 NOTE — PROGRESS NOTES
[facility]  66   Progress Note Swapnil White Day 1935, 80 y o  female MRN: [id number]  Unit/Bed#: -01 Encounter: [id number]  Primary Care Provider: Chicho Hernandez MD   Date and time admitted to hospital: 8/25/2021  4:58 PM    Partial small bowel obstruction [facility])  Assessment & Plan  Improved  Denies nausea and vomiting  No abdominal pain or distension  Was able to pass flatus  Per nurse's note, had a bowel movement  At this time, patient is medically cleared to be discharged  * Sepsis ([address] )  Assessment & Plan    Results from last 7 days   Lab Units 08/25/21 2044 08/25/21  1739   BLOOD CULTURE  Escherichia coli ESBL*  --    GRAM STAIN RESULT  Gram negative rods*  --    URINE CULTURE   --  >100,000 cfu/ml Escherichia coli ESBL*     Patient meets sepsis criteria on admission  Sepsis likely secondary to UTI  Blood culture and urine culture growing ESBL E coli  ID consult, appreciate recommendation  She received IV meropenem for 3 days and subsequently transition to Ertapenem on 8/30 by ID guidance  Last dose today  Total duration anticipated for a total duration of 7 days or as per clinical situation  Carbapenem duration: #8  Completed course of IV antibiotics  At this time patient is stable to be discharged  UTI (urinary tract infection)  Assessment & Plan  Urine culture growing ESBL E coli  She received 3 days of IV meropenem was subsequently switched to ertapenem on 8/30  Last dose today 9/3/21  Total carbapenem duration: #8 days  WBC now within normal limits  Infectious disease following, appreciate recommendation  Sacral decubitus ulcer  Assessment & Plan  Sacral decubitus ulcer reported by nursing staff    Appears to be stage 2 on exam  Wound care management as per nursing protocol     - inpatient wound care nurse following  - continue nursing intervention to redistribute pressure and minimize tissue damage   - daily wound assessment per unit protocol      Hypernatremia  Assessment & Plan  Improved  Appreciate nephrology input  CKD (chronic kidney disease)  Assessment & Plan  Results from last 7 days   Lab Units 09/03/21  0516 09/02/21  0600 09/01/21  0519 08/31/21  0607 08/30/21  0707 08/29/21  0548 08/28/21  2140 08/28/21  0458   SODIUM mmol/L 139 139 138 146* 147* 152*  --  150*   POTASSIUM mmol/L 3 1* 2 8* 3 2* 3 4* 3 8 3 2* 3 3* 3 2*   CHLORIDE mmol/L 101 98 97 102 105 106  --  103   CO2 mmol/L 30 34* 33 36* 34* 37*  --  37*   ANION GAP mmol/L 8 7 8 8 8 9  --  10   BUN mg/dL 11 13 13 16 19 28*  --  41*   CREATININE mg/dL 0 69 0 72 0 78 0 83 0 88 0 96  --  1 29*   CALCIUM mg/dL 8 1* 8 6 8 6 9 0 9 5 9 6  --  9 4   EGFR ml/min/1 73sq m 79 76 69 64 60 54  --  38   GLUCOSE RANDOM mg/dL 107 112 107 129 145* 124  --  97       Creatinine is greater than her baseline of 1 4-1 5  Currently creatinine at baseline  Monitor BMP daily    Hypokalemia  Assessment & Plan  Replete accordingly  Most likely due to metabolic alkalosis in setting of NG tube drainage - expected to improve as NG tube has been that removed    Results from last 7 days   Lab Units 09/03/21  0516 09/02/21  0600 09/01/21  0519 08/31/21  0607 08/30/21  0707 08/29/21  0548 08/28/21 2140 08/28/21  0458   SODIUM mmol/L 139 139 138 146* 147* 152*  --  150*   POTASSIUM mmol/L 3 1* 2 8* 3 2* 3 4* 3 8 3 2* 3 3* 3 2*   CHLORIDE mmol/L 101 98 97 102 105 106  --  103   CO2 mmol/L 30 34* 33 36* 34* 37*  --  37*   ANION GAP mmol/L 8 7 8 8 8 9  --  10   BUN mg/dL 11 13 13 16 19 28*  --  41*   CREATININE mg/dL 0 69 0 72 0 78 0 83 0 88 0 96  --  1 29*   CALCIUM mg/dL 8 1* 8 6 8 6 9 0 9 5 9 6  --  9 4   EGFR ml/min/1 73sq m 79 76 69 64 60 54  --  38   GLUCOSE RANDOM mg/dL 107 112 107 129 145* 124  --  97     Will place on oral potassium supplementation on discharge     Hypertension  Assessment & Plan  · Amlodipine and metoprolol with holding parameters  · No longer septic, will resume home Lasix      Blood pressure 126/60, pulse 89, temperature (!) 96 8 °F (36 °C), temperature source Tympanic, resp  rate 17, height 5' (1 524 m), weight 92 6 kg (204 lb 2 3 oz), SpO2 94 %  Hyperlipidemia  Assessment & Plan  Continue Lipitor for now    Chronic diastolic congestive heart failure (HCC)  Assessment & Plan  Wt Readings from Last 3 Encounters:   08/25/21 92 6 kg (204 lb 2 3 oz)   08/22/21 95 3 kg (210 lb 1 6 oz)   02/19/20 87 5 kg (193 lb)     Currently lasix on hold in light of sepsis and reduced p o  Intake  I's and O's, daily weight  Cardiology on board  Input appreciated    Diabetes mellitus [facility])  Assessment & Plan  Lab Results   Component Value Date    HGBA1C 6 7 (H) 08/19/2021       Recent Labs     08/30/21  0004 08/30/21  0651 08/30/21  1135 08/30/21  1453   POCGLU 136 149* 145* 148*       Blood Sugar Average: Last 72 hrs:  (P) 138 6     Currently on D5W  Accu-Cheks every 6 hourly and sliding scale insulin  Hypoglycemic protocol  Constipation  Assessment & Plan  Chronic  Hold stool softener      Ambulatory dysfunction  Assessment & Plan  PT and OT consult, recommended acute post rehab  Frequent falls  Assessment & Plan  PT and OT evaluations, recommend acute post rehab  Family in agreement    Age-related cognitive decline  Assessment & Plan  Supportive care  Metabolic alkalosis-resolved as of 9/3/2021  Assessment & Plan  HCO of 34  Most likely due to NG tube drainage with consequential loss of gastric HCl  SpO2 of 98% on 2L NC O2  VBG revealed alkalemia of 7 4      VTE Pharmacologic Prophylaxis:   VTE Score: 5 Moderate Risk (Score 3-4) - Pharmacological DVT Prophylaxis Contraindicated  Sequential Compression Devices Ordered  Mechanical VTE Prophylaxis in Place: Yes    Patient Centered Rounds: I have performed bedside rounds with nursing staff today      Discussions with Specialists or Other Care Team Provider: ID    Education and Discussions with Family / Patient: Patient    Current Length of Stay: 9 day(s)    Current Patient Status: Inpatient     Discharge Plan / Estimated Discharge Date: Anticipate discharge in 48 hrs to TBD    Code Status: Level 3 - DNAR and DNI      Subjective:   Patient seen and examined at bedside  Denies abdominal pain  Patient able to tolerate surgical soft diet  Reports passing flatus  She is excited about potential for discharge today  Objective:     Vitals:   Temp (24hrs), Av 4 °F (36 9 °C), Min:96 8 °F (36 °C), Max:99 5 °F (37 5 °C)    Temp:  [96 8 °F (36 °C)-99 5 °F (37 5 °C)] 96 8 °F (36 °C)  HR:  [79-89] 89  Resp:  [17-18] 17  BP: (118-126)/(56-70) 126/60  SpO2:  [94 %-97 %] 94 %  Body mass index is 39 87 kg/m²  Input and Output Summary (last 24 hours): Intake/Output Summary (Last 24 hours) at 9/3/2021 1653  Last data filed at 9/3/2021 1500  Gross per 24 hour   Intake 270 ml   Output 1230 ml   Net -960 ml       Physical Exam:     Physical Exam  Vitals reviewed  Constitutional:       General: She is not in acute distress  Appearance: She is not ill-appearing or toxic-appearing  HENT:      Head: Normocephalic and atraumatic  Cardiovascular:      Rate and Rhythm: Normal rate and regular rhythm  Pulses: Normal pulses  Pulmonary:      Effort: No respiratory distress  Breath sounds: Normal breath sounds  No wheezing  Abdominal:      General: Bowel sounds are normal  There is no distension  Palpations: Abdomen is soft  Tenderness: There is no abdominal tenderness  Musculoskeletal:      Right lower leg: No edema  Left lower leg: No edema  Skin:     Coloration: Skin is not jaundiced or pale  Neurological:      General: No focal deficit present  Mental Status: She is alert and oriented to person, place, and time           Additional Data:     Labs:  Results from last 7 days   Lab Units 21  0516   WBC Thousand/uL 9 87   HEMOGLOBIN g/dL 10 9*   HEMATOCRIT % 34 3*   PLATELETS Thousands/uL 302   NEUTROS PCT % 71 LYMPHS PCT % 14   MONOS PCT % 9   EOS PCT % 4     Results from last 7 days   Lab Units 09/03/21  0516   SODIUM mmol/L 139   POTASSIUM mmol/L 3 1*   CHLORIDE mmol/L 101   CO2 mmol/L 30   BUN mg/dL 11   CREATININE mg/dL 0 69   ANION GAP mmol/L 8   CALCIUM mg/dL 8 1*   GLUCOSE RANDOM mg/dL 107         Results from last 7 days   Lab Units 09/03/21  1559 09/03/21  1112 09/03/21  0538 09/03/21  0000 09/02/21  1711 09/02/21  1126 09/02/21  0650 09/01/21  2327 09/01/21  1555 09/01/21  1122 09/01/21  0541 09/01/21  0025   POC GLUCOSE mg/dl 143* 172* 116 121 134 169* 140 122 123 121 103 130               Imaging: Reviewed radiology reports from this admission including: xray(s)    Recent Cultures (last 7 days):           Lines/Drains:  Invasive Devices     Peripheral Intravenous Line            Peripheral IV 09/01/21 Dorsal (posterior); Left Forearm 2 days          Drain            Urethral Catheter Non-latex 16 Fr  8 days                Telemetry:        Last 24 Hours Medication List:   Current Facility-Administered Medications   Medication Dose Route Frequency Provider Last Rate    acetaminophen  650 mg Oral Q6H PRN Marie Vega MD      aluminum-magnesium hydroxide-simethicone  30 mL Oral Q6H PRN Marie Vega MD      atorvastatin  10 mg Oral Daily With Emil Nielson MD      heparin (porcine)  5,000 Units Subcutaneous Q8H [address] Salvatore Churchill MD      insulin lispro  1-5 Units Subcutaneous Q6H Ei Josué Lentz MD      levothyroxine  50 mcg Oral Early Morning Marie Vega MD      magnesium hydroxide  30 mL Oral Daily PRN Marie Vega MD      metoprolol tartrate  25 mg Oral BID Marie Vega MD      nystatin   Topical BID Marie Vega MD      ondansetron  4 mg Intravenous Q4H PRN Jose Day MD      oxyCODONE  2 5 mg Oral Q4H PRN Marie Vega MD      pantoprazole  40 mg Intravenous Q12H [address] KASIE Womack      potassium chloride  20 mEq Oral BID Cristina Rodriguez MD          Today, Patient Was Seen By: Cristina Rodriguez MD    ** Please Note: This note has been constructed using a voice recognition system   **

## 2021-09-03 NOTE — PLAN OF CARE
Problem: Prexisting or High Potential for Compromised Skin Integrity  Goal: Skin integrity is maintained or improved  Description: INTERVENTIONS:  - Identify patients at risk for skin breakdown  - Assess and monitor skin integrity  - Assess and monitor nutrition and hydration status  - Monitor labs   - Assess for incontinence   - Turn and reposition patient  - Assist with mobility/ambulation  - Relieve pressure over bony prominences  - Avoid friction and shearing  - Provide appropriate hygiene as needed including keeping skin clean and dry  - Evaluate need for skin moisturizer/barrier cream  - Collaborate with interdisciplinary team   - Patient/family teaching  - Consider wound care consult   Outcome: Progressing     Problem: GASTROINTESTINAL - ADULT  Goal: Minimal or absence of nausea and/or vomiting  Description: INTERVENTIONS:  - Administer IV fluids if ordered to ensure adequate hydration  - Maintain NPO status until nausea and vomiting are resolved  - Nasogastric tube if ordered  - Administer ordered antiemetic medications as needed  - Provide nonpharmacologic comfort measures as appropriate  - Advance diet as tolerated, if ordered  - Consider nutrition services referral to assist patient with adequate nutrition and appropriate food choices  Outcome: Progressing  Goal: Maintains or returns to baseline bowel function  Description: INTERVENTIONS:  - Assess bowel function  - Encourage oral fluids to ensure adequate hydration  - Administer IV fluids if ordered to ensure adequate hydration  - Administer ordered medications as needed  - Encourage mobilization and activity  - Consider nutritional services referral to assist patient with adequate nutrition and appropriate food choices  Outcome: Progressing     Problem: DISCHARGE PLANNING  Goal: Discharge to home or other facility with appropriate resources  Description: INTERVENTIONS:  - Identify barriers to discharge w/patient and caregiver  - Arrange for needed discharge resources and transportation as appropriate  - Identify discharge learning needs (meds, wound care, etc )  - Arrange for interpretive services to assist at discharge as needed  - Refer to Case Management Department for coordinating discharge planning if the patient needs post-hospital services based on physician/advanced practitioner order or complex needs related to functional status, cognitive ability, or social support system  Outcome: Progressing

## 2021-09-03 NOTE — DISCHARGE SUMMARY
[address]  66   Discharge- Isamar Green Day 1935, 80 y o  female MRN: [id number]  Unit/Bed#: -01 Encounter: [id number]  Primary Care Provider: Juan Valente MD   Date and time admitted to hospital: 8/25/2021  4:58 PM    Partial small bowel obstruction [facility])  Assessment & Plan  Improved  Denies nausea and vomiting  No abdominal pain or distension  Was able to pass flatus  Per nurse's note, had a bowel movement  At this time, patient is medically cleared to be discharged  * Sepsis ([address] )  Assessment & Plan    Results from last 7 days   Lab Units 08/25/21 2044 08/25/21  1739   BLOOD CULTURE  Escherichia coli ESBL*  --    GRAM STAIN RESULT  Gram negative rods*  --    URINE CULTURE   --  >100,000 cfu/ml Escherichia coli ESBL*     Patient meets sepsis criteria on admission  Sepsis likely secondary to UTI  Blood culture and urine culture growing ESBL E coli  ID consult, appreciate recommendation  She received IV meropenem for 3 days and subsequently transition to Ertapenem on 8/30 by ID guidance  Last dose today  Total duration anticipated for a total duration of 7 days or as per clinical situation  Carbapenem duration: #8  Completed course of IV antibiotics  At this time patient is stable to be discharged  UTI (urinary tract infection)  Assessment & Plan  Urine culture growing ESBL E coli  She received 3 days of IV meropenem was subsequently switched to ertapenem on 8/30  Last dose today 9/3/21  Total carbapenem duration: #8 days  WBC now within normal limits  Infectious disease following, appreciate recommendation  Sacral decubitus ulcer  Assessment & Plan  Sacral decubitus ulcer reported by nursing staff    Appears to be stage 2 on exam  Wound care management as per nursing protocol     - inpatient wound care nurse following  - continue nursing intervention to redistribute pressure and minimize tissue damage   - daily wound assessment per unit protocol      Hypernatremia  Assessment & Plan  Improved  Appreciate nephrology input  CKD (chronic kidney disease)  Assessment & Plan  Results from last 7 days   Lab Units 09/03/21  0516 09/02/21  0600 09/01/21  0519 08/31/21  0607 08/30/21  0707 08/29/21  0548 08/28/21  2140 08/28/21  0458   SODIUM mmol/L 139 139 138 146* 147* 152*  --  150*   POTASSIUM mmol/L 3 1* 2 8* 3 2* 3 4* 3 8 3 2* 3 3* 3 2*   CHLORIDE mmol/L 101 98 97 102 105 106  --  103   CO2 mmol/L 30 34* 33 36* 34* 37*  --  37*   ANION GAP mmol/L 8 7 8 8 8 9  --  10   BUN mg/dL 11 13 13 16 19 28*  --  41*   CREATININE mg/dL 0 69 0 72 0 78 0 83 0 88 0 96  --  1 29*   CALCIUM mg/dL 8 1* 8 6 8 6 9 0 9 5 9 6  --  9 4   EGFR ml/min/1 73sq m 79 76 69 64 60 54  --  38   GLUCOSE RANDOM mg/dL 107 112 107 129 145* 124  --  97       Creatinine is greater than her baseline of 1 4-1 5  Currently creatinine at baseline  Monitor BMP daily    Hypokalemia  Assessment & Plan  Replete accordingly  Most likely due to metabolic alkalosis in setting of NG tube drainage - expected to improve as NG tube has been that removed    Results from last 7 days   Lab Units 09/03/21  0516 09/02/21  0600 09/01/21  0519 08/31/21  0607 08/30/21  0707 08/29/21  0548 08/28/21 2140 08/28/21  0458   SODIUM mmol/L 139 139 138 146* 147* 152*  --  150*   POTASSIUM mmol/L 3 1* 2 8* 3 2* 3 4* 3 8 3 2* 3 3* 3 2*   CHLORIDE mmol/L 101 98 97 102 105 106  --  103   CO2 mmol/L 30 34* 33 36* 34* 37*  --  37*   ANION GAP mmol/L 8 7 8 8 8 9  --  10   BUN mg/dL 11 13 13 16 19 28*  --  41*   CREATININE mg/dL 0 69 0 72 0 78 0 83 0 88 0 96  --  1 29*   CALCIUM mg/dL 8 1* 8 6 8 6 9 0 9 5 9 6  --  9 4   EGFR ml/min/1 73sq m 79 76 69 64 60 54  --  38   GLUCOSE RANDOM mg/dL 107 112 107 129 145* 124  --  97     Will place on oral potassium supplementation on discharge     Hypertension  Assessment & Plan  · Amlodipine and metoprolol with holding parameters  · No longer septic, will resume home Lasix      Blood pressure 126/60, pulse 89, temperature (!) 96 8 °F (36 °C), temperature source Tympanic, resp  rate 17, height 5' (1 524 m), weight 92 6 kg (204 lb 2 3 oz), SpO2 94 %  Hyperlipidemia  Assessment & Plan  Continue Lipitor for now    Chronic diastolic congestive heart failure (HCC)  Assessment & Plan  Wt Readings from Last 3 Encounters:   08/25/21 92 6 kg (204 lb 2 3 oz)   08/22/21 95 3 kg (210 lb 1 6 oz)   02/19/20 87 5 kg (193 lb)     Currently lasix on hold in light of sepsis and reduced p o  Intake  I's and O's, daily weight  Cardiology on board  Input appreciated    Diabetes mellitus [facility])  Assessment & Plan  Lab Results   Component Value Date    HGBA1C 6 7 (H) 08/19/2021       Recent Labs     08/30/21  0004 08/30/21  0651 08/30/21  1135 08/30/21  1453   POCGLU 136 149* 145* 148*       Blood Sugar Average: Last 72 hrs:  (P) 138 6     Currently on D5W  Accu-Cheks every 6 hourly and sliding scale insulin  Hypoglycemic protocol  Constipation  Assessment & Plan  Chronic  Hold stool softener      Ambulatory dysfunction  Assessment & Plan  PT and OT consult, recommended acute post rehab  Frequent falls  Assessment & Plan  PT and OT evaluations, recommend acute post rehab  Family in agreement    Age-related cognitive decline  Assessment & Plan  Supportive care  Metabolic alkalosis-resolved as of 9/3/2021  Assessment & Plan  HCO of 34  Most likely due to NG tube drainage with consequential loss of gastric HCl  SpO2 of 98% on 2L NC O2    VBG revealed alkalemia of 7 4  Date of discharge is 09/05/2021  Discharging Resident Physician: Peyton Burt MD  Attending: Mart Reynoso MD  PCP: Landry Horne MD  Admission Date: 8/25/2021  Admission Date:   Admission Orders (From admission, onward)     Ordered        08/25/21 [address]  Once                   Discharge Date:9/5/2021    Disposition:     Other: Short Term rehab    Reason for Admission:  Sepsis/UTI/partial bowel obstruction    Consultations During Hospital Stay:  · Cardiology  · General surgeon  · Nephrology  · Gastroenterology  · Infectious disease    Procedures Performed:     No orders of the defined types were placed in this encounter  Diagnosis:    Medical Problems     Resolved Problems  Date Reviewed: 9/3/2021        Resolved    Metabolic alkalosis 0/5/5853     Resolved by  Raghu Cortez MD                Principal Problem:    Sepsis [facility])  Active Problems:    Partial small bowel obstruction ([address] )    UTI (urinary tract infection)    Sacral decubitus ulcer    Age-related cognitive decline    Frequent falls    Ambulatory dysfunction    Constipation    Diabetes mellitus (HCC)    Chronic diastolic congestive heart failure (HCC)    Hyperlipidemia    Hypertension    Hypokalemia    CKD (chronic kidney disease)    Hypernatremia      Significant Findings / Test Results:     XR abdomen obstruction series   Final Result by Khushboo Causey DO (09/01 2143)      Improving small bowel distention  Workstation performed: [id number]         XR abdomen obstruction series   Final Result by Shabbir Mead MD (08/30 5743)   Stable findings suggesting partial mid to distal small bowel obstruction         Workstation performed: [id number]         XR abdomen obstruction series   Final Result by Yanick Arana MD (08/30 9416)      Continued mild gaseous distention of several loops of small bowel out of proportion to large bowel distention suggesting ongoing partial small bowel obstruction  Workstation performed: [id number]         XR chest portable   Final Result by Keila Urbina MD (08/29 1312)      Enteric tube in place with distal portion coursing below left hemidiaphragm      LEFT basilar opacity, likely reflecting small to moderate pleural effusion with atelectasis  Possibility of superimposed pneumonia is not excluded              Workstation performed: [id number]         CT abdomen pelvis wo contrast   Final Result by Meme Mahoney MD (08/28 8288)      Dilated small bowel loops in the mid to lower abdomen with normal caliber of the distal ileum, suggest  incomplete low-grade partial small bowel obstruction      Hepatic steatosis      No free air or pneumatosis      No abnormal bowel wall thickening      Small left effusion and left basilar consolidation with air bronchograms  This may be infective consolidation versus atelectasis      Follow-up suggested at 3 months to demonstrate resolution      Workstation performed: [id number]         XR abdomen obstruction series   Final Result by Aster Galarza MD (08/27 1702)      Technically limited study  Nonspecific bowel gas pattern likely reflecting mild ileus  Early/partial obstruction is not excludable  Follow-up as clinically dictated  Workstation performed: [id number]         XR chest 1 view portable   ED Interpretation by Richard Crane MD (08/25 1813)   Left basilar consolidation vs small effusion  Final Result by Pamela Hicks MD (08/26 0831)      Mild left base atelectasis with mild elevation of the left hemidiaphragm  Workstation performed: [id number]             Incidental Findings:   · None     Test Results Pending at Discharge (will require follow up): · None     Outpatient Tests Requested:  · None    Complications:  None    History of Present Illness:     Jim Hutton is a 80 y o  female who presents with vomiting and increased weakness  Patient was recently hospitalized for heart failure at [address] and was discharged  Patient has been feeling weak for several days right now  Patient was also mildly confused  The ER note mentions the patient did have some chest pain but patient is not complaining of any chest pain to me  Patient during the recent hospital course had aggressive Lasix to induce diuresis and was discharged on 80 mg twice a day    Patient has been having some vomiting and weakness  Because of the persistent vomiting and weakness patient was brought into the emergency room  Patient is a resident of skilled nursing facility at [facility]  Initially when she came in the patient was hypoxic and short of breath  Patient is much improved after getting antibiotics and fluids in the emergency room  Patient apparently had a recent urinary tract infection per the daughter who was at the bedside    Hospital Course:     Toño Quiles is a 80 y o  female patient who originally presented to the hospital on 8/25/2021 due to generalized weakness, nausea and vomiting  Patient was admitted given concern for sepsis due to UTI  Subsequently found to have developed partial bowel obstruction which was managed conservatively by General surgery  Infectious disease were consulted due to Gram-negative sepsis and UTI  Inessa James Received 7 days of IV carbapenem  Subsequently developed hypernatremia and hypokalemia for which Nephrology was consulted  Electrolytes now at goal   Cardiology consulted due to chest pain  Noted to be atypical   ACS ruled out  Partial small-bowel obstruction has totally resolved  At the time of discharge, she is hemodynamically and clinically stable  She will be discharged to [facility] continued rehabilitation  A trial avoiding was given after Deal catheter was treating more than 500 urine  Patient will be started on tamsulosin place the Deal catheter back follow-up with urology as outpatient  Plan of care was discussed with the daughter on the phone before discharge and agreeable  Condition at Discharge: stable     Discharge Day Visit / Exam:     Subjective:  Patient seen and examined by me at bedside  Reports no new complaints  She has been able to tolerate diet without nausea, vomiting and abdominal pain and bloating  Denies cough, nausea, vomiting, dysuria and leg swelling or pain      She is excited to be discharged back to [facility] today    Vitals: Blood Pressure: 139/59 (09/04/21 0720)  Pulse: 80 (09/04/21 0720)  Temperature: 98 1 °F (36 7 °C) (09/04/21 0720)  Temp Source: Tympanic (09/04/21 0720)  Respirations: 18 (09/04/21 0720)  Height: 5' (152 4 cm) (08/25/21 1911)  Weight - Scale: 92 6 kg (204 lb 2 3 oz) (08/25/21 1911)  SpO2: 93 % (09/04/21 0720)  Exam:   Physical Exam  Vitals reviewed  Constitutional:       General: She is not in acute distress  Appearance: She is not toxic-appearing  HENT:      Head: Normocephalic and atraumatic  Mouth/Throat:      Mouth: Mucous membranes are moist    Cardiovascular:      Rate and Rhythm: Normal rate  Pulses: Normal pulses  Heart sounds: No murmur heard  No gallop  Abdominal:      General: Bowel sounds are normal  There is no distension  Palpations: Abdomen is soft  Tenderness: There is no abdominal tenderness  Musculoskeletal:      Right lower leg: No edema  Left lower leg: No edema  Skin:     Coloration: Skin is not jaundiced or pale  Neurological:      General: No focal deficit present  Mental Status: She is alert and oriented to person, place, and time  Mental status is at baseline  Discussion with Family:  Talked to patient/ daughter  All questions/concerns were answered  They agree with the plan  Discharge instructions/Information to patient and family:   See after visit summary for information provided to patient and family  Provisions for Follow-Up Care:  See after visit summary for information related to follow-up care and any pertinent home health orders  Planned Readmission:  No     Discharge Medications:  See after visit summary for reconciled discharge medications provided to patient and family  Discharge Statement :  I spent 40 minutes discharging the patient  This time was spent on the day of discharge  I had direct contact with the patient on the day of discharge   Additional documentation is required if more than 30 minutes were spent on discharge           ** Please Note: This note has been constructed using a voice recognition system **

## 2021-09-03 NOTE — CASE MANAGEMENT
CM informed by [facility] during rounds patient is ready for DC today  CM notified [facility] via [facility] stated they can take patient back  At 2:20pm CM notified by [facility] that due to a staffing issue they needs to take patient back in the morning at 10pm the lates  CM sent referral to [facility] via [facility] with with instructions to set of BLS for no later than 10am per facility request  CM update tx team, family and patient with  time  DCP: return to [facility] with post acute rehab services

## 2021-09-03 NOTE — PROGRESS NOTES
Progress Note - Infectious Disease   Audi Bear Day 80 y o  female MRN: [id number]  Unit/Bed#: -01 Encounter: [id number]      Impression/Plan:  1  Sepsis, POA  Tachycardia, tachypnea, leukocytosis on admission   Source of sepsis appears to be complicated UTI   Patient is clinically improving   Leukocytosis and fever have resolved     -antibiotic therapy as below   -monitor fever curve, WBC count    2  Complicated UTI  Likely source of sepsis and bacteremia  Patient with urinary retention in the ED which predisposed to infection  Urine and blood cultures both growing ESBL E  Coli  Recent renal US was unremarkable     -complete 7 day course of Ertapenem, last dose tonight    3  ESBL E  Coli bacteremia  Secondary to UTI  -Ertapenem as above    4  CKD3  Creatinine improved    5  SBO  NGT removed, patient tolerating diet  Abdominal exam benign    6  Diabetes mellitus type 2  Noted HbA1c of 6 7 on 2021   This is a risk factor for infection   -Glycemic control per primary team    7  Morbid obesity  BMI of 39    Plan discussed with primary team   Louisa Fang to discharge from ID perspective  ID will sign off, please call with questions    Antibiotics:  Ertapenem D7    Subjective:  Patient seen while getting potassium infusion this afternoon, She reported burning from the IV and pain in her left arm  She is tolerating a diet and denies abdominal pain, nausea, vomiting  No fever or chills  Leukocytosis improved  Objective:  Vitals:  Temp:  [97 9 °F (36 6 °C)-99 5 °F (37 5 °C)] 98 1 °F (36 7 °C)  HR:  [79-87] 87  Resp:  [16-18] 18  BP: (118-125)/(56-70) 124/56  SpO2:  [95 %-97 %] 96 %  Temp (24hrs), Av 7 °F (37 1 °C), Min:97 9 °F (36 6 °C), Max:99 5 °F (37 5 °C)  Current: Temperature: 98 1 °F (36 7 °C)    Physical Exam:   General Appearance:  Alert, interactive, nontoxic, no acute distress  Throat: Oropharynx moist without lesions      Lungs:   Clear to auscultation bilaterally; no wheezes, rhonchi or rales; respirations unlabored   Heart:  RRR; no murmur, rub or gallop   Abdomen:   Soft, non-tender, non-distended, positive bowel sounds  Extremities: No clubbing, cyanosis or edema   Skin: No new rashes or lesions  No draining wounds noted  Labs:    All pertinent labs and imaging studies were personally reviewed  Results from last 7 days   Lab Units 09/03/21  0516 09/01/21  0519 08/30/21  0708   WBC Thousand/uL 9 87 15 33* 15 37*   HEMOGLOBIN g/dL 10 9* 11 5 12 8   PLATELETS Thousands/uL 302 372 427*     Results from last 7 days   Lab Units 09/03/21  0516 09/02/21  0600 09/01/21  0519   SODIUM mmol/L 139 139 138   POTASSIUM mmol/L 3 1* 2 8* 3 2*   CHLORIDE mmol/L 101 98 97   CO2 mmol/L 30 34* 33   BUN mg/dL 11 13 13   CREATININE mg/dL 0 69 0 72 0 78   EGFR ml/min/1 73sq m 79 76 69   CALCIUM mg/dL 8 1* 8 6 8 6                       Micro:      Blood cx 8/25: ESBL E  Coli  Urine Cx 8/25: ESBL E  Coli     Imaging:    CT A/P 8/28/21:  Dilated small bowel loops in the mid to lower abdomen with normal caliber of the distal ileum, suggest  incomplete low-grade partial small bowel obstruction

## 2021-09-04 LAB
ANION GAP SERPL CALCULATED.3IONS-SCNC: 5 MMOL/L (ref 4–13)
BUN SERPL-MCNC: 12 MG/DL (ref 6–20)
CALCIUM SERPL-MCNC: 8.7 MG/DL (ref 8.4–10.2)
CHLORIDE SERPL-SCNC: 105 MMOL/L (ref 96–108)
CO2 SERPL-SCNC: 31 MMOL/L (ref 22–33)
CREAT SERPL-MCNC: 0.73 MG/DL (ref 0.4–1.1)
GFR SERPL CREATININE-BSD FRML MDRD: 75 ML/MIN/1.73SQ M
GLUCOSE SERPL-MCNC: 100 MG/DL (ref 65–140)
GLUCOSE SERPL-MCNC: 101 MG/DL (ref 65–140)
GLUCOSE SERPL-MCNC: 107 MG/DL (ref 65–140)
GLUCOSE SERPL-MCNC: 136 MG/DL (ref 65–140)
GLUCOSE SERPL-MCNC: 146 MG/DL (ref 65–140)
GLUCOSE SERPL-MCNC: 160 MG/DL (ref 65–140)
POTASSIUM SERPL-SCNC: 3.8 MMOL/L (ref 3.5–5)
SARS-COV-2 RNA RESP QL NAA+PROBE: NEGATIVE
SODIUM SERPL-SCNC: 141 MMOL/L (ref 133–145)

## 2021-09-04 PROCEDURE — 82948 REAGENT STRIP/BLOOD GLUCOSE: CPT

## 2021-09-04 PROCEDURE — 80048 BASIC METABOLIC PNL TOTAL CA: CPT | Performed by: INTERNAL MEDICINE

## 2021-09-04 PROCEDURE — C9113 INJ PANTOPRAZOLE SODIUM, VIA: HCPCS | Performed by: NURSE PRACTITIONER

## 2021-09-04 PROCEDURE — 87635 SARS-COV-2 COVID-19 AMP PRB: CPT | Performed by: INTERNAL MEDICINE

## 2021-09-04 PROCEDURE — 99239 HOSP IP/OBS DSCHRG MGMT >30: CPT | Performed by: INTERNAL MEDICINE

## 2021-09-04 RX ADMIN — HEPARIN SODIUM 5000 UNITS: 5000 INJECTION INTRAVENOUS; SUBCUTANEOUS at 14:55

## 2021-09-04 RX ADMIN — POTASSIUM CHLORIDE 20 MEQ: 20 SOLUTION ORAL at 17:37

## 2021-09-04 RX ADMIN — METOPROLOL TARTRATE 25 MG: 25 TABLET, FILM COATED ORAL at 17:36

## 2021-09-04 RX ADMIN — HEPARIN SODIUM 5000 UNITS: 5000 INJECTION INTRAVENOUS; SUBCUTANEOUS at 21:07

## 2021-09-04 RX ADMIN — PANTOPRAZOLE SODIUM 40 MG: 40 INJECTION, POWDER, FOR SOLUTION INTRAVENOUS at 21:04

## 2021-09-04 RX ADMIN — NYSTATIN: 100000 POWDER TOPICAL at 17:44

## 2021-09-04 RX ADMIN — POTASSIUM CHLORIDE 20 MEQ: 20 SOLUTION ORAL at 08:33

## 2021-09-04 RX ADMIN — ATORVASTATIN CALCIUM 10 MG: 10 TABLET, FILM COATED ORAL at 17:37

## 2021-09-04 RX ADMIN — FUROSEMIDE 80 MG: 80 TABLET ORAL at 08:33

## 2021-09-04 RX ADMIN — NYSTATIN: 100000 POWDER TOPICAL at 08:33

## 2021-09-04 RX ADMIN — PANTOPRAZOLE SODIUM 40 MG: 40 INJECTION, POWDER, FOR SOLUTION INTRAVENOUS at 08:33

## 2021-09-04 RX ADMIN — FUROSEMIDE 80 MG: 80 TABLET ORAL at 14:55

## 2021-09-04 RX ADMIN — MAGNESIUM HYDROXIDE 30 ML: 400 SUSPENSION ORAL at 21:02

## 2021-09-04 RX ADMIN — LEVOTHYROXINE SODIUM 50 MCG: 50 TABLET ORAL at 06:46

## 2021-09-04 RX ADMIN — HEPARIN SODIUM 5000 UNITS: 5000 INJECTION INTRAVENOUS; SUBCUTANEOUS at 06:46

## 2021-09-04 RX ADMIN — ACETAMINOPHEN 650 MG: 325 TABLET, FILM COATED ORAL at 21:08

## 2021-09-04 RX ADMIN — METOPROLOL TARTRATE 25 MG: 25 TABLET, FILM COATED ORAL at 08:33

## 2021-09-04 NOTE — TREATMENT PLAN
Nephrology    Renal function is stable  Serum sodium and potassium level are normal     Nothing further to add from renal standpoint we will sign off call for any questions concerns    Thank you

## 2021-09-04 NOTE — PROGRESS NOTES
[facility]  66   Progress Note Vero Garry Day 1935, 80 y o  female MRN: [id number]  Unit/Bed#: -01 Encounter: [id number]  Primary Care Provider: Francine Jules MD   Date and time admitted to hospital: 8/25/2021  4:58 PM    Partial small bowel obstruction [facility])  Assessment & Plan  Improved  Denies nausea and vomiting  No abdominal pain or distension  Was able to pass flatus  Per nurse's note, had a bowel movement  At this time, patient is medically cleared to be discharged  * Sepsis ([address] )  Assessment & Plan    Results from last 7 days   Lab Units 08/25/21 2044 08/25/21  1739   BLOOD CULTURE  Escherichia coli ESBL*  --    GRAM STAIN RESULT  Gram negative rods*  --    URINE CULTURE   --  >100,000 cfu/ml Escherichia coli ESBL*     Patient meets sepsis criteria on admission  Sepsis likely secondary to UTI  Blood culture and urine culture growing ESBL E coli  ID consult, appreciate recommendation  She received IV meropenem for 3 days and subsequently transition to Ertapenem on 8/30 by ID guidance  Last dose today  Total duration anticipated for a total duration of 7 days or as per clinical situation  Carbapenem duration: #8  Completed course of IV antibiotics  At this time patient is stable to be discharged  UTI (urinary tract infection)  Assessment & Plan  Urine culture growing ESBL E coli  She received 3 days of IV meropenem was subsequently switched to ertapenem on 8/30  Last dose today 9/3/21  Total carbapenem duration: #8 days  WBC now within normal limits  Infectious disease now signed off  Sacral decubitus ulcer  Assessment & Plan  Sacral decubitus ulcer reported by nursing staff    Appears to be stage 2 on exam  Wound care management as per nursing protocol     - inpatient wound care nurse following  - continue nursing intervention to redistribute pressure and minimize tissue damage   - daily wound assessment per unit protocol      Hypernatremia  Assessment & Plan  Improved  Appreciate nephrology input  CKD (chronic kidney disease)  Assessment & Plan  Results from last 7 days   Lab Units 09/04/21  0531 09/03/21  0516 09/02/21  0600 09/01/21  0519 08/31/21  0607 08/30/21  0707 08/29/21  0548 08/28/21  2140   SODIUM mmol/L 141 139 139 138 146* 147* 152*  --    POTASSIUM mmol/L 3 8 3 1* 2 8* 3 2* 3 4* 3 8 3 2* 3 3*   CHLORIDE mmol/L 105 101 98 97 102 105 106  --    CO2 mmol/L 31 30 34* 33 36* 34* 37*  --    ANION GAP mmol/L 5 8 7 8 8 8 9  --    BUN mg/dL 12 11 13 13 16 19 28*  --    CREATININE mg/dL 0 73 0 69 0 72 0 78 0 83 0 88 0 96  --    CALCIUM mg/dL 8 7 8 1* 8 6 8 6 9 0 9 5 9 6  --    EGFR ml/min/1 73sq m 75 79 76 69 64 60 54  --    GLUCOSE RANDOM mg/dL 101 107 112 107 129 145* 124  --        Creatinine is greater than her baseline of 1 4-1 5  Currently creatinine at baseline  Monitor BMP daily    Hypokalemia  Assessment & Plan  Replete accordingly  Most likely due to metabolic alkalosis in setting of NG tube drainage - expected to improve as NG tube has been that removed    Results from last 7 days   Lab Units 09/04/21  0531 09/03/21  0516 09/02/21  0600 09/01/21  0519 08/31/21  0607 08/30/21  0707 08/29/21  0548 08/28/21  2140   SODIUM mmol/L 141 139 139 138 146* 147* 152*  --    POTASSIUM mmol/L 3 8 3 1* 2 8* 3 2* 3 4* 3 8 3 2* 3 3*   CHLORIDE mmol/L 105 101 98 97 102 105 106  --    CO2 mmol/L 31 30 34* 33 36* 34* 37*  --    ANION GAP mmol/L 5 8 7 8 8 8 9  --    BUN mg/dL 12 11 13 13 16 19 28*  --    CREATININE mg/dL 0 73 0 69 0 72 0 78 0 83 0 88 0 96  --    CALCIUM mg/dL 8 7 8 1* 8 6 8 6 9 0 9 5 9 6  --    EGFR ml/min/1 73sq m 75 79 76 69 64 60 54  --    GLUCOSE RANDOM mg/dL 101 107 112 107 129 145* 124  --      Will place on oral potassium supplementation on discharge     Hypertension  Assessment & Plan  · Amlodipine and metoprolol with holding parameters  · No longer septic, will resume home Lasix      Blood pressure 126/60, pulse 89, temperature (!) 96 8 °F (36 °C), temperature source Tympanic, resp  rate 17, height 5' (1 524 m), weight 92 6 kg (204 lb 2 3 oz), SpO2 94 %  Hyperlipidemia  Assessment & Plan  Continue Lipitor for now    Chronic diastolic congestive heart failure (HCC)  Assessment & Plan  Wt Readings from Last 3 Encounters:   08/25/21 92 6 kg (204 lb 2 3 oz)   08/22/21 95 3 kg (210 lb 1 6 oz)   02/19/20 87 5 kg (193 lb)     Currently lasix on hold in light of sepsis and reduced p o  Intake  I's and O's, daily weight  Resume home Lasix    Diabetes mellitus [facility])  Assessment & Plan  Lab Results   Component Value Date    HGBA1C 6 7 (H) 08/19/2021       Recent Labs     09/03/21  2332 09/04/21  0534 09/04/21  0724 09/04/21  1102   POCGLU 132 107 100 136       Blood Sugar Average: Last 72 hrs:  (P) 129 3125     Accu-Cheks every 6 hourly and sliding scale insulin  Hypoglycemic protocol  Constipation  Assessment & Plan  Chronic  Resume home stool softener      Ambulatory dysfunction  Assessment & Plan  PT and OT consult, recommended acute post rehab  Frequent falls  Assessment & Plan  PT and OT evaluations, recommend acute post rehab  Family in agreement    Age-related cognitive decline  Assessment & Plan  Supportive care  Metabolic alkalosis-resolved as of 9/3/2021  Assessment & Plan  HCO of 34  Most likely due to NG tube drainage with consequential loss of gastric HCl  SpO2 of 98% on 2L NC O2  VBG revealed alkalemia of 7 4      VTE Pharmacologic Prophylaxis:   VTE Score: 5 Moderate Risk (Score 3-4) - Pharmacological DVT Prophylaxis Contraindicated  Sequential Compression Devices Ordered  Mechanical VTE Prophylaxis in Place: Yes    Patient Centered Rounds: I have performed bedside rounds with nursing staff today      Discussions with Specialists or Other Care Team Provider: ID    Education and Discussions with Family / Patient: Patient    Current Length of Stay: 10 day(s)    Current Patient Status: Inpatient     Discharge Plan / Estimated Discharge Date: When bed is available at [facility]    Code Status: Level 3 - DNAR and DNI      Subjective:   Patient seen and examined at bedside  Now having normal bowel movement  At this time, patient is medically stable to be discharged  Objective:     Vitals:   Temp (24hrs), Av 5 °F (36 4 °C), Min:96 8 °F (36 °C), Max:98 1 °F (36 7 °C)    Temp:  [96 8 °F (36 °C)-98 1 °F (36 7 °C)] 98 1 °F (36 7 °C)  HR:  [80-89] 80  Resp:  [17-18] 18  BP: (126-139)/(59-60) 139/59  SpO2:  [93 %-94 %] 93 %  Body mass index is 39 87 kg/m²  Input and Output Summary (last 24 hours): Intake/Output Summary (Last 24 hours) at 2021 1302  Last data filed at 9/3/2021 1500  Gross per 24 hour   Intake --   Output 650 ml   Net -650 ml       Physical Exam:     Physical Exam  Vitals reviewed  Constitutional:       General: She is not in acute distress  Appearance: She is not ill-appearing or toxic-appearing  HENT:      Head: Normocephalic and atraumatic  Cardiovascular:      Rate and Rhythm: Normal rate and regular rhythm  Pulses: Normal pulses  Pulmonary:      Effort: No respiratory distress  Breath sounds: Normal breath sounds  No wheezing  Abdominal:      General: Bowel sounds are normal  There is no distension  Palpations: Abdomen is soft  Tenderness: There is no abdominal tenderness  Musculoskeletal:      Right lower leg: No edema  Left lower leg: No edema  Skin:     Coloration: Skin is not jaundiced or pale  Neurological:      General: No focal deficit present  Mental Status: She is alert and oriented to person, place, and time           Additional Data:     Labs:  Results from last 7 days   Lab Units 21  0516   WBC Thousand/uL 9 87   HEMOGLOBIN g/dL 10 9*   HEMATOCRIT % 34 3*   PLATELETS Thousands/uL 302   NEUTROS PCT % 71   LYMPHS PCT % 14   MONOS PCT % 9   EOS PCT % 4     Results from last 7 days   Lab Units 21  0531   SODIUM mmol/L 141   POTASSIUM mmol/L 3 8   CHLORIDE mmol/L 105   CO2 mmol/L 31   BUN mg/dL 12   CREATININE mg/dL 0 73   ANION GAP mmol/L 5   CALCIUM mg/dL 8 7   GLUCOSE RANDOM mg/dL 101         Results from last 7 days   Lab Units 09/04/21  1102 09/04/21  0724 09/04/21  0534 09/03/21  2332 09/03/21  1559 09/03/21  1112 09/03/21  0538 09/03/21  0000 09/02/21  1711 09/02/21  1126 09/02/21  0650 09/01/21  2327   POC GLUCOSE mg/dl 136 100 107 132 143* 172* 116 121 134 169* 140 122               Imaging: Reviewed radiology reports from this admission including: xray(s)    Recent Cultures (last 7 days):           Lines/Drains:  Invasive Devices     Peripheral Intravenous Line            Peripheral IV 09/01/21 Dorsal (posterior); Left Forearm 3 days          Drain            Urethral Catheter Non-latex 16 Fr  9 days                Telemetry:        Last 24 Hours Medication List:   Current Facility-Administered Medications   Medication Dose Route Frequency Provider Last Rate    acetaminophen  650 mg Oral Q6H PRN Autry Jeans, MD      aluminum-magnesium hydroxide-simethicone  30 mL Oral Q6H PRN Autry Jeans, MD      atorvastatin  10 mg Oral Daily With Jazmin Kamara MD      furosemide  80 mg Oral BID (diuretic) Tello Potts MD      heparin (porcine)  5,000 Units Subcutaneous Q8H [facility] Kate Dickey MD      insulin lispro  1-5 Units Subcutaneous Q6H Donell Lucero MD      levothyroxine  50 mcg Oral Early Morning Autry Jeans, MD      magnesium hydroxide  30 mL Oral Daily PRN Autry Jeans, MD      metoprolol tartrate  25 mg Oral BID Autry Jeans, MD      nystatin   Topical BID Autry Jeans, MD      ondansetron  4 mg Intravenous Q4H PRN Jamie Varghese MD      oxyCODONE  2 5 mg Oral Q4H PRN Autry Jeans, MD      pantoprazole  40 mg Intravenous Q12H [facility] KASIE Hameed      potassium chloride  20 mEq Oral BID Kate Dickey MD          Today, Patient Was Seen By: Amber Gtz MD    ** Please Note: This note has been constructed using a voice recognition system   **

## 2021-09-04 NOTE — CASE MANAGEMENT
CM informed by [facility] via [facility] that BLS to [facility] has been set up for patient for 10am  CM infomed [facility] via [employer] stat COVID test needs to be ordered for patient  [facility] ordered test, and CM informed Nurse Jim Veliz via [address] and facility via [facility]  [place] notified of transport and COVID test ordered  At 9:32am CM informed by [facility] lab over the phone patient COVID test woould not be done in time for 10am transport due to lab being unaware COVID test was STAT  [address] at this time and [facility] notified CM the latest they can accept patient is 11am due to staffing  CM called [facility] and spoke with Charly Massey who stated the only other transport time for patient is 7pm  CM asked if Charly Massey could get patient transported at [facility] stated he let CM know of earliest  time tomorrow morning  CM informed by Charly Massey via [employer] at 9:42am that earliest  for patient is 9:30am  [facility] and Nurse Jim Veliz informed via [employer], facility informed via [place], patient daughter Suri Jauregui informed via phone call to number on file  DCP: Return to [facility] with post acute rehab services

## 2021-09-04 NOTE — PLAN OF CARE
Problem: PAIN - ADULT  Goal: Verbalizes/displays adequate comfort level or baseline comfort level  Description: Interventions:  - Encourage patient to monitor pain and request assistance  - Assess pain using appropriate pain scale  - Administer analgesics based on type and severity of pain and evaluate response  - Implement non-pharmacological measures as appropriate and evaluate response  - Consider cultural and social influences on pain and pain management  - Notify physician/advanced practitioner if interventions unsuccessful or patient reports new pain  Outcome: Progressing     Problem: INFECTION - ADULT  Goal: Absence or prevention of progression during hospitalization  Description: INTERVENTIONS:  - Assess and monitor for signs and symptoms of infection  - Monitor lab/diagnostic results  - Monitor all insertion sites, i e  indwelling lines, tubes, and drains  - Monitor endotracheal if appropriate and nasal secretions for changes in amount and color  - Bulger appropriate cooling/warming therapies per order  - Administer medications as ordered  - Instruct and encourage patient and family to use good hand hygiene technique  - Identify and instruct in appropriate isolation precautions for identified infection/condition  Outcome: Progressing     Problem: DISCHARGE PLANNING  Goal: Discharge to home or other facility with appropriate resources  Description: INTERVENTIONS:  - Identify barriers to discharge w/patient and caregiver  - Arrange for needed discharge resources and transportation as appropriate  - Identify discharge learning needs (meds, wound care, etc )  - Arrange for interpretive services to assist at discharge as needed  - Refer to Case Management Department for coordinating discharge planning if the patient needs post-hospital services based on physician/advanced practitioner order or complex needs related to functional status, cognitive ability, or social support system  Outcome: Progressing

## 2021-09-05 VITALS
OXYGEN SATURATION: 92 % | WEIGHT: 204.15 LBS | SYSTOLIC BLOOD PRESSURE: 132 MMHG | DIASTOLIC BLOOD PRESSURE: 72 MMHG | TEMPERATURE: 97.4 F | HEIGHT: 60 IN | HEART RATE: 89 BPM | BODY MASS INDEX: 40.08 KG/M2 | RESPIRATION RATE: 18 BRPM

## 2021-09-05 LAB — GLUCOSE SERPL-MCNC: 132 MG/DL (ref 65–140)

## 2021-09-05 PROCEDURE — 82948 REAGENT STRIP/BLOOD GLUCOSE: CPT

## 2021-09-05 RX ORDER — PANTOPRAZOLE SODIUM 40 MG/1
40 TABLET, DELAYED RELEASE ORAL DAILY
Qty: 30 TABLET | Refills: 0
Start: 2021-09-05

## 2021-09-05 RX ORDER — PANTOPRAZOLE SODIUM 40 MG/1
40 TABLET, DELAYED RELEASE ORAL
Status: DISCONTINUED | OUTPATIENT
Start: 2021-09-05 | End: 2021-09-05 | Stop reason: HOSPADM

## 2021-09-05 RX ORDER — POTASSIUM CHLORIDE 20MEQ/15ML
20 LIQUID (ML) ORAL 2 TIMES DAILY
Qty: 30 ML | Refills: 0
Start: 2021-09-05

## 2021-09-05 RX ORDER — TAMSULOSIN HYDROCHLORIDE 0.4 MG/1
0.4 CAPSULE ORAL
Qty: 30 CAPSULE | Refills: 0
Start: 2021-09-05

## 2021-09-05 RX ORDER — TAMSULOSIN HYDROCHLORIDE 0.4 MG/1
0.4 CAPSULE ORAL
Status: DISCONTINUED | OUTPATIENT
Start: 2021-09-05 | End: 2021-09-05 | Stop reason: HOSPADM

## 2021-09-05 RX ADMIN — PANTOPRAZOLE SODIUM 40 MG: 40 TABLET, DELAYED RELEASE ORAL at 05:15

## 2021-09-05 RX ADMIN — POTASSIUM CHLORIDE 20 MEQ: 20 SOLUTION ORAL at 09:03

## 2021-09-05 RX ADMIN — HEPARIN SODIUM 5000 UNITS: 5000 INJECTION INTRAVENOUS; SUBCUTANEOUS at 05:15

## 2021-09-05 RX ADMIN — FUROSEMIDE 80 MG: 80 TABLET ORAL at 09:03

## 2021-09-05 RX ADMIN — METOPROLOL TARTRATE 25 MG: 25 TABLET, FILM COATED ORAL at 09:02

## 2021-09-05 RX ADMIN — LEVOTHYROXINE SODIUM 50 MCG: 50 TABLET ORAL at 05:15

## 2021-09-13 ENCOUNTER — TELEPHONE (OUTPATIENT)
Dept: UROLOGY | Facility: AMBULATORY SURGERY CENTER | Age: 86
End: 2021-09-13

## 2021-09-13 NOTE — TELEPHONE ENCOUNTER
Elva Castillo from [facility] skilled facility is callign to say patient had pinno placed in hospital for urinary retention  She is asking if she needs a follow up with urology or they can do voiding trial and remove pinon there  Patient is not having any urinry issues at this time  Please call [phone] x [phone]

## 2021-10-20 ENCOUNTER — REMOTE DEVICE CLINIC VISIT (OUTPATIENT)
Dept: CARDIOLOGY CLINIC | Facility: CLINIC | Age: 86
End: 2021-10-20
Payer: COMMERCIAL

## 2021-10-20 DIAGNOSIS — Z95.0 CARDIAC PACEMAKER IN SITU: Primary | ICD-10-CM

## 2021-10-20 PROCEDURE — 93294 REM INTERROG EVL PM/LDLS PM: CPT | Performed by: INTERNAL MEDICINE

## 2021-10-20 PROCEDURE — 93296 REM INTERROG EVL PM/IDS: CPT | Performed by: INTERNAL MEDICINE

## 2021-11-12 ENCOUNTER — OFFICE VISIT (OUTPATIENT)
Dept: CARDIAC SURGERY | Facility: CLINIC | Age: 86
End: 2021-11-12
Payer: MEDICARE

## 2021-11-12 VITALS
WEIGHT: 196.1 LBS | HEART RATE: 73 BPM | SYSTOLIC BLOOD PRESSURE: 118 MMHG | DIASTOLIC BLOOD PRESSURE: 62 MMHG | BODY MASS INDEX: 38.5 KG/M2 | HEIGHT: 60 IN | OXYGEN SATURATION: 95 %

## 2021-11-12 DIAGNOSIS — Z95.2 S/P TAVR (TRANSCATHETER AORTIC VALVE REPLACEMENT): Primary | ICD-10-CM

## 2021-11-12 DIAGNOSIS — Z95.0 HISTORY OF CARDIAC PACEMAKER: ICD-10-CM

## 2021-11-12 DIAGNOSIS — E78.5 HYPERLIPIDEMIA, UNSPECIFIED HYPERLIPIDEMIA TYPE: ICD-10-CM

## 2021-11-12 DIAGNOSIS — Z95.5 STATUS POST INSERTION OF DRUG-ELUTING STENT INTO LEFT ANTERIOR DESCENDING (LAD) ARTERY: ICD-10-CM

## 2021-11-12 PROCEDURE — 99214 OFFICE O/P EST MOD 30 MIN: CPT | Performed by: INTERNAL MEDICINE

## 2021-11-12 RX ORDER — MINERAL OIL, PETROLATUM 425; 573 MG/G; MG/G
OINTMENT OPHTHALMIC
COMMUNITY

## 2021-11-12 RX ORDER — METOLAZONE 2.5 MG/1
TABLET ORAL
COMMUNITY
Start: 2021-08-11

## 2021-11-12 RX ORDER — SPIRONOLACTONE 25 MG/1
25 TABLET ORAL DAILY
COMMUNITY

## 2022-01-19 ENCOUNTER — REMOTE DEVICE CLINIC VISIT (OUTPATIENT)
Dept: CARDIOLOGY CLINIC | Facility: CLINIC | Age: 87
End: 2022-01-19
Payer: MEDICARE

## 2022-01-19 DIAGNOSIS — Z95.0 PRESENCE OF PERMANENT CARDIAC PACEMAKER: Primary | ICD-10-CM

## 2022-01-19 PROCEDURE — 93296 REM INTERROG EVL PM/IDS: CPT | Performed by: INTERNAL MEDICINE

## 2022-01-19 PROCEDURE — 93294 REM INTERROG EVL PM/LDLS PM: CPT | Performed by: INTERNAL MEDICINE

## 2022-01-19 NOTE — PROGRESS NOTES
BIOT DUAL CHAMBER  PPM (DDD MODE)/ ACTIVE SYSTEM IS MRI CONDITIONAL   BIOTRONIK TRANSMISSION:  BATTERY STATUS "OK "  45% REMAINING   AP 0%  100% (>40%/CHB)   ALL LEAD PARAMETERS WITHIN NORMAL LIMITS   NO SIGNIFICANT HIGH RATE EPISODES   NORMAL DEVICE FUNCTION   RG

## 2022-04-20 ENCOUNTER — REMOTE DEVICE CLINIC VISIT (OUTPATIENT)
Dept: CARDIOLOGY CLINIC | Facility: CLINIC | Age: 87
End: 2022-04-20
Payer: MEDICARE

## 2022-04-20 DIAGNOSIS — Z95.0 PRESENCE OF PERMANENT CARDIAC PACEMAKER: Primary | ICD-10-CM

## 2022-04-20 PROCEDURE — 93296 REM INTERROG EVL PM/IDS: CPT | Performed by: INTERNAL MEDICINE

## 2022-04-20 PROCEDURE — 93294 REM INTERROG EVL PM/LDLS PM: CPT | Performed by: INTERNAL MEDICINE

## 2022-04-20 NOTE — PROGRESS NOTES
Results for orders placed or performed in visit on 04/20/22   Cardiac EP device report    Narrative    BIOTRONIK DUAL CHAMBER  PPM (DDD MODE)/ ACTIVE SYSTEM IS MRI CONDITIONAL  BIOTRONIK TRANSMISSION: BATTERY VOLTAGE ADEQUATE  (45%) AP 0%  100%  ALL AVAILABLE LEAD PARAMETERS WITHIN NORMAL LIMITS  NO SIGNIFICANT HIGH RATE EPISODES  NORMAL DEVICE FUNCTION  ---CHASE

## 2022-07-01 ENCOUNTER — IN-CLINIC DEVICE VISIT (OUTPATIENT)
Dept: CARDIOLOGY CLINIC | Facility: CLINIC | Age: 87
End: 2022-07-01
Payer: MEDICARE

## 2022-07-01 DIAGNOSIS — Z95.0 CARDIAC PACEMAKER IN SITU: Primary | ICD-10-CM

## 2022-07-01 PROCEDURE — 93280 PM DEVICE PROGR EVAL DUAL: CPT | Performed by: INTERNAL MEDICINE

## 2022-07-01 NOTE — PROGRESS NOTES
Results for orders placed or performed in visit on 07/01/22   Cardiac EP device report    Narrative    BIOTRONIK DUAL CHAMBER  PPM (DDD MODE)/ ACTIVE SYSTEM IS MRI CONDITIONAL  DEVICE INTERROGATED IN THE [place] OFFICE: BATTERY VOLTAGE ADEQUATE-45%  AP 0%  100%  ALL AVAILABLE LEAD PARAMETERS WITHIN NORMAL LIMITS  NO SIGNIFICANT HIGH RATE EPISODES  NO PROGRAMMING CHANGES MADE TO DEVICE PARAMETERS  NORMAL DEVICE FUNCTION   NC

## 2022-07-12 ENCOUNTER — HOSPITAL ENCOUNTER (OUTPATIENT)
Dept: NON INVASIVE DIAGNOSTICS | Facility: HOSPITAL | Age: 87
Discharge: HOME | End: 2022-07-12
Attending: INTERNAL MEDICINE
Payer: MEDICARE

## 2022-07-12 VITALS
HEIGHT: 60 IN | BODY MASS INDEX: 38.48 KG/M2 | SYSTOLIC BLOOD PRESSURE: 126 MMHG | HEART RATE: 61 BPM | WEIGHT: 196 LBS | DIASTOLIC BLOOD PRESSURE: 68 MMHG

## 2022-07-12 DIAGNOSIS — Z95.2 S/P TAVR (TRANSCATHETER AORTIC VALVE REPLACEMENT): ICD-10-CM

## 2022-07-12 LAB
AORTIC ROOT: 3 CM
AORTIC VALVE MEAN VELOCITY: 16.9 M/S
AV AREA BY CONTINUOUS VTI: 1.2 CM2
AV AREA PEAK VELOCITY: 1.4 CM2
AV LVOT MEAN GRADIENT: 2 MMHG
AV LVOT PEAK GRADIENT: 4 MMHG
AV MEAN GRADIENT: 13 MMHG
AV PEAK GRADIENT: 21 MMHG
AV VALVE AREA: 1.2 CM2
AV VELOCITY RATIO: 0.44
DOP CALC AO PEAK VEL: 2.28 M/S
DOP CALC AO VTI: 59.01 CM
DOP CALC LVOT AREA: 3.14 CM2
DOP CALC LVOT DIAMETER: 2 CM
DOP CALC LVOT PEAK VEL VTI: 22.62 CM
DOP CALC LVOT PEAK VEL: 1 M/S
DOP CALC LVOT STROKE INDEX: 36.8 ML/M2
DOP CALC LVOT STROKE VOLUME: 71.03 CM3
E WAVE DECELERATION TIME: 245 MS
FRACTIONAL SHORTENING: 39 % (ref 28–44)
GLOBAL LONGITUIDAL STRAIN: -12 %
INTERVENTRICULAR SEPTUM IN DIASTOLE (PARASTERNAL SHORT AXIS VIEW): 1.2 CM
INTERVENTRICULAR SEPTUM: 1.2 CM (ref 0.6–1.1)
LAAS-AP2: 15.7 CM2
LAAS-AP4: 21.7 CM2
LEFT ATRIUM SIZE: 3.3 CM
LEFT INTERNAL DIMENSION IN SYSTOLE: 3.1 CM (ref 2.1–4)
LEFT VENTRICULAR INTERNAL DIMENSION IN DIASTOLE: 5.1 CM (ref 3.5–6)
LEFT VENTRICULAR POSTERIOR WALL IN END DIASTOLE: 1.3 CM
LEFT VENTRICULAR STROKE VOLUME: 83 ML
LVSV (TEICH): 83 ML
MV E'TISSUE VEL-LAT: 6 CM/S
MV E'TISSUE VEL-SEP: 5 CM/S
MV PEAK A VEL: 1.58 M/S
MV PEAK E VEL: 122 CM/S
MV STENOSIS PRESSURE HALF TIME: 71 MS
MV VALVE AREA P 1/2 METHOD: 3.1 CM2
RA PRESSURE ESTIMATED: 3 MMHG
RIGHT ATRIAL 2D VOLUME: 50 ML
RIGHT ATRIUM AREA SYSTOLE A4C: 17.6 CM2
RV PSP: 36 MMHG
SL CV LEFT ATRIUM LENGTH A2C: 4.6 CM
SL CV LV EF: 55
SL CV PED ECHO LEFT VENTRICLE DIASTOLIC VOLUME (MOD BIPLANE) 2D: 122 ML
SL CV PED ECHO LEFT VENTRICLE SYSTOLIC VOLUME (MOD BIPLANE) 2D: 39 ML
TR MAX PG: 33 MMHG
TR PEAK VELOCITY: 2.9 M/S
TRICUSPID VALVE PEAK REGURGITATION VELOCITY: 2.86 M/S

## 2022-07-12 PROCEDURE — 93306 TTE W/DOPPLER COMPLETE: CPT

## 2022-10-06 ENCOUNTER — REMOTE DEVICE CLINIC VISIT (OUTPATIENT)
Dept: CARDIOLOGY CLINIC | Facility: CLINIC | Age: 87
End: 2022-10-06
Payer: MEDICARE

## 2022-10-06 DIAGNOSIS — Z95.0 CARDIAC PACEMAKER IN SITU: Primary | ICD-10-CM

## 2022-10-06 PROCEDURE — 93296 REM INTERROG EVL PM/IDS: CPT | Performed by: INTERNAL MEDICINE

## 2022-10-06 PROCEDURE — 93294 REM INTERROG EVL PM/LDLS PM: CPT | Performed by: INTERNAL MEDICINE

## 2022-10-07 NOTE — PROGRESS NOTES
Results for orders placed or performed in visit on 10/06/22   Cardiac EP device report    Narrative    BIOTRONIK DUAL CHAMBER  PPM (DDD MODE)/ ACTIVE SYSTEM IS MRI CONDITIONAL  BIOTRONIK TRANSMISSION: BATTERY STATUS "40% " AP 0%  100%  ALL AVAILABLE LEAD PARAMETERS WITHIN NORMAL LIMITS  NO SIGNIFICANT HIGH RATE EPISODES  NORMAL DEVICE FUNCTION   NC

## 2022-10-12 PROBLEM — N39.0 UTI (URINARY TRACT INFECTION): Status: RESOLVED | Noted: 2021-08-19 | Resolved: 2022-10-12

## 2022-10-12 PROBLEM — A41.9 SEPSIS (HCC): Status: RESOLVED | Noted: 2021-08-25 | Resolved: 2022-10-12

## 2022-11-08 ENCOUNTER — APPOINTMENT (EMERGENCY)
Dept: CT IMAGING | Facility: HOSPITAL | Age: 87
End: 2022-11-08

## 2022-11-08 ENCOUNTER — HOSPITAL ENCOUNTER (EMERGENCY)
Facility: HOSPITAL | Age: 87
Discharge: NURSING, REHAB OR LONG-TERM CARE | End: 2022-11-08
Attending: EMERGENCY MEDICINE

## 2022-11-08 ENCOUNTER — APPOINTMENT (EMERGENCY)
Dept: RADIOLOGY | Facility: HOSPITAL | Age: 87
End: 2022-11-08

## 2022-11-08 VITALS
HEART RATE: 94 BPM | SYSTOLIC BLOOD PRESSURE: 147 MMHG | DIASTOLIC BLOOD PRESSURE: 65 MMHG | RESPIRATION RATE: 18 BRPM | OXYGEN SATURATION: 95 % | TEMPERATURE: 97.7 F
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
## 2022-11-08 DIAGNOSIS — S01.81XA FACIAL LACERATION, INITIAL ENCOUNTER: ICD-10-CM

## 2022-11-08 RX ORDER — ACETAMINOPHEN 325 MG/1
650 TABLET ORAL ONCE
Status: COMPLETED | OUTPATIENT
Start: 2022-11-08 | End: 2022-11-08

## 2022-11-08 RX ORDER — LIDOCAINE HYDROCHLORIDE AND EPINEPHRINE 10; 10 MG/ML; UG/ML
10 INJECTION, SOLUTION INFILTRATION; PERINEURAL ONCE
Status: COMPLETED | OUTPATIENT
Start: 2022-11-08 | End: 2022-11-08

## 2022-11-08 RX ORDER — GINSENG 100 MG
1 CAPSULE ORAL ONCE
Status: COMPLETED | OUTPATIENT
Start: 2022-11-08 | End: 2022-11-08

## 2022-11-08 RX ADMIN — BACITRACIN ZINC 1 SMALL APPLICATION: 500 OINTMENT TOPICAL at 20:23

## 2022-11-08 RX ADMIN — TETANUS TOXOID, REDUCED DIPHTHERIA TOXOID AND ACELLULAR PERTUSSIS VACCINE, ADSORBED 0.5 ML: 5; 2.5; 8; 8; 2.5 SUSPENSION INTRAMUSCULAR at 17:47

## 2022-11-08 RX ADMIN — LIDOCAINE HYDROCHLORIDE,EPINEPHRINE BITARTRATE 10 ML: 10; .01 INJECTION, SOLUTION INFILTRATION; PERINEURAL at 17:47

## 2022-11-08 RX ADMIN — ACETAMINOPHEN 650 MG: 325 TABLET ORAL at 22:11

## 2022-11-08 NOTE — ED PROVIDER NOTES
Pt Name: Christen Hutton  MRN: [id number]  [place] 1935  Age/Sex: 80 y o  female  Date of evaluation: 11/8/2022  PCP: Ernestine Resendiz MD    [address] [place] [address]    Chief Complaint   Patient presents with   • Fall     Arrives via ems pt from [facility], witnessed fall, pt states she stumbled, falling forward  Right eye laceration due to hitting nightstand  Hematoma to right elbow  C/o right shoulder pain  No loc no thinners  HPI    James Tapia presents to the Emergency Department complaining of pain after a fall  She lives at [facility] and fell and hit a wooden table  No LOC  She has chronic right shoulder pain but it is more painful that usual   She has a laceration on her right eyebow that has been bleeding  She has no significant headache or nausea          HPI      Past Medical and Surgical History    Past Medical History:   Diagnosis Date   • Anemia     Resolved 3/1/2017    • Arthritis     Knee - Resolved 3/1/2017    • Blind    • CAD (coronary artery disease)     s/p HERNAN 6/2016   • Calcaneal spur    • CHF (congestive heart failure) ([facility])    • Chronic knee instability     Resolved 3/1/2017    • Chronic pain syndrome     Resolved 3/1/2017    • Dementia ([facility] [address] )     Last assessed 11/1/2017    • Diabetes mellitus ([facility] [address] )     non-insulin depdenent   • Disease of thyroid gland    • Diverticulitis    • Essential thrombocytopenia ([facility] [address] )     Last assessed 11/1/2017    • GERD (gastroesophageal reflux disease)    • History of aortic valve replacement     Resolved 3/1/2017    • History of bilateral knee replacement     Resolved 3/1/2017    • History of TIA (transient ischemic attack)     no residual deficits   • Hyperlipidemia    • Hypertension    • Hypoxia     on home O2 QHS   • Leukocytosis     Resolved 3/1/2017    • Lumbar post-laminectomy syndrome     Resolved 3/1/2017    • Meniere disease    • Osteopenia    • Pacemaker     [facility] in 2/2015   • S/P TAVR (transcatheter aortic valve replacement)     Resolved 3/1/2017    • Severe aortic stenosis    • Thrombocytosis     Resolved 4/5/2017    • Type 2 diabetes mellitus ([facility] [address] )     Last assessed 11/1/2017        Past Surgical History:   Procedure Laterality Date   • APPENDECTOMY     • BACK SURGERY      Arthrodesis    • CARDIAC PACEMAKER PLACEMENT     • CHOLECYSTECTOMY     • EYE SURGERY     • FRACTURE SURGERY Right 01/02/2018    femur   • HYSTERECTOMY     • WY EGD TRANSORAL BIOPSY SINGLE/MULTIPLE N/A 11/9/2016    Procedure: ESOPHAGOGASTRODUODENOSCOPY (EGD); Surgeon: Erik Zarco MD;  Location: [facility]; Service: Gastroenterology   • WY OPEN RX FEMUR FX+INTRAMED GARRETT Right 1/2/2018    Procedure: INSERTION NAIL IM FEMUR ANTEGRADE (TROCHANTERIC) RIGHT;  Surgeon: Ana Fall MD;  Location: [facility];  Service: Orthopedics   • WY REPLACE AORTIC VALVE OPENFEMORAL ARTERY APPROACH N/A 7/26/2016    Procedure: TRANSFEMORAL TAVR WITH 23MM LAROSE LILY S3 VALVE; JOSE ALFREDO ;  Surgeon: Surya Ca DO;  Location: [facility];  Service: Cardiac Surgery   • SMALL INTESTINE SURGERY     • TONSILLECTOMY     • TOTAL KNEE ARTHROPLASTY     • VARICOSE VEIN SURGERY     • VENTRAL HERNIA REPAIR         Family History   Problem Relation Age of Onset   • Cancer Child    • Coronary artery disease Father         Native artery    • Stroke Family    • Osteopenia Family    • Other Family         Pituitary disease   • Polycythemia Family        Social History     Tobacco Use   • Smoking status: Never Smoker   • Smokeless tobacco: Never Used   Vaping Use   • Vaping Use: Never used   Substance Use Topics   • Alcohol use: Not Currently   • Drug use: No              Allergies    Allergies   Allergen Reactions   • Advil [Ibuprofen] GI Intolerance   • Propoxyphene Other (See Comments)     DARVOCET=ACID REFLUX   • Rofecoxib Other (See Comments)       Home Medications    Prior to Admission medications    Medication Sig Start Date End Date Taking?  Authorizing Provider   acetaminophen (TYLENOL) 325 mg tablet Take 2 tablets (650 mg total) by mouth every 6 (six) hours as needed for mild pain  Patient taking differently: Take 650 mg by mouth every 4 (four) hours as needed for mild pain As needed  7/28/16   Stanford Dorsey PA-C   amLODIPine (NORVASC) 5 mg tablet 5 mg daily   7/31/19   Historical Provider, MD   aspirin 81 mg chewable tablet Chew 81 mg daily  Historical Provider, MD   atorvastatin (LIPITOR) 10 mg tablet  7/31/19   Historical Provider, MD   bisacodyl (DULCOLAX) 5 mg EC tablet Take 5 mg by mouth daily as needed for constipation Two tabs by mouth once daily as needed  Historical Provider, MD   diclofenac sodium (VOLTAREN) 1 % Apply 2 g topically 4 (four) times a day as needed    Historical Provider, MD   Diclofenac Sodium (VOLTAREN) 1 %  8/16/21   Historical Provider, MD   ferrous sulfate 325 (65 Fe) mg tablet Take 325 mg by mouth daily with breakfast  Patient not taking: Reported on 11/12/2021     Historical Provider, MD   furosemide (LASIX) 40 mg tablet Take 2 tablets (80 mg total) by mouth 2 (two) times a day 8/22/21   Trudi Nissen [facility],    Glucagon, rDNA, (Glucagon Emergency) 1 MG KIT  4/19/21   Historical Provider, MD   ketoconazole (NIZORAL) 2 % cream daily  7/17/19   Historical Provider, MD   levothyroxine 50 mcg tablet Take 50 mcg by mouth daily    Historical Provider, MD   loperamide (IMODIUM A-D) 2 MG tablet Take 2 mg by mouth 3 (three) times a day as needed for diarrhea  Patient not taking: Reported on 11/12/2021     Historical Provider, MD   magnesium hydroxide (MILK OF MAGNESIA) 400 mg/5 mL oral suspension Take 30 mL by mouth daily as needed for constipation Once daily as needed  Historical Provider, MD   metolazone (ZAROXOLYN) 2 5 mg tablet  8/11/21   Historical Provider, MD   metoprolol tartrate (LOPRESSOR) 50 mg tablet Take 1 tablet (50 mg total) by mouth 2 (two) times a day   7/28/16   Stanford Dorsey PA-C   Multiple Vitamins-Minerals (OCUVITE ADULT 50+ PO) Take by mouth daily Historical Provider, MD   nystatin (MYCOSTATIN) powder Apply topically 2 (two) times a day    Historical Provider, MD   oxyCODONE (ROXICODONE) 5 mg immediate release tablet Take 0 5 tablets (2 5 mg total) by mouth every 4 (four) hours as needed for moderate pain or severe pain for up to 10 dosesMax Daily Amount: 15 mg 8/22/21   Prashant Durham,    pantoprazole (PROTONIX) 40 mg tablet Take 1 tablet (40 mg total) by mouth daily 9/5/21   Bharti Nguyen MD   potassium chloride (K-DUR,KLOR-CON) 20 mEq tablet Take 20 mEq by mouth 4 (four) times a day      Historical Provider, MD   potassium chloride 10% oral solution Take 15 mL (20 mEq total) by mouth 2 (two) times a day  Patient not taking: Reported on 11/12/2021 9/5/21   Bharti Nguyen MD   spironolactone (ALDACTONE) 25 mg tablet Take 25 mg by mouth daily One time daily for chf    Historical Provider, MD   tamsulosin (FLOMAX) 0 4 mg Take 1 capsule (0 4 mg total) by mouth daily with dinner 9/5/21   Bharti Nguyen MD   tetrahydrozoline (VISINE) 0 05 % ophthalmic solution 1 drop 4 (four) times a day as needed for irritation  Patient not taking: Reported on 11/12/2021     Historical Provider, MD   White Petrolatum-Mineral Oil (artificial tears) Administer to both eyes 1 drop in each eye every 12 hrs for dry eyes  Historical Provider, MD           Review of Systems    Review of Systems   Constitutional: Negative for chills and fever  HENT: Negative for ear pain and sore throat  Eyes: Positive for visual disturbance  Negative for pain  Respiratory: Negative for cough and shortness of breath  Cardiovascular: Negative for chest pain and palpitations  Gastrointestinal: Negative for abdominal pain and vomiting  Genitourinary: Negative for dysuria and hematuria  Musculoskeletal: Negative for arthralgias and back pain  Skin: Negative for color change and rash  Neurological: Negative for seizures and syncope  All other systems reviewed and are negative  Physical Exam      ED Triage Vitals [11/08/22 1719]   Temperature Pulse Respirations Blood Pressure SpO2   97 7 °F (36 5 °C) 76 18 (!) 172/72 96 %      Temp Source Heart Rate Source Patient Position - Orthostatic VS BP Location FiO2 (%)   Oral Monitor Lying Left arm --      Pain Score       6               Physical Exam  Vitals and nursing note reviewed  Constitutional:       General: She is not in acute distress  Appearance: She is well-developed  HENT:      Head: Normocephalic  Contusion and laceration present  Eyes:      Conjunctiva/sclera: Conjunctivae normal    Cardiovascular:      Rate and Rhythm: Normal rate and regular rhythm  Heart sounds: No murmur heard  Pulmonary:      Effort: Pulmonary effort is normal  No respiratory distress  Breath sounds: Normal breath sounds  Abdominal:      Palpations: Abdomen is soft  Tenderness: There is no abdominal tenderness  Musculoskeletal:      Right shoulder: Tenderness and bony tenderness present  No swelling, deformity, effusion or laceration  Decreased range of motion  Right elbow: Swelling present  Arms:       Cervical back: Neck supple  Skin:     General: Skin is warm and dry  Neurological:      Mental Status: She is alert  Assessment and Plan    Bhavesh Hamm is a 80 y o  female who presents with injuries from a fall  Physical examination remarkable for right eyebrow lac and ecchymosis  Differential diagnosis (not completely inclusive) includes acute traumatic injury  Plan will be to perform diagnostic testing and treat symptomatically  MDM    Diagnostic Results          Radiology:    CT head without contrast   Final Result      No acute intracranial abnormality  No acute facial fracture appreciated                    Workstation performed: [id number]         CT spine cervical without contrast   Final Result      No acute cervical spine fracture or traumatic malalignment  Workstation performed: [id number]         CT facial bones without contrast   Final Result      No acute intracranial abnormality  No acute facial fracture appreciated  Workstation performed: [id number]         XR shoulder 2+ views RIGHT    (Results Pending)   XR elbow 3+ vw right    (Results Pending)       All radiology studies independently viewed by me and interpreted by the radiologist     Procedure    Laceration repair    Date/Time: 11/8/2022 11:52 PM  Performed by: Emili Mao DO  Authorized by: Emili Mao DO   Consent: Verbal consent obtained    Consent given by: patient  Patient identity confirmed: verbally with patient  Body area: head/neck  Location details: right eyebrow  Laceration length: 3 cm  Tendon involvement: none  Nerve involvement: none  Vascular damage: no  Anesthesia: local infiltration    Anesthesia:  Local Anesthetic: lidocaine 1% with epinephrine  Anesthetic total: 5 mL    Sedation:  Patient sedated: no      Wound Dehiscence:  Superficial Wound Dehiscence: simple closure      Procedure Details:  Irrigation solution: saline  Debridement: none  Degree of undermining: none  Skin closure: 4-0 nylon  Number of sutures: 4  Approximation: close  Approximation difficulty: simple  Patient tolerance: patient tolerated the procedure well with no immediate complications            ED Course of Care and Re-Assessments        Medications   lidocaine-epinephrine (XYLOCAINE/EPINEPHRINE) 1 %-1:100,000 injection 10 mL (10 mL Infiltration Given by Other 11/8/22 1747)   tetanus-diphtheria-acellular pertussis (BOOSTRIX) IM injection 0 5 mL (0 5 mL Intramuscular Given 11/8/22 1747)   bacitracin topical ointment 1 small application (1 small application Topical Given 11/8/22 2023)   acetaminophen (TYLENOL) tablet 650 mg (650 mg Oral Given 11/8/22 2211)           FINAL IMPRESSION    Final diagnoses:   Fall, initial encounter Facial laceration, initial encounter         DISPOSITION/PLAN      Time reflects when diagnosis was documented in both MDM as applicable and the Disposition within this note     Time User Action Codes Description Comment    11/8/2022  7:08 PM Jean Paul Beam Add [D36  UBCV] Fall, initial encounter     11/8/2022  7:08 PM Jean Paul Beam Add [S01 [id number]] Facial laceration, initial encounter       ED Disposition     ED Disposition   Discharge    Condition   Stable    Date/Time   Tue Nov 8, 2022  7:07 PM    Comment   Roberth Parris Day discharge to home/self care  Follow-up Information     Follow up With Specialties Details Why Contact Info Additional Information    Josi Gaston MD Internal Medicine   [address]  [address] [place] [id number]  [address] [facility] Emergency Department Emergency Medicine In 1 week For suture removal [facility] [id number]  [address] [facility] Emergency Department, [address] [place] [address], [address] [place] [address], [facility]            PATIENT REFERRED TO:    Josi Gaston MD  [address]  [address] [place] [phone]  [address] [facility] Emergency Department  [facility] [id number] [phone]  In 1 week  For suture removal      DISCHARGE MEDICATIONS:    Discharge Medication List as of 11/8/2022  7:11 PM      CONTINUE these medications which have NOT CHANGED    Details   acetaminophen (TYLENOL) 325 mg tablet Take 2 tablets (650 mg total) by mouth every 6 (six) hours as needed for mild pain  , Starting Thu 7/28/2016, Normal      amLODIPine (NORVASC) 5 mg tablet 5 mg daily  , Starting Wed 7/31/2019, Historical Med      aspirin 81 mg chewable tablet Chew 81 mg daily  , Historical Med      atorvastatin (LIPITOR) 10 mg tablet Starting Wed 7/31/2019, Historical Med      bisacodyl (DULCOLAX) 5 mg EC tablet Take 5 mg by mouth daily as needed for constipation Two tabs by mouth once daily as needed , Historical Med      diclofenac sodium (VOLTAREN) 1 % Apply 2 g topically 4 (four) times a day as needed, Historical Med      Diclofenac Sodium (VOLTAREN) 1 % Starting Mon 8/16/2021, Historical Med      ferrous sulfate 325 (65 Fe) mg tablet Take 325 mg by mouth daily with breakfast, Historical Med      furosemide (LASIX) 40 mg tablet Take 2 tablets (80 mg total) by mouth 2 (two) times a day, Starting Sun 8/22/2021, No Print      Glucagon, rDNA, (Glucagon Emergency) 1 MG KIT Starting Mon 4/19/2021, Historical Med      ketoconazole (NIZORAL) 2 % cream daily , Starting Wed 7/17/2019, Historical Med      levothyroxine 50 mcg tablet Take 50 mcg by mouth daily, Historical Med      loperamide (IMODIUM A-D) 2 MG tablet Take 2 mg by mouth 3 (three) times a day as needed for diarrhea, Historical Med      magnesium hydroxide (MILK OF MAGNESIA) 400 mg/5 mL oral suspension Take 30 mL by mouth daily as needed for constipation Once daily as needed , Historical Med      metolazone (ZAROXOLYN) 2 5 mg tablet Starting Wed 8/11/2021, Historical Med      metoprolol tartrate (LOPRESSOR) 50 mg tablet Take 1 tablet (50 mg total) by mouth 2 (two) times a day , Starting Thu 7/28/2016, Normal      Multiple Vitamins-Minerals (OCUVITE ADULT 50+ PO) Take by mouth daily, Historical Med      nystatin (MYCOSTATIN) powder Apply topically 2 (two) times a day, Historical Med      oxyCODONE (ROXICODONE) 5 mg immediate release tablet Take 0 5 tablets (2 5 mg total) by mouth every 4 (four) hours as needed for moderate pain or severe pain for up to 10 dosesMax Daily Amount: 15 mg, Starting Sun 8/22/2021, Print      pantoprazole (PROTONIX) 40 mg tablet Take 1 tablet (40 mg total) by mouth daily, Starting Sun 9/5/2021, No Print      potassium chloride (K-DUR,KLOR-CON) 20 mEq tablet Take 20 mEq by mouth 4 (four) times a day  , Historical Med      potassium chloride 10% oral solution Take 15 mL (20 mEq total) by mouth 2 (two) times a day, Starting Sun 9/5/2021, No Print      spironolactone (ALDACTONE) 25 mg tablet Take 25 mg by mouth daily One time daily for chf, Historical Med      tamsulosin (FLOMAX) 0 4 mg Take 1 capsule (0 4 mg total) by mouth daily with dinner, Starting Sun 9/5/2021, No Print      tetrahydrozoline (VISINE) 0 05 % ophthalmic solution 1 drop 4 (four) times a day as needed for irritation, Historical Med      White Petrolatum-Mineral Oil (artificial tears) Administer to both eyes 1 drop in each eye every 12 hrs for dry eyes  , Historical Med             No discharge procedures on file           Yesica Vera, [address] [place] [address],   11/09/22 0799

## 2022-11-09 NOTE — ED NOTES
Charge nurse made aware of need to transport pt back to facility   Pt walked to bathroom with walker assist x 1   Currently sitting in wheelchair daughter remains at bedside and aware of disposition  Right elbow covered with gauze and francheskax        Marilee Oakley RN  11/08/22 2022

## 2022-11-15 ENCOUNTER — OFFICE VISIT (OUTPATIENT)
Dept: CARDIAC SURGERY | Facility: CLINIC | Age: 87
End: 2022-11-15

## 2022-11-15 VITALS
BODY MASS INDEX: 39.84 KG/M2 | SYSTOLIC BLOOD PRESSURE: 115 MMHG | HEART RATE: 67 BPM | OXYGEN SATURATION: 96 % | WEIGHT: 204 LBS | DIASTOLIC BLOOD PRESSURE: 80 MMHG

## 2022-11-15 DIAGNOSIS — E66.01 MORBID (SEVERE) OBESITY DUE TO EXCESS CALORIES (HCC): ICD-10-CM

## 2022-11-15 DIAGNOSIS — N18.4 STAGE 4 CHRONIC KIDNEY DISEASE (HCC): ICD-10-CM

## 2022-11-15 DIAGNOSIS — Z95.2 S/P TAVR (TRANSCATHETER AORTIC VALVE REPLACEMENT): Primary | ICD-10-CM

## 2022-11-15 DIAGNOSIS — I50.32 CHRONIC DIASTOLIC (CONGESTIVE) HEART FAILURE (HCC): ICD-10-CM

## 2022-11-15 DIAGNOSIS — I49.5 SICK SINUS SYNDROME (HCC): ICD-10-CM

## 2022-11-15 DIAGNOSIS — Z95.5 STATUS POST INSERTION OF DRUG-ELUTING STENT INTO LEFT ANTERIOR DESCENDING (LAD) ARTERY: ICD-10-CM

## 2022-11-15 RX ORDER — LEVOTHYROXINE SODIUM 0.07 MG/1
TABLET ORAL
COMMUNITY
Start: 2022-10-03

## 2022-11-15 NOTE — PROGRESS NOTES
Cardiology Follow Up Visit    Rocco Marino Day  1935  [id number]  [facility] [address] [address] [phone]    1  S/P TAVR (transcatheter aortic valve replacement)  Echo complete w/ contrast if indicated   2  Sick sinus syndrome (HCC)  Echo complete w/ contrast if indicated   3  Morbid (severe) obesity due to excess calories ([facility] )     4  Chronic diastolic (congestive) heart failure (HCC)     5  Stage 4 chronic kidney disease ([facility] )     6  Status post insertion of drug-eluting stent into left anterior descending (LAD) artery, June 2016, cath before TAVR           Discussion/Summary:  1  S/p TAVR 23mm ES3, 6/2016, mean 14mmHg, no significant PVL  2  PPM 2/2015 for CHB  3  CAD, asymptomatic  A  LAD PCI w/ HERNAN, cath pre TAVR    Plan:  Patient doing well  He is in skilled nursing facility  Ambulates longer distance with wheelchair  Is able to get around with walking smaller distances    Her echocardiogram with unchanged valve gradient  Overall feels well without evidence of volume overload    Continue current regimen  Echocardiogram 2023 ordered    Interval History:     27-year-old female returns for routine follow-up     TAVR 2016 number 23 [facility] S3   Follow-up echocardiogram with good valve position and function    Catheterization pre TAVR with LAD lesion successfully stented with a drug-eluting stent    Asymptomatic since     R echocardiogram with unchanged gradient  Number 23 valve functioning well    LDL less than 70 on current atorvastatin  No symptoms of volume overload    No chest discomfort    Feels well        no symptoms of coronary artery disease          Patient Active Problem List   Diagnosis   • Nonrheumatic aortic valve stenosis   • Coronary artery disease involving native coronary artery   • Complete atrioventricular block (HCC)   • S/P TAVR (transcatheter aortic valve replacement), #23 ES3, June 2016   • Age-related cognitive decline   • Frequent falls   • Ambulatory dysfunction   • Vision impairment   • Constipation   • Incontinence in female   • Closed displaced intertrochanteric fracture of right femur ([facility] )   • Diabetes mellitus ([facility] )   • History of cardiac pacemaker   • MAYLEIN (acute kidney injury) ([facility] )   • Acute blood loss as cause of postoperative anemia   • Chronic diastolic congestive heart failure (HCC)   • Hyperlipidemia   • Hypothyroidism   • Encephalopathy   • Status post insertion of drug-eluting stent into left anterior descending (LAD) artery, June 2016, cath before TAVR   • Fall   • Closed fracture of sternal end of right clavicle   • Hypertension   • Secondary lymphedema   • Hypokalemia   • Stage 4 chronic kidney disease (HCC)   • Chest pain   • Acute on chronic diastolic heart failure ([facility])   • Acute kidney injury superimposed on CKD ([facility])   • Elevated troponin level not due myocardial infarction   • Sick sinus syndrome ([facility])   • Partial small bowel obstruction ([facility])   • Hypernatremia   • Sacral decubitus ulcer   • Morbid (severe) obesity due to excess calories ([place] [address] )     Past Medical History:   Diagnosis Date   • Anemia     Resolved 3/1/2017    • Arthritis     Knee - Resolved 3/1/2017    • Blind    • CAD (coronary artery disease)     s/p HERNAN 6/2016   • Calcaneal spur    • CHF (congestive heart failure) ([facility])    • Chronic knee instability     Resolved 3/1/2017    • Chronic pain syndrome     Resolved 3/1/2017    • Dementia ([facility] )     Last assessed 11/1/2017    • Diabetes mellitus ([place] [address] )     non-insulin depdenent   • Disease of thyroid gland    • Diverticulitis    • Essential thrombocytopenia ([facility] )     Last assessed 11/1/2017    • GERD (gastroesophageal reflux disease)    • History of aortic valve replacement     Resolved 3/1/2017    • History of bilateral knee replacement     Resolved 3/1/2017    • History of TIA (transient ischemic attack)     no residual deficits   • Hyperlipidemia    • Hypertension    • Hypoxia     on home O2 QHS   • Leukocytosis     Resolved 3/1/2017    • Lumbar post-laminectomy syndrome     Resolved 3/1/2017    • Meniere disease    • Osteopenia    • Pacemaker     CHB-Biotronik in 2/2015   • S/P TAVR (transcatheter aortic valve replacement)     Resolved 3/1/2017    • Severe aortic stenosis    • Thrombocytosis     Resolved 4/5/2017    • Type 2 diabetes mellitus ([facility] )     Last assessed 11/1/2017      Social History     Socioeconomic History   • Marital status:       Spouse name: Not on file   • Number of children: Not on file   • Years of education: Not on file   • Highest education level: Not on file   Occupational History   • Not on file   Tobacco Use   • Smoking status: Never Smoker   • Smokeless tobacco: Never Used   Vaping Use   • Vaping Use: Never used   Substance and Sexual Activity   • Alcohol use: Not Currently   • Drug use: No   • Sexual activity: Not on file   Other Topics Concern   • Not on file   Social History Narrative    Lives in an independent group home      Social Determinants of Health     Financial Resource Strain: Not on file   Food Insecurity: Not on file   Transportation Needs: Not on file   Physical Activity: Not on file   Stress: Not on file   Social Connections: Not on file   Intimate Partner Violence: Not on file   Housing Stability: Not on file      Family History   Problem Relation Age of Onset   • Cancer Child    • Coronary artery disease Father         Native artery    • Stroke Family    • Osteopenia Family    • Other Family         Pituitary disease   • Polycythemia Family      Past Surgical History:   Procedure Laterality Date   • APPENDECTOMY     • BACK SURGERY      Arthrodesis    • CARDIAC PACEMAKER PLACEMENT     • CHOLECYSTECTOMY     • EYE SURGERY     • FRACTURE SURGERY Right 01/02/2018    femur   • HYSTERECTOMY     • DC EGD TRANSORAL BIOPSY SINGLE/MULTIPLE N/A 11/9/2016    Procedure: ESOPHAGOGASTRODUODENOSCOPY (EGD); Surgeon: Lina Chiang MD;  Location: [facility]; Service: Gastroenterology   • HI OPEN RX FEMUR FX+INTRAMED GARRETT Right 1/2/2018    Procedure: INSERTION NAIL IM FEMUR ANTEGRADE (TROCHANTERIC) RIGHT;  Surgeon: Maria E oFx MD;  Location: [facility];  Service: Orthopedics   • HI REPLACE AORTIC VALVE OPENFEMORAL ARTERY APPROACH N/A 7/26/2016    Procedure: TRANSFEMORAL TAVR WITH 23MM LAROSE LILY S3 VALVE; JOSE ALFREDO ;  Surgeon: Ignacio Nielson DO;  Location: [facility];  Service: Cardiac Surgery   • SMALL INTESTINE SURGERY     • TONSILLECTOMY     • TOTAL KNEE ARTHROPLASTY     • VARICOSE VEIN SURGERY     • VENTRAL HERNIA REPAIR         Current Outpatient Medications:   •  acetaminophen (TYLENOL) 325 mg tablet, Take 2 tablets (650 mg total) by mouth every 6 (six) hours as needed for mild pain  (Patient taking differently: Take 650 mg by mouth every 4 (four) hours as needed for mild pain As needed ), Disp: 30 tablet, Rfl: 0  •  amLODIPine (NORVASC) 5 mg tablet, 5 mg daily  , Disp: , Rfl:   •  aspirin 81 mg chewable tablet, Chew 81 mg daily  , Disp: , Rfl:   •  atorvastatin (LIPITOR) 10 mg tablet, , Disp: , Rfl:   •  bisacodyl (DULCOLAX) 5 mg EC tablet, Take 5 mg by mouth daily as needed for constipation Two tabs by mouth once daily as needed  , Disp: , Rfl:   •  diclofenac sodium (VOLTAREN) 1 %, Apply 2 g topically 4 (four) times a day as needed, Disp: , Rfl:   •  furosemide (LASIX) 40 mg tablet, Take 2 tablets (80 mg total) by mouth 2 (two) times a day, Disp: , Rfl: 0  •  ketoconazole (NIZORAL) 2 % cream, daily , Disp: , Rfl:   •  magnesium hydroxide (MILK OF MAGNESIA) 400 mg/5 mL oral suspension, Take 30 mL by mouth daily as needed for constipation Once daily as needed  , Disp: , Rfl:   •  metolazone (ZAROXOLYN) 2 5 mg tablet, , Disp: , Rfl:   •  metoprolol tartrate (LOPRESSOR) 50 mg tablet, Take 1 tablet (50 mg total) by mouth 2 (two) times a day , Disp: 60 tablet, Rfl: 2  •  Multiple Vitamins-Minerals (OCUVITE ADULT 50+ PO), Take by mouth daily, Disp: , Rfl:   •  nystatin (MYCOSTATIN) powder, Apply topically 2 (two) times a day, Disp: , Rfl:   •  oxyCODONE (ROXICODONE) 5 mg immediate release tablet, Take 0 5 tablets (2 5 mg total) by mouth every 4 (four) hours as needed for moderate pain or severe pain for up to 10 dosesMax Daily Amount: 15 mg, Disp: 3 tablet, Rfl: 0  •  pantoprazole (PROTONIX) 40 mg tablet, Take 1 tablet (40 mg total) by mouth daily, Disp: 30 tablet, Rfl: 0  •  potassium chloride (K-DUR,KLOR-CON) 20 mEq tablet, Take 20 mEq by mouth 4 (four) times a day  , Disp: , Rfl:   •  spironolactone (ALDACTONE) 25 mg tablet, Take 25 mg by mouth daily One time daily for chf, Disp: , Rfl:   •  tamsulosin (FLOMAX) 0 4 mg, Take 1 capsule (0 4 mg total) by mouth daily with dinner, Disp: 30 capsule, Rfl: 0  •  White Petrolatum-Mineral Oil (artificial tears), Administer to both eyes 1 drop in each eye every 12 hrs for dry eyes  , Disp: , Rfl:   •  Diclofenac Sodium (VOLTAREN) 1 %, , Disp: , Rfl:   •  ferrous sulfate 325 (65 Fe) mg tablet, Take 325 mg by mouth daily with breakfast (Patient not taking: No sig reported), Disp: , Rfl:   •  Glucagon, rDNA, (Glucagon Emergency) 1 MG KIT, , Disp: , Rfl:   •  levothyroxine 75 mcg tablet, , Disp: , Rfl:   •  loperamide (IMODIUM A-D) 2 MG tablet, Take 2 mg by mouth 3 (three) times a day as needed for diarrhea (Patient not taking: No sig reported), Disp: , Rfl:   •  potassium chloride 10% oral solution, Take 15 mL (20 mEq total) by mouth 2 (two) times a day (Patient not taking: No sig reported), Disp: 30 mL, Rfl: 0  •  tetrahydrozoline (VISINE) 0 05 % ophthalmic solution, 1 drop 4 (four) times a day as needed for irritation (Patient not taking: No sig reported), Disp: , Rfl:   Allergies   Allergen Reactions   • Advil [Ibuprofen] GI Intolerance   • Propoxyphene Other (See Comments)     DARVOCET=ACID REFLUX   • Rofecoxib Other (See Comments)         Social, Family, Medication history reviewed and updated as necessary      Labs:     Lab Results   Component Value Date     12/01/2015    K 3 8 09/04/2021     09/04/2021    CO2 31 09/04/2021    BUN 12 09/04/2021    CREATININE 0 73 09/04/2021    CREATININE 0 69 09/03/2021    GLUCOSE 134 07/26/2016    CALCIUM 8 7 09/04/2021       Lab Results   Component Value Date    WBC 9 87 09/03/2021    HGB 10 9 (L) 09/03/2021    HGB 11 5 09/01/2021    HCT 34 3 (L) 09/03/2021    HCT 37 2 09/01/2021     09/03/2021     09/01/2021       Lab Results   Component Value Date    CHOL 195 07/09/2015    CHOL 163 02/20/2015     Lab Results   Component Value Date    HDL 55 08/01/2017    HDL 34 (L) 06/09/2016     Lab Results   Component Value Date    LDLCALC 44 08/01/2017    LDLCALC 105 (H) 06/09/2016     No results found for: LDLDIRECT          Lab Results   Component Value Date    TRIG 117 08/01/2017    TRIG 103 06/09/2016       Lab Results   Component Value Date    ALT 27 08/25/2021    ALT 36 08/17/2021    AST 26 08/25/2021    AST 28 08/17/2021       Lab Results   Component Value Date    INR 1 01 08/25/2021    INR 1 03 08/17/2021       Lab Results   Component Value Date    NTBNP 1,806 (H) 08/17/2021    NTBNP 668 (H) 03/01/2021       Lab Results   Component Value Date    HGBA1C 6 6 (H) 03/16/2022    HGBA1C 6 7 (H) 11/30/2021    HGBA1C 6 7 (H) 08/19/2021           Imaging: Reviewed in epic        Review of Systems:  14 systems reviewed and negative with exception of the above        PHYSICAL EXAM:      Vitals:    11/15/22 0926   BP: 115/80   Pulse: 67   SpO2: 96%     Body mass index is 39 84 kg/m²     Weight (last 2 days)     Date/Time Weight    11/15/22 0926 92 5 (204)            Gen: No acute distress  HEENT: anicteric, mucous membranes moist  Neck: supple, no jugular venous distention, or carotid bruit  Heart: regular, normal s1 and s2, 1/6 systolic murmur, no rub or gallop  Lungs :clear to auscultation bilaterally, no rales/rhonchi or wheeze  Abdomen: soft nontender, normoactive bowel sounds, no organomegaly  Ext: warm and perfused, normal femoral pulses, trace edema, or clubbing  Skin: warm, no rashes  Neuro: AAO x 3, no focal findings  Psychiatric: normal affect  Musculoskeletal: no obvious joint deformities  This note was completed in part utilizing coComment direct voice recognition software  Grammatical errors, random word insertion, spelling mistakes, and incomplete sentences may be an occasional consequence of the system secondary to software limitations, ambient noise and hardware issues  At the time of dictation, efforts were made to edit, clarify and /or correct errors  Please read the chart carefully and recognize, using context, where substitutions have occurred  If you have any questions or concerns about the context, text or information contained within the body of this dictation, please contact myself, the provider, for further clarification

## 2023-01-01 NOTE — ASSESSMENT & PLAN NOTE
· Cardiology following  · Monitor for signs and symptoms of volume overload  ·  Patient denies SOB and is satting well on room air     · Lasix  Now discontinued bolus of ivf per nehro today  · Will remove pinon discussed with ortho / urinary retention protocol in place  · Also strict I/Os via, blue pads please weigh No

## 2023-01-05 ENCOUNTER — REMOTE DEVICE CLINIC VISIT (OUTPATIENT)
Dept: CARDIOLOGY CLINIC | Facility: CLINIC | Age: 88
End: 2023-01-05

## 2023-01-05 DIAGNOSIS — Z95.0 PRESENCE OF PERMANENT CARDIAC PACEMAKER: Primary | ICD-10-CM

## 2023-01-06 NOTE — PROGRESS NOTES
Results for orders placed or performed in visit on 01/05/23   Cardiac EP device report    Narrative    BIOTRONIK DUAL CHAMBER  PPM (DDD MODE)/ ACTIVE SYSTEM IS MRI CONDITIONAL  BIOTRONIK TRANSMISSION: BATTERY VOLTAGE ADEQUATE (40%)   100% (>40%/CHB) ALL AVAILABLE LEAD PARAMETERS WITHIN NORMAL LIMITS  NO SIGNIFICANT HIGH RATE EPISODES  NORMAL DEVICE FUNCTION   AM/CH

## 2023-02-18 NOTE — PROGRESS NOTES
Progress Note - Infectious Disease   Kash Reis Day 80 y o  female MRN: [id number]  Unit/Bed#: -01 Encounter: [id number]      Impression/Recommendations:  1  Sepsis, POA:  Tachycardia, tachypnea, leukocytosis  Source of sepsis appears to be complicated UTI  Patient is clinically improved  WBC stable but fever has resolved  a  Follow-up blood cultures  b  Antibiotic therapy as below  c  CBC in am   d  Monitor temperature/WBC  2  Complicated UTI in the setting of urine retention: This is the likely source of sepsis and bacteremia  Deal cath inserted in ED due to urine retention- pt had urine output of nearly 2L following straight cath and Deal insertion  Urine cultures and blood culture both have growth of ESBL producing E coli  Recent renal US was unremarkable  a  Change antibiotic to IV ertapenem  b  Monitor temperature/WBC  3  ESBL producing E coli bacteremia: source of bacteremia is UTI  a  Antibiotic plan as in above  b  Treat x 7 days total, possibly longer depending on clinical response  4  CKD stage 3:   creatinine improved  a  Antibiotic at full dose  b  Monitor creatinine  5  Ileus versus SBO  NG tube is in place  Abdominal exam is benign  6  Diabetes mellitus type 2 with mild hyperglycemia:  Noted HbA1c of 6 7 on 2021  This is a risk factor for infection  a  Glycemic control per primary team  7  Morbid obesity with BMI of 39     Discussed with patient in detail regarding the above plan  Discussed with Dr Neel Lantigua from [facility] service  Antibiotics:  Meropenem # 3     Subjective:  Patient is comfortable  NG tube is in place  No abdominal or flank pain  Temperature stays down  No chills  She is tolerating antibiotic well  No diarrhea      Objective:  Vitals:  Temp:  [97 7 °F (36 5 °C)-98 4 °F (36 9 °C)] 98 2 °F (36 8 °C)  HR:  [90-96] 91  Resp:  [18-19] 19  BP: (131-153)/(64-73) 138/73  SpO2:  [89 %-96 %] 89 %  Temp (24hrs), Av °F (36 7 °C), Min:97 7 °F (36 5 °C), spoke to pt regarding ct results, will follow up urine with pmd and ob, will follow up with ENT and optho- SD Max:98 4 °F (36 9 °C)  Current: Temperature: 98 2 °F (36 8 °C)    Physical Exam:     General: Awake, alert, cooperative, no distress  Neck:  Supple  No mass  No lymphadenopathy  Lungs: Expansion symmetric, no rales, no wheezing, respirations unlabored  Heart:  Regular rate and rhythm, S1 and S2 normal, no murmur  Abdomen: Soft, mildly distended, non-tender, bowel sounds active all four quadrants, no masses, no organomegaly  Extremities: Trace edema  No erythema/warmth  No ulcer  Nontender to palpation  Skin:  No rash  Neuro: Moves all extremities  Invasive Devices     Peripheral Intravenous Line            Peripheral IV 08/25/21 Right Antecubital 4 days    Peripheral IV 08/30/21 Dorsal (posterior); Left Hand <1 day          Drain            Urethral Catheter Non-latex 16 Fr  4 days    NG/OG Tube Nasogastric Right nare 1 day                Labs studies:   I have personally reviewed pertinent labs    Results from last 7 days   Lab Units 08/30/21  0707 08/29/21  0548 08/28/21  2140 08/28/21  0458 08/25/21  1734   POTASSIUM mmol/L 3 8 3 2* 3 3* 3 2* 3 9   CHLORIDE mmol/L 105 106  --  103 93*   CO2 mmol/L 34* 37*  --  37* 28   BUN mg/dL 19 28*  --  41* 71*   CREATININE mg/dL 0 88 0 96  --  1 29* 1 75*   EGFR ml/min/1 73sq m 60 54  --  38 26   CALCIUM mg/dL 9 5 9 6  --  9 4 9 7   AST U/L  --   --   --   --  26   ALT U/L  --   --   --   --  27   ALK PHOS U/L  --   --   --   --  92 4     Results from last 7 days   Lab Units 08/30/21  0708 08/29/21  0548 08/28/21  0458   WBC Thousand/uL 15 37* 13 44* 12 46*   HEMOGLOBIN g/dL 12 8 12 2 12 2   PLATELETS Thousands/uL 427* 409* 400*     Results from last 7 days   Lab Units 08/25/21 2051 08/25/21  2044 08/25/21  1739   BLOOD CULTURE   --  Escherichia coli ESBL*  --    GRAM STAIN RESULT   --  Gram negative rods*  --    URINE CULTURE   --   --  >100,000 cfu/ml Escherichia coli ESBL*   MRSA CULTURE ONLY  No Methicillin Resistant Staphlyococcus aureus (MRSA) isolated  --   --        Imaging Studies:   I have personally reviewed pertinent imaging study reports and images in PACS  EKG, Pathology, and Other Studies:   I have personally reviewed pertinent reports

## 2023-04-06 ENCOUNTER — REMOTE DEVICE CLINIC VISIT (OUTPATIENT)
Dept: CARDIOLOGY CLINIC | Facility: CLINIC | Age: 88
End: 2023-04-06

## 2023-04-06 DIAGNOSIS — Z95.0 PRESENCE OF CARDIAC PACEMAKER: Primary | ICD-10-CM

## 2023-04-06 NOTE — PROGRESS NOTES
Results for orders placed or performed in visit on 04/06/23   Cardiac EP device report    Narrative    BIOTRONIK DUAL CHAMBER  PPM (DDD MODE)/ ACTIVE SYSTEM IS MRI CONDITIONAL  BIOTRONIK TRANSMISSION: BATTERY VOLTAGE ADEQUATE (40%)  AP: 0%  : 100% (>40%~CHB)  ALL AVAILABLE LEAD PARAMETERS WITHIN NORMAL LIMITS  NO SIGNIFICANT HIGH RATE EPISODES  PACEMAKER FUNCTIONING APPROPRIATELY    [address] [place] [address]

## 2023-06-29 ENCOUNTER — IN-CLINIC DEVICE VISIT (OUTPATIENT)
Dept: CARDIOLOGY CLINIC | Facility: CLINIC | Age: 88
End: 2023-06-29
Payer: MEDICARE

## 2023-06-29 DIAGNOSIS — Z95.0 PRESENCE OF PERMANENT CARDIAC PACEMAKER: Primary | ICD-10-CM

## 2023-06-29 PROCEDURE — 93280 PM DEVICE PROGR EVAL DUAL: CPT | Performed by: INTERNAL MEDICINE

## 2023-06-29 NOTE — PROGRESS NOTES
Results for orders placed or performed in visit on 06/29/23   Cardiac EP device report    Narrative    BIOTRONIK DUAL CHAMBER  PPM (DDD MODE)/ ACTIVE SYSTEM IS MRI CONDITIONAL  DEVICE INTERROGATED IN THE [place] OFFICE  BATTERY VOLTAGE ADEQUATE (4 YRS 4 MTHS)  AP 0%  100% (>40%/CHB) ALL LEAD PARAMETERS WITHIN NORMAL LIMITS  NO SIGNIFICANT HIGH RATE EPISODES  NO PROGRAMMING CHANGES MADE TO DEVICE PARAMETERS  NORMAL DEVICE FUNCTION   AM/CHASE

## 2023-10-03 ENCOUNTER — REMOTE DEVICE CLINIC VISIT (OUTPATIENT)
Dept: CARDIOLOGY CLINIC | Facility: CLINIC | Age: 88
End: 2023-10-03
Payer: MEDICARE

## 2023-10-03 DIAGNOSIS — Z95.0 CARDIAC PACEMAKER IN SITU: Primary | ICD-10-CM

## 2023-10-03 PROCEDURE — 93296 REM INTERROG EVL PM/IDS: CPT | Performed by: INTERNAL MEDICINE

## 2023-10-03 PROCEDURE — 93294 REM INTERROG EVL PM/LDLS PM: CPT | Performed by: INTERNAL MEDICINE

## 2023-10-03 NOTE — PROGRESS NOTES
Results for orders placed or performed in visit on 10/03/23   Cardiac EP device report    Narrative    BIOTRONIK DUAL CHAMBER  PPM (DDD MODE)/ ACTIVE SYSTEM IS MRI CONDITIONAL  BIOTRONIK TRANSMISSION: BATTERY STATUS "OK" (40% REMAINING BATTERY LIFE). AP-0%, -100% (DEPENDENT/CHB). ALL AVAILABLE LEAD PARAMETERS WITHIN NORMAL LIMITS. NO SIGNIFICANT HIGH RATE EPISODES. REACTIVATED HOME MONITORING. NORMAL DEVICE FUNCTION.  GV

## 2023-10-30 DIAGNOSIS — E78.5 HYPERLIPIDEMIA, UNSPECIFIED HYPERLIPIDEMIA TYPE: ICD-10-CM

## 2023-10-30 DIAGNOSIS — Z95.0 PRESENCE OF PERMANENT CARDIAC PACEMAKER: Primary | ICD-10-CM

## 2023-11-01 ENCOUNTER — HOSPITAL ENCOUNTER (OUTPATIENT)
Dept: NON INVASIVE DIAGNOSTICS | Facility: HOSPITAL | Age: 88
Discharge: HOME | End: 2023-11-01
Payer: MEDICARE

## 2023-11-01 VITALS
SYSTOLIC BLOOD PRESSURE: 132 MMHG | DIASTOLIC BLOOD PRESSURE: 61 MMHG | BODY MASS INDEX: 40.05 KG/M2 | HEIGHT: 60 IN | WEIGHT: 204 LBS | HEART RATE: 71 BPM

## 2023-11-01 DIAGNOSIS — Z95.2 S/P TAVR (TRANSCATHETER AORTIC VALVE REPLACEMENT): ICD-10-CM

## 2023-11-01 DIAGNOSIS — I49.5 SICK SINUS SYNDROME (HCC): ICD-10-CM

## 2023-11-01 PROCEDURE — 93306 TTE W/DOPPLER COMPLETE: CPT | Performed by: INTERNAL MEDICINE

## 2023-11-01 PROCEDURE — 93306 TTE W/DOPPLER COMPLETE: CPT

## 2023-11-03 LAB
AORTIC ROOT: 2.7 CM
AORTIC VALVE MEAN VELOCITY: 17.3 M/S
ASCENDING AORTA: 2.9 CM
AV AREA BY CONTINUOUS VTI: 1.4 CM2
AV AREA PEAK VELOCITY: 1.5 CM2
AV LVOT MEAN GRADIENT: 2 MMHG
AV LVOT PEAK GRADIENT: 4 MMHG
AV MEAN GRADIENT: 14 MMHG
AV PEAK GRADIENT: 23 MMHG
AV VALVE AREA: 1.43 CM2
AV VELOCITY RATIO: 0.42
DOP CALC AO PEAK VEL: 2.38 M/S
DOP CALC AO VTI: 53.38 CM
DOP CALC LVOT AREA: 3.46 CM2
DOP CALC LVOT CARDIAC INDEX: 2.89 L/MIN/M2
DOP CALC LVOT CARDIAC OUTPUT: 5.44 L/MIN
DOP CALC LVOT DIAMETER: 2.1 CM
DOP CALC LVOT PEAK VEL VTI: 22.05 CM
DOP CALC LVOT PEAK VEL: 1.01 M/S
DOP CALC LVOT STROKE INDEX: 41 ML/M2
DOP CALC LVOT STROKE VOLUME: 76.33
E WAVE DECELERATION TIME: 131 MS
E/A RATIO: 0.69
LEFT ATRIUM SIZE: 3.2 CM
MV E'TISSUE VEL-LAT: 4 CM/S
MV E'TISSUE VEL-SEP: 6 CM/S
MV PEAK A VEL: 1.41 M/S
MV PEAK E VEL: 97 CM/S
MV STENOSIS PRESSURE HALF TIME: 38 MS
MV VALVE AREA P 1/2 METHOD: 5.79
SL CV LV EF: 60
TR MAX PG: 34 MMHG
TR PEAK VELOCITY: 2.9 M/S
TRICUSPID ANNULAR PLANE SYSTOLIC EXCURSION: 1.7 CM
TRICUSPID VALVE PEAK REGURGITATION VELOCITY: 2.93 M/S

## 2024-01-02 ENCOUNTER — REMOTE DEVICE CLINIC VISIT (OUTPATIENT)
Dept: CARDIOLOGY CLINIC | Facility: CLINIC | Age: 89
End: 2024-01-02
Payer: MEDICARE

## 2024-01-02 DIAGNOSIS — Z95.0 PRESENCE OF PERMANENT CARDIAC PACEMAKER: Primary | ICD-10-CM

## 2024-01-02 PROCEDURE — 93294 REM INTERROG EVL PM/LDLS PM: CPT | Performed by: STUDENT IN AN ORGANIZED HEALTH CARE EDUCATION/TRAINING PROGRAM

## 2024-01-02 PROCEDURE — 93296 REM INTERROG EVL PM/IDS: CPT | Performed by: STUDENT IN AN ORGANIZED HEALTH CARE EDUCATION/TRAINING PROGRAM

## 2024-01-02 NOTE — PROGRESS NOTES
BIOTRONIK DUAL CHAMBER  PPM (DDD MODE)/ ACTIVE SYSTEM IS MRI CONDITIONAL   BIOTRONIK TRANSMISSION: BATTERY VOLTAGE ADEQUATE. (40% REMAINING LONGEVITY). AP 0%  100% (>405/CHB- DEPENDENT/DDD 50PPM). ALL AVAILABLE LEAD PARAMETERS WITHIN NORMAL LIMITS. NO SIGNIFICANT HIGH RATE EPISODES. NORMAL DEVICE FUNCTION. ES

## 2024-01-09 ENCOUNTER — OFFICE VISIT (OUTPATIENT)
Dept: CARDIAC SURGERY | Facility: CLINIC | Age: 89
End: 2024-01-09
Payer: MEDICARE

## 2024-01-09 VITALS
HEIGHT: 60 IN | SYSTOLIC BLOOD PRESSURE: 138 MMHG | DIASTOLIC BLOOD PRESSURE: 58 MMHG | BODY MASS INDEX: 39.84 KG/M2 | HEART RATE: 78 BPM | OXYGEN SATURATION: 95 %

## 2024-01-09 DIAGNOSIS — I50.33 ACUTE ON CHRONIC DIASTOLIC (CONGESTIVE) HEART FAILURE (HCC): ICD-10-CM

## 2024-01-09 DIAGNOSIS — I49.5 SICK SINUS SYNDROME (HCC): ICD-10-CM

## 2024-01-09 DIAGNOSIS — I50.32 CHRONIC DIASTOLIC (CONGESTIVE) HEART FAILURE (HCC): ICD-10-CM

## 2024-01-09 DIAGNOSIS — N18.4 STAGE 4 CHRONIC KIDNEY DISEASE (HCC): ICD-10-CM

## 2024-01-09 DIAGNOSIS — Z95.2 S/P TAVR (TRANSCATHETER AORTIC VALVE REPLACEMENT): Primary | ICD-10-CM

## 2024-01-09 DIAGNOSIS — E66.01 MORBID (SEVERE) OBESITY DUE TO EXCESS CALORIES (HCC): ICD-10-CM

## 2024-01-09 PROCEDURE — 93000 ELECTROCARDIOGRAM COMPLETE: CPT | Performed by: INTERNAL MEDICINE

## 2024-01-09 PROCEDURE — 99213 OFFICE O/P EST LOW 20 MIN: CPT | Performed by: INTERNAL MEDICINE

## 2024-01-09 RX ORDER — TRIAMCINOLONE ACETONIDE 1 MG/G
CREAM TOPICAL
COMMUNITY
Start: 2023-12-27

## 2024-01-09 RX ORDER — AMLODIPINE BESYLATE 10 MG/1
10 TABLET ORAL DAILY
COMMUNITY
Start: 2023-11-19

## 2024-01-09 RX ORDER — LEVOTHYROXINE SODIUM 88 UG/1
88 TABLET ORAL DAILY
COMMUNITY
Start: 2023-11-20

## 2024-01-09 NOTE — PROGRESS NOTES
Cardiology Follow Up Visit    Anabel N Day  1935  [id number]  [facility] [place]  [address]  [address]  [facility] [address]  [phone]    1. S/P TAVR (transcatheter aortic valve replacement)  POCT ECG    POCT ECG      2. Acute on chronic diastolic (congestive) heart failure (HCC)        3. Chronic diastolic (congestive) heart failure (HCC)        4. Stage 4 chronic kidney disease (HCC)        5. Sick sinus syndrome (HCC)        6. Morbid (severe) obesity due to excess calories (HCC)              Discussion/Summary:  1. S/p TAVR 23mm ES3, 6/2016, mean 14mmHg, peak 23mmHg no significant PVL  2. PPM 2/2015 for CHB  3. CAD, asymptomatic  A. LAD PCI w/ HERNAN, cath pre TAVR    Plan:  Patient doing well.  She is in skilled nursing facility and here with daughter.  Ambulates longer distance with wheelchair.  Is able to get around with walking smaller distances    Her echocardiogram with unchanged valve gradient.    Overall feels well without evidence of volume overload    Continue current regimen.  Echocardiogram ordered.    Interval History:     88-year-old female returns for routine follow-up.  Nursing home, ambulatory dysfunction and wheelchair for most long distances.  With her daughter.    TAVR 2016 number 23 [facility] S3   Follow-up echocardiogram with good valve position and function    Catheterization pre TAVR with LAD lesion successfully stented with a drug-eluting stent    Asymptomatic since     echocardiogram with unchanged gradient.  Number 23 valve functioning well.  To be at 2.8 m/s with a mean gradient of 14 mmHg.  No major interval change from prior year.    LDL less than 70 on current atorvastatin.    No symptoms of volume overload    No chest discomfort    Feels well..     no symptoms of coronary artery disease.        Patient Active Problem List   Diagnosis    Nonrheumatic aortic valve stenosis     Coronary artery disease involving native coronary artery    Complete atrioventricular block (HCC)    S/P TAVR (transcatheter aortic valve replacement), #23 ES3, June 2016    Age-related cognitive decline    Frequent falls    Ambulatory dysfunction    Vision impairment    Constipation    Incontinence in female    Closed displaced intertrochanteric fracture of right femur ([facility])    Diabetes mellitus (HCC)    History of cardiac pacemaker    MAYELIN (acute kidney injury) ([facility])    Acute blood loss as cause of postoperative anemia    Chronic diastolic congestive heart failure ([facility])    Hyperlipidemia    Hypothyroidism    Encephalopathy    Status post insertion of drug-eluting stent into left anterior descending (LAD) artery, June 2016, cath before TAVR    Fall    Closed fracture of sternal end of right clavicle    Hypertension    Secondary lymphedema    Hypokalemia    Stage 4 chronic kidney disease ([facility])    Chest pain    Acute on chronic diastolic heart failure ([facility])    Acute kidney injury superimposed on CKD     Elevated troponin level not due myocardial infarction    Sick sinus syndrome ([facility])    Partial small bowel obstruction ([facility])    Hypernatremia    Sacral decubitus ulcer    Morbid (severe) obesity due to excess calories ([facility])     Past Medical History:   Diagnosis Date    Anemia     Resolved 3/1/2017     Arthritis     Knee - Resolved 3/1/2017     Blind     CAD (coronary artery disease)     s/p HERNAN 6/2016    Calcaneal spur     CHF (congestive heart failure) ([facility])     Chronic knee instability     Resolved 3/1/2017     Chronic pain syndrome     Resolved 3/1/2017     Dementia ([facility])     Last assessed 11/1/2017     Diabetes mellitus ([facility])     non-insulin depdenent    Disease of thyroid gland     Diverticulitis     Essential thrombocytopenia ([facility])     Last assessed 11/1/2017     GERD (gastroesophageal reflux disease)     History of aortic valve replacement     Resolved 3/1/2017     History of bilateral knee replacement      Resolved 3/1/2017     History of TIA (transient ischemic attack)     no residual deficits    Hyperlipidemia     Hypertension     Hypoxia     on home O2 QHS    Leukocytosis     Resolved 3/1/2017     Lumbar post-laminectomy syndrome     Resolved 3/1/2017     Meniere disease     Osteopenia     Pacemaker     CHB-Biotronik in 2/2015    S/P TAVR (transcatheter aortic valve replacement)     Resolved 3/1/2017     Severe aortic stenosis     Thrombocytosis     Resolved 4/5/2017     Type 2 diabetes mellitus (HCC)     Last assessed 11/1/2017      Social History     Socioeconomic History    Marital status:      Spouse name: Not on file    Number of children: Not on file    Years of education: Not on file    Highest education level: Not on file   Occupational History    Not on file   Tobacco Use    Smoking status: Never    Smokeless tobacco: Never   Vaping Use    Vaping status: Never Used   Substance and Sexual Activity    Alcohol use: Not Currently    Drug use: No    Sexual activity: Not on file   Other Topics Concern    Not on file   Social History Narrative    Lives in an independent group home      Social Determinants of Health     Financial Resource Strain: Not on file   Food Insecurity: Not on file   Transportation Needs: Not on file   Physical Activity: Not on file   Stress: Not on file   Social Connections: Not on file   Intimate Partner Violence: Not on file   Housing Stability: Not on file      Family History   Problem Relation Age of Onset    Cancer Child     Coronary artery disease Father         Native artery     Stroke Family     Osteopenia Family     Other Family         Pituitary disease    Polycythemia Family      Past Surgical History:   Procedure Laterality Date    APPENDECTOMY      BACK SURGERY      Arthrodesis     CARDIAC PACEMAKER PLACEMENT      CHOLECYSTECTOMY      EYE SURGERY      FRACTURE SURGERY Right 01/02/2018    femur    HYSTERECTOMY      AK EGD TRANSORAL BIOPSY SINGLE/MULTIPLE N/A 11/9/2016     Procedure: ESOPHAGOGASTRODUODENOSCOPY (EGD);  Surgeon: Vinh Bryant MD;  Location: [facility];  Service: Gastroenterology    WV OPTX FEM SHFT FX W/INSJ IMED IMPLT W/WO SCREW Right 1/2/2018    Procedure: INSERTION NAIL IM FEMUR ANTEGRADE (TROCHANTERIC) RIGHT;  Surgeon: Roel Amin MD;  Location: [facility];  Service: Orthopedics    WV REPLACE AORTIC VALVE OPENFEMORAL ARTERY APPROACH N/A 7/26/2016    Procedure: TRANSFEMORAL TAVR WITH 23MM LAROSE LILY S3 VALVE; JOSE ALFREDO ;  Surgeon: Tre Ruiz DO;  Location: [facility];  Service: Cardiac Surgery    SMALL INTESTINE SURGERY      TONSILLECTOMY      TOTAL KNEE ARTHROPLASTY      VARICOSE VEIN SURGERY      VENTRAL HERNIA REPAIR         Current Outpatient Medications:     acetaminophen (TYLENOL) 325 mg tablet, Take 2 tablets (650 mg total) by mouth every 6 (six) hours as needed for mild pain. (Patient taking differently: Take 650 mg by mouth every 4 (four) hours as needed for mild pain As needed.), Disp: 30 tablet, Rfl: 0    amLODIPine (NORVASC) 10 mg tablet, Take 10 mg by mouth daily, Disp: , Rfl:     aspirin 81 mg chewable tablet, Chew 81 mg daily., Disp: , Rfl:     atorvastatin (LIPITOR) 10 mg tablet, , Disp: , Rfl:     bisacodyl (DULCOLAX) 5 mg EC tablet, Take 5 mg by mouth daily as needed for constipation Two tabs by mouth once daily as needed., Disp: , Rfl:     diclofenac sodium (VOLTAREN) 1 %, Apply 2 g topically 4 (four) times a day as needed, Disp: , Rfl:     furosemide (LASIX) 40 mg tablet, Take 2 tablets (80 mg total) by mouth 2 (two) times a day, Disp: , Rfl: 0    ketoconazole (NIZORAL) 2 % cream, daily , Disp: , Rfl:     levothyroxine 88 mcg tablet, Take 88 mcg by mouth daily, Disp: , Rfl:     metoprolol tartrate (LOPRESSOR) 50 mg tablet, Take 1 tablet (50 mg total) by mouth 2 (two) times a day., Disp: 60 tablet, Rfl: 2    Multiple Vitamins-Minerals (OCUVITE ADULT 50+ PO), Take by mouth daily, Disp: , Rfl:     potassium chloride (K-DUR,KLOR-CON) 20 mEq  tablet, Take 20 mEq by mouth 4 (four) times a day  , Disp: , Rfl:     spironolactone (ALDACTONE) 25 mg tablet, Take 25 mg by mouth daily One time daily for chf, Disp: , Rfl:     tamsulosin (FLOMAX) 0.4 mg, Take 1 capsule (0.4 mg total) by mouth daily with dinner, Disp: 30 capsule, Rfl: 0    triamcinolone (KENALOG) 0.1 % cream, , Disp: , Rfl:     White Petrolatum-Mineral Oil (artificial tears), Administer to both eyes 1 drop in each eye every 12 hrs for dry eyes., Disp: , Rfl:     amLODIPine (NORVASC) 5 mg tablet, 5 mg daily   (Patient not taking: Reported on 1/9/2024), Disp: , Rfl:     Diclofenac Sodium (VOLTAREN) 1 %, , Disp: , Rfl:     ferrous sulfate 325 (65 Fe) mg tablet, Take 325 mg by mouth daily with breakfast (Patient not taking: Reported on 11/12/2021), Disp: , Rfl:     Glucagon, rDNA, (Glucagon Emergency) 1 MG KIT, , Disp: , Rfl:     levothyroxine 75 mcg tablet, , Disp: , Rfl:     loperamide (IMODIUM A-D) 2 MG tablet, Take 2 mg by mouth 3 (three) times a day as needed for diarrhea (Patient not taking: Reported on 11/12/2021), Disp: , Rfl:     magnesium hydroxide (MILK OF MAGNESIA) 400 mg/5 mL oral suspension, Take 30 mL by mouth daily as needed for constipation Once daily as needed. (Patient not taking: Reported on 1/9/2024), Disp: , Rfl:     metolazone (ZAROXOLYN) 2.5 mg tablet, , Disp: , Rfl:     nystatin (MYCOSTATIN) powder, Apply topically 2 (two) times a day (Patient not taking: Reported on 1/9/2024), Disp: , Rfl:     oxyCODONE (ROXICODONE) 5 mg immediate release tablet, Take 0.5 tablets (2.5 mg total) by mouth every 4 (four) hours as needed for moderate pain or severe pain for up to 10 dosesMax Daily Amount: 15 mg (Patient not taking: Reported on 1/9/2024), Disp: 3 tablet, Rfl: 0    pantoprazole (PROTONIX) 40 mg tablet, Take 1 tablet (40 mg total) by mouth daily (Patient not taking: Reported on 1/9/2024), Disp: 30 tablet, Rfl: 0    potassium chloride 10% oral solution, Take 15 mL (20 mEq total) by  "mouth 2 (two) times a day (Patient not taking: Reported on 11/12/2021), Disp: 30 mL, Rfl: 0    tetrahydrozoline (VISINE) 0.05 % ophthalmic solution, 1 drop 4 (four) times a day as needed for irritation (Patient not taking: Reported on 11/12/2021), Disp: , Rfl:   Allergies   Allergen Reactions    Advil [Ibuprofen] GI Intolerance    Propoxyphene Other (See Comments)     DARVOCET=ACID REFLUX    Rofecoxib Other (See Comments)         Social, Family, Medication history reviewed and updated as necessary      Labs:     Lab Results   Component Value Date     12/01/2015    K 3.8 09/04/2021     09/04/2021    CO2 31 09/04/2021    BUN 12 09/04/2021    CREATININE 0.73 09/04/2021    CREATININE 0.69 09/03/2021    GLUCOSE 134 07/26/2016    CALCIUM 8.7 09/04/2021       Lab Results   Component Value Date    WBC 9.87 09/03/2021    HGB 10.9 (L) 09/03/2021    HGB 11.5 09/01/2021    HCT 34.3 (L) 09/03/2021    HCT 37.2 09/01/2021     09/03/2021     09/01/2021       Lab Results   Component Value Date    CHOL 195 07/09/2015    CHOL 163 02/20/2015     Lab Results   Component Value Date    HDL 55 08/01/2017    HDL 34 (L) 06/09/2016     Lab Results   Component Value Date    LDLCALC 44 08/01/2017    LDLCALC 105 (H) 06/09/2016     No results found for: \"LDLDIRECT\"          Lab Results   Component Value Date    TRIG 117 08/01/2017    TRIG 103 06/09/2016       Lab Results   Component Value Date    ALT 27 08/25/2021    ALT 36 08/17/2021    AST 26 08/25/2021    AST 28 08/17/2021    ALKPHOS 92.4 08/25/2021    ALKPHOS 126 (H) 08/17/2021       Lab Results   Component Value Date    INR 1.01 08/25/2021    INR 1.03 08/17/2021       Lab Results   Component Value Date    NTBNP 1,806 (H) 08/17/2021    NTBNP 668 (H) 03/01/2021       Lab Results   Component Value Date    HGBA1C 7.3 (H) 06/30/2023    HGBA1C 6.8 (H) 11/29/2022    HGBA1C 6.6 (H) 03/16/2022           Imaging: Reviewed in epic        Review of Systems:  14 systems reviewed " and negative with exception of the above        PHYSICAL EXAM:      Vitals:    01/09/24 1340   BP: 138/58   Pulse: 78   SpO2: 95%     Body mass index is 39.84 kg/m².   Weight (last 2 days)       Date/Time Weight    01/09/24 1340 --     Weight: pt is unable to stand. at 01/09/24 1340              Gen: No acute distress  HEENT: anicteric, mucous membranes moist  Neck: supple, no jugular venous distention, or carotid bruit  Heart: regular, normal s1 and s2, 2/6 systolic murmur, no rub or gallop  Lungs :clear to auscultation bilaterally, no rales/rhonchi or wheeze  Abdomen: soft nontender, normoactive bowel sounds, no organomegaly  Ext: warm and perfused, normal femoral pulses, trace edema, or clubbing  Skin: warm, no rashes  Neuro: AAO x 3, no focal findings  Psychiatric: normal affect  Musculoskeletal: no obvious joint deformities.        This note was completed in part utilizing [employer] direct voice recognition software.   Grammatical errors, random word insertion, spelling mistakes, and incomplete sentences may be an occasional consequence of the system secondary to software limitations, ambient noise and hardware issues. At the time of dictation, efforts were made to edit, clarify and /or correct errors.  Please read the chart carefully and recognize, using context, where substitutions have occurred.  If you have any questions or concerns about the context, text or information contained within the body of this dictation, please contact myself, the provider, for further clarification.

## 2024-04-02 ENCOUNTER — REMOTE DEVICE CLINIC VISIT (OUTPATIENT)
Dept: CARDIOLOGY CLINIC | Facility: CLINIC | Age: 89
End: 2024-04-02
Payer: MEDICARE

## 2024-04-02 DIAGNOSIS — Z95.0 PRESENCE OF PERMANENT CARDIAC PACEMAKER: Primary | ICD-10-CM

## 2024-04-02 PROCEDURE — 93296 REM INTERROG EVL PM/IDS: CPT | Performed by: STUDENT IN AN ORGANIZED HEALTH CARE EDUCATION/TRAINING PROGRAM

## 2024-04-02 PROCEDURE — 93294 REM INTERROG EVL PM/LDLS PM: CPT | Performed by: STUDENT IN AN ORGANIZED HEALTH CARE EDUCATION/TRAINING PROGRAM

## 2024-04-02 NOTE — PROGRESS NOTES
BIOTRONIK DUAL CHAMBER  PPM (DDD MODE)/ ACTIVE SYSTEM IS MRI CONDITIONAL   BIOTRONIK TRANSMISSION: BATTERY VOLTAGE ADEQUATE. (40% REMAINING LONGEVITY). AP 0%  100% (>40%/CHB- DEPENDENT/DDD 50PPM). ALL AVAILABLE LEAD PARAMETERS WITHIN NORMAL LIMITS. NO SIGNIFICANT HIGH RATE EPISODES. NORMAL DEVICE FUNCTION. ES

## 2024-07-01 ENCOUNTER — IN-CLINIC DEVICE VISIT (OUTPATIENT)
Dept: CARDIOLOGY CLINIC | Facility: CLINIC | Age: 89
End: 2024-07-01
Payer: MEDICARE

## 2024-07-01 DIAGNOSIS — Z95.0 PRESENCE OF PERMANENT CARDIAC PACEMAKER: Primary | ICD-10-CM

## 2024-07-01 PROCEDURE — 93280 PM DEVICE PROGR EVAL DUAL: CPT | Performed by: STUDENT IN AN ORGANIZED HEALTH CARE EDUCATION/TRAINING PROGRAM

## 2024-07-01 NOTE — PROGRESS NOTES
Results for orders placed or performed in visit on 07/01/24   Cardiac EP device report    Narrative    [employer] DUAL CHAMBER  PPM (DDD MODE)/ ACTIVE SYSTEM IS MRI CONDITIONAL  DEVICE INTERROGATED IN THE [place] OFFICE. BATTERY VOLTAGE ADEQUATE (3 YR 11 MTHS/40%).  100% (>40%/CHB) ALL LEAD PARAMETERS WITHIN NORMAL LIMITS. NO SIGNIFICANT HIGH RATE EPISODES. NO PROGRAMMING CHANGES MADE TO DEVICE PARAMETERS. NORMAL DEVICE FUNCTION. AM

## 2024-10-10 ENCOUNTER — TELEPHONE (OUTPATIENT)
Dept: CARDIOLOGY CLINIC | Facility: CLINIC | Age: 89
End: 2024-10-10

## 2024-10-10 NOTE — TELEPHONE ENCOUNTER
10/10/24 PH CALL FROM NIELS, PATIENT'S DAUGHTER MISSED REMOTE TRANSMISSION ON 10/3/24. SHE WILL GO TO [facility] ON SUNDAY TO CHECK ON THE BOX. GAVE HER KWAME'S NUMBER TO CALL ON MONDAY SINCE I'M OUT. CR

## 2024-10-17 ENCOUNTER — REMOTE DEVICE CLINIC VISIT (OUTPATIENT)
Dept: CARDIOLOGY CLINIC | Facility: CLINIC | Age: 89
End: 2024-10-17
Payer: MEDICARE

## 2024-10-17 DIAGNOSIS — I44.2 CHB (COMPLETE HEART BLOCK) (HCC): Primary | ICD-10-CM

## 2024-10-17 PROCEDURE — 93296 REM INTERROG EVL PM/IDS: CPT | Performed by: INTERNAL MEDICINE

## 2024-10-17 PROCEDURE — 93294 REM INTERROG EVL PM/LDLS PM: CPT | Performed by: INTERNAL MEDICINE

## 2024-10-17 NOTE — PROGRESS NOTES
"Results for orders placed or performed in visit on 10/17/24   Cardiac EP device report    Narrative    [employer] DUAL CHAMBER  PPM (DDD MODE)/ ACTIVE SYSTEM IS MRI CONDITIONAL  BIOTRONIK TRANSMISSION: BATTERY STATUS \"OK.\" 35%. AP: 0%. : 100% (>40%~CHB). ALL AVAILABLE LEAD PARAMETERS WITHIN NORMAL LIMITS. NO SIGNIFICANT HIGH RATE EPISODES. NORMAL DEVICE FUNCTION. CH        "

## 2024-11-25 ENCOUNTER — HOSPITAL ENCOUNTER (OUTPATIENT)
Dept: NON INVASIVE DIAGNOSTICS | Facility: HOSPITAL | Age: 89
Discharge: HOME | End: 2024-11-25
Attending: INTERNAL MEDICINE
Payer: MEDICARE

## 2024-11-25 VITALS
HEART RATE: 62 BPM | BODY MASS INDEX: 39.84 KG/M2 | HEIGHT: 60 IN | SYSTOLIC BLOOD PRESSURE: 149 MMHG | DIASTOLIC BLOOD PRESSURE: 69 MMHG

## 2024-11-25 DIAGNOSIS — Z95.2 S/P TAVR (TRANSCATHETER AORTIC VALVE REPLACEMENT): ICD-10-CM

## 2024-11-25 LAB
AORTIC ROOT: 2.5 CM
AORTIC VALVE MEAN VELOCITY: 13.8 M/S
ASCENDING AORTA: 2.8 CM
AV AREA BY CONTINUOUS VTI: 1.2 CM2
AV AREA PEAK VELOCITY: 1.2 CM2
AV LVOT MEAN GRADIENT: 2 MMHG
AV LVOT PEAK GRADIENT: 3 MMHG
AV MEAN GRADIENT: 9 MMHG
AV PEAK GRADIENT: 15 MMHG
AV VALVE AREA: 1.31 CM2
AV VELOCITY RATIO: 0.43
DOP CALC AO PEAK VEL: 1.95 M/S
DOP CALC AO VTI: 48.48 CM
DOP CALC LVOT AREA: 3.14 CM2
DOP CALC LVOT CARDIAC INDEX: 2.01 L/MIN/M2
DOP CALC LVOT CARDIAC OUTPUT: 3.78 L/MIN
DOP CALC LVOT DIAMETER: 2 CM
DOP CALC LVOT PEAK VEL VTI: 20.21 CM
DOP CALC LVOT PEAK VEL: 0.83 M/S
DOP CALC LVOT STROKE INDEX: 31.4 ML/M2
DOP CALC LVOT STROKE VOLUME: 63.46 CM3
DOP CALC MV VTI: 43.49 CM
E WAVE DECELERATION TIME: 242 MS
E/A RATIO: 0.7
FRACTIONAL SHORTENING: 36 (ref 28–44)
INTERVENTRICULAR SEPTUM IN DIASTOLE (PARASTERNAL SHORT AXIS VIEW): 1.2 CM
INTERVENTRICULAR SEPTUM: 1.2 CM (ref 0.6–1.1)
LEFT ATRIUM SIZE: 3.7 CM
LEFT INTERNAL DIMENSION IN SYSTOLE: 3 CM (ref 2.1–4)
LEFT VENTRICULAR INTERNAL DIMENSION IN DIASTOLE: 4.7 CM (ref 3.5–6)
LEFT VENTRICULAR POSTERIOR WALL IN END DIASTOLE: 1.2 CM
LEFT VENTRICULAR STROKE VOLUME: 67 ML
LVSV (TEICH): 67 ML
MV E'TISSUE VEL-LAT: 4 CM/S
MV E'TISSUE VEL-SEP: 5 CM/S
MV MEAN GRADIENT: 6 MMHG
MV PEAK A VEL: 1.33 M/S
MV PEAK E VEL: 93 CM/S
MV PEAK GRADIENT: 14 MMHG
MV STENOSIS PRESSURE HALF TIME: 70 MS
MV VALVE AREA BY CONTINUITY EQUATION: 1.46 CM2
MV VALVE AREA P 1/2 METHOD: 3.1
RA PRESSURE ESTIMATED: 5 MMHG
RIGHT VENTRICLE ID DIMENSION: 3.6 CM
RV PSP: 39 MMHG
SL CV LV EF: 55
SL CV PED ECHO LEFT VENTRICLE DIASTOLIC VOLUME (MOD BIPLANE) 2D: 104 ML
SL CV PED ECHO LEFT VENTRICLE SYSTOLIC VOLUME (MOD BIPLANE) 2D: 36 ML
TR MAX PG: 34 MMHG
TR PEAK VELOCITY: 2.9 M/S
TRICUSPID ANNULAR PLANE SYSTOLIC EXCURSION: 1.7 CM
TRICUSPID VALVE PEAK REGURGITATION VELOCITY: 2.93 M/S

## 2024-11-25 PROCEDURE — 93306 TTE W/DOPPLER COMPLETE: CPT

## 2024-11-25 PROCEDURE — 93306 TTE W/DOPPLER COMPLETE: CPT | Performed by: STUDENT IN AN ORGANIZED HEALTH CARE EDUCATION/TRAINING PROGRAM

## 2025-01-02 ENCOUNTER — RESULTS FOLLOW-UP (OUTPATIENT)
Dept: CARDIOLOGY CLINIC | Facility: CLINIC | Age: OVER 89
End: 2025-01-02

## 2025-01-02 ENCOUNTER — REMOTE DEVICE CLINIC VISIT (OUTPATIENT)
Dept: CARDIOLOGY CLINIC | Facility: CLINIC | Age: OVER 89
End: 2025-01-02
Payer: COMMERCIAL

## 2025-01-02 DIAGNOSIS — Z95.0 PRESENCE OF PERMANENT CARDIAC PACEMAKER: Primary | ICD-10-CM

## 2025-01-02 PROCEDURE — 93294 REM INTERROG EVL PM/LDLS PM: CPT | Performed by: INTERNAL MEDICINE

## 2025-01-02 PROCEDURE — 93296 REM INTERROG EVL PM/IDS: CPT | Performed by: INTERNAL MEDICINE

## 2025-01-02 NOTE — PROGRESS NOTES
BIOTRONIK DUAL CHAMBER  PPM (DDD MODE)/ ACTIVE SYSTEM IS MRI CONDITIONAL  BIOTRONIK TRANSMISSION. BATTERY VOLTAGE ADEQUATE (35%). AP  0%   100% (>40%/CHB/DDD 50 PPM). ALL AVAILABLE LEAD PARAMETERS WITHIN NORMAL LIMITS. NO SIGNIFICANT HIGH RATE EPISODES. NORMAL DEVICE FUNCTION.  ES

## 2025-03-21 ENCOUNTER — TELEPHONE (OUTPATIENT)
Dept: CARDIOLOGY CLINIC | Facility: CLINIC | Age: OVER 89
End: 2025-03-21

## 2025-03-21 NOTE — TELEPHONE ENCOUNTER
3/21/25 Spoke with Melyssa patient's daughter. Monitor is deactivated per [employer]. I told her next time she goes to visit to unplug and replug in again and gave her [employer]'s #. CR

## 2025-03-25 ENCOUNTER — TELEPHONE (OUTPATIENT)
Age: OVER 89
End: 2025-03-25

## 2025-03-25 NOTE — TELEPHONE ENCOUNTER
Carmen from [facility] requested for the patient to be consulted with the wound center at [facility] for her right knee.  A fax should be coming through to the office for the request. Please advise.

## 2025-03-26 ENCOUNTER — TELEPHONE (OUTPATIENT)
Dept: CARDIOLOGY CLINIC | Facility: CLINIC | Age: OVER 89
End: 2025-03-26

## 2025-03-26 NOTE — TELEPHONE ENCOUNTER
3/26/25 Spoke to Daughter Melyssa, [employer] was supposed to be overnighting new 3G monitor to Melyssa's home. She didn't get it. She is calling them back and we'll set up something with a Rep to go to [facility] to help with this. CR

## 2025-03-31 ENCOUNTER — IN HOME VISIT (OUTPATIENT)
Dept: WOUND CARE | Facility: HOSPITAL | Age: OVER 89
End: 2025-03-31
Payer: COMMERCIAL

## 2025-03-31 DIAGNOSIS — L98.499 TRAUMATIC SKIN ULCER, UNSPECIFIED ULCER STAGE (HCC): Primary | ICD-10-CM

## 2025-03-31 DIAGNOSIS — R26.2 AMBULATORY DYSFUNCTION: ICD-10-CM

## 2025-03-31 PROCEDURE — 97597 DBRDMT OPN WND 1ST 20 CM/<: CPT | Performed by: NURSE PRACTITIONER

## 2025-03-31 PROCEDURE — 99344 HOME/RES VST NEW MOD MDM 60: CPT | Performed by: NURSE PRACTITIONER

## 2025-03-31 NOTE — ASSESSMENT & PLAN NOTE
As per medical record review, patient was found on the floor on March 19, 2025.  Patient sustained hematoma from the fall.  Visiting nurse seen the patient and recommended silver alginate for the wound.    Assessment and plan  Wound on the right knee medial is 8 x 7 cm, with 80% granulation, moderate amount of serosanguineous drainage, friable, patient easily bleeds  The wound on the right knee anterior is full-thickness, about 1 x 1.5 cm, with no obvious sign of infection  Continue local wound care with silver alginate  I will order silver nitrate stick just in case I need to debride patient next week.  Patient currently on aspirin.  Continue to offload  Under the care of visiting nurse  Follow-up next week

## 2025-03-31 NOTE — LETTER
Patient:  Anabel N Day   1935           KASIE Marrufo saw Anabel N Day for a wound care visit on 3/31/2025. See below for information relating to this visit.      Chief Complaint   Patient presents with    New Patient Visit     Wound on the right knee        Assessment/Plan:  1. Traumatic skin ulcer, unspecified ulcer stage (HCC)  Assessment & Plan:  As per medical record review, patient was found on the floor on March 19, 2025.  Patient sustained hematoma from the fall.  Visiting nurse seen the patient and recommended silver alginate for the wound.    Assessment and plan  Wound on the right knee medial is 8 x 7 cm, with 80% granulation, moderate amount of serosanguineous drainage, friable, patient easily bleeds  The wound on the right knee anterior is full-thickness, about 1 x 1.5 cm, with no obvious sign of infection  Continue local wound care with silver alginate  I will order silver nitrate stick just in case I need to debride patient next week.  Patient currently on aspirin.  Continue to offload  Under the care of visiting nurse  Follow-up next week        Orders:  -     Debridement  2. Ambulatory dysfunction         Orders:  Anabel N Day  1935  Wound: Right knee  Discontinue previous wound order  Cleanse the wound bed with NSS   Apply non-sting skin prep to periwound area  Apply silver alginate to wound bed, then cover with ABD pad or OPTi lock or bordered foam  Frequency : Daily and prn for soiling    Offload all wounds  Turn and reposition frequently  Instruct / Assist with weight shifting in wheelchair  Increase protein intake.  Monitor for any sign of infection or worsening, inform PCP or patient's primary physician in your facility.        Follow Up:  Return in about 1 week (around 4/7/2025).       [facility] hours are 8:00 am - 4:30 pm Monday through Friday. The center phone number is [phone]. You can also contact me directly thru my email at  [email or web address] or thru tiger text. If it is an emergency, please contact the PCP or patient's attending physician in your facility.     Sincerely,    Electronically signed by KASIE Marrufo    Patient : Anabel VALDES Day    1935            No

## 2025-03-31 NOTE — PROGRESS NOTES
[facility] WOUND CARE MANAGEMENT   AND HYPERBARIC MEDICINE CENTER       Patient ID: Anabel Hutton is a 89 y.o. female Date of Birth 1935     Location of Service: [facility] care    Performed wound round with: Wound team     Chief Complaint : Right knee    Wound Instructions:  Wound: Right knee  Discontinue previous wound order  Cleanse the wound bed with NSS   Apply non-sting skin prep to periwound area  Apply silver alginate to wound bed, then cover with ABD pad or OPTi lock or bordered foam  Frequency : Daily and prn for soiling    Offload all wounds  Turn and reposition frequently  Instruct / Assist with weight shifting in wheelchair  Increase protein intake.  Monitor for any sign of infection or worsening, inform PCP or patient's primary physician in your facility.      Allergies  Advil [ibuprofen], Propoxyphene, and Rofecoxib      Assessment & Plan:  1. Traumatic skin ulcer, unspecified ulcer stage (HCC)  Assessment & Plan:  As per medical record review, patient was found on the floor on March 19, 2025.  Patient sustained hematoma from the fall.  Visiting nurse seen the patient and recommended silver alginate for the wound.    Assessment and plan  Wound on the right knee medial is 8 x 7 cm, with 80% granulation, moderate amount of serosanguineous drainage, friable, patient easily bleeds  The wound on the right knee anterior is full-thickness, about 1 x 1.5 cm, with no obvious sign of infection  Continue local wound care with silver alginate  I will order silver nitrate stick just in case I need to debride patient next week.  Patient currently on aspirin.  Continue to offload  Under the care of visiting nurse  Follow-up next week        Orders:  -     Debridement  2. Ambulatory dysfunction           Subjective:   March 31, 2025.  New consult for wound on the right knee.  Medical problem includes but not limited to chronic diastolic heart failure, stage IV chronic kidney  "disease, and morbid obesity.  I introduced myself to patient and patient agreed to be seen for wound consult.    Wound history: As per medical record review, patient was found on the floor on March 19, 2025.  Patient sustained hematoma from the fall.  Visiting nurse seen the patient and recommended silver alginate for the wound.    Received patient in chair, seems comfortable.  Denies pain.  As per report, patient is on aspirin.  Patient is wheelchair-bound.  Patient verbalized, \"I do not have any pain\".  Denies fever.        Review of Systems   Constitutional: Negative.    Respiratory: Negative.     Cardiovascular: Negative.    Musculoskeletal: Negative.    Skin:  Positive for wound.   Psychiatric/Behavioral: Negative.         Objective:    Physical Exam  Constitutional:       Appearance: Normal appearance.   Cardiovascular:      Rate and Rhythm: Normal rate.   Pulmonary:      Effort: Pulmonary effort is normal.   Musculoskeletal:      Right lower leg: Edema present.      Left lower leg: Edema present.      Comments: Moderate edema on bilateral lower legs   Skin:     Findings: Lesion present.      Comments: Right knee medial: Wound size is 8 x 7 x 0.1 cm.,  Moderate amount of serous sanguinous drainage, 80% granulation, 10% slough, 10% black necrotic tissue, periwound is normal, no obvious sign of infection    Right knee anterior: Wound size is 1 x 1.5 x 0.1 cm.,  Moderate amount of serosanguineous drainage, 100% granulation, periwound normal, with no obvious sign of infection   Neurological:      Mental Status: She is alert and oriented to person, place, and time.      Gait: Gait abnormal.      Comments: Wheelchair-bound              Debridement     Date/Time: 3/31/2025 9:46 AM  Universal Protocol:  Consent: Verbal consent obtained.  Risks and benefits: risks, benefits and alternatives were discussed  Consent given by: patient  Time out: Immediately prior to procedure a \"time out\" was called to verify the correct " patient, procedure, equipment, support staff and site/side marked as required.  Timeout called at: 3/31/2025 9:46 AM.  Patient understanding: patient states understanding of the procedure being performed  Patient identity confirmed: verbally with patient    Debridement Details  Performed by: NP  Debridement type: selective  Pain control: lidocaine 4%    Post-debridement measurements  Length (cm): 8  Width (cm): 7  Depth (cm): 0.1  Percent debrided: 10%  Surface Area (cm^2): 56  Area Debrided (cm^2): 5.6  Volume (cm^3): 5.6    Devitalized tissue debrided: biofilm, fibrin, necrotic debris and slough  Instrument(s) utilized: curette  Technique utilized: nonexcisional  Bleeding: small  Hemostasis obtained with: pressure  Procedural pain (0-10): 0  Post-procedural pain: 0   Response to treatment: procedure was tolerated well               Patient's care was coordinated with nursing facility staff. Recent vitals, labs and updated medications were reviewed on EMR or chart system of facility. Past Medical, surgical, social, medication and allergy history and patient's previous records were reviewed and updated as appropriate: Most up-to date information is available in the facility EMR where the patient is currently admitted.    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, June 2016    Age-related cognitive decline    Frequent falls    Ambulatory dysfunction    Vision impairment    Constipation    Incontinence in female    Closed displaced intertrochanteric fracture of right femur (HCC)    Diabetes mellitus (HCC)    History of cardiac pacemaker    MAYELIN (acute kidney injury) ([facility])    Acute blood loss as cause of postoperative anemia    Chronic diastolic congestive heart failure (HCC)    Hyperlipidemia    Hypothyroidism     Encephalopathy    Status post insertion of drug-eluting stent into left anterior descending (LAD) artery, June 2016, cath before TAVR    Fall    Closed fracture of sternal end of right clavicle    Hypertension    Secondary lymphedema    Hypokalemia    Stage 4 chronic kidney disease (HCC)    Chest pain    Acute on chronic diastolic heart failure (HCC)    Acute kidney injury superimposed on CKD  (HCC)    Elevated troponin level not due myocardial infarction    Sick sinus syndrome (HCC)    Partial small bowel obstruction (HCC)    Hypernatremia    Sacral decubitus ulcer    Morbid (severe) obesity due to excess calories (HCC)    Traumatic skin ulcer (HCC)     Past Medical History:   Diagnosis Date    Anemia     Resolved 3/1/2017     Arthritis     Knee - Resolved 3/1/2017     Blind     CAD (coronary artery disease)     s/p HERNAN 6/2016    Calcaneal spur     CHF (congestive heart failure) ([facility])     Chronic knee instability     Resolved 3/1/2017     Chronic pain syndrome     Resolved 3/1/2017     Dementia ([facility])     Last assessed 11/1/2017     Diabetes mellitus ([facility])     non-insulin depdenent    Disease of thyroid gland     Diverticulitis     Essential thrombocytopenia ([facility])     Last assessed 11/1/2017     GERD (gastroesophageal reflux disease)     History of aortic valve replacement     Resolved 3/1/2017     History of bilateral knee replacement     Resolved 3/1/2017     History of TIA (transient ischemic attack)     no residual deficits    Hyperlipidemia     Hypertension     Hypoxia     on home O2 QHS    Leukocytosis     Resolved 3/1/2017     Lumbar post-laminectomy syndrome     Resolved 3/1/2017     Meniere disease     Osteopenia     Pacemaker     CHB-Biotronik in 2/2015    S/P TAVR (transcatheter aortic valve replacement)     Resolved 3/1/2017     Severe aortic stenosis     Thrombocytosis     Resolved 4/5/2017     Type 2 diabetes mellitus ([facility])     Last assessed 11/1/2017      Past Surgical History:   Procedure Laterality  Date    APPENDECTOMY      BACK SURGERY      Arthrodesis     CARDIAC PACEMAKER PLACEMENT      CHOLECYSTECTOMY      EYE SURGERY      FRACTURE SURGERY Right 01/02/2018    [facility]emur    HYSTERECTOMY      SD EGD TRANSORAL BIOPSY SINGLE/MULTIPLE N/A 11/9/2016    Procedure: ESOPHAGOGASTRODUODENOSCOPY (EGD);  Surgeon: Vinh Bryant MD;  Location: [facility];  Service: Gastroenterology    SD OPTX FEM SHFT FX W/INSJ IMED IMPLT W/WO SCREW Right 1/2/2018    Procedure: INSERTION NAIL IM FEMUR ANTEGRADE (TROCHANTERIC) RIGHT;  Surgeon: Roel Amin MD;  Location: [facility];  Service: Orthopedics    SD REPLACE AORTIC VALVE OPENFEMORAL ARTERY APPROACH N/A 7/26/2016    Procedure: TRANSFEMORAL TAVR WITH 23MM LAROSE LILY S3 VALVE; JOSE ALFREDO ;  Surgeon: Tre Ruiz DO;  Location: [facility];  Service: Cardiac Surgery    SMALL INTESTINE SURGERY      TONSILLECTOMY      TOTAL KNEE ARTHROPLASTY      VARICOSE VEIN SURGERY      VENTRAL HERNIA REPAIR       Social History     Socioeconomic History    Marital status:      Spouse name: None    Number of children: None    Years of education: None    Highest education level: None   Occupational History    None   Tobacco Use    Smoking status: Never    Smokeless tobacco: Never   Vaping Use    Vaping status: Never Used   Substance and Sexual Activity    Alcohol use: Not Currently    Drug use: No    Sexual activity: None   Other Topics Concern    None   Social History Narrative    Lives in an independent group home      Social Drivers of Health     Financial Resource Strain: Not on file   Food Insecurity: Not on file   Transportation Needs: Not on file   Physical Activity: Not on file   Stress: Not on file   Social Connections: Not on file   Intimate Partner Violence: Not on file   Housing Stability: Not on file        Current Outpatient Medications:     acetaminophen (TYLENOL) 325 mg tablet, Take 2 tablets (650 mg total) by mouth every 6 (six) hours as needed for mild pain. (Patient taking  differently: Take 650 mg by mouth every 4 (four) hours as needed for mild pain As needed.), Disp: 30 tablet, Rfl: 0    amLODIPine (NORVASC) 10 mg tablet, Take 10 mg by mouth daily, Disp: , Rfl:     amLODIPine (NORVASC) 5 mg tablet, 5 mg daily   (Patient not taking: Reported on 1/9/2024), Disp: , Rfl:     aspirin 81 mg chewable tablet, Chew 81 mg daily., Disp: , Rfl:     atorvastatin (LIPITOR) 10 mg tablet, , Disp: , Rfl:     bisacodyl (DULCOLAX) 5 mg EC tablet, Take 5 mg by mouth daily as needed for constipation Two tabs by mouth once daily as needed., Disp: , Rfl:     diclofenac sodium (VOLTAREN) 1 %, Apply 2 g topically 4 (four) times a day as needed, Disp: , Rfl:     Diclofenac Sodium (VOLTAREN) 1 %, , Disp: , Rfl:     ferrous sulfate 325 (65 Fe) mg tablet, Take 325 mg by mouth daily with breakfast (Patient not taking: Reported on 11/12/2021), Disp: , Rfl:     furosemide (LASIX) 40 mg tablet, Take 2 tablets (80 mg total) by mouth 2 (two) times a day, Disp: , Rfl: 0    Glucagon, rDNA, (Glucagon Emergency) 1 MG KIT, , Disp: , Rfl:     ketoconazole (NIZORAL) 2 % cream, daily , Disp: , Rfl:     levothyroxine 75 mcg tablet, , Disp: , Rfl:     levothyroxine 88 mcg tablet, Take 88 mcg by mouth daily, Disp: , Rfl:     loperamide (IMODIUM A-D) 2 MG tablet, Take 2 mg by mouth 3 (three) times a day as needed for diarrhea (Patient not taking: Reported on 11/12/2021), Disp: , Rfl:     magnesium hydroxide (MILK OF MAGNESIA) 400 mg/5 mL oral suspension, Take 30 mL by mouth daily as needed for constipation Once daily as needed. (Patient not taking: Reported on 1/9/2024), Disp: , Rfl:     metolazone (ZAROXOLYN) 2.5 mg tablet, , Disp: , Rfl:     metoprolol tartrate (LOPRESSOR) 50 mg tablet, Take 1 tablet (50 mg total) by mouth 2 (two) times a day., Disp: 60 tablet, Rfl: 2    Multiple Vitamins-Minerals (OCUVITE ADULT 50+ PO), Take by mouth daily, Disp: , Rfl:     nystatin (MYCOSTATIN) powder, Apply topically 2 (two) times a day  (Patient not taking: Reported on 1/9/2024), Disp: , Rfl:     oxyCODONE (ROXICODONE) 5 mg immediate release tablet, Take 0.5 tablets (2.5 mg total) by mouth every 4 (four) hours as needed for moderate pain or severe pain for up to 10 dosesMax Daily Amount: 15 mg (Patient not taking: Reported on 1/9/2024), Disp: 3 tablet, Rfl: 0    pantoprazole (PROTONIX) 40 mg tablet, Take 1 tablet (40 mg total) by mouth daily (Patient not taking: Reported on 1/9/2024), Disp: 30 tablet, Rfl: 0    potassium chloride (K-DUR,KLOR-CON) 20 mEq tablet, Take 20 mEq by mouth 4 (four) times a day  , Disp: , Rfl:     potassium chloride 10% oral solution, Take 15 mL (20 mEq total) by mouth 2 (two) times a day (Patient not taking: Reported on 11/12/2021), Disp: 30 mL, Rfl: 0    spironolactone (ALDACTONE) 25 mg tablet, Take 25 mg by mouth daily One time daily for chf, Disp: , Rfl:     tamsulosin (FLOMAX) 0.4 mg, Take 1 capsule (0.4 mg total) by mouth daily with dinner, Disp: 30 capsule, Rfl: 0    tetrahydrozoline (VISINE) 0.05 % ophthalmic solution, 1 drop 4 (four) times a day as needed for irritation (Patient not taking: Reported on 11/12/2021), Disp: , Rfl:     triamcinolone (KENALOG) 0.1 % cream, , Disp: , Rfl:     White Petrolatum-Mineral Oil (artificial tears), Administer to both eyes 1 drop in each eye every 12 hrs for dry eyes., Disp: , Rfl:   Family History   Problem Relation Age of Onset    Cancer Child     Coronary artery disease Father         Native artery     Stroke Family     Osteopenia Family     Other Family         Pituitary disease    Polycythemia Family               Coordination of Care: Wound team aware of the treatment plan. Facility nurse will provide wound treatment and monitor the wound for any changes.     Patient / Staff education : Patient / Staff was given education on sign of infection and pressure ulcer prevention. Patient/ Staff verbalized understanding     Follow up :  Next week    Voice-recognition software may  "have been used in the preparation of this document. Occasional wrong word or \"sound-alike\" substitutions may have occurred due to the inherent limitations of voice recognition software. Interpretation should be guided by context.      KASIE Marrufo  "

## 2025-04-07 ENCOUNTER — IN HOME VISIT (OUTPATIENT)
Dept: WOUND CARE | Facility: HOSPITAL | Age: OVER 89
End: 2025-04-07
Payer: COMMERCIAL

## 2025-04-07 DIAGNOSIS — L98.499 TRAUMATIC SKIN ULCER, UNSPECIFIED ULCER STAGE (HCC): Primary | ICD-10-CM

## 2025-04-07 DIAGNOSIS — R26.2 AMBULATORY DYSFUNCTION: ICD-10-CM

## 2025-04-07 PROCEDURE — 97598 DBRDMT OPN WND ADDL 20CM/<: CPT | Performed by: NURSE PRACTITIONER

## 2025-04-07 PROCEDURE — 97597 DBRDMT OPN WND 1ST 20 CM/<: CPT | Performed by: NURSE PRACTITIONER

## 2025-04-07 NOTE — PROGRESS NOTES
[facility] WOUND CARE MANAGEMENT   AND HYPERBARIC MEDICINE CENTER       Patient ID: Anabel Hutton is a 89 y.o. female Date of Birth 1935     Location of Service: [facility] care    Performed wound round with: Wound team     Chief Complaint : Right knee    Wound Instructions:  Wound: Right knee  Discontinue previous wound order  Cleanse the wound bed with NSS   Apply non-sting skin prep to periwound area  Apply silver alginate to wound bed, then cover with ABD pad or OPTi lock or bordered foam  Frequency : Daily and prn for soiling    Offload all wounds  Turn and reposition frequently  Instruct / Assist with weight shifting in wheelchair  Increase protein intake.  Monitor for any sign of infection or worsening, inform PCP or patient's primary physician in your facility.      Allergies  Advil [ibuprofen], Propoxyphene, and Rofecoxib      Assessment & Plan:  1. Traumatic skin ulcer, unspecified ulcer stage (HCC)  Assessment & Plan:  As per medical record review, patient was found on the floor on March 19, 2025.  Patient sustained hematoma from the fall.  Visiting nurse seen the patient and recommended silver alginate for the wound.    Assessment and plan  Right knee medial wound: Wound improved, decrease in wound size, 6.5 x 7 cm, with 30% slough. Selective debridement performed, remove yellowish necrotic tissue.  Silver nitrate was used to stop bleeding postdebridement.  Patient tolerated procedure  Right knee anterior: Wound is 100% granulation postdebridement  There is no tunneling from the large wound to the small wound.    Continue local wound care with silver alginate  Patient currently on aspirin.  Continue to offload  Under the care of visiting nurse  Follow-up next week            Orders:  -     Debridement  2. Ambulatory dysfunction  Assessment & Plan:  Wheelchair-bound             Subjective:   March 31, 2025.  New consult for wound on the right knee.  Medical problem  "includes but not limited to chronic diastolic heart failure, stage IV chronic kidney disease, and morbid obesity.  I introduced myself to patient and patient agreed to be seen for wound consult.    Wound history: As per medical record review, patient was found on the floor on March 19, 2025.  Patient sustained hematoma from the fall.  Visiting nurse seen the patient and recommended silver alginate for the wound.    Received patient in chair, seems comfortable.  Denies pain.  As per report, patient is on aspirin.  Patient is wheelchair-bound.  Patient verbalized, \"I do not have any pain\".  Denies fever.    April 7, 2025.  Follow-up for wound on the right knee.  Received patient on recliner chair, seems comfortable.  Denies pain.  Last week I received a message from the facility's nurse regarding the wound.  Visiting nurse is concerned with the connecting tunneling from the large wound to the small wound.        Review of Systems   Constitutional: Negative.    Respiratory: Negative.     Cardiovascular: Negative.    Musculoskeletal: Negative.    Skin:  Positive for wound.   Psychiatric/Behavioral: Negative.         Objective:    Physical Exam  Constitutional:       Appearance: Normal appearance.   Cardiovascular:      Rate and Rhythm: Normal rate.   Pulmonary:      Effort: Pulmonary effort is normal.   Musculoskeletal:      Right lower leg: Edema present.      Left lower leg: Edema present.      Comments: Moderate edema on bilateral lower legs   Skin:     Findings: Lesion present.      Comments: Right knee medial: Wound size is 6.5 x 7 x 0.2 cm.,  30% slough, 70% granulation, moderate amount of serosanguineous drainage, periwound normal, with no obvious sign of infection    Right knee anterior: Wound size is 1 x 1.5 x 0.1 cm.,  Moderate amount of serosanguineous drainage, 100% granulation postdebridement, periwound normal, with no obvious sign of infection   Neurological:      Mental Status: She is alert and oriented " "to person, place, and time.      Gait: Gait abnormal.      Comments: Wheelchair-bound              Debridement     Date/Time: 4/7/2025 9:30 AM  Universal Protocol:  Consent: Verbal consent obtained.  Risks and benefits: risks, benefits and alternatives were discussed  Consent given by: patient  Time out: Immediately prior to procedure a \"time out\" was called to verify the correct patient, procedure, equipment, support staff and site/side marked as required.  Timeout called at: 4/7/2025 9:36 AM.  Patient understanding: patient states understanding of the procedure being performed  Patient identity confirmed: verbally with patient    Debridement Details  Performed by: NP (Right knee)  Debridement type: selective  Pain control: lidocaine 4%    Post-debridement measurements  Length (cm): 6.5  Width (cm): 7  Depth (cm): 0.2  Percent debrided: 100%  Surface Area (cm^2): 45.5  Area Debrided (cm^2): 45.5  Volume (cm^3): 9.1    Devitalized tissue debrided: biofilm, fibrin, necrotic debris and slough  Instrument(s) utilized: curette  Technique utilized: nonexcisional  Bleeding: medium  Hemostasis obtained with: pressure and silver nitrate  Procedural pain (0-10): 0  Post-procedural pain: 0   Response to treatment: procedure was tolerated well               Patient's care was coordinated with nursing facility staff. Recent vitals, labs and updated medications were reviewed on EMR or chart system of facility. Past Medical, surgical, social, medication and allergy history and patient's previous records were reviewed and updated as appropriate: Most up-to date information is available in the facility EMR where the patient is currently admitted.    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, June 2016    Age-related cognitive decline    " Frequent falls    Ambulatory dysfunction    Vision impairment    Constipation    Incontinence in female    Closed displaced intertrochanteric fracture of right femur (HCC)    Diabetes mellitus (HCC)    History of cardiac pacemaker    MAYELIN (acute kidney injury) (HCC)    Acute blood loss as cause of postoperative anemia    Chronic diastolic congestive heart failure (HCC)    Hyperlipidemia    Hypothyroidism    Encephalopathy    Status post insertion of drug-eluting stent into left anterior descending (LAD) artery, June 2016, cath before TAVR    Fall    Closed fracture of sternal end of right clavicle    Hypertension    Secondary lymphedema    Hypokalemia    Stage 4 chronic kidney disease (HCC)    Chest pain    Acute on chronic diastolic heart failure (HCC)    Acute kidney injury superimposed on CKD  (HCC)    Elevated troponin level not due myocardial infarction    Sick sinus syndrome (HCC)    Partial small bowel obstruction (HCC)    Hypernatremia    Sacral decubitus ulcer    Morbid (severe) obesity due to excess calories (HCC)    Traumatic skin ulcer ([facility])     Past Medical History:   Diagnosis Date    Anemia     Resolved 3/1/2017     Arthritis     Knee - Resolved 3/1/2017     Blind     CAD (coronary artery disease)     s/p HERNAN 6/2016    Calcaneal spur     CHF (congestive heart failure) ([facility])     Chronic knee instability     Resolved 3/1/2017     Chronic pain syndrome     Resolved 3/1/2017     Dementia ([facility])     Last assessed 11/1/2017     Diabetes mellitus ([facility])     non-insulin depdenent    Disease of thyroid gland     Diverticulitis     Essential thrombocytopenia ([facility])     Last assessed 11/1/2017     GERD (gastroesophageal reflux disease)     History of aortic valve replacement     Resolved 3/1/2017     History of bilateral knee replacement     Resolved 3/1/2017     History of TIA (transient ischemic attack)     no residual deficits    Hyperlipidemia     Hypertension     Hypoxia     on home O2 QHS    Leukocytosis      Resolved 3/1/2017     Lumbar post-laminectomy syndrome     Resolved 3/1/2017     Meniere disease     Osteopenia     Pacemaker     CHB-Biotronik in 2/2015    S/P TAVR (transcatheter aortic valve replacement)     Resolved 3/1/2017     Severe aortic stenosis     Thrombocytosis     Resolved 4/5/2017     Type 2 diabetes mellitus (HCC)     Last assessed 11/1/2017      Past Surgical History:   Procedure Laterality Date    APPENDECTOMY      BACK SURGERY      Arthrodesis     CARDIAC PACEMAKER PLACEMENT      CHOLECYSTECTOMY      EYE SURGERY      FRACTURE SURGERY Right 01/02/2018    femur    HYSTERECTOMY      DC EGD TRANSORAL BIOPSY SINGLE/MULTIPLE N/A 11/9/2016    Procedure: ESOPHAGOGASTRODUODENOSCOPY (EGD);  Surgeon: Vinh Bryant MD;  Location: [facility];  Service: Gastroenterology    DC OPTX FEM SHFT FX W/INSJ IMED IMPLT W/WO SCREW Right 1/2/2018    Procedure: INSERTION NAIL IM FEMUR ANTEGRADE (TROCHANTERIC) RIGHT;  Surgeon: Roel Amin MD;  Location: [facility];  Service: Orthopedics    DC REPLACE AORTIC VALVE OPENFEMORAL ARTERY APPROACH N/A 7/26/2016    Procedure: TRANSFEMORAL TAVR WITH 23MM LAROSE LIYL S3 VALVE; JOSE ALFREDO ;  Surgeon: Tre Ruiz DO;  Location: [facility];  Service: Cardiac Surgery    SMALL INTESTINE SURGERY      TONSILLECTOMY      TOTAL KNEE ARTHROPLASTY      VARICOSE VEIN SURGERY      VENTRAL HERNIA REPAIR       Social History     Socioeconomic History    Marital status:      Spouse name: None    Number of children: None    Years of education: None    Highest education level: None   Occupational History    None   Tobacco Use    Smoking status: Never    Smokeless tobacco: Never   Vaping Use    Vaping status: Never Used   Substance and Sexual Activity    Alcohol use: Not Currently    Drug use: No    Sexual activity: None   Other Topics Concern    None   Social History Narrative    Lives in an independent group home      Social Drivers of Health     Financial Resource Strain: Not on file    Food Insecurity: Not on file   Transportation Needs: Not on file   Physical Activity: Not on file   Stress: Not on file   Social Connections: Not on file   Intimate Partner Violence: Not on file   Housing Stability: Not on file        Current Outpatient Medications:     acetaminophen (TYLENOL) 325 mg tablet, Take 2 tablets (650 mg total) by mouth every 6 (six) hours as needed for mild pain. (Patient taking differently: Take 650 mg by mouth every 4 (four) hours as needed for mild pain As needed.), Disp: 30 tablet, Rfl: 0    amLODIPine (NORVASC) 10 mg tablet, Take 10 mg by mouth daily, Disp: , Rfl:     amLODIPine (NORVASC) 5 mg tablet, 5 mg daily   (Patient not taking: Reported on 1/9/2024), Disp: , Rfl:     aspirin 81 mg chewable tablet, Chew 81 mg daily., Disp: , Rfl:     atorvastatin (LIPITOR) 10 mg tablet, , Disp: , Rfl:     bisacodyl (DULCOLAX) 5 mg EC tablet, Take 5 mg by mouth daily as needed for constipation Two tabs by mouth once daily as needed., Disp: , Rfl:     diclofenac sodium (VOLTAREN) 1 %, Apply 2 g topically 4 (four) times a day as needed, Disp: , Rfl:     Diclofenac Sodium (VOLTAREN) 1 %, , Disp: , Rfl:     ferrous sulfate 325 (65 Fe) mg tablet, Take 325 mg by mouth daily with breakfast (Patient not taking: Reported on 11/12/2021), Disp: , Rfl:     furosemide (LASIX) 40 mg tablet, Take 2 tablets (80 mg total) by mouth 2 (two) times a day, Disp: , Rfl: 0    Glucagon, rDNA, (Glucagon Emergency) 1 MG KIT, , Disp: , Rfl:     ketoconazole (NIZORAL) 2 % cream, daily , Disp: , Rfl:     levothyroxine 75 mcg tablet, , Disp: , Rfl:     levothyroxine 88 mcg tablet, Take 88 mcg by mouth daily, Disp: , Rfl:     loperamide (IMODIUM A-D) 2 MG tablet, Take 2 mg by mouth 3 (three) times a day as needed for diarrhea (Patient not taking: Reported on 11/12/2021), Disp: , Rfl:     magnesium hydroxide (MILK OF MAGNESIA) 400 mg/5 mL oral suspension, Take 30 mL by mouth daily as needed for constipation Once daily as  needed. (Patient not taking: Reported on 1/9/2024), Disp: , Rfl:     metolazone (ZAROXOLYN) 2.5 mg tablet, , Disp: , Rfl:     metoprolol tartrate (LOPRESSOR) 50 mg tablet, Take 1 tablet (50 mg total) by mouth 2 (two) times a day., Disp: 60 tablet, Rfl: 2    Multiple Vitamins-Minerals (OCUVITE ADULT 50+ PO), Take by mouth daily, Disp: , Rfl:     nystatin (MYCOSTATIN) powder, Apply topically 2 (two) times a day (Patient not taking: Reported on 1/9/2024), Disp: , Rfl:     oxyCODONE (ROXICODONE) 5 mg immediate release tablet, Take 0.5 tablets (2.5 mg total) by mouth every 4 (four) hours as needed for moderate pain or severe pain for up to 10 dosesMax Daily Amount: 15 mg (Patient not taking: Reported on 1/9/2024), Disp: 3 tablet, Rfl: 0    pantoprazole (PROTONIX) 40 mg tablet, Take 1 tablet (40 mg total) by mouth daily (Patient not taking: Reported on 1/9/2024), Disp: 30 tablet, Rfl: 0    potassium chloride (K-DUR,KLOR-CON) 20 mEq tablet, Take 20 mEq by mouth 4 (four) times a day  , Disp: , Rfl:     potassium chloride 10% oral solution, Take 15 mL (20 mEq total) by mouth 2 (two) times a day (Patient not taking: Reported on 11/12/2021), Disp: 30 mL, Rfl: 0    spironolactone (ALDACTONE) 25 mg tablet, Take 25 mg by mouth daily One time daily for chf, Disp: , Rfl:     tamsulosin (FLOMAX) 0.4 mg, Take 1 capsule (0.4 mg total) by mouth daily with dinner, Disp: 30 capsule, Rfl: 0    tetrahydrozoline (VISINE) 0.05 % ophthalmic solution, 1 drop 4 (four) times a day as needed for irritation (Patient not taking: Reported on 11/12/2021), Disp: , Rfl:     triamcinolone (KENALOG) 0.1 % cream, , Disp: , Rfl:     White Petrolatum-Mineral Oil (artificial tears), Administer to both eyes 1 drop in each eye every 12 hrs for dry eyes., Disp: , Rfl:   Family History   Problem Relation Age of Onset    Cancer Child     Coronary artery disease Father         Native artery     Stroke Family     Osteopenia Family     Other Family         Pituitary  "disease    Polycythemia Family               Coordination of Care: Wound team aware of the treatment plan. Facility nurse will provide wound treatment and monitor the wound for any changes.     Patient / Staff education : Patient / Staff was given education on sign of infection and pressure ulcer prevention. Patient/ Staff verbalized understanding     Follow up :  Next week    Voice-recognition software may have been used in the preparation of this document. Occasional wrong word or \"sound-alike\" substitutions may have occurred due to the inherent limitations of voice recognition software. Interpretation should be guided by context.      KASIE Marrufo  "

## 2025-04-07 NOTE — LETTER
Patient:  Anabel VALDES Day   1935           KASIE Marrufo saw Anabel VALDES Day for a wound care visit on 4/7/2025. See below for information relating to this visit.      Chief Complaint   Patient presents with    Follow Up Wound Care Visit        Assessment/Plan:  1. Traumatic skin ulcer, unspecified ulcer stage (HCC)  Assessment & Plan:  As per medical record review, patient was found on the floor on March 19, 2025.  Patient sustained hematoma from the fall.  Visiting nurse seen the patient and recommended silver alginate for the wound.    Assessment and plan  Right knee medial wound: Wound improved, decrease in wound size, 6.5 x 7 cm, with 30% slough. Selective debridement performed, remove yellowish necrotic tissue.  Silver nitrate was used to stop bleeding postdebridement.  Patient tolerated procedure  Right knee anterior: Wound is 100% granulation postdebridement  There is no tunneling from the large wound to the small wound.    Continue local wound care with silver alginate  Patient currently on aspirin.  Continue to offload  Under the care of visiting nurse  Follow-up next week            Orders:  -     Debridement  2. Ambulatory dysfunction  Assessment & Plan:  Wheelchair-bound         Orders:  Anabel VALDES Day  1935  Orders Placed This Encounter   Procedures    Debridement     This order was created via procedure documentation   Wound: Right knee  Discontinue previous wound order  Cleanse the wound bed with NSS   Apply non-sting skin prep to periwound area  Apply silver alginate to wound bed, then cover with ABD pad or OPTi lock or bordered foam  Frequency : Daily and prn for soiling    Offload all wounds  Turn and reposition frequently  Instruct / Assist with weight shifting in wheelchair  Increase protein intake.  Monitor for any sign of infection or worsening, inform PCP or patient's primary physician in your facility.      Follow Up:  Return in about 1 week (around 4/14/2025).       [facility]  [place] [facility] hours are 8:00 am - 4:30 pm Monday through Friday. The center phone number is [phone]. You can also contact me directly thru my email at [email or web address] or thru tiger [email or web address]. If it is an emergency, please contact the PCP or patient's attending physician in your facility.     Sincerely,    Electronically signed by KASIE Marrufo    Patient : Anabel VALDES Day    1935

## 2025-04-07 NOTE — ASSESSMENT & PLAN NOTE
As per medical record review, patient was found on the floor on March 19, 2025.  Patient sustained hematoma from the fall.  Visiting nurse seen the patient and recommended silver alginate for the wound.    Assessment and plan  Right knee medial wound: Wound improved, decrease in wound size, 6.5 x 7 cm, with 30% slough. Selective debridement performed, remove yellowish necrotic tissue.  Silver nitrate was used to stop bleeding postdebridement.  Patient tolerated procedure  Right knee anterior: Wound is 100% granulation postdebridement  There is no tunneling from the large wound to the small wound.    Continue local wound care with silver alginate  Patient currently on aspirin.  Continue to offload  Under the care of visiting nurse  Follow-up next week

## 2025-04-08 ENCOUNTER — RESULTS FOLLOW-UP (OUTPATIENT)
Dept: CARDIOLOGY CLINIC | Facility: CLINIC | Age: OVER 89
End: 2025-04-08

## 2025-04-08 ENCOUNTER — REMOTE DEVICE CLINIC VISIT (OUTPATIENT)
Dept: CARDIOLOGY CLINIC | Facility: CLINIC | Age: OVER 89
End: 2025-04-08
Payer: COMMERCIAL

## 2025-04-08 DIAGNOSIS — Z95.0 CARDIAC PACEMAKER IN SITU: Primary | ICD-10-CM

## 2025-04-08 PROCEDURE — 93296 REM INTERROG EVL PM/IDS: CPT | Performed by: INTERNAL MEDICINE

## 2025-04-08 PROCEDURE — 93294 REM INTERROG EVL PM/LDLS PM: CPT | Performed by: INTERNAL MEDICINE

## 2025-04-08 NOTE — PROGRESS NOTES
"Results for orders placed or performed in visit on 04/08/25   Cardiac EP device report    Narrative    BIOTRONIK DUAL CHAMBER  PPM (DDD MODE)/ ACTIVE SYSTEM IS MRI CONDITIONAL  BIOTRONIK TRANSMISSION: NO PRESENTING EGM. BATTERY STATUS \"OK\" (35% REMAINING BATTERY LIFE). AP-0%, -100% (DEPENDENT/CHB). ALL AVAILABLE LEAD PARAMETERS WITHIN NORMAL LIMITS. NO SIGNIFICANT HIGH RATE EPISODES. NORMAL DEVICE FUNCTION. GV        "

## 2025-04-14 ENCOUNTER — IN HOME VISIT (OUTPATIENT)
Dept: WOUND CARE | Facility: HOSPITAL | Age: OVER 89
End: 2025-04-14
Payer: COMMERCIAL

## 2025-04-14 DIAGNOSIS — L98.499 TRAUMATIC SKIN ULCER, UNSPECIFIED ULCER STAGE (HCC): Primary | ICD-10-CM

## 2025-04-14 DIAGNOSIS — R26.2 AMBULATORY DYSFUNCTION: ICD-10-CM

## 2025-04-14 PROCEDURE — 97597 DBRDMT OPN WND 1ST 20 CM/<: CPT | Performed by: NURSE PRACTITIONER

## 2025-04-14 PROCEDURE — 97598 DBRDMT OPN WND ADDL 20CM/<: CPT | Performed by: NURSE PRACTITIONER

## 2025-04-14 NOTE — LETTER
Patient:  Anabel N Day   1935           KASIE Marrufo saw Anabel N Day for a wound care visit on 4/14/2025. See below for information relating to this visit.      Chief Complaint   Patient presents with    Follow Up Wound Care Visit        Assessment/Plan:  1. Traumatic skin ulcer, unspecified ulcer stage (HCC)  Assessment & Plan:  As per medical record review, patient was found on the floor on March 19, 2025.  Patient sustained hematoma from the fall.  Visiting nurse seen the patient and recommended silver alginate for the wound.    Assessment and plan  Right knee medial wound: Wound improved, increased granulation   right knee anterior: Wound is 100% granulation postdebridement  Change local wound care to collagen  Patient currently on aspirin.  Continue to offload  Under the care of visiting nurse  Follow-up in 1 week.  If no improvement next visit, I will start patient on wound VAC.            2. Ambulatory dysfunction         Orders:  Anabel N Day  1935  Orders Placed This Encounter   Procedures    Debridement     This order was created via procedure documentation   Wound: Right knee  Discontinue previous wound order  Cleanse the wound bed with NSS   Apply non-sting skin prep to periwound area  Apply collagen wound dressing to wound bed, then cover with ABD pad or OPTi lock or bordered foam  Frequency : Daily and prn for soiling    Offload all wounds  Turn and reposition frequently  Instruct / Assist with weight shifting in wheelchair  Increase protein intake.  Monitor for any sign of infection or worsening, inform PCP or patient's primary physician in your facility.      Follow Up:  Return in about 1 week (around 4/21/2025).       [facility] hours are 8:00 am - 4:30 pm Monday through Friday. The center phone number is [phone]. You can also contact me directly thru my email at [email or web address] or thru tiger text. If it is an emergency, please  contact the PCP or patient's attending physician in your facility.     Sincerely,    Electronically signed by KASIE Marrufo    Patient : Anabel VALDES Day    1935

## 2025-04-14 NOTE — PROGRESS NOTES
[facility] WOUND CARE MANAGEMENT   AND HYPERBARIC MEDICINE CENTER       Patient ID: Anabel Hutton is a 89 y.o. female Date of Birth 1935     Location of Service: [facility] Personal care    Performed wound round with: Wound team     Chief Complaint : Right knee    Wound Instructions:  Wound: Right knee  Discontinue previous wound order  Cleanse the wound bed with NSS   Apply non-sting skin prep to periwound area  Apply collagen wound dressing to wound bed, then cover with ABD pad or OPTi lock or bordered foam  Frequency : Daily and prn for soiling    Offload all wounds  Turn and reposition frequently  Instruct / Assist with weight shifting in wheelchair  Increase protein intake.  Monitor for any sign of infection or worsening, inform PCP or patient's primary physician in your facility.      Allergies  Advil [ibuprofen], Propoxyphene, and Rofecoxib      Assessment & Plan:  1. Traumatic skin ulcer, unspecified ulcer stage (HCC)  Assessment & Plan:  As per medical record review, patient was found on the floor on March 19, 2025.  Patient sustained hematoma from the fall.  Visiting nurse seen the patient and recommended silver alginate for the wound.    Assessment and plan  Right knee medial wound: Wound improved, increased granulation   right knee anterior: Wound is 100% granulation postdebridement  Change local wound care to collagen  Patient currently on aspirin.  Continue to offload  Under the care of visiting nurse  Follow-up in 1 week.  If no improvement next visit, I will start patient on wound VAC.            Orders:  -     Debridement  2. Ambulatory dysfunction               Subjective:   March 31, 2025.  New consult for wound on the right knee.  Medical problem includes but not limited to chronic diastolic heart failure, stage IV chronic kidney disease, and morbid obesity.  I introduced myself to patient and patient agreed to be seen for wound consult.    Wound history: As per  "medical record review, patient was found on the floor on March 19, 2025.  Patient sustained hematoma from the fall.  Visiting nurse seen the patient and recommended silver alginate for the wound.    Received patient in chair, seems comfortable.  Denies pain.  As per report, patient is on aspirin.  Patient is wheelchair-bound.  Patient verbalized, \"I do not have any pain\".  Denies fever.    April 7, 2025.  Follow-up for wound on the right knee.  Received patient on recliner chair, seems comfortable.  Denies pain.  Last week I received a message from the facility's nurse regarding the wound.  Visiting nurse is concerned with the connecting tunneling from the large wound to the small wound.    April 14, 2025.  Follow-up for wound in the right knee.  Received patient in bed, seems comfortable.  Denies pain.            Review of Systems   Constitutional: Negative.    Respiratory: Negative.     Cardiovascular: Negative.    Musculoskeletal: Negative.    Skin:  Positive for wound.   Psychiatric/Behavioral: Negative.         Objective:    Physical Exam  Constitutional:       Appearance: Normal appearance.   Cardiovascular:      Rate and Rhythm: Normal rate.   Pulmonary:      Effort: Pulmonary effort is normal.   Musculoskeletal:      Right lower leg: Edema present.      Left lower leg: Edema present.      Comments: Moderate edema on bilateral lower legs   Skin:     Findings: Lesion present.      Comments: Right knee medial: Wound size is 7 x 6.5 x 1 cm.,  With undermining from 9-11, deepest at 9, 2 cm, 10% slough, 90% granulation, moderate amount of serosanguineous drainage, periwound normal, with no obvious sign of infection    Right knee anterior: Wound size is 2 x 0.6 x 0.1, moderate amount of serosanguineous drainage, 100% granulation postdebridement, periwound normal, with no obvious sign of infection   Neurological:      Mental Status: She is alert and oriented to person, place, and time.      Gait: Gait abnormal.      " "Comments: Wheelchair-bound              Debridement     Date/Time: 4/14/2025 11:46 AM  Universal Protocol:  Consent: Verbal consent obtained.  Risks and benefits: risks, benefits and alternatives were discussed  Consent given by: patient  Time out: Immediately prior to procedure a \"time out\" was called to verify the correct patient, procedure, equipment, support staff and site/side marked as required.  Timeout called at: 4/14/2025 9:36 AM.  Patient understanding: patient states understanding of the procedure being performed  Patient identity confirmed: verbally with patient    Debridement Details  Performed by: NP (Right knee)  Debridement type: selective  Pain control: lidocaine 4%    Post-debridement measurements  Length (cm): 7  Width (cm): 6.5  Depth (cm): 1  Percent debrided: 50%  Surface Area (cm^2): 45.5  Area Debrided (cm^2): 22.75  Volume (cm^3): 45.5    Devitalized tissue debrided: biofilm, fibrin, necrotic debris and slough  Instrument(s) utilized: curette  Technique utilized: nonexcisional  Bleeding: medium  Hemostasis obtained with: pressure and silver nitrate  Procedural pain (0-10): 0  Post-procedural pain: 0   Response to treatment: procedure was tolerated well               Patient's care was coordinated with nursing facility staff. Recent vitals, labs and updated medications were reviewed on EMR or chart system of facility. Past Medical, surgical, social, medication and allergy history and patient's previous records were reviewed and updated as appropriate: Most up-to date information is available in the facility EMR where the patient is currently admitted.    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, June 2016    Age-related cognitive decline    Frequent falls    Ambulatory dysfunction    Vision " impairment    Constipation    Incontinence in female    Closed displaced intertrochanteric fracture of right femur (HCC)    Diabetes mellitus (HCC)    History of cardiac pacemaker    MAYELIN (acute kidney injury) ([facility])    Acute blood loss as cause of postoperative anemia    Chronic diastolic congestive heart failure (HCC)    Hyperlipidemia    Hypothyroidism    Encephalopathy    Status post insertion of drug-eluting stent into left anterior descending (LAD) artery, June 2016, cath before TAVR    Fall    Closed fracture of sternal end of right clavicle    Hypertension    Secondary lymphedema    Hypokalemia    Stage 4 chronic kidney disease (HCC)    Chest pain    Acute on chronic diastolic heart failure (HCC)    Acute kidney injury superimposed on CKD  (HCC)    Elevated troponin level not due myocardial infarction    Sick sinus syndrome (HCC)    Partial small bowel obstruction (HCC)    Hypernatremia    Sacral decubitus ulcer    Morbid (severe) obesity due to excess calories ([facility])    Traumatic skin ulcer ([facility])     Past Medical History:   Diagnosis Date    Anemia     Resolved 3/1/2017     Arthritis     Knee - Resolved 3/1/2017     Blind     CAD (coronary artery disease)     s/p HERNAN 6/2016    Calcaneal spur     CHF (congestive heart failure) ([facility])     Chronic knee instability     Resolved 3/1/2017     Chronic pain syndrome     Resolved 3/1/2017     Dementia ([facility])     Last assessed 11/1/2017     Diabetes mellitus ([facility])     non-insulin depdenent    Disease of thyroid gland     Diverticulitis     Essential thrombocytopenia ([facility])     Last assessed 11/1/2017     GERD (gastroesophageal reflux disease)     History of aortic valve replacement     Resolved 3/1/2017     History of bilateral knee replacement     Resolved 3/1/2017     History of TIA (transient ischemic attack)     no residual deficits    Hyperlipidemia     Hypertension     Hypoxia     on home O2 QHS    Leukocytosis     Resolved 3/1/2017     Lumbar post-laminectomy  syndrome     Resolved 3/1/2017     Meniere disease     Osteopenia     Pacemaker     CHB-Biotronik in 2/2015    S/P TAVR (transcatheter aortic valve replacement)     Resolved 3/1/2017     Severe aortic stenosis     Thrombocytosis     Resolved 4/5/2017     Type 2 diabetes mellitus (HCC)     Last assessed 11/1/2017      Past Surgical History:   Procedure Laterality Date    APPENDECTOMY      BACK SURGERY      Arthrodesis     CARDIAC PACEMAKER PLACEMENT      CHOLECYSTECTOMY      EYE SURGERY      FRACTURE SURGERY Right 01/02/2018    [facility]emur    HYSTERECTOMY      RI EGD TRANSORAL BIOPSY SINGLE/MULTIPLE N/A 11/9/2016    Procedure: ESOPHAGOGASTRODUODENOSCOPY (EGD);  Surgeon: Vinh Bryant MD;  Location: [facility];  Service: Gastroenterology    RI OPTX FEM SHFT FX W/INSJ IMED IMPLT W/WO SCREW Right 1/2/2018    Procedure: INSERTION NAIL IM FEMUR ANTEGRADE (TROCHANTERIC) RIGHT;  Surgeon: Roel Amin MD;  Location: [facility];  Service: Orthopedics    RI REPLACE AORTIC VALVE OPENFEMORAL ARTERY APPROACH N/A 7/26/2016    Procedure: TRANSFEMORAL TAVR WITH 23MM LAROSE LILY S3 VALVE; JOSE ALFREDO ;  Surgeon: Tre Ruiz DO;  Location: [facility];  Service: Cardiac Surgery    SMALL INTESTINE SURGERY      TONSILLECTOMY      TOTAL KNEE ARTHROPLASTY      VARICOSE VEIN SURGERY      VENTRAL HERNIA REPAIR       Social History     Socioeconomic History    Marital status:      Spouse name: Not on file    Number of children: Not on file    Years of education: Not on file    Highest education level: Not on file   Occupational History    Not on file   Tobacco Use    Smoking status: Never    Smokeless tobacco: Never   Vaping Use    Vaping status: Never Used   Substance and Sexual Activity    Alcohol use: Not Currently    Drug use: No    Sexual activity: Not on file   Other Topics Concern    Not on file   Social History Narrative    Lives in an independent group home      Social Drivers of Health     Financial Resource Strain: Not on  file   Food Insecurity: Not on file   Transportation Needs: Not on file   Physical Activity: Not on file   Stress: Not on file   Social Connections: Not on file   Intimate Partner Violence: Not on file   Housing Stability: Not on file        Current Outpatient Medications:     acetaminophen (TYLENOL) 325 mg tablet, Take 2 tablets (650 mg total) by mouth every 6 (six) hours as needed for mild pain. (Patient taking differently: Take 650 mg by mouth every 4 (four) hours as needed for mild pain As needed.), Disp: 30 tablet, Rfl: 0    amLODIPine (NORVASC) 10 mg tablet, Take 10 mg by mouth daily, Disp: , Rfl:     amLODIPine (NORVASC) 5 mg tablet, 5 mg daily   (Patient not taking: Reported on 1/9/2024), Disp: , Rfl:     aspirin 81 mg chewable tablet, Chew 81 mg daily., Disp: , Rfl:     atorvastatin (LIPITOR) 10 mg tablet, , Disp: , Rfl:     bisacodyl (DULCOLAX) 5 mg EC tablet, Take 5 mg by mouth daily as needed for constipation Two tabs by mouth once daily as needed., Disp: , Rfl:     diclofenac sodium (VOLTAREN) 1 %, Apply 2 g topically 4 (four) times a day as needed, Disp: , Rfl:     Diclofenac Sodium (VOLTAREN) 1 %, , Disp: , Rfl:     ferrous sulfate 325 (65 Fe) mg tablet, Take 325 mg by mouth daily with breakfast (Patient not taking: Reported on 11/12/2021), Disp: , Rfl:     furosemide (LASIX) 40 mg tablet, Take 2 tablets (80 mg total) by mouth 2 (two) times a day, Disp: , Rfl: 0    Glucagon, rDNA, (Glucagon Emergency) 1 MG KIT, , Disp: , Rfl:     ketoconazole (NIZORAL) 2 % cream, daily , Disp: , Rfl:     levothyroxine 75 mcg tablet, , Disp: , Rfl:     levothyroxine 88 mcg tablet, Take 88 mcg by mouth daily, Disp: , Rfl:     loperamide (IMODIUM A-D) 2 MG tablet, Take 2 mg by mouth 3 (three) times a day as needed for diarrhea (Patient not taking: Reported on 11/12/2021), Disp: , Rfl:     magnesium hydroxide (MILK OF MAGNESIA) 400 mg/5 mL oral suspension, Take 30 mL by mouth daily as needed for constipation Once daily as  needed. (Patient not taking: Reported on 1/9/2024), Disp: , Rfl:     metolazone (ZAROXOLYN) 2.5 mg tablet, , Disp: , Rfl:     metoprolol tartrate (LOPRESSOR) 50 mg tablet, Take 1 tablet (50 mg total) by mouth 2 (two) times a day., Disp: 60 tablet, Rfl: 2    Multiple Vitamins-Minerals (OCUVITE ADULT 50+ PO), Take by mouth daily, Disp: , Rfl:     nystatin (MYCOSTATIN) powder, Apply topically 2 (two) times a day (Patient not taking: Reported on 1/9/2024), Disp: , Rfl:     oxyCODONE (ROXICODONE) 5 mg immediate release tablet, Take 0.5 tablets (2.5 mg total) by mouth every 4 (four) hours as needed for moderate pain or severe pain for up to 10 dosesMax Daily Amount: 15 mg (Patient not taking: Reported on 1/9/2024), Disp: 3 tablet, Rfl: 0    pantoprazole (PROTONIX) 40 mg tablet, Take 1 tablet (40 mg total) by mouth daily (Patient not taking: Reported on 1/9/2024), Disp: 30 tablet, Rfl: 0    potassium chloride (K-DUR,KLOR-CON) 20 mEq tablet, Take 20 mEq by mouth 4 (four) times a day  , Disp: , Rfl:     potassium chloride 10% oral solution, Take 15 mL (20 mEq total) by mouth 2 (two) times a day (Patient not taking: Reported on 11/12/2021), Disp: 30 mL, Rfl: 0    spironolactone (ALDACTONE) 25 mg tablet, Take 25 mg by mouth daily One time daily for chf, Disp: , Rfl:     tamsulosin (FLOMAX) 0.4 mg, Take 1 capsule (0.4 mg total) by mouth daily with dinner, Disp: 30 capsule, Rfl: 0    tetrahydrozoline (VISINE) 0.05 % ophthalmic solution, 1 drop 4 (four) times a day as needed for irritation (Patient not taking: Reported on 11/12/2021), Disp: , Rfl:     triamcinolone (KENALOG) 0.1 % cream, , Disp: , Rfl:     White Petrolatum-Mineral Oil (artificial tears), Administer to both eyes 1 drop in each eye every 12 hrs for dry eyes., Disp: , Rfl:   Family History   Problem Relation Age of Onset    Cancer Child     Coronary artery disease Father         Native artery     Stroke Family     Osteopenia Family     Other Family         Pituitary  "disease    Polycythemia Family               Coordination of Care: Wound team aware of the treatment plan. Facility nurse will provide wound treatment and monitor the wound for any changes.     Patient / Staff education : Patient / Staff was given education on sign of infection and pressure ulcer prevention. Patient/ Staff verbalized understanding     Follow up :  Next week    Voice-recognition software may have been used in the preparation of this document. Occasional wrong word or \"sound-alike\" substitutions may have occurred due to the inherent limitations of voice recognition software. Interpretation should be guided by context.      KASIE Marrufo  "

## 2025-04-14 NOTE — ASSESSMENT & PLAN NOTE
As per medical record review, patient was found on the floor on March 19, 2025.  Patient sustained hematoma from the fall.  Visiting nurse seen the patient and recommended silver alginate for the wound.    Assessment and plan  Right knee medial wound: Wound improved, increased granulation   right knee anterior: Wound is 100% granulation postdebridement  Change local wound care to collagen  Patient currently on aspirin.  Continue to offload  Under the care of visiting nurse  Follow-up in 1 week.  If no improvement next visit, I will start patient on wound VAC.

## 2025-05-02 ENCOUNTER — NURSING HOME VISIT (OUTPATIENT)
Dept: GERIATRICS | Facility: OTHER | Age: OVER 89
End: 2025-05-02
Payer: COMMERCIAL

## 2025-05-02 DIAGNOSIS — R41.89 COGNITIVE IMPAIRMENT: ICD-10-CM

## 2025-05-02 DIAGNOSIS — G93.41 ACUTE METABOLIC ENCEPHALOPATHY: Primary | ICD-10-CM

## 2025-05-02 DIAGNOSIS — I10 PRIMARY HYPERTENSION: Chronic | ICD-10-CM

## 2025-05-02 DIAGNOSIS — N17.9 ACUTE KIDNEY INJURY SUPERIMPOSED ON CKD  (HCC): ICD-10-CM

## 2025-05-02 DIAGNOSIS — E03.9 HYPOTHYROIDISM, UNSPECIFIED TYPE: ICD-10-CM

## 2025-05-02 DIAGNOSIS — K59.04 CHRONIC IDIOPATHIC CONSTIPATION: ICD-10-CM

## 2025-05-02 DIAGNOSIS — I50.32 CHRONIC HEART FAILURE WITH PRESERVED EJECTION FRACTION (HCC): ICD-10-CM

## 2025-05-02 DIAGNOSIS — N18.9 ACUTE KIDNEY INJURY SUPERIMPOSED ON CKD  (HCC): ICD-10-CM

## 2025-05-02 DIAGNOSIS — Z71.89 ACP (ADVANCE CARE PLANNING): ICD-10-CM

## 2025-05-02 DIAGNOSIS — R33.8 ACUTE RETENTION OF URINE: ICD-10-CM

## 2025-05-02 DIAGNOSIS — R26.2 AMBULATORY DYSFUNCTION: ICD-10-CM

## 2025-05-02 DIAGNOSIS — E11.22 TYPE 2 DIABETES MELLITUS WITH CHRONIC KIDNEY DISEASE, WITHOUT LONG-TERM CURRENT USE OF INSULIN, UNSPECIFIED CKD STAGE (HCC): ICD-10-CM

## 2025-05-02 DIAGNOSIS — I25.10 CORONARY ARTERY DISEASE INVOLVING NATIVE CORONARY ARTERY OF NATIVE HEART WITHOUT ANGINA PECTORIS: ICD-10-CM

## 2025-05-02 DIAGNOSIS — E78.5 HYPERLIPIDEMIA, UNSPECIFIED HYPERLIPIDEMIA TYPE: ICD-10-CM

## 2025-05-02 DIAGNOSIS — Z95.2 S/P TAVR (TRANSCATHETER AORTIC VALVE REPLACEMENT): ICD-10-CM

## 2025-05-02 PROBLEM — D64.9 ANEMIA: Status: ACTIVE | Noted: 2025-05-02

## 2025-05-02 PROBLEM — I50.30 (HFPEF) HEART FAILURE WITH PRESERVED EJECTION FRACTION (HCC): Status: ACTIVE | Noted: 2025-04-19

## 2025-05-02 PROBLEM — R26.9 ABNORMAL GAIT: Status: ACTIVE | Noted: 2020-06-18

## 2025-05-02 PROBLEM — M25.511 ACUTE PAIN OF RIGHT SHOULDER: Status: ACTIVE | Noted: 2025-04-30

## 2025-05-02 PROCEDURE — 99306 1ST NF CARE HIGH MDM 50: CPT | Performed by: INTERNAL MEDICINE

## 2025-05-02 RX ORDER — POLYETHYLENE GLYCOL 3350 17 G/17G
17 POWDER, FOR SOLUTION ORAL DAILY
COMMUNITY

## 2025-05-02 RX ORDER — MULTIVITAMIN WITH IRON
500 TABLET ORAL DAILY
COMMUNITY

## 2025-05-02 RX ORDER — AMOXICILLIN 250 MG
1 CAPSULE ORAL 2 TIMES DAILY
COMMUNITY
Start: 2025-05-01 | End: 2026-05-01

## 2025-05-02 NOTE — ASSESSMENT & PLAN NOTE
Chronic lower extremity edema  Continue with lasix and spironolactone  Continue with potassium supplement   Continue monitoring fluid status  Continue monitoring weight  Echo done in November 2024 - EF of 55% with grade 1 diastolic dysfunction, mild concentric hypertrophy

## 2025-05-02 NOTE — ASSESSMENT & PLAN NOTE
Resident had retention in the hospital  And had pulled pinon catheter out after coming to the facility  Will continue to monitor for retention   Will not put pinon back in for now

## 2025-05-02 NOTE — ASSESSMENT & PLAN NOTE
Seen by neurology  Presumed to be possibly some dementia component  Had been evaluated by ID for infection and was negative as a result antibiotics were discontinued   Continue with supportive care  Cognitive therapy recommended  Continue with supportive care   Continue to encourage fluids and oral intake

## 2025-05-02 NOTE — PROGRESS NOTES
[facility] notes  LONG TERM CARE       NAME: Anabel Hutton  AGE: 89 y.o. SEX: female    DATE OF ENCOUNTER: 5/2/2025    Assessment and Plan   Acute metabolic encephalopathy  Evaluated in the hospital  Possibly due to delirium on top of some cognitive issue as per hospital records  Seen by neurology - had imaging done and recommended no further testing other then below done  Had head CT done with no acute findings  EEG with diffuse slowing   Did not get MRI due to some question about the PPM  B12 normal and last TSH was normal   Had a lengthy discussion with daughter about prognosis and about what to expect. Also explained possibly some form of dementia is what is causing her decline.     Cognitive impairment  Seen by neurology  Presumed to be possibly some dementia component  Had been evaluated by ID for infection and was negative as a result antibiotics were discontinued   Continue with supportive care  Cognitive therapy recommended  Continue with supportive care   Continue to encourage fluids and oral intake       Ambulatory dysfunction  Multifactorial  Continue with safety measures  Continue with fall precautions  PT/OT eval and treat     (HFpEF) heart failure with preserved ejection fraction (HCC)  Chronic lower extremity edema  Continue with lasix and spironolactone  Continue with potassium supplement   Continue monitoring fluid status  Continue monitoring weight  Echo done in November 2024 - EF of 55% with grade 1 diastolic dysfunction, mild concentric hypertrophy    Acute retention of urine  Resident had retention in the hospital  And had pulled pinon catheter out after coming to the facility  Will continue to monitor for retention   Will not put pinon back in for now      Acute kidney injury superimposed on CKD (HCC)  Lab Results   Component Value Date    EGFR 42 (L) 05/01/2025    EGFR 42 (L) 04/29/2025    EGFR 32 (L) 04/28/2025    CREATININE 1.21 (H) 05/01/2025    CREATININE 1.22 (H)  04/29/2025    CREATININE 1.52 (H) 04/28/2025     Baseline Cr was 1.2-1.5  During the stay had gone up to around 1.8 and trending back down with medication adjustment   On discharge from the hospital was 1.21  Will repeat BMP     Constipation  Chronic  Continue with miralax   Continue with bowel protocol     Coronary artery disease involving native coronary artery  Hx of HERNAN x 2  Continue with ASA, lipitor and metoprolol   Continue monitoring symptoms  Continue with supportive care   Currently asymptomatic     Diabetes mellitus (HCC)    Lab Results   Component Value Date    HGBA1C 7.3 (H) 06/30/2023     No recent hba1c   Will repeat with next blood work next week  Will put patient a CCD diet       Hyperlipidemia  Continue lipitor due to DMII and CAD  Continue monitoring lipid panel     Hypertension  BP reviewed from facility records, acceptable range   Continue metoprolol and norvasc   Continue monitoring BP        Hypothyroidism  Continue with levothyroxine   TSH normal on 4/19/25    S/P TAVR (transcatheter aortic valve replacement), #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 Did give it at the personal care but will bring it into the [facility] on next visit.   She also reports she is DNR        Chief Complaint     Unable to get due to cognitive decline  Information obtained from staff taking care of patient      History of Present Illness     89 yo female seen for admission to [facility] after hospitalization. Patient poor historian due to her cognitive decline. Called talked to daughter to get history. As per daughter patient has been slowly decline but as of recently she has significantly declined. She reports she was at the personal care for the past 2-3 years and has been declining. She reported that she was very sleepy and weak. There was a concern for stroke that  she was sent to the hospital where she was evaluated. At the hospital initially evaluated for stroke but was negative. She was worked up for her encephalopathy but no acute findings. But during her stay she did develop urinary retention requiring ipnon catheter. She was evaluated by ID for possible infection but no signs of infection was noted. Once some of her encephalopathy improved she was discharged to [facility]. Reviewed labs and imaging report from the hospital records.       PMHx     Past Medical History:   Diagnosis Date    Anemia     Resolved 3/1/2017     Arthritis     Knee - Resolved 3/1/2017     Blind     CAD (coronary artery disease)     s/p HERNAN 6/2016    Calcaneal spur     CHF (congestive heart failure) ([facility])     Chronic knee instability     Resolved 3/1/2017     Chronic pain syndrome     Resolved 3/1/2017     Dementia ([facility])     Last assessed 11/1/2017     Diabetes mellitus ([facility])     non-insulin depdenent    Disease of thyroid gland     Diverticulitis     Essential thrombocytopenia ([facility])     Last assessed 11/1/2017     GERD (gastroesophageal reflux disease)     History of aortic valve replacement     Resolved 3/1/2017     History of bilateral knee replacement     Resolved 3/1/2017     History of TIA (transient ischemic attack)     no residual deficits    Hyperlipidemia     Hypertension     Hypoxia     on home O2 QHS    Leukocytosis     Resolved 3/1/2017     Lumbar post-laminectomy syndrome     Resolved 3/1/2017     Meniere disease     Osteopenia     Pacemaker     [employer]2-Biotronik in 2/2015    S/P TAVR (transcatheter aortic valve replacement)     Resolved 3/1/2017     Severe aortic stenosis     Thrombocytosis     Resolved 4/5/2017     Type 2 diabetes mellitus ([facility])     Last assessed 11/1/2017      Past Surgical History:   Procedure Laterality Date    APPENDECTOMY      BACK SURGERY      Arthrodesis     CARDIAC PACEMAKER PLACEMENT      CHOLECYSTECTOMY      EYE SURGERY      FRACTURE SURGERY Right  01/02/2018    femur    HYSTERECTOMY      PA EGD TRANSORAL BIOPSY SINGLE/MULTIPLE N/A 11/9/2016    Procedure: ESOPHAGOGASTRODUODENOSCOPY (EGD);  Surgeon: Vinh Bryant MD;  [facility]: [facility];  Service: Gastroenterology    PA OPTX FEM SHFT FX W/INSJ IMED IMPLT W/WO SCREW Right 1/2/2018    Procedure: INSERTION NAIL IM FEMUR ANTEGRADE (TROCHANTERIC) RIGHT;  Surgeon: Roel Amin MD;  [facility]: [facility];  Service: Orthopedics    PA REPLACE AORTIC VALVE OPENFEMORAL ARTERY APPROACH N/A 7/26/2016    Procedure: TRANSFEMORAL TAVR WITH 23MM LAROSE LILY S3 VALVE; JOSE ALFREDO ;  Surgeon: Tre Ruiz DO;  [facility]: [facility];  Service: Cardiac Surgery    SMALL INTESTINE SURGERY      TONSILLECTOMY      TOTAL KNEE ARTHROPLASTY      VARICOSE VEIN SURGERY      VENTRAL HERNIA REPAIR       Family History   Problem Relation Age of Onset    Cancer Child     Coronary artery disease Father         Native artery     Stroke Family     Osteopenia Family     Other Family         Pituitary disease    Polycythemia Family      Social History     Socioeconomic History    Marital status:      Spouse name: None    Number of children: None    Years of education: None    Highest education level: None   Occupational History    None   Tobacco Use    Smoking status: Never    Smokeless tobacco: Never   Vaping Use    Vaping status: Never Used   Substance and Sexual Activity    Alcohol use: Not Currently    Drug use: No    Sexual activity: None   Other Topics Concern    None   Social History Narrative    Lives in an independent group home      Social Drivers of Health     Financial Resource Strain: Patient Unable To Answer (4/20/2025)    Received from [facility]    Financial Insecurity     In the last 12 months did you skip medications to save money?: Unable to Assess     In the last 12 months was there a time when you needed to see a doctor but could not because of cost?: Unable to Assess   Food Insecurity: Patient  Unable To Answer (4/20/2025)    Received from [facility]    Food Insecurity     In the last 12 months did you ever eat less than you felt you should because there wasn't enough money for food?: Unable to Assess   Transportation Needs: Patient Unable To Answer (4/20/2025)    Received from [facility]    Transportation Needs     In the last 12 months have you ever had to go without healthcare because you didn't have a way to get there?: Unable to Assess   Physical Activity: Not on file   Stress: Not on file   Social Connections: Patient Unable To Answer (4/20/2025)    Received from [facility]    Social Connection     Do you often feel lonely?: Unable to Assess   Intimate Partner Violence: Not on file   Housing Stability: Patient Unable To Answer (4/20/2025)    Received from [facility]    Housing Stability     Are you worried that in the next 2 months you may not have stable housing?: Unable to Assess     Allergies   Allergen Reactions    Advil [Ibuprofen] GI Intolerance    Propoxyphene Other (See Comments)     DARVOCET=ACID REFLUX    Rofecoxib Other (See Comments)       Review of Systems     Unable to get due to poor cognitive function  Information obtained from staff taking care of patient      Objective   Vital signs:  BP: 128/66  HR: 89  RR: 20  TEMP: 98.6F  SAT.: 92% on RA    PHYSICAL EXAM:  GENERAL: no acute distress  SKIN: warm, dry, no rash, no cyanosis  HEENT: normocephalic, atraumatic, no JVD, no Thyromegaly, no lymphadenopathy  LUNGS: CTA, no wheezing, no rales, expanded equally, no chest tenderness   HEART: normal rhythm, normal rate, no murmur, no gallop  ABDOMEN: soft non tender non distended bs+, no guarding or rebound tenderness  :  no suprapubic tenderness  MUSCULOSKELETAL: strength about 4+/5 all extremities except weaker lower extremities, decreased ROM within right shoulder, no calf tenderness  NEUROLOGY: awake, alert, only  oriented to self, unable to recall 3 object. CN2-12 intact.   PSYCH: cooperative, pleasant, confused        Pertinent Laboratory/Diagnostic Studies:  Recent labs and diagnostic tests reviewed in nursing home EMR    Current Medications   Medications reviewed and signed off on nursing home EMR.        I have spent a total time of 62 minutes on 05/02/25 in caring for this patient including Prognosis, Risks and benefits of tx options, Patient and family education, Importance of tx compliance, Counseling / Coordination of care, Documenting in the medical record, Reviewing/placing orders in the medical record (including tests, medications, and/or procedures), and Obtaining or reviewing history  .

## 2025-05-02 NOTE — ASSESSMENT & PLAN NOTE
BP reviewed from facility records, acceptable range   Continue metoprolol and norvasc   Continue monitoring BP

## 2025-05-02 NOTE — ASSESSMENT & PLAN NOTE
Lab Results   Component Value Date    EGFR 42 (L) 05/01/2025    EGFR 42 (L) 04/29/2025    EGFR 32 (L) 04/28/2025    CREATININE 1.21 (H) 05/01/2025    CREATININE 1.22 (H) 04/29/2025    CREATININE 1.52 (H) 04/28/2025     Baseline Cr was 1.2-1.5  During the stay had gone up to around 1.8 and trending back down with medication adjustment   On discharge from the hospital was 1.21  Will repeat BMP

## 2025-05-02 NOTE — ASSESSMENT & PLAN NOTE
Hx of HERNAN x 2  Continue with ASA, lipitor and metoprolol   Continue monitoring symptoms  Continue with supportive care   Currently asymptomatic

## 2025-05-02 NOTE — ASSESSMENT & PLAN NOTE
Lab Results   Component Value Date    HGBA1C 7.3 (H) 06/30/2023     No recent hba1c   Will repeat with next blood work next week  Will put patient a CCD diet

## 2025-05-02 NOTE — ASSESSMENT & PLAN NOTE
Daughter reports has a living will. Did give it at the personal care but will bring it into the [facility] on next visit.   She also reports she is DNR

## 2025-05-02 NOTE — ASSESSMENT & PLAN NOTE
Evaluated in the hospital  Possibly due to delirium on top of some cognitive issue as per hospital records  Seen by neurology - had imaging done and recommended no further testing other then below done  Had head CT done with no acute findings  EEG with diffuse slowing   Did not get MRI due to some question about the PPM  B12 normal and last TSH was normal   Had a lengthy discussion with daughter about prognosis and about what to expect. Also explained possibly some form of dementia is what is causing her decline.

## 2025-05-05 ENCOUNTER — NURSING HOME VISIT (OUTPATIENT)
Dept: WOUND CARE | Facility: HOSPITAL | Age: OVER 89
End: 2025-05-05
Payer: COMMERCIAL

## 2025-05-05 DIAGNOSIS — L98.499 TRAUMATIC SKIN ULCER, UNSPECIFIED ULCER STAGE (HCC): Primary | ICD-10-CM

## 2025-05-05 DIAGNOSIS — R26.2 AMBULATORY DYSFUNCTION: ICD-10-CM

## 2025-05-05 PROCEDURE — 99308 SBSQ NF CARE LOW MDM 20: CPT | Performed by: NURSE PRACTITIONER

## 2025-05-05 NOTE — PROGRESS NOTES
[facility] WOUND CARE MANAGEMENT   AND HYPERBARIC MEDICINE CENTER       Patient ID: Anabel Hutton is a 89 y.o. female Date of Birth 1935     Location of Service: [facility]    Performed wound round with: Wound team     Chief Complaint : Right knee    Wound Instructions:  Wound: Right knee  Discontinue previous wound order  Cleanse the wound bed with NSS   Apply non-sting skin prep to periwound area  Apply collagen wound dressing to wound bed, then cover with ABD pad or OPTi lock or bordered foam  Frequency : Daily and prn for soiling    Offload all wounds  Turn and reposition frequently  Instruct / Assist with weight shifting in wheelchair  Increase protein intake.  Monitor for any sign of infection or worsening, inform PCP or patient's primary physician in your facility.      Allergies  Advil [ibuprofen], Propoxyphene, and Rofecoxib      Assessment & Plan:  1. Traumatic skin ulcer, unspecified ulcer stage (HCC)  Assessment & Plan:  As per medical record review, patient was found on the floor on March 19, 2025.  Patient sustained hematoma from the fall.  Visiting nurse seen the patient and recommended silver alginate for the wound.    Assessment and plan  Right knee medial wound: full thickness, improved, no obvious sign of infection  local wound care to collagen  Patient currently on aspirin.  Continue to offload  Under the care of visiting nurse  Follow-up : two weeks            2. Ambulatory dysfunction  Assessment & Plan:  Wheelchair-bound                 Subjective:   March 31, 2025.  New consult for wound on the right knee.  Medical problem includes but not limited to chronic diastolic heart failure, stage IV chronic kidney disease, and morbid obesity.  I introduced myself to patient and patient agreed to be seen for wound consult.    Wound history: As per medical record review, patient was found on the floor on March 19, 2025.  Patient sustained hematoma from the fall.   "Visiting nurse seen the patient and recommended silver alginate for the wound.    Received patient in chair, seems comfortable.  Denies pain.  As per report, patient is on aspirin.  Patient is wheelchair-bound.  Patient verbalized, \"I do not have any pain\".  Denies fever.    April 7, 2025.  Follow-up for wound on the right knee.  Received patient on recliner chair, seems comfortable.  Denies pain.  Last week I received a message from the facility's nurse regarding the wound.  Visiting nurse is concerned with the connecting tunneling from the large wound to the small wound.    April 14, 2025.  Follow-up for wound in the right knee.  Received patient in bed, seems comfortable.  Denies pain.    May 5, 2025.  Follow-up for wound on the right lower leg.  As per medical record review, patient was recently admitted at [facility] for confusion.  While in the hospital, patient was seen by facility wound team.  Ordered Triad paste and bordered foam for the wound.    Received patient on chair, seems comfortable.  Denies pain.  No significant issues reported related to the wound.            Review of Systems   Constitutional: Negative.    Respiratory: Negative.     Cardiovascular: Negative.    Musculoskeletal: Negative.    Skin:  Positive for wound.   Psychiatric/Behavioral: Negative.         Objective:    Physical Exam  Constitutional:       Appearance: Normal appearance.   Cardiovascular:      Rate and Rhythm: Normal rate.   Pulmonary:      Effort: Pulmonary effort is normal.   Musculoskeletal:      Right lower leg: Edema present.      Left lower leg: Edema present.      Comments: Moderate edema on bilateral lower legs   Skin:     Findings: Lesion present.      Comments: Right knee anterior is healed    Right knee: Wound size is 5.5 x 3.3 x 0.2 cm.,  100% granulation, moderate amount of serosanguineous drainage, periwound normal, with no obvious sign of infection   Neurological:      Mental Status: She is alert " and oriented to person, place, and time.      Gait: Gait abnormal.      Comments: Wheelchair-bound              Procedures           Patient's care was coordinated with nursing facility staff. Recent vitals, labs and updated medications were reviewed on EMR or chart system of facility. Past Medical, surgical, social, medication and allergy history and patient's previous records were reviewed and updated as appropriate: Most up-to date information is available in the facility EMR where the patient is currently admitted.    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, June 2016    Age-related cognitive decline    Frequent falls    Ambulatory dysfunction    Vision impairment    Constipation    Incontinence in female    Closed displaced intertrochanteric fracture of right femur (HCC)    Diabetes mellitus (HCC)    History of cardiac pacemaker    MAYELIN (acute kidney injury) ([facility])    Acute blood loss as cause of postoperative anemia    Chronic diastolic congestive heart failure ([facility])    Hyperlipidemia    Hypothyroidism    Encephalopathy    Status post insertion of drug-eluting stent into left anterior descending (LAD) artery, June 2016, cath before TAVR    Fall    Closed fracture of sternal end of right clavicle    Hypertension    Secondary lymphedema    Hypokalemia    Stage 4 chronic kidney disease (HCC)    Chest pain    Acute on chronic diastolic heart failure (HCC)    Acute kidney injury superimposed on CKD  (HCC)    Elevated troponin level not due myocardial infarction    Sick sinus syndrome (HCC)    Partial small bowel obstruction (HCC)    Hypernatremia    Sacral decubitus ulcer    Morbid (severe) obesity due to excess calories (HCC)    Traumatic skin ulcer (HCC)    Abnormal gait    Acute metabolic encephalopathy    Acute  retention of urine    Acute pain of right shoulder    Anemia    (HFpEF) heart failure with preserved ejection fraction (HCC)    Gastroesophageal reflux disease    Cognitive impairment    ACP (advance care planning)     Past Medical History:   Diagnosis Date    Anemia     Resolved 3/1/2017     Arthritis     Knee - Resolved 3/1/2017     Blind     CAD (coronary artery disease)     s/p HERNAN 6/2016    Calcaneal spur     CHF (congestive heart failure) (HCC)     Chronic knee instability     Resolved 3/1/2017     Chronic pain syndrome     Resolved 3/1/2017     Dementia (HCC)     Last assessed 11/1/2017     Diabetes mellitus (HCC)     non-insulin depdenent    Disease of thyroid gland     Diverticulitis     Essential thrombocytopenia (HCC)     Last assessed 11/1/2017     GERD (gastroesophageal reflux disease)     History of aortic valve replacement     Resolved 3/1/2017     History of bilateral knee replacement     Resolved 3/1/2017     History of TIA (transient ischemic attack)     no residual deficits    Hyperlipidemia     Hypertension     Hypoxia     on home O2 QHS    Leukocytosis     Resolved 3/1/2017     Lumbar post-laminectomy syndrome     Resolved 3/1/2017     Meniere disease     Osteopenia     Pacemaker     [employer]-Biotronik in 2/2015    S/P TAVR (transcatheter aortic valve replacement)     Resolved 3/1/2017     Severe aortic stenosis     Thrombocytosis     Resolved 4/5/2017     Type 2 diabetes mellitus ([facility])     Last assessed 11/1/2017      Past Surgical History:   Procedure Laterality Date    APPENDECTOMY      BACK SURGERY      Arthrodesis     CARDIAC PACEMAKER PLACEMENT      CHOLECYSTECTOMY      EYE SURGERY      FRACTURE SURGERY Right 01/02/2018    femur    HYSTERECTOMY      ND EGD TRANSORAL BIOPSY SINGLE/MULTIPLE N/A 11/9/2016    Procedure: ESOPHAGOGASTRODUODENOSCOPY (EGD);  Surgeon: Vinh Bryant MD;  [facility];  Service: Gastroenterology    ND OPTX FEM SHFT FX W/INSJ IMED IMPLT W/WO SCREW Right 1/2/2018     Procedure: INSERTION NAIL IM FEMUR ANTEGRADE (TROCHANTERIC) RIGHT;  Surgeon: Roel Amin MD;  [facility];  Service: Orthopedics    OR REPLACE AORTIC VALVE OPENFEMORAL ARTERY APPROACH N/A 7/26/2016    Procedure: TRANSFEMORAL TAVR WITH 23MM LAROSE LILY S3 VALVE; JOSE ALFREDO ;  Surgeon: Tre Ruiz DO;  [facility];  Service: Cardiac Surgery    SMALL INTESTINE SURGERY      TONSILLECTOMY      TOTAL KNEE ARTHROPLASTY      VARICOSE VEIN SURGERY      VENTRAL HERNIA REPAIR       Social History     Socioeconomic History    Marital status:      Spouse name: None    Number of children: None    Years of education: None    Highest education level: None   Occupational History    None   Tobacco Use    Smoking status: Never    Smokeless tobacco: Never   Vaping Use    Vaping status: Never Used   Substance and Sexual Activity    Alcohol use: Not Currently    Drug use: No    Sexual activity: None   Other Topics Concern    None   Social History Narrative    Lives in an independent group home      Social Drivers of Health     Financial Resource Strain: Patient Unable To Answer (4/20/2025)    Received from [facility]    Financial Insecurity     In the last 12 months did you skip medications to save money?: Unable to Assess     In the last 12 months was there a time when you needed to see a doctor but could not because of cost?: Unable to Assess   Food Insecurity: Patient Unable To Answer (4/20/2025)    Received from [facility]    Food Insecurity     In the last 12 months did you ever eat less than you felt you should because there wasn't enough money for food?: Unable to Assess   Transportation Needs: Patient Unable To Answer (4/20/2025)    Received from [facility]    Transportation Needs     In the last 12 months have you ever had to go without healthcare because you didn't have a way to get there?: Unable to Assess   Physical Activity: Not on file  "  Stress: Not on file   Social Connections: Patient Unable To Answer (4/20/2025)    Received from [facility]    Social Connection     Do you often feel lonely?: Unable to Assess   Intimate Partner Violence: Not on file   Housing Stability: Patient Unable To Answer (4/20/2025)    Received from [facility]    Housing Stability     Are you worried that in the next 2 months you may not have stable housing?: Unable to Assess        Family History   Problem Relation Age of Onset    Cancer Child     Coronary artery disease Father         Native artery     Stroke Family     Osteopenia Family     Other Family         Pituitary disease    Polycythemia Family               Coordination of Care: Wound team aware of the treatment plan. Facility nurse will provide wound treatment and monitor the wound for any changes.     Patient / Staff education : Patient / Staff was given education on sign of infection and pressure ulcer prevention. Patient/ Staff verbalized understanding     Follow up :  Two weeks    Voice-recognition software may have been used in the preparation of this document. Occasional wrong word or \"sound-alike\" substitutions may have occurred due to the inherent limitations of voice recognition software. Interpretation should be guided by context.      KASIE Marrufo  "

## 2025-05-05 NOTE — LETTER
Patient:  Anabel N Day   1935           KASIE Marrufo saw Anabel VALDES Day for a wound care visit on 5/5/2025. See below for information relating to this visit.      Chief Complaint   Patient presents with    Follow Up Wound Care Visit        Assessment/Plan:  1. Traumatic skin ulcer, unspecified ulcer stage (HCC)  Assessment & Plan:  As per medical record review, patient was found on the floor on March 19, 2025.  Patient sustained hematoma from the fall.  Visiting nurse seen the patient and recommended silver alginate for the wound.    Assessment and plan  Right knee medial wound: full thickness, improved, no obvious sign of infection  local wound care to collagen  Patient currently on aspirin.  Continue to offload  Under the care of visiting nurse  Follow-up : two weeks            2. Ambulatory dysfunction  Assessment & Plan:  Wheelchair-bound         Orders:  Anabel N Day  1935  Wound: Right knee  Discontinue previous wound order  Cleanse the wound bed with NSS   Apply non-sting skin prep to periwound area  Apply collagen wound dressing to wound bed, then cover with ABD pad or OPTi lock or bordered foam  Frequency : Daily and prn for soiling    Offload all wounds  Turn and reposition frequently  Instruct / Assist with weight shifting in wheelchair  Increase protein intake.  Monitor for any sign of infection or worsening, inform PCP or patient's primary physician in your facility.      Follow Up:  Return in about 2 weeks (around 5/19/2025).       [facility] hours are 8:00 am - 4:30 pm Monday through Friday. The center phone number is [phone]. You can also contact me directly thru my email at [email or web address] or thru tiger text. If it is an emergency, please contact the PCP or patient's attending physician in your facility.     Sincerely,    Electronically signed by KASIE Marrufo    Patient : Anabel N Day    1935

## 2025-05-05 NOTE — ASSESSMENT & PLAN NOTE
As per medical record review, patient was found on the floor on March 19, 2025.  Patient sustained hematoma from the fall.  Visiting nurse seen the patient and recommended silver alginate for the wound.    Assessment and plan  Right knee medial wound: full thickness, improved, no obvious sign of infection  local wound care to collagen  Patient currently on aspirin.  Continue to offload  Under the care of visiting nurse  Follow-up : two weeks

## 2025-05-06 ENCOUNTER — NURSING HOME VISIT (OUTPATIENT)
Dept: GERIATRICS | Facility: OTHER | Age: OVER 89
End: 2025-05-06
Payer: COMMERCIAL

## 2025-05-06 DIAGNOSIS — I10 PRIMARY HYPERTENSION: Chronic | ICD-10-CM

## 2025-05-06 DIAGNOSIS — I25.10 CORONARY ARTERY DISEASE INVOLVING NATIVE CORONARY ARTERY OF NATIVE HEART WITHOUT ANGINA PECTORIS: ICD-10-CM

## 2025-05-06 DIAGNOSIS — E11.22 TYPE 2 DIABETES MELLITUS WITH CHRONIC KIDNEY DISEASE, WITHOUT LONG-TERM CURRENT USE OF INSULIN, UNSPECIFIED CKD STAGE (HCC): ICD-10-CM

## 2025-05-06 DIAGNOSIS — I50.32 CHRONIC HEART FAILURE WITH PRESERVED EJECTION FRACTION (HCC): Primary | ICD-10-CM

## 2025-05-06 DIAGNOSIS — E03.9 HYPOTHYROIDISM, UNSPECIFIED TYPE: ICD-10-CM

## 2025-05-06 PROCEDURE — 99309 SBSQ NF CARE MODERATE MDM 30: CPT | Performed by: NURSE PRACTITIONER

## 2025-05-06 NOTE — ASSESSMENT & PLAN NOTE
BP goal: < 140/90  BP range (5/1-4/2025) = 119/61 to 143/63  HR range (5/1-4/2025) = 62 to 92/min  Continue Metoprolol tartrate 50mg BID/ Amlodipine 5mg daily  On diuretic therapy  Continue BP/HR checks in LTCF

## 2025-05-06 NOTE — PROGRESS NOTES
[facility]  [address] [place] [address], [address], [place] [address]  [phone]    NAME: Anabel Hutton  AGE: 89 y.o. SEX: female    Progress Note    Location: [facility]  POS: 32    Assessment/Plan:    (HFpEF) heart failure with preserved ejection fraction (HCC)  Wt Readings from Last 3 Encounters:   11/01/23 92.5 kg (204 lb)   11/15/22 92.5 kg (204 lb)   07/12/22 88.9 kg (196 lb)   Weight loss on review  Euvolemic on assessment  No peripheral edema seen  Continue Lasix BID [40mg @ 12Noon/ 80mg @ 9AM] and Spironolactone 25mg daily  Continue Kcl 20mEQ TID  Continue daily weight in LTCF  Next BMP on 5/8/2025    Diabetes mellitus (HCC)    Lab Results   Component Value Date    HGBA1C 7.3 (H) 06/30/2023   No  recent HbA1C on file  Not on BG checks in LTCF  Diet controlled.  HbA1C scheduled on 5/8/2025    Hypertension  BP goal: < 140/90  BP range (5/1-4/2025) = 119/61 to 143/63  HR range (5/1-4/2025) = 62 to 92/min  Continue Metoprolol tartrate 50mg BID/ Amlodipine 5mg daily  On diuretic therapy  Continue BP/HR checks in LTCF    Hypothyroidism  Lab Results   Component Value Date    TAJ1SPTIMXYD 1.791 08/17/2021    TSH 1.6 04/19/2025   Not on Free T4 replacement therapy    Coronary artery disease involving native coronary artery  Hx of HERNAN x 2 (2016)  Continue ASA 812mg daily and Atorvastatin 10mg daily      Chief complaint / Reason for visit:  Follow- visit    History of Present Illness:  This is a 89-year-old female patient recently admitted to [facility] (5/1/2025 to present).  Patient is seen and examined today to follow-up for any acute and chronic medical conditions.  Patient is still in bed for this visit -sleepy but cooperative, responsive, calm and not in distress.  Patient stated name and birthday.  Patient acknowledged feeling well on this visit -denies any acute medical concerns during ROS assessment including pain.  Nursing reported patient had a wonderful day yesterday: No  combativeness, no agitation, no verbal aggression.  Nursing reported patient exhibits significant sundowning behavior since admission to SNF. Patient's family had visited yesterday -and patient enjoyed the visit.  Nursing has no acute medical concerns for this visit.    Nursing and prior provider notes reviewed on this visit. Discussed visit with PCP and nursing staff/ supervisor.    Review of Systems:  Per history of present illness, all other systems reviewed and negative.    HISTORY:  Medical Hx: Reviewed, unchanged  Family Hx: Reviewed, unchanged  Soc Hx: Reviewed,  unchanged    ALLERGY: Reviewed, unchanged.   Allergies   Allergen Reactions    Advil [Ibuprofen] GI Intolerance    Propoxyphene Other (See Comments)     DARVOCET=ACID REFLUX    Rofecoxib Other (See Comments)        PHYSICAL EXAM:  Vital Signs: T 97.1F -HR 68 -R 18 -BP: 138/71 (5/4/2025) -SpO2 95% RA  Weight: 173.6 lbs (5/6/2025) <= 183.5 lbs (5/2/2025)    General: NAD. Well appearing. No acute distress. With obesity  Head: Atraumatic. Normocephalic.  Eye Exam: anicteric sclera, no discharge, PERRLA, No injection. Wears glasses.  Oral Exam: moist mucous membranes, no buccaloropharyngeal erythema, palatine tonsils WNL.  Neck Exam: no anterior cervical lymphadenopathy noted, neck supple. Trachea midline, no carotid bruit, no masses  Cardiovascular: regular rate, regular rhythm, no: murmurs/ rubs/ gallops. S1 and S2 appreciated.  Pulmonary: no wheeze, no rhonchi, no rales. No chest tenderness. Normal chest wall expansion  Abdominal: soft, non-tender, nondistended, bowel sounds audible x 4 quadrants. No palpable hepatosplenomegaly, no tympany  : Non distended bladder.   Extremities and skin: no edema noted, no rashes. Intact skin  Neurological: alert, cooperative and responsive, Oriented x 2. No tics, normal sensation to pressure and light touch. moving all 4 extremities symmetrically. Non ambulatory - wheelchair bound.    Laboratory / Imaging results  "reviewed.    Current Medications: All medications reviewed and updated in Nursing Home eMAR.    Please note: This note was completed in part utilizing a voice-recognition software may have been used in the preparation of this document. Grammatical errors, random word insertion, spelling mistakes, and incomplete sentences may be an occasional consequence of the system secondary to software limitations, ambient noise and hardware issues. Occasional wrong word or \"sound-alike\" substitutions may have occurred due to the inherent limitations of voice recognition software. At the time of dictation, efforts were made to edit, clarify and/or correct errors. Interpretation should be guided by context. Please read the chart carefully and recognize, using context, where substitutions have occurred. If you have any questions or concerns about the context, text or information contained within the body of this dictation, please contact myself, the provider, for further clarification.      KASIE Pettit  5/6/2025  "

## 2025-05-06 NOTE — ASSESSMENT & PLAN NOTE
Lab Results   Component Value Date    HFT6OAFUVYNF 1.791 08/17/2021    TSH 1.6 04/19/2025   Not on Free T4 replacement therapy

## 2025-05-06 NOTE — ASSESSMENT & PLAN NOTE
Lab Results   Component Value Date    HGBA1C 7.3 (H) 06/30/2023   No  recent HbA1C on file  Not on BG checks in LTCF  Diet controlled.  HbA1C scheduled on 5/8/2025

## 2025-05-06 NOTE — ASSESSMENT & PLAN NOTE
Wt Readings from Last 3 Encounters:   11/01/23 92.5 kg (204 lb)   11/15/22 92.5 kg (204 lb)   07/12/22 88.9 kg (196 lb)   Weight loss on review  Euvolemic on assessment  No peripheral edema seen  Continue Lasix BID [40mg @ 12Noon/ 80mg @ 9AM] and Spironolactone 25mg daily  Continue Kcl 20mEQ TID  Continue daily weight in LTCF  Next BMP on 5/8/2025

## 2025-05-19 ENCOUNTER — NURSING HOME VISIT (OUTPATIENT)
Dept: WOUND CARE | Facility: HOSPITAL | Age: OVER 89
End: 2025-05-19
Payer: COMMERCIAL

## 2025-05-19 DIAGNOSIS — L98.499 TRAUMATIC SKIN ULCER, UNSPECIFIED ULCER STAGE (HCC): Primary | ICD-10-CM

## 2025-05-19 DIAGNOSIS — R26.2 AMBULATORY DYSFUNCTION: ICD-10-CM

## 2025-05-19 PROCEDURE — 99307 SBSQ NF CARE SF MDM 10: CPT | Performed by: NURSE PRACTITIONER

## 2025-05-19 NOTE — ASSESSMENT & PLAN NOTE
As per medical record review, patient was found on the floor on March 19, 2025.  Patient sustained hematoma from the fall.  Visiting nurse seen the patient and recommended silver alginate for the wound.    Assessment and plan  Right knee   Wound is almost healed, no obvious sign of infection  local wound care  -changed to Skin-Prep and bordered foam  Continue to offload  Under the care of visiting nurse  Follow-up : two weeks

## 2025-05-19 NOTE — PROGRESS NOTES
[facility] WOUND CARE MANAGEMENT   AND HYPERBARIC MEDICINE CENTER       Patient ID: Anabel Hutton is a 89 y.o. female Date of Birth 1935     Location of Service: [facility]    Performed wound round with: Wound team     Chief Complaint : Right knee    Wound Instructions:  Wound: Right knee  Discontinue previous wound order  Cleanse the wound bed with NSS   Apply non-sting skin prep to periwound area and wound bed, cover with bordered foam  Frequency : 3 times a week and prn for soiling    Offload all wounds  Turn and reposition frequently  Instruct / Assist with weight shifting in wheelchair  Increase protein intake.  Monitor for any sign of infection or worsening, inform PCP or patient's primary physician in your facility.      Allergies  Advil [ibuprofen], Propoxyphene, and Rofecoxib      Assessment & Plan:  1. Traumatic skin ulcer, unspecified ulcer stage (HCC)  Assessment & Plan:  As per medical record review, patient was found on the floor on March 19, 2025.  Patient sustained hematoma from the fall.  Visiting nurse seen the patient and recommended silver alginate for the wound.    Assessment and plan  Right knee   Wound is almost healed, no obvious sign of infection  local wound care  -changed to Skin-Prep and bordered foam  Continue to offload  Under the care of visiting nurse  Follow-up : two weeks             2. Ambulatory dysfunction  Assessment & Plan:  Wheelchair-bound                   Subjective:   March 31, 2025.  New consult for wound on the right knee.  Medical problem includes but not limited to chronic diastolic heart failure, stage IV chronic kidney disease, and morbid obesity.  I introduced myself to patient and patient agreed to be seen for wound consult.    Wound history: As per medical record review, patient was found on the floor on March 19, 2025.  Patient sustained hematoma from the fall.  Visiting nurse seen the patient and recommended silver alginate  "for the wound.    Received patient in chair, seems comfortable.  Denies pain.  As per report, patient is on aspirin.  Patient is wheelchair-bound.  Patient verbalized, \"I do not have any pain\".  Denies fever.    April 7, 2025.  Follow-up for wound on the right knee.  Received patient on recliner chair, seems comfortable.  Denies pain.  Last week I received a message from the facility's nurse regarding the wound.  Visiting nurse is concerned with the connecting tunneling from the large wound to the small wound.    April 14, 2025.  Follow-up for wound in the right knee.  Received patient in bed, seems comfortable.  Denies pain.    May 5, 2025.  Follow-up for wound on the right lower leg.  As per medical record review, patient was recently admitted at [facility] for confusion.  While in the hospital, patient was seen by facility wound team.  Ordered Triad paste and bordered foam for the wound.    Received patient on chair, seems comfortable.  Denies pain.  No significant issues reported related to the wound.    5/19/2025 Follow up for wound on the right knee . Received patient, not in distress. Facility staff did not report any significant issues related to the wound.  Wound dressing removed: Triad paste.            Review of Systems   Constitutional: Negative.    Respiratory: Negative.     Cardiovascular: Negative.    Musculoskeletal: Negative.    Skin:  Positive for wound.   Psychiatric/Behavioral: Negative.         Objective:    Physical Exam  Constitutional:       Appearance: Normal appearance.     Cardiovascular:      Rate and Rhythm: Normal rate.   Pulmonary:      Effort: Pulmonary effort is normal.     Musculoskeletal:      Right lower leg: Edema present.      Left lower leg: Edema present.      Comments: Moderate edema on bilateral lower legs     Skin:     Findings: Lesion present.      Comments: Right knee anterior is healed    Right knee: Wound size is 2 x 1 x 0.1 cm.,  100% epithelial, no " drainage, periwound normal, with no obvious sign of infection     Neurological:      Mental Status: She is alert and oriented to person, place, and time.      Gait: Gait abnormal.      Comments: Wheelchair-bound            Procedures           Patient's care was coordinated with nursing facility staff. Recent vitals, labs and updated medications were reviewed on EMR or chart system of facility. Past Medical, surgical, social, medication and allergy history and patient's previous records were reviewed and updated as appropriate: Most up-to date information is available in the facility EMR where the patient is currently admitted.    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, June 2016    Age-related cognitive decline    Frequent falls    Ambulatory dysfunction    Vision impairment    Constipation    Incontinence in female    Closed displaced intertrochanteric fracture of right femur ([facility])    Diabetes mellitus (HCC)    History of cardiac pacemaker    MAYELIN (acute kidney injury) ([facility])    Acute blood loss as cause of postoperative anemia    Chronic diastolic congestive heart failure ([facility])    Hyperlipidemia    Hypothyroidism    Encephalopathy    Status post insertion of drug-eluting stent into left anterior descending (LAD) artery, June 2016, cath before TAVR    Fall    Closed fracture of sternal end of right clavicle    Hypertension    Secondary lymphedema    Hypokalemia    Stage 4 chronic kidney disease (HCC)    Chest pain    Acute on chronic diastolic heart failure (HCC)    Acute kidney injury superimposed on CKD  (HCC)    Elevated troponin level not due myocardial infarction    Sick sinus syndrome (HCC)    Partial small bowel obstruction (HCC)    Hypernatremia    Sacral decubitus ulcer    Morbid (severe) obesity due to  excess calories (HCC)    Traumatic skin ulcer (HCC)    Abnormal gait    Acute metabolic encephalopathy    Acute retention of urine    Acute pain of right shoulder    Anemia    (HFpEF) heart failure with preserved ejection fraction (HCC)    Gastroesophageal reflux disease    Cognitive impairment    ACP (advance care planning)     Past Medical History:   Diagnosis Date    Anemia     Resolved 3/1/2017     Arthritis     Knee - Resolved 3/1/2017     Blind     CAD (coronary artery disease)     s/p HERNAN 6/2016    Calcaneal spur     CHF (congestive heart failure) (HCC)     Chronic knee instability     Resolved 3/1/2017     Chronic pain syndrome     Resolved 3/1/2017     Dementia (HCC)     Last assessed 11/1/2017     Diabetes mellitus (HCC)     non-insulin depdenent    Disease of thyroid gland     Diverticulitis     Essential thrombocytopenia (HCC)     Last assessed 11/1/2017     GERD (gastroesophageal reflux disease)     History of aortic valve replacement     Resolved 3/1/2017     History of bilateral knee replacement     Resolved 3/1/2017     History of TIA (transient ischemic attack)     no residual deficits    Hyperlipidemia     Hypertension     Hypoxia     on home O2 QHS    Leukocytosis     Resolved 3/1/2017     Lumbar post-laminectomy syndrome     Resolved 3/1/2017     Meniere disease     Osteopenia     Pacemaker     [employer] in 2/2015    S/P TAVR (transcatheter aortic valve replacement)     Resolved 3/1/2017     Severe aortic stenosis     Thrombocytosis     Resolved 4/5/2017     Type 2 diabetes mellitus (HCC)     Last assessed 11/1/2017      Past Surgical History:   Procedure Laterality Date    APPENDECTOMY      BACK SURGERY      Arthrodesis     CARDIAC PACEMAKER PLACEMENT      CHOLECYSTECTOMY      EYE SURGERY      FRACTURE SURGERY Right 01/02/2018    femur    HYSTERECTOMY      WI EGD TRANSORAL BIOPSY SINGLE/MULTIPLE N/A 11/9/2016    Procedure: ESOPHAGOGASTRODUODENOSCOPY (EGD);  Surgeon: Vinh Bryant MD;   Location: [facility];  Service: Gastroenterology    MN OPTX FEM SHFT FX W/INSJ IMED IMPLT W/WO SCREW Right 1/2/2018    Procedure: INSERTION NAIL IM FEMUR ANTEGRADE (TROCHANTERIC) RIGHT;  Surgeon: Roel Amin MD;  Location: [facility];  Service: Orthopedics    MN REPLACE AORTIC VALVE OPENFEMORAL ARTERY APPROACH N/A 7/26/2016    Procedure: TRANSFEMORAL TAVR WITH 23MM LAROSE LILY S3 VALVE; JOSE ALFREDO ;  Surgeon: Tre Ruiz DO;  Location: [facility];  Service: Cardiac Surgery    SMALL INTESTINE SURGERY      TONSILLECTOMY      TOTAL KNEE ARTHROPLASTY      VARICOSE VEIN SURGERY      VENTRAL HERNIA REPAIR       Social History     Socioeconomic History    Marital status:      Spouse name: None    Number of children: None    Years of education: None    Highest education level: None   Occupational History    None   Tobacco Use    Smoking status: Never    Smokeless tobacco: Never   Vaping Use    Vaping status: Never Used   Substance and Sexual Activity    Alcohol use: Not Currently    Drug use: No    Sexual activity: None   Other Topics Concern    None   Social History Narrative    Lives in an independent group home      Social Drivers of Health     Financial Resource Strain: Patient Unable To Answer (4/20/2025)    Received from [facility]    Financial Insecurity     In the last 12 months did you skip medications to save money?: Unable to Assess     In the last 12 months was there a time when you needed to see a doctor but could not because of cost?: Unable to Assess   Food Insecurity: Patient Unable To Answer (4/20/2025)    Received from [facility]    Food Insecurity     In the last 12 months did you ever eat less than you felt you should because there wasn't enough money for food?: Unable to Assess   Transportation Needs: Patient Unable To Answer (4/20/2025)    Received from [facility]    Transportation Needs     In the last 12 months have you ever had to  "go without healthcare because you didn't have a way to get there?: Unable to Assess   Physical Activity: Not on file   Stress: Not on file   Social Connections: Patient Unable To Answer (4/20/2025)    Received from [facility]    Social Connection     Do you often feel lonely?: Unable to Assess   Intimate Partner Violence: Not on file   Housing Stability: Patient Unable To Answer (4/20/2025)    Received from [facility]    Housing Stability     Are you worried that in the next 2 months you may not have stable housing?: Unable to Assess        Family History   Problem Relation Age of Onset    Cancer Child     Coronary artery disease Father         Native artery     Stroke Family     Osteopenia Family     Other Family         Pituitary disease    Polycythemia Family               Coordination of Care: Wound team aware of the treatment plan. Facility nurse will provide wound treatment and monitor the wound for any changes.     Patient / Staff education : Patient / Staff was given education on sign of infection and pressure ulcer prevention. Patient/ Staff verbalized understanding     Follow up :  Two weeks    Voice-recognition software may have been used in the preparation of this document. Occasional wrong word or \"sound-alike\" substitutions may have occurred due to the inherent limitations of voice recognition software. Interpretation should be guided by context.      KASIE Marrufo  "

## 2025-05-22 ENCOUNTER — NURSING HOME VISIT (OUTPATIENT)
Dept: GERIATRICS | Facility: OTHER | Age: OVER 89
End: 2025-05-22

## 2025-05-22 DIAGNOSIS — R41.81 AGE-RELATED COGNITIVE DECLINE: ICD-10-CM

## 2025-05-22 DIAGNOSIS — N18.9 ACUTE KIDNEY INJURY SUPERIMPOSED ON CKD  (HCC): Primary | ICD-10-CM

## 2025-05-22 DIAGNOSIS — G93.40 ACUTE ENCEPHALOPATHY: ICD-10-CM

## 2025-05-22 DIAGNOSIS — M43.6 TORTICOLLIS, ACUTE: ICD-10-CM

## 2025-05-22 DIAGNOSIS — I50.32 CHRONIC HEART FAILURE WITH PRESERVED EJECTION FRACTION (HCC): ICD-10-CM

## 2025-05-22 DIAGNOSIS — N17.9 ACUTE KIDNEY INJURY SUPERIMPOSED ON CKD  (HCC): Primary | ICD-10-CM

## 2025-05-22 DIAGNOSIS — I10 PRIMARY HYPERTENSION: Chronic | ICD-10-CM

## 2025-05-22 NOTE — ASSESSMENT & PLAN NOTE
"Patient reported worsening combativeness and agitation this morning  -Usually patient resist care but not combative at daytime  -Patient is known to mostly sleep at daytime  -Significant sundowning syndromes towards the evening  -Per nursing, daughter reported that patient did not recognize her yesterday's visit  Nursing reported patient with poor meal intake in facility: 25-50%  - enjoys 80% of snacks offered between meals  Patient is calm, cooperative, somnolent versus stuporous on this visit  Physical assessment is unremarkable except for dry mouth  -Agreed when offered juice but unable to drink due to poor effort with strong, however when offered without straw, noted to pocket liquid in her mouth without swallowing -needs encouragement to swallow which patient cooperates without coughing or signs of distress  Patient did not engage in full verbal communication on this visit -uses head movements for yes/no responses.  Order infectious work-up: CBC with diff, CMP, UA with C&S, CXR (2 views)  VSS. No hypoxia on RA  CTH (4/22/2025) = \"No evolved large territorial infarct, hemorrhage, or mass effect. Chronic lacunar infarcts of the left caudate. Low attenuation in the white matter consistent with mild chronic microvascular disease\".  TSH (4/19/2025) = 1.6  Hbg/Hct (5/8/2025) = 13.3 / 41.0 (WNL)  Hepatic enzymes (4/5/2025): Alk. Phosph: 105/ AST: 14/ ALT: 11/ TBili: 0.5  Recent Hx of Acute metabolic encephalopathy managed in hospital - UTI with hx of acute urinary retention  CODE STATUS/ GOAL: Has an Advance directives. DNR.  "

## 2025-05-22 NOTE — ASSESSMENT & PLAN NOTE
Lab Results   Component Value Date    EGFR 42 (L) 05/01/2025    EGFR 42 (L) 04/29/2025    EGFR 32 (L) 04/28/2025    CREATININE 1.21 (H) 05/01/2025    CREATININE 1.22 (H) 04/29/2025    CREATININE 1.52 (H) 04/28/2025   Baseline crea: 1.4-1.5  Renal function (5/22/2025) = Crea: 1.54/ BUN: 28/ eGFRcr: 32 - K: 4.6/ Na: 145  Start hypodermoclysis 0.45% NSS @ 70mL/hour x 1 liter for now  - per nursing, patient will not be able to keep peripheral IV access   - okay to use hypodermoclysis  Prior renal function (5/8/2025) = Crea: 1.38/ BUN: 832/ eGFRcr: 36 - K: 4.8/ Na: 141  Prior renal function (5/1/2025): Crea: 1.21/ BUN: 27/ eGFRcr: 42 - K: 4.0/ Na: 140  Hx of MAYELIN managed in recent hospitalization  On diuretic therapy  At risk for hydration issues due to overall poor intake  Repeat BMP on 5/23/2025

## 2025-05-22 NOTE — ASSESSMENT & PLAN NOTE
Site: Left leaning  Per daughter started after last hospitalization  Start Tylenol 975mg every 8 hours x 10 days  Start Aspercreme lidocaine cream 4% to Left neck and shoulder areas BID x 10 days

## 2025-05-22 NOTE — PROGRESS NOTES
"[facility]  [address], [address], [place] [address] [phone]    NAME: Anabel Hutton  AGE: 89 y.o. SEX: female    Progress Note    Location: [facility]  POS: 32    Assessment/Plan:    Age-related cognitive decline  Patient reported worsening combativeness and agitation this morning  -Usually patient resist care but not combative at daytime  -Patient is known to mostly sleep at daytime  -Significant sundowning syndromes towards the evening  -Per nursing, daughter reported that patient did not recognize her yesterday's visit  Nursing reported patient with poor meal intake in facility: 25-50%  - enjoys 80% of snacks offered between meals  Patient is calm, cooperative, somnolent versus stuporous on this visit  Physical assessment is unremarkable except for dry mouth  -Agreed when offered juice but unable to drink due to poor effort with strong, however when offered without straw, noted to pocket liquid in her mouth without swallowing -needs encouragement to swallow which patient cooperates without coughing or signs of distress  Patient did not engage in full verbal communication on this visit -uses head movements for yes/no responses.  Order infectious work-up: CBC with diff, CMP, UA with C&S, CXR (2 views)  VSS. No hypoxia on RA  CTH (4/22/2025) = \"No evolved large territorial infarct, hemorrhage, or mass effect. Chronic lacunar infarcts of the left caudate. Low attenuation in the white matter consistent with mild chronic microvascular disease\".  TSH (4/19/2025) = 1.6  Hbg/Hct (5/8/2025) = 13.3 / 41.0 (WNL)  Hepatic enzymes (4/5/2025): Alk. Phosph: 105/ AST: 14/ ALT: 11/ TBili: 0.5  Recent Hx of Acute metabolic encephalopathy managed in hospital - UTI with hx of acute urinary retention  CODE STATUS/ GOAL: Has an Advance directives. DNR.    Hypertension  BP goal: < 140/90  Controlled with current meds  BP range (5/1-21/2025) = 119/61 to 154/70  ** 2  and higher in this period  HR " range (5/1-21/2025) = 60 to 104/min  ** 2 HR > 92/min in this period  Continue Metoprolol tartrate 50mg BID/ Amlodipine 5mg daily  On diuretic therapy  Continue BP/HR checks in LTCF    Acute encephalopathy  Recently managed for acute metabolic encephalopathy in the hospital - related to UTI  Nursing reported patient is more confused, combative and lethargic than baseline noted this morning  Patient is sitting in a manual wheelchair with eyes closed but responsive, cooperative and follows instruction without difficulty  Patient declines open eyes despite multiple encouragement -resist attempted to assess eyes today  Physical assessment today is found to be unremarkable. VSS.  Undetermined etiology at this time but leaning towards MAYELIN and UTI in setting of overall poor p.o. intake and history of recent UTI with urinary retention.  Last Hepatic enzymes (4/5/2025): Alk. Phosph: 105/ AST: 14/ ALT: 11/ TBili: 0.5  STAT labs ordered:   - CXR (2 views): No acute cardiopulmonary disease process.  - Still with pending CM, CBC with diff  - Still with pending UA with C&S -specimen still needs to be collected  Start bladder scan post void x 2 today    (HFpEF) heart failure with preserved ejection fraction (HCC)  Wt Readings from Last 3 Encounters:   11/01/23 92.5 kg (204 lb)   11/15/22 92.5 kg (204 lb)   07/12/22 88.9 kg (196 lb)   Euvolemic on assessment  NO peripheral edema seen  Weight loss  LCTA. No hypoxia on RA  CXR (5/22/2025): NO: focal consolidation/ effusion/ pneumothorax/  - cardiomediastinal silhouette is enlarged  - Found implanted cardiac device  Continue diuretics: Lasix 80mg daily and Spironolactone 25mg daily    Torticollis, acute  Site: Left leaning  Per daughter started after last hospitalization  Start Tylenol 975mg every 8 hours x 10 days  Start Aspercreme lidocaine cream 4% to Left neck and shoulder areas BID x 10 days    Acute kidney injury superimposed on CKD (HCC)  Lab Results   Component Value Date     "EGFR 42 (L) 05/01/2025    EGFR 42 (L) 04/29/2025    EGFR 32 (L) 04/28/2025    CREATININE 1.21 (H) 05/01/2025    CREATININE 1.22 (H) 04/29/2025    CREATININE 1.52 (H) 04/28/2025   Baseline crea: 1.4-1.5  Renal function (5/22/2025) = Crea: 1.54/ BUN: 28/ eGFRcr: 32 - K: 4.6/ Na: 145  Start hypodermoclysis 0.45% NSS @ 70mL/hour x 1 liter for now  - per nursing, patient will not be able to keep peripheral IV access   - okay to use hypodermoclysis  Prior renal function (5/8/2025) = Crea: 1.38/ BUN: 832/ eGFRcr: 36 - K: 4.8/ Na: 141  Prior renal function (5/1/2025): Crea: 1.21/ BUN: 27/ eGFRcr: 42 - K: 4.0/ Na: 140  Hx of MAYELIN managed in recent hospitalization  On diuretic therapy  At risk for hydration issues due to overall poor intake  Repeat BMP on 5/23/2025      Chief complaint / Reason for visit: Acute Visit    History of Present Illness:  This is a 89-year-old female patient residing at [facility].  Patient is seen and examined today per nursing request for worsening of combativeness during care this morning, physical and verbal aggression towards staff and increased confusion than baseline. Patient is also reported to be poor eater with an ave of 25-50% meal completion in LTCF. Daughter is aware of nursing concerns.      Patient is out of bed for this visit sitting in a manual wheelchair in the hallway for closer visual monitoring.  Patient is well-dressed and appears to be asleep.  Patient did not open her eyes when name is called but responds with \"yes\".  Patient did not open eyes despite multiple request but cooperative, calm, pleasant and not in distress.  Patient denies having any pain anywhere including headache.  Patient acknowledged YES to question of \" Are you okay\"?.  Patient denies any acute medical concerns during ROS assessment including pain.  Noted dry tongue with patient acknowledging that she is thirsty.  Attempted to give juice via straw but has poor sucking effort and when asked If she " will drink wo straw, said yes but when given small amount, pocketed liquid and has to be asked to swallow which she did. Noted no coughing.  Patient fully cooperative during physical assessment of this visit -findings are unremarkable. VSS.  Will order infectious work-up today.  Patient is noted to have a left-sided torticollis -patient reported discomfort during range of motion -would recommend use of Armida chair or Broda chair to effective support the next and upper back.    Called and spoken to daughter today - discussed reason for visit/CXR result and CBC result/ findings/plan of care as indicated above -agreeable.  Daughters also agreed to start patient on a Broda chair for comfort. No further questions or concerns raised on this call.    Nursing and prior provider notes reviewed on this visit. Discussed visit with PCP and nursing staff/ supervisor.    Review of Systems:  Per history of present illness, all other systems reviewed and negative.    HISTORY:  Medical Hx: Reviewed, unchanged  Family Hx: Reviewed, unchanged  Soc Hx: Reviewed,  unchanged    ALLERGY: Reviewed, unchanged. Allergies[1]     PHYSICAL EXAM:  Vital Signs: T 98.0F -HR 70 -R16  -BP: 128/75 -SpO2: 96% RA  Weight: 177.0 lbs (5/21/2025) <= 176.5 lbs (5/19/025) <= 180.3 lbs (5/14/2025)    General: NAD. Well appearing. No acute distress. With obesity  Head: Atraumatic. Normocephalic.  Eye Exam: anicteric sclera, no discharge, PERRLA, No injection. Wears glasses.  Oral Exam: dry tongue and mucous membranes, no buccaloropharyngeal erythema, palatine tonsils WNL.  Neck Exam: no anterior cervical lymphadenopathy noted, neck supple. Trachea midline, no carotid bruit, no masses. Left sided torticollis.  Cardiovascular: regular rate, regular rhythm, no: murmurs/ rubs/ gallops. S1 and S2 appreciated.  Pulmonary: no wheeze, no rhonchi, no rales. No chest tenderness. Normal chest wall expansion  Abdominal: soft, non-tender, nondistended, bowel sounds audible  "x 4 quadrants. No palpable hepatosplenomegaly, no tympany  : Non distended bladder.   Extremities and skin: no edema noted, no rashes. Skin tear to Right knee  Neurological: Somnolence versus stupor, cooperative and responsive . No tics, normal sensation to pressure and light touch. moving all 4 extremities symmetrically. Non ambulatory - wheelchair bound.    Laboratory / Imaging results reviewed. Last labs done on 5/8/2025.    Current Medications: All medications reviewed and updated in Nursing Home eMAR.    Please note: This note was completed in part utilizing a voice-recognition software may have been used in the preparation of this document. Grammatical errors, random word insertion, spelling mistakes, and incomplete sentences may be an occasional consequence of the system secondary to software limitations, ambient noise and hardware issues. Occasional wrong word or \"sound-alike\" substitutions may have occurred due to the inherent limitations of voice recognition software. At the time of dictation, efforts were made to edit, clarify and/or correct errors. Interpretation should be guided by context. Please read the chart carefully and recognize, using context, where substitutions have occurred. If you have any questions or concerns about the context, text or information contained within the body of this dictation, please contact myself, the provider, for further clarification.      KASIE Pettit  5/22/2025         [1]   Allergies  Allergen Reactions    Advil [Ibuprofen] GI Intolerance    Propoxyphene Other (See Comments)     DARVOCET=ACID REFLUX    Rofecoxib Other (See Comments)     "

## 2025-05-22 NOTE — ASSESSMENT & PLAN NOTE
Recently managed for acute metabolic encephalopathy in the hospital - related to UTI  Nursing reported patient is more confused, combative and lethargic than baseline noted this morning  Patient is sitting in a manual wheelchair with eyes closed but responsive, cooperative and follows instruction without difficulty  Patient declines open eyes despite multiple encouragement -resist attempted to assess eyes today  Physical assessment today is found to be unremarkable. VSS.  Undetermined etiology at this time but leaning towards MAYELIN and UTI in setting of overall poor p.o. intake and history of recent UTI with urinary retention.  Last Hepatic enzymes (4/5/2025): Alk. Phosph: 105/ AST: 14/ ALT: 11/ TBili: 0.5  STAT labs ordered:   - CXR (2 views): No acute cardiopulmonary disease process.  - Still with pending CM, CBC with diff  - Still with pending UA with C&S -specimen still needs to be collected  Start bladder scan post void x 2 today

## 2025-05-22 NOTE — ASSESSMENT & PLAN NOTE
BP goal: < 140/90  Controlled with current meds  BP range (5/1-21/2025) = 119/61 to 154/70  ** 2  and higher in this period  HR range (5/1-21/2025) = 60 to 104/min  ** 2 HR > 92/min in this period  Continue Metoprolol tartrate 50mg BID/ Amlodipine 5mg daily  On diuretic therapy  Continue BP/HR checks in LTCF

## 2025-05-22 NOTE — ASSESSMENT & PLAN NOTE
Wt Readings from Last 3 Encounters:   11/01/23 92.5 kg (204 lb)   11/15/22 92.5 kg (204 lb)   07/12/22 88.9 kg (196 lb)   Euvolemic on assessment  NO peripheral edema seen  Weight loss  LCTA. No hypoxia on RA  CXR (5/22/2025): NO: focal consolidation/ effusion/ pneumothorax/  - cardiomediastinal silhouette is enlarged  - Found implanted cardiac device  Continue diuretics: Lasix 80mg daily and Spironolactone 25mg daily

## 2025-05-22 NOTE — ASSESSMENT & PLAN NOTE
Lab Results   Component Value Date    EGFR 42 (L) 05/01/2025    EGFR 42 (L) 04/29/2025    EGFR 32 (L) 04/28/2025    CREATININE 1.21 (H) 05/01/2025    CREATININE 1.22 (H) 04/29/2025    CREATININE 1.52 (H) 04/28/2025   Per hospital note, baseline Crea: 1.4-1.5  (new)    CBC with diff, CMP on 5/22/2025

## 2025-05-23 ENCOUNTER — NURSING HOME VISIT (OUTPATIENT)
Dept: GERIATRICS | Facility: OTHER | Age: OVER 89
End: 2025-05-23
Payer: COMMERCIAL

## 2025-05-23 DIAGNOSIS — G93.40 ACUTE ENCEPHALOPATHY: ICD-10-CM

## 2025-05-23 DIAGNOSIS — N17.9 ACUTE KIDNEY INJURY SUPERIMPOSED ON CKD  (HCC): Primary | ICD-10-CM

## 2025-05-23 DIAGNOSIS — F43.21 ADJUSTMENT DISORDER WITH DEPRESSED MOOD: ICD-10-CM

## 2025-05-23 DIAGNOSIS — N18.9 ACUTE KIDNEY INJURY SUPERIMPOSED ON CKD  (HCC): Primary | ICD-10-CM

## 2025-05-23 DIAGNOSIS — I50.32 CHRONIC HEART FAILURE WITH PRESERVED EJECTION FRACTION (HCC): ICD-10-CM

## 2025-05-23 PROCEDURE — 99309 SBSQ NF CARE MODERATE MDM 30: CPT | Performed by: NURSE PRACTITIONER

## 2025-05-25 ENCOUNTER — TELEPHONE (OUTPATIENT)
Dept: OTHER | Facility: OTHER | Age: OVER 89
End: 2025-05-25

## 2025-05-25 NOTE — TELEPHONE ENCOUNTER
"HOSSEIN Sanchez, stated, \"I am calling to verify labs results.\"    On call notified via secure chat.  "

## 2025-06-02 ENCOUNTER — NURSING HOME VISIT (OUTPATIENT)
Dept: WOUND CARE | Facility: HOSPITAL | Age: OVER 89
End: 2025-06-02
Payer: COMMERCIAL

## 2025-06-02 DIAGNOSIS — L98.499 TRAUMATIC SKIN ULCER, UNSPECIFIED ULCER STAGE (HCC): Primary | ICD-10-CM

## 2025-06-02 DIAGNOSIS — R26.2 AMBULATORY DYSFUNCTION: ICD-10-CM

## 2025-06-02 PROBLEM — F43.21 ADJUSTMENT DISORDER WITH DEPRESSED MOOD: Status: ACTIVE | Noted: 2025-06-02

## 2025-06-02 PROCEDURE — 99309 SBSQ NF CARE MODERATE MDM 30: CPT | Performed by: NURSE PRACTITIONER

## 2025-06-02 RX ORDER — MIRTAZAPINE 7.5 MG/1
7.5 TABLET, FILM COATED ORAL
COMMUNITY
End: 2025-06-11 | Stop reason: ALTCHOICE

## 2025-06-02 NOTE — LETTER
Patient:  Anabel VALDES Day   1935           KASIE Marrufo saw Anabel VALDES Day for a wound care visit on 6/2/2025. See below for information relating to this visit.      Chief Complaint   Patient presents with    Follow Up Wound Care Visit        Assessment/Plan:  1. Traumatic skin ulcer, unspecified ulcer stage (HCC)  Assessment & Plan:  As per medical record review, patient was found on the floor on March 19, 2025.  Patient sustained hematoma from the fall.  Visiting nurse seen the patient and recommended silver alginate for the wound.    Assessment and plan  Right knee   Wound size increased, full-thickness  local wound care  -changed to collagen  Continue to offload  Follow-up : two weeks             2. Ambulatory dysfunction  Assessment & Plan:  Wheelchair-bound         Orders:  Anabel N Day  1935  Wound: Right knee  Discontinue previous wound order  Cleanse the wound bed with NSS   Apply non-sting skin prep to periwound area   Apply collagen wound dressing to wound bed, cover with bordered foam  Frequency : 3 times a week and prn for soiling    Offload all wounds  Turn and reposition frequently  Instruct / Assist with weight shifting in wheelchair  Increase protein intake.  Monitor for any sign of infection or worsening, inform PCP or patient's primary physician in your facility.      Follow Up:  Return in about 2 weeks (around 6/16/2025).       [facility] hours are 8:00 am - 4:30 pm Monday through Friday. The center phone number is [phone]. You can also contact me directly thru my email at [email or web address] or thru tiger text. If it is an emergency, please contact the PCP or patient's attending physician in your facility.     Sincerely,    Electronically signed by KASIE Marrufo    Patient : Anabel N Day    1935

## 2025-06-02 NOTE — ASSESSMENT & PLAN NOTE
As per medical record review, patient was found on the floor on March 19, 2025.  Patient sustained hematoma from the fall.  Visiting nurse seen the patient and recommended silver alginate for the wound.    Assessment and plan  Right knee   Wound size increased, full-thickness  local wound care  -changed to collagen  Continue to offload  Follow-up : two weeks

## 2025-06-02 NOTE — ASSESSMENT & PLAN NOTE
Improving mentation and alertness today  Multifactorial: MAYELIN and suspect UTI  Will be completing IVF NSS today  Improving renal function (5/23/2025)  UA (5/23/2025): ABnormal:   - Leuko: Negative  - Nitrite: Positive  - Bacteria: 2+  WBC: 0 - 5  CBC result: (5/22/2025)  - WBC: 10.7 (H)  - Ab Neutro: 8.2 (H)  CXR (5/22/2025): No acute abnormality seen  Afebrile. NO hypoxia on RA  Will HOLD off antibiotic until urine C&S is final  Continue V/S daily x 5 days

## 2025-06-02 NOTE — PROGRESS NOTES
[address], [address], [place] [address]  [phone]    NAME: Anabel Hutton  AGE: 89 y.o. SEX: female    Progress Note    Location: [facility]  POS: 13    Assessment/Plan:    Acute kidney injury superimposed on CKD (HCC)  Lab Results   Component Value Date    EGFR 42 (L) 05/01/2025    EGFR 42 (L) 04/29/2025    EGFR 32 (L) 04/28/2025    CREATININE 1.21 (H) 05/01/2025    CREATININE 1.22 (H) 04/29/2025    CREATININE 1.52 (H) 04/28/2025   Baseline crea: 1.4-1.5  Follow-up Renal function improving (5/23/2025) = Crea: 1.48/ BUN: 30/ eGFRcr: 33 - K: 4.4/ Na: 140  Continue hypodermoclysis 0.45% NSS @ 70mL/hour until done (1L)Hx of MAYELIN managed in recent hospitalization  On diuretic therapy  At risk for hydration issues due to overall poor intake    (HFpEF) heart failure with preserved ejection fraction (HCC)  Wt Readings from Last 3 Encounters:   11/01/23 92.5 kg (204 lb)   11/15/22 92.5 kg (204 lb)   07/12/22 88.9 kg (196 lb)   Euvolemic on assessment  NO peripheral edema seen  LCTA. No hypoxia on RA  Continue diuretics: Lasix 80mg daily and Spironolactone 25mg daily  Renal function at baseline today - S/P IVF x 1 Liter    Acute encephalopathy  Improving mentation and alertness today  Multifactorial: MAYELIN and suspect UTI  Will be completing IVF NSS today  Improving renal function (5/23/2025)  UA (5/23/2025): ABnormal:   - Leuko: Negative  - Nitrite: Positive  - Bacteria: 2+  WBC: 0 - 5  CBC result: (5/22/2025)  - WBC: 10.7 (H)  - Ab Neutro: 8.2 (H)  CXR (5/22/2025): No acute abnormality seen  Afebrile. NO hypoxia on RA  Will HOLD off antibiotic until urine C&S is final  Continue V/S daily x 5 days    Adjustment disorder with depressed mood  Per nursing, patient have exhibited increased agitation/ combativeness, poor motivation to do anything and have been verbal about being angry in setting of transfer from home to [facility].  Daughter have also expressed that patient have been verba about  her unwillingness and anger on being in LTCF.  Patient with poor appetite, somnolence wo organic cause, combative during care in LTCF  Currently being managed for MAYELIN with NSS via hypodermoclysis  Small increment of weight loss  Start Mirtazapine 7.5mg at HS - to improve appetite and address depression/ anxiety  Consult Behavioral Health CRNP on next visit      Chief complaint / Reason for visit:  Follow- visit    History of Present Illness:  This is a 89-year-old female patient residing at [facility].  Patient is seen and examined today to follow-up acute medical condition that was assessed and managed on 5/22/2025: Lethargy/ somnolence; MAYELIN.       Patient is still in bed for this visit - awake/alert, cooperative, more engaged and responds verbally during ROS assessment today - to which the patient denies any acute medical concerns including pain. Patient still on the IVF  NSS but should be completed soon. Per nursing, patient more awake, though ate around 25% and improved oral fluid intake as well - on much better mentation than yesterday. Nursing has no acute mdical concerns today.     Nursing and prior provider notes reviewed on this visit. Discussed visit with PCP and nursing staff/ supervisor.    Review of Systems:  Per history of present illness, all other systems reviewed and negative.    HISTORY:  Medical Hx: Reviewed, unchanged  Family Hx: Reviewed, unchanged  Soc Hx: Reviewed,  unchanged    ALLERGY: Reviewed, unchanged. Allergies[1]     PHYSICAL EXAM:  Vital Signs: T 97.7F -HR 73 -R 18 -BP: 128/65 -SpO2: 98% RA  Weight: 176.4 lbs (5/23/2025) <= 177.0 lbs (5/21/2025) <= 176.5 lbs (5/19/025) <= 180.3 lbs (5/14/2025)    General: More alert and engaging with conversation.  Well appearing. No acute distress. With obesity  Head: Atraumatic. Normocephalic.  Eye Exam: anicteric sclera, no discharge, PERRLA, No injection. Wears glasses.  Oral Exam: Moist tongue and mucous membranes, no  "buccaloropharyngeal erythema, palatine tonsils WNL.  Neck Exam: no anterior cervical lymphadenopathy noted, neck supple. Trachea midline, no carotid bruit, no masses. Left sided torticollis.  Cardiovascular: regular rate, regular rhythm, no: murmurs/ rubs/ gallops. S1 and S2 appreciated.  Pulmonary: no wheeze, no rhonchi, no rales. No chest tenderness. Normal chest wall expansion  Abdominal: soft, non-tender, nondistended, bowel sounds audible x 4 quadrants. No palpable hepatosplenomegaly, no tympany  : Non distended bladder.   Extremities and skin: no edema noted, no rashes. Skin tear to Right knee  Neurological: Alert, cooperative and responsive. No tics, normal sensation to pressure and light touch. moving all 4 extremities symmetrically. Non ambulatory - wheelchair bound.    Laboratory / Imaging results reviewed. Last lab done on 5/23/2025    Current Medications: All medications reviewed and updated in Nursing Home eMAR.    Please note: This note was completed in part utilizing a voice-recognition software may have been used in the preparation of this document. Grammatical errors, random word insertion, spelling mistakes, and incomplete sentences may be an occasional consequence of the system secondary to software limitations, ambient noise and hardware issues. Occasional wrong word or \"sound-alike\" substitutions may have occurred due to the inherent limitations of voice recognition software. At the time of dictation, efforts were made to edit, clarify and/or correct errors. Interpretation should be guided by context. Please read the chart carefully and recognize, using context, where substitutions have occurred. If you have any questions or concerns about the context, text or information contained within the body of this dictation, please contact myself, the provider, for further clarification.      KASIE Pettit  6/2/2025         [1]   Allergies  Allergen Reactions    Advil [Ibuprofen] GI Intolerance "    Propoxyphene Other (See Comments)     DARVOCET=ACID REFLUX    Rofecoxib Other (See Comments)

## 2025-06-02 NOTE — ASSESSMENT & PLAN NOTE
Wt Readings from Last 3 Encounters:   11/01/23 92.5 kg (204 lb)   11/15/22 92.5 kg (204 lb)   07/12/22 88.9 kg (196 lb)   Euvolemic on assessment  NO peripheral edema seen  LCTA. No hypoxia on RA  Continue diuretics: Lasix 80mg daily and Spironolactone 25mg daily  Renal function at baseline today - S/P IVF x 1 Liter

## 2025-06-02 NOTE — ASSESSMENT & PLAN NOTE
Lab Results   Component Value Date    EGFR 42 (L) 05/01/2025    EGFR 42 (L) 04/29/2025    EGFR 32 (L) 04/28/2025    CREATININE 1.21 (H) 05/01/2025    CREATININE 1.22 (H) 04/29/2025    CREATININE 1.52 (H) 04/28/2025   Baseline crea: 1.4-1.5  Follow-up Renal function improving (5/23/2025) = Crea: 1.48/ BUN: 30/ eGFRcr: 33 - K: 4.4/ Na: 140  Continue hypodermoclysis 0.45% NSS @ 70mL/hour until done (1L)Hx of MAYELIN managed in recent hospitalization  On diuretic therapy  At risk for hydration issues due to overall poor intake

## 2025-06-02 NOTE — ASSESSMENT & PLAN NOTE
Per nursing, patient have exhibited increased agitation/ combativeness, poor motivation to do anything and have been verbal about being angry in setting of transfer from home to LCTF.  Daughter have also expressed that patient have been verba about her unwillingness and anger on being in LTCF.  Patient with poor appetite, somnolence wo organic cause, combative during care in LTCF  Currently being managed for MAYELIN with NSS via hypodermoclysis  Small increment of weight loss  Start Mirtazapine 7.5mg at HS - to improve appetite and address depression/ anxiety  Consult Behavioral Health CRNP on next visit

## 2025-06-02 NOTE — PROGRESS NOTES
[facility] WOUND CARE MANAGEMENT   AND HYPERBARIC MEDICINE CENTER       Patient ID: Anabel Hutton is a 89 y.o. female Date of Birth 1935     Location of Service: [facility]    Performed wound round with: Wound team     Chief Complaint : Right knee    Wound Instructions:  Wound: Right knee  Discontinue previous wound order  Cleanse the wound bed with NSS   Apply non-sting skin prep to periwound area   Apply collagen wound dressing to wound bed, cover with bordered foam  Frequency : 3 times a week and prn for soiling    Offload all wounds  Turn and reposition frequently  Instruct / Assist with weight shifting in wheelchair  Increase protein intake.  Monitor for any sign of infection or worsening, inform PCP or patient's primary physician in your facility.      Allergies  Advil [ibuprofen], Propoxyphene, and Rofecoxib      Assessment & Plan:  1. Traumatic skin ulcer, unspecified ulcer stage (HCC)  Assessment & Plan:  As per medical record review, patient was found on the floor on March 19, 2025.  Patient sustained hematoma from the fall.  Visiting nurse seen the patient and recommended silver alginate for the wound.    Assessment and plan  Right knee   Wound size increased, full-thickness  local wound care  -changed to collagen  Continue to offload  Follow-up : two weeks             2. Ambulatory dysfunction  Assessment & Plan:  Wheelchair-bound                     Subjective:   March 31, 2025.  New consult for wound on the right knee.  Medical problem includes but not limited to chronic diastolic heart failure, stage IV chronic kidney disease, and morbid obesity.  I introduced myself to patient and patient agreed to be seen for wound consult.    Wound history: As per medical record review, patient was found on the floor on March 19, 2025.  Patient sustained hematoma from the fall.  Visiting nurse seen the patient and recommended silver alginate for the wound.    Received patient in  "chair, seems comfortable.  Denies pain.  As per report, patient is on aspirin.  Patient is wheelchair-bound.  Patient verbalized, \"I do not have any pain\".  Denies fever.    April 7, 2025.  Follow-up for wound on the right knee.  Received patient on recliner chair, seems comfortable.  Denies pain.  Last week I received a message from the facility's nurse regarding the wound.  Visiting nurse is concerned with the connecting tunneling from the large wound to the small wound.    April 14, 2025.  Follow-up for wound in the right knee.  Received patient in bed, seems comfortable.  Denies pain.    May 5, 2025.  Follow-up for wound on the right lower leg.  As per medical record review, patient was recently admitted at [facility] for confusion.  While in the hospital, patient was seen by facility wound team.  Ordered Triad paste and bordered foam for the wound.    Received patient on chair, seems comfortable.  Denies pain.  No significant issues reported related to the wound.    5/19/2025 Follow up for wound on the right knee . Received patient, not in distress. Facility staff did not report any significant issues related to the wound.  Wound dressing removed: Triad paste.    6/2/2025 Follow up for wound on the right knee . Received patient, not in distress. Facility staff did not report any significant issues related to the wound.               Review of Systems   Constitutional: Negative.    Respiratory: Negative.     Cardiovascular: Negative.    Musculoskeletal: Negative.    Skin:  Positive for wound.   Psychiatric/Behavioral: Negative.         Objective:    Physical Exam  Constitutional:       Appearance: Normal appearance.     Cardiovascular:      Rate and Rhythm: Normal rate.   Pulmonary:      Effort: Pulmonary effort is normal.     Musculoskeletal:      Right lower leg: Edema present.      Left lower leg: Edema present.      Comments: Moderate edema on bilateral lower legs     Skin:     Findings: Lesion " present.      Comments: Right knee anterior is healed    Right knee: Wound size is 3 x 1.4 x 0.1 cm.,  100% granulation, small amount of serous drainage, periwound normal, with no obvious sign of infection     Neurological:      Mental Status: She is alert.      Gait: Gait abnormal.      Comments: Wheelchair-bound            Procedures           Patient's care was coordinated with nursing facility staff. Recent vitals, labs and updated medications were reviewed on EMR or chart system of facility. Past Medical, surgical, social, medication and allergy history and patient's previous records were reviewed and updated as appropriate: Most up-to date information is available in the facility EMR where the patient is currently admitted.    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, June 2016    Age-related cognitive decline    Frequent falls    Ambulatory dysfunction    Vision impairment    Constipation    Incontinence in female    Closed displaced intertrochanteric fracture of right femur ([facility])    Diabetes mellitus (HCC)    History of cardiac pacemaker    MAYELIN (acute kidney injury) ([facility])    Acute blood loss as cause of postoperative anemia    Chronic diastolic congestive heart failure ([facility])    Hyperlipidemia    Hypothyroidism    Acute encephalopathy    Status post insertion of drug-eluting stent into left anterior descending (LAD) artery, June 2016, cath before TAVR    Fall    Closed fracture of sternal end of right clavicle    Hypertension    Secondary lymphedema    Hypokalemia    Stage 4 chronic kidney disease (HCC)    Chest pain    Acute on chronic diastolic heart failure (HCC)    Acute kidney injury superimposed on CKD  (HCC)    Elevated troponin level not due myocardial infarction    Sick sinus syndrome (HCC)    Partial  small bowel obstruction (HCC)    Hypernatremia    Sacral decubitus ulcer    Morbid (severe) obesity due to excess calories (HCC)    Traumatic skin ulcer (HCC)    Abnormal gait    Acute metabolic encephalopathy    Acute retention of urine    Acute pain of right shoulder    Anemia    (HFpEF) heart failure with preserved ejection fraction (HCC)    Gastroesophageal reflux disease    Cognitive impairment    ACP (advance care planning)    Torticollis, acute     Past Medical History:   Diagnosis Date    Anemia     Resolved 3/1/2017     Arthritis     Knee - Resolved 3/1/2017     Blind     CAD (coronary artery disease)     s/p HERNAN 6/2016    Calcaneal spur     CHF (congestive heart failure) ([facility])     Chronic knee instability     Resolved 3/1/2017     Chronic pain syndrome     Resolved 3/1/2017     Dementia ([facility])     Last assessed 11/1/2017     Diabetes mellitus ([facility])     non-insulin depdenent    Disease of thyroid gland     Diverticulitis     Essential thrombocytopenia ([facility])     Last assessed 11/1/2017     GERD (gastroesophageal reflux disease)     History of aortic valve replacement     Resolved 3/1/2017     History of bilateral knee replacement     Resolved 3/1/2017     History of TIA (transient ischemic attack)     no residual deficits    Hyperlipidemia     Hypertension     Hypoxia     on home O2 QHS    Leukocytosis     Resolved 3/1/2017     Lumbar post-laminectomy syndrome     Resolved 3/1/2017     Meniere disease     Osteopenia     Pacemaker     CHB-Biotronik in 2/2015    S/P TAVR (transcatheter aortic valve replacement)     Resolved 3/1/2017     Severe aortic stenosis     Thrombocytosis     Resolved 4/5/2017     Type 2 diabetes mellitus ([facility])     Last assessed 11/1/2017      Past Surgical History:   Procedure Laterality Date    APPENDECTOMY      BACK SURGERY      Arthrodesis     CARDIAC PACEMAKER PLACEMENT      CHOLECYSTECTOMY      EYE SURGERY      FRACTURE SURGERY Right 01/02/2018    femur    HYSTERECTOMY      NV EGD  TRANSORAL BIOPSY SINGLE/MULTIPLE N/A 11/9/2016    Procedure: ESOPHAGOGASTRODUODENOSCOPY (EGD);  Surgeon: Vinh Bryant MD;  Location: [facility];  Service: Gastroenterology    VA OPTX FEM SHFT FX W/INSJ IMED IMPLT W/WO SCREW Right 1/2/2018    Procedure: INSERTION NAIL IM FEMUR ANTEGRADE (TROCHANTERIC) RIGHT;  Surgeon: Roel Amin MD;  Location: [facility];  Service: Orthopedics    VA REPLACE AORTIC VALVE OPENFEMORAL ARTERY APPROACH N/A 7/26/2016    Procedure: TRANSFEMORAL TAVR WITH 23MM LAROSE LILY S3 VALVE; JOSE ALFREDO ;  Surgeon: Tre Ruiz DO;  Location: [facility];  Service: Cardiac Surgery    SMALL INTESTINE SURGERY      TONSILLECTOMY      TOTAL KNEE ARTHROPLASTY      VARICOSE VEIN SURGERY      VENTRAL HERNIA REPAIR       Social History     Socioeconomic History    Marital status:    Tobacco Use    Smoking status: Never    Smokeless tobacco: Never   Vaping Use    Vaping status: Never Used   Substance and Sexual Activity    Alcohol use: Not Currently    Drug use: No   Social History Narrative    Lives in an independent group home      Social Drivers of Health     Financial Resource Strain: Patient Unable To Answer (4/20/2025)    Received from [facility]    Financial Insecurity     In the last 12 months did you skip medications to save money?: Unable to Assess     In the last 12 months was there a time when you needed to see a doctor but could not because of cost?: Unable to Assess   Food Insecurity: Patient Unable To Answer (4/20/2025)    Received from [facility]    Food Insecurity     In the last 12 months did you ever eat less than you felt you should because there wasn't enough money for food?: Unable to Assess   Transportation Needs: Patient Unable To Answer (4/20/2025)    Received from [facility]    Transportation Needs     In the last 12 months have you ever had to go without healthcare because you didn't have a way to get there?: Unable  "to Assess   Social Connections: Patient Unable To Answer (4/20/2025)    Received from [facility]    Social Connection     Do you often feel lonely?: Unable to Assess   Housing Stability: Patient Unable To Answer (4/20/2025)    Received from [facility]    Housing Stability     Are you worried that in the next 2 months you may not have stable housing?: Unable to Assess        Family History   Problem Relation Name Age of Onset    Cancer Child      Coronary artery disease Father          Native artery     Stroke Family      Osteopenia Family      Other Family          Pituitary disease    Polycythemia Family                Coordination of Care: Wound team aware of the treatment plan. Facility nurse will provide wound treatment and monitor the wound for any changes.     Patient / Staff education : Patient / Staff was given education on sign of infection and pressure ulcer prevention. Patient/ Staff verbalized understanding     Follow up :  Two weeks    Voice-recognition software may have been used in the preparation of this document. Occasional wrong word or \"sound-alike\" substitutions may have occurred due to the inherent limitations of voice recognition software. Interpretation should be guided by context.      KASIE Marrufo  "

## 2025-06-03 ENCOUNTER — TELEPHONE (OUTPATIENT)
Dept: OTHER | Facility: OTHER | Age: OVER 89
End: 2025-06-03

## 2025-06-11 ENCOUNTER — NURSING HOME VISIT (OUTPATIENT)
Dept: GERIATRICS | Facility: OTHER | Age: OVER 89
End: 2025-06-11
Payer: COMMERCIAL

## 2025-06-11 DIAGNOSIS — G93.40 ACUTE ENCEPHALOPATHY: Primary | ICD-10-CM

## 2025-06-11 DIAGNOSIS — R33.8 ACUTE RETENTION OF URINE: ICD-10-CM

## 2025-06-11 DIAGNOSIS — Z51.5 COMFORT MEASURES ONLY STATUS: Primary | ICD-10-CM

## 2025-06-11 DIAGNOSIS — N18.9 ACUTE KIDNEY INJURY SUPERIMPOSED ON CKD  (HCC): ICD-10-CM

## 2025-06-11 DIAGNOSIS — I50.32 CHRONIC DIASTOLIC CONGESTIVE HEART FAILURE (HCC): ICD-10-CM

## 2025-06-11 DIAGNOSIS — N17.9 ACUTE KIDNEY INJURY SUPERIMPOSED ON CKD  (HCC): ICD-10-CM

## 2025-06-11 PROCEDURE — 99310 SBSQ NF CARE HIGH MDM 45: CPT | Performed by: NURSE PRACTITIONER

## 2025-06-11 RX ORDER — MORPHINE SULFATE 20 MG/ML
5 SOLUTION ORAL EVERY 4 HOURS PRN
Qty: 30 ML | Refills: 0 | Status: SHIPPED | OUTPATIENT
Start: 2025-06-11 | End: 2025-06-18

## 2025-06-11 RX ORDER — HYOSCYAMINE SULFATE 0.12 MG/1
0.12 TABLET SUBLINGUAL EVERY 8 HOURS PRN
COMMUNITY

## 2025-06-11 RX ORDER — LORAZEPAM 2 MG/ML
0.5 CONCENTRATE ORAL EVERY 6 HOURS PRN
Qty: 30 ML | Refills: 0 | Status: SHIPPED | OUTPATIENT
Start: 2025-06-11 | End: 2025-06-18

## 2025-06-11 NOTE — PROGRESS NOTES
[facility]  [address], [address], [place] [address]  [phone]    NAME: Anabel Hutton  AGE: 89 y.o. SEX: female    Progress Note    Location: [facility] [place]  POS: 32    Assessment/Plan:    Acute encephalopathy  Patient progressively declining in mentation, alertness and weight loss  Patient was also evaluated by Speech Therapist today  - unable to swallow; no mechanical attempt to swallow despite encouragement  - ST evaluation today initiated due to patient found to be pocketing food and medication - nursing has to sweep food out of tongue  Patient have been progressively declining since in LTCF  - multiple management for MAYELIN and UTI  - Poor overall meal and fluid completion  Recently transitioned to Comfort measures only by family due to decline  D/C all po meds effective today  Start NPO status  - May have comfort feed only with honey thick liquids by spoon  Start comfort medications:  - Morphine oral concentrate 5mg every 4 hours PRN (SOB/MARTIN, terminal restlessness)  - Lorazepam oral solution 0.5mg every 6 hours PRN (increased Anxiety/ terminal restlessness)  - Hyoscyamine 125mcg every 8 hours PRN (increased salivation; oral secretion)  Updated and spoken to patient daughter today - agreeable to plan of care  Code Status/ Goal: Comfort measures only. NO hospitalization    Acute kidney injury superimposed on CKD (HCC)  Lab Results   Component Value Date    EGFR 42 (L) 05/01/2025    EGFR 42 (L) 04/29/2025    EGFR 32 (L) 04/28/2025    CREATININE 1.21 (H) 05/01/2025    CREATININE 1.22 (H) 04/29/2025    CREATININE 1.52 (H) 04/28/2025   Baseline Crea: 1.4-15  Suspect pre-renal in setting of poor overall oral meal and fluid intake since in [facility]  Hx of recurrent MAYELIN pre LTCF admission  Last Renal function (6/4/2025): Crea: 2.14 / BUN: 49/ eGFRcr: 21  - K: 4.9  - NA: 146  - Ca: 10.6  - anion gap: 14  Completed hypodermoclysis  Code Status/ Goal: Comfort measures only    Acute retention of  urine  Urinary retention not new  Recently resumed Deal catheter placement on 6/4/2025  Dark shayla urine in bag today  Hx of recurrent UTI - currently started on Cephalosporins on 6/5/2025      Chief complaint / Reason for visit:  Acute Visit    History of Present Illness:  This is a 89-year-old female patient residing at [facility].  Patient is seen and examined today per nursing request because of progressive decline. ST evaluated patient today in setting of difficulty swallowing and pocketing of food and medicines -nursing had to remove food debris/medication from mouth. Per ST, there is absence of mechanical attempts to chew to swallow -food sits on the tongue and despite multiple encouragement to chew and swallow -patient does not seem to do so.  Patient has recently been managed for MAYELIN and currently on UTI but had not been able to take medications in the last 24 hours due to inability to chew and swallow.  Patient has been consistently of poor overall meal completions since admission to facility and had since declined.     Patient is in bed for this visit -appears to be asleep and unable to awaken on this  visit - breathing is not labored -regular patient does not seem to be in distress. Patient on O2 supplement: 2-3L/min nc - SPO2: 96% - tachycardia regular at HR: 116-120/min - R 19 - Hypothermic Temp: 96.0F today.    Called and spoken to patient's daughter and updated on reason for visit/findings/assessment and plan as indicated above -daughter is agreeable.  Daughters mentioned that she had also noticed progressive decline -did not ate and drink very well over the weekend but had since declined and also noted pooling of food/drinks in mouth that she also had to removed food from patient's mouth.  Per daughter, goal of care is to keep her mother comfortable and pain free as possible.  Extensively discussed about stopping p.o. medications, scheduled meals, change from scheduled meals to comfort  feed only.  Daughter is also aware of the change in texture and consistency of food and liquids as recommended by ST to ensure /minimize risk of aspiration and respiratory distress.  Discussed about starting PRN comfort medications: Morphine/ Lorazepam - daughter agreeable.  Daughter will be visiting patient later today.    Nursing and prior provider notes reviewed on this visit. Discussed visit with PCP and nursing staff/ supervisor.    Review of Systems:  Per history of present illness, all other systems reviewed and negative.    HISTORY:  Medical Hx: Reviewed, unchanged  Family Hx: Reviewed, unchanged  Soc Hx: Reviewed,  unchanged    ALLERGY: Reviewed, unchanged. Allergies[1]     PHYSICAL EXAM:  Vital Signs: T 96.0F --120 -R 19 -BP: 138/66 (6/10/2025) -SpO2 96% 2L O2 nc.  Weight: 167.1 lbs (6/5/2025) <= 175.6 lbs (5/3/2025)    General: Stuporous mentation. No acute distress  Head: Atraumatic. Normocephalic.  Eye Exam: anicteric sclera, no discharge, PERRLA, No injection  Oral Exam: dry mouth/ mucous membranes, no buccaloropharyngeal erythema, palatine tonsils WNL.  Neck Exam: no anterior cervical lymphadenopathy noted, neck supple. Trachea midline, no carotid bruit, no masses  Cardiovascular: tachycardia up to 120/min, regular rhythm, no: murmurs/ rubs/ gallops. S1 and S2 appreciated.  Pulmonary: no wheeze, no rhonchi, no rales. No chest tenderness. Normal chest wall expansion. ON O2 via nc.  Abdominal: soft, non-tender, nondistended, bowel sounds audible x 4 quadrants. No palpable hepatosplenomegaly, no tympany. BM 1-2 x per day: formed  : Non distended bladder. Deal catheter in place : dark shayla urine.   Extremities and skin: no edema noted, no rashes. Intact skin  Neurological: stupor. No tics.    Laboratory / Imaging results reviewed. Last labs done on 6/4/2025    Current Medications: All medications reviewed and updated in Nursing Home eMAR.    Please note: This note was completed in part utilizing  "a voice-recognition software may have been used in the preparation of this document. Grammatical errors, random word insertion, spelling mistakes, and incomplete sentences may be an occasional consequence of the system secondary to software limitations, ambient noise and hardware issues. Occasional wrong word or \"sound-alike\" substitutions may have occurred due to the inherent limitations of voice recognition software. At the time of dictation, efforts were made to edit, clarify and/or correct errors. Interpretation should be guided by context. Please read the chart carefully and recognize, using context, where substitutions have occurred. If you have any questions or concerns about the context, text or information contained within the body of this dictation, please contact myself, the provider, for further clarification.      KASIE Pettit  6/11/2025         [1]   Allergies  Allergen Reactions    Advil [Ibuprofen] GI Intolerance    Propoxyphene Other (See Comments)     DARVOCET=ACID REFLUX    Rofecoxib Other (See Comments)     "

## 2025-06-11 NOTE — ASSESSMENT & PLAN NOTE
Urinary retention not new  Recently resumed Deal catheter placement on 6/4/2025  Dark shayla urine in bag today  Hx of recurrent UTI - currently started on Cephalosporins on 6/5/2025

## 2025-06-11 NOTE — ASSESSMENT & PLAN NOTE
Lab Results   Component Value Date    EGFR 42 (L) 05/01/2025    EGFR 42 (L) 04/29/2025    EGFR 32 (L) 04/28/2025    CREATININE 1.21 (H) 05/01/2025    CREATININE 1.22 (H) 04/29/2025    CREATININE 1.52 (H) 04/28/2025   Baseline Crea: 1.4-15  Suspect pre-renal in setting of poor overall oral meal and fluid intake since in [facility]  Hx of recurrent MAYELIN pre LTCF admission  Last Renal function (6/4/2025): Crea: 2.14 / BUN: 49/ eGFRcr: 21  - K: 4.9  - NA: 146  - Ca: 10.6  - anion gap: 14  Completed hypodermoclysis  Code Status/ Goal: Comfort measures only

## 2025-06-11 NOTE — ASSESSMENT & PLAN NOTE
Patient progressively declining in mentation, alertness and weight loss  Patient was also evaluated by Speech Therapist today  - unable to swallow; no mechanical attempt to swallow despite encouragement  - ST evaluation today initiated due to patient found to be pocketing food and medication - nursing has to sweep food out of tongue  Patient have been progressively declining since in LTCF  - multiple management for MAYELIN and UTI  - Poor overall meal and fluid completion  Recently transitioned to Comfort measures only by family due to decline  D/C all po meds effective today  Start NPO status  - May have comfort feed only with honey thick liquids by spoon  Start comfort medications:  - Morphine oral concentrate 5mg every 4 hours PRN (SOB/MARTIN, terminal restlessness)  - Lorazepam oral solution 0.5mg every 6 hours PRN (increased Anxiety/ terminal restlessness)  - Hyoscyamine 125mcg every 8 hours PRN (increased salivation; oral secretion)  Updated and spoken to patient daughter today - agreeable to plan of care  Code Status/ Goal: Comfort measures only. NO hospitalization

## 2025-06-11 NOTE — PROGRESS NOTES
New script:  1.) Morphine sulfate concentrate 20mg/mL solution. Give Morphine 5mg po/ SL every 4 hours PRN. 30mL. NO refill.  2.) Lorazepam 2mg/mL concentrate. Give Lorazepam 0.5mg po/SL every 6 hours PRN.

## (undated) DEVICE — DISPOSABLE EQUIPMENT COVER: Brand: SMALL TOWEL DRAPE

## (undated) DEVICE — CHLORAPREP HI-LITE 26ML ORANGE

## (undated) DEVICE — GLOVE INDICATOR PI UNDERGLOVE SZ 8.5 BLUE

## (undated) DEVICE — 2.5MM REAMING ROD WITH BALL TIP/950MM-STERILE

## (undated) DEVICE — DRAPE EQUIPMENT RF WAND

## (undated) DEVICE — INTENDED FOR TISSUE SEPARATION, AND OTHER PROCEDURES THAT REQUIRE A SHARP SURGICAL BLADE TO PUNCTURE OR CUT.: Brand: BARD-PARKER SAFETY BLADES SIZE 10, STERILE

## (undated) DEVICE — ARTHROSCOPY FLOOR MAT

## (undated) DEVICE — DRESSING MEPILEX AG BORDER 4 X 4 IN

## (undated) DEVICE — STERILE ORIF HIP PACK: Brand: CARDINAL HEALTH

## (undated) DEVICE — COBAN 4 IN STERILE

## (undated) DEVICE — GLOVE SRG BIOGEL 8.5

## (undated) DEVICE — SUT VICRYL PLUS 0 CTB-1 27 IN VCPB260H

## (undated) DEVICE — DRAPE C-ARMOUR

## (undated) DEVICE — SPONGE PVP SCRUB WING STERILE

## (undated) DEVICE — 6617 IOBAN II PATIENT ISOLATION DRAPE 5/BX,4BX/CS: Brand: STERI-DRAPE™ IOBAN™ 2

## (undated) DEVICE — ACE WRAP 6 IN UNSTERILE

## (undated) DEVICE — 4.2MM THREE-FLUTED DRILL BIT QC/NEEDLE POINT/145MM

## (undated) DEVICE — SUT VICRYL 2-0 CTB-1 36 IN JB945

## (undated) DEVICE — SPONGE GAUZE 4 X 9

## (undated) DEVICE — SOFT SILICONE HYDROCELLULAR FOAM DRESSING WITH LOCK AWAY LAYER: Brand: ALLEVYN LIFE M 12.9X12.9 CTN10

## (undated) DEVICE — 3.2MM GUIDE WIRE 400MM